# Patient Record
Sex: FEMALE | Employment: OTHER | URBAN - METROPOLITAN AREA
[De-identification: names, ages, dates, MRNs, and addresses within clinical notes are randomized per-mention and may not be internally consistent; named-entity substitution may affect disease eponyms.]

---

## 2018-03-07 PROBLEM — L03.317 CELLULITIS OF BUTTOCK: Status: ACTIVE | Noted: 2018-03-07

## 2018-03-07 PROBLEM — Z23 NEED FOR PROPHYLACTIC VACCINATION AGAINST DIPHTHERIA-TETANUS-PERTUSSIS (DTP): Status: ACTIVE | Noted: 2018-03-07

## 2018-03-19 ENCOUNTER — OFFICE VISIT (OUTPATIENT)
Dept: FAMILY MEDICINE CLINIC | Age: 83
End: 2018-03-19
Payer: MEDICARE

## 2018-03-19 ENCOUNTER — HOSPITAL ENCOUNTER (OUTPATIENT)
Age: 83
Discharge: HOME | End: 2018-03-21
Payer: MEDICARE

## 2018-03-19 VITALS
HEART RATE: 63 BPM | BODY MASS INDEX: 32.02 KG/M2 | DIASTOLIC BLOOD PRESSURE: 80 MMHG | OXYGEN SATURATION: 97 % | WEIGHT: 174 LBS | HEIGHT: 62 IN | SYSTOLIC BLOOD PRESSURE: 120 MMHG | RESPIRATION RATE: 18 BRPM | TEMPERATURE: 98.3 F

## 2018-03-19 DIAGNOSIS — E11.21 TYPE II DIABETES MELLITUS WITH NEPHROPATHY (HCC): ICD-10-CM

## 2018-03-19 DIAGNOSIS — L03.317 CELLULITIS OF BUTTOCK: ICD-10-CM

## 2018-03-19 DIAGNOSIS — N18.30 CKD (CHRONIC KIDNEY DISEASE) STAGE 3, GFR 30-59 ML/MIN (HCC): ICD-10-CM

## 2018-03-19 DIAGNOSIS — R53.83 FATIGUE, UNSPECIFIED TYPE: ICD-10-CM

## 2018-03-19 DIAGNOSIS — E78.2 MIXED HYPERLIPIDEMIA: ICD-10-CM

## 2018-03-19 DIAGNOSIS — R73.01 IFG (IMPAIRED FASTING GLUCOSE): ICD-10-CM

## 2018-03-19 DIAGNOSIS — K21.9 GASTROESOPHAGEAL REFLUX DISEASE WITHOUT ESOPHAGITIS: ICD-10-CM

## 2018-03-19 DIAGNOSIS — I10 ESSENTIAL HYPERTENSION: ICD-10-CM

## 2018-03-19 DIAGNOSIS — L03.317 CELLULITIS OF BUTTOCK: Primary | ICD-10-CM

## 2018-03-19 LAB
ALBUMIN SERPL-MCNC: 3.9 G/DL (ref 3.5–5.2)
ALP BLD-CCNC: 79 U/L (ref 35–104)
ALT SERPL-CCNC: 13 U/L (ref 0–32)
ANION GAP SERPL CALCULATED.3IONS-SCNC: 15 MMOL/L (ref 7–16)
AST SERPL-CCNC: 19 U/L (ref 0–31)
BASOPHILS ABSOLUTE: 0.04 E9/L (ref 0–0.2)
BASOPHILS RELATIVE PERCENT: 0.5 % (ref 0–2)
BILIRUB SERPL-MCNC: 0.7 MG/DL (ref 0–1.2)
BUN BLDV-MCNC: 26 MG/DL (ref 8–23)
CALCIUM SERPL-MCNC: 10.1 MG/DL (ref 8.6–10.2)
CHLORIDE BLD-SCNC: 102 MMOL/L (ref 98–107)
CHOLESTEROL, TOTAL: 199 MG/DL (ref 0–199)
CO2: 25 MMOL/L (ref 22–29)
CREAT SERPL-MCNC: 1.1 MG/DL (ref 0.5–1)
EOSINOPHILS ABSOLUTE: 0.08 E9/L (ref 0.05–0.5)
EOSINOPHILS RELATIVE PERCENT: 1 % (ref 0–6)
GFR AFRICAN AMERICAN: 57
GFR NON-AFRICAN AMERICAN: 47 ML/MIN/1.73
GLUCOSE BLD-MCNC: 191 MG/DL (ref 74–109)
HBA1C MFR BLD: 9.5 % (ref 4.8–5.9)
HCT VFR BLD CALC: 43 % (ref 34–48)
HDLC SERPL-MCNC: 46 MG/DL
HEMOGLOBIN: 14.1 G/DL (ref 11.5–15.5)
IMMATURE GRANULOCYTES #: 0.02 E9/L
IMMATURE GRANULOCYTES %: 0.3 % (ref 0–5)
LDL CHOLESTEROL CALCULATED: 108 MG/DL (ref 0–99)
LYMPHOCYTES ABSOLUTE: 2.02 E9/L (ref 1.5–4)
LYMPHOCYTES RELATIVE PERCENT: 25.5 % (ref 20–42)
MCH RBC QN AUTO: 32 PG (ref 26–35)
MCHC RBC AUTO-ENTMCNC: 32.8 % (ref 32–34.5)
MCV RBC AUTO: 97.7 FL (ref 80–99.9)
MONOCYTES ABSOLUTE: 0.57 E9/L (ref 0.1–0.95)
MONOCYTES RELATIVE PERCENT: 7.2 % (ref 2–12)
NEUTROPHILS ABSOLUTE: 5.19 E9/L (ref 1.8–7.3)
NEUTROPHILS RELATIVE PERCENT: 65.5 % (ref 43–80)
PDW BLD-RTO: 12.9 FL (ref 11.5–15)
PLATELET # BLD: 159 E9/L (ref 130–450)
PMV BLD AUTO: 10.2 FL (ref 7–12)
POTASSIUM SERPL-SCNC: 5.1 MMOL/L (ref 3.5–5)
RBC # BLD: 4.4 E12/L (ref 3.5–5.5)
SODIUM BLD-SCNC: 142 MMOL/L (ref 132–146)
TOTAL PROTEIN: 7.1 G/DL (ref 6.4–8.3)
TRIGL SERPL-MCNC: 224 MG/DL (ref 0–149)
TSH SERPL DL<=0.05 MIU/L-ACNC: 1.13 UIU/ML (ref 0.27–4.2)
VLDLC SERPL CALC-MCNC: 45 MG/DL
WBC # BLD: 7.9 E9/L (ref 4.5–11.5)

## 2018-03-19 PROCEDURE — 99214 OFFICE O/P EST MOD 30 MIN: CPT | Performed by: FAMILY MEDICINE

## 2018-03-19 PROCEDURE — 1090F PRES/ABSN URINE INCON ASSESS: CPT | Performed by: FAMILY MEDICINE

## 2018-03-19 PROCEDURE — G8484 FLU IMMUNIZE NO ADMIN: HCPCS | Performed by: FAMILY MEDICINE

## 2018-03-19 PROCEDURE — 4040F PNEUMOC VAC/ADMIN/RCVD: CPT | Performed by: FAMILY MEDICINE

## 2018-03-19 PROCEDURE — G8417 CALC BMI ABV UP PARAM F/U: HCPCS | Performed by: FAMILY MEDICINE

## 2018-03-19 PROCEDURE — 84443 ASSAY THYROID STIM HORMONE: CPT

## 2018-03-19 PROCEDURE — 83036 HEMOGLOBIN GLYCOSYLATED A1C: CPT

## 2018-03-19 PROCEDURE — 80061 LIPID PANEL: CPT

## 2018-03-19 PROCEDURE — G8400 PT W/DXA NO RESULTS DOC: HCPCS | Performed by: FAMILY MEDICINE

## 2018-03-19 PROCEDURE — 1036F TOBACCO NON-USER: CPT | Performed by: FAMILY MEDICINE

## 2018-03-19 PROCEDURE — 1123F ACP DISCUSS/DSCN MKR DOCD: CPT | Performed by: FAMILY MEDICINE

## 2018-03-19 PROCEDURE — G8427 DOCREV CUR MEDS BY ELIG CLIN: HCPCS | Performed by: FAMILY MEDICINE

## 2018-03-19 PROCEDURE — 80053 COMPREHEN METABOLIC PANEL: CPT

## 2018-03-19 PROCEDURE — 85025 COMPLETE CBC W/AUTO DIFF WBC: CPT

## 2018-03-19 RX ORDER — KETOCONAZOLE 20 MG/G
CREAM TOPICAL
Qty: 60 G | Refills: 1 | Status: SHIPPED | OUTPATIENT
Start: 2018-03-19 | End: 2018-04-04 | Stop reason: CLARIF

## 2018-03-20 DIAGNOSIS — E11.21 TYPE II DIABETES MELLITUS WITH NEPHROPATHY (HCC): Primary | ICD-10-CM

## 2018-03-20 RX ORDER — BLOOD-GLUCOSE METER
1 KIT MISCELLANEOUS 2 TIMES DAILY
Qty: 1 KIT | Refills: 0 | Status: ON HOLD | OUTPATIENT
Start: 2018-03-20 | End: 2019-10-24

## 2018-03-20 NOTE — TELEPHONE ENCOUNTER
Glucometer order sent to [facility] per [facility] request.

## 2018-03-23 ASSESSMENT — ENCOUNTER SYMPTOMS
STRIDOR: 0
PHOTOPHOBIA: 0
SPUTUM PRODUCTION: 0
ORTHOPNEA: 0
SHORTNESS OF BREATH: 0
RESPIRATORY NEGATIVE: 1
DOUBLE VISION: 0
CONSTIPATION: 0
EYES NEGATIVE: 1
HEMOPTYSIS: 0
BLOOD IN STOOL: 0
EYE REDNESS: 0
WHEEZING: 0
COUGH: 0
NAUSEA: 0
BLURRED VISION: 0
EYE DISCHARGE: 0
SORE THROAT: 0
BACK PAIN: 0
DIARRHEA: 0
HEARTBURN: 1
EYE PAIN: 0
SINUS PAIN: 0
ABDOMINAL PAIN: 0
VOMITING: 0

## 2018-03-23 NOTE — PROGRESS NOTES
nervous/anxious. The patient does not have insomnia. Past Medical/Surgical Hx;  Reviewed with patient      Diagnosis Date    Anxiety     Depression     Hypertension     Peripheral neuropathy ([address]      Past Surgical History:   Procedure Laterality Date    CHOLECYSTECTOMY      JOINT REPLACEMENT      SKIN CANCER EXCISION         Past Family Hx:  Reviewed with patient  No family history on file. Social Hx:  Reviewed with patient  Social History   Substance Use Topics    Smoking status: Never Smoker    Smokeless tobacco: Never Used    Alcohol use No       OBJECTIVE  /80   Pulse 63   Temp 98.3 °F (36.8 °C) (Temporal)   Resp 18   Ht 5' 2\" (1.575 m)   Wt 174 lb (78.9 kg)   LMP  (LMP Unknown)   SpO2 97%   Breastfeeding? No   BMI 31.83 kg/m²     Problem List:  Jo Ann Morrow  does not have any pertinent problems on file. PHYS EX:  Physical Exam   Constitutional: She is oriented to person, place, and time. She appears well-developed and well-nourished. No distress. HENT:   Head: Normocephalic and atraumatic. Right Ear: External ear normal.   Left Ear: External ear normal.   Nose: Nose normal.   Mouth/Throat: Oropharynx is clear and moist. No oropharyngeal exudate. Eyes: Conjunctivae and EOM are normal. Pupils are equal, round, and reactive to light. Right eye exhibits no discharge. Left eye exhibits no discharge. No scleral icterus. Neck: Normal range of motion. Neck supple. No JVD present. No tracheal deviation present. No thyromegaly present. Cardiovascular: Normal rate, regular rhythm, normal heart sounds and intact distal pulses. Exam reveals no gallop and no friction rub. No murmur heard. Pulmonary/Chest: Effort normal and breath sounds normal. No stridor. No respiratory distress. She has no wheezes. She has no rales. She exhibits no tenderness. Abdominal: Soft. Bowel sounds are normal. She exhibits no distension and no mass. There is no tenderness.  There is no rebound (impaired fasting glucose)  -     CBC Auto Differential; Future  -     Comprehensive Metabolic Panel; Future  -     Lipid Panel; Future  -     TSH without Reflex; Future  -     HEMOGLOBIN A1C; Future  Instructed on lab. Fatigue, unspecified type  -     CBC Auto Differential; Future  -     Comprehensive Metabolic Panel; Future  Not controlled. Lab. Outpatient Encounter Prescriptions as of 3/19/2018   Medication Sig Dispense Refill    ketoconazole (NIZORAL) 2 % cream Apply topically daily. 60 g 1    montelukast (SINGULAIR) 10 MG tablet TAKE 1 TABLET NIGHTLY 90 tablet 1    mupirocin (BACTROBAN) 2 % cream Apply 3 times daily. 1 Tube 0    carBAMazepine (TEGRETOL-XR) 100 MG extended release tablet Take 1 tablet by mouth 2 times daily 180 tablet 1    furosemide (LASIX) 20 MG tablet Take 1 tablet by mouth daily 90 tablet 1    nitroGLYCERIN (NITROSTAT) 0.4 MG SL tablet Place 1 tablet under the tongue every 5 minutes as needed for Chest pain 25 tablet 3    albuterol sulfate  (90 Base) MCG/ACT inhaler Inhale 2 puffs into the lungs every 6 hours as needed for Wheezing or Shortness of Breath 1 Inhaler 5    ipratropium-albuterol (DUONEB) 0.5-2.5 (3) MG/3ML SOLN nebulizer solution Take 3 mLs by nebulization every 6 hours 120 vial 5    gabapentin (NEURONTIN) 300 MG capsule Take two tablets three times a day. 540 capsule 1    atenolol (TENORMIN) 50 MG tablet Take 1 tablet by mouth daily 90 tablet 1    busPIRone (BUSPAR) 5 MG tablet Take 1 tablet by mouth 3 times daily 270 tablet 1    sertraline (ZOLOFT) 100 MG tablet Take 1 1/2 tablets once a day 135 tablet 1    SYMBICORT 160-4.5 MCG/ACT AERO Inhale 2 puffs into the lungs 2 times daily 1 Inhaler 3    Omega-3-Acid Eth Est, Dietary, 1 G CAPS Take 2 tablets by mouth 2 times daily      Liraglutide (VICTOZA) 18 MG/3ML SOPN SC injection Inject 1.2 mg into the skin daily      aspirin EC 81 MG EC tablet Take 81 mg by mouth daily.       Calcium-Vitamin D

## 2018-03-26 ENCOUNTER — TELEPHONE (OUTPATIENT)
Dept: FAMILY MEDICINE CLINIC | Age: 83
End: 2018-03-26

## 2018-03-26 DIAGNOSIS — L03.317 CELLULITIS OF BUTTOCK: ICD-10-CM

## 2018-03-26 RX ORDER — MUPIROCIN CALCIUM 20 MG/G
CREAM TOPICAL
Qty: 1 TUBE | Refills: 3 | Status: SHIPPED | OUTPATIENT
Start: 2018-03-26 | End: 2018-04-25

## 2018-04-02 RX ORDER — ATENOLOL 50 MG/1
50 TABLET ORAL DAILY
Qty: 90 TABLET | Refills: 1 | Status: SHIPPED | OUTPATIENT
Start: 2018-04-02 | End: 2018-11-26 | Stop reason: SDUPTHER

## 2018-04-04 ENCOUNTER — HOSPITAL ENCOUNTER (OUTPATIENT)
Age: 83
Discharge: HOME | End: 2018-04-06
Payer: MEDICARE

## 2018-04-04 ENCOUNTER — OFFICE VISIT (OUTPATIENT)
Dept: FAMILY MEDICINE CLINIC | Age: 83
End: 2018-04-04
Payer: MEDICARE

## 2018-04-04 ENCOUNTER — TELEPHONE (OUTPATIENT)
Dept: FAMILY MEDICINE CLINIC | Age: 83
End: 2018-04-04

## 2018-04-04 VITALS
WEIGHT: 172.6 LBS | OXYGEN SATURATION: 95 % | RESPIRATION RATE: 20 BRPM | BODY MASS INDEX: 31.76 KG/M2 | SYSTOLIC BLOOD PRESSURE: 118 MMHG | HEIGHT: 62 IN | DIASTOLIC BLOOD PRESSURE: 78 MMHG | HEART RATE: 72 BPM

## 2018-04-04 DIAGNOSIS — R32 URINARY INCONTINENCE, UNSPECIFIED TYPE: ICD-10-CM

## 2018-04-04 DIAGNOSIS — L03.317 CELLULITIS OF BUTTOCK: Primary | ICD-10-CM

## 2018-04-04 DIAGNOSIS — E11.65 TYPE 2 DIABETES MELLITUS WITH HYPERGLYCEMIA, UNSPECIFIED LONG TERM INSULIN USE STATUS: ICD-10-CM

## 2018-04-04 DIAGNOSIS — N18.30 CKD (CHRONIC KIDNEY DISEASE) STAGE 3, GFR 30-59 ML/MIN (HCC): ICD-10-CM

## 2018-04-04 DIAGNOSIS — M19.90 OSTEOARTHRITIS, UNSPECIFIED OSTEOARTHRITIS TYPE, UNSPECIFIED SITE: ICD-10-CM

## 2018-04-04 PROCEDURE — G8427 DOCREV CUR MEDS BY ELIG CLIN: HCPCS | Performed by: FAMILY MEDICINE

## 2018-04-04 PROCEDURE — 99214 OFFICE O/P EST MOD 30 MIN: CPT | Performed by: FAMILY MEDICINE

## 2018-04-04 PROCEDURE — 87070 CULTURE OTHR SPECIMN AEROBIC: CPT

## 2018-04-04 PROCEDURE — G8400 PT W/DXA NO RESULTS DOC: HCPCS | Performed by: FAMILY MEDICINE

## 2018-04-04 PROCEDURE — 1036F TOBACCO NON-USER: CPT | Performed by: FAMILY MEDICINE

## 2018-04-04 PROCEDURE — 1123F ACP DISCUSS/DSCN MKR DOCD: CPT | Performed by: FAMILY MEDICINE

## 2018-04-04 PROCEDURE — 0509F URINE INCON PLAN DOCD: CPT | Performed by: FAMILY MEDICINE

## 2018-04-04 PROCEDURE — 1090F PRES/ABSN URINE INCON ASSESS: CPT | Performed by: FAMILY MEDICINE

## 2018-04-04 PROCEDURE — 4040F PNEUMOC VAC/ADMIN/RCVD: CPT | Performed by: FAMILY MEDICINE

## 2018-04-04 PROCEDURE — G8417 CALC BMI ABV UP PARAM F/U: HCPCS | Performed by: FAMILY MEDICINE

## 2018-04-04 PROCEDURE — 87077 CULTURE AEROBIC IDENTIFY: CPT

## 2018-04-04 PROCEDURE — 87186 SC STD MICRODIL/AGAR DIL: CPT

## 2018-04-04 RX ORDER — DOXYCYCLINE HYCLATE 100 MG
100 TABLET ORAL 2 TIMES DAILY
Qty: 20 TABLET | Refills: 0 | Status: SHIPPED | OUTPATIENT
Start: 2018-04-04 | End: 2018-04-14

## 2018-04-07 LAB
ORGANISM: ABNORMAL
ORGANISM: ABNORMAL
WOUND/ABSCESS: ABNORMAL
WOUND/ABSCESS: ABNORMAL

## 2018-04-08 PROBLEM — M19.90 OSTEOARTHRITIS: Status: ACTIVE | Noted: 2018-04-08

## 2018-04-08 PROBLEM — R32 URINARY INCONTINENCE: Status: ACTIVE | Noted: 2018-04-08

## 2018-04-08 ASSESSMENT — ENCOUNTER SYMPTOMS
EYE DISCHARGE: 0
VOMITING: 0
DOUBLE VISION: 0
NAUSEA: 0
CONSTIPATION: 0
HEARTBURN: 0
BACK PAIN: 1
EYES NEGATIVE: 1
BLURRED VISION: 0
ORTHOPNEA: 0
SORE THROAT: 0
STRIDOR: 0
SPUTUM PRODUCTION: 0
EYE REDNESS: 0
EYE PAIN: 0
COUGH: 0
DIARRHEA: 0
SHORTNESS OF BREATH: 0
ABDOMINAL PAIN: 0
GASTROINTESTINAL NEGATIVE: 1
WHEEZING: 0
PHOTOPHOBIA: 0
SINUS PAIN: 0
RESPIRATORY NEGATIVE: 1
BLOOD IN STOOL: 0
HEMOPTYSIS: 0

## 2018-04-13 ENCOUNTER — HOSPITAL ENCOUNTER (OUTPATIENT)
Dept: WOUND CARE | Age: 83
Discharge: HOME | End: 2018-04-13
Payer: MEDICARE

## 2018-04-13 ENCOUNTER — HOSPITAL ENCOUNTER (OUTPATIENT)
Dept: WOUND CARE | Age: 83
Discharge: HOME | End: 2018-04-13

## 2018-04-13 VITALS
HEIGHT: 62 IN | SYSTOLIC BLOOD PRESSURE: 102 MMHG | WEIGHT: 172 LBS | RESPIRATION RATE: 18 BRPM | HEART RATE: 62 BPM | BODY MASS INDEX: 31.65 KG/M2 | TEMPERATURE: 97.9 F | DIASTOLIC BLOOD PRESSURE: 62 MMHG

## 2018-04-13 DIAGNOSIS — L89.152 DECUBITUS ULCER OF COCCYGEAL REGION, STAGE 2 (HCC): ICD-10-CM

## 2018-04-13 PROCEDURE — 11042 DBRDMT SUBQ TIS 1ST 20SQCM/<: CPT

## 2018-04-13 PROCEDURE — 97597 DBRDMT OPN WND 1ST 20 CM/<: CPT | Performed by: NURSE PRACTITIONER

## 2018-04-13 RX ORDER — KETOCONAZOLE 20 MG/G
CREAM TOPICAL DAILY
Status: ON HOLD | COMMUNITY
End: 2018-08-17 | Stop reason: HOSPADM

## 2018-04-20 ENCOUNTER — HOSPITAL ENCOUNTER (OUTPATIENT)
Dept: WOUND CARE | Age: 83
Discharge: HOME | End: 2018-04-20
Payer: MEDICARE

## 2018-04-27 ENCOUNTER — HOSPITAL ENCOUNTER (OUTPATIENT)
Dept: WOUND CARE | Age: 83
Discharge: HOME | End: 2018-04-27
Payer: MEDICARE

## 2018-04-27 VITALS
HEIGHT: 62 IN | HEART RATE: 67 BPM | SYSTOLIC BLOOD PRESSURE: 136 MMHG | DIASTOLIC BLOOD PRESSURE: 74 MMHG | WEIGHT: 180 LBS | TEMPERATURE: 97.9 F | RESPIRATION RATE: 16 BRPM | BODY MASS INDEX: 33.13 KG/M2

## 2018-04-27 DIAGNOSIS — L89.152 DECUBITUS ULCER OF COCCYGEAL REGION, STAGE 2 (HCC): ICD-10-CM

## 2018-04-27 PROCEDURE — 11042 DBRDMT SUBQ TIS 1ST 20SQCM/<: CPT

## 2018-04-27 PROCEDURE — 97597 DBRDMT OPN WND 1ST 20 CM/<: CPT | Performed by: NURSE PRACTITIONER

## 2018-05-08 RX ORDER — SERTRALINE HYDROCHLORIDE 100 MG/1
TABLET, FILM COATED ORAL
Qty: 135 TABLET | Refills: 1 | Status: SHIPPED | OUTPATIENT
Start: 2018-05-08 | End: 2018-11-26 | Stop reason: SDUPTHER

## 2018-05-11 ENCOUNTER — HOSPITAL ENCOUNTER (OUTPATIENT)
Dept: WOUND CARE | Age: 83
Discharge: HOME | End: 2018-05-11
Payer: MEDICARE

## 2018-05-11 VITALS
HEART RATE: 80 BPM | DIASTOLIC BLOOD PRESSURE: 82 MMHG | TEMPERATURE: 98.2 F | SYSTOLIC BLOOD PRESSURE: 152 MMHG | RESPIRATION RATE: 16 BRPM

## 2018-05-11 PROCEDURE — 97597 DBRDMT OPN WND 1ST 20 CM/<: CPT

## 2018-05-11 PROCEDURE — 97597 DBRDMT OPN WND 1ST 20 CM/<: CPT | Performed by: NURSE PRACTITIONER

## 2018-05-22 ENCOUNTER — TELEPHONE (OUTPATIENT)
Dept: FAMILY MEDICINE CLINIC | Age: 83
End: 2018-05-22

## 2018-05-25 ENCOUNTER — HOSPITAL ENCOUNTER (OUTPATIENT)
Dept: WOUND CARE | Age: 83
Discharge: HOME | End: 2018-05-25

## 2018-06-01 ENCOUNTER — HOSPITAL ENCOUNTER (OUTPATIENT)
Dept: WOUND CARE | Age: 83
Discharge: HOME | End: 2018-06-01
Payer: MEDICARE

## 2018-06-01 VITALS
RESPIRATION RATE: 18 BRPM | HEART RATE: 80 BPM | SYSTOLIC BLOOD PRESSURE: 122 MMHG | HEIGHT: 62 IN | BODY MASS INDEX: 33.13 KG/M2 | WEIGHT: 180 LBS | DIASTOLIC BLOOD PRESSURE: 62 MMHG | TEMPERATURE: 98.1 F

## 2018-06-01 DIAGNOSIS — L89.152 DECUBITUS ULCER OF COCCYGEAL REGION, STAGE 2 (HCC): ICD-10-CM

## 2018-06-01 PROCEDURE — 99213 OFFICE O/P EST LOW 20 MIN: CPT

## 2018-06-01 PROCEDURE — 99212 OFFICE O/P EST SF 10 MIN: CPT | Performed by: NURSE PRACTITIONER

## 2018-06-08 ENCOUNTER — OFFICE VISIT (OUTPATIENT)
Dept: FAMILY MEDICINE CLINIC | Age: 83
End: 2018-06-08
Payer: MEDICARE

## 2018-06-08 VITALS
DIASTOLIC BLOOD PRESSURE: 66 MMHG | SYSTOLIC BLOOD PRESSURE: 128 MMHG | OXYGEN SATURATION: 95 % | BODY MASS INDEX: 30.73 KG/M2 | HEART RATE: 72 BPM | WEIGHT: 167 LBS | HEIGHT: 62 IN

## 2018-06-08 DIAGNOSIS — E11.21 TYPE II DIABETES MELLITUS WITH NEPHROPATHY (HCC): Primary | ICD-10-CM

## 2018-06-08 DIAGNOSIS — G62.9 PERIPHERAL POLYNEUROPATHY: ICD-10-CM

## 2018-06-08 DIAGNOSIS — L30.9 DERMATITIS: ICD-10-CM

## 2018-06-08 DIAGNOSIS — I10 ESSENTIAL HYPERTENSION: ICD-10-CM

## 2018-06-08 DIAGNOSIS — L03.317 CELLULITIS OF BUTTOCK: ICD-10-CM

## 2018-06-08 LAB
GLUCOSE BLD-MCNC: 153 MG/DL
HBA1C MFR BLD: 7.2 %

## 2018-06-08 PROCEDURE — G8417 CALC BMI ABV UP PARAM F/U: HCPCS | Performed by: FAMILY MEDICINE

## 2018-06-08 PROCEDURE — 1090F PRES/ABSN URINE INCON ASSESS: CPT | Performed by: FAMILY MEDICINE

## 2018-06-08 PROCEDURE — 82962 GLUCOSE BLOOD TEST: CPT | Performed by: FAMILY MEDICINE

## 2018-06-08 PROCEDURE — 99214 OFFICE O/P EST MOD 30 MIN: CPT | Performed by: FAMILY MEDICINE

## 2018-06-08 PROCEDURE — 1036F TOBACCO NON-USER: CPT | Performed by: FAMILY MEDICINE

## 2018-06-08 PROCEDURE — G8427 DOCREV CUR MEDS BY ELIG CLIN: HCPCS | Performed by: FAMILY MEDICINE

## 2018-06-08 PROCEDURE — 4040F PNEUMOC VAC/ADMIN/RCVD: CPT | Performed by: FAMILY MEDICINE

## 2018-06-08 PROCEDURE — 83036 HEMOGLOBIN GLYCOSYLATED A1C: CPT | Performed by: FAMILY MEDICINE

## 2018-06-08 PROCEDURE — G8400 PT W/DXA NO RESULTS DOC: HCPCS | Performed by: FAMILY MEDICINE

## 2018-06-08 PROCEDURE — 1123F ACP DISCUSS/DSCN MKR DOCD: CPT | Performed by: FAMILY MEDICINE

## 2018-06-08 RX ORDER — NYSTATIN 100000 [USP'U]/G
POWDER TOPICAL
Qty: 1 BOTTLE | Refills: 1 | Status: ON HOLD | OUTPATIENT
Start: 2018-06-08 | End: 2018-08-17 | Stop reason: HOSPADM

## 2018-06-12 ASSESSMENT — ENCOUNTER SYMPTOMS
VOMITING: 0
HEARTBURN: 0
EYE REDNESS: 0
SHORTNESS OF BREATH: 0
STRIDOR: 0
SINUS PAIN: 0
BLURRED VISION: 0
NAUSEA: 0
ORTHOPNEA: 0
EYES NEGATIVE: 1
RESPIRATORY NEGATIVE: 1
BACK PAIN: 0
COUGH: 0
DOUBLE VISION: 0
GASTROINTESTINAL NEGATIVE: 1
BLOOD IN STOOL: 0
ABDOMINAL PAIN: 0
SORE THROAT: 0
SPUTUM PRODUCTION: 0
DIARRHEA: 0
WHEEZING: 0
EYE PAIN: 0
PHOTOPHOBIA: 0
EYE DISCHARGE: 0
CONSTIPATION: 0
HEMOPTYSIS: 0

## 2018-06-19 ENCOUNTER — TELEPHONE (OUTPATIENT)
Dept: FAMILY MEDICINE CLINIC | Age: 83
End: 2018-06-19

## 2018-06-19 DIAGNOSIS — E11.21 TYPE II DIABETES MELLITUS WITH NEPHROPATHY (HCC): Primary | ICD-10-CM

## 2018-06-27 DIAGNOSIS — E11.21 TYPE II DIABETES MELLITUS WITH NEPHROPATHY (HCC): ICD-10-CM

## 2018-06-27 DIAGNOSIS — N18.30 CKD (CHRONIC KIDNEY DISEASE) STAGE 3, GFR 30-59 ML/MIN (HCC): ICD-10-CM

## 2018-06-27 RX ORDER — GABAPENTIN 300 MG/1
CAPSULE ORAL
Qty: 540 CAPSULE | Refills: 1 | Status: SHIPPED | OUTPATIENT
Start: 2018-06-27 | End: 2018-11-26 | Stop reason: SDUPTHER

## 2018-08-13 ENCOUNTER — TELEPHONE (OUTPATIENT)
Dept: FAMILY MEDICINE CLINIC | Age: 83
End: 2018-08-13

## 2018-08-13 ENCOUNTER — HOSPITAL ENCOUNTER (INPATIENT)
Age: 83
LOS: 4 days | Discharge: NURSING, REHAB OR LONG-TERM CARE | DRG: 193 | End: 2018-08-17
Attending: EMERGENCY MEDICINE | Admitting: INTERNAL MEDICINE
Payer: MEDICARE

## 2018-08-13 ENCOUNTER — HOSPITAL ENCOUNTER (OUTPATIENT)
Age: 83
Discharge: HOME | End: 2018-08-13
Payer: MEDICARE

## 2018-08-13 ENCOUNTER — APPOINTMENT (OUTPATIENT)
Dept: GENERAL RADIOLOGY | Age: 83
DRG: 193 | End: 2018-08-13
Payer: MEDICARE

## 2018-08-13 DIAGNOSIS — J18.9 PNEUMONIA DUE TO ORGANISM: Primary | ICD-10-CM

## 2018-08-13 PROBLEM — R65.10 SIRS (SYSTEMIC INFLAMMATORY RESPONSE SYNDROME) (HCC): Status: ACTIVE | Noted: 2018-08-13

## 2018-08-13 PROBLEM — I10 HTN (HYPERTENSION), BENIGN: Chronic | Status: ACTIVE | Noted: 2018-08-13

## 2018-08-13 PROBLEM — L03.317 CELLULITIS OF BUTTOCK: Status: RESOLVED | Noted: 2018-03-07 | Resolved: 2018-08-13

## 2018-08-13 PROBLEM — J96.01 ACUTE HYPOXEMIC RESPIRATORY FAILURE (HCC): Status: ACTIVE | Noted: 2018-08-13

## 2018-08-13 LAB
ALBUMIN SERPL-MCNC: 4 G/DL (ref 3.5–5.2)
ALP BLD-CCNC: 70 U/L (ref 35–104)
ALT SERPL-CCNC: 58 U/L (ref 0–32)
ANION GAP SERPL CALCULATED.3IONS-SCNC: 15 MMOL/L (ref 7–16)
APTT: 31 SEC (ref 25.7–34.7)
AST SERPL-CCNC: 95 U/L (ref 0–31)
BASOPHILS ABSOLUTE: 0.06 E9/L (ref 0–0.2)
BASOPHILS RELATIVE PERCENT: 0.3 % (ref 0–2)
BILIRUB SERPL-MCNC: 0.7 MG/DL (ref 0–1.2)
BILIRUBIN URINE: NEGATIVE
BLOOD, URINE: NEGATIVE
BUN BLDV-MCNC: 25 MG/DL (ref 8–23)
CALCIUM SERPL-MCNC: 9.8 MG/DL (ref 8.6–10.2)
CHLORIDE BLD-SCNC: 99 MMOL/L (ref 98–107)
CLARITY: CLEAR
CO2: 27 MMOL/L (ref 22–29)
COLOR: YELLOW
CREAT SERPL-MCNC: 1.3 MG/DL (ref 0.5–1)
EOSINOPHILS ABSOLUTE: 0.08 E9/L (ref 0.05–0.5)
EOSINOPHILS RELATIVE PERCENT: 0.4 % (ref 0–6)
GFR AFRICAN AMERICAN: 47
GFR NON-AFRICAN AMERICAN: 39 ML/MIN/1.73
GLUCOSE BLD-MCNC: 320 MG/DL (ref 74–109)
GLUCOSE URINE: 500 MG/DL
HCT VFR BLD CALC: 38.7 % (ref 34–48)
HEMOGLOBIN: 13.3 G/DL (ref 11.5–15.5)
IMMATURE GRANULOCYTES #: 0.16 E9/L
IMMATURE GRANULOCYTES %: 0.8 % (ref 0–5)
INR BLD: 1.1
KETONES, URINE: NEGATIVE MG/DL
LACTIC ACID: 3.2 MMOL/L (ref 0.5–2.2)
LEUKOCYTE ESTERASE, URINE: NEGATIVE
LIPASE: 24 U/L (ref 13–60)
LYMPHOCYTES ABSOLUTE: 1.37 E9/L (ref 1.5–4)
LYMPHOCYTES RELATIVE PERCENT: 6.7 % (ref 20–42)
MCH RBC QN AUTO: 33.2 PG (ref 26–35)
MCHC RBC AUTO-ENTMCNC: 34.4 % (ref 32–34.5)
MCV RBC AUTO: 96.5 FL (ref 80–99.9)
METER GLUCOSE: 223 MG/DL (ref 70–110)
MONOCYTES ABSOLUTE: 0.95 E9/L (ref 0.1–0.95)
MONOCYTES RELATIVE PERCENT: 4.6 % (ref 2–12)
NEUTROPHILS ABSOLUTE: 17.96 E9/L (ref 1.8–7.3)
NEUTROPHILS RELATIVE PERCENT: 87.2 % (ref 43–80)
NITRITE, URINE: NEGATIVE
PDW BLD-RTO: 13 FL (ref 11.5–15)
PH UA: 6.5 (ref 5–9)
PLATELET # BLD: 165 E9/L (ref 130–450)
PMV BLD AUTO: 9.2 FL (ref 7–12)
POTASSIUM SERPL-SCNC: 4.5 MMOL/L (ref 3.5–5)
PRO-BNP: 1445 PG/ML (ref 0–450)
PROTEIN UA: NEGATIVE MG/DL
PROTHROMBIN TIME: 12.5 SEC (ref 9.3–12.4)
RBC # BLD: 4.01 E12/L (ref 3.5–5.5)
SODIUM BLD-SCNC: 141 MMOL/L (ref 132–146)
SPECIFIC GRAVITY UA: 1.01 (ref 1–1.03)
TOTAL PROTEIN: 7.2 G/DL (ref 6.4–8.3)
TROPONIN: 0.1 NG/ML (ref 0–0.03)
UROBILINOGEN, URINE: 0.2 E.U./DL
WBC # BLD: 20.6 E9/L (ref 4.5–11.5)

## 2018-08-13 PROCEDURE — 87798 DETECT AGENT NOS DNA AMP: CPT

## 2018-08-13 PROCEDURE — 87581 M.PNEUMON DNA AMP PROBE: CPT

## 2018-08-13 PROCEDURE — 82962 GLUCOSE BLOOD TEST: CPT

## 2018-08-13 PROCEDURE — 84484 ASSAY OF TROPONIN QUANT: CPT

## 2018-08-13 PROCEDURE — 36415 COLL VENOUS BLD VENIPUNCTURE: CPT

## 2018-08-13 PROCEDURE — A0425 GROUND MILEAGE: HCPCS

## 2018-08-13 PROCEDURE — A0426 ALS 1: HCPCS

## 2018-08-13 PROCEDURE — 6360000002 HC RX W HCPCS: Performed by: INTERNAL MEDICINE

## 2018-08-13 PROCEDURE — 87502 INFLUENZA DNA AMP PROBE: CPT

## 2018-08-13 PROCEDURE — 71045 X-RAY EXAM CHEST 1 VIEW: CPT

## 2018-08-13 PROCEDURE — 2700000000 HC OXYGEN THERAPY PER DAY

## 2018-08-13 PROCEDURE — 83880 ASSAY OF NATRIURETIC PEPTIDE: CPT

## 2018-08-13 PROCEDURE — 80053 COMPREHEN METABOLIC PANEL: CPT

## 2018-08-13 PROCEDURE — 85730 THROMBOPLASTIN TIME PARTIAL: CPT

## 2018-08-13 PROCEDURE — 83605 ASSAY OF LACTIC ACID: CPT

## 2018-08-13 PROCEDURE — 2060000000 HC ICU INTERMEDIATE R&B

## 2018-08-13 PROCEDURE — 6360000002 HC RX W HCPCS: Performed by: NURSE PRACTITIONER

## 2018-08-13 PROCEDURE — 99285 EMERGENCY DEPT VISIT HI MDM: CPT

## 2018-08-13 PROCEDURE — 2580000003 HC RX 258: Performed by: NURSE PRACTITIONER

## 2018-08-13 PROCEDURE — 87040 BLOOD CULTURE FOR BACTERIA: CPT

## 2018-08-13 PROCEDURE — 85025 COMPLETE CBC W/AUTO DIFF WBC: CPT

## 2018-08-13 PROCEDURE — 83690 ASSAY OF LIPASE: CPT

## 2018-08-13 PROCEDURE — 87503 INFLUENZA DNA AMP PROB ADDL: CPT

## 2018-08-13 PROCEDURE — 87088 URINE BACTERIA CULTURE: CPT

## 2018-08-13 PROCEDURE — 81003 URINALYSIS AUTO W/O SCOPE: CPT

## 2018-08-13 PROCEDURE — 6370000000 HC RX 637 (ALT 250 FOR IP): Performed by: INTERNAL MEDICINE

## 2018-08-13 PROCEDURE — 85610 PROTHROMBIN TIME: CPT

## 2018-08-13 PROCEDURE — 87486 CHLMYD PNEUM DNA AMP PROBE: CPT

## 2018-08-13 PROCEDURE — 2580000003 HC RX 258: Performed by: INTERNAL MEDICINE

## 2018-08-13 RX ORDER — IPRATROPIUM BROMIDE AND ALBUTEROL SULFATE 2.5; .5 MG/3ML; MG/3ML
1 SOLUTION RESPIRATORY (INHALATION)
Status: DISCONTINUED | OUTPATIENT
Start: 2018-08-14 | End: 2018-08-17 | Stop reason: HOSPADM

## 2018-08-13 RX ORDER — ATENOLOL 50 MG/1
50 TABLET ORAL DAILY
Status: DISCONTINUED | OUTPATIENT
Start: 2018-08-14 | End: 2018-08-17 | Stop reason: HOSPADM

## 2018-08-13 RX ORDER — SERTRALINE HYDROCHLORIDE 100 MG/1
150 TABLET, FILM COATED ORAL DAILY
Status: DISCONTINUED | OUTPATIENT
Start: 2018-08-14 | End: 2018-08-17 | Stop reason: HOSPADM

## 2018-08-13 RX ORDER — ONDANSETRON 2 MG/ML
4 INJECTION INTRAMUSCULAR; INTRAVENOUS EVERY 6 HOURS PRN
Status: DISCONTINUED | OUTPATIENT
Start: 2018-08-13 | End: 2018-08-17 | Stop reason: HOSPADM

## 2018-08-13 RX ORDER — NICOTINE POLACRILEX 4 MG
15 LOZENGE BUCCAL PRN
Status: DISCONTINUED | OUTPATIENT
Start: 2018-08-13 | End: 2018-08-17 | Stop reason: HOSPADM

## 2018-08-13 RX ORDER — ACETAMINOPHEN 325 MG/1
650 TABLET ORAL EVERY 4 HOURS PRN
Status: DISCONTINUED | OUTPATIENT
Start: 2018-08-13 | End: 2018-08-17 | Stop reason: HOSPADM

## 2018-08-13 RX ORDER — SODIUM CHLORIDE 0.9 % (FLUSH) 0.9 %
10 SYRINGE (ML) INJECTION EVERY 12 HOURS SCHEDULED
Status: DISCONTINUED | OUTPATIENT
Start: 2018-08-13 | End: 2018-08-17 | Stop reason: HOSPADM

## 2018-08-13 RX ORDER — GUAIFENESIN/DEXTROMETHORPHAN 100-10MG/5
5 SYRUP ORAL EVERY 4 HOURS PRN
Status: DISCONTINUED | OUTPATIENT
Start: 2018-08-13 | End: 2018-08-17 | Stop reason: HOSPADM

## 2018-08-13 RX ORDER — DOXYCYCLINE HYCLATE 100 MG/1
100 CAPSULE ORAL EVERY 12 HOURS SCHEDULED
Status: DISCONTINUED | OUTPATIENT
Start: 2018-08-13 | End: 2018-08-17 | Stop reason: HOSPADM

## 2018-08-13 RX ORDER — ASPIRIN 81 MG/1
81 TABLET ORAL DAILY
Status: DISCONTINUED | OUTPATIENT
Start: 2018-08-14 | End: 2018-08-17 | Stop reason: HOSPADM

## 2018-08-13 RX ORDER — VANCOMYCIN HYDROCHLORIDE 500 MG/10ML
INJECTION, POWDER, LYOPHILIZED, FOR SOLUTION INTRAVENOUS
Status: DISCONTINUED
Start: 2018-08-13 | End: 2018-08-13 | Stop reason: WASHOUT

## 2018-08-13 RX ORDER — GABAPENTIN 300 MG/1
600 CAPSULE ORAL 3 TIMES DAILY
Status: DISCONTINUED | OUTPATIENT
Start: 2018-08-13 | End: 2018-08-17 | Stop reason: HOSPADM

## 2018-08-13 RX ORDER — DEXTROSE MONOHYDRATE 25 G/50ML
12.5 INJECTION, SOLUTION INTRAVENOUS PRN
Status: DISCONTINUED | OUTPATIENT
Start: 2018-08-13 | End: 2018-08-17 | Stop reason: HOSPADM

## 2018-08-13 RX ORDER — DEXTROSE MONOHYDRATE 50 MG/ML
100 INJECTION, SOLUTION INTRAVENOUS PRN
Status: DISCONTINUED | OUTPATIENT
Start: 2018-08-13 | End: 2018-08-17 | Stop reason: HOSPADM

## 2018-08-13 RX ORDER — PIPERACILLIN SODIUM, TAZOBACTAM SODIUM 3; .375 G/15ML; G/15ML
INJECTION, POWDER, LYOPHILIZED, FOR SOLUTION INTRAVENOUS
Status: DISCONTINUED
Start: 2018-08-13 | End: 2018-08-14

## 2018-08-13 RX ORDER — BUSPIRONE HYDROCHLORIDE 5 MG/1
5 TABLET ORAL 3 TIMES DAILY
Status: DISCONTINUED | OUTPATIENT
Start: 2018-08-13 | End: 2018-08-16

## 2018-08-13 RX ORDER — SODIUM CHLORIDE 0.9 % (FLUSH) 0.9 %
10 SYRINGE (ML) INJECTION PRN
Status: DISCONTINUED | OUTPATIENT
Start: 2018-08-13 | End: 2018-08-17 | Stop reason: HOSPADM

## 2018-08-13 RX ORDER — MONTELUKAST SODIUM 10 MG/1
10 TABLET ORAL NIGHTLY
Status: DISCONTINUED | OUTPATIENT
Start: 2018-08-13 | End: 2018-08-17 | Stop reason: HOSPADM

## 2018-08-13 RX ORDER — 0.9 % SODIUM CHLORIDE 0.9 %
30 INTRAVENOUS SOLUTION INTRAVENOUS ONCE
Status: DISCONTINUED | OUTPATIENT
Start: 2018-08-13 | End: 2018-08-13

## 2018-08-13 RX ADMIN — GABAPENTIN 600 MG: 300 CAPSULE ORAL at 23:37

## 2018-08-13 RX ADMIN — Medication 10 ML: at 22:16

## 2018-08-13 RX ADMIN — INSULIN LISPRO 2 UNITS: 100 INJECTION, SOLUTION INTRAVENOUS; SUBCUTANEOUS at 22:15

## 2018-08-13 RX ADMIN — MONTELUKAST SODIUM 10 MG: 10 TABLET, FILM COATED ORAL at 23:37

## 2018-08-13 RX ADMIN — PIPERACILLIN SODIUM, TAZOBACTAM SODIUM 3.38 G: 3; .375 INJECTION, POWDER, LYOPHILIZED, FOR SOLUTION INTRAVENOUS at 18:48

## 2018-08-13 RX ADMIN — BUSPIRONE HYDROCHLORIDE 5 MG: 5 TABLET ORAL at 23:37

## 2018-08-13 RX ADMIN — CEFTRIAXONE SODIUM 1 G: 1 INJECTION, POWDER, FOR SOLUTION INTRAMUSCULAR; INTRAVENOUS at 22:16

## 2018-08-13 RX ADMIN — VANCOMYCIN HYDROCHLORIDE 1000 MG: 1 INJECTION, POWDER, LYOPHILIZED, FOR SOLUTION INTRAVENOUS at 19:17

## 2018-08-13 RX ADMIN — DOXYCYCLINE HYCLATE 100 MG: 100 CAPSULE, GELATIN COATED ORAL at 23:37

## 2018-08-13 ASSESSMENT — PAIN SCALES - GENERAL: PAINLEVEL_OUTOF10: 0

## 2018-08-13 NOTE — ED PROVIDER NOTES
ED Attending  CC: Jyoti       Department of Emergency Medicine   ED  Provider Note  Admit Date/RoomTime: 2018  5:03 PM  ED Room:   Chief Complaint:   Cough (for 3 days, mucus white. chills at home) and Fall (this am, slipped out of lift chair   denies injury)       History of Present Illness   Source of history provided by:  patient and relative(s). History/Exam Limitations: none. Ashley Rosales is a 80 y.o. female who has a past medical history of:   Past Medical History:   Diagnosis Date    Anxiety     Depression     Hypertension     Peripheral neuropathy     presents to the ED by private vehicle and accompanied by her granddaughter for cough, fever, and shortness of breath, beginning a few days ago. The complaint has been persistent, moderate in severity. Patient states the symptoms worsened last night and she felt she was having \"reflux\" and took a pepcid with improvement of symptoms. She states this afternoon she developed chest pain and took 4 baby aspirin. She states she is bringing up \"white phlegm\". She reports a fever of 99.6 at home. She states she was having increasing shortness of breath with ambulation today. She denies wearing oxygen at home. She denies smoking. She denies history of heart attack or stroke. She denies history of congestive heart failure. She uses a walker at home. She lives with her . She denies increased swelling or edema to lower extremities. She denies abdominal pain, nausea, or vomiting.        HEART Score For Major Cardiac Events  (Max Score 10 Points)  HISTORY       []   Slightly Suspicious  0       [x]   Moderately Suspicious  +1       []   Highly Suspicious  +2    EK point: No ST depression but LBBB, LVH repolarization changes (ex:digoxin);               2 points: ST depression/elevation not due to LBBB, LVH or digoxin         [x]   Normal  0       []   Nonspecific Repolarization Disturbance  +1       []   Significant ST Depression  +2    AGE Peptide   Result Value Ref Range    Pro-BNP 1,445 (H) 0 - 450 pg/mL   CBC Auto Differential   Result Value Ref Range    WBC 20.6 (H) 4.5 - 11.5 E9/L    RBC 4.01 3.50 - 5.50 E12/L    Hemoglobin 13.3 11.5 - 15.5 g/dL    Hematocrit 38.7 34.0 - 48.0 %    MCV 96.5 80.0 - 99.9 fL    MCH 33.2 26.0 - 35.0 pg    MCHC 34.4 32.0 - 34.5 %    RDW 13.0 11.5 - 15.0 fL    Platelets 945 784 - 644 E9/L    MPV 9.2 7.0 - 12.0 fL    Neutrophils % 87.2 (H) 43.0 - 80.0 %    Immature Granulocytes % 0.8 0.0 - 5.0 %    Lymphocytes % 6.7 (L) 20.0 - 42.0 %    Monocytes % 4.6 2.0 - 12.0 %    Eosinophils % 0.4 0.0 - 6.0 %    Basophils % 0.3 0.0 - 2.0 %    Neutrophils # 17.96 (H) 1.80 - 7.30 E9/L    Immature Granulocytes # 0.16 E9/L    Lymphocytes # 1.37 (L) 1.50 - 4.00 E9/L    Monocytes # 0.95 0.10 - 0.95 E9/L    Eosinophils # 0.08 0.05 - 0.50 E9/L    Basophils # 0.06 0.00 - 0.20 E9/L   Urinalysis   Result Value Ref Range    Color, UA Yellow Straw/Yellow    Clarity, UA Clear Clear    Glucose, Ur 500 (A) Negative mg/dL    Bilirubin Urine Negative Negative    Ketones, Urine Negative Negative mg/dL    Specific Gravity, UA 1.015 1.005 - 1.030    Blood, Urine Negative Negative    pH, UA 6.5 5.0 - 9.0    Protein, UA Negative Negative mg/dL    Urobilinogen, Urine 0.2 <2.0 E.U./dL    Nitrite, Urine Negative Negative    Leukocyte Esterase, Urine Negative Negative     Imaging: All Radiology results interpreted by Radiologist unless otherwise noted. XR CHEST PORTABLE   Final Result   Suboptimal study. No definite pulmonary airspace consolidation. Degenerative changes in the humeral heads. Thoracic dextroscoliosis. EKG #1:  Interpreted by emergency department physician unless otherwise noted. Time:  1720    Rate: 96  Rhythm: Sinus. Interpretation: normal sinus rhythm, 1st degree AV block, there are no previous tracings available for comparison.     ED Course / Medical Decision Making     Medications   piperacillin-tazobactam (ZOSYN) 3.375 (3-0.375) g injection (not administered)   piperacillin-tazobactam (ZOSYN) 3.375 g in dextrose 5 % 100 mL IVPB (mini-bag) (3.375 g Intravenous New Bag 8/13/18 1848)   vancomycin 1000 mg IVPB in 250 mL D5W addavial (not administered)     ED Course as of Aug 13 1912   Mon Aug 13, 2018   1911 Awake and alert. Answering questions appropriately. Lungs coarse bilaterally left greater than right. No use of accessory muscles. Oxygen at 4 liters with saturation at 95%. Discussed plan and answered questions. [JG]      ED Course User Index  [JG] Neela Vaughn, APRN - CNP     Consultations:             IP CONSULT TO HOSPITALIST  IP CONSULT TO SOCIAL WORK    Procedures:   none    MDM: Patient presents with complaint of shortness of breath, cough, and intermittent chest pain and nausea x days. Reported chills at home. Elevated temperature in ED. Laboratory studies with leukocytosis, elevated BNP and elevated troponin. Blood cultures, lactic acid, urine culture pending. Xray suboptimal with possible left upper lobe pneumonia. Initial oxygenation 82% with improvement after oxygen. Normotensive and no IV fluids given. IV antibiotics initiated. Case was discussed with Dr. Patrick Falk per Dr. Jackie Benson. Dr. Patrick Falk accepts the patient to telemetry. Patient hemodynamically stable with improved oxygenation after oxygen applied. Counseling:   I have spoken with the granddaughter and patient and discussed todays results, in addition to providing specific details for the plan of care and counseling regarding the diagnosis and prognosis and are agreeable with the plan. Assessment      1. Pneumonia due to organism      This patient's ED course included: a personal history and physicial examination, re-evaluation prior to disposition, IV medications, cardiac monitoring and continuous pulse oximetry  This patient has remained hemodynamically stable and improved during their ED course. Plan   Admit to telemetry.   Patient condition is

## 2018-08-13 NOTE — Clinical Note
Patient Class: Inpatient [101]   REQUIRED: Diagnosis: Acute hypoxemic respiratory failure [facility]) [[id number]]   Estimated Length of Stay: Estimated stay of more than 2 midnights   Future Attending Provider: Mu Connor [[id number]]   Telemetry Bed Required?: Yes

## 2018-08-14 LAB
ANION GAP SERPL CALCULATED.3IONS-SCNC: 14 MMOL/L (ref 7–16)
BASOPHILS ABSOLUTE: 0.03 E9/L (ref 0–0.2)
BASOPHILS RELATIVE PERCENT: 0.2 % (ref 0–2)
BUN BLDV-MCNC: 25 MG/DL (ref 8–23)
CALCIUM SERPL-MCNC: 8.7 MG/DL (ref 8.6–10.2)
CHLORIDE BLD-SCNC: 99 MMOL/L (ref 98–107)
CO2: 26 MMOL/L (ref 22–29)
CREAT SERPL-MCNC: 1.2 MG/DL (ref 0.5–1)
EOSINOPHILS ABSOLUTE: 0.32 E9/L (ref 0.05–0.5)
EOSINOPHILS RELATIVE PERCENT: 2 % (ref 0–6)
FILM ARRAY ADENOVIRUS: NORMAL
FILM ARRAY BORDETELLA PERTUSSIS: NORMAL
FILM ARRAY CHLAMYDOPHILIA PNEUMONIAE: NORMAL
FILM ARRAY CORONAVIRUS 229E: NORMAL
FILM ARRAY CORONAVIRUS HKU1: NORMAL
FILM ARRAY CORONAVIRUS NL63: NORMAL
FILM ARRAY CORONAVIRUS OC43: NORMAL
FILM ARRAY INFLUENZA A VIRUS 09H1: NORMAL
FILM ARRAY INFLUENZA A VIRUS H1: NORMAL
FILM ARRAY INFLUENZA A VIRUS H3: NORMAL
FILM ARRAY INFLUENZA A VIRUS: NORMAL
FILM ARRAY INFLUENZA B: NORMAL
FILM ARRAY METAPNEUMOVIRUS: NORMAL
FILM ARRAY MYCOPLASMA PNEUMONIAE: NORMAL
FILM ARRAY PARAINFLUENZA VIRUS 1: NORMAL
FILM ARRAY PARAINFLUENZA VIRUS 2: NORMAL
FILM ARRAY PARAINFLUENZA VIRUS 3: NORMAL
FILM ARRAY PARAINFLUENZA VIRUS 4: NORMAL
FILM ARRAY RESPIRATORY SYNCITIAL VIRUS: NORMAL
FILM ARRAY RHINOVIRUS/ENTEROVIRUS: NORMAL
GFR AFRICAN AMERICAN: 52
GFR NON-AFRICAN AMERICAN: 43 ML/MIN/1.73
GLUCOSE BLD-MCNC: 159 MG/DL (ref 74–109)
HCT VFR BLD CALC: 32.2 % (ref 34–48)
HEMOGLOBIN: 11.3 G/DL (ref 11.5–15.5)
IMMATURE GRANULOCYTES #: 0.13 E9/L
IMMATURE GRANULOCYTES %: 0.8 % (ref 0–5)
LACTIC ACID: 1.5 MMOL/L (ref 0.5–2.2)
LYMPHOCYTES ABSOLUTE: 2.21 E9/L (ref 1.5–4)
LYMPHOCYTES RELATIVE PERCENT: 13.7 % (ref 20–42)
MCH RBC QN AUTO: 33 PG (ref 26–35)
MCHC RBC AUTO-ENTMCNC: 35.1 % (ref 32–34.5)
MCV RBC AUTO: 94.2 FL (ref 80–99.9)
METER GLUCOSE: 143 MG/DL (ref 70–110)
METER GLUCOSE: 255 MG/DL (ref 70–110)
METER GLUCOSE: 267 MG/DL (ref 70–110)
MONOCYTES ABSOLUTE: 0.8 E9/L (ref 0.1–0.95)
MONOCYTES RELATIVE PERCENT: 5 % (ref 2–12)
NEUTROPHILS ABSOLUTE: 12.67 E9/L (ref 1.8–7.3)
NEUTROPHILS RELATIVE PERCENT: 78.3 % (ref 43–80)
PDW BLD-RTO: 13 FL (ref 11.5–15)
PLATELET # BLD: 136 E9/L (ref 130–450)
PMV BLD AUTO: 9.5 FL (ref 7–12)
POTASSIUM SERPL-SCNC: 3.6 MMOL/L (ref 3.5–5)
RBC # BLD: 3.42 E12/L (ref 3.5–5.5)
SODIUM BLD-SCNC: 139 MMOL/L (ref 132–146)
WBC # BLD: 16.2 E9/L (ref 4.5–11.5)

## 2018-08-14 PROCEDURE — 97161 PT EVAL LOW COMPLEX 20 MIN: CPT

## 2018-08-14 PROCEDURE — 85025 COMPLETE CBC W/AUTO DIFF WBC: CPT

## 2018-08-14 PROCEDURE — 83605 ASSAY OF LACTIC ACID: CPT

## 2018-08-14 PROCEDURE — G8988 SELF CARE GOAL STATUS: HCPCS

## 2018-08-14 PROCEDURE — 82962 GLUCOSE BLOOD TEST: CPT

## 2018-08-14 PROCEDURE — 97530 THERAPEUTIC ACTIVITIES: CPT

## 2018-08-14 PROCEDURE — 94640 AIRWAY INHALATION TREATMENT: CPT

## 2018-08-14 PROCEDURE — 36415 COLL VENOUS BLD VENIPUNCTURE: CPT

## 2018-08-14 PROCEDURE — G8979 MOBILITY GOAL STATUS: HCPCS

## 2018-08-14 PROCEDURE — 97535 SELF CARE MNGMENT TRAINING: CPT

## 2018-08-14 PROCEDURE — G8978 MOBILITY CURRENT STATUS: HCPCS

## 2018-08-14 PROCEDURE — 6360000002 HC RX W HCPCS: Performed by: INTERNAL MEDICINE

## 2018-08-14 PROCEDURE — 97166 OT EVAL MOD COMPLEX 45 MIN: CPT

## 2018-08-14 PROCEDURE — 2700000000 HC OXYGEN THERAPY PER DAY

## 2018-08-14 PROCEDURE — 2580000003 HC RX 258: Performed by: INTERNAL MEDICINE

## 2018-08-14 PROCEDURE — 6370000000 HC RX 637 (ALT 250 FOR IP): Performed by: INTERNAL MEDICINE

## 2018-08-14 PROCEDURE — 80048 BASIC METABOLIC PNL TOTAL CA: CPT

## 2018-08-14 PROCEDURE — G8987 SELF CARE CURRENT STATUS: HCPCS

## 2018-08-14 PROCEDURE — 2060000000 HC ICU INTERMEDIATE R&B

## 2018-08-14 RX ORDER — PETROLATUM 42 G/100G
OINTMENT TOPICAL 3 TIMES DAILY PRN
Status: DISCONTINUED | OUTPATIENT
Start: 2018-08-14 | End: 2018-08-17 | Stop reason: HOSPADM

## 2018-08-14 RX ORDER — PETROLATUM 42 G/100G
OINTMENT TOPICAL 3 TIMES DAILY
Status: DISCONTINUED | OUTPATIENT
Start: 2018-08-14 | End: 2018-08-17 | Stop reason: HOSPADM

## 2018-08-14 RX ADMIN — GABAPENTIN 600 MG: 300 CAPSULE ORAL at 14:23

## 2018-08-14 RX ADMIN — BUSPIRONE HYDROCHLORIDE 5 MG: 5 TABLET ORAL at 14:23

## 2018-08-14 RX ADMIN — IPRATROPIUM BROMIDE AND ALBUTEROL SULFATE 1 AMPULE: .5; 3 SOLUTION RESPIRATORY (INHALATION) at 08:18

## 2018-08-14 RX ADMIN — ACETAMINOPHEN 650 MG: 325 TABLET, FILM COATED ORAL at 21:46

## 2018-08-14 RX ADMIN — IPRATROPIUM BROMIDE AND ALBUTEROL SULFATE 1 AMPULE: .5; 3 SOLUTION RESPIRATORY (INHALATION) at 21:20

## 2018-08-14 RX ADMIN — MONTELUKAST SODIUM 10 MG: 10 TABLET, FILM COATED ORAL at 20:02

## 2018-08-14 RX ADMIN — Medication 10 ML: at 20:02

## 2018-08-14 RX ADMIN — PETROLATUM: 42 OINTMENT TOPICAL at 20:01

## 2018-08-14 RX ADMIN — GABAPENTIN 600 MG: 300 CAPSULE ORAL at 20:02

## 2018-08-14 RX ADMIN — PETROLATUM: 42 OINTMENT TOPICAL at 14:23

## 2018-08-14 RX ADMIN — IPRATROPIUM BROMIDE AND ALBUTEROL SULFATE 1 AMPULE: .5; 3 SOLUTION RESPIRATORY (INHALATION) at 16:30

## 2018-08-14 RX ADMIN — ACETAMINOPHEN 650 MG: 325 TABLET, FILM COATED ORAL at 09:37

## 2018-08-14 RX ADMIN — INSULIN LISPRO 2 UNITS: 100 INJECTION, SOLUTION INTRAVENOUS; SUBCUTANEOUS at 09:37

## 2018-08-14 RX ADMIN — INSULIN LISPRO 6 UNITS: 100 INJECTION, SOLUTION INTRAVENOUS; SUBCUTANEOUS at 11:56

## 2018-08-14 RX ADMIN — BUSPIRONE HYDROCHLORIDE 5 MG: 5 TABLET ORAL at 20:02

## 2018-08-14 RX ADMIN — SERTRALINE 150 MG: 100 TABLET, FILM COATED ORAL at 09:19

## 2018-08-14 RX ADMIN — Medication 10 ML: at 21:41

## 2018-08-14 RX ADMIN — BUSPIRONE HYDROCHLORIDE 5 MG: 5 TABLET ORAL at 09:19

## 2018-08-14 RX ADMIN — GABAPENTIN 600 MG: 300 CAPSULE ORAL at 09:19

## 2018-08-14 RX ADMIN — ENOXAPARIN SODIUM 40 MG: 40 INJECTION SUBCUTANEOUS at 09:19

## 2018-08-14 RX ADMIN — Medication 10 ML: at 09:20

## 2018-08-14 RX ADMIN — CEFTRIAXONE SODIUM 1 G: 1 INJECTION, POWDER, FOR SOLUTION INTRAMUSCULAR; INTRAVENOUS at 21:41

## 2018-08-14 RX ADMIN — INSULIN LISPRO 2 UNITS: 100 INJECTION, SOLUTION INTRAVENOUS; SUBCUTANEOUS at 15:57

## 2018-08-14 RX ADMIN — ATENOLOL 50 MG: 50 TABLET ORAL at 09:19

## 2018-08-14 RX ADMIN — ASPIRIN 81 MG: 81 TABLET, COATED ORAL at 09:19

## 2018-08-14 RX ADMIN — INSULIN LISPRO 3 UNITS: 100 INJECTION, SOLUTION INTRAVENOUS; SUBCUTANEOUS at 20:01

## 2018-08-14 RX ADMIN — DOXYCYCLINE HYCLATE 100 MG: 100 CAPSULE, GELATIN COATED ORAL at 09:19

## 2018-08-14 RX ADMIN — DOXYCYCLINE HYCLATE 100 MG: 100 CAPSULE, GELATIN COATED ORAL at 20:02

## 2018-08-14 ASSESSMENT — PAIN SCALES - GENERAL
PAINLEVEL_OUTOF10: 3
PAINLEVEL_OUTOF10: 0

## 2018-08-14 NOTE — PROGRESS NOTES
Multi-Step Commands:  Good (-)   Problem solving skills:  Good (-)  Memory:  Good (-)  Additional Comments:  Pt was pleasant, cooperative, motivated to participate in therapy    Visual-Perceptual:   Hearing: WFL  Hearing Aids:  None noted  Vision:  WFL  Glasses:  Yes     UE Assessment:  Hand Dominance:  Right     Strength ROM Additional Info:    RUE   3/5 shoulder flex  3+/5 shoulder ext  4/5 elbow flex  4+/5 elbow ext [facility] proximally  WNL distally Good (-)    Good (-) FMC/dexterity noted during ADL tasks     LUE 3/5 shoulder flex  3+/5 shoulder ext  4/5 elbow flex  4+/5 elbow ext Shoulder flex to ~ 85*  WNL Distally Good (-)    Good (-) FMC/dexterity noted during ADL tasks     Sensation:  Denies numbness and tingling Gallo UEs, chronic neuropathy Gallo LEs  Tone: WNL Bilateral UEs    Edema:   Moderate edema Gallo LEs     Functional Assessment:   Initial Status 8-14-18 Comments   Feeding  SUP/Set up    Grooming  Min a/Set up bed level SUP to wash hands/face, Min A to comb hair d/t limited functional reaching r/t shoulder ROM   Upper Body Dressing Mod A seated EOB  Limited Shoulder ROM affecting task   Lower Body Dressing Max A Max A to don socks, thread feet into undergarments, Mod A for standing balance/Clothing adjustment to don undergarments   Bathing NT    Toileting  NT Gonzalez Catheter   Bed Mobility  Max A Rolling/Repositioning   Functional Transfers Max A  Max A supine<>sit  Mod A sit to stand   Min A stand to sit   Functional Mobility Min A w/ FWW limited to ~ 3' fwd/back d/t fatigue, general weakness, safety      Functional Sitting balance:   Fair (+) static/Fair dynamic - increased posterior lean seated unsupported at EOB  Functional Standing balance:  Fair (+) static/Fair dynamic w/ FWW  Endurance/Activity tolerance: Overall Tolerance - Fair       Tolerated Sitting EOB w/ Ax ~ 10-15 mins       Tolerated Standing w/ Ax ~ 3-4 mins                              Treatment:      -- At end of session, pt was Fair    Recommended Treatment Frequency: 3-5 days/week    Patient / Family Goal: Get stronger    Pt/Family participated in the establishment of the following goals:      Short term goals  Time Frame: 1 week    GOAL (1)  Pt will complete Hygiene/Grooming with SUP/Set up while seated at the sink. GOAL (2)  Pt will complete UB ADLs with Min A w/ use of Adaptive equip/techs PRN  GOAL (3)  Pt will complete LB ADLs/Toileting with Mod A w/ use of DME/AD/Adaptive equip/techs PRN. GOAL (4)  Pt will complete Functional Transfers and Functional Mobility with Min A w/ use of DME/AD PRN. GOAL (5)  Pt will tolerate standing > 5 minutes with Min A for completion of Functional Ax. GOAL (6)  Pt will INDly Demo Good Safety Awareness w/ completion of all Functional Ax. Patient and/or family understands diagnosis, prognosis and plan of care: yes    Yes []  No [x]  Malnutrition indicators have been identified and nursing has been notified to ensure a dietitian consult is ordered. Time in: 1420  Time out: [address] Completed:   Moderate - 40 Minutes  Timed Treatment:  [facility], [address] [place] [address], OTR/L  # [id number]

## 2018-08-14 NOTE — H&P
[address] [facility]  History and Physical      CHIEF COMPLAINT:  Short of breath, cough, chills    History of Present Illness: the patient is a 80 y.o. white woman followed by DR Vito Hopkins who presents secondary to shortness of breath, cough and chills. She was in her usual state of health until yesterday. She developed chills/rigors with low grade fever and shortness of breath. She began coughing non bloody sputum. She had dry heaves. After, she noted a vague mid chest ache. She tried taking 4 aspirin, 81 mg each, but felt no better. She presented to the ER where clinical exam was consistent with developing pneumonia/SIRS. She was given IV antibiotics and aerosols and admitted for further care. This AM, she is awake/alert. She feels better and is less short of breath. No chest pain/angina. No further emesis/nausea. Past Medical History:   Diagnosis Date    Diabetes mellitus 2     Depression     Hypertension     Peripheral neuropathy          Past Surgical History:   Procedure Laterality Date    CHOLECYSTECTOMY      JOINT REPLACEMENT      SKIN CANCER EXCISION         Medications Prior to Admission:    Prescriptions Prior to Admission: gabapentin (NEURONTIN) 300 MG capsule, Take two tablets three times a day. sertraline (ZOLOFT) 100 MG tablet, Take 1 1/2 tablets once a day  Liraglutide (VICTOZA) 18 MG/3ML SOPN SC injection, Inject 1.2 mg into the skin daily  atenolol (TENORMIN) 50 MG tablet, Take 1 tablet by mouth daily  montelukast (SINGULAIR) 10 MG tablet, TAKE 1 TABLET NIGHTLY  busPIRone (BUSPAR) 5 MG tablet, Take 1 tablet by mouth 3 times daily  aspirin EC 81 MG EC tablet, Take 81 mg by mouth daily Took 4 today  Calcium-Vitamin D (CALTRATE 600 PLUS-VIT D PO), Take  by mouth. Insulin Pen Needle 32G X 4 MM MISC, 1 each by Does not apply route daily  hydrocortisone 2.5 % cream, Apply topically 2 times daily.   nystatin (MYCOSTATIN) 345344 UNIT/GM powder, Apply 3 times daily. glucose blood VI test strips (FREESTYLE LITE) strip, Test BID.  ketoconazole (NIZORAL) 2 % cream, Apply topically daily Apply topically daily. glucose monitoring kit (FREESTYLE) monitoring kit, 1 kit by Does not apply route 2 times daily  nitroGLYCERIN (NITROSTAT) 0.4 MG SL tablet, Place 1 tablet under the tongue every 5 minutes as needed for Chest pain  albuterol sulfate  (90 Base) MCG/ACT inhaler, Inhale 2 puffs into the lungs every 6 hours as needed for Wheezing or Shortness of Breath  ipratropium-albuterol (DUONEB) 0.5-2.5 (3) MG/3ML SOLN nebulizer solution, Take 3 mLs by nebulization every 6 hours    Note that the patient's home medications were reviewed and the above list is accurate to the best of my knowledge at the time of the exam.    Allergies:    Patient has no known allergies. Social History:    reports that she has never smoked. She has never used smokeless tobacco. She reports that she does not drink alcohol or use drugs. Family History:   History is noncontributory due to advanced age and co-morbidities. REVIEW OF SYSTEMS:  As above in the HPI, otherwise negative    PHYSICAL EXAM:    Vitals:  /64   Pulse 82   Temp 98 °F (36.7 °C) (Oral)   Resp 24   Ht 5' 2\" (1.575 m)   Wt 167 lb 1.6 oz (75.8 kg)   LMP  (LMP Unknown)   SpO2 97%   BMI 30.56 kg/m²     General:  Awake, alert, oriented X 3. Well developed, well nourished, well groomed. No apparent distress. HEENT:  Normocephalic, atraumatic. No scleral icterus. No conjunctival injection. Normal lips, teeth, and gums. No nasal discharge. Neck:  Supple  Heart:  RRR, no murmurs, gallops, or rubs  Lungs:  Bilateral rhonchi, no wheeze, equal breath sounds bilaterally  Abdomen:   Bowel sounds present, soft, non distended, nontender, no masses, no organomegaly, no peritoneal signs  Extremities:  No clubbing, cyanosis, or edema  Skin:  Warm and dry, no rash, evidence of deep tissue irritation of right/left buttocks resume home medications  6) control sugar  7) PT/OT evaluations  8) the patient is expected to stay 2 or more midnights to evaluate and treat her hypoxia/pneumonia/SIRS        Please note that over 50 minutes was spent in evaluating the patient, review of records and results, discussion with staff/family, etc.    Karina Toth MD  9:07 AM  8/14/2018       UPDATED H&P    the patient is a 80 y.o. white woman followed by DR Eugenio Franklin who presents secondary to fatigue, chills and shortness of breath. Clinical exam was consistent with pneumonia and SIRS. She was admitted and started on IV fluids, IV antibiotics and aerosols. Cultures were negative for specific organism. She had clinical improvement over the course of her stay. She was discharged to [facility] for further functional improvement once transitioned to oral antibiotics.     Electronically signed by Karina Toth MD on 8/17/18 at 9:39 AM

## 2018-08-14 NOTE — PROGRESS NOTES
Physical Therapy    Facility/Department: [address] PICU  Initial Assessment    NAME: Holley Tariq  : 1933  MRN: [id number]    Date of Service: 2018    Evaluating Therapist: Emilie Gloria PT, DPT    Room #: 8615I  DIAGNOSIS: Acute hypoxic respiratory failure  PRECAUTIONS: Falls, O2    Social:  Pt lives with  (90) in a 1 floor plan with 4 step(s) and 2 rail(s) to enter. Prior to admission pt walked with [facility]. Reported having an aide 3x/wk for bathing/sponge bathing, laundry. Daughters assist with meals and transportation. Initial Evaluation  Date: 18 Treatment  Date: NA    Short Term/ Long Term   Goals   Was pt agreeable to Eval/treatment? Yes      Does pt have pain? No c/o pain. Bed Mobility  Rolling: Mod A  Supine to sit: Max A  Sit to supine: Max A  Scooting: Max A  Min A   Transfers Sit to stand: Mod A  Stand to sit: Min A  Stand pivot: NT  SBA   Ambulation    3 feet fwd/bkwd with [facility] with Min A  >50 feet with [facility] with SBA   Stair negotiation: ascended and descended  NT  4 steps with 2 rail with Min A   AM-PAC Score /       Pt is alert and Oriented x 3  BLE ROM is WFL. BLE strength is grossly 3+/5. Balance: Seated: Min A due to moderate retropulsive/L lateral lean; Standing: Min A with [facility]  Sensation: Reported neuropathy N/T in bilateral feet. Edema: None noted  Endurance: Poor  Bed/Chair alarm: No     ASSESSMENT  Pt displays functional ability as noted in the objective portion of this evaluation. Comments/Treatment:  Patient cleared by nurse and agreeable to assessment. Patient required assist with all aspects of bed mobility and reported  assists her at home. Patient assisted to seated and denied dizziness with positional change, however, exhibited a moderate retropulsive/L lateral lean that patient reported is not new. Patient assisted to standing and required verbal cues for hand placement.  Patient with forward flexed posture and not able to correct with cues as she reported this is normal. Patient with small, shuffled steps but no LOB noted during gait. Patient reported having a lift chair and \"slid out of it\" recently. Patient assisted back to supine and placed on a R turn per RN with call light and tray table in reach. Patient education  Pt educated on safety with mobility, transfers, gait. Patient response to education:   Pt verbalized understanding Pt demonstrated skill Pt requires further education in this area   Yes  Yes with verbal cues and assist Yes      Rehab potential is Good for reaching above PT goals. Pts/ family goals   1. To feel better. Patient and or family understand(s) diagnosis, prognosis, and plan of care. Yes     PLAN  PT care will be provided in accordance with the objectives noted above. Whenever appropriate, clear delegation orders will be provided for nursing staff. Exercises and functional mobility practice will be used as well as appropriate assistive devices or modalities to obtain goals. Patient and family education will also be administered as needed. Frequency of treatments will be 2-5x/week x 5-7 days.     Time in: 1420  Time out: [address], PT, DPT   License number:  [id number]

## 2018-08-14 NOTE — PROGRESS NOTES
Patient admitted into room 4503B at 2100, her bilateral lower extremities are noted to be dark pink and warm to touch, with 2+ pitting edema. BLE elevated on pillows, heels off loaded. Patient denies pain or discomfort. Daughter Dolores Vasquezr at bedside.

## 2018-08-14 NOTE — FLOWSHEET NOTE
Inpatient Wound Care(Initial Consult)4503B    Admit Date: 8/13/2018  5:03 PM    Reason for consult:  Coccyx,buttocks    Significant history:  Admitted for SOB,chills,cough  Diabetes mellitus 2      Depression     Hypertension     Peripheral neuropathy         Wound history:  Noted on admission    Findings:       08/14/18 0927   Wound 04/13/18 Coccyx   Date First Assessed/Time First Assessed: 04/13/18 1143   Location: Coccyx  Pre-existing: Yes  Photo Taken: No   Wound Type Wound   Wound Deep tissue/Injury   Dressing/Treatment Pharmaceutical agent (see eMar)   Wound Length (cm) 2.5 cm   Wound Width (cm) 1 cm   Wound Depth (cm)  undetermined   Calculated Wound Size (cm^2) (l*w) 2.5 cm^2   Change in Wound Size % (l*w) -150   Wound Assessment Light purple   Drainage Amount None   Xena-wound Assessment Blanchable erythema;Fragile   Purple%Wound Bed 100   Wound 08/13/18 Buttocks Right   Date First Assessed/Time First Assessed: 08/13/18 2100   Location: Buttocks  Wound Location Orientation: Right  Pre-existing: Yes  Photo Taken: No   Wound Type Wound   Wound Deep tissue/Injury   Dressing/Treatment Pharmaceutical agent (see eMar)   Wound Length (cm) 3.4 cm   Wound Width (cm) 1.4 cm   Wound Depth (cm)  undetermined   Calculated Wound Size (cm^2) (l*w) 4.76 cm^2   Change in Wound Size % (l*w) -90.4   Wound Assessment Light purple   Drainage Amount None   Xena-wound Assessment Blanchable erythema;Fragile   Purple%Wound Bed 100   Wound 08/13/18 Buttocks Left   Date First Assessed/Time First Assessed: 08/13/18 2100   Location: Buttocks  Wound Location Orientation: Left  Pre-existing: Yes  Photo Taken: No   Wound Type Wound   Wound Deep tissue/Injury   Dressing/Treatment Pharmaceutical agent (see eMar)   Wound Length (cm) 2.2 cm   Wound Width (cm) 1.5 cm   Wound Depth (cm)  undetermined   Calculated Wound Size (cm^2) (l*w) 3.3 cm^2   Change in Wound Size % (l*w) 26.67   Wound Assessment Light purple   Drainage Amount None

## 2018-08-15 LAB
ANION GAP SERPL CALCULATED.3IONS-SCNC: 13 MMOL/L (ref 7–16)
BASOPHILS ABSOLUTE: 0.03 E9/L (ref 0–0.2)
BASOPHILS RELATIVE PERCENT: 0.3 % (ref 0–2)
BUN BLDV-MCNC: 25 MG/DL (ref 8–23)
CALCIUM SERPL-MCNC: 8.6 MG/DL (ref 8.6–10.2)
CHLORIDE BLD-SCNC: 103 MMOL/L (ref 98–107)
CO2: 27 MMOL/L (ref 22–29)
CREAT SERPL-MCNC: 1.2 MG/DL (ref 0.5–1)
EOSINOPHILS ABSOLUTE: 0.4 E9/L (ref 0.05–0.5)
EOSINOPHILS RELATIVE PERCENT: 3.8 % (ref 0–6)
GFR AFRICAN AMERICAN: 52
GFR NON-AFRICAN AMERICAN: 43 ML/MIN/1.73
GLUCOSE BLD-MCNC: 136 MG/DL (ref 74–109)
HCT VFR BLD CALC: 31.5 % (ref 34–48)
HEMOGLOBIN: 10.8 G/DL (ref 11.5–15.5)
IMMATURE GRANULOCYTES #: 0.08 E9/L
IMMATURE GRANULOCYTES %: 0.8 % (ref 0–5)
LYMPHOCYTES ABSOLUTE: 1.73 E9/L (ref 1.5–4)
LYMPHOCYTES RELATIVE PERCENT: 16.3 % (ref 20–42)
MCH RBC QN AUTO: 32.8 PG (ref 26–35)
MCHC RBC AUTO-ENTMCNC: 34.3 % (ref 32–34.5)
MCV RBC AUTO: 95.7 FL (ref 80–99.9)
METER GLUCOSE: 133 MG/DL (ref 70–110)
METER GLUCOSE: 158 MG/DL (ref 70–110)
METER GLUCOSE: 208 MG/DL (ref 70–110)
METER GLUCOSE: 234 MG/DL (ref 70–110)
MONOCYTES ABSOLUTE: 0.51 E9/L (ref 0.1–0.95)
MONOCYTES RELATIVE PERCENT: 4.8 % (ref 2–12)
NEUTROPHILS ABSOLUTE: 7.86 E9/L (ref 1.8–7.3)
NEUTROPHILS RELATIVE PERCENT: 74 % (ref 43–80)
PDW BLD-RTO: 13.1 FL (ref 11.5–15)
PLATELET # BLD: 122 E9/L (ref 130–450)
PMV BLD AUTO: 9.4 FL (ref 7–12)
POTASSIUM SERPL-SCNC: 3.5 MMOL/L (ref 3.5–5)
RBC # BLD: 3.29 E12/L (ref 3.5–5.5)
SODIUM BLD-SCNC: 143 MMOL/L (ref 132–146)
WBC # BLD: 10.6 E9/L (ref 4.5–11.5)

## 2018-08-15 PROCEDURE — 97535 SELF CARE MNGMENT TRAINING: CPT

## 2018-08-15 PROCEDURE — 2700000000 HC OXYGEN THERAPY PER DAY

## 2018-08-15 PROCEDURE — 6360000002 HC RX W HCPCS: Performed by: INTERNAL MEDICINE

## 2018-08-15 PROCEDURE — 94640 AIRWAY INHALATION TREATMENT: CPT

## 2018-08-15 PROCEDURE — 82962 GLUCOSE BLOOD TEST: CPT

## 2018-08-15 PROCEDURE — 6370000000 HC RX 637 (ALT 250 FOR IP): Performed by: INTERNAL MEDICINE

## 2018-08-15 PROCEDURE — 80048 BASIC METABOLIC PNL TOTAL CA: CPT

## 2018-08-15 PROCEDURE — 36415 COLL VENOUS BLD VENIPUNCTURE: CPT

## 2018-08-15 PROCEDURE — 85025 COMPLETE CBC W/AUTO DIFF WBC: CPT

## 2018-08-15 PROCEDURE — 1200000000 HC SEMI PRIVATE

## 2018-08-15 PROCEDURE — 2580000003 HC RX 258: Performed by: INTERNAL MEDICINE

## 2018-08-15 PROCEDURE — 97112 NEUROMUSCULAR REEDUCATION: CPT

## 2018-08-15 RX ORDER — PANTOPRAZOLE SODIUM 40 MG/1
40 TABLET, DELAYED RELEASE ORAL
Status: DISCONTINUED | OUTPATIENT
Start: 2018-08-15 | End: 2018-08-17 | Stop reason: HOSPADM

## 2018-08-15 RX ADMIN — BUSPIRONE HYDROCHLORIDE 5 MG: 5 TABLET ORAL at 09:10

## 2018-08-15 RX ADMIN — MONTELUKAST SODIUM 10 MG: 10 TABLET, FILM COATED ORAL at 20:24

## 2018-08-15 RX ADMIN — ATENOLOL 50 MG: 50 TABLET ORAL at 09:10

## 2018-08-15 RX ADMIN — BUSPIRONE HYDROCHLORIDE 5 MG: 5 TABLET ORAL at 14:17

## 2018-08-15 RX ADMIN — Medication 10 ML: at 09:11

## 2018-08-15 RX ADMIN — INSULIN LISPRO 4 UNITS: 100 INJECTION, SOLUTION INTRAVENOUS; SUBCUTANEOUS at 13:07

## 2018-08-15 RX ADMIN — DOXYCYCLINE HYCLATE 100 MG: 100 CAPSULE, GELATIN COATED ORAL at 20:24

## 2018-08-15 RX ADMIN — PETROLATUM: 42 OINTMENT TOPICAL at 14:17

## 2018-08-15 RX ADMIN — Medication 10 ML: at 20:23

## 2018-08-15 RX ADMIN — ENOXAPARIN SODIUM 40 MG: 40 INJECTION SUBCUTANEOUS at 09:10

## 2018-08-15 RX ADMIN — PETROLATUM: 42 OINTMENT TOPICAL at 09:55

## 2018-08-15 RX ADMIN — GABAPENTIN 600 MG: 300 CAPSULE ORAL at 20:23

## 2018-08-15 RX ADMIN — DOXYCYCLINE HYCLATE 100 MG: 100 CAPSULE, GELATIN COATED ORAL at 09:10

## 2018-08-15 RX ADMIN — GABAPENTIN 600 MG: 300 CAPSULE ORAL at 14:17

## 2018-08-15 RX ADMIN — IPRATROPIUM BROMIDE AND ALBUTEROL SULFATE 1 AMPULE: .5; 3 SOLUTION RESPIRATORY (INHALATION) at 16:58

## 2018-08-15 RX ADMIN — PETROLATUM: 42 OINTMENT TOPICAL at 20:25

## 2018-08-15 RX ADMIN — INSULIN LISPRO 1 UNITS: 100 INJECTION, SOLUTION INTRAVENOUS; SUBCUTANEOUS at 20:26

## 2018-08-15 RX ADMIN — GABAPENTIN 600 MG: 300 CAPSULE ORAL at 09:10

## 2018-08-15 RX ADMIN — CEFTRIAXONE SODIUM 1 G: 1 INJECTION, POWDER, FOR SOLUTION INTRAMUSCULAR; INTRAVENOUS at 20:23

## 2018-08-15 RX ADMIN — SERTRALINE 150 MG: 100 TABLET, FILM COATED ORAL at 09:10

## 2018-08-15 RX ADMIN — IPRATROPIUM BROMIDE AND ALBUTEROL SULFATE 1 AMPULE: .5; 3 SOLUTION RESPIRATORY (INHALATION) at 20:42

## 2018-08-15 RX ADMIN — INSULIN LISPRO 4 UNITS: 100 INJECTION, SOLUTION INTRAVENOUS; SUBCUTANEOUS at 17:15

## 2018-08-15 RX ADMIN — IPRATROPIUM BROMIDE AND ALBUTEROL SULFATE 1 AMPULE: .5; 3 SOLUTION RESPIRATORY (INHALATION) at 07:32

## 2018-08-15 RX ADMIN — ASPIRIN 81 MG: 81 TABLET, COATED ORAL at 14:16

## 2018-08-15 RX ADMIN — PANTOPRAZOLE SODIUM 40 MG: 40 TABLET, DELAYED RELEASE ORAL at 09:55

## 2018-08-15 RX ADMIN — BUSPIRONE HYDROCHLORIDE 5 MG: 5 TABLET ORAL at 20:24

## 2018-08-15 ASSESSMENT — PAIN SCALES - GENERAL
PAINLEVEL_OUTOF10: 0
PAINLEVEL_OUTOF10: 0

## 2018-08-15 NOTE — PROGRESS NOTES
Subjective: The patient is awake and alert. Feels better. Had vague chest ache last night for a short time, but feels ok this AM.  No dyspnea. No fevers/chills. Tolerating diet. Objective:    /66   Pulse 100   Temp 97.9 °F (36.6 °C) (Temporal)   Resp 20   Ht 5' 2\" (1.575 m)   Wt 167 lb (75.8 kg)   LMP  (LMP Unknown)   SpO2 96%   BMI 30.54 kg/m²     Current medications that patient is taking have been reviewed. Heart:  RRR, no murmurs, gallops, or rubs.   Lungs:  Few scattered rhonchi otherwise clear, no wheeze/rales  Abd: bowel sounds present, nontender, nondistended, no masses  Extrem:  No clubbing or cyanosis, 1+ bilateral lower leg/ankle edema (chronic)    CBC:   Lab Results   Component Value Date    WBC 10.6 08/15/2018    RBC 3.29 08/15/2018    HGB 10.8 08/15/2018    HCT 31.5 08/15/2018    MCV 95.7 08/15/2018    MCH 32.8 08/15/2018    MCHC 34.3 08/15/2018    RDW 13.1 08/15/2018     08/15/2018    MPV 9.4 08/15/2018     BMP:    Lab Results   Component Value Date     08/15/2018    K 3.5 08/15/2018     08/15/2018    CO2 27 08/15/2018    BUN 25 08/15/2018    CREATININE 1.2 08/15/2018    CALCIUM 8.6 08/15/2018    GFRAA 52 08/15/2018    LABGLOM 43 08/15/2018    GLUCOSE 136 08/15/2018        Viral panel negative  Other cultures pending  Monitor-sinus      Assessment:    Patient Active Problem List   Diagnosis    SIRS    Acute hypoxemic respiratory failure secondary to lobar pneumonia, organism unspecified    Asthma without acute exacerbation    Diabetes mellitus type 2, uncontrolled    CKD (chronic kidney disease) stage 3    Buttock/coccyx wounds, present on admission    HTN (hypertension), benign       Plan:    1) continue IV antibiotics and aerosols  2) await cultures  3) increase activity with PT/gait team  4) ok to remove telemetry and transfer to general floor      Wendall Spatz MD  8:34 AM  8/15/2018

## 2018-08-15 NOTE — PROGRESS NOTES
Nutrition Assessment    Type and Reason for Visit: Initial, Positive Nutrition Screen    Nutrition Recommendations: Continue current diet as ordered. Will begin Ensure HP BID to promote optimal po intake and healing. Malnutrition Assessment:  · Malnutrition Status: At risk for malnutrition  · Context: Acute illness or injury  · Findings of the 6 clinical characteristics of malnutrition (Minimum of 2 out of 6 clinical characteristics is required to make the diagnosis of moderate or severe Protein Calorie Malnutrition based on AND/ASPEN Guidelines):  1. Energy Intake-Less than or equal to 75%,  (x 2 day admit )    2. Weight Loss- (2.9% ),  (x 4 months per EMR data- does not meet significant criteria )  3. Fat Loss-No significant subcutaneous fat loss,    4. Muscle Loss-No significant muscle mass loss,    5. Fluid Accumulation-No significant fluid accumulation,    6.   Strength-Not measured    Nutrition Diagnosis:   · Problem: Increased nutrient needs  · Etiology: related to Increased demand for energy/nutrients due to (needs for healing )     Signs and symptoms:  as evidenced by Presence of wounds    Nutrition Assessment:  · Subjective Assessment: pt asleep at bedside   · Nutrition-Focused Physical Findings: -I/Os, intermitent confusion noted, rounded abd, active bs, +2 BLE edema, SOB noted PTA   · Wound Type: Deep Tissue Injury (coccyx, buttocks )  · Current Nutrition Therapies:  · Oral Diet Orders: Carb Control 4 Carbs/Meal, No Added Salt (3-4gm)   · Oral Diet intake: 51-75% (variable intake noted since admit )  · Oral Nutrition Supplement (ONS) Orders: None  · Anthropometric Measures:  · Ht: 5' 2\" (157.5 cm)   · Current Body Wt: 167 lb (75.8 kg) (8/15 actual bedscale )  · Usual Body Wt: 172 lb (78 kg) (4/2018 actual bedscale per EMR )  · % Weight Change: 2.9% wt loss noted x 4 months per EMR data ,     · Ideal Body Wt: 110 lb (49.9 kg), % Ideal Body 151%   · Adjusted Body Wt: 124 lb (56.2 kg), body

## 2018-08-15 NOTE — PLAN OF CARE
Problem: Falls - Risk of:  Goal: Will remain free from falls  Will remain free from falls   Outcome: Met This Shift      Problem: Pain:  Goal: Control of acute pain  Control of acute pain   Outcome: Met This Shift      Problem: Gas Exchange - Impaired:  Goal: Levels of oxygenation will improve  Levels of oxygenation will improve   Outcome: Not Met This Shift

## 2018-08-15 NOTE — CARE COORDINATION
SOCIAL WORK / DISCHARGE PLANNIN [facility] and can accept pt. Sw followed up with Umm Cardenas dtr, [phone], she wants to tour facility tonight or in am and will confirm this as discharge plan with Bee MATHUR whose contact info was provided to her for further follow up.                     Electronically signed by JOSE ALEJANDRO Pichardo on 8/15/2018 at 3:01 PM

## 2018-08-15 NOTE — CARE COORDINATION
SOCIAL WORK / DISCHARGE PLANNING:  Sw spoke with dtr, Neal Nicolas via phone regarding transition to care. Discussed with dtr her concerns and answered questions. She is interested in [facility] but if no beds available would use [facility] as she has been there before. Dtr is going to discuss with spouse and tour. Efren called referral to [facility], voice message left, request review for acceptance. Efren will follow.                    Electronically signed by JOSE ALEJANDRO Mtz on 8/15/2018 at 2:08 PM

## 2018-08-16 ENCOUNTER — APPOINTMENT (OUTPATIENT)
Dept: GENERAL RADIOLOGY | Age: 83
DRG: 193 | End: 2018-08-16
Payer: MEDICARE

## 2018-08-16 LAB
ANION GAP SERPL CALCULATED.3IONS-SCNC: 14 MMOL/L (ref 7–16)
BASOPHILS ABSOLUTE: 0.04 E9/L (ref 0–0.2)
BASOPHILS RELATIVE PERCENT: 0.3 % (ref 0–2)
BUN BLDV-MCNC: 20 MG/DL (ref 8–23)
CALCIUM SERPL-MCNC: 9.7 MG/DL (ref 8.6–10.2)
CHLORIDE BLD-SCNC: 103 MMOL/L (ref 98–107)
CO2: 23 MMOL/L (ref 22–29)
CREAT SERPL-MCNC: 1.1 MG/DL (ref 0.5–1)
EOSINOPHILS ABSOLUTE: 0.49 E9/L (ref 0.05–0.5)
EOSINOPHILS RELATIVE PERCENT: 4.2 % (ref 0–6)
GFR AFRICAN AMERICAN: 57
GFR NON-AFRICAN AMERICAN: 47 ML/MIN/1.73
GLUCOSE BLD-MCNC: 174 MG/DL (ref 74–109)
HCT VFR BLD CALC: 38.7 % (ref 34–48)
HEMOGLOBIN: 13.5 G/DL (ref 11.5–15.5)
IMMATURE GRANULOCYTES #: 0.09 E9/L
IMMATURE GRANULOCYTES %: 0.8 % (ref 0–5)
LYMPHOCYTES ABSOLUTE: 1.51 E9/L (ref 1.5–4)
LYMPHOCYTES RELATIVE PERCENT: 13 % (ref 20–42)
MCH RBC QN AUTO: 32.7 PG (ref 26–35)
MCHC RBC AUTO-ENTMCNC: 34.9 % (ref 32–34.5)
MCV RBC AUTO: 93.7 FL (ref 80–99.9)
METER GLUCOSE: 140 MG/DL (ref 70–110)
METER GLUCOSE: 275 MG/DL (ref 70–110)
METER GLUCOSE: 317 MG/DL (ref 70–110)
METER GLUCOSE: 348 MG/DL (ref 70–110)
MONOCYTES ABSOLUTE: 0.58 E9/L (ref 0.1–0.95)
MONOCYTES RELATIVE PERCENT: 5 % (ref 2–12)
NEUTROPHILS ABSOLUTE: 8.94 E9/L (ref 1.8–7.3)
NEUTROPHILS RELATIVE PERCENT: 76.7 % (ref 43–80)
PDW BLD-RTO: 12.6 FL (ref 11.5–15)
PLATELET # BLD: 170 E9/L (ref 130–450)
PMV BLD AUTO: 9.4 FL (ref 7–12)
POTASSIUM SERPL-SCNC: 3.9 MMOL/L (ref 3.5–5)
RBC # BLD: 4.13 E12/L (ref 3.5–5.5)
SODIUM BLD-SCNC: 140 MMOL/L (ref 132–146)
URINE CULTURE, ROUTINE: NORMAL
WBC # BLD: 11.7 E9/L (ref 4.5–11.5)

## 2018-08-16 PROCEDURE — 85025 COMPLETE CBC W/AUTO DIFF WBC: CPT

## 2018-08-16 PROCEDURE — 6370000000 HC RX 637 (ALT 250 FOR IP): Performed by: INTERNAL MEDICINE

## 2018-08-16 PROCEDURE — 80048 BASIC METABOLIC PNL TOTAL CA: CPT

## 2018-08-16 PROCEDURE — 82962 GLUCOSE BLOOD TEST: CPT

## 2018-08-16 PROCEDURE — 1200000000 HC SEMI PRIVATE

## 2018-08-16 PROCEDURE — 94640 AIRWAY INHALATION TREATMENT: CPT

## 2018-08-16 PROCEDURE — 71045 X-RAY EXAM CHEST 1 VIEW: CPT

## 2018-08-16 PROCEDURE — 36415 COLL VENOUS BLD VENIPUNCTURE: CPT

## 2018-08-16 PROCEDURE — 2580000003 HC RX 258: Performed by: INTERNAL MEDICINE

## 2018-08-16 PROCEDURE — 6360000002 HC RX W HCPCS: Performed by: INTERNAL MEDICINE

## 2018-08-16 RX ORDER — CEFUROXIME AXETIL 500 MG/1
500 TABLET ORAL EVERY 12 HOURS SCHEDULED
Status: DISCONTINUED | OUTPATIENT
Start: 2018-08-16 | End: 2018-08-17 | Stop reason: HOSPADM

## 2018-08-16 RX ORDER — FLUTICASONE PROPIONATE 50 MCG
1 SPRAY, SUSPENSION (ML) NASAL DAILY
Status: DISCONTINUED | OUTPATIENT
Start: 2018-08-16 | End: 2018-08-17 | Stop reason: HOSPADM

## 2018-08-16 RX ORDER — BUSPIRONE HYDROCHLORIDE 5 MG/1
5 TABLET ORAL DAILY
Status: DISCONTINUED | OUTPATIENT
Start: 2018-08-17 | End: 2018-08-17 | Stop reason: HOSPADM

## 2018-08-16 RX ADMIN — BUSPIRONE HYDROCHLORIDE 5 MG: 5 TABLET ORAL at 07:59

## 2018-08-16 RX ADMIN — MONTELUKAST SODIUM 10 MG: 10 TABLET, FILM COATED ORAL at 20:56

## 2018-08-16 RX ADMIN — ENOXAPARIN SODIUM 40 MG: 40 INJECTION SUBCUTANEOUS at 08:00

## 2018-08-16 RX ADMIN — DOXYCYCLINE HYCLATE 100 MG: 100 CAPSULE, GELATIN COATED ORAL at 20:56

## 2018-08-16 RX ADMIN — INSULIN LISPRO 6 UNITS: 100 INJECTION, SOLUTION INTRAVENOUS; SUBCUTANEOUS at 17:02

## 2018-08-16 RX ADMIN — IPRATROPIUM BROMIDE AND ALBUTEROL SULFATE 1 AMPULE: .5; 3 SOLUTION RESPIRATORY (INHALATION) at 16:11

## 2018-08-16 RX ADMIN — SERTRALINE 150 MG: 100 TABLET, FILM COATED ORAL at 07:59

## 2018-08-16 RX ADMIN — FLUTICASONE PROPIONATE 1 SPRAY: 50 SPRAY, METERED NASAL at 14:49

## 2018-08-16 RX ADMIN — PETROLATUM: 42 OINTMENT TOPICAL at 12:49

## 2018-08-16 RX ADMIN — INSULIN LISPRO 2 UNITS: 100 INJECTION, SOLUTION INTRAVENOUS; SUBCUTANEOUS at 08:00

## 2018-08-16 RX ADMIN — IPRATROPIUM BROMIDE AND ALBUTEROL SULFATE 1 AMPULE: .5; 3 SOLUTION RESPIRATORY (INHALATION) at 20:33

## 2018-08-16 RX ADMIN — Medication 10 ML: at 08:00

## 2018-08-16 RX ADMIN — ASPIRIN 81 MG: 81 TABLET, COATED ORAL at 07:59

## 2018-08-16 RX ADMIN — GABAPENTIN 600 MG: 300 CAPSULE ORAL at 20:56

## 2018-08-16 RX ADMIN — ACETAMINOPHEN 650 MG: 325 TABLET, FILM COATED ORAL at 00:45

## 2018-08-16 RX ADMIN — PETROLATUM: 42 OINTMENT TOPICAL at 08:00

## 2018-08-16 RX ADMIN — GABAPENTIN 600 MG: 300 CAPSULE ORAL at 07:59

## 2018-08-16 RX ADMIN — INSULIN LISPRO 4 UNITS: 100 INJECTION, SOLUTION INTRAVENOUS; SUBCUTANEOUS at 21:10

## 2018-08-16 RX ADMIN — INSULIN LISPRO 8 UNITS: 100 INJECTION, SOLUTION INTRAVENOUS; SUBCUTANEOUS at 12:49

## 2018-08-16 RX ADMIN — CEFUROXIME AXETIL 500 MG: 500 TABLET ORAL at 20:56

## 2018-08-16 RX ADMIN — DOXYCYCLINE HYCLATE 100 MG: 100 CAPSULE, GELATIN COATED ORAL at 07:59

## 2018-08-16 RX ADMIN — GABAPENTIN 600 MG: 300 CAPSULE ORAL at 12:49

## 2018-08-16 RX ADMIN — Medication 10 ML: at 20:57

## 2018-08-16 RX ADMIN — ATENOLOL 50 MG: 50 TABLET ORAL at 07:59

## 2018-08-16 RX ADMIN — PANTOPRAZOLE SODIUM 40 MG: 40 TABLET, DELAYED RELEASE ORAL at 06:11

## 2018-08-16 RX ADMIN — PETROLATUM: 42 OINTMENT TOPICAL at 20:57

## 2018-08-16 RX ADMIN — IPRATROPIUM BROMIDE AND ALBUTEROL SULFATE 1 AMPULE: .5; 3 SOLUTION RESPIRATORY (INHALATION) at 09:20

## 2018-08-16 ASSESSMENT — PAIN SCALES - GENERAL
PAINLEVEL_OUTOF10: 0
PAINLEVEL_OUTOF10: 7
PAINLEVEL_OUTOF10: 0

## 2018-08-16 ASSESSMENT — PAIN DESCRIPTION - DESCRIPTORS: DESCRIPTORS: DISCOMFORT

## 2018-08-16 ASSESSMENT — PAIN DESCRIPTION - PAIN TYPE: TYPE: ACUTE PAIN

## 2018-08-16 ASSESSMENT — PAIN DESCRIPTION - ORIENTATION: ORIENTATION: MID

## 2018-08-16 ASSESSMENT — PAIN DESCRIPTION - LOCATION: LOCATION: BACK

## 2018-08-16 NOTE — PROGRESS NOTES
Subjective: The patient is awake and alert. Sitting up in a chair. Just finished lunch. Cough improved. No dyspnea. No chest pain. No nausea or vomiting. Reports nasal congestion. Objective:    BP (!) 145/68   Pulse 82   Temp 98.1 °F (36.7 °C) (Oral)   Resp 18   Ht 5' 2\" (1.575 m)   Wt 175 lb 8 oz (79.6 kg)   LMP  (LMP Unknown)   SpO2 91%   BMI 32.10 kg/m²     Current medications that patient is taking have been reviewed. Heart:  RRR, no murmurs, gallops, or rubs.   Lungs:  Diminished but clear, no wheeze/rales  Abd: bowel sounds present, nontender, nondistended, no masses  Extrem:  No clubbing or cyanosis, 1+ bilateral lower leg/ankle edema (chronic)    CBC:   Lab Results   Component Value Date    WBC 11.7 08/16/2018    RBC 4.13 08/16/2018    HGB 13.5 08/16/2018    HCT 38.7 08/16/2018    MCV 93.7 08/16/2018    MCH 32.7 08/16/2018    MCHC 34.9 08/16/2018    RDW 12.6 08/16/2018     08/16/2018    MPV 9.4 08/16/2018     BMP:    Lab Results   Component Value Date     08/16/2018    K 3.9 08/16/2018     08/16/2018    CO2 23 08/16/2018    BUN 20 08/16/2018    CREATININE 1.1 08/16/2018    CALCIUM 9.7 08/16/2018    GFRAA 57 08/16/2018    LABGLOM 47 08/16/2018    GLUCOSE 174 08/16/2018       Cultures negative      Assessment:    Patient Active Problem List   Diagnosis    SIRS-resolved    Acute hypoxemic respiratory failure secondary to lobar pneumonia, organism unspecified    Asthma without acute exacerbation    Diabetes mellitus type 2, uncontrolled    CKD (chronic kidney disease) stage 3    Buttock/coccyx wounds, present on admission    HTN (hypertension), benign       Plan:    1) transition to oral antibiotics  2) continue aerosols  3) flonase for nasal congestion  4) re check chest xray  5) plan to discharge to [facility] tomorrow if she remains clinically stable      Charissa Jean MD  1:33 PM  8/16/2018

## 2018-08-17 VITALS
TEMPERATURE: 98.2 F | WEIGHT: 173.7 LBS | SYSTOLIC BLOOD PRESSURE: 130 MMHG | HEART RATE: 82 BPM | BODY MASS INDEX: 31.96 KG/M2 | DIASTOLIC BLOOD PRESSURE: 58 MMHG | HEIGHT: 62 IN | RESPIRATION RATE: 18 BRPM | OXYGEN SATURATION: 95 %

## 2018-08-17 PROBLEM — R65.10 SIRS (SYSTEMIC INFLAMMATORY RESPONSE SYNDROME) (HCC): Status: RESOLVED | Noted: 2018-08-13 | Resolved: 2018-08-17

## 2018-08-17 PROBLEM — J96.01 ACUTE HYPOXEMIC RESPIRATORY FAILURE (HCC): Status: RESOLVED | Noted: 2018-08-13 | Resolved: 2018-08-17

## 2018-08-17 LAB
EKG ATRIAL RATE: 96 BPM
EKG P AXIS: 50 DEGREES
EKG P-R INTERVAL: 216 MS
EKG Q-T INTERVAL: 330 MS
EKG QRS DURATION: 68 MS
EKG QTC CALCULATION (BAZETT): 416 MS
EKG R AXIS: -35 DEGREES
EKG T AXIS: 11 DEGREES
EKG VENTRICULAR RATE: 96 BPM
METER GLUCOSE: 150 MG/DL (ref 70–110)
METER GLUCOSE: 224 MG/DL (ref 70–110)
METER GLUCOSE: 226 MG/DL (ref 70–110)

## 2018-08-17 PROCEDURE — 97530 THERAPEUTIC ACTIVITIES: CPT

## 2018-08-17 PROCEDURE — 6370000000 HC RX 637 (ALT 250 FOR IP): Performed by: INTERNAL MEDICINE

## 2018-08-17 PROCEDURE — 2580000003 HC RX 258: Performed by: INTERNAL MEDICINE

## 2018-08-17 PROCEDURE — 6360000002 HC RX W HCPCS: Performed by: INTERNAL MEDICINE

## 2018-08-17 PROCEDURE — 82962 GLUCOSE BLOOD TEST: CPT

## 2018-08-17 PROCEDURE — 94640 AIRWAY INHALATION TREATMENT: CPT

## 2018-08-17 RX ORDER — ACETAMINOPHEN 325 MG/1
650 TABLET ORAL EVERY 4 HOURS PRN
Qty: 120 TABLET | Refills: 3 | Status: ON HOLD | DISCHARGE
Start: 2018-08-17 | End: 2019-12-02 | Stop reason: HOSPADM

## 2018-08-17 RX ORDER — DOXYCYCLINE HYCLATE 100 MG/1
100 CAPSULE ORAL EVERY 12 HOURS SCHEDULED
Qty: 14 CAPSULE | Refills: 0 | DISCHARGE
Start: 2018-08-17 | End: 2018-08-20 | Stop reason: ALTCHOICE

## 2018-08-17 RX ORDER — IPRATROPIUM BROMIDE AND ALBUTEROL SULFATE 2.5; .5 MG/3ML; MG/3ML
3 SOLUTION RESPIRATORY (INHALATION)
Qty: 360 ML | DISCHARGE
Start: 2018-08-17 | End: 2019-02-13 | Stop reason: SDUPTHER

## 2018-08-17 RX ORDER — FLUTICASONE PROPIONATE 50 MCG
1 SPRAY, SUSPENSION (ML) NASAL DAILY
Qty: 1 BOTTLE | Refills: 3 | Status: ON HOLD | DISCHARGE
Start: 2018-08-17 | End: 2019-10-24 | Stop reason: SDUPTHER

## 2018-08-17 RX ORDER — CEFUROXIME AXETIL 500 MG/1
500 TABLET ORAL EVERY 12 HOURS SCHEDULED
Qty: 14 TABLET | Refills: 0 | DISCHARGE
Start: 2018-08-17 | End: 2018-08-20 | Stop reason: ALTCHOICE

## 2018-08-17 RX ORDER — BUSPIRONE HYDROCHLORIDE 5 MG/1
5 TABLET ORAL DAILY
DISCHARGE
Start: 2018-08-17 | End: 2019-05-29 | Stop reason: SDUPTHER

## 2018-08-17 RX ORDER — PETROLATUM 42 G/100G
OINTMENT TOPICAL
Refills: 0 | DISCHARGE
Start: 2018-08-17 | End: 2019-02-01 | Stop reason: SDUPTHER

## 2018-08-17 RX ORDER — PETROLATUM 42 G/100G
OINTMENT TOPICAL
Refills: 0 | DISCHARGE
Start: 2018-08-17 | End: 2018-10-29 | Stop reason: CLARIF

## 2018-08-17 RX ADMIN — ENOXAPARIN SODIUM 40 MG: 40 INJECTION SUBCUTANEOUS at 09:45

## 2018-08-17 RX ADMIN — IPRATROPIUM BROMIDE AND ALBUTEROL SULFATE 1 AMPULE: .5; 3 SOLUTION RESPIRATORY (INHALATION) at 17:38

## 2018-08-17 RX ADMIN — CEFUROXIME AXETIL 500 MG: 500 TABLET ORAL at 09:46

## 2018-08-17 RX ADMIN — BUSPIRONE HYDROCHLORIDE 5 MG: 5 TABLET ORAL at 09:48

## 2018-08-17 RX ADMIN — Medication 10 ML: at 09:45

## 2018-08-17 RX ADMIN — SERTRALINE 150 MG: 100 TABLET, FILM COATED ORAL at 09:47

## 2018-08-17 RX ADMIN — PANTOPRAZOLE SODIUM 40 MG: 40 TABLET, DELAYED RELEASE ORAL at 06:50

## 2018-08-17 RX ADMIN — GABAPENTIN 600 MG: 300 CAPSULE ORAL at 09:48

## 2018-08-17 RX ADMIN — FLUTICASONE PROPIONATE 1 SPRAY: 50 SPRAY, METERED NASAL at 09:45

## 2018-08-17 RX ADMIN — INSULIN LISPRO 2 UNITS: 100 INJECTION, SOLUTION INTRAVENOUS; SUBCUTANEOUS at 09:46

## 2018-08-17 RX ADMIN — GABAPENTIN 600 MG: 300 CAPSULE ORAL at 14:39

## 2018-08-17 RX ADMIN — IPRATROPIUM BROMIDE AND ALBUTEROL SULFATE 1 AMPULE: .5; 3 SOLUTION RESPIRATORY (INHALATION) at 10:13

## 2018-08-17 RX ADMIN — ATENOLOL 50 MG: 50 TABLET ORAL at 09:48

## 2018-08-17 RX ADMIN — DOXYCYCLINE HYCLATE 100 MG: 100 CAPSULE, GELATIN COATED ORAL at 09:48

## 2018-08-17 RX ADMIN — PETROLATUM: 42 OINTMENT TOPICAL at 14:39

## 2018-08-17 RX ADMIN — PETROLATUM: 42 OINTMENT TOPICAL at 09:52

## 2018-08-17 RX ADMIN — INSULIN LISPRO 4 UNITS: 100 INJECTION, SOLUTION INTRAVENOUS; SUBCUTANEOUS at 13:12

## 2018-08-17 RX ADMIN — ASPIRIN 81 MG: 81 TABLET, COATED ORAL at 09:48

## 2018-08-17 ASSESSMENT — PAIN SCALES - GENERAL
PAINLEVEL_OUTOF10: 0
PAINLEVEL_OUTOF10: 0

## 2018-08-17 NOTE — DISCHARGE SUMMARY
Physician Discharge Summary     Patient ID:  Tye Randall  [id number]  13 y.o.  6/29/1933    Admit date: 8/13/2018    Discharge date and time:  Aug 17, 2018    Admission Diagnoses:   Patient Active Problem List   Diagnosis    SIRS-     Acute hypoxemic respiratory failure secondary to lobar pneumonia, organism unspecified    Asthma without acute exacerbation    Diabetes mellitus type 2, uncontrolled    CKD (chronic kidney disease) stage 3    Buttock/coccyx wounds, present on admission    HTN (hypertension), benign       Discharge Diagnoses: same    Consults: none    Procedures: none    Hospital Course: the patient is a 80 y.o. white woman followed by DR Eugenio Franklin who presents secondary to fatigue, chills and shortness of breath. Clinical exam was consistent with pneumonia and SIRS. She was admitted and started on IV fluids, IV antibiotics and aerosols. Cultures were negative for specific organism. She had clinical improvement over the course of her stay. She was discharged to [facility] for further functional improvement once transitioned to oral antibiotics.     Discharge Exam:  See progress note from today    Disposition: [facility]    Patient Instructions:   Current Discharge Medication List      START taking these medications    Details   acetaminophen (TYLENOL) 325 MG tablet Take 2 tablets by mouth every 4 hours as needed for Fever (Temp greater than 100.5, for pain score 1-3)  Qty: 120 tablet, Refills: 3      insulin lispro (HUMALOG) 100 UNIT/ML injection vial Inject 0-12 Units into the skin 3 times daily (with meals) **Medium Dose Correction Algorithm**  Glucose:  Dose:     No Insulin  140-199  2 Units  200-249  4 Units  250-299  6 Units  300-349  8 Units  350-399  10 Units  400 and above 12 Units  Qty: 1 vial, Refills: 3      cefUROXime (CEFTIN) 500 MG tablet Take 1 tablet by mouth every 12 hours for 7 days  Qty: 14 tablet, Refills: 0      !! mineral oil-hydrophilic petrolatum (HYDROPHOR) ointment Apply

## 2018-08-17 NOTE — CARE COORDINATION
Social Work/Discharge Planning    Pt is discharging to [facility] between 5-6pm via [facility] ([phone]) jimmie. RN aware. SW called pt's daughter to explain that transport will require $60 upfront. She stated that she will call her sister to discuss and stated that they may transport. SW following, awaiting return call.     Electronically signed by JOSE ALEJANDRO Trujillo on 8/17/2018 at 11:44 AM

## 2018-08-17 NOTE — PROGRESS NOTES
OT BEDSIDE TREATMENT NOTE      Date:2018  Patient Name: Beto Ruffin  MRN: [id number]  : 1933  Room: [address] [place] [address]     Evaluating OT:  Kati Jaquan, PRETTY, OTR/L  # [id number]     AM-PAC Raw Score:  15/24   G Code CK     Recommended Adaptive Equipment: TBD as pt progresses        Reason for Admission:  Pt was admitted w/ cough, weakness. Slipped off of Lift chair onto floor     Diagnosis:   1. Pneumonia due to organism       Precautions:  Fall Risk  Coccyx Wound - turning schedule- waffle cushion  Carb Control Diet     Home Living: Pt lives with her spouse in a 1-story house with 4 FEROZ and Gallo HR/s. Bed/bath on the main floor  Bathroom setup: Tub-Shower, standard commode  Equipment owned: Shower Chair, [address], Lift Chair     Available Family Assist:  Spouse provides 24 hour SUP, unable to safely provide adequate assist      Prior Level of Function:  Pt received assist w/ all ADLs, Transfers and Mobility w/ use of FWW. Reports having an Aide 3 days/week to assist w/ Bathing (Sponge bathing and transferring into Tub-Shower 1x/week). Pt reported her spouse provides assist w/ Dressing when Aide is not present  Driving: No  Occupation/Interests: None stated    Pain: No c/o pain this date. Cognition: Alert & Oriented x3. Follows 2 step commands. Fair safety/problem solving. Goals:  GOAL (1)  Pt will complete Hygiene/Grooming with SUP/Set up while seated at the sink. GOAL (2)  Pt will complete UB ADLs with Min A w/ use of Adaptive equip/techs PRN  GOAL (3)  Pt will complete LB ADLs/Toileting with Mod A w/ use of DME/AD/Adaptive equip/techs PRN. GOAL (4)  Pt will complete Functional Transfers and Functional Mobility with Min A w/ use of DME/AD PRN. GOAL (5)  Pt will tolerate standing > 5 minutes with Min A for completion of Functional Ax. GOAL (6)  Pt will INDly Demo Good Safety Awareness w/ completion of all Functional Ax.      Functional Assessment:    Initial Status 18 Current Status  Feeding  SUP/Set up  Setup  Per last tx  Will continue to assess. Grooming  Min a/Set up bed level S/Setup  Combing hair seated in chair    Upper Body Dressing Mod A seated EOB  Min A  To don/doff hospital gown seated at EOB with min cues for sequencing and assist required to adjust clothing around trunk. Lower Body Dressing Max A Max A  Adjusting socks seated in chair. Bathing NT NT      Toileting  NT [place]     Bed Mobility  Max A Supine>Sit: Max A  Mod cues for sequencing required to complete. Functional Transfers Max A  Sit<>Stand: Mod A  Stand Pivot Transfer: Mod A  Min cues for hand placement/safety provided with fair- carryover during sit<>stand transfer. SPT completed from with min cues for safety and assist to maneuver walker. Functional Mobility Min A w/ FWW limited to ~ 3' fwd/back d/t fatigue, general weakness, safety  Mod A  Few steps with w/w with min cues for walker safety. Functional Sitting balance: Min A dynamic, SBA static utilizing B UE's for support. Functional Standing balance: Mod A with w/w  Endurance/Activity tolerance: Fair    Comments: Upon arrival pt in supine with HOB elevated. Pt educated on transfer safety/sequencing, walker safety, increasing activity and posture. Pt verbalized/demonstrated fair understanding. At end of session pt left sitting up in chair on waffle cushion with all lines and tubes intact and call light within reach. · Pt has made fair progress towards set goals.    · Continue with current plan of care    Time in:1111  Time out: 1127  Total Tx Time: [address] [facility] DAN/L [id number]

## 2018-08-17 NOTE — CARE COORDINATION
Social Work/Discharge Planning    Pt's daughter stated that she will provide transportation to [facility]. She stated that she will  patient between 5:30-6pm. RN aware. SW called and canceled transportation.     Electronically signed by Frank Solano on 8/17/2018 at 3:45 PM

## 2018-08-19 LAB
BLOOD CULTURE, ROUTINE: NORMAL
CULTURE, BLOOD 2: NORMAL

## 2018-08-20 ENCOUNTER — CLINICAL DOCUMENTATION (OUTPATIENT)
Dept: FAMILY MEDICINE CLINIC | Age: 83
End: 2018-08-20

## 2018-08-20 ENCOUNTER — HOSPITAL ENCOUNTER (OUTPATIENT)
Age: 83
Discharge: HOME | End: 2018-08-22
Payer: MEDICARE

## 2018-08-20 LAB
ANION GAP SERPL CALCULATED.3IONS-SCNC: 13 MMOL/L (ref 7–16)
BUN BLDV-MCNC: 32 MG/DL (ref 8–23)
CALCIUM SERPL-MCNC: 9.7 MG/DL (ref 8.6–10.2)
CHLORIDE BLD-SCNC: 98 MMOL/L (ref 98–107)
CO2: 27 MMOL/L (ref 22–29)
CREAT SERPL-MCNC: 1.1 MG/DL (ref 0.5–1)
GFR AFRICAN AMERICAN: 57
GFR NON-AFRICAN AMERICAN: 47 ML/MIN/1.73
GLUCOSE BLD-MCNC: 115 MG/DL (ref 74–109)
HBA1C MFR BLD: 7.4 % (ref 4–5.6)
HCT VFR BLD CALC: 34.7 % (ref 34–48)
HEMOGLOBIN: 11.9 G/DL (ref 11.5–15.5)
MCH RBC QN AUTO: 32.9 PG (ref 26–35)
MCHC RBC AUTO-ENTMCNC: 34.3 % (ref 32–34.5)
MCV RBC AUTO: 95.9 FL (ref 80–99.9)
PDW BLD-RTO: 12.8 FL (ref 11.5–15)
PLATELET # BLD: 224 E9/L (ref 130–450)
PMV BLD AUTO: 9.4 FL (ref 7–12)
POTASSIUM SERPL-SCNC: 4.3 MMOL/L (ref 3.5–5)
RBC # BLD: 3.62 E12/L (ref 3.5–5.5)
SODIUM BLD-SCNC: 138 MMOL/L (ref 132–146)
WBC # BLD: 7.9 E9/L (ref 4.5–11.5)

## 2018-08-20 PROCEDURE — 80048 BASIC METABOLIC PNL TOTAL CA: CPT

## 2018-08-20 PROCEDURE — 85027 COMPLETE CBC AUTOMATED: CPT

## 2018-08-20 PROCEDURE — 83036 HEMOGLOBIN GLYCOSYLATED A1C: CPT

## 2018-08-20 PROCEDURE — 36415 COLL VENOUS BLD VENIPUNCTURE: CPT

## 2018-08-20 NOTE — PROGRESS NOTES
deficit or sensory deficit. She exhibits normal muscle tone. Coordination normal.   Skin: Skin is warm. Small area of erythema on buttocks. Dermatitis around neck. Psychiatric: She has a normal mood and affect. Her behavior is normal. Judgment and thought content normal.   Nursing note and vitals reviewed. ASSESSMENT/PLAN  Sundar Oreilly was seen today for diabetes and wound check. Diagnoses and all orders for this visit:    Type II diabetes mellitus with nephropathy ([facility] [address]  -     POCT Glucose  -     POCT glycosylated hemoglobin (Hb A1C)  Controlled. Lab, victoza, ADA diet. Peripheral polyneuropathy (HCC)  Stable. Neurontin, tegretol. Essential hypertension  Controlled. BB, lasix,  Cellulitis of buttock  -     nystatin (MYCOSTATIN) 663352 UNIT/GM powder; Apply 3 times daily. Controlled. Mycostatin, nizoral.  Dermatitis  -     hydrocortisone 2.5 % cream; Apply topically 2 times daily. Not controlled.  Hydrocortisone cream.          Outpatient Encounter Prescriptions as of 8/20/2018   Medication Sig Dispense Refill    acetaminophen (TYLENOL) 325 MG tablet Take 2 tablets by mouth every 4 hours as needed for Fever (Temp greater than 100.5, for pain score 1-3) 120 tablet 3    busPIRone (BUSPAR) 5 MG tablet Take 1 tablet by mouth daily      ipratropium-albuterol (DUONEB) 0.5-2.5 (3) MG/3ML SOLN nebulizer solution Inhale 3 mLs into the lungs every 4 hours (while awake) 360 mL     insulin lispro (HUMALOG) 100 UNIT/ML injection vial Inject 0-12 Units into the skin 3 times daily (with meals) **Medium Dose Correction Algorithm**  Glucose:  Dose:     No Insulin  140-199  2 Units  200-249  4 Units  250-299  6 Units  300-349  8 Units  350-399  10 Units  400 and above 12 Units 1 vial 3    fluticasone (FLONASE) 50 MCG/ACT nasal spray 1 spray by Each Nare route daily 1 Bottle 3    mineral oil-hydrophilic petrolatum (HYDROPHOR) ointment Apply topically bid as needed to coccyx and bilateral buttocks  0    mineral

## 2018-09-07 ENCOUNTER — CLINICAL DOCUMENTATION (OUTPATIENT)
Dept: FAMILY MEDICINE CLINIC | Age: 83
End: 2018-09-07

## 2018-09-07 NOTE — PROGRESS NOTES
Anola Burkitt is a 80 y.o. female. HPI/Chief C/O:  No chief complaint on file. No Known Allergies   I am in to follow up and do a BEERS review   She has no new issues of complaint today  She is eating and doing well        ROS:  Review of Systems   Constitutional: Positive for malaise/fatigue. Negative for chills, diaphoresis, fever and weight loss. HENT: Negative. Negative for congestion, ear discharge, ear pain, hearing loss, nosebleeds, sinus pain, sore throat and tinnitus. Eyes: Negative. Negative for blurred vision, double vision, photophobia, pain, discharge and redness. Respiratory: Negative. Negative for cough, hemoptysis, sputum production, shortness of breath, wheezing and stridor. Cardiovascular: Negative for chest pain, palpitations, orthopnea, claudication, leg swelling and PND. Pt has hypertension. Gastrointestinal: Negative. Negative for abdominal pain, blood in stool, constipation, diarrhea, heartburn, melena, nausea and vomiting. Genitourinary: Negative. Negative for dysuria, flank pain, frequency, hematuria and urgency. Musculoskeletal: Positive for joint pain and myalgias. Negative for back pain, falls and neck pain. Skin: good  Neurological: Positive for tingling. Negative for dizziness, tremors, sensory change, speech change, focal weakness, seizures, loss of consciousness, weakness and headaches. Endo/Heme/Allergies: Negative for environmental allergies and polydipsia. Does not bruise/bleed easily. Pt has type 2 DM. Psychiatric/Behavioral: Negative for depression, hallucinations, memory loss, substance abuse and suicidal ideas. The patient is nervous/anxious. The patient does not have insomnia.           Past Medical/Surgical Hx;  Reviewed with patient      Diagnosis Date    Anxiety     Depression     Hypertension     Peripheral neuropathy      Past Surgical History:   Procedure Laterality Date    CHOLECYSTECTOMY      JOINT REPLACEMENT      SKIN CANCER EXCISION         Past Family Hx:  Reviewed with patient  No family history on file. Social Hx:  Reviewed with patient  Social History   Substance Use Topics    Smoking status: Never Smoker    Smokeless tobacco: Never Used    Alcohol use No       OBJECTIVE  LMP  (LMP Unknown)     Problem List:  Tyron Rosales  does not have any pertinent problems on file. PHYS EX:  Physical Exam   Constitutional: She is oriented to person, place, and time. She appears well-developed and well-nourished. No distress. HENT:   Head: Normocephalic and atraumatic. Right Ear: External ear normal.   Left Ear: External ear normal.   Nose: Nose normal.   Mouth/Throat: Oropharynx is clear and moist. No oropharyngeal exudate. Eyes: Conjunctivae and EOM are normal. Pupils are equal, round, and reactive to light. Right eye exhibits no discharge. Left eye exhibits no discharge. No scleral icterus. Neck: Normal range of motion. Neck supple. No JVD present. No tracheal deviation present. No thyromegaly present. Cardiovascular: Normal rate, regular rhythm, normal heart sounds and intact distal pulses. Exam reveals no gallop and no friction rub. No murmur heard. Pulmonary/Chest: Effort normal and breath sounds normal. No stridor. No respiratory distress. She has no wheezes. She has no rales. She exhibits no tenderness. Abdominal: Soft. Bowel sounds are normal. She exhibits no distension and no mass. There is no tenderness. There is no rebound and no guarding. No hernia. Musculoskeletal: She exhibits tenderness. She exhibits no edema or deformity. She has multiple and diffuse joint pains of osteoarthritis   Lymphadenopathy:     She has no cervical adenopathy. Neurological: She is alert and oriented to person, place, and time. She has normal reflexes. She displays normal reflexes. No cranial nerve deficit or sensory deficit. She exhibits normal muscle tone.  Coordination normal.   Skin: Skin is warm.    Psychiatric: She has a normal mood and affect. Her behavior is normal. Judgment and thought content normal.   Nursing note and vitals reviewed. ASSESSMENT/PLAN  Marcelo Jacobsen was seen today for diabetes and wound check. Diagnoses and all orders for this visit:    Type II diabetes mellitus with nephropathy ([facility] [address]  -     POCT Glucose  -     POCT glycosylated hemoglobin (Hb A1C)  Controlled. Lab, victoza, ADA diet. Peripheral polyneuropathy (HCC)  Stable. Neurontin, tegretol. Essential hypertension  Controlled. BB, lasix,  CARDIAC--ASA, BETA, ace, statin, LASIX, ( ccb )  VASCULAR PANEL  A) ASA, plavix, aggrenox  B) coumadin, pletal, tzd, statin  C) ace, LASIX, folic, ccb  D) cannikinumab, fish oils       She is medically stable and I will make no care plan change today     Outpatient Encounter Prescriptions as of 9/7/2018   Medication Sig Dispense Refill    acetaminophen (TYLENOL) 325 MG tablet Take 2 tablets by mouth every 4 hours as needed for Fever (Temp greater than 100.5, for pain score 1-3) 120 tablet 3    busPIRone (BUSPAR) 5 MG tablet Take 1 tablet by mouth daily      ipratropium-albuterol (DUONEB) 0.5-2.5 (3) MG/3ML SOLN nebulizer solution Inhale 3 mLs into the lungs every 4 hours (while awake) 360 mL     insulin lispro (HUMALOG) 100 UNIT/ML injection vial Inject 0-12 Units into the skin 3 times daily (with meals) **Medium Dose Correction Algorithm**  Glucose:  Dose:     No Insulin  140-199  2 Units  200-249  4 Units  250-299  6 Units  300-349  8 Units  350-399  10 Units  400 and above 12 Units 1 vial 3    fluticasone (FLONASE) 50 MCG/ACT nasal spray 1 spray by Each Nare route daily 1 Bottle 3    mineral oil-hydrophilic petrolatum (HYDROPHOR) ointment Apply topically bid as needed to coccyx and bilateral buttocks  0    mineral oil-hydrophilic petrolatum (HYDROPHOR) ointment Apply topically as needed. 0    gabapentin (NEURONTIN) 300 MG capsule Take two tablets three times a day.  Kendy Grimes

## 2018-10-05 ENCOUNTER — TELEPHONE (OUTPATIENT)
Dept: FAMILY MEDICINE CLINIC | Age: 83
End: 2018-10-05

## 2018-10-05 RX ORDER — AZITHROMYCIN 250 MG/1
TABLET, FILM COATED ORAL
Qty: 1 PACKET | Refills: 0 | Status: SHIPPED | OUTPATIENT
Start: 2018-10-05 | End: 2018-10-09

## 2018-10-08 ENCOUNTER — TELEPHONE (OUTPATIENT)
Dept: FAMILY MEDICINE CLINIC | Age: 83
End: 2018-10-08

## 2018-10-08 ENCOUNTER — CLINICAL DOCUMENTATION (OUTPATIENT)
Dept: FAMILY MEDICINE CLINIC | Age: 83
End: 2018-10-08

## 2018-10-08 DIAGNOSIS — L03.317 CELLULITIS OF BUTTOCK: Primary | ICD-10-CM

## 2018-10-08 NOTE — PROGRESS NOTES
Osvaldo Murphy is a 80 y.o. female. HPI/Chief C/O:  No chief complaint on file. No Known Allergies   I am in to follow up and do a BEERS review   She has no new issues of complaint today  She is eating and doing well        ROS:  Review of Systems   Constitutional: Positive for malaise/fatigue. Negative for chills, diaphoresis, fever and weight loss. HENT: Negative. Negative for congestion, ear discharge, ear pain, hearing loss, nosebleeds, sinus pain, sore throat and tinnitus. Eyes: Negative. Negative for blurred vision, double vision, photophobia, pain, discharge and redness. Respiratory: Negative. Negative for cough, hemoptysis, sputum production, shortness of breath, wheezing and stridor. Cardiovascular: Negative for chest pain, palpitations, orthopnea, claudication, leg swelling and PND. Pt has hypertension. Gastrointestinal: Negative. Negative for abdominal pain, blood in stool, constipation, diarrhea, heartburn, melena, nausea and vomiting. Genitourinary: Negative. Negative for dysuria, flank pain, frequency, hematuria and urgency. Musculoskeletal: Positive for joint pain and myalgias. Negative for back pain, falls and neck pain. Skin: good  Neurological: Positive for tingling. Negative for dizziness, tremors, sensory change, speech change, focal weakness, seizures, loss of consciousness, weakness and headaches. Endo/Heme/Allergies: Negative for environmental allergies and polydipsia. Does not bruise/bleed easily. Pt has type 2 DM. Psychiatric/Behavioral: Negative for depression, hallucinations, memory loss, substance abuse and suicidal ideas. The patient is nervous/anxious. The patient does not have insomnia.           Past Medical/Surgical Hx;  Reviewed with patient      Diagnosis Date    Anxiety     Depression     Hypertension     Peripheral neuropathy      Past Surgical History:   Procedure Laterality Date    CHOLECYSTECTOMY      JOINT needed to coccyx and bilateral buttocks  0    mineral oil-hydrophilic petrolatum (HYDROPHOR) ointment Apply topically as needed. 0    gabapentin (NEURONTIN) 300 MG capsule Take two tablets three times a day. 540 capsule 1    Insulin Pen Needle 32G X 4 MM MISC 1 each by Does not apply route daily 100 each 3    glucose blood VI test strips (FREESTYLE LITE) strip Test BID. 200 each 3    sertraline (ZOLOFT) 100 MG tablet Take 1 1/2 tablets once a day 135 tablet 1    Liraglutide (VICTOZA) 18 MG/3ML SOPN SC injection Inject 1.2 mg into the skin daily 6 pen 1    atenolol (TENORMIN) 50 MG tablet Take 1 tablet by mouth daily 90 tablet 1    glucose monitoring kit (FREESTYLE) monitoring kit 1 kit by Does not apply route 2 times daily 1 kit 0    montelukast (SINGULAIR) 10 MG tablet TAKE 1 TABLET NIGHTLY 90 tablet 1    aspirin EC 81 MG EC tablet Take 81 mg by mouth daily Took 4 today      Calcium-Vitamin D (CALTRATE 600 PLUS-VIT D PO) Take  by mouth. No facility-administered encounter medications on file as of 10/8/2018. No Follow-up on file.         Reviewed recent labs related to Grace's current problems      Discussed importance of regular Health Maintenance follow up  Health Maintenance   Topic    DTaP/Tdap/Td vaccine (1 - Tdap)    Shingles Vaccine (1 of 2 - 2 Dose Series)    Flu vaccine (1)    Potassium monitoring     Creatinine monitoring     DEXA (modify frequency per FRAX score)     Pneumococcal low/med risk

## 2018-10-08 NOTE — TELEPHONE ENCOUNTER
Eboni Ho from [facility] contacted office stating patient's O2 was 93 she did hear some rales in left lower lobe patient is still taking Zpak.  Patient also has sore on right buttock patient's daughter wants to know if an ointment can be called in for the sore

## 2018-10-29 ENCOUNTER — OFFICE VISIT (OUTPATIENT)
Dept: FAMILY MEDICINE CLINIC | Age: 83
End: 2018-10-29
Payer: MEDICARE

## 2018-10-29 VITALS
HEART RATE: 76 BPM | WEIGHT: 170.6 LBS | BODY MASS INDEX: 32.21 KG/M2 | OXYGEN SATURATION: 98 % | RESPIRATION RATE: 18 BRPM | DIASTOLIC BLOOD PRESSURE: 62 MMHG | SYSTOLIC BLOOD PRESSURE: 112 MMHG | HEIGHT: 61 IN

## 2018-10-29 DIAGNOSIS — I10 HTN (HYPERTENSION), BENIGN: Chronic | ICD-10-CM

## 2018-10-29 DIAGNOSIS — J18.9 PNEUMONIA DUE TO INFECTIOUS ORGANISM, UNSPECIFIED LATERALITY, UNSPECIFIED PART OF LUNG: ICD-10-CM

## 2018-10-29 DIAGNOSIS — N18.30 CKD (CHRONIC KIDNEY DISEASE) STAGE 3, GFR 30-59 ML/MIN (HCC): Chronic | ICD-10-CM

## 2018-10-29 DIAGNOSIS — E11.65 UNCONTROLLED TYPE 2 DIABETES MELLITUS WITH HYPERGLYCEMIA (HCC): Primary | Chronic | ICD-10-CM

## 2018-10-29 DIAGNOSIS — R32 URINARY INCONTINENCE, UNSPECIFIED TYPE: ICD-10-CM

## 2018-10-29 DIAGNOSIS — J45.909 MILD ASTHMA WITHOUT COMPLICATION, UNSPECIFIED WHETHER PERSISTENT: ICD-10-CM

## 2018-10-29 DIAGNOSIS — K21.9 GASTROESOPHAGEAL REFLUX DISEASE WITHOUT ESOPHAGITIS: Chronic | ICD-10-CM

## 2018-10-29 DIAGNOSIS — Z23 IMMUNIZATION DUE: ICD-10-CM

## 2018-10-29 LAB — GLUCOSE BLD-MCNC: 140 MG/DL

## 2018-10-29 PROCEDURE — 1123F ACP DISCUSS/DSCN MKR DOCD: CPT | Performed by: FAMILY MEDICINE

## 2018-10-29 PROCEDURE — 82962 GLUCOSE BLOOD TEST: CPT | Performed by: FAMILY MEDICINE

## 2018-10-29 PROCEDURE — 4040F PNEUMOC VAC/ADMIN/RCVD: CPT | Performed by: FAMILY MEDICINE

## 2018-10-29 PROCEDURE — 99214 OFFICE O/P EST MOD 30 MIN: CPT | Performed by: FAMILY MEDICINE

## 2018-10-29 PROCEDURE — 1090F PRES/ABSN URINE INCON ASSESS: CPT | Performed by: FAMILY MEDICINE

## 2018-10-29 PROCEDURE — G8427 DOCREV CUR MEDS BY ELIG CLIN: HCPCS | Performed by: FAMILY MEDICINE

## 2018-10-29 PROCEDURE — 1036F TOBACCO NON-USER: CPT | Performed by: FAMILY MEDICINE

## 2018-10-29 PROCEDURE — 0509F URINE INCON PLAN DOCD: CPT | Performed by: FAMILY MEDICINE

## 2018-10-29 PROCEDURE — G8482 FLU IMMUNIZE ORDER/ADMIN: HCPCS | Performed by: FAMILY MEDICINE

## 2018-10-29 PROCEDURE — 1111F DSCHRG MED/CURRENT MED MERGE: CPT | Performed by: FAMILY MEDICINE

## 2018-10-29 PROCEDURE — G8400 PT W/DXA NO RESULTS DOC: HCPCS | Performed by: FAMILY MEDICINE

## 2018-10-29 PROCEDURE — G0008 ADMIN INFLUENZA VIRUS VAC: HCPCS | Performed by: FAMILY MEDICINE

## 2018-10-29 PROCEDURE — 90662 IIV NO PRSV INCREASED AG IM: CPT | Performed by: FAMILY MEDICINE

## 2018-10-29 PROCEDURE — G8417 CALC BMI ABV UP PARAM F/U: HCPCS | Performed by: FAMILY MEDICINE

## 2018-10-29 PROCEDURE — 1101F PT FALLS ASSESS-DOCD LE1/YR: CPT | Performed by: FAMILY MEDICINE

## 2018-10-29 RX ORDER — MONTELUKAST SODIUM 10 MG/1
TABLET ORAL
Qty: 90 TABLET | Refills: 1 | Status: SHIPPED | OUTPATIENT
Start: 2018-10-29 | End: 2019-05-29 | Stop reason: SDUPTHER

## 2018-11-04 NOTE — PROGRESS NOTES
use according to sliding scale up to 12 units three times daily 5 pen 3    Insulin Pen Needle 32G X 4 MM MISC 1 each by Does not apply route 3 times daily 100 each 3    acetaminophen (TYLENOL) 325 MG tablet Take 2 tablets by mouth every 4 hours as needed for Fever (Temp greater than 100.5, for pain score 1-3) 120 tablet 3    busPIRone (BUSPAR) 5 MG tablet Take 1 tablet by mouth daily      ipratropium-albuterol (DUONEB) 0.5-2.5 (3) MG/3ML SOLN nebulizer solution Inhale 3 mLs into the lungs every 4 hours (while awake) (Patient taking differently: Inhale 3 mLs into the lungs every 6 hours ) 360 mL     fluticasone (FLONASE) 50 MCG/ACT nasal spray 1 spray by Each Nare route daily 1 Bottle 3    mineral oil-hydrophilic petrolatum (HYDROPHOR) ointment Apply topically bid as needed to coccyx and bilateral buttocks  0    gabapentin (NEURONTIN) 300 MG capsule Take two tablets three times a day. 540 capsule 1    glucose blood VI test strips (FREESTYLE LITE) strip Test BID. 200 each 3    sertraline (ZOLOFT) 100 MG tablet Take 1 1/2 tablets once a day 135 tablet 1    Liraglutide (VICTOZA) 18 MG/3ML SOPN SC injection Inject 1.2 mg into the skin daily 6 pen 1    atenolol (TENORMIN) 50 MG tablet Take 1 tablet by mouth daily 90 tablet 1    glucose monitoring kit (FREESTYLE) monitoring kit 1 kit by Does not apply route 2 times daily 1 kit 0    aspirin EC 81 MG EC tablet Take 81 mg by mouth daily Took 4 today      Calcium-Vitamin D (CALTRATE 600 PLUS-VIT D PO) Take  by mouth.             Medications patient taking as of now reconciled against medications ordered at time of hospital discharge: Yes    Chief Complaint   Patient presents with    Diabetes     Pt here today for a f/u after nursing home;     Other     Pt here today to discuss lift chair    Health Maintenance     MA reviewed with pt- flu pended    Discuss Medications     Pt here today to discuss medications    Medication Refill     Pt here today for medication refills- meds pended       History of Present illness - Follow up of Hospital diagnosis(es): acute hypoxemic respiratory failure secondary to lobar pneumonia unspecified organism. Inpatient course: Discharge summary reviewed- see chart. Interval history/Current status: fair    A comprehensive review of systems was negative except for what was noted in the HPI. Vitals:    10/29/18 0948   BP: 112/62   Pulse: 76   Resp: 18   SpO2: 98%   Weight: 170 lb 9.6 oz (77.4 kg)   Height: 5' 1\" (1.549 m)     Body mass index is 32.23 kg/m².    Wt Readings from Last 3 Encounters:   10/29/18 170 lb 9.6 oz (77.4 kg)   08/17/18 173 lb 11.2 oz (78.8 kg)   06/08/18 167 lb (75.8 kg)     BP Readings from Last 3 Encounters:   10/29/18 112/62   08/17/18 (!) 130/58   06/08/18 128/66        Physical Exam:  General Appearance: alert and oriented to person, place and time, well-developed and well-nourished, in no acute distress, alert and oriented to person, place and time, well-developed and well nourished, in no acute distress and alert  Skin: cellulitis buttocks  Head: normocephalic and atraumatic  Eyes: pupils equal, round, and reactive to light, extraocular eye movements intact, conjunctivae normal  ENT: tympanic membrane, external ear and ear canal normal bilaterally, oropharynx clear and moist with normal mucous membranes  Neck: neck supple and non tender without mass, no thyromegaly or thyroid nodules, no cervical lymphadenopathy and neck supple and non tender without mass   Pulmonary/Chest: clear to auscultation bilaterally- no wheezes, rales or rhonchi, normal air movement, no respiratory distress and no chest wall tenderness  Cardiovascular: normal rate, regular rhythm, normal S1 and S2, no murmurs, rubs, clicks or gallops, distal pulses intact, no carotid bruits, normal rate, regular rhythm, normal S1 and S2, no murmurs, no gallops, intact distal pulses and no carotid bruits  Abdomen: soft, non-tender,

## 2018-11-12 ENCOUNTER — CARE COORDINATION (OUTPATIENT)
Dept: CARE COORDINATION | Age: 83
End: 2018-11-12

## 2018-11-20 ENCOUNTER — CARE COORDINATION (OUTPATIENT)
Dept: CARE COORDINATION | Age: 83
End: 2018-11-20

## 2018-11-20 NOTE — CARE COORDINATION
-[facility] phoned patient to offer enrollment into care coordination, patient declines at this time.  -Patient encouraged to contact [facility] if she should change her mind.

## 2018-11-26 ENCOUNTER — HOSPITAL ENCOUNTER (OUTPATIENT)
Dept: GENERAL RADIOLOGY | Age: 83
Discharge: HOME | End: 2018-11-28
Payer: MEDICARE

## 2018-11-26 ENCOUNTER — HOSPITAL ENCOUNTER (OUTPATIENT)
Age: 83
Discharge: HOME | End: 2018-11-28
Payer: MEDICARE

## 2018-11-26 ENCOUNTER — OFFICE VISIT (OUTPATIENT)
Dept: FAMILY MEDICINE CLINIC | Age: 83
End: 2018-11-26
Payer: MEDICARE

## 2018-11-26 VITALS
OXYGEN SATURATION: 97 % | SYSTOLIC BLOOD PRESSURE: 118 MMHG | HEART RATE: 73 BPM | TEMPERATURE: 98.3 F | HEIGHT: 61 IN | WEIGHT: 176 LBS | BODY MASS INDEX: 33.23 KG/M2 | RESPIRATION RATE: 18 BRPM | DIASTOLIC BLOOD PRESSURE: 80 MMHG

## 2018-11-26 DIAGNOSIS — N18.30 CKD (CHRONIC KIDNEY DISEASE) STAGE 3, GFR 30-59 ML/MIN (HCC): ICD-10-CM

## 2018-11-26 DIAGNOSIS — E11.65 UNCONTROLLED TYPE 2 DIABETES MELLITUS WITH HYPERGLYCEMIA (HCC): Chronic | ICD-10-CM

## 2018-11-26 DIAGNOSIS — E78.2 MIXED HYPERLIPIDEMIA: ICD-10-CM

## 2018-11-26 DIAGNOSIS — J18.9 PNEUMONIA DUE TO INFECTIOUS ORGANISM, UNSPECIFIED LATERALITY, UNSPECIFIED PART OF LUNG: ICD-10-CM

## 2018-11-26 DIAGNOSIS — I10 HTN (HYPERTENSION), BENIGN: Chronic | ICD-10-CM

## 2018-11-26 DIAGNOSIS — E11.21 TYPE II DIABETES MELLITUS WITH NEPHROPATHY (HCC): ICD-10-CM

## 2018-11-26 DIAGNOSIS — G62.9 PERIPHERAL POLYNEUROPATHY: ICD-10-CM

## 2018-11-26 DIAGNOSIS — F41.9 ANXIETY: ICD-10-CM

## 2018-11-26 DIAGNOSIS — E11.65 UNCONTROLLED TYPE 2 DIABETES MELLITUS WITH HYPERGLYCEMIA (HCC): Primary | Chronic | ICD-10-CM

## 2018-11-26 DIAGNOSIS — R73.01 IFG (IMPAIRED FASTING GLUCOSE): ICD-10-CM

## 2018-11-26 DIAGNOSIS — R53.83 FATIGUE, UNSPECIFIED TYPE: ICD-10-CM

## 2018-11-26 LAB
ALBUMIN SERPL-MCNC: 3.8 G/DL (ref 3.5–5.2)
ALP BLD-CCNC: 77 U/L (ref 35–104)
ALT SERPL-CCNC: 18 U/L (ref 0–32)
AMPHETAMINE SCREEN, URINE: NOT DETECTED
ANION GAP SERPL CALCULATED.3IONS-SCNC: 14 MMOL/L (ref 7–16)
AST SERPL-CCNC: 27 U/L (ref 0–31)
BARBITURATE SCREEN URINE: NOT DETECTED
BASOPHILS ABSOLUTE: 0.04 E9/L (ref 0–0.2)
BASOPHILS RELATIVE PERCENT: 0.6 % (ref 0–2)
BENZODIAZEPINE SCREEN, URINE: NOT DETECTED
BILIRUB SERPL-MCNC: 0.4 MG/DL (ref 0–1.2)
BUN BLDV-MCNC: 26 MG/DL (ref 8–23)
CALCIUM SERPL-MCNC: 10.3 MG/DL (ref 8.6–10.2)
CANNABINOID SCREEN URINE: NOT DETECTED
CHLORIDE BLD-SCNC: 101 MMOL/L (ref 98–107)
CHOLESTEROL, TOTAL: 262 MG/DL (ref 0–199)
CO2: 29 MMOL/L (ref 22–29)
COCAINE METABOLITE SCREEN URINE: NOT DETECTED
CREAT SERPL-MCNC: 1.3 MG/DL (ref 0.5–1)
EOSINOPHILS ABSOLUTE: 0.15 E9/L (ref 0.05–0.5)
EOSINOPHILS RELATIVE PERCENT: 2.3 % (ref 0–6)
GFR AFRICAN AMERICAN: 47
GFR NON-AFRICAN AMERICAN: 39 ML/MIN/1.73
GLUCOSE BLD-MCNC: 146 MG/DL
GLUCOSE BLD-MCNC: 161 MG/DL (ref 74–99)
HBA1C MFR BLD: 7.4 % (ref 4–5.6)
HCT VFR BLD CALC: 40.9 % (ref 34–48)
HDLC SERPL-MCNC: 51 MG/DL
HEMOGLOBIN: 13.3 G/DL (ref 11.5–15.5)
IMMATURE GRANULOCYTES #: 0.03 E9/L
IMMATURE GRANULOCYTES %: 0.5 % (ref 0–5)
LDL CHOLESTEROL CALCULATED: 145 MG/DL (ref 0–99)
LYMPHOCYTES ABSOLUTE: 1.88 E9/L (ref 1.5–4)
LYMPHOCYTES RELATIVE PERCENT: 28.4 % (ref 20–42)
MCH RBC QN AUTO: 32.8 PG (ref 26–35)
MCHC RBC AUTO-ENTMCNC: 32.5 % (ref 32–34.5)
MCV RBC AUTO: 100.7 FL (ref 80–99.9)
METHADONE SCREEN, URINE: NOT DETECTED
MONOCYTES ABSOLUTE: 0.4 E9/L (ref 0.1–0.95)
MONOCYTES RELATIVE PERCENT: 6.1 % (ref 2–12)
NEUTROPHILS ABSOLUTE: 4.11 E9/L (ref 1.8–7.3)
NEUTROPHILS RELATIVE PERCENT: 62.1 % (ref 43–80)
OPIATE SCREEN URINE: NOT DETECTED
PDW BLD-RTO: 13.3 FL (ref 11.5–15)
PHENCYCLIDINE SCREEN URINE: NOT DETECTED
PLATELET # BLD: 177 E9/L (ref 130–450)
PMV BLD AUTO: 9.5 FL (ref 7–12)
POTASSIUM SERPL-SCNC: 5.6 MMOL/L (ref 3.5–5)
PROPOXYPHENE SCREEN: NOT DETECTED
RBC # BLD: 4.06 E12/L (ref 3.5–5.5)
SODIUM BLD-SCNC: 144 MMOL/L (ref 132–146)
TOTAL PROTEIN: 7.2 G/DL (ref 6.4–8.3)
TRIGL SERPL-MCNC: 330 MG/DL (ref 0–149)
TSH SERPL DL<=0.05 MIU/L-ACNC: 5.4 UIU/ML (ref 0.27–4.2)
VLDLC SERPL CALC-MCNC: 66 MG/DL
WBC # BLD: 6.6 E9/L (ref 4.5–11.5)

## 2018-11-26 PROCEDURE — 80307 DRUG TEST PRSMV CHEM ANLYZR: CPT

## 2018-11-26 PROCEDURE — 1123F ACP DISCUSS/DSCN MKR DOCD: CPT | Performed by: FAMILY MEDICINE

## 2018-11-26 PROCEDURE — 1101F PT FALLS ASSESS-DOCD LE1/YR: CPT | Performed by: FAMILY MEDICINE

## 2018-11-26 PROCEDURE — G8400 PT W/DXA NO RESULTS DOC: HCPCS | Performed by: FAMILY MEDICINE

## 2018-11-26 PROCEDURE — 82962 GLUCOSE BLOOD TEST: CPT | Performed by: FAMILY MEDICINE

## 2018-11-26 PROCEDURE — 1090F PRES/ABSN URINE INCON ASSESS: CPT | Performed by: FAMILY MEDICINE

## 2018-11-26 PROCEDURE — G8427 DOCREV CUR MEDS BY ELIG CLIN: HCPCS | Performed by: FAMILY MEDICINE

## 2018-11-26 PROCEDURE — 83036 HEMOGLOBIN GLYCOSYLATED A1C: CPT

## 2018-11-26 PROCEDURE — 80053 COMPREHEN METABOLIC PANEL: CPT

## 2018-11-26 PROCEDURE — 80061 LIPID PANEL: CPT

## 2018-11-26 PROCEDURE — 84443 ASSAY THYROID STIM HORMONE: CPT

## 2018-11-26 PROCEDURE — 85025 COMPLETE CBC W/AUTO DIFF WBC: CPT

## 2018-11-26 PROCEDURE — 71046 X-RAY EXAM CHEST 2 VIEWS: CPT

## 2018-11-26 PROCEDURE — 99214 OFFICE O/P EST MOD 30 MIN: CPT | Performed by: FAMILY MEDICINE

## 2018-11-26 PROCEDURE — G8417 CALC BMI ABV UP PARAM F/U: HCPCS | Performed by: FAMILY MEDICINE

## 2018-11-26 PROCEDURE — 4040F PNEUMOC VAC/ADMIN/RCVD: CPT | Performed by: FAMILY MEDICINE

## 2018-11-26 PROCEDURE — 1036F TOBACCO NON-USER: CPT | Performed by: FAMILY MEDICINE

## 2018-11-26 PROCEDURE — G8482 FLU IMMUNIZE ORDER/ADMIN: HCPCS | Performed by: FAMILY MEDICINE

## 2018-11-26 RX ORDER — GABAPENTIN 300 MG/1
CAPSULE ORAL
Qty: 180 CAPSULE | Refills: 0 | Status: SHIPPED | OUTPATIENT
Start: 2018-11-26 | End: 2019-06-18 | Stop reason: SDUPTHER

## 2018-11-26 RX ORDER — ATENOLOL 50 MG/1
50 TABLET ORAL DAILY
Qty: 90 TABLET | Refills: 1 | Status: SHIPPED | OUTPATIENT
Start: 2018-11-26 | End: 2019-01-04 | Stop reason: SDUPTHER

## 2018-11-26 RX ORDER — SERTRALINE HYDROCHLORIDE 100 MG/1
TABLET, FILM COATED ORAL
Qty: 135 TABLET | Refills: 1 | Status: SHIPPED | OUTPATIENT
Start: 2018-11-26 | End: 2019-05-29 | Stop reason: SDUPTHER

## 2018-11-26 RX ORDER — GABAPENTIN 300 MG/1
CAPSULE ORAL
Qty: 540 CAPSULE | Refills: 1 | Status: SHIPPED | OUTPATIENT
Start: 2018-11-26 | End: 2018-11-26

## 2018-11-28 NOTE — PROGRESS NOTES
capsule  Take two tablets three times a day. glucose blood VI test strips (FREESTYLE LITE) strip  Test BID.             glucose monitoring kit (FREESTYLE) monitoring kit  1 kit by Does not apply route 2 times daily             insulin lispro (HUMALOG KWIKPEN) 100 UNIT/ML pen  Patient to use according to sliding scale up to 12 units three times daily             Insulin Pen Needle 32G X 4 MM MISC  1 each by Does not apply route 3 times daily             ipratropium-albuterol (DUONEB) 0.5-2.5 (3) MG/3ML SOLN nebulizer solution  Inhale 3 mLs into the lungs every 4 hours (while awake)             Liraglutide (VICTOZA) 18 MG/3ML SOPN SC injection  Inject 1.2 mg into the skin daily             mineral oil-hydrophilic petrolatum (HYDROPHOR) ointment  Apply topically bid as needed to coccyx and bilateral buttocks             montelukast (SINGULAIR) 10 MG tablet  TAKE 1 TABLET NIGHTLY             sertraline (ZOLOFT) 100 MG tablet  Take 1 1/2 tablets once a day                   Medications marked \"taking\" at this time  Outpatient Prescriptions Marked as Taking for the 11/26/18 encounter (Office Visit) with Aisha Lane DO   Medication Sig Dispense Refill    atenolol (TENORMIN) 50 MG tablet Take 1 tablet by mouth daily 90 tablet 1    Liraglutide (VICTOZA) 18 MG/3ML SOPN SC injection Inject 1.2 mg into the skin daily 6 pen 1    sertraline (ZOLOFT) 100 MG tablet Take 1 1/2 tablets once a day 135 tablet 1    gabapentin (NEURONTIN) 300 MG capsule Take two tablets three times a day.  180 capsule 0    montelukast (SINGULAIR) 10 MG tablet TAKE 1 TABLET NIGHTLY 90 tablet 1    insulin lispro (HUMALOG KWIKPEN) 100 UNIT/ML pen Patient to use according to sliding scale up to 12 units three times daily 5 pen 3    Insulin Pen Needle 32G X 4 MM MISC 1 each by Does not apply route 3 times daily 100 each 3    acetaminophen (TYLENOL) 325 MG tablet Take 2 tablets by mouth every 4 hours as needed for Fever (Temp greater than 100.5, for pain score 1-3) 120 tablet 3    busPIRone (BUSPAR) 5 MG tablet Take 1 tablet by mouth daily      ipratropium-albuterol (DUONEB) 0.5-2.5 (3) MG/3ML SOLN nebulizer solution Inhale 3 mLs into the lungs every 4 hours (while awake) (Patient taking differently: Inhale 3 mLs into the lungs every 6 hours ) 360 mL     fluticasone (FLONASE) 50 MCG/ACT nasal spray 1 spray by Each Nare route daily 1 Bottle 3    mineral oil-hydrophilic petrolatum (HYDROPHOR) ointment Apply topically bid as needed to coccyx and bilateral buttocks  0    glucose blood VI test strips (FREESTYLE LITE) strip Test BID. 200 each 3    glucose monitoring kit (FREESTYLE) monitoring kit 1 kit by Does not apply route 2 times daily 1 kit 0    aspirin EC 81 MG EC tablet Take 81 mg by mouth daily Took 4 today      Calcium-Vitamin D (CALTRATE 600 PLUS-VIT D PO) Take  by mouth. Medications patient taking as of now reconciled against medications ordered at time of hospital discharge: Yes    Chief Complaint   Patient presents with    Diabetes     Pt here for 1 month follow up, last visit was TCM, 7 day glucose average was 153, 14 day was 151, and 30 day was 156    Discuss Medications     MA reviewed med list with pt - pharmacy confirmed    Medication Refill     Pharmacy reviewed - meds pended    Health Maintenance     Needs new nebulizer supplies       History of Present illness - Follow up of Hospital diagnosis(es): acute hypoxemic respiratory failure secondary to lobar pneumonia unspecified organism. Inpatient course: Discharge summary reviewed- see chart. Interval history/Current status: fair    A comprehensive review of systems was negative except for what was noted in the HPI. Vitals:    11/26/18 1035   BP: 118/80   Pulse: 73   Resp: 18   Temp: 98.3 °F (36.8 °C)   TempSrc: Temporal   SpO2: 97%   Weight: 176 lb (79.8 kg)   Height: 5' 1\" (1.549 m)     Body mass index is 33.25 kg/m².    Wt Readings from Last 3 (gastroesophageal reflux disease)    Chronic sinusitis    Asthma    Diabetes mellitus type 2, uncontrolled ([facility])    CKD (chronic kidney disease) stage 3, GFR 30-59 ml/min ([facility])    Complete tear of right rotator cuff    Urinary incontinence    Osteoarthritis    Decubitus ulcer of coccygeal region, stage 2    HTN (hypertension), benign    Pneumonia       No Known Allergies    Medications listed as ordered at the time of discharge from [facility] Medication Instructions KALE:    Printed on:11/27/18 1927   Medication Information                      acetaminophen (TYLENOL) 325 MG tablet  Take 2 tablets by mouth every 4 hours as needed for Fever (Temp greater than 100.5, for pain score 1-3)             aspirin EC 81 MG EC tablet  Take 81 mg by mouth daily Took 4 today             atenolol (TENORMIN) 50 MG tablet  Take 1 tablet by mouth daily             busPIRone (BUSPAR) 5 MG tablet  Take 1 tablet by mouth daily             Calcium-Vitamin D (CALTRATE 600 PLUS-VIT D PO)  Take  by mouth. fluticasone (FLONASE) 50 MCG/ACT nasal spray  1 spray by Each Nare route daily             gabapentin (NEURONTIN) 300 MG capsule  Take two tablets three times a day.              glucose blood VI test strips (FREESTYLE LITE) strip  Test BID.             glucose monitoring kit (FREESTYLE) monitoring kit  1 kit by Does not apply route 2 times daily             insulin lispro (HUMALOG KWIKPEN) 100 UNIT/ML pen  Patient to use according to sliding scale up to 12 units three times daily             Insulin Pen Needle 32G X 4 MM MISC  1 each by Does not apply route 3 times daily             ipratropium-albuterol (DUONEB) 0.5-2.5 (3) MG/3ML SOLN nebulizer solution  Inhale 3 mLs into the lungs every 4 hours (while awake)             Liraglutide (VICTOZA) 18 MG/3ML SOPN SC injection  Inject 1.2 mg into the skin daily             mineral oil-hydrophilic petrolatum (HYDROPHOR) ointment  Apply topically bid as needed to coccyx and bilateral buttocks             montelukast (SINGULAIR) 10 MG tablet  TAKE 1 TABLET NIGHTLY             sertraline (ZOLOFT) 100 MG tablet  Take 1 1/2 tablets once a day                   Medications marked \"taking\" at this time  Outpatient Prescriptions Marked as Taking for the 11/26/18 encounter (Office Visit) with Angelica Mario DO   Medication Sig Dispense Refill    atenolol (TENORMIN) 50 MG tablet Take 1 tablet by mouth daily 90 tablet 1    Liraglutide (VICTOZA) 18 MG/3ML SOPN SC injection Inject 1.2 mg into the skin daily 6 pen 1    sertraline (ZOLOFT) 100 MG tablet Take 1 1/2 tablets once a day 135 tablet 1    gabapentin (NEURONTIN) 300 MG capsule Take two tablets three times a day.  180 capsule 0    montelukast (SINGULAIR) 10 MG tablet TAKE 1 TABLET NIGHTLY 90 tablet 1    insulin lispro (HUMALOG KWIKPEN) 100 UNIT/ML pen Patient to use according to sliding scale up to 12 units three times daily 5 pen 3    Insulin Pen Needle 32G X 4 MM MISC 1 each by Does not apply route 3 times daily 100 each 3    acetaminophen (TYLENOL) 325 MG tablet Take 2 tablets by mouth every 4 hours as needed for Fever (Temp greater than 100.5, for pain score 1-3) 120 tablet 3    busPIRone (BUSPAR) 5 MG tablet Take 1 tablet by mouth daily      ipratropium-albuterol (DUONEB) 0.5-2.5 (3) MG/3ML SOLN nebulizer solution Inhale 3 mLs into the lungs every 4 hours (while awake) (Patient taking differently: Inhale 3 mLs into the lungs every 6 hours ) 360 mL     fluticasone (FLONASE) 50 MCG/ACT nasal spray 1 spray by Each Nare route daily 1 Bottle 3    mineral oil-hydrophilic petrolatum (HYDROPHOR) ointment Apply topically bid as needed to coccyx and bilateral buttocks  0    glucose blood VI test strips (FREESTYLE LITE) strip Test BID. 200 each 3    glucose monitoring kit (FREESTYLE) monitoring kit 1 kit by Does not apply route 2 times daily 1 kit 0    aspirin EC 81 MG EC tablet Take 81 mg by mouth daily tender without mass, no thyromegaly or thyroid nodules, no cervical lymphadenopathy and neck supple and non tender without mass   Pulmonary/Chest: clear to auscultation bilaterally- no wheezes, rales or rhonchi, normal air movement, no respiratory distress and no chest wall tenderness  Cardiovascular: normal rate, regular rhythm, normal S1 and S2, no murmurs, rubs, clicks or gallops, distal pulses intact, no carotid bruits, normal rate, regular rhythm, normal S1 and S2, no murmurs, no gallops, intact distal pulses and no carotid bruits  Abdomen: soft, non-tender, non-distended, normal bowel sounds, no masses or organomegaly, soft, non-tender and non-distended  Extremities: venous stasis dermatosis noted  Musculoskeletal: pain and decreased ROM multiple joints  Neurologic: reflexes normal and symmetric, no cranial nerve deficit, gait, coordination and speech normal, reflexes normal and symmetric, no cranial nerve deficit, gait and coordination normal and speech normal    Assessment/Plan:  1. Mild asthma without complication, unspecified whether persistent  Stable. Singulair, duoneb. - montelukast (SINGULAIR) 10 MG tablet; TAKE 1 TABLET NIGHTLY  Dispense: 90 tablet; Refill: 1    2. Uncontrolled type 2 diabetes mellitus with hyperglycemia (HCC)  Controlled. Victoza, humalog, aspirin, ADA diet. - insulin lispro (HUMALOG) 100 UNIT/ML injection vial; Inject 0-12 Units into the skin 3 times daily (with meals) **Medium Dose Correction Algorithm**  Glucose:  Dose:     No Insulin  140-199  2 Units  200-249  4 Units  250-299  6 Units  300-349  8 Units  350-399  10 Units  400 and above 12 Units  Dispense: 1 vial; Refill: 3  - POCT Glucose    3. Gastroesophageal reflux disease without esophagitis  Controlled. 4. CKD (chronic kidney disease) stage 3, GFR 30-59 ml/min (HCC)  Stable. Nephrology, UDS, neurontin. 5. HTN (hypertension), benign  Controlled. BB, low salt diet.     6. Urinary incontinence, unspecified Future    Mixed hyperlipidemia  -     CBC Auto Differential; Future  -     COMPREHENSIVE METABOLIC PANEL; Future  -     Lipid Panel; Future        Outpatient Encounter Prescriptions as of 11/26/2018   Medication Sig Dispense Refill    atenolol (TENORMIN) 50 MG tablet Take 1 tablet by mouth daily 90 tablet 1    Liraglutide (VICTOZA) 18 MG/3ML SOPN SC injection Inject 1.2 mg into the skin daily 6 pen 1    sertraline (ZOLOFT) 100 MG tablet Take 1 1/2 tablets once a day 135 tablet 1    gabapentin (NEURONTIN) 300 MG capsule Take two tablets three times a day. 180 capsule 0    montelukast (SINGULAIR) 10 MG tablet TAKE 1 TABLET NIGHTLY 90 tablet 1    insulin lispro (HUMALOG KWIKPEN) 100 UNIT/ML pen Patient to use according to sliding scale up to 12 units three times daily 5 pen 3    Insulin Pen Needle 32G X 4 MM MISC 1 each by Does not apply route 3 times daily 100 each 3    acetaminophen (TYLENOL) 325 MG tablet Take 2 tablets by mouth every 4 hours as needed for Fever (Temp greater than 100.5, for pain score 1-3) 120 tablet 3    busPIRone (BUSPAR) 5 MG tablet Take 1 tablet by mouth daily      ipratropium-albuterol (DUONEB) 0.5-2.5 (3) MG/3ML SOLN nebulizer solution Inhale 3 mLs into the lungs every 4 hours (while awake) (Patient taking differently: Inhale 3 mLs into the lungs every 6 hours ) 360 mL     fluticasone (FLONASE) 50 MCG/ACT nasal spray 1 spray by Each Nare route daily 1 Bottle 3    mineral oil-hydrophilic petrolatum (HYDROPHOR) ointment Apply topically bid as needed to coccyx and bilateral buttocks  0    glucose blood VI test strips (FREESTYLE LITE) strip Test BID. 200 each 3    glucose monitoring kit (FREESTYLE) monitoring kit 1 kit by Does not apply route 2 times daily 1 kit 0    aspirin EC 81 MG EC tablet Take 81 mg by mouth daily Took 4 today      Calcium-Vitamin D (CALTRATE 600 PLUS-VIT D PO) Take  by mouth.         [DISCONTINUED] gabapentin (NEURONTIN) 300 MG capsule Take two tablets three

## 2018-11-29 ENCOUNTER — CARE COORDINATION (OUTPATIENT)
Dept: CARE COORDINATION | Age: 83
End: 2018-11-29

## 2018-12-06 ENCOUNTER — CARE COORDINATION (OUTPATIENT)
Dept: CARE COORDINATION | Age: 83
End: 2018-12-06

## 2019-01-04 ENCOUNTER — OFFICE VISIT (OUTPATIENT)
Dept: FAMILY MEDICINE CLINIC | Age: 84
End: 2019-01-04
Payer: MEDICARE

## 2019-01-04 VITALS
OXYGEN SATURATION: 96 % | SYSTOLIC BLOOD PRESSURE: 120 MMHG | BODY MASS INDEX: 33.04 KG/M2 | WEIGHT: 175 LBS | DIASTOLIC BLOOD PRESSURE: 78 MMHG | HEIGHT: 61 IN | TEMPERATURE: 98.2 F | RESPIRATION RATE: 18 BRPM | HEART RATE: 75 BPM

## 2019-01-04 DIAGNOSIS — I10 HTN (HYPERTENSION), BENIGN: Chronic | ICD-10-CM

## 2019-01-04 DIAGNOSIS — R06.00 DYSPNEA, UNSPECIFIED TYPE: Primary | ICD-10-CM

## 2019-01-04 DIAGNOSIS — M19.90 OSTEOARTHRITIS, UNSPECIFIED OSTEOARTHRITIS TYPE, UNSPECIFIED SITE: ICD-10-CM

## 2019-01-04 DIAGNOSIS — F32.5 MAJOR DEPRESSIVE DISORDER, SINGLE EPISODE, IN FULL REMISSION (HCC): ICD-10-CM

## 2019-01-04 DIAGNOSIS — J45.909 MILD ASTHMA WITHOUT COMPLICATION, UNSPECIFIED WHETHER PERSISTENT: Chronic | ICD-10-CM

## 2019-01-04 DIAGNOSIS — N18.30 CKD (CHRONIC KIDNEY DISEASE) STAGE 3, GFR 30-59 ML/MIN (HCC): Chronic | ICD-10-CM

## 2019-01-04 DIAGNOSIS — E11.65 UNCONTROLLED TYPE 2 DIABETES MELLITUS WITH HYPERGLYCEMIA (HCC): Chronic | ICD-10-CM

## 2019-01-04 DIAGNOSIS — G62.9 PERIPHERAL POLYNEUROPATHY: ICD-10-CM

## 2019-01-04 PROCEDURE — 1101F PT FALLS ASSESS-DOCD LE1/YR: CPT | Performed by: FAMILY MEDICINE

## 2019-01-04 PROCEDURE — 99214 OFFICE O/P EST MOD 30 MIN: CPT | Performed by: FAMILY MEDICINE

## 2019-01-04 PROCEDURE — G8400 PT W/DXA NO RESULTS DOC: HCPCS | Performed by: FAMILY MEDICINE

## 2019-01-04 PROCEDURE — 4040F PNEUMOC VAC/ADMIN/RCVD: CPT | Performed by: FAMILY MEDICINE

## 2019-01-04 PROCEDURE — G8482 FLU IMMUNIZE ORDER/ADMIN: HCPCS | Performed by: FAMILY MEDICINE

## 2019-01-04 PROCEDURE — 1090F PRES/ABSN URINE INCON ASSESS: CPT | Performed by: FAMILY MEDICINE

## 2019-01-04 PROCEDURE — 1036F TOBACCO NON-USER: CPT | Performed by: FAMILY MEDICINE

## 2019-01-04 PROCEDURE — G8417 CALC BMI ABV UP PARAM F/U: HCPCS | Performed by: FAMILY MEDICINE

## 2019-01-04 PROCEDURE — G8427 DOCREV CUR MEDS BY ELIG CLIN: HCPCS | Performed by: FAMILY MEDICINE

## 2019-01-04 PROCEDURE — 1123F ACP DISCUSS/DSCN MKR DOCD: CPT | Performed by: FAMILY MEDICINE

## 2019-01-04 RX ORDER — ATENOLOL 50 MG/1
50 TABLET ORAL DAILY
Qty: 90 TABLET | Refills: 0 | Status: SHIPPED | OUTPATIENT
Start: 2019-01-04 | End: 2019-06-18 | Stop reason: SDUPTHER

## 2019-01-04 RX ORDER — FLASH GLUCOSE SCANNING READER
1 EACH MISCELLANEOUS
Qty: 6 DEVICE | Refills: 1 | Status: SHIPPED | OUTPATIENT
Start: 2019-01-04 | End: 2019-04-22 | Stop reason: CLARIF

## 2019-01-04 RX ORDER — FLUTICASONE FUROATE AND VILANTEROL 200; 25 UG/1; UG/1
1 POWDER RESPIRATORY (INHALATION) DAILY
Qty: 1 EACH | Refills: 0 | COMMUNITY
Start: 2019-01-04 | End: 2019-04-22 | Stop reason: CLARIF

## 2019-01-04 RX ORDER — FLASH GLUCOSE SENSOR
KIT MISCELLANEOUS
Qty: 1 EACH | Refills: 0 | Status: SHIPPED | OUTPATIENT
Start: 2019-01-04 | End: 2019-04-22 | Stop reason: CLARIF

## 2019-01-07 ENCOUNTER — TELEPHONE (OUTPATIENT)
Dept: FAMILY MEDICINE CLINIC | Age: 84
End: 2019-01-07

## 2019-01-07 PROBLEM — F32.5 MAJOR DEPRESSIVE DISORDER, SINGLE EPISODE, IN FULL REMISSION (HCC): Status: ACTIVE | Noted: 2019-01-07

## 2019-01-07 ASSESSMENT — ENCOUNTER SYMPTOMS
BLOOD IN STOOL: 0
ORTHOPNEA: 0
SPUTUM PRODUCTION: 0
BLURRED VISION: 0
WHEEZING: 0
PHOTOPHOBIA: 0
HEARTBURN: 0
SORE THROAT: 0
SINUS PAIN: 0
VOMITING: 0
BACK PAIN: 0
GASTROINTESTINAL NEGATIVE: 1
EYE PAIN: 0
COUGH: 1
HEMOPTYSIS: 0
EYES NEGATIVE: 1
EYE REDNESS: 0
NAUSEA: 0
DOUBLE VISION: 0
DIARRHEA: 0
ABDOMINAL PAIN: 0
SHORTNESS OF BREATH: 1
STRIDOR: 0
CONSTIPATION: 0
EYE DISCHARGE: 0

## 2019-01-08 ENCOUNTER — HOSPITAL ENCOUNTER (OUTPATIENT)
Age: 84
Discharge: HOME | End: 2019-01-10
Payer: MEDICARE

## 2019-01-08 ENCOUNTER — HOSPITAL ENCOUNTER (OUTPATIENT)
Dept: GENERAL RADIOLOGY | Age: 84
Discharge: HOME | End: 2019-01-10
Payer: MEDICARE

## 2019-01-08 DIAGNOSIS — R06.00 DYSPNEA, UNSPECIFIED TYPE: ICD-10-CM

## 2019-01-08 PROCEDURE — 71046 X-RAY EXAM CHEST 2 VIEWS: CPT

## 2019-02-01 RX ORDER — PETROLATUM 42 G/100G
OINTMENT TOPICAL
Qty: 1 TUBE | Refills: 3 | Status: ON HOLD | OUTPATIENT
Start: 2019-02-01 | End: 2019-10-24 | Stop reason: SDUPTHER

## 2019-02-13 RX ORDER — IPRATROPIUM BROMIDE AND ALBUTEROL SULFATE 2.5; .5 MG/3ML; MG/3ML
3 SOLUTION RESPIRATORY (INHALATION) EVERY 4 HOURS PRN
Qty: 360 ML | Refills: 3 | Status: SHIPPED
Start: 2019-02-13 | End: 2020-01-01

## 2019-02-25 ENCOUNTER — APPOINTMENT (OUTPATIENT)
Dept: GENERAL RADIOLOGY | Age: 84
End: 2019-02-25
Payer: MEDICARE

## 2019-02-25 ENCOUNTER — APPOINTMENT (OUTPATIENT)
Dept: ULTRASOUND IMAGING | Age: 84
End: 2019-02-25
Payer: MEDICARE

## 2019-02-25 ENCOUNTER — HOSPITAL ENCOUNTER (EMERGENCY)
Age: 84
Discharge: HOME | End: 2019-02-25
Attending: EMERGENCY MEDICINE
Payer: MEDICARE

## 2019-02-25 VITALS
HEART RATE: 78 BPM | TEMPERATURE: 98.5 F | BODY MASS INDEX: 33.04 KG/M2 | DIASTOLIC BLOOD PRESSURE: 90 MMHG | RESPIRATION RATE: 14 BRPM | OXYGEN SATURATION: 99 % | WEIGHT: 175 LBS | SYSTOLIC BLOOD PRESSURE: 148 MMHG | HEIGHT: 61 IN

## 2019-02-25 DIAGNOSIS — R60.9 PERIPHERAL EDEMA: Primary | ICD-10-CM

## 2019-02-25 LAB
ANION GAP SERPL CALCULATED.3IONS-SCNC: 10 MMOL/L (ref 7–16)
BASOPHILS ABSOLUTE: 0.03 E9/L (ref 0–0.2)
BASOPHILS RELATIVE PERCENT: 0.4 % (ref 0–2)
BUN BLDV-MCNC: 19 MG/DL (ref 8–23)
CALCIUM SERPL-MCNC: 9.8 MG/DL (ref 8.6–10.2)
CHLORIDE BLD-SCNC: 103 MMOL/L (ref 98–107)
CO2: 30 MMOL/L (ref 22–29)
CREAT SERPL-MCNC: 1 MG/DL (ref 0.5–1)
EOSINOPHILS ABSOLUTE: 0.12 E9/L (ref 0.05–0.5)
EOSINOPHILS RELATIVE PERCENT: 1.8 % (ref 0–6)
GFR AFRICAN AMERICAN: >60
GFR NON-AFRICAN AMERICAN: 53 ML/MIN/1.73
GLUCOSE BLD-MCNC: 158 MG/DL (ref 74–99)
HCT VFR BLD CALC: 37.7 % (ref 34–48)
HEMOGLOBIN: 12.1 G/DL (ref 11.5–15.5)
IMMATURE GRANULOCYTES #: 0.04 E9/L
IMMATURE GRANULOCYTES %: 0.6 % (ref 0–5)
LYMPHOCYTES ABSOLUTE: 1.77 E9/L (ref 1.5–4)
LYMPHOCYTES RELATIVE PERCENT: 26.1 % (ref 20–42)
MCH RBC QN AUTO: 32.4 PG (ref 26–35)
MCHC RBC AUTO-ENTMCNC: 32.1 % (ref 32–34.5)
MCV RBC AUTO: 100.8 FL (ref 80–99.9)
MONOCYTES ABSOLUTE: 0.42 E9/L (ref 0.1–0.95)
MONOCYTES RELATIVE PERCENT: 6.2 % (ref 2–12)
NEUTROPHILS ABSOLUTE: 4.41 E9/L (ref 1.8–7.3)
NEUTROPHILS RELATIVE PERCENT: 64.9 % (ref 43–80)
PDW BLD-RTO: 13.5 FL (ref 11.5–15)
PLATELET # BLD: 154 E9/L (ref 130–450)
PMV BLD AUTO: 8.8 FL (ref 7–12)
POTASSIUM REFLEX MAGNESIUM: 4.7 MMOL/L (ref 3.5–5)
PRO-BNP: 687 PG/ML (ref 0–450)
RBC # BLD: 3.74 E12/L (ref 3.5–5.5)
SEDIMENTATION RATE, ERYTHROCYTE: 41 MM/HR (ref 0–20)
SODIUM BLD-SCNC: 143 MMOL/L (ref 132–146)
TROPONIN: 0.01 NG/ML (ref 0–0.03)
WBC # BLD: 6.8 E9/L (ref 4.5–11.5)

## 2019-02-25 PROCEDURE — 80048 BASIC METABOLIC PNL TOTAL CA: CPT

## 2019-02-25 PROCEDURE — 85025 COMPLETE CBC W/AUTO DIFF WBC: CPT

## 2019-02-25 PROCEDURE — 93971 EXTREMITY STUDY: CPT

## 2019-02-25 PROCEDURE — 85651 RBC SED RATE NONAUTOMATED: CPT

## 2019-02-25 PROCEDURE — 36415 COLL VENOUS BLD VENIPUNCTURE: CPT

## 2019-02-25 PROCEDURE — 84484 ASSAY OF TROPONIN QUANT: CPT

## 2019-02-25 PROCEDURE — 71046 X-RAY EXAM CHEST 2 VIEWS: CPT

## 2019-02-25 PROCEDURE — 99284 EMERGENCY DEPT VISIT MOD MDM: CPT

## 2019-02-25 PROCEDURE — 83880 ASSAY OF NATRIURETIC PEPTIDE: CPT

## 2019-02-25 PROCEDURE — 94760 N-INVAS EAR/PLS OXIMETRY 1: CPT

## 2019-02-25 PROCEDURE — 73552 X-RAY EXAM OF FEMUR 2/>: CPT

## 2019-02-25 PROCEDURE — 73564 X-RAY EXAM KNEE 4 OR MORE: CPT

## 2019-02-25 RX ORDER — SODIUM CHLORIDE 0.9 % (FLUSH) 0.9 %
10 SYRINGE (ML) INJECTION PRN
Status: DISCONTINUED | OUTPATIENT
Start: 2019-02-25 | End: 2019-02-25 | Stop reason: HOSPADM

## 2019-02-25 NOTE — ED NOTES
Assisted Dr. Fernando Norman with bedside ultrasound of b/l lower extremities. (+) 1 pitting edema with red skin noted to b/l lower legs. Skin warm, dry, normal color otherwise.       Mateusz Verdin RN  02/25/19 [id number]

## 2019-02-25 NOTE — ED PROVIDER NOTES
Department of Emergency Medicine   ED  Provider Note  Admit Date/RoomTime: 2/25/2019  2:24 PM  ED Room: 12/12 2/25/19  3:40 PM    History of Present Illness:   Kathleen Hough is a 80 y.o. female presenting to the ED for leg swelling, beginning 2 days ago. The complaint has been persistent, moderate in severity, and worsened by nothing. Daughter is present at bedside and seems well versed in the patient's history. She states that the patient frequently gets worsening independent edema because of her relative immobility that she has had worsening edema and now some erythema and warmth of the bilateral lower extremities on the medial aspect of them for the last several days. She states that her mother has also seemed more short of breath whenever she exerts himself. She denies any history of congestive heart failure. She is not on any anticoagulation. She states that the patient was helped down on occasion last week due to pain in her legs, particularly the right lateral aspect of the femur and the left knee. She did not fall or strike anything. Nothing has made her symptoms any better at home      Review of Systems:   Pertinent positives and negatives are stated within HPI, all other systems reviewed and are negative.          --------------------------------------------- PAST HISTORY ---------------------------------------------  Past Medical History:  has a past medical history of Anxiety; Depression; Diabetes mellitus ([address] GERD (gastroesophageal reflux disease); Hypertension; Peripheral neuropathy; and Spinal stenosis. Past Surgical History:  has a past surgical history that includes Cholecystectomy; Skin cancer excision; and Hip fracture surgery (Bilateral). Social History:  reports that she has never smoked. She has never used smokeless tobacco. She reports that she does not drink alcohol or use drugs. Family History: family history is not on file.      The patients home medications have been reviewed. Allergies: Patient has no known allergies.     -------------------------------------------------- RESULTS -------------------------------------------------  All laboratory and radiology results have been personally reviewed by myself   LABS:  Results for orders placed or performed during the hospital encounter of 02/25/19   CBC auto differential   Result Value Ref Range    WBC 6.8 4.5 - 11.5 E9/L    RBC 3.74 3.50 - 5.50 E12/L    Hemoglobin 12.1 11.5 - 15.5 g/dL    Hematocrit 37.7 34.0 - 48.0 %    .8 (H) 80.0 - 99.9 fL    MCH 32.4 26.0 - 35.0 pg    MCHC 32.1 32.0 - 34.5 %    RDW 13.5 11.5 - 15.0 fL    Platelets 899 055 - 907 E9/L    MPV 8.8 7.0 - 12.0 fL    Neutrophils % 64.9 43.0 - 80.0 %    Immature Granulocytes % 0.6 0.0 - 5.0 %    Lymphocytes % 26.1 20.0 - 42.0 %    Monocytes % 6.2 2.0 - 12.0 %    Eosinophils % 1.8 0.0 - 6.0 %    Basophils % 0.4 0.0 - 2.0 %    Neutrophils # 4.41 1.80 - 7.30 E9/L    Immature Granulocytes # 0.04 E9/L    Lymphocytes # 1.77 1.50 - 4.00 E9/L    Monocytes # 0.42 0.10 - 0.95 E9/L    Eosinophils # 0.12 0.05 - 0.50 E9/L    Basophils # 0.03 0.00 - 0.20 F8/L   Basic Metabolic Panel w/ Reflex to MG   Result Value Ref Range    Sodium 143 132 - 146 mmol/L    Potassium reflex Magnesium 4.7 3.5 - 5.0 mmol/L    Chloride 103 98 - 107 mmol/L    CO2 30 (H) 22 - 29 mmol/L    Anion Gap 10 7 - 16 mmol/L    Glucose 158 (H) 74 - 99 mg/dL    BUN 19 8 - 23 mg/dL    CREATININE 1.0 0.5 - 1.0 mg/dL    GFR Non-African American 53 >=60 mL/min/1.73    GFR African American >60     Calcium 9.8 8.6 - 10.2 mg/dL   Troponin   Result Value Ref Range    Troponin 0.01 0.00 - 0.03 ng/mL   EKG 12 Lead   Result Value Ref Range    Ventricular Rate 74 BPM    Atrial Rate 74 BPM    P-R Interval 216 ms    QRS Duration 72 ms    Q-T Interval 380 ms    QTc Calculation (Bazett) 421 ms    P Axis 55 degrees    R Axis -29 degrees    T Axis 16 degrees       RADIOLOGY:  Interpreted by Radiologist.  XR FEMUR RIGHT (MIN 2 VIEWS)   Final Result   No acute right femoral shaft fracture evident. Status post prior proximal right femoral ORIF. Additional chronic and degenerative findings as noted. Continued follow-up advised should symptoms persist in any event. XR CHEST STANDARD (2 VW)   Final Result   No acute cardiopulmonary pathology evident. XR KNEE LEFT (MIN 4 VIEWS)   Final Result   1. No acute displaced bony fracture definitively identified. If there   is persistent clinical pain or symptomatology, a return to medical   attention within 2-7 days and further imaging is recommended. .   2. 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 US DUP LOWER EXTREMITY LEFT BRUCE   Final Result   1. Negative for evident DVT at left lower extremity major veins, at   level of knee and above. 2. Significantly more technically limited assessment of major left   calf veins, with such evaluated incompletely. 3. Incidentally visible left popliteal/Baker's cyst.                ------------------------- NURSING NOTES AND VITALS REVIEWED ---------------------------   The nursing notes within the ED encounter and vital signs as below have been reviewed.    BP (!) 140/90   Pulse 78   Temp 98.5 °F (36.9 °C) (Oral)   Resp 14   Ht 5' 1\" (1.549 m)   Wt 175 lb (79.4 kg)   LMP  (LMP Unknown)   SpO2 99%   BMI 33.07 kg/m²   Oxygen Saturation Interpretation: Normal      ---------------------------------------------------PHYSICAL EXAM--------------------------------------    Constitutional/General: Alert and oriented x3, well appearing, non toxic in NAD  Head: Normocephalic and atraumatic  Eyes: PERRL, EOMI, conjunctiva normal, sclera non icteric  Mouth: Oropharynx clear, handling secretions, no trismus, no asymmetry of the posterior oropharynx or uvular edema  Neck: Supple, full ROM, non tender to palpation in the midline, no stridor, no crepitus, no meningeal signs  Respiratory: No respiratory distress. Slight crackles appreciated in the left lung base. Cardiovascular:  Regular rate. Regular rhythm. No murmurs, gallops, or rubs. 2+ distal pulses  Chest: No chest wall tenderness  GI:  Abdomen Soft, Non tender, Non distended. +BS. No organomegaly, no palpable masses,  No rebound, guarding, or rigidity. Musculoskeletal: Moves all extremities x 4. Warm and well perfused. Capillary refill <3 seconds. BLE edema L > R  Integument: skin warm and dry. No rashes. There is erythema and warmth to the medial aspect of both lower legs. Lymphatic: no lymphadenopathy noted  Neurologic: GCS 15, no focal deficits, symmetric strength 5/5 in the upper and lower extremities bilaterally  Psychiatric: Normal Affect      ------------------------------ ED COURSE/MEDICAL DECISION MAKING----------------------  Medications   sodium chloride flush 0.9 % injection 10 mL (not administered)     Procedures:    MUSCULOSKELETAL BEDSIDE ULTRASOUND     Risks, benefits and alternatives (for applicable procedures below) described. Performed By: rGace Jackman DO. Indication:  Pain, deformity. Informed consent: by patient or legal gardian. Procedural Quadrants:     BLE: Subcutaneous edema without convincing evidence of cobblestoning or abscess    This procedure was performed by Grace Jackman on 2/25/19 at 5:35 PM      Medical Decision Making:    Patient presents to the ED for leg swelling without history of CHF and questionable cellulitis. Bedside US shows no evidence of cellulitis. Xrays of painful joints negative for fracture. Discussed use of graded compression stockings as well as fluid/salt restriction in her diet with daughter. They will call the PCP tomorrow. Counseling: The emergency provider has spoken with the patient and daughter and discussed todays results, in addition to providing specific details for the plan of care and counseling regarding the diagnosis and prognosis.

## 2019-03-01 LAB
EKG ATRIAL RATE: 74 BPM
EKG P AXIS: 55 DEGREES
EKG P-R INTERVAL: 216 MS
EKG Q-T INTERVAL: 380 MS
EKG QRS DURATION: 72 MS
EKG QTC CALCULATION (BAZETT): 421 MS
EKG R AXIS: -29 DEGREES
EKG T AXIS: 16 DEGREES
EKG VENTRICULAR RATE: 74 BPM

## 2019-03-07 DIAGNOSIS — E11.65 UNCONTROLLED TYPE 2 DIABETES MELLITUS WITH HYPERGLYCEMIA (HCC): Chronic | ICD-10-CM

## 2019-04-22 ENCOUNTER — TELEPHONE (OUTPATIENT)
Dept: FAMILY MEDICINE CLINIC | Age: 84
End: 2019-04-22

## 2019-04-22 ENCOUNTER — OFFICE VISIT (OUTPATIENT)
Dept: FAMILY MEDICINE CLINIC | Age: 84
End: 2019-04-22
Payer: MEDICARE

## 2019-04-22 ENCOUNTER — HOSPITAL ENCOUNTER (OUTPATIENT)
Age: 84
Discharge: HOME | End: 2019-04-24
Payer: MEDICARE

## 2019-04-22 ENCOUNTER — CARE COORDINATION (OUTPATIENT)
Dept: CARE COORDINATION | Age: 84
End: 2019-04-22

## 2019-04-22 VITALS
SYSTOLIC BLOOD PRESSURE: 124 MMHG | WEIGHT: 174 LBS | OXYGEN SATURATION: 96 % | TEMPERATURE: 98.2 F | HEART RATE: 82 BPM | DIASTOLIC BLOOD PRESSURE: 84 MMHG | BODY MASS INDEX: 32.85 KG/M2 | HEIGHT: 61 IN | RESPIRATION RATE: 18 BRPM

## 2019-04-22 DIAGNOSIS — I10 HTN (HYPERTENSION), BENIGN: ICD-10-CM

## 2019-04-22 DIAGNOSIS — E11.21 TYPE II DIABETES MELLITUS WITH NEPHROPATHY (HCC): ICD-10-CM

## 2019-04-22 DIAGNOSIS — J40 BRONCHITIS: ICD-10-CM

## 2019-04-22 DIAGNOSIS — R73.01 IFG (IMPAIRED FASTING GLUCOSE): ICD-10-CM

## 2019-04-22 DIAGNOSIS — M19.90 OSTEOARTHRITIS, UNSPECIFIED OSTEOARTHRITIS TYPE, UNSPECIFIED SITE: ICD-10-CM

## 2019-04-22 DIAGNOSIS — E78.2 MIXED HYPERLIPIDEMIA: ICD-10-CM

## 2019-04-22 DIAGNOSIS — I10 HTN (HYPERTENSION), BENIGN: Primary | ICD-10-CM

## 2019-04-22 DIAGNOSIS — R53.83 FATIGUE, UNSPECIFIED TYPE: ICD-10-CM

## 2019-04-22 DIAGNOSIS — N18.30 CKD (CHRONIC KIDNEY DISEASE) STAGE 3, GFR 30-59 ML/MIN (HCC): ICD-10-CM

## 2019-04-22 LAB
ALBUMIN SERPL-MCNC: 3.9 G/DL (ref 3.5–5.2)
ALP BLD-CCNC: 67 U/L (ref 35–104)
ALT SERPL-CCNC: 12 U/L (ref 0–32)
AMPHETAMINE SCREEN, URINE: NOT DETECTED
ANION GAP SERPL CALCULATED.3IONS-SCNC: 11 MMOL/L (ref 7–16)
AST SERPL-CCNC: 23 U/L (ref 0–31)
BARBITURATE SCREEN URINE: NOT DETECTED
BASOPHILS ABSOLUTE: 0.03 E9/L (ref 0–0.2)
BASOPHILS RELATIVE PERCENT: 0.4 % (ref 0–2)
BENZODIAZEPINE SCREEN, URINE: NOT DETECTED
BILIRUB SERPL-MCNC: 0.2 MG/DL (ref 0–1.2)
BUN BLDV-MCNC: 31 MG/DL (ref 8–23)
CALCIUM SERPL-MCNC: 10.1 MG/DL (ref 8.6–10.2)
CANNABINOID SCREEN URINE: NOT DETECTED
CHLORIDE BLD-SCNC: 107 MMOL/L (ref 98–107)
CHOLESTEROL, TOTAL: 220 MG/DL (ref 0–199)
CO2: 30 MMOL/L (ref 22–29)
COCAINE METABOLITE SCREEN URINE: NOT DETECTED
CREAT SERPL-MCNC: 1.7 MG/DL (ref 0.5–1)
EOSINOPHILS ABSOLUTE: 0.25 E9/L (ref 0.05–0.5)
EOSINOPHILS RELATIVE PERCENT: 3 % (ref 0–6)
GFR AFRICAN AMERICAN: 34
GFR NON-AFRICAN AMERICAN: 29 ML/MIN/1.73
GLUCOSE BLD-MCNC: 161 MG/DL (ref 74–99)
HBA1C MFR BLD: 6.8 % (ref 4–5.6)
HCT VFR BLD CALC: 38.7 % (ref 34–48)
HDLC SERPL-MCNC: 41 MG/DL
HEMOGLOBIN: 12.3 G/DL (ref 11.5–15.5)
IMMATURE GRANULOCYTES #: 0.03 E9/L
IMMATURE GRANULOCYTES %: 0.4 % (ref 0–5)
LDL CHOLESTEROL CALCULATED: 123 MG/DL (ref 0–99)
LYMPHOCYTES ABSOLUTE: 1.35 E9/L (ref 1.5–4)
LYMPHOCYTES RELATIVE PERCENT: 16.5 % (ref 20–42)
MCH RBC QN AUTO: 32.4 PG (ref 26–35)
MCHC RBC AUTO-ENTMCNC: 31.8 % (ref 32–34.5)
MCV RBC AUTO: 101.8 FL (ref 80–99.9)
METHADONE SCREEN, URINE: NOT DETECTED
MONOCYTES ABSOLUTE: 0.45 E9/L (ref 0.1–0.95)
MONOCYTES RELATIVE PERCENT: 5.5 % (ref 2–12)
NEUTROPHILS ABSOLUTE: 6.09 E9/L (ref 1.8–7.3)
NEUTROPHILS RELATIVE PERCENT: 74.2 % (ref 43–80)
OPIATE SCREEN URINE: NOT DETECTED
PDW BLD-RTO: 13.2 FL (ref 11.5–15)
PHENCYCLIDINE SCREEN URINE: NOT DETECTED
PLATELET # BLD: 166 E9/L (ref 130–450)
PMV BLD AUTO: 9.2 FL (ref 7–12)
POTASSIUM SERPL-SCNC: 4.8 MMOL/L (ref 3.5–5)
PROPOXYPHENE SCREEN: NOT DETECTED
RBC # BLD: 3.8 E12/L (ref 3.5–5.5)
SODIUM BLD-SCNC: 148 MMOL/L (ref 132–146)
TOTAL PROTEIN: 7.3 G/DL (ref 6.4–8.3)
TRIGL SERPL-MCNC: 280 MG/DL (ref 0–149)
TSH SERPL DL<=0.05 MIU/L-ACNC: 2.19 UIU/ML (ref 0.27–4.2)
VLDLC SERPL CALC-MCNC: 56 MG/DL
WBC # BLD: 8.2 E9/L (ref 4.5–11.5)

## 2019-04-22 PROCEDURE — 1090F PRES/ABSN URINE INCON ASSESS: CPT | Performed by: FAMILY MEDICINE

## 2019-04-22 PROCEDURE — 4040F PNEUMOC VAC/ADMIN/RCVD: CPT | Performed by: FAMILY MEDICINE

## 2019-04-22 PROCEDURE — G8417 CALC BMI ABV UP PARAM F/U: HCPCS | Performed by: FAMILY MEDICINE

## 2019-04-22 PROCEDURE — 83036 HEMOGLOBIN GLYCOSYLATED A1C: CPT

## 2019-04-22 PROCEDURE — 1123F ACP DISCUSS/DSCN MKR DOCD: CPT | Performed by: FAMILY MEDICINE

## 2019-04-22 PROCEDURE — 84443 ASSAY THYROID STIM HORMONE: CPT

## 2019-04-22 PROCEDURE — 85025 COMPLETE CBC W/AUTO DIFF WBC: CPT

## 2019-04-22 PROCEDURE — 80053 COMPREHEN METABOLIC PANEL: CPT

## 2019-04-22 PROCEDURE — G8427 DOCREV CUR MEDS BY ELIG CLIN: HCPCS | Performed by: FAMILY MEDICINE

## 2019-04-22 PROCEDURE — 1036F TOBACCO NON-USER: CPT | Performed by: FAMILY MEDICINE

## 2019-04-22 PROCEDURE — 80307 DRUG TEST PRSMV CHEM ANLYZR: CPT

## 2019-04-22 PROCEDURE — 99214 OFFICE O/P EST MOD 30 MIN: CPT | Performed by: FAMILY MEDICINE

## 2019-04-22 PROCEDURE — G8400 PT W/DXA NO RESULTS DOC: HCPCS | Performed by: FAMILY MEDICINE

## 2019-04-22 PROCEDURE — 80061 LIPID PANEL: CPT

## 2019-04-22 RX ORDER — TRAMADOL HYDROCHLORIDE 50 MG/1
50 TABLET ORAL EVERY 6 HOURS PRN
Qty: 28 TABLET | Refills: 0 | Status: SHIPPED | OUTPATIENT
Start: 2019-04-22 | End: 2019-04-29

## 2019-04-22 NOTE — CARE COORDINATION
-[facility] phoned patient's daughter Elizabeth Glasgow  to offer enrollment into care coordination for her mother , no answer,  left requesting call back, contact information provided.

## 2019-04-26 ASSESSMENT — ENCOUNTER SYMPTOMS
BLURRED VISION: 0
DOUBLE VISION: 0
ABDOMINAL PAIN: 0
ORTHOPNEA: 0
COUGH: 1
NAUSEA: 0
SORE THROAT: 0
DIARRHEA: 0
HEMOPTYSIS: 0
BLOOD IN STOOL: 0
VOMITING: 0
EYE REDNESS: 0
EYE DISCHARGE: 0
SINUS PAIN: 0
WHEEZING: 0
SPUTUM PRODUCTION: 0
EYES NEGATIVE: 1
STRIDOR: 0
EYE PAIN: 0
CONSTIPATION: 0
BACK PAIN: 0
GASTROINTESTINAL NEGATIVE: 1
SHORTNESS OF BREATH: 1
HEARTBURN: 0
PHOTOPHOBIA: 0

## 2019-04-26 NOTE — PROGRESS NOTES
headaches. Endo/Heme/Allergies: Negative for environmental allergies and polydipsia. Does not bruise/bleed easily. Pt has type 2 DM. Psychiatric/Behavioral: Negative for depression, hallucinations, memory loss, substance abuse and suicidal ideas. The patient is nervous/anxious. The patient does not have insomnia. Past Medical/Surgical Hx;  Reviewed with patient      Diagnosis Date    Anxiety     Depression     Diabetes mellitus ([address]     GERD (gastroesophageal reflux disease)     Hypertension     Peripheral neuropathy     Spinal stenosis      Past Surgical History:   Procedure Laterality Date    CHOLECYSTECTOMY      HIP FRACTURE SURGERY Bilateral     SKIN CANCER EXCISION         Past Family Hx:  Reviewed with patient  History reviewed. No pertinent family history. Social Hx:  Reviewed with patient  Social History     Tobacco Use    Smoking status: Never Smoker    Smokeless tobacco: Never Used   Substance Use Topics    Alcohol use: No       OBJECTIVE  /84   Pulse 82   Temp 98.2 °F (36.8 °C) (Temporal)   Resp 18   Ht 5' 1\" (1.549 m)   Wt 174 lb (78.9 kg)   LMP  (LMP Unknown)   SpO2 96%   BMI 32.88 kg/m²     Problem List:  Henry Garcia does not have any pertinent problems on file. PHYS EX:  Physical Exam   Constitutional: She is oriented to person, place, and time. She appears well-developed and well-nourished. No distress. HENT:   Head: Normocephalic and atraumatic. Right Ear: External ear normal.   Left Ear: External ear normal.   Mouth/Throat: Oropharynx is clear and moist. No oropharyngeal exudate. Pt has nasal congestion. Pharynx- erythema. Eyes: Pupils are equal, round, and reactive to light. Conjunctivae and EOM are normal. Right eye exhibits no discharge. Left eye exhibits no discharge. No scleral icterus. Neck: Normal range of motion. Neck supple. No JVD present. No tracheal deviation present. No thyromegaly present.    Cardiovascular: Normal rate, regular rhythm, normal heart sounds and intact distal pulses. Exam reveals no gallop and no friction rub. No murmur heard. Pulmonary/Chest: Effort normal. No stridor. No respiratory distress. She has no wheezes. She has no rales. She exhibits no tenderness. Pt has distant breath sounds. Abdominal: Soft. Bowel sounds are normal. She exhibits no distension and no mass. There is no tenderness. There is no rebound and no guarding. No hernia. Musculoskeletal: She exhibits tenderness. She exhibits no edema or deformity. Pt uses wheelchair,and is helped by daughter. Pain and decreased ROM multiple joints. Lymphadenopathy:     She has no cervical adenopathy. Neurological: She is alert and oriented to person, place, and time. She has normal reflexes. She displays normal reflexes. A sensory deficit is present. No cranial nerve deficit. She exhibits normal muscle tone. Coordination normal.   Skin: Skin is warm. Rash noted. She is not diaphoretic. There is erythema. No pallor. Small area of erythema on buttocks. Dermatitis around neck. Psychiatric: She has a normal mood and affect. Her behavior is normal. Judgment and thought content normal.   Nursing note and vitals reviewed. ASSESSMENT/PLAN  Eli Arevalo was seen today for diabetes and wound check. Diagnoses and all orders for this visit:    Type II diabetes mellitus with nephropathy ([facility]  -     POCT Glucose  -     POCT glycosylated hemoglobin (Hb A1C)  Controlled. Lab, victoza, humalog, aspirin, ADA diet. Peripheral polyneuropathy (HCC)  Stable. Neurontin. Essential hypertension  Controlled. BB, aspirin. Cellulitis of buttock  -     nystatin (MYCOSTATIN) 597162 UNIT/GM powder; Apply 3 times daily. Controlled. Mycostatin, nizoral.  Dermatitis  -     hydrocortisone 2.5 % cream; Apply topically 2 times daily. Not controlled. Hydrocortisone cream.  Dyspnea. Ranny Rank. CKD stage 3  Stable. No NSAIDS. Depression. Stable.  Buspar, zoloft. Bronchitis  Not controlled. Penicillin, mucinex, duoneb, singulair. Instructed of any worse go ED ASAP. Outpatient Encounter Medications as of 4/22/2019   Medication Sig Dispense Refill    traMADol (ULTRAM) 50 MG tablet Take 1 tablet by mouth every 6 hours as needed for Pain for up to 7 days. Intended supply: 7 days. Take lowest dose possible to manage pain 28 tablet 0    insulin lispro (HUMALOG KWIKPEN) 100 UNIT/ML pen Patient to use according to sliding scale up to 12 units three times daily 5 pen 3    Liraglutide (VICTOZA) 18 MG/3ML SOPN SC injection Inject 1.2 mg into the skin daily 6 pen 1    Omega-3 Fatty Acids (OMEGA-3 EPA FISH OIL PO) Take by mouth Daily      ipratropium-albuterol (DUONEB) 0.5-2.5 (3) MG/3ML SOLN nebulizer solution Inhale 3 mLs into the lungs every 4 hours as needed for Shortness of Breath 360 mL 3    mineral oil-hydrophilic petrolatum (HYDROPHOR) ointment Apply topically bid as needed to coccyx and bilateral buttocks 1 Tube 3    atenolol (TENORMIN) 50 MG tablet Take 1 tablet by mouth daily 90 tablet 0    sertraline (ZOLOFT) 100 MG tablet Take 1 1/2 tablets once a day 135 tablet 1    gabapentin (NEURONTIN) 300 MG capsule Take two tablets three times a day.  180 capsule 0    montelukast (SINGULAIR) 10 MG tablet TAKE 1 TABLET NIGHTLY 90 tablet 1    Insulin Pen Needle 32G X 4 MM MISC 1 each by Does not apply route 3 times daily 100 each 3    acetaminophen (TYLENOL) 325 MG tablet Take 2 tablets by mouth every 4 hours as needed for Fever (Temp greater than 100.5, for pain score 1-3) 120 tablet 3    busPIRone (BUSPAR) 5 MG tablet Take 1 tablet by mouth daily      fluticasone (FLONASE) 50 MCG/ACT nasal spray 1 spray by Each Nare route daily 1 Bottle 3    glucose blood VI test strips (FREESTYLE LITE) strip Test BID. 200 each 3    glucose monitoring kit (FREESTYLE) monitoring kit 1 kit by Does not apply route 2 times daily 1 kit 0    aspirin EC 81 MG EC tablet Take 81 mg by mouth daily Took 4 today      Calcium-Vitamin D (CALTRATE 600 PLUS-VIT D PO) Take  by mouth.  [DISCONTINUED] Continuous Blood Gluc Sensor (FREESTYLE ROJELIO 14 DAY SENSOR) MISC Check glucose BID as PRN symptoms as irregular sugars 1 each 0    [DISCONTINUED] Continuous Blood Gluc  (FREESTYLE ROJELIO 14 DAY READER) NILE Place 1 patch onto the skin every 14 days 6 Device 1    [DISCONTINUED] Fluticasone Furoate-Vilanterol (BREO ELLIPTA) 200-25 MCG/INH AEPB Inhale 1 puff into the lungs daily 1 each 0     No facility-administered encounter medications on file as of 4/22/2019. Return in about 1 month (around 5/20/2019). Reviewed recent labs related to Grace's current problems      Discussed importance of regular Health Maintenance follow up  Health Maintenance   Topic    DTaP/Tdap/Td vaccine (1 - Tdap)    Shingles Vaccine (1 of 2)    Potassium monitoring     Creatinine monitoring     Flu vaccine     Pneumococcal 65+ years Vaccine     DEXA (modify frequency per FRAX score)                                                  Review of Systems   Constitutional: Positive for fatigue and malaise/fatigue. Negative for chills, diaphoresis, fever and weight loss. HENT: Positive for congestion. Negative for ear discharge, ear pain, hearing loss, nosebleeds, sinus pain, sore throat and tinnitus. Eyes: Negative. Negative for blurred vision, double vision, photophobia, pain, discharge and redness. Respiratory: Positive for cough and shortness of breath. Negative for hemoptysis, sputum production, wheezing and stridor. Cardiovascular: Positive for leg swelling. Negative for chest pain, palpitations, orthopnea, claudication and PND. Pt has hypertension. Gastrointestinal: Negative. Negative for abdominal pain, blood in stool, constipation, diarrhea, heartburn, melena, nausea and vomiting. Endocrine: Negative for polydipsia. Genitourinary: Positive for frequency.  Negative for dysuria, flank pain, hematuria and urgency. Musculoskeletal: Positive for joint pain and myalgias. Negative for back pain, falls and neck pain. Skin: Positive for itching and rash. Allergic/Immunologic: Negative for environmental allergies. Neurological: Positive for tingling, weakness and numbness. Negative for dizziness, tremors, sensory change, speech change, focal weakness, seizures, loss of consciousness and headaches. Hematological: Does not bruise/bleed easily. Pt has type 2 DM. Psychiatric/Behavioral: Negative for depression, hallucinations, memory loss, substance abuse and suicidal ideas. The patient is nervous/anxious. The patient does not have insomnia.

## 2019-04-29 ENCOUNTER — CARE COORDINATION (OUTPATIENT)
Dept: CARE COORDINATION | Age: 84
End: 2019-04-29

## 2019-04-29 NOTE — CARE COORDINATION
- [address] phoned patient's daughter Arpita Ant SULAIMAN)  to offer enrollment into care coordination for her mother , no answer, VM left requesting call back, contact information provided.

## 2019-05-07 ENCOUNTER — CARE COORDINATION (OUTPATIENT)
Dept: CARE COORDINATION | Age: 84
End: 2019-05-07

## 2019-05-07 NOTE — CARE COORDINATION
-No response from patient's daughter Bal HILARIO)after VM x 2. -[facility] will temporarily exclude patient from care coordination for unable to contact.

## 2019-05-29 DIAGNOSIS — F41.9 ANXIETY: ICD-10-CM

## 2019-05-29 DIAGNOSIS — J45.909 MILD ASTHMA WITHOUT COMPLICATION, UNSPECIFIED WHETHER PERSISTENT: ICD-10-CM

## 2019-05-29 RX ORDER — BUSPIRONE HYDROCHLORIDE 5 MG/1
5 TABLET ORAL DAILY
Qty: 90 TABLET | Refills: 1 | Status: SHIPPED
Start: 2019-05-29 | End: 2020-03-12 | Stop reason: SDUPTHER

## 2019-05-29 RX ORDER — SERTRALINE HYDROCHLORIDE 100 MG/1
TABLET, FILM COATED ORAL
Qty: 135 TABLET | Refills: 1 | Status: SHIPPED | OUTPATIENT
Start: 2019-05-29 | End: 2019-10-28 | Stop reason: SDUPTHER

## 2019-05-29 RX ORDER — MONTELUKAST SODIUM 10 MG/1
TABLET ORAL
Qty: 90 TABLET | Refills: 1 | Status: SHIPPED | OUTPATIENT
Start: 2019-05-29 | End: 2020-01-09 | Stop reason: ALTCHOICE

## 2019-05-29 NOTE — TELEPHONE ENCOUNTER
Patient called the clinical care line requesting medication refill. Chart reviewed and medication sent to the pharmacy.   Electronically signed by Antonio Hair on 5/29/2019 at 2:37 PM

## 2019-06-07 DIAGNOSIS — E11.65 UNCONTROLLED TYPE 2 DIABETES MELLITUS WITH HYPERGLYCEMIA (HCC): Chronic | ICD-10-CM

## 2019-06-07 RX ORDER — LANCETS 28 GAUGE
EACH MISCELLANEOUS
Qty: 200 EACH | Refills: 3 | Status: ON HOLD | OUTPATIENT
Start: 2019-06-07 | End: 2019-10-24

## 2019-06-07 NOTE — TELEPHONE ENCOUNTER
Patient called the clinical care line requesting medication refill. Chart reviewed and medication sent to the pharmacy.   Electronically signed by Dana Carrasquillo on 6/7/2019 at 3:06 PM

## 2019-06-18 DIAGNOSIS — I10 HTN (HYPERTENSION), BENIGN: Chronic | ICD-10-CM

## 2019-06-18 DIAGNOSIS — N18.30 CKD (CHRONIC KIDNEY DISEASE) STAGE 3, GFR 30-59 ML/MIN (HCC): ICD-10-CM

## 2019-06-18 DIAGNOSIS — G62.9 PERIPHERAL POLYNEUROPATHY: ICD-10-CM

## 2019-06-18 DIAGNOSIS — E11.21 TYPE II DIABETES MELLITUS WITH NEPHROPATHY (HCC): ICD-10-CM

## 2019-06-18 RX ORDER — GABAPENTIN 300 MG/1
CAPSULE ORAL
Qty: 540 CAPSULE | Refills: 0 | Status: SHIPPED | OUTPATIENT
Start: 2019-06-18 | End: 2019-10-30 | Stop reason: SDUPTHER

## 2019-06-18 RX ORDER — ATENOLOL 50 MG/1
50 TABLET ORAL DAILY
Qty: 90 TABLET | Refills: 1 | Status: ON HOLD | OUTPATIENT
Start: 2019-06-18 | End: 2019-10-24 | Stop reason: HOSPADM

## 2019-06-18 NOTE — TELEPHONE ENCOUNTER
Pt's daughter called for refill of Gabapentin to [facility]. Please advise.     Electronically signed by Tyshawn Duran MA on 6/18/19 at 3:36 PM

## 2019-06-27 DIAGNOSIS — L03.317 CELLULITIS OF BUTTOCK: ICD-10-CM

## 2019-06-27 RX ORDER — NYSTATIN 100000 [USP'U]/G
POWDER TOPICAL
Qty: 60 G | Refills: 3 | Status: ON HOLD | OUTPATIENT
Start: 2019-06-27 | End: 2019-10-24 | Stop reason: SDUPTHER

## 2019-06-27 NOTE — TELEPHONE ENCOUNTER
Pt's daughter called office for refill - Rx sent.     Electronically signed by Myron Campbell MA on 6/27/19 at 3:08 PM

## 2019-07-01 DIAGNOSIS — E11.65 UNCONTROLLED TYPE 2 DIABETES MELLITUS WITH HYPERGLYCEMIA (HCC): Chronic | ICD-10-CM

## 2019-07-01 RX ORDER — PEN NEEDLE, DIABETIC 32GX 5/32"
NEEDLE, DISPOSABLE MISCELLANEOUS
Qty: 123 EACH | Refills: 3 | Status: ON HOLD | OUTPATIENT
Start: 2019-07-01 | End: 2019-10-24

## 2019-07-07 ENCOUNTER — HOSPITAL ENCOUNTER (EMERGENCY)
Age: 84
Discharge: ANOTHER HOSPITAL | End: 2019-07-08
Attending: EMERGENCY MEDICINE | Admitting: INTERNAL MEDICINE
Payer: MEDICARE

## 2019-07-07 DIAGNOSIS — L03.319 CELLULITIS OF SACRAL REGION: ICD-10-CM

## 2019-07-07 DIAGNOSIS — E87.20 LACTIC ACIDOSIS: ICD-10-CM

## 2019-07-07 DIAGNOSIS — E11.65 POORLY CONTROLLED TYPE 2 DIABETES MELLITUS (HCC): ICD-10-CM

## 2019-07-07 DIAGNOSIS — L89.152 SACRAL DECUBITUS ULCER, STAGE II (HCC): Primary | ICD-10-CM

## 2019-07-07 DIAGNOSIS — N30.00 ACUTE CYSTITIS WITHOUT HEMATURIA: ICD-10-CM

## 2019-07-07 LAB
BACTERIA: ABNORMAL /HPF
BASOPHILS ABSOLUTE: 0.04 E9/L (ref 0–0.2)
BASOPHILS RELATIVE PERCENT: 0.6 % (ref 0–2)
BILIRUBIN URINE: NEGATIVE
BLOOD, URINE: ABNORMAL
CLARITY: ABNORMAL
COLOR: YELLOW
EOSINOPHILS ABSOLUTE: 0.14 E9/L (ref 0.05–0.5)
EOSINOPHILS RELATIVE PERCENT: 2.1 % (ref 0–6)
EPITHELIAL CELLS, UA: ABNORMAL /HPF
GLUCOSE URINE: NEGATIVE MG/DL
HCT VFR BLD CALC: 38.5 % (ref 34–48)
HEMOGLOBIN: 12.6 G/DL (ref 11.5–15.5)
IMMATURE GRANULOCYTES #: 0.03 E9/L
IMMATURE GRANULOCYTES %: 0.4 % (ref 0–5)
KETONES, URINE: NEGATIVE MG/DL
LEUKOCYTE ESTERASE, URINE: ABNORMAL
LYMPHOCYTES ABSOLUTE: 1.94 E9/L (ref 1.5–4)
LYMPHOCYTES RELATIVE PERCENT: 28.9 % (ref 20–42)
MCH RBC QN AUTO: 32.2 PG (ref 26–35)
MCHC RBC AUTO-ENTMCNC: 32.7 % (ref 32–34.5)
MCV RBC AUTO: 98.5 FL (ref 80–99.9)
MONOCYTES ABSOLUTE: 0.55 E9/L (ref 0.1–0.95)
MONOCYTES RELATIVE PERCENT: 8.2 % (ref 2–12)
NEUTROPHILS ABSOLUTE: 4.01 E9/L (ref 1.8–7.3)
NEUTROPHILS RELATIVE PERCENT: 59.8 % (ref 43–80)
NITRITE, URINE: POSITIVE
PDW BLD-RTO: 14.2 FL (ref 11.5–15)
PH UA: 6.5 (ref 5–9)
PLATELET # BLD: 182 E9/L (ref 130–450)
PMV BLD AUTO: 9.3 FL (ref 7–12)
PROTEIN UA: ABNORMAL MG/DL
RBC # BLD: 3.91 E12/L (ref 3.5–5.5)
RBC UA: ABNORMAL /HPF (ref 0–2)
SPECIFIC GRAVITY UA: 1.02 (ref 1–1.03)
UROBILINOGEN, URINE: 0.2 E.U./DL
WBC # BLD: 6.7 E9/L (ref 4.5–11.5)
WBC UA: ABNORMAL /HPF (ref 0–5)

## 2019-07-07 PROCEDURE — 83605 ASSAY OF LACTIC ACID: CPT

## 2019-07-07 PROCEDURE — 81001 URINALYSIS AUTO W/SCOPE: CPT

## 2019-07-07 PROCEDURE — 80048 BASIC METABOLIC PNL TOTAL CA: CPT

## 2019-07-07 PROCEDURE — 36415 COLL VENOUS BLD VENIPUNCTURE: CPT

## 2019-07-07 PROCEDURE — 99285 EMERGENCY DEPT VISIT HI MDM: CPT

## 2019-07-07 PROCEDURE — 87070 CULTURE OTHR SPECIMN AEROBIC: CPT

## 2019-07-07 PROCEDURE — 6370000000 HC RX 637 (ALT 250 FOR IP): Performed by: EMERGENCY MEDICINE

## 2019-07-07 PROCEDURE — 87040 BLOOD CULTURE FOR BACTERIA: CPT

## 2019-07-07 PROCEDURE — 87088 URINE BACTERIA CULTURE: CPT

## 2019-07-07 PROCEDURE — 85025 COMPLETE CBC W/AUTO DIFF WBC: CPT

## 2019-07-07 PROCEDURE — 6360000002 HC RX W HCPCS: Performed by: EMERGENCY MEDICINE

## 2019-07-07 PROCEDURE — 2580000003 HC RX 258: Performed by: EMERGENCY MEDICINE

## 2019-07-07 PROCEDURE — 87186 SC STD MICRODIL/AGAR DIL: CPT

## 2019-07-07 PROCEDURE — 87077 CULTURE AEROBIC IDENTIFY: CPT

## 2019-07-07 RX ORDER — ACETAMINOPHEN 500 MG
1000 TABLET ORAL ONCE
Status: COMPLETED | OUTPATIENT
Start: 2019-07-07 | End: 2019-07-07

## 2019-07-07 RX ORDER — 0.9 % SODIUM CHLORIDE 0.9 %
500 INTRAVENOUS SOLUTION INTRAVENOUS ONCE
Status: COMPLETED | OUTPATIENT
Start: 2019-07-07 | End: 2019-07-07

## 2019-07-07 RX ADMIN — PIPERACILLIN SODIUM,TAZOBACTAM SODIUM 3.38 G: 3; .375 INJECTION, POWDER, FOR SOLUTION INTRAVENOUS at 23:10

## 2019-07-07 RX ADMIN — ACETAMINOPHEN 1000 MG: 500 TABLET ORAL at 23:10

## 2019-07-07 RX ADMIN — SODIUM CHLORIDE 500 ML: 9 INJECTION, SOLUTION INTRAVENOUS at 23:10

## 2019-07-07 ASSESSMENT — PAIN DESCRIPTION - DESCRIPTORS: DESCRIPTORS: ACHING

## 2019-07-07 ASSESSMENT — PAIN DESCRIPTION - FREQUENCY: FREQUENCY: CONTINUOUS

## 2019-07-07 ASSESSMENT — PAIN SCALES - GENERAL
PAINLEVEL_OUTOF10: 3
PAINLEVEL_OUTOF10: 3

## 2019-07-07 ASSESSMENT — PAIN DESCRIPTION - PAIN TYPE: TYPE: ACUTE PAIN

## 2019-07-07 ASSESSMENT — PAIN DESCRIPTION - PROGRESSION: CLINICAL_PROGRESSION: GRADUALLY WORSENING

## 2019-07-07 ASSESSMENT — PAIN DESCRIPTION - ORIENTATION: ORIENTATION: LEFT

## 2019-07-07 ASSESSMENT — PAIN DESCRIPTION - LOCATION: LOCATION: COCCYX

## 2019-07-08 ENCOUNTER — HOSPITAL ENCOUNTER (INPATIENT)
Age: 84
LOS: 3 days | Discharge: NURSING, REHAB OR LONG-TERM CARE | DRG: 603 | End: 2019-07-11
Attending: INTERNAL MEDICINE | Admitting: INTERNAL MEDICINE
Payer: MEDICARE

## 2019-07-08 ENCOUNTER — APPOINTMENT (OUTPATIENT)
Dept: GENERAL RADIOLOGY | Age: 84
End: 2019-07-08
Payer: MEDICARE

## 2019-07-08 ENCOUNTER — HOSPITAL ENCOUNTER (OUTPATIENT)
Age: 84
Discharge: HOME | End: 2019-07-08
Payer: MEDICARE

## 2019-07-08 ENCOUNTER — APPOINTMENT (OUTPATIENT)
Dept: CT IMAGING | Age: 84
End: 2019-07-08
Payer: MEDICARE

## 2019-07-08 VITALS
WEIGHT: 175 LBS | TEMPERATURE: 98.1 F | DIASTOLIC BLOOD PRESSURE: 88 MMHG | SYSTOLIC BLOOD PRESSURE: 142 MMHG | HEART RATE: 76 BPM | RESPIRATION RATE: 18 BRPM | HEIGHT: 61 IN | OXYGEN SATURATION: 95 % | BODY MASS INDEX: 33.04 KG/M2

## 2019-07-08 PROBLEM — L03.90 CELLULITIS: Status: RESOLVED | Noted: 2019-07-08 | Resolved: 2019-07-08

## 2019-07-08 PROBLEM — M19.90 OSTEOARTHRITIS: Status: RESOLVED | Noted: 2018-04-08 | Resolved: 2019-07-08

## 2019-07-08 PROBLEM — R32 URINARY INCONTINENCE: Status: RESOLVED | Noted: 2018-04-08 | Resolved: 2019-07-08

## 2019-07-08 PROBLEM — L89.159 DECUBITUS ULCER OF COCCYX: Chronic | Status: ACTIVE | Noted: 2019-07-08

## 2019-07-08 PROBLEM — L03.90 CELLULITIS: Status: ACTIVE | Noted: 2019-07-08

## 2019-07-08 PROBLEM — L89.152 DECUBITUS ULCER OF COCCYGEAL REGION, STAGE 2 (HCC): Status: RESOLVED | Noted: 2018-04-13 | Resolved: 2019-07-08

## 2019-07-08 PROBLEM — J18.9 PNEUMONIA: Status: RESOLVED | Noted: 2018-08-13 | Resolved: 2019-07-08

## 2019-07-08 PROBLEM — L89.609 DECUBITUS ULCER, HEEL: Chronic | Status: ACTIVE | Noted: 2019-07-08

## 2019-07-08 PROBLEM — F32.5 MAJOR DEPRESSIVE DISORDER, SINGLE EPISODE, IN FULL REMISSION (HCC): Status: RESOLVED | Noted: 2019-01-07 | Resolved: 2019-07-08

## 2019-07-08 LAB
ANION GAP SERPL CALCULATED.3IONS-SCNC: 11 MMOL/L (ref 7–16)
ANION GAP SERPL CALCULATED.3IONS-SCNC: 13 MMOL/L (ref 7–16)
BUN BLDV-MCNC: 23 MG/DL (ref 8–23)
BUN BLDV-MCNC: 28 MG/DL (ref 8–23)
CALCIUM SERPL-MCNC: 8.9 MG/DL (ref 8.6–10.2)
CALCIUM SERPL-MCNC: 9.6 MG/DL (ref 8.6–10.2)
CHLORIDE BLD-SCNC: 101 MMOL/L (ref 98–107)
CHLORIDE BLD-SCNC: 107 MMOL/L (ref 98–107)
CO2: 25 MMOL/L (ref 22–29)
CO2: 28 MMOL/L (ref 22–29)
CREAT SERPL-MCNC: 1.1 MG/DL (ref 0.5–1)
CREAT SERPL-MCNC: 1.3 MG/DL (ref 0.5–1)
GFR AFRICAN AMERICAN: 47
GFR AFRICAN AMERICAN: 57
GFR NON-AFRICAN AMERICAN: 39 ML/MIN/1.73
GFR NON-AFRICAN AMERICAN: 47 ML/MIN/1.73
GLUCOSE BLD-MCNC: 148 MG/DL (ref 74–99)
GLUCOSE BLD-MCNC: 230 MG/DL (ref 74–99)
HBA1C MFR BLD: 7 % (ref 4–5.6)
LACTIC ACID: 3.1 MMOL/L (ref 0.5–2.2)
METER GLUCOSE: 137 MG/DL (ref 74–99)
METER GLUCOSE: 142 MG/DL (ref 74–99)
METER GLUCOSE: 148 MG/DL (ref 74–99)
METER GLUCOSE: 221 MG/DL (ref 74–99)
POTASSIUM REFLEX MAGNESIUM: 4.1 MMOL/L (ref 3.5–5)
POTASSIUM SERPL-SCNC: 5.2 MMOL/L (ref 3.5–5)
SODIUM BLD-SCNC: 142 MMOL/L (ref 132–146)
SODIUM BLD-SCNC: 143 MMOL/L (ref 132–146)

## 2019-07-08 PROCEDURE — 82962 GLUCOSE BLOOD TEST: CPT

## 2019-07-08 PROCEDURE — 97165 OT EVAL LOW COMPLEX 30 MIN: CPT

## 2019-07-08 PROCEDURE — 97161 PT EVAL LOW COMPLEX 20 MIN: CPT

## 2019-07-08 PROCEDURE — 80048 BASIC METABOLIC PNL TOTAL CA: CPT

## 2019-07-08 PROCEDURE — 6360000002 HC RX W HCPCS: Performed by: INTERNAL MEDICINE

## 2019-07-08 PROCEDURE — 36415 COLL VENOUS BLD VENIPUNCTURE: CPT

## 2019-07-08 PROCEDURE — 83036 HEMOGLOBIN GLYCOSYLATED A1C: CPT

## 2019-07-08 PROCEDURE — 97530 THERAPEUTIC ACTIVITIES: CPT

## 2019-07-08 PROCEDURE — 6370000000 HC RX 637 (ALT 250 FOR IP): Performed by: INTERNAL MEDICINE

## 2019-07-08 PROCEDURE — 2580000003 HC RX 258: Performed by: INTERNAL MEDICINE

## 2019-07-08 PROCEDURE — 2580000003 HC RX 258: Performed by: RADIOLOGY

## 2019-07-08 PROCEDURE — 74177 CT ABD & PELVIS W/CONTRAST: CPT

## 2019-07-08 PROCEDURE — A0428 BLS: HCPCS

## 2019-07-08 PROCEDURE — 2580000003 HC RX 258: Performed by: EMERGENCY MEDICINE

## 2019-07-08 PROCEDURE — 1200000000 HC SEMI PRIVATE

## 2019-07-08 PROCEDURE — 71045 X-RAY EXAM CHEST 1 VIEW: CPT

## 2019-07-08 PROCEDURE — A0425 GROUND MILEAGE: HCPCS

## 2019-07-08 PROCEDURE — 2500000003 HC RX 250 WO HCPCS: Performed by: INTERNAL MEDICINE

## 2019-07-08 PROCEDURE — 6360000004 HC RX CONTRAST MEDICATION: Performed by: RADIOLOGY

## 2019-07-08 RX ORDER — DEXTROSE MONOHYDRATE 50 MG/ML
100 INJECTION, SOLUTION INTRAVENOUS PRN
Status: DISCONTINUED | OUTPATIENT
Start: 2019-07-08 | End: 2019-07-11 | Stop reason: HOSPADM

## 2019-07-08 RX ORDER — SODIUM CHLORIDE 9 MG/ML
INJECTION, SOLUTION INTRAVENOUS CONTINUOUS
Status: DISCONTINUED | OUTPATIENT
Start: 2019-07-08 | End: 2019-07-08

## 2019-07-08 RX ORDER — GABAPENTIN 300 MG/1
300 CAPSULE ORAL NIGHTLY
Status: DISCONTINUED | OUTPATIENT
Start: 2019-07-08 | End: 2019-07-09

## 2019-07-08 RX ORDER — NICOTINE POLACRILEX 4 MG
15 LOZENGE BUCCAL PRN
Status: DISCONTINUED | OUTPATIENT
Start: 2019-07-08 | End: 2019-07-11 | Stop reason: HOSPADM

## 2019-07-08 RX ORDER — 0.9 % SODIUM CHLORIDE 0.9 %
1000 INTRAVENOUS SOLUTION INTRAVENOUS ONCE
Status: COMPLETED | OUTPATIENT
Start: 2019-07-08 | End: 2019-07-08

## 2019-07-08 RX ORDER — MONTELUKAST SODIUM 10 MG/1
10 TABLET ORAL NIGHTLY
Status: DISCONTINUED | OUTPATIENT
Start: 2019-07-08 | End: 2019-07-11 | Stop reason: HOSPADM

## 2019-07-08 RX ORDER — ONDANSETRON 2 MG/ML
4 INJECTION INTRAMUSCULAR; INTRAVENOUS EVERY 6 HOURS PRN
Status: DISCONTINUED | OUTPATIENT
Start: 2019-07-08 | End: 2019-07-11 | Stop reason: HOSPADM

## 2019-07-08 RX ORDER — BUSPIRONE HYDROCHLORIDE 5 MG/1
5 TABLET ORAL DAILY
Status: DISCONTINUED | OUTPATIENT
Start: 2019-07-08 | End: 2019-07-11 | Stop reason: HOSPADM

## 2019-07-08 RX ORDER — DEXTROSE MONOHYDRATE 25 G/50ML
12.5 INJECTION, SOLUTION INTRAVENOUS PRN
Status: DISCONTINUED | OUTPATIENT
Start: 2019-07-08 | End: 2019-07-11 | Stop reason: HOSPADM

## 2019-07-08 RX ORDER — ASPIRIN 81 MG/1
81 TABLET ORAL DAILY
Status: DISCONTINUED | OUTPATIENT
Start: 2019-07-08 | End: 2019-07-11 | Stop reason: HOSPADM

## 2019-07-08 RX ORDER — SODIUM CHLORIDE 0.9 % (FLUSH) 0.9 %
10 SYRINGE (ML) INJECTION ONCE
Status: COMPLETED | OUTPATIENT
Start: 2019-07-08 | End: 2019-07-08

## 2019-07-08 RX ORDER — ACETAMINOPHEN 325 MG/1
650 TABLET ORAL EVERY 4 HOURS PRN
Status: DISCONTINUED | OUTPATIENT
Start: 2019-07-08 | End: 2019-07-11 | Stop reason: HOSPADM

## 2019-07-08 RX ORDER — FLUTICASONE PROPIONATE 50 MCG
1 SPRAY, SUSPENSION (ML) NASAL DAILY
Status: DISCONTINUED | OUTPATIENT
Start: 2019-07-08 | End: 2019-07-11 | Stop reason: HOSPADM

## 2019-07-08 RX ORDER — ATENOLOL 50 MG/1
50 TABLET ORAL DAILY
Status: DISCONTINUED | OUTPATIENT
Start: 2019-07-08 | End: 2019-07-11 | Stop reason: HOSPADM

## 2019-07-08 RX ORDER — IPRATROPIUM BROMIDE AND ALBUTEROL SULFATE 2.5; .5 MG/3ML; MG/3ML
3 SOLUTION RESPIRATORY (INHALATION) EVERY 4 HOURS PRN
Status: DISCONTINUED | OUTPATIENT
Start: 2019-07-08 | End: 2019-07-11 | Stop reason: HOSPADM

## 2019-07-08 RX ADMIN — SERTRALINE HYDROCHLORIDE 50 MG: 50 TABLET ORAL at 08:46

## 2019-07-08 RX ADMIN — PIPERACILLIN SODIUM AND TAZOBACTAM SODIUM 3.38 G: 3; .375 INJECTION, POWDER, LYOPHILIZED, FOR SOLUTION INTRAVENOUS at 23:41

## 2019-07-08 RX ADMIN — MICONAZOLE NITRATE: 20 POWDER TOPICAL at 21:29

## 2019-07-08 RX ADMIN — PIPERACILLIN SODIUM AND TAZOBACTAM SODIUM 3.38 G: 3; .375 INJECTION, POWDER, LYOPHILIZED, FOR SOLUTION INTRAVENOUS at 15:58

## 2019-07-08 RX ADMIN — INSULIN LISPRO 1 UNITS: 100 INJECTION, SOLUTION INTRAVENOUS; SUBCUTANEOUS at 06:53

## 2019-07-08 RX ADMIN — ENOXAPARIN SODIUM 40 MG: 40 INJECTION SUBCUTANEOUS at 08:49

## 2019-07-08 RX ADMIN — IOPAMIDOL 100 ML: 755 INJECTION, SOLUTION INTRAVENOUS at 01:28

## 2019-07-08 RX ADMIN — BUSPIRONE HYDROCHLORIDE 5 MG: 5 TABLET ORAL at 08:46

## 2019-07-08 RX ADMIN — GABAPENTIN 300 MG: 300 CAPSULE ORAL at 21:29

## 2019-07-08 RX ADMIN — ASPIRIN 81 MG: 81 TABLET ORAL at 08:46

## 2019-07-08 RX ADMIN — INSULIN LISPRO 2 UNITS: 100 INJECTION, SOLUTION INTRAVENOUS; SUBCUTANEOUS at 12:01

## 2019-07-08 RX ADMIN — SODIUM CHLORIDE 1000 ML: 9 INJECTION, SOLUTION INTRAVENOUS at 00:19

## 2019-07-08 RX ADMIN — MONTELUKAST SODIUM 10 MG: 10 TABLET, FILM COATED ORAL at 21:29

## 2019-07-08 RX ADMIN — SODIUM CHLORIDE: 9 INJECTION, SOLUTION INTRAVENOUS at 05:54

## 2019-07-08 RX ADMIN — ATENOLOL 50 MG: 50 TABLET ORAL at 08:46

## 2019-07-08 RX ADMIN — INSULIN LISPRO 1 UNITS: 100 INJECTION, SOLUTION INTRAVENOUS; SUBCUTANEOUS at 21:32

## 2019-07-08 RX ADMIN — MICONAZOLE NITRATE: 20 POWDER TOPICAL at 08:45

## 2019-07-08 RX ADMIN — Medication 10 ML: at 01:29

## 2019-07-08 RX ADMIN — PIPERACILLIN SODIUM AND TAZOBACTAM SODIUM 3.38 G: 3; .375 INJECTION, POWDER, LYOPHILIZED, FOR SOLUTION INTRAVENOUS at 06:59

## 2019-07-08 ASSESSMENT — PAIN SCALES - GENERAL
PAINLEVEL_OUTOF10: 0

## 2019-07-08 NOTE — PROGRESS NOTES
Occupational Therapy      Occupational Therapy  Occupational Therapy Initial Assessment    Date:2019  Patient Name: Meryle Jews  MRN: [id number]  : 1933  Room: 524    Evaluating OT: Red Jews OTR/L [id number]    AM-PAC Daily Activity Raw Score:     Recommended Adaptive Equipment: TBD  Diagnosis:  Cellulitis, wound buttocks  Pertinent Medical History: DM, GERD, HTN, spinal stenosis, HX B hip fx, anxiety, neuropathy & depression  Precautions:  Falls  Home Living: Pt lives in a 1-story home [address]. Pt has assist with ADLs & transfers. Prior Level of Function: Assist with ADLs, assist with IADLs. Pt daughters assist & hired caregiver Drew Needs @ night to get ready for bed  Pain Level: Pt did report neuropathy in her feet pain during OT session    Cognition: A&O x3.  Pt is alert and oriented but latent in her processing   Problem solving:  fair   Judgement/safety:  fair     Functional Assessment:   Initial Eval Status  Date: 19 Treatment session:  Short Term Goals  Treatment frequency: 2-5x/wk PRN x1-3 wks   Feeding Min A  s/u   Grooming Min A  Sup   UB Dressing Mod A  Min A   LB Dressing Dependent  ModA   Bathing Max A  Mod A   Toileting NT- zaragoza  Mod A   Bed Mobility  NT  Rolling to right & left Sup   Functional Transfers Max A  Mod A with rwf   Functional Mobility NT     Balance Sitting: CGA    Standing: Mod-Max A     Activity Tolerance Fair-   fair plus balance during self care tasks           ADL comments: Pt educated on pacing, safety and technique during transfers     Strength ROM Additional Info:    BUE  Proximally: 3+/5  Distally: 4-/5   Pt has <1/2 shoulder & greater than 1/2 elbow & wrist Fair  and FMC/dexterity noted during ADL tasks           Comments: Pt has arthrotic changes BUE    Hearing: WFL   Vision: WFL- glasses   Sensation:  No c/o numbness or tingling   Tone: WFL   Edema: BUE edema noted                             Comments/Treatment: Patient educated on adapted techniques

## 2019-07-08 NOTE — ED NOTES
Report to [facility] RN at [facility] for patient transport.       Richard Olsen RN  07/08/19 1123

## 2019-07-08 NOTE — FLOWSHEET NOTE
Inpatient Wound Care    Admit Date: 7/8/2019  2:34 AM    Reason for consult:  Wounds    Significant history:  Admitted with cellulitis. History includes: depression, DM, spinal stenosis. Wound history:  POA    Findings:       07/08/19 1154   Wound 07/07/19 Buttocks Left   Date First Assessed/Time First Assessed: 07/07/19 2318   Present on Hospital Admission: Yes  Primary Wound Type: Pressure Injury  Location: Buttocks  Wound Location Orientation: Left   Wound Pressure Unstageable   Dressing Status Intact   Dressing/Treatment   (mepilex)   Wound Length (cm) 4.5 cm   Wound Width (cm) 3 cm   Wound Depth (cm)   (obscured)   Wound Surface Area (cm^2) 13.5 cm^2   Change in Wound Size % (l*w) 10   Wound Assessment Black; Purple   Drainage Amount Small   Drainage Description Serosanguinous   Odor None   [place]-wound Assessment Denuded   Wound 07/08/19 Coccyx   Date First Assessed/Time First Assessed: 07/08/19 1156   Present on Hospital Admission: Yes  Location: Coccyx   Wound Deep tissue/Injury   Dressing Status Intact   Wound Length (cm) 1 cm   Wound Width (cm) 0.4 cm   Wound Surface Area (cm^2) 0.4 cm^2   Wound Assessment Purple   Drainage Amount None   Xena-wound Assessment Denuded   Wound 07/08/19 Buttocks Right   Date First Assessed/Time First Assessed: 07/08/19 1158   Present on Hospital Admission: Yes  Location: Buttocks  Wound Location Orientation: Right   Wound Pressure Stage  1   Dressing Status Intact   Wound Length (cm) 3 cm   Wound Width (cm) 1 cm   Wound Surface Area (cm^2) 3 cm^2   Wound Assessment Red   Drainage Amount None   Xena-wound Assessment Denuded   Wound 07/08/19 Heel Right   Date First Assessed/Time First Assessed: 07/08/19 1159   Present on Hospital Admission: Yes  Location: Heel  Wound Location Orientation: Right   Wound Pressure Stage  1   Wound Length (cm) 1 cm   Wound Width (cm) 1.5 cm   Wound Surface Area (cm^2) 1.5 cm^2   Wound Assessment Red   Drainage Amount None       Impression: Unstageable pressure injury on L buttocks, Stage 1 pressure injury R buttocks, DTI coccyx, Stage 1 pressure injury R heel. Interventions in place:  Pt sits most of the day at home. She lives alone with family support. Pt has become much less ambulatory over the past month. Daughter is at the bedside. States that her mother usually sleeps in bed but doesn't reposition herself much. Appetite has been good. Long discussion regarding the plan of care. Emotional support given. Plan: Low air loss mattress. Daily dressing changes to buttocks, SOS precautions.        Rudy Caro 7/8/2019 12:01 PM

## 2019-07-08 NOTE — CARE COORDINATION
Social Work discharge planning   SW consult for discharge planning noted. Pt's OT AMPAC today is 11/24. PT AMPAC is pending n chart currently. Sw met with pt, who is a&0x3 and pleasant. We discussed need for SNF short term and pt said she was at [facility] in the past. SW gave her a SNF choice list.  Pt said she will review with her daughter tonight for 2 or 3 choices to give SW tomorrow. Await PT AMPAC as well.   Electronically signed by Radha Tomlin on 7/8/2019 at 3:06 PM

## 2019-07-08 NOTE — ED PROVIDER NOTES
HPI:  7/7/19, Time: 11:37 PM        Brent Merritt is a 80 y.o. female presenting to the ED for decubitus wound worsening severely, beginning 3-4 hours ago. The complaint has been persistent, severe in severity, and worsened by nothing. No reported fever/chills, no n/v/d, no shortness of breath, no coughing, no chest pain, no other complaints reported. No aggravating nor relieving factors reported. Review of Systems:   Pertinent positives and negatives are stated within HPI, all other systems reviewed and are negative.      --------------------------------------------- PAST HISTORY ---------------------------------------------  Past Medical History:  has a past medical history of Anxiety, Depression, Diabetes mellitus ([address] GERD (gastroesophageal reflux disease), Hypertension, Peripheral neuropathy, and Spinal stenosis. Past Surgical History:  has a past surgical history that includes Cholecystectomy; Skin cancer excision; and Hip fracture surgery (Bilateral). Social History:  reports that she has never smoked. She has never used smokeless tobacco. She reports that she does not drink alcohol or use drugs. Family History: family history is not on file. The patients home medications have been reviewed. Allergies: Patient has no known allergies.     -------------------------------------------------- RESULTS -------------------------------------------------  All laboratory and radiology results have been personally reviewed by myself   LABS:  Results for orders placed or performed during the hospital encounter of 07/07/19   CBC Auto Differential   Result Value Ref Range    WBC 6.7 4.5 - 11.5 E9/L    RBC 3.91 3.50 - 5.50 E12/L    Hemoglobin 12.6 11.5 - 15.5 g/dL    Hematocrit 38.5 34.0 - 48.0 %    MCV 98.5 80.0 - 99.9 fL    MCH 32.2 26.0 - 35.0 pg    MCHC 32.7 32.0 - 34.5 %    RDW 14.2 11.5 - 15.0 fL    Platelets 362 323 - 844 E9/L    MPV 9.3 7.0 - 12.0 fL    Neutrophils % 59.8 43.0 - 80.0 % ---------------------------  The nursing notes within the ED encounter and vital signs as below have been reviewed. BP (!) 142/88   Pulse 76   Temp 98.1 °F (36.7 °C) (Oral)   Resp 18   Ht 5' 1\" (1.549 m)   Wt 175 lb (79.4 kg)   LMP  (LMP Unknown)   SpO2 95%   BMI 33.07 kg/m²   Oxygen Saturation Interpretation: Normal      ---------------------------------------------------PHYSICAL EXAM--------------------------------------      Constitutional/General: Alert and oriented x3, well appearing, non toxic in NAD  Head: Normocephalic and atraumatic  Eyes: PERRL, EOMI  Mouth: Oropharynx clear, handling secretions, no trismus  Neck: Supple, full ROM,   Pulmonary: Lungs clear to auscultation bilaterally, no wheezes, rales, or rhonchi. Not in respiratory distress  Cardiovascular:  Regular rate and rhythm, no murmurs, gallops, or rubs. 2+ distal pulses  Abdomen: Soft, non tender, non distended,   Extremities: Moves all extremities x 4.  Warm and well perfused  Skin: warm and dry without rash  Neurologic: GCS 15,  Psych: Normal Affect      ------------------------------ ED COURSE/MEDICAL DECISION MAKING----------------------  Medications   0.9 % sodium chloride bolus (0 mLs Intravenous Stopped 7/7/19 2341)   piperacillin-tazobactam (ZOSYN) 3.375 g in dextrose 5 % 100 mL IVPB (mini-bag) (0 g Intravenous Stopped 7/7/19 2341)   acetaminophen (TYLENOL) tablet 1,000 mg (1,000 mg Oral Given 7/7/19 2310)   0.9 % sodium chloride bolus (0 mLs Intravenous Stopped 7/8/19 0151)   iopamidol (ISOVUE-370) 76 % injection 100 mL (100 mLs Intravenous Given 7/8/19 0128)   sodium chloride flush 0.9 % injection 10 mL (10 mLs Intravenous Given 7/8/19 0129)         ED COURSE:       Medical Decision Making:   Differential Diagnoses:  Sacral Decubitus Ulcer/Cellulitis/Abscess, SIRS, Sepsis, other occult infection, Metabolic/electrolyte disorder    Counseling:  he emergency provider has spoken with the patient and family member patient and

## 2019-07-09 LAB
ANION GAP SERPL CALCULATED.3IONS-SCNC: 10 MMOL/L (ref 7–16)
BASOPHILS ABSOLUTE: 0.04 E9/L (ref 0–0.2)
BASOPHILS RELATIVE PERCENT: 0.6 % (ref 0–2)
BUN BLDV-MCNC: 19 MG/DL (ref 8–23)
CALCIUM SERPL-MCNC: 9 MG/DL (ref 8.6–10.2)
CHLORIDE BLD-SCNC: 107 MMOL/L (ref 98–107)
CO2: 27 MMOL/L (ref 22–29)
CREAT SERPL-MCNC: 1.2 MG/DL (ref 0.5–1)
EOSINOPHILS ABSOLUTE: 0.17 E9/L (ref 0.05–0.5)
EOSINOPHILS RELATIVE PERCENT: 2.4 % (ref 0–6)
GFR AFRICAN AMERICAN: 52
GFR NON-AFRICAN AMERICAN: 43 ML/MIN/1.73
GLUCOSE BLD-MCNC: 101 MG/DL (ref 74–99)
HCT VFR BLD CALC: 33.7 % (ref 34–48)
HEMOGLOBIN: 11.2 G/DL (ref 11.5–15.5)
IMMATURE GRANULOCYTES #: 0.04 E9/L
IMMATURE GRANULOCYTES %: 0.6 % (ref 0–5)
LYMPHOCYTES ABSOLUTE: 1.89 E9/L (ref 1.5–4)
LYMPHOCYTES RELATIVE PERCENT: 26.8 % (ref 20–42)
MAGNESIUM: 2.1 MG/DL (ref 1.6–2.6)
MCH RBC QN AUTO: 32.4 PG (ref 26–35)
MCHC RBC AUTO-ENTMCNC: 33.2 % (ref 32–34.5)
MCV RBC AUTO: 97.4 FL (ref 80–99.9)
METER GLUCOSE: 109 MG/DL (ref 74–99)
METER GLUCOSE: 159 MG/DL (ref 74–99)
METER GLUCOSE: 168 MG/DL (ref 74–99)
METER GLUCOSE: 237 MG/DL (ref 74–99)
MONOCYTES ABSOLUTE: 0.48 E9/L (ref 0.1–0.95)
MONOCYTES RELATIVE PERCENT: 6.8 % (ref 2–12)
NEUTROPHILS ABSOLUTE: 4.43 E9/L (ref 1.8–7.3)
NEUTROPHILS RELATIVE PERCENT: 62.8 % (ref 43–80)
PDW BLD-RTO: 13.9 FL (ref 11.5–15)
PLATELET # BLD: 161 E9/L (ref 130–450)
PMV BLD AUTO: 9 FL (ref 7–12)
POTASSIUM SERPL-SCNC: 4.1 MMOL/L (ref 3.5–5)
RBC # BLD: 3.46 E12/L (ref 3.5–5.5)
SODIUM BLD-SCNC: 144 MMOL/L (ref 132–146)
WBC # BLD: 7.1 E9/L (ref 4.5–11.5)

## 2019-07-09 PROCEDURE — 6370000000 HC RX 637 (ALT 250 FOR IP): Performed by: INTERNAL MEDICINE

## 2019-07-09 PROCEDURE — 85025 COMPLETE CBC W/AUTO DIFF WBC: CPT

## 2019-07-09 PROCEDURE — 82962 GLUCOSE BLOOD TEST: CPT

## 2019-07-09 PROCEDURE — 2580000003 HC RX 258: Performed by: INTERNAL MEDICINE

## 2019-07-09 PROCEDURE — 97530 THERAPEUTIC ACTIVITIES: CPT

## 2019-07-09 PROCEDURE — 36415 COLL VENOUS BLD VENIPUNCTURE: CPT

## 2019-07-09 PROCEDURE — 6360000002 HC RX W HCPCS: Performed by: INTERNAL MEDICINE

## 2019-07-09 PROCEDURE — 80048 BASIC METABOLIC PNL TOTAL CA: CPT

## 2019-07-09 PROCEDURE — 83735 ASSAY OF MAGNESIUM: CPT

## 2019-07-09 PROCEDURE — 1200000000 HC SEMI PRIVATE

## 2019-07-09 PROCEDURE — 97535 SELF CARE MNGMENT TRAINING: CPT

## 2019-07-09 RX ORDER — SODIUM CHLORIDE 0.9 % (FLUSH) 0.9 %
10 SYRINGE (ML) INJECTION 2 TIMES DAILY
Status: DISCONTINUED | OUTPATIENT
Start: 2019-07-09 | End: 2019-07-11 | Stop reason: HOSPADM

## 2019-07-09 RX ORDER — GABAPENTIN 300 MG/1
600 CAPSULE ORAL 3 TIMES DAILY
Status: DISCONTINUED | OUTPATIENT
Start: 2019-07-09 | End: 2019-07-11 | Stop reason: HOSPADM

## 2019-07-09 RX ORDER — SODIUM CHLORIDE 0.9 % (FLUSH) 0.9 %
10 SYRINGE (ML) INJECTION PRN
Status: DISCONTINUED | OUTPATIENT
Start: 2019-07-09 | End: 2019-07-11 | Stop reason: HOSPADM

## 2019-07-09 RX ADMIN — INSULIN LISPRO 1 UNITS: 100 INJECTION, SOLUTION INTRAVENOUS; SUBCUTANEOUS at 11:53

## 2019-07-09 RX ADMIN — ENOXAPARIN SODIUM 40 MG: 40 INJECTION SUBCUTANEOUS at 09:22

## 2019-07-09 RX ADMIN — ATENOLOL 50 MG: 50 TABLET ORAL at 09:22

## 2019-07-09 RX ADMIN — Medication 10 ML: at 09:26

## 2019-07-09 RX ADMIN — MICONAZOLE NITRATE: 20 POWDER TOPICAL at 21:18

## 2019-07-09 RX ADMIN — MICONAZOLE NITRATE: 20 POWDER TOPICAL at 09:22

## 2019-07-09 RX ADMIN — SERTRALINE HYDROCHLORIDE 50 MG: 50 TABLET ORAL at 09:22

## 2019-07-09 RX ADMIN — PIPERACILLIN SODIUM AND TAZOBACTAM SODIUM 3.38 G: 3; .375 INJECTION, POWDER, LYOPHILIZED, FOR SOLUTION INTRAVENOUS at 22:29

## 2019-07-09 RX ADMIN — BUSPIRONE HYDROCHLORIDE 5 MG: 5 TABLET ORAL at 09:22

## 2019-07-09 RX ADMIN — MONTELUKAST SODIUM 10 MG: 10 TABLET, FILM COATED ORAL at 21:11

## 2019-07-09 RX ADMIN — PIPERACILLIN SODIUM AND TAZOBACTAM SODIUM 3.38 G: 3; .375 INJECTION, POWDER, LYOPHILIZED, FOR SOLUTION INTRAVENOUS at 15:11

## 2019-07-09 RX ADMIN — INSULIN LISPRO 1 UNITS: 100 INJECTION, SOLUTION INTRAVENOUS; SUBCUTANEOUS at 16:37

## 2019-07-09 RX ADMIN — INSULIN LISPRO 1 UNITS: 100 INJECTION, SOLUTION INTRAVENOUS; SUBCUTANEOUS at 21:18

## 2019-07-09 RX ADMIN — GABAPENTIN 600 MG: 300 CAPSULE ORAL at 21:22

## 2019-07-09 RX ADMIN — PIPERACILLIN SODIUM AND TAZOBACTAM SODIUM 3.38 G: 3; .375 INJECTION, POWDER, LYOPHILIZED, FOR SOLUTION INTRAVENOUS at 07:10

## 2019-07-09 RX ADMIN — Medication 10 ML: at 21:11

## 2019-07-09 RX ADMIN — ASPIRIN 81 MG: 81 TABLET ORAL at 09:22

## 2019-07-09 RX ADMIN — Medication 10 ML: at 15:12

## 2019-07-09 ASSESSMENT — PAIN SCALES - GENERAL: PAINLEVEL_OUTOF10: 0

## 2019-07-09 NOTE — PROGRESS NOTES
other co morbidities cont as appropriate w dosage adjustments as necessary   PT/OT  DVT PPx  DC planning likely mark in 24 to 48 hours    Electronically signed by Alberto Ortega MD on 7/9/2019 at 9:43 AM

## 2019-07-09 NOTE — PROGRESS NOTES
sitting in chair with grandson present. At end of session pt was transferred to chair with B LE elevated, alarm on and all lines and tubes intact, call light within reach. Education: Safety training during transfers and ADLs. · Pt has made good progress towards set goals.    · Continue with current plan of care      Total Tx Time: [address] DAN/L [id number]

## 2019-07-10 PROBLEM — N39.0 UTI (URINARY TRACT INFECTION): Status: ACTIVE | Noted: 2019-07-10

## 2019-07-10 LAB
METER GLUCOSE: 130 MG/DL (ref 74–99)
METER GLUCOSE: 217 MG/DL (ref 74–99)
METER GLUCOSE: 227 MG/DL (ref 74–99)
METER GLUCOSE: 229 MG/DL (ref 74–99)
ORGANISM: ABNORMAL
URINE CULTURE, ROUTINE: ABNORMAL
WOUND/ABSCESS: NORMAL

## 2019-07-10 PROCEDURE — 1200000000 HC SEMI PRIVATE

## 2019-07-10 PROCEDURE — 6370000000 HC RX 637 (ALT 250 FOR IP): Performed by: INTERNAL MEDICINE

## 2019-07-10 PROCEDURE — 2580000003 HC RX 258: Performed by: INTERNAL MEDICINE

## 2019-07-10 PROCEDURE — 97530 THERAPEUTIC ACTIVITIES: CPT

## 2019-07-10 PROCEDURE — 6360000002 HC RX W HCPCS: Performed by: INTERNAL MEDICINE

## 2019-07-10 PROCEDURE — 82962 GLUCOSE BLOOD TEST: CPT

## 2019-07-10 RX ADMIN — MONTELUKAST SODIUM 10 MG: 10 TABLET, FILM COATED ORAL at 20:30

## 2019-07-10 RX ADMIN — ATENOLOL 50 MG: 50 TABLET ORAL at 09:07

## 2019-07-10 RX ADMIN — BUSPIRONE HYDROCHLORIDE 5 MG: 5 TABLET ORAL at 09:07

## 2019-07-10 RX ADMIN — FLUTICASONE PROPIONATE 1 SPRAY: 50 SPRAY, METERED NASAL at 09:08

## 2019-07-10 RX ADMIN — SERTRALINE HYDROCHLORIDE 50 MG: 50 TABLET ORAL at 09:07

## 2019-07-10 RX ADMIN — PIPERACILLIN SODIUM AND TAZOBACTAM SODIUM 3.38 G: 3; .375 INJECTION, POWDER, LYOPHILIZED, FOR SOLUTION INTRAVENOUS at 14:50

## 2019-07-10 RX ADMIN — GABAPENTIN 600 MG: 300 CAPSULE ORAL at 14:50

## 2019-07-10 RX ADMIN — GABAPENTIN 600 MG: 300 CAPSULE ORAL at 20:30

## 2019-07-10 RX ADMIN — PIPERACILLIN SODIUM AND TAZOBACTAM SODIUM 3.38 G: 3; .375 INJECTION, POWDER, LYOPHILIZED, FOR SOLUTION INTRAVENOUS at 06:55

## 2019-07-10 RX ADMIN — PIPERACILLIN SODIUM AND TAZOBACTAM SODIUM 3.38 G: 3; .375 INJECTION, POWDER, LYOPHILIZED, FOR SOLUTION INTRAVENOUS at 23:21

## 2019-07-10 RX ADMIN — INSULIN LISPRO 2 UNITS: 100 INJECTION, SOLUTION INTRAVENOUS; SUBCUTANEOUS at 12:10

## 2019-07-10 RX ADMIN — MICONAZOLE NITRATE: 20 POWDER TOPICAL at 09:08

## 2019-07-10 RX ADMIN — INSULIN LISPRO 1 UNITS: 100 INJECTION, SOLUTION INTRAVENOUS; SUBCUTANEOUS at 20:30

## 2019-07-10 RX ADMIN — Medication 10 ML: at 23:21

## 2019-07-10 RX ADMIN — ASPIRIN 81 MG: 81 TABLET ORAL at 09:07

## 2019-07-10 RX ADMIN — Medication 10 ML: at 20:30

## 2019-07-10 RX ADMIN — GABAPENTIN 600 MG: 300 CAPSULE ORAL at 09:07

## 2019-07-10 RX ADMIN — ACETAMINOPHEN 650 MG: 325 TABLET ORAL at 20:30

## 2019-07-10 RX ADMIN — MICONAZOLE NITRATE: 20 POWDER TOPICAL at 20:30

## 2019-07-10 RX ADMIN — ENOXAPARIN SODIUM 40 MG: 40 INJECTION SUBCUTANEOUS at 09:08

## 2019-07-10 RX ADMIN — INSULIN LISPRO 2 UNITS: 100 INJECTION, SOLUTION INTRAVENOUS; SUBCUTANEOUS at 17:04

## 2019-07-10 ASSESSMENT — PAIN DESCRIPTION - DESCRIPTORS: DESCRIPTORS: ACHING;SORE

## 2019-07-10 ASSESSMENT — PAIN DESCRIPTION - ONSET: ONSET: ON-GOING

## 2019-07-10 ASSESSMENT — PAIN SCALES - GENERAL
PAINLEVEL_OUTOF10: 3
PAINLEVEL_OUTOF10: 0

## 2019-07-10 ASSESSMENT — PAIN DESCRIPTION - ORIENTATION: ORIENTATION: MID

## 2019-07-10 ASSESSMENT — PAIN DESCRIPTION - FREQUENCY: FREQUENCY: INTERMITTENT

## 2019-07-10 ASSESSMENT — PAIN - FUNCTIONAL ASSESSMENT: PAIN_FUNCTIONAL_ASSESSMENT: PREVENTS OR INTERFERES SOME ACTIVE ACTIVITIES AND ADLS

## 2019-07-10 ASSESSMENT — PAIN DESCRIPTION - PAIN TYPE: TYPE: ACUTE PAIN

## 2019-07-10 ASSESSMENT — PAIN DESCRIPTION - LOCATION: LOCATION: COCCYX;NECK

## 2019-07-10 ASSESSMENT — PAIN DESCRIPTION - PROGRESSION: CLINICAL_PROGRESSION: NOT CHANGED

## 2019-07-10 NOTE — PROGRESS NOTES
Subjective:  Feeling better No CP, SOB, F, V, D, P     Objective:    /78   Pulse 64   Temp 97.8 °F (36.6 °C) (Oral)   Resp 17   Ht 5' 1\" (1.549 m)   Wt 175 lb (79.4 kg)   LMP  (LMP Unknown)   SpO2 93%   BMI 33.07 kg/m²     24HR INTAKE/OUTPUT:      Intake/Output Summary (Last 24 hours) at 7/10/2019 1024  Last data filed at 7/10/2019 0531  Gross per 24 hour   Intake 340 ml   Output 1150 ml   Net -810 ml     nad  Heart:  RRR, no murmurs, gallops, or rubs.   Lungs:  CTA bilaterally, no wheeze, rales or rhonchi  Abd: bowel sounds present, nontender, nondistended, no masses  Extrem:  No clubbing, cyanosis, or edema  + Sacral wound with minimal evidence of infection  Most Recent Labs  Lab Results   Component Value Date    WBC 7.1 07/09/2019    HGB 11.2 (L) 07/09/2019    HCT 33.7 (L) 07/09/2019     07/09/2019     07/09/2019    K 4.1 07/09/2019     07/09/2019    CREATININE 1.2 (H) 07/09/2019    BUN 19 07/09/2019    CO2 27 07/09/2019    GLUCOSE 101 (H) 07/09/2019    ALT 12 04/22/2019    AST 23 04/22/2019    INR 1.1 08/13/2018    TSH 2.190 04/22/2019    LABA1C 7.0 (H) 07/08/2019    LABMICR 21.2 (H) 01/23/2018     Recent Labs     07/09/19  0640   MG 2.1     Lab Results   Component Value Date    CALCIUM 9.0 07/09/2019        No orders to display       Assessment    Principal Problem:    Cellulitis  Active Problems:    GERD (gastroesophageal reflux disease)    Asthma    Diabetes mellitus type 2, uncontrolled ([facility])    CKD (chronic kidney disease) stage 3, GFR 30-59 ml/min ([facility])    HTN (hypertension), benign    Decubitus ulcer of coccyx    Decubitus ulcer, heel  Resolved Problems:    Cellulitis    unstageable left buttocks pressure ulcer, stage 1 right buttocks pressure ulcer, and stage 1 pressure ulcer on right heel were present at the time of the order to admit to the hospital    Plan:    Admit to general medical floor  IV antibiotics-Zosyn, can switch to Levaquin oral at discharge  U CX positive for Pseudomonas  General surgery consult appreciated-no plans for intervention  Wound care consult  Medications for other co morbidities cont as appropriate w dosage adjustments as necessary   PT/OT  DVT PPx  DC planning likely mark in 24 to 48 hours    Electronically signed by Rodri Sorensen MD on 7/10/2019 at 10:24 AM

## 2019-07-10 NOTE — CARE COORDINATION
Social Work / Discharge Planning : SW followed up with  patient, daughter and grandson and discussed discharge plans AT LENGTH. Plan at this time is SNF. SNF list provided and daughter is going to discuss and go tour. SW to follow up tomorrow am for choices. Patient does reside alone and family does check on her throughout the day However, patient has a wound and therapy am/pac supports SNF. Edyta Woody Daughter did say they do feel SNF is the final decision BUT IF they change their mind and take her home, they will need : Hospital bed, special mattress per wound care here, wheelchair and cushion. Again, SW did stress to patient and family that SNF would be very beneficial for patient to succeed  when she does hussein home. Family in agreement for SNF at this time. SW to follow.  Electronically signed by JOSE ALEJANDRO Méndez on 7/10/19 at 11:14 AM

## 2019-07-10 NOTE — PROGRESS NOTES
around and walked into BR. She transferred on/off commode with MOD A. After completion of BM she stood with ww and MIN A for another 3 min while daughter performed thorough hygiene care. Pt walked back to chair for short rest break and then performed ww mobility exercises. Pt c/o fatigue and session was completed. Pt was left sitting up in chair with call light left by patient. Chair/bed alarm: chair alarm was re-activated and pt's daughter and grandson were present. Time in: 13:35  Time out: 14:10    Pt is making good progress toward established Physical Therapy goals. Continue with physical therapy current plan of care. Brenton Montes., P.T.   License Number: [id number]

## 2019-07-11 VITALS
HEIGHT: 61 IN | HEART RATE: 64 BPM | TEMPERATURE: 97.6 F | SYSTOLIC BLOOD PRESSURE: 124 MMHG | OXYGEN SATURATION: 96 % | BODY MASS INDEX: 32.72 KG/M2 | WEIGHT: 173.3 LBS | RESPIRATION RATE: 16 BRPM | DIASTOLIC BLOOD PRESSURE: 60 MMHG

## 2019-07-11 LAB
METER GLUCOSE: 132 MG/DL (ref 74–99)
METER GLUCOSE: 181 MG/DL (ref 74–99)
METER GLUCOSE: 188 MG/DL (ref 74–99)

## 2019-07-11 PROCEDURE — 6370000000 HC RX 637 (ALT 250 FOR IP): Performed by: INTERNAL MEDICINE

## 2019-07-11 PROCEDURE — 97535 SELF CARE MNGMENT TRAINING: CPT

## 2019-07-11 PROCEDURE — 6360000002 HC RX W HCPCS: Performed by: INTERNAL MEDICINE

## 2019-07-11 PROCEDURE — 97530 THERAPEUTIC ACTIVITIES: CPT

## 2019-07-11 PROCEDURE — 82962 GLUCOSE BLOOD TEST: CPT

## 2019-07-11 PROCEDURE — 2580000003 HC RX 258: Performed by: INTERNAL MEDICINE

## 2019-07-11 PROCEDURE — 6370000000 HC RX 637 (ALT 250 FOR IP)

## 2019-07-11 RX ORDER — LEVOFLOXACIN 250 MG/1
250 TABLET ORAL DAILY
Qty: 7 TABLET | Refills: 0 | DISCHARGE
Start: 2019-07-12 | End: 2019-07-11

## 2019-07-11 RX ORDER — LEVOFLOXACIN 250 MG/1
250 TABLET ORAL DAILY
Qty: 7 TABLET | Refills: 0 | DISCHARGE
Start: 2019-07-12 | End: 2019-07-19

## 2019-07-11 RX ORDER — LEVOFLOXACIN 250 MG/1
250 TABLET ORAL DAILY
Status: DISCONTINUED | OUTPATIENT
Start: 2019-07-11 | End: 2019-07-11 | Stop reason: HOSPADM

## 2019-07-11 RX ORDER — DIMETHICONE, OXYBENZONE, AND PADIMATE O 2; 2.5; 6.6 G/100G; G/100G; G/100G
STICK TOPICAL
Status: COMPLETED
Start: 2019-07-11 | End: 2019-07-11

## 2019-07-11 RX ADMIN — ASPIRIN 81 MG: 81 TABLET ORAL at 08:17

## 2019-07-11 RX ADMIN — INSULIN LISPRO 1 UNITS: 100 INJECTION, SOLUTION INTRAVENOUS; SUBCUTANEOUS at 16:58

## 2019-07-11 RX ADMIN — PIPERACILLIN SODIUM AND TAZOBACTAM SODIUM 3.38 G: 3; .375 INJECTION, POWDER, LYOPHILIZED, FOR SOLUTION INTRAVENOUS at 06:58

## 2019-07-11 RX ADMIN — LEVOFLOXACIN 250 MG: 250 TABLET, FILM COATED ORAL at 10:03

## 2019-07-11 RX ADMIN — INSULIN LISPRO 1 UNITS: 100 INJECTION, SOLUTION INTRAVENOUS; SUBCUTANEOUS at 11:44

## 2019-07-11 RX ADMIN — GABAPENTIN 600 MG: 300 CAPSULE ORAL at 08:17

## 2019-07-11 RX ADMIN — MICONAZOLE NITRATE: 20 POWDER TOPICAL at 08:17

## 2019-07-11 RX ADMIN — SERTRALINE HYDROCHLORIDE 50 MG: 50 TABLET ORAL at 08:17

## 2019-07-11 RX ADMIN — ENOXAPARIN SODIUM 40 MG: 40 INJECTION SUBCUTANEOUS at 08:17

## 2019-07-11 RX ADMIN — Medication: at 04:27

## 2019-07-11 RX ADMIN — BUSPIRONE HYDROCHLORIDE 5 MG: 5 TABLET ORAL at 08:17

## 2019-07-11 RX ADMIN — GABAPENTIN 600 MG: 300 CAPSULE ORAL at 14:30

## 2019-07-11 RX ADMIN — ATENOLOL 50 MG: 50 TABLET ORAL at 08:17

## 2019-07-11 RX ADMIN — FLUTICASONE PROPIONATE 1 SPRAY: 50 SPRAY, METERED NASAL at 08:17

## 2019-07-11 ASSESSMENT — PAIN SCALES - GENERAL: PAINLEVEL_OUTOF10: 0

## 2019-07-11 NOTE — PLAN OF CARE
Problem: Increased nutrient needs (NI-5.1)  Goal: Food and/or Nutrient Delivery  Continue Diet. Continue [facility] ONS BID and Ensure High Pro ONS Once Daily. Description  Individualized approach for food/nutrient provision.   Outcome: Met This Shift

## 2019-07-11 NOTE — PLAN OF CARE
Problem: Falls - Risk of:  Goal: Will remain free from falls  Description  Will remain free from falls  Outcome: Met This Shift  Goal: Absence of physical injury  Description  Absence of physical injury  Outcome: Met This Shift     Problem: Risk for Impaired Skin Integrity  Goal: Tissue integrity - skin and mucous membranes  Description  Structural intactness and normal physiological function of skin and  mucous membranes.   Outcome: Met This Shift     Problem: Mobility - Impaired:  Goal: Mobility will improve  Description  Mobility will improve  Outcome: Met This Shift     Problem: Skin Integrity:  Goal: Will show no infection signs and symptoms  Description  Will show no infection signs and symptoms  Outcome: Met This Shift  Goal: Absence of new skin breakdown  Description  Absence of new skin breakdown  Outcome: Met This Shift     Problem: ABCDS Injury Assessment  Goal: Absence of physical injury  Outcome: Met This Shift

## 2019-07-11 NOTE — PROGRESS NOTES
Physical Therapy    Facility/Department: [facility]  Treatment Note  NAME: Brent Merritt  : 1933  MRN: [id number]    Date of Service: 2019    Evaluating Therapist: Jasbir Gallegos PT      Room #: 746   DIAGNOSIS: cellulitis   PRECAUTIONS: Falls     Social:  Pt lives alone  in a 1 floor plan with 4 step(s) and 2 rail(s) to enter. Prior to admission pt walked with [employer] short distances only . Reported having an aide 3x/wk, . Daughters assist as needed.        Initial Evaluation  Date: 19 Treatment     Short Term/ Long Term   Goals   Was pt agreeable to Eval/treatment? Yes   yes     Does pt have pain? No c/o pain.  no c/o pain     Bed Mobility  Rolling: NT  Supine to sit: NT  Sit to supine: NT   Scooting: Min assist in sit   Pt in chair upon arrival   NA, pt was found and left up in chair Min A   Transfers Sit to stand: Max assist   Stand to sit: Mod assist   Stand pivot: max assist chair <> BSC  Sit to stand: MOD A  Stand to sit: MIN A  Stand pivot: MIN A with ww Min assist    Ambulation    8 feet with ww min assist  15 feet x 2 reps +30 feet with ww MIN A 25  feet with [employer] with SBA/CGA   Stair negotiation: ascended and descended  NT NA 4 steps with 2 rail with Min A   AM-PAC Score 12/24 15/24        BLE ROM is WFL  BLE Strength is grossly 3-/5 to 4-/5  Balance: standing with ww MIN A    Pt performed therapeutic exercise of the following: Sit<>stands 3x and stood with ww 1x 1 min. Patient education  Pt was educated on hand placement during transfers. Patient response to education:   Pt verbalized understanding Pt demonstrated skill Pt requires further education in this area   yes yes yes     Additional Comments: Pt was found up in chair and needed to use BR. She stood up from chair with MIN A and then walked into BR with ww and transferred on commode with MIN A. After she was done, she required MOD A to stand up from commode and required assist from [facility] with hygiene care.   Pt then walked back to her chair and sat down for a rest break. After short rest break she walked in her room back and forth and c/o fatigue limiting amb distance. Pt was left sitting up in chair with call light left by patient. Chair/bed alarm: chair alarm was re-activated. Time in: 10:55  Time out: 11:20    Pt is making good progress toward established Physical Therapy goals. Continue with physical therapy current plan of care. Ruth Delvalle., P.T.   License Number: [id number]

## 2019-07-11 NOTE — DISCHARGE INSTR - COC
Continuity of Care Form    Patient Name: Mariza Jorgensen   :  1933  MRN:  [id number]    Admit date:  2019  Discharge date:  19    Code Status Order: Full Code   Advance Directives:   Advance Care Flowsheet Documentation     Date/Time Healthcare Directive Type of Healthcare Directive Copy in [address] Agent's Name Healthcare Agent's Phone Number    [phone]  No, patient does not have an advance directive for healthcare treatment -- -- -- -- --          Admitting Physician:  Shawna Thao MD  PCP: Andrey Vasquez DO    Discharging Nurse: [facility] Unit/Room#: 7390/2393-V  Discharging Unit Phone Number: [phone]    Emergency Contact:   Extended Emergency Contact Information  Primary Emergency Contact: Abena Hay [place] [address] [address]  Phone: [phone]  Mobile Phone: [phone]  Relation: Brother/Sister  Secondary Emergency Contact: Mike Oconnor  [place] Phone: [phone]  Relation: Child    Past Surgical History:  Past Surgical History:   Procedure Laterality Date    CHOLECYSTECTOMY      HIP FRACTURE SURGERY Bilateral     SKIN CANCER EXCISION         Immunization History:   Immunization History   Administered Date(s) Administered    Influenza Virus Vaccine 2015, 2018    Influenza, High Dose (Fluzone 65 yrs and older) 2014, 10/29/2018    Influenza, Jass Soares, 3 Years and older, IM (Fluzone 3 yrs and older or Afluria 5 yrs and older) 2016    Pneumococcal Conjugate 13-valent (Sue Gaston) 2016    Pneumococcal Polysaccharide ([id number]) 10/15/2006, 2015       Active Problems:  Patient Active Problem List   Diagnosis Code    GERD (gastroesophageal reflux disease) K21.9    Asthma J45.909    Diabetes mellitus type 2, uncontrolled ([facility] [address] E11.65    CKD (chronic kidney disease) stage 3, GFR 30-59 ml/min ([facility]) N18.3    HTN (hypertension), benign I10    Cellulitis L03.90    Decubitus ulcer of coccyx L89.159    Decubitus ulcer, heel L89.609    UTI (urinary tract infection) N39.0       Isolation/Infection:   Isolation          No Isolation            Nurse Assessment:  Last Vital Signs: /60   Pulse 64   Temp 97.6 °F (36.4 °C) (Oral)   Resp 16   Ht 5' 1\" (1.549 m)   Wt 173 lb 4.8 oz (78.6 kg)   LMP  (LMP Unknown)   SpO2 96%   BMI 32.74 kg/m²     Last documented pain score (0-10 scale): Pain Level: 0  Last Weight:   Wt Readings from Last 1 Encounters:   07/11/19 173 lb 4.8 oz (78.6 kg)     Mental Status:  oriented and alert    IV Access:  - None    Nursing Mobility/ADLs:  Walking   Assisted  Transfer  Assisted  Bathing  Assisted  Dressing  Assisted  Toileting  Assisted  Feeding  Assisted  Med Admin  Assisted  Med Delivery   whole and prefers mixed with applesauce    Wound Care Documentation and Therapy:  Wound 07/07/19 Buttocks Left (Active)   Wound Pressure Unstageable 7/10/2019  2:18 PM   Dressing Status Clean;Dry; Intact; Changed 7/10/2019  8:15 PM   Dressing Changed Changed/New 7/10/2019  2:18 PM   Dressing/Treatment Alginate; Wound gel 7/10/2019  8:15 PM   Wound Cleansed Rinsed/Irrigated with saline 7/10/2019  2:18 PM   Dressing Change Due 07/11/19 7/10/2019  8:15 PM   Wound Length (cm) 4 cm 7/10/2019  2:18 PM   Wound Width (cm) 3 cm 7/10/2019  2:18 PM   Wound Surface Area (cm^2) 12 cm^2 7/10/2019  2:18 PM   Change in Wound Size % (l*w) 20 7/10/2019  2:18 PM   Wound Assessment DYLON 7/10/2019  8:15 PM   Drainage Amount DYLON 7/10/2019  8:15 PM   Drainage Description Serosanguinous 7/10/2019  2:18 PM   Odor None 7/8/2019 11:54 AM   [place]-wound Assessment [facility] 7/10/2019  8:15 PM   Number of days: 3       Wound 07/08/19 Coccyx (Active)   Wound Deep tissue/Injury 7/10/2019  2:18 PM   Dressing Status Clean;Dry; Intact; Changed 7/10/2019  8:15 PM   Dressing Changed Changed/New 7/10/2019  2:18 PM   Dressing/Treatment Foam;Alginate; Wound gel 7/10/2019  8:15 PM   Wound Cleansed Rinsed/Irrigated Seda Obando MD on 7/11/19 at 2:42 PM

## 2019-07-11 NOTE — DISCHARGE SUMMARY
Omega-3 Fatty Acids (OMEGA-3 EPA FISH OIL PO) Take by mouth Daily      ipratropium-albuterol (DUONEB) 0.5-2.5 (3) MG/3ML SOLN nebulizer solution Inhale 3 mLs into the lungs every 4 hours as needed for Shortness of Breath  Qty: 360 mL, Refills: 3      mineral oil-hydrophilic petrolatum (HYDROPHOR) ointment Apply topically bid as needed to coccyx and bilateral buttocks  Qty: 1 Tube, Refills: 3      acetaminophen (TYLENOL) 325 MG tablet Take 2 tablets by mouth every 4 hours as needed for Fever (Temp greater than 100.5, for pain score 1-3)  Qty: 120 tablet, Refills: 3      fluticasone (FLONASE) 50 MCG/ACT nasal spray 1 spray by Each Nare route daily  Qty: 1 Bottle, Refills: 3      glucose monitoring kit (FREESTYLE) monitoring kit 1 kit by Does not apply route 2 times daily  Qty: 1 kit, Refills: 0    Comments: Please provide patient with a glucometer covered under her insurance plan. Associated Diagnoses: Type II diabetes mellitus with nephropathy (HCC)      aspirin EC 81 MG EC tablet Take 81 mg by mouth daily Took 4 today      Calcium-Vitamin D (CALTRATE 600 PLUS-VIT D PO) Take  by mouth.              Activity: activity as tolerated  Diet: diabetic diet    Follow-up with PCP Dr. Mackenzie Ybarra    Note that over 30 minutes was spent in preparing discharge papers, discussing discharge with patient, staff, consultants, medication review, arranging follow up, etc.    Signed:  Barrie Gonzalez MD  7/11/2019  3:11 PM

## 2019-07-12 ENCOUNTER — HOSPITAL ENCOUNTER (OUTPATIENT)
Age: 84
Discharge: HOME | End: 2019-07-14

## 2019-07-12 LAB
ANION GAP SERPL CALCULATED.3IONS-SCNC: 11 MMOL/L (ref 7–16)
BASOPHILS ABSOLUTE: 0.03 E9/L (ref 0–0.2)
BASOPHILS RELATIVE PERCENT: 0.5 % (ref 0–2)
BUN BLDV-MCNC: 29 MG/DL (ref 8–23)
CALCIUM SERPL-MCNC: 9.3 MG/DL (ref 8.6–10.2)
CHLORIDE BLD-SCNC: 103 MMOL/L (ref 98–107)
CO2: 27 MMOL/L (ref 22–29)
CREAT SERPL-MCNC: 1.1 MG/DL (ref 0.5–1)
EOSINOPHILS ABSOLUTE: 0.12 E9/L (ref 0.05–0.5)
EOSINOPHILS RELATIVE PERCENT: 2 % (ref 0–6)
GFR AFRICAN AMERICAN: 57
GFR NON-AFRICAN AMERICAN: 47 ML/MIN/1.73
GLUCOSE BLD-MCNC: 138 MG/DL (ref 74–99)
HBA1C MFR BLD: 7 % (ref 4–5.6)
HCT VFR BLD CALC: 35.7 % (ref 34–48)
HEMOGLOBIN: 11.7 G/DL (ref 11.5–15.5)
IMMATURE GRANULOCYTES #: 0.02 E9/L
IMMATURE GRANULOCYTES %: 0.3 % (ref 0–5)
LYMPHOCYTES ABSOLUTE: 1.75 E9/L (ref 1.5–4)
LYMPHOCYTES RELATIVE PERCENT: 29.3 % (ref 20–42)
MCH RBC QN AUTO: 32.1 PG (ref 26–35)
MCHC RBC AUTO-ENTMCNC: 32.8 % (ref 32–34.5)
MCV RBC AUTO: 97.8 FL (ref 80–99.9)
MONOCYTES ABSOLUTE: 0.5 E9/L (ref 0.1–0.95)
MONOCYTES RELATIVE PERCENT: 8.4 % (ref 2–12)
NEUTROPHILS ABSOLUTE: 3.55 E9/L (ref 1.8–7.3)
NEUTROPHILS RELATIVE PERCENT: 59.5 % (ref 43–80)
PDW BLD-RTO: 14.3 FL (ref 11.5–15)
PLATELET # BLD: 151 E9/L (ref 130–450)
PMV BLD AUTO: 9.1 FL (ref 7–12)
POTASSIUM SERPL-SCNC: 3.6 MMOL/L (ref 3.5–5)
RBC # BLD: 3.65 E12/L (ref 3.5–5.5)
SODIUM BLD-SCNC: 141 MMOL/L (ref 132–146)
WBC # BLD: 6 E9/L (ref 4.5–11.5)

## 2019-07-12 PROCEDURE — 36415 COLL VENOUS BLD VENIPUNCTURE: CPT

## 2019-07-12 PROCEDURE — 80048 BASIC METABOLIC PNL TOTAL CA: CPT

## 2019-07-12 PROCEDURE — 83036 HEMOGLOBIN GLYCOSYLATED A1C: CPT

## 2019-07-12 PROCEDURE — 85025 COMPLETE CBC W/AUTO DIFF WBC: CPT

## 2019-07-13 LAB
BLOOD CULTURE, ROUTINE: NORMAL
CULTURE, BLOOD 2: NORMAL

## 2019-07-15 ENCOUNTER — CLINICAL DOCUMENTATION (OUTPATIENT)
Dept: FAMILY MEDICINE CLINIC | Age: 84
End: 2019-07-15

## 2019-07-15 ASSESSMENT — ENCOUNTER SYMPTOMS
PHOTOPHOBIA: 0
VOMITING: 0
DOUBLE VISION: 0
STRIDOR: 0
CONSTIPATION: 0
ORTHOPNEA: 0
GASTROINTESTINAL NEGATIVE: 1
EYES NEGATIVE: 1
SPUTUM PRODUCTION: 0
BLURRED VISION: 0
COUGH: 0
NAUSEA: 0
SORE THROAT: 0
SINUS PAIN: 0
EYE REDNESS: 0
DIARRHEA: 0
EYE PAIN: 0
HEARTBURN: 0
ABDOMINAL PAIN: 0
HEMOPTYSIS: 0
SHORTNESS OF BREATH: 1
BLOOD IN STOOL: 0
WHEEZING: 0
BACK PAIN: 1
EYE DISCHARGE: 0

## 2019-07-15 NOTE — PROGRESS NOTES
Diabetes mellitus ([facility] [address]     GERD (gastroesophageal reflux disease)     Hypertension     Peripheral neuropathy     Spinal stenosis      Past Surgical History:   Procedure Laterality Date    CHOLECYSTECTOMY      HIP FRACTURE SURGERY Bilateral     SKIN CANCER EXCISION         Past Family Hx:  Reviewed with patient  No family history on file. Social Hx:  Reviewed with patient  Social History     Tobacco Use    Smoking status: Never Smoker    Smokeless tobacco: Never Used   Substance Use Topics    Alcohol use: No       OBJECTIVE  LMP  (LMP Unknown)     Problem List:  Patel Hodge does not have any pertinent problems on file. PHYS EX:  Physical Exam   Constitutional: She is oriented to person, place, and time. She appears well-developed and well-nourished. No distress. HENT:   Head: Normocephalic and atraumatic. Right Ear: External ear normal.   Left Ear: External ear normal.   Mouth/Throat: Oropharynx is clear and moist. No oropharyngeal exudate. Pt has nasal congestion. Pharynx- erythema. Eyes: Pupils are equal, round, and reactive to light. Conjunctivae and EOM are normal. Right eye exhibits no discharge. Left eye exhibits no discharge. No scleral icterus. Neck: Normal range of motion. Neck supple. No JVD present. No tracheal deviation present. No thyromegaly present. Cardiovascular: Normal rate, regular rhythm, normal heart sounds and intact distal pulses. Exam reveals no gallop and no friction rub. No murmur heard. Pulmonary/Chest: Effort normal. No stridor. No respiratory distress. She has no wheezes. She has no rales. She exhibits no tenderness. Pt has distant breath sounds. Abdominal: Soft. Bowel sounds are normal. She exhibits no distension and no mass. There is no tenderness. There is no rebound and no guarding. No hernia. Musculoskeletal: She exhibits tenderness. She exhibits no edema or deformity. Pt uses wheelchair,and is helped by daughter.  Pain and decreased ROM multiple busPIRone (BUSPAR) 5 MG tablet Take 1 tablet by mouth daily 90 tablet 1    insulin lispro (HUMALOG KWIKPEN) 100 UNIT/ML pen Patient to use according to sliding scale up to 12 units three times daily 5 pen 3    Omega-3 Fatty Acids (OMEGA-3 EPA FISH OIL PO) Take by mouth Daily      ipratropium-albuterol (DUONEB) 0.5-2.5 (3) MG/3ML SOLN nebulizer solution Inhale 3 mLs into the lungs every 4 hours as needed for Shortness of Breath 360 mL 3    mineral oil-hydrophilic petrolatum (HYDROPHOR) ointment Apply topically bid as needed to coccyx and bilateral buttocks 1 Tube 3    acetaminophen (TYLENOL) 325 MG tablet Take 2 tablets by mouth every 4 hours as needed for Fever (Temp greater than 100.5, for pain score 1-3) 120 tablet 3    fluticasone (FLONASE) 50 MCG/ACT nasal spray 1 spray by Each Nare route daily 1 Bottle 3    glucose monitoring kit (FREESTYLE) monitoring kit 1 kit by Does not apply route 2 times daily 1 kit 0    aspirin EC 81 MG EC tablet Take 81 mg by mouth daily Took 4 today      Calcium-Vitamin D (CALTRATE 600 PLUS-VIT D PO) Take  by mouth. No facility-administered encounter medications on file as of 7/15/2019. No follow-ups on file. Reviewed recent labs related to Grace's current problems      Discussed importance of regular Health Maintenance follow up  Health Maintenance   Topic    DTaP/Tdap/Td vaccine (1 - Tdap)    Shingles Vaccine (1 of 2)    Annual Wellness Visit (AWV)     Flu vaccine (1)    Potassium monitoring     Creatinine monitoring     Pneumococcal 65+ years Vaccine                                                  Review of Systems   Constitutional: Positive for fatigue and malaise/fatigue. Negative for chills, diaphoresis, fever and weight loss. HENT: Negative for congestion, ear discharge, ear pain, hearing loss, nosebleeds, sinus pain, sore throat and tinnitus. Eyes: Negative.   Negative for blurred vision, double vision, photophobia, pain, discharge and redness. Respiratory: Positive for shortness of breath. Negative for cough, hemoptysis, sputum production, wheezing and stridor. Cardiovascular: Positive for leg swelling. Negative for chest pain, palpitations, orthopnea, claudication and PND. Pt has hypertension. Gastrointestinal: Negative. Negative for abdominal pain, blood in stool, constipation, diarrhea, heartburn, melena, nausea and vomiting. Endocrine: Negative for polydipsia. Genitourinary: Positive for frequency. Negative for dysuria, flank pain, hematuria and urgency. Musculoskeletal: Positive for back pain, falls, joint pain, myalgias and neck pain. Skin: Negative for itching and rash. Wound on gluteal cleft    Allergic/Immunologic: Negative for environmental allergies. Neurological: Positive for tingling, weakness and numbness. Negative for dizziness, tremors, sensory change, speech change, focal weakness, seizures, loss of consciousness and headaches. Hematological: Does not bruise/bleed easily. Pt has type 2 DM. Psychiatric/Behavioral: Negative for depression, hallucinations, memory loss, substance abuse and suicidal ideas. The patient is nervous/anxious. The patient does not have insomnia.

## 2019-07-29 ENCOUNTER — CARE COORDINATION (OUTPATIENT)
Dept: CARE COORDINATION | Age: 84
End: 2019-07-29

## 2019-07-30 ENCOUNTER — HOSPITAL ENCOUNTER (OUTPATIENT)
Dept: WOUND CARE | Age: 84
Discharge: HOME | End: 2019-07-30
Payer: MEDICARE

## 2019-07-30 VITALS
WEIGHT: 173 LBS | TEMPERATURE: 98.4 F | SYSTOLIC BLOOD PRESSURE: 112 MMHG | HEART RATE: 68 BPM | DIASTOLIC BLOOD PRESSURE: 70 MMHG | HEIGHT: 61 IN | BODY MASS INDEX: 32.66 KG/M2 | RESPIRATION RATE: 18 BRPM

## 2019-07-30 DIAGNOSIS — L89.153 PRESSURE INJURY OF COCCYGEAL REGION, STAGE 3 (HCC): Primary | Chronic | ICD-10-CM

## 2019-07-30 PROCEDURE — 99203 OFFICE O/P NEW LOW 30 MIN: CPT | Performed by: SURGERY

## 2019-07-30 PROCEDURE — 99213 OFFICE O/P EST LOW 20 MIN: CPT

## 2019-07-30 NOTE — PROGRESS NOTES
osteomyelitis   []  4 Localized gangrene   []  5 Extensive gangrene of the foot     Wound: Blister        PAST MEDICAL HISTORY      Diagnosis Date    Anxiety     Depression     Diabetes mellitus (HCC)     GERD (gastroesophageal reflux disease)     Hypertension     Peripheral neuropathy     Spinal stenosis      Past Surgical History:   Procedure Laterality Date    CHOLECYSTECTOMY      HIP FRACTURE SURGERY Bilateral     SKIN CANCER EXCISION       History reviewed. No pertinent family history. Social History     Tobacco Use    Smoking status: Never Smoker    Smokeless tobacco: Never Used   Substance Use Topics    Alcohol use: No    Drug use: No     No Known Allergies  Current Outpatient Medications on File Prior to Encounter   Medication Sig Dispense Refill    BD PEN NEEDLE MUKESH U/F 32G X 4 MM MISC use three times a day with INSULIN 123 each 3    nystatin (MYCOSTATIN) 859791 UNIT/GM powder Apply 3 times daily.  60 g 3    atenolol (TENORMIN) 50 MG tablet Take 1 tablet by mouth daily 90 tablet 1    gabapentin (NEURONTIN) 300 MG capsule Take two tablets three times a day 540 capsule 0    FREESTYLE LANCETS MISC TEST twice a day 200 each 3    blood glucose test strips (FREESTYLE LITE) strip TEST twice a day 200 strip 3    Liraglutide (VICTOZA) 18 MG/3ML SOPN SC injection Inject 1.2 mg into the skin daily 6 pen 1    sertraline (ZOLOFT) 100 MG tablet Take 1 1/2 tablets once a day 135 tablet 1    montelukast (SINGULAIR) 10 MG tablet TAKE 1 TABLET NIGHTLY 90 tablet 1    busPIRone (BUSPAR) 5 MG tablet Take 1 tablet by mouth daily 90 tablet 1    insulin lispro (HUMALOG KWIKPEN) 100 UNIT/ML pen Patient to use according to sliding scale up to 12 units three times daily 5 pen 3    Omega-3 Fatty Acids (OMEGA-3 EPA FISH OIL PO) Take by mouth Daily      ipratropium-albuterol (DUONEB) 0.5-2.5 (3) MG/3ML SOLN nebulizer solution Inhale 3 mLs into the lungs every 4 hours as needed for Shortness of Breath 360 mL 3 Discharge/Physician Orders    Discharge condition: Stable    Assessment of pain at discharge: NONE     Anesthetic used: NONE     Discharge to: Home    Left via:Private automobile    Accompanied by: accompanied by DAUGHTER     ECF/HHA:     Dressing Orders: NONE     Treatment Orders:    [facility] visit ____AREA HEALED ________IF [address] [place] [address] _______________  (Please note your next appointment above and if you are unable to keep, kindly give a 24 hour notice. Thank you.)    Physician signature:__________________________      If you experience any of the following, please call the [address] [place] [address] [address] during business hours:    * Increase in Pain  * Temperature over 101  * Increase in drainage from your wound  * Drainage with a foul odor  * Bleeding  * Increase in swelling  * Need for compression bandage changes due to slippage, breakthrough drainage. If you need medical attention outside of the business hours of the [address] [place] [address] Road please contact your PCP or go to the nearest emergency room.         Electronically signed by Kvng Sanchez MD on 7/30/2019 at 3:34 PM

## 2019-08-05 ENCOUNTER — CLINICAL DOCUMENTATION (OUTPATIENT)
Dept: FAMILY MEDICINE CLINIC | Age: 84
End: 2019-08-05

## 2019-08-05 ASSESSMENT — ENCOUNTER SYMPTOMS
COUGH: 0
SPUTUM PRODUCTION: 0
ORTHOPNEA: 0
SHORTNESS OF BREATH: 1
WHEEZING: 0
EYE DISCHARGE: 0
BACK PAIN: 1
HEMOPTYSIS: 0
EYE REDNESS: 0
DOUBLE VISION: 0
SORE THROAT: 0
STRIDOR: 0
CONSTIPATION: 0
HEARTBURN: 0
NAUSEA: 0
BLURRED VISION: 0
SINUS PAIN: 0
DIARRHEA: 0
VOMITING: 0
EYES NEGATIVE: 1
GASTROINTESTINAL NEGATIVE: 1
BLOOD IN STOOL: 0
EYE PAIN: 0
ABDOMINAL PAIN: 0
PHOTOPHOBIA: 0

## 2019-08-05 NOTE — PROGRESS NOTES
She has no cervical adenopathy. Neurological: She is alert and oriented to person, place, and time. She has normal reflexes. She displays normal reflexes. A sensory deficit is present. No cranial nerve deficit. She exhibits normal muscle tone. Coordination normal.   Skin: Skin is warm. No rash noted. She is not diaphoretic. There is erythema (gluteal cleft, small wound ). No pallor. Nursing note and vitals reviewed. ASSESSMENT/PLAN  Papa Sesay was seen today for diabetes and wound check. Diagnoses and all orders for this visit:    Type II diabetes mellitus with nephropathy ([facility] [address]  -     POCT Glucose  -     POCT glycosylated hemoglobin (Hb A1C)  Controlled. Lab, victoza, humalog, aspirin, ADA diet. Peripheral polyneuropathy (HCC)  Stable. Neurontin. Essential hypertension  Controlled. BB, aspirin. Cellulitis of buttock  -     Post hospital wound care protocol   Dermatitis  -     hydrocortisone 2.5 % cream; Apply topically 2 times daily. Treated as out patient     CKD stage 3      She is medically stable and I will make no change to her care planning today       Outpatient Encounter Medications as of 8/5/2019   Medication Sig Dispense Refill    BD PEN NEEDLE MUKESH U/F 32G X 4 MM MISC use three times a day with INSULIN 123 each 3    nystatin (MYCOSTATIN) 357227 UNIT/GM powder Apply 3 times daily.  60 g 3    atenolol (TENORMIN) 50 MG tablet Take 1 tablet by mouth daily 90 tablet 1    gabapentin (NEURONTIN) 300 MG capsule Take two tablets three times a day 540 capsule 0    FREESTYLE LANCETS MISC TEST twice a day 200 each 3    blood glucose test strips (FREESTYLE LITE) strip TEST twice a day 200 strip 3    Liraglutide (VICTOZA) 18 MG/3ML SOPN SC injection Inject 1.2 mg into the skin daily 6 pen 1    sertraline (ZOLOFT) 100 MG tablet Take 1 1/2 tablets once a day 135 tablet 1    montelukast (SINGULAIR) 10 MG tablet TAKE 1 TABLET NIGHTLY 90 tablet 1    busPIRone (BUSPAR) 5 MG tablet Take 1 tablet by mouth

## 2019-08-07 ENCOUNTER — CARE COORDINATION (OUTPATIENT)
Dept: CARE COORDINATION | Age: 84
End: 2019-08-07

## 2019-08-09 PROBLEM — N39.0 UTI (URINARY TRACT INFECTION): Status: RESOLVED | Noted: 2019-07-10 | Resolved: 2019-08-09

## 2019-08-20 ENCOUNTER — CARE COORDINATION (OUTPATIENT)
Dept: CARE COORDINATION | Age: 84
End: 2019-08-20

## 2019-08-26 ENCOUNTER — TELEPHONE (OUTPATIENT)
Dept: FAMILY MEDICINE CLINIC | Age: 84
End: 2019-08-26

## 2019-08-28 ENCOUNTER — TELEPHONE (OUTPATIENT)
Dept: FAMILY MEDICINE CLINIC | Age: 84
End: 2019-08-28

## 2019-08-28 DIAGNOSIS — E11.65 UNCONTROLLED TYPE 2 DIABETES MELLITUS WITH HYPERGLYCEMIA (HCC): Chronic | ICD-10-CM

## 2019-08-28 RX ORDER — GLUCOSAMINE HCL/CHONDROITIN SU 500-400 MG
CAPSULE ORAL
Qty: 100 STRIP | Refills: 0 | Status: SHIPPED | OUTPATIENT
Start: 2019-08-28 | End: 2019-09-03 | Stop reason: SDUPTHER

## 2019-08-28 RX ORDER — BLOOD-GLUCOSE METER
1 KIT MISCELLANEOUS DAILY
Qty: 1 KIT | Refills: 0 | Status: SHIPPED | OUTPATIENT
Start: 2019-08-28 | End: 2019-09-03 | Stop reason: SDUPTHER

## 2019-08-28 RX ORDER — LANCETS 30 GAUGE
1 EACH MISCELLANEOUS 3 TIMES DAILY
Qty: 100 EACH | Refills: 3 | Status: SHIPPED | OUTPATIENT
Start: 2019-08-28 | End: 2019-09-03 | Stop reason: SDUPTHER

## 2019-08-28 NOTE — TELEPHONE ENCOUNTER
Sliding scale and glucometer order faxed to [facility].   Electronically signed by Abad Ludwig on 8/28/2019 at 3:13 PM

## 2019-08-29 ENCOUNTER — CARE COORDINATION (OUTPATIENT)
Dept: CARE COORDINATION | Age: 84
End: 2019-08-29

## 2019-09-03 DIAGNOSIS — E11.65 UNCONTROLLED TYPE 2 DIABETES MELLITUS WITH HYPERGLYCEMIA (HCC): Chronic | ICD-10-CM

## 2019-09-03 RX ORDER — BLOOD-GLUCOSE METER
1 KIT MISCELLANEOUS DAILY
Qty: 1 KIT | Refills: 0 | Status: ON HOLD | OUTPATIENT
Start: 2019-09-03 | End: 2019-10-24

## 2019-09-03 RX ORDER — GLUCOSAMINE HCL/CHONDROITIN SU 500-400 MG
CAPSULE ORAL
Qty: 100 STRIP | Refills: 0 | Status: ON HOLD | OUTPATIENT
Start: 2019-09-03 | End: 2019-10-24

## 2019-09-03 RX ORDER — LANCETS 30 GAUGE
1 EACH MISCELLANEOUS 3 TIMES DAILY
Qty: 100 EACH | Refills: 3 | Status: ON HOLD | OUTPATIENT
Start: 2019-09-03 | End: 2019-10-24

## 2019-09-04 ENCOUNTER — CARE COORDINATION (OUTPATIENT)
Dept: CARE COORDINATION | Age: 84
End: 2019-09-04

## 2019-09-09 ENCOUNTER — OFFICE VISIT (OUTPATIENT)
Dept: FAMILY MEDICINE CLINIC | Age: 84
End: 2019-09-09
Payer: MEDICARE

## 2019-09-09 VITALS
WEIGHT: 182.2 LBS | DIASTOLIC BLOOD PRESSURE: 64 MMHG | TEMPERATURE: 97.8 F | BODY MASS INDEX: 34.43 KG/M2 | OXYGEN SATURATION: 94 % | SYSTOLIC BLOOD PRESSURE: 124 MMHG | HEART RATE: 73 BPM

## 2019-09-09 DIAGNOSIS — N18.30 CKD (CHRONIC KIDNEY DISEASE) STAGE 3, GFR 30-59 ML/MIN (HCC): ICD-10-CM

## 2019-09-09 DIAGNOSIS — E78.2 MIXED HYPERLIPIDEMIA: ICD-10-CM

## 2019-09-09 DIAGNOSIS — I10 HTN (HYPERTENSION), BENIGN: ICD-10-CM

## 2019-09-09 DIAGNOSIS — N39.0 URINARY TRACT INFECTION WITHOUT HEMATURIA, SITE UNSPECIFIED: ICD-10-CM

## 2019-09-09 DIAGNOSIS — R73.01 IFG (IMPAIRED FASTING GLUCOSE): ICD-10-CM

## 2019-09-09 DIAGNOSIS — R53.83 FATIGUE, UNSPECIFIED TYPE: ICD-10-CM

## 2019-09-09 DIAGNOSIS — Z00.00 ROUTINE GENERAL MEDICAL EXAMINATION AT A HEALTH CARE FACILITY: ICD-10-CM

## 2019-09-09 DIAGNOSIS — L03.317 CELLULITIS OF BUTTOCK: Primary | ICD-10-CM

## 2019-09-09 DIAGNOSIS — L03.039 CELLULITIS OF TOE, UNSPECIFIED LATERALITY: ICD-10-CM

## 2019-09-09 DIAGNOSIS — N18.30 TYPE 2 DM WITH CKD STAGE 3 AND HYPERTENSION (HCC): ICD-10-CM

## 2019-09-09 DIAGNOSIS — I12.9 TYPE 2 DM WITH CKD STAGE 3 AND HYPERTENSION (HCC): ICD-10-CM

## 2019-09-09 DIAGNOSIS — E11.22 TYPE 2 DM WITH CKD STAGE 3 AND HYPERTENSION (HCC): ICD-10-CM

## 2019-09-09 LAB
BILIRUBIN, POC: NEGATIVE
BLOOD URINE, POC: NEGATIVE
CLARITY, POC: CLEAR
COLOR, POC: YELLOW
GLUCOSE URINE, POC: NEGATIVE
KETONES, POC: NEGATIVE
LEUKOCYTE EST, POC: NORMAL
NITRITE, POC: NEGATIVE
PH, POC: 7
PROTEIN, POC: NEGATIVE
SPECIFIC GRAVITY, POC: 1.02
UROBILINOGEN, POC: 0.2

## 2019-09-09 PROCEDURE — 4040F PNEUMOC VAC/ADMIN/RCVD: CPT | Performed by: FAMILY MEDICINE

## 2019-09-09 PROCEDURE — 81003 URINALYSIS AUTO W/O SCOPE: CPT | Performed by: FAMILY MEDICINE

## 2019-09-09 PROCEDURE — 1123F ACP DISCUSS/DSCN MKR DOCD: CPT | Performed by: FAMILY MEDICINE

## 2019-09-09 PROCEDURE — G0438 PPPS, INITIAL VISIT: HCPCS | Performed by: FAMILY MEDICINE

## 2019-09-09 ASSESSMENT — PATIENT HEALTH QUESTIONNAIRE - PHQ9
SUM OF ALL RESPONSES TO PHQ QUESTIONS 1-9: 0
SUM OF ALL RESPONSES TO PHQ QUESTIONS 1-9: 0

## 2019-09-09 ASSESSMENT — LIFESTYLE VARIABLES: HOW OFTEN DO YOU HAVE A DRINK CONTAINING ALCOHOL: 0

## 2019-09-12 ENCOUNTER — HOSPITAL ENCOUNTER (OUTPATIENT)
Dept: GENERAL RADIOLOGY | Age: 84
Discharge: HOME | End: 2019-09-14
Payer: MEDICARE

## 2019-09-12 ENCOUNTER — HOSPITAL ENCOUNTER (OUTPATIENT)
Age: 84
Discharge: HOME | End: 2019-09-12
Payer: MEDICARE

## 2019-09-12 ENCOUNTER — HOSPITAL ENCOUNTER (OUTPATIENT)
Age: 84
Discharge: HOME | End: 2019-09-14
Payer: MEDICARE

## 2019-09-12 DIAGNOSIS — L03.317 CELLULITIS OF BUTTOCK: ICD-10-CM

## 2019-09-12 DIAGNOSIS — R53.83 FATIGUE, UNSPECIFIED TYPE: ICD-10-CM

## 2019-09-12 DIAGNOSIS — L03.039 CELLULITIS OF TOE, UNSPECIFIED LATERALITY: ICD-10-CM

## 2019-09-12 DIAGNOSIS — N39.0 URINARY TRACT INFECTION WITHOUT HEMATURIA, SITE UNSPECIFIED: ICD-10-CM

## 2019-09-12 DIAGNOSIS — R73.01 IFG (IMPAIRED FASTING GLUCOSE): ICD-10-CM

## 2019-09-12 DIAGNOSIS — N18.30 CKD (CHRONIC KIDNEY DISEASE) STAGE 3, GFR 30-59 ML/MIN (HCC): ICD-10-CM

## 2019-09-12 DIAGNOSIS — I10 HTN (HYPERTENSION), BENIGN: ICD-10-CM

## 2019-09-12 DIAGNOSIS — E78.2 MIXED HYPERLIPIDEMIA: ICD-10-CM

## 2019-09-12 LAB
ALBUMIN SERPL-MCNC: 3.6 G/DL (ref 3.5–5.2)
ALP BLD-CCNC: 93 U/L (ref 35–104)
ALT SERPL-CCNC: 18 U/L (ref 0–32)
ANION GAP SERPL CALCULATED.3IONS-SCNC: 12 MMOL/L (ref 7–16)
AST SERPL-CCNC: 21 U/L (ref 0–31)
BASOPHILS ABSOLUTE: 0.02 E9/L (ref 0–0.2)
BASOPHILS RELATIVE PERCENT: 0.3 % (ref 0–2)
BILIRUB SERPL-MCNC: 0.3 MG/DL (ref 0–1.2)
BUN BLDV-MCNC: 23 MG/DL (ref 8–23)
CALCIUM SERPL-MCNC: 9.9 MG/DL (ref 8.6–10.2)
CHLORIDE BLD-SCNC: 101 MMOL/L (ref 98–107)
CHOLESTEROL, TOTAL: 231 MG/DL (ref 0–199)
CO2: 29 MMOL/L (ref 22–29)
CREAT SERPL-MCNC: 1.3 MG/DL (ref 0.5–1)
EOSINOPHILS ABSOLUTE: 0.11 E9/L (ref 0.05–0.5)
EOSINOPHILS RELATIVE PERCENT: 1.4 % (ref 0–6)
GFR AFRICAN AMERICAN: 47
GFR NON-AFRICAN AMERICAN: 39 ML/MIN/1.73
GLUCOSE BLD-MCNC: 311 MG/DL (ref 74–99)
HCT VFR BLD CALC: 38.2 % (ref 34–48)
HDLC SERPL-MCNC: 51 MG/DL
HEMOGLOBIN: 12.2 G/DL (ref 11.5–15.5)
IMMATURE GRANULOCYTES #: 0.05 E9/L
IMMATURE GRANULOCYTES %: 0.7 % (ref 0–5)
LDL CHOLESTEROL CALCULATED: 128 MG/DL (ref 0–99)
LYMPHOCYTES ABSOLUTE: 1.89 E9/L (ref 1.5–4)
LYMPHOCYTES RELATIVE PERCENT: 24.7 % (ref 20–42)
MCH RBC QN AUTO: 32 PG (ref 26–35)
MCHC RBC AUTO-ENTMCNC: 31.9 % (ref 32–34.5)
MCV RBC AUTO: 100.3 FL (ref 80–99.9)
MONOCYTES ABSOLUTE: 0.56 E9/L (ref 0.1–0.95)
MONOCYTES RELATIVE PERCENT: 7.3 % (ref 2–12)
NEUTROPHILS ABSOLUTE: 5.02 E9/L (ref 1.8–7.3)
NEUTROPHILS RELATIVE PERCENT: 65.6 % (ref 43–80)
PDW BLD-RTO: 13.3 FL (ref 11.5–15)
PLATELET # BLD: 165 E9/L (ref 130–450)
PMV BLD AUTO: 8.6 FL (ref 7–12)
POTASSIUM SERPL-SCNC: 4.2 MMOL/L (ref 3.5–5)
RBC # BLD: 3.81 E12/L (ref 3.5–5.5)
SODIUM BLD-SCNC: 142 MMOL/L (ref 132–146)
TOTAL PROTEIN: 7.4 G/DL (ref 6.4–8.3)
TRIGL SERPL-MCNC: 262 MG/DL (ref 0–149)
TSH SERPL DL<=0.05 MIU/L-ACNC: 2.93 UIU/ML (ref 0.27–4.2)
VLDLC SERPL CALC-MCNC: 52 MG/DL
WBC # BLD: 7.7 E9/L (ref 4.5–11.5)

## 2019-09-12 PROCEDURE — 73620 X-RAY EXAM OF FOOT: CPT

## 2019-09-12 PROCEDURE — 84443 ASSAY THYROID STIM HORMONE: CPT

## 2019-09-12 PROCEDURE — 80053 COMPREHEN METABOLIC PANEL: CPT

## 2019-09-12 PROCEDURE — 85025 COMPLETE CBC W/AUTO DIFF WBC: CPT

## 2019-09-12 PROCEDURE — 80061 LIPID PANEL: CPT

## 2019-09-12 PROCEDURE — 36415 COLL VENOUS BLD VENIPUNCTURE: CPT

## 2019-09-13 ENCOUNTER — HOSPITAL ENCOUNTER (OUTPATIENT)
Age: 84
Discharge: HOME | End: 2019-09-15
Payer: MEDICARE

## 2019-09-13 PROCEDURE — 87077 CULTURE AEROBIC IDENTIFY: CPT

## 2019-09-13 PROCEDURE — 87070 CULTURE OTHR SPECIMN AEROBIC: CPT

## 2019-09-13 PROCEDURE — 87186 SC STD MICRODIL/AGAR DIL: CPT

## 2019-09-13 PROCEDURE — 87075 CULTR BACTERIA EXCEPT BLOOD: CPT

## 2019-09-13 PROCEDURE — 87205 SMEAR GRAM STAIN: CPT

## 2019-09-13 NOTE — PROGRESS NOTES
XR FOOT LEFT (2 VIEWS); Future  -     XR FOOT RIGHT (2 VIEWS); Future    Urinary tract infection without hematuria, site unspecified  -     CBC Auto Differential; Future  -     Comprehensive Metabolic Panel;  Future  -     POCT Urinalysis No Micro (Auto)    Type 2 DM with CKD stage 3 and hypertension ([facility] [address]

## 2019-09-14 LAB — GRAM STAIN ORDERABLE: NORMAL

## 2019-09-15 LAB — ANAEROBIC CULTURE: NORMAL

## 2019-09-17 LAB
ORGANISM: ABNORMAL
ORGANISM: ABNORMAL
WOUND/ABSCESS: ABNORMAL
WOUND/ABSCESS: ABNORMAL

## 2019-09-23 ENCOUNTER — HOSPITAL ENCOUNTER (INPATIENT)
Age: 84
LOS: 2 days | Discharge: HOME HEALTH | DRG: 948 | End: 2019-09-26
Attending: INTERNAL MEDICINE | Admitting: INTERNAL MEDICINE
Payer: MEDICARE

## 2019-09-23 ENCOUNTER — APPOINTMENT (OUTPATIENT)
Dept: ULTRASOUND IMAGING | Age: 84
DRG: 948 | End: 2019-09-23
Attending: INTERNAL MEDICINE
Payer: MEDICARE

## 2019-09-23 ENCOUNTER — APPOINTMENT (OUTPATIENT)
Dept: CT IMAGING | Age: 84
End: 2019-09-23
Payer: MEDICARE

## 2019-09-23 ENCOUNTER — HOSPITAL ENCOUNTER (EMERGENCY)
Age: 84
Discharge: ANOTHER HOSPITAL | End: 2019-09-23
Attending: EMERGENCY MEDICINE
Payer: MEDICARE

## 2019-09-23 ENCOUNTER — APPOINTMENT (OUTPATIENT)
Dept: GENERAL RADIOLOGY | Age: 84
End: 2019-09-23
Payer: MEDICARE

## 2019-09-23 ENCOUNTER — HOSPITAL ENCOUNTER (OUTPATIENT)
Age: 84
Discharge: HOME | End: 2019-09-23
Payer: MEDICARE

## 2019-09-23 VITALS
RESPIRATION RATE: 16 BRPM | BODY MASS INDEX: 33.49 KG/M2 | TEMPERATURE: 97.9 F | OXYGEN SATURATION: 98 % | DIASTOLIC BLOOD PRESSURE: 51 MMHG | HEIGHT: 62 IN | SYSTOLIC BLOOD PRESSURE: 140 MMHG | HEART RATE: 71 BPM | WEIGHT: 182 LBS

## 2019-09-23 DIAGNOSIS — N30.01 ACUTE CYSTITIS WITH HEMATURIA: Primary | ICD-10-CM

## 2019-09-23 DIAGNOSIS — R40.4 TRANSIENT ALTERATION OF AWARENESS: ICD-10-CM

## 2019-09-23 DIAGNOSIS — R79.89 ELEVATED D-DIMER: ICD-10-CM

## 2019-09-23 DIAGNOSIS — I50.23 ACUTE ON CHRONIC SYSTOLIC CONGESTIVE HEART FAILURE (HCC): ICD-10-CM

## 2019-09-23 DIAGNOSIS — R09.02 HYPOXIA: ICD-10-CM

## 2019-09-23 PROBLEM — N39.0 UTI (URINARY TRACT INFECTION): Status: ACTIVE | Noted: 2019-09-23

## 2019-09-23 LAB
ALBUMIN SERPL-MCNC: 3.8 G/DL (ref 3.5–5.2)
ALP BLD-CCNC: 85 U/L (ref 35–104)
ALT SERPL-CCNC: 18 U/L (ref 0–32)
ANION GAP SERPL CALCULATED.3IONS-SCNC: 12 MMOL/L (ref 7–16)
AST SERPL-CCNC: 22 U/L (ref 0–31)
BACTERIA: ABNORMAL /HPF
BASOPHILS ABSOLUTE: 0.02 E9/L (ref 0–0.2)
BASOPHILS RELATIVE PERCENT: 0.2 % (ref 0–2)
BILIRUB SERPL-MCNC: 0.3 MG/DL (ref 0–1.2)
BILIRUBIN URINE: NEGATIVE
BLOOD, URINE: ABNORMAL
BUN BLDV-MCNC: 22 MG/DL (ref 8–23)
CALCIUM SERPL-MCNC: 10.4 MG/DL (ref 8.6–10.2)
CHLORIDE BLD-SCNC: 100 MMOL/L (ref 98–107)
CLARITY: CLEAR
CO2: 29 MMOL/L (ref 22–29)
COLOR: YELLOW
CREAT SERPL-MCNC: 1.3 MG/DL (ref 0.5–1)
D DIMER: 581 NG/ML DDU
EKG ATRIAL RATE: 81 BPM
EKG P AXIS: 68 DEGREES
EKG P-R INTERVAL: 208 MS
EKG Q-T INTERVAL: 374 MS
EKG QRS DURATION: 72 MS
EKG QTC CALCULATION (BAZETT): 434 MS
EKG R AXIS: -27 DEGREES
EKG T AXIS: 17 DEGREES
EKG VENTRICULAR RATE: 81 BPM
EOSINOPHILS ABSOLUTE: 0.15 E9/L (ref 0.05–0.5)
EOSINOPHILS RELATIVE PERCENT: 1.5 % (ref 0–6)
FOLATE: >20 NG/ML (ref 4.8–24.2)
GFR AFRICAN AMERICAN: 47
GFR NON-AFRICAN AMERICAN: 39 ML/MIN/1.73
GLUCOSE BLD-MCNC: 215 MG/DL (ref 74–99)
GLUCOSE URINE: NEGATIVE MG/DL
HCT VFR BLD CALC: 37.1 % (ref 34–48)
HEMOGLOBIN: 12.1 G/DL (ref 11.5–15.5)
IMMATURE GRANULOCYTES #: 0.05 E9/L
IMMATURE GRANULOCYTES %: 0.5 % (ref 0–5)
KETONES, URINE: ABNORMAL MG/DL
LACTIC ACID: 2.1 MMOL/L (ref 0.5–2.2)
LEUKOCYTE ESTERASE, URINE: ABNORMAL
LYMPHOCYTES ABSOLUTE: 2.17 E9/L (ref 1.5–4)
LYMPHOCYTES RELATIVE PERCENT: 21.3 % (ref 20–42)
MCH RBC QN AUTO: 32.3 PG (ref 26–35)
MCHC RBC AUTO-ENTMCNC: 32.6 % (ref 32–34.5)
MCV RBC AUTO: 98.9 FL (ref 80–99.9)
METER GLUCOSE: 186 MG/DL (ref 74–99)
METER GLUCOSE: 209 MG/DL (ref 74–99)
METER GLUCOSE: 213 MG/DL (ref 74–99)
METER GLUCOSE: 225 MG/DL (ref 74–99)
MONOCYTES ABSOLUTE: 0.64 E9/L (ref 0.1–0.95)
MONOCYTES RELATIVE PERCENT: 6.3 % (ref 2–12)
NEUTROPHILS ABSOLUTE: 7.17 E9/L (ref 1.8–7.3)
NEUTROPHILS RELATIVE PERCENT: 70.2 % (ref 43–80)
NITRITE, URINE: POSITIVE
PDW BLD-RTO: 13.3 FL (ref 11.5–15)
PH UA: 5.5 (ref 5–9)
PLATELET # BLD: 181 E9/L (ref 130–450)
PMV BLD AUTO: 8.8 FL (ref 7–12)
POTASSIUM SERPL-SCNC: 4.1 MMOL/L (ref 3.5–5)
PRO-BNP: 1392 PG/ML (ref 0–450)
PROTEIN UA: NEGATIVE MG/DL
RBC # BLD: 3.75 E12/L (ref 3.5–5.5)
RBC UA: ABNORMAL /HPF (ref 0–2)
SODIUM BLD-SCNC: 141 MMOL/L (ref 132–146)
SPECIFIC GRAVITY UA: 1.02 (ref 1–1.03)
TOTAL PROTEIN: 7.2 G/DL (ref 6.4–8.3)
TROPONIN: <0.01 NG/ML (ref 0–0.03)
UROBILINOGEN, URINE: 0.2 E.U./DL
VITAMIN B-12: 788 PG/ML (ref 211–946)
WBC # BLD: 10.2 E9/L (ref 4.5–11.5)
WBC UA: ABNORMAL /HPF (ref 0–5)

## 2019-09-23 PROCEDURE — 2580000003 HC RX 258: Performed by: INTERNAL MEDICINE

## 2019-09-23 PROCEDURE — 71045 X-RAY EXAM CHEST 1 VIEW: CPT

## 2019-09-23 PROCEDURE — 83605 ASSAY OF LACTIC ACID: CPT

## 2019-09-23 PROCEDURE — 85025 COMPLETE CBC W/AUTO DIFF WBC: CPT

## 2019-09-23 PROCEDURE — G0008 ADMIN INFLUENZA VIRUS VAC: HCPCS | Performed by: INTERNAL MEDICINE

## 2019-09-23 PROCEDURE — G0378 HOSPITAL OBSERVATION PER HR: HCPCS

## 2019-09-23 PROCEDURE — 83880 ASSAY OF NATRIURETIC PEPTIDE: CPT

## 2019-09-23 PROCEDURE — 82962 GLUCOSE BLOOD TEST: CPT

## 2019-09-23 PROCEDURE — 82746 ASSAY OF FOLIC ACID SERUM: CPT

## 2019-09-23 PROCEDURE — 6370000000 HC RX 637 (ALT 250 FOR IP): Performed by: INTERNAL MEDICINE

## 2019-09-23 PROCEDURE — 81001 URINALYSIS AUTO W/SCOPE: CPT

## 2019-09-23 PROCEDURE — 82607 VITAMIN B-12: CPT

## 2019-09-23 PROCEDURE — 93005 ELECTROCARDIOGRAM TRACING: CPT | Performed by: EMERGENCY MEDICINE

## 2019-09-23 PROCEDURE — 97161 PT EVAL LOW COMPLEX 20 MIN: CPT

## 2019-09-23 PROCEDURE — 70450 CT HEAD/BRAIN W/O DYE: CPT

## 2019-09-23 PROCEDURE — 99220 PR INITIAL OBSERVATION CARE/DAY 70 MINUTES: CPT | Performed by: INTERNAL MEDICINE

## 2019-09-23 PROCEDURE — 84484 ASSAY OF TROPONIN QUANT: CPT

## 2019-09-23 PROCEDURE — G0379 DIRECT REFER HOSPITAL OBSERV: HCPCS

## 2019-09-23 PROCEDURE — A0425 GROUND MILEAGE: HCPCS

## 2019-09-23 PROCEDURE — 80053 COMPREHEN METABOLIC PANEL: CPT

## 2019-09-23 PROCEDURE — 85378 FIBRIN DEGRADE SEMIQUANT: CPT

## 2019-09-23 PROCEDURE — 36415 COLL VENOUS BLD VENIPUNCTURE: CPT

## 2019-09-23 PROCEDURE — 96365 THER/PROPH/DIAG IV INF INIT: CPT

## 2019-09-23 PROCEDURE — 99285 EMERGENCY DEPT VISIT HI MDM: CPT

## 2019-09-23 PROCEDURE — 6360000002 HC RX W HCPCS: Performed by: INTERNAL MEDICINE

## 2019-09-23 PROCEDURE — 87040 BLOOD CULTURE FOR BACTERIA: CPT

## 2019-09-23 PROCEDURE — 2580000003 HC RX 258: Performed by: EMERGENCY MEDICINE

## 2019-09-23 PROCEDURE — 93970 EXTREMITY STUDY: CPT

## 2019-09-23 PROCEDURE — A0426 ALS 1: HCPCS

## 2019-09-23 PROCEDURE — 96375 TX/PRO/DX INJ NEW DRUG ADDON: CPT

## 2019-09-23 PROCEDURE — 90653 IIV ADJUVANT VACCINE IM: CPT | Performed by: INTERNAL MEDICINE

## 2019-09-23 PROCEDURE — 97165 OT EVAL LOW COMPLEX 30 MIN: CPT

## 2019-09-23 PROCEDURE — 6360000002 HC RX W HCPCS: Performed by: EMERGENCY MEDICINE

## 2019-09-23 RX ORDER — ASPIRIN 81 MG/1
81 TABLET, CHEWABLE ORAL DAILY
Status: DISCONTINUED | OUTPATIENT
Start: 2019-09-23 | End: 2019-09-26 | Stop reason: HOSPADM

## 2019-09-23 RX ORDER — SODIUM CHLORIDE 0.9 % (FLUSH) 0.9 %
10 SYRINGE (ML) INJECTION EVERY 12 HOURS SCHEDULED
Status: DISCONTINUED | OUTPATIENT
Start: 2019-09-23 | End: 2019-09-26 | Stop reason: HOSPADM

## 2019-09-23 RX ORDER — GABAPENTIN 300 MG/1
600 CAPSULE ORAL 3 TIMES DAILY
Status: DISCONTINUED | OUTPATIENT
Start: 2019-09-23 | End: 2019-09-26 | Stop reason: HOSPADM

## 2019-09-23 RX ORDER — ONDANSETRON 2 MG/ML
4 INJECTION INTRAMUSCULAR; INTRAVENOUS EVERY 6 HOURS PRN
Status: DISCONTINUED | OUTPATIENT
Start: 2019-09-23 | End: 2019-09-26 | Stop reason: HOSPADM

## 2019-09-23 RX ORDER — ATENOLOL 50 MG/1
50 TABLET ORAL DAILY
Status: DISCONTINUED | OUTPATIENT
Start: 2019-09-23 | End: 2019-09-26 | Stop reason: HOSPADM

## 2019-09-23 RX ORDER — FLUTICASONE PROPIONATE 50 MCG
1 SPRAY, SUSPENSION (ML) NASAL DAILY
Status: DISCONTINUED | OUTPATIENT
Start: 2019-09-23 | End: 2019-09-26 | Stop reason: HOSPADM

## 2019-09-23 RX ORDER — DEXTROSE MONOHYDRATE 50 MG/ML
100 INJECTION, SOLUTION INTRAVENOUS PRN
Status: DISCONTINUED | OUTPATIENT
Start: 2019-09-23 | End: 2019-09-26 | Stop reason: HOSPADM

## 2019-09-23 RX ORDER — ACETAMINOPHEN 325 MG/1
650 TABLET ORAL EVERY 4 HOURS PRN
Status: DISCONTINUED | OUTPATIENT
Start: 2019-09-23 | End: 2019-09-26 | Stop reason: HOSPADM

## 2019-09-23 RX ORDER — MONTELUKAST SODIUM 10 MG/1
10 TABLET ORAL NIGHTLY
Status: DISCONTINUED | OUTPATIENT
Start: 2019-09-23 | End: 2019-09-26 | Stop reason: HOSPADM

## 2019-09-23 RX ORDER — 0.9 % SODIUM CHLORIDE 0.9 %
500 INTRAVENOUS SOLUTION INTRAVENOUS ONCE
Status: DISCONTINUED | OUTPATIENT
Start: 2019-09-23 | End: 2019-09-23 | Stop reason: HOSPADM

## 2019-09-23 RX ORDER — SODIUM CHLORIDE 0.9 % (FLUSH) 0.9 %
10 SYRINGE (ML) INJECTION PRN
Status: DISCONTINUED | OUTPATIENT
Start: 2019-09-23 | End: 2019-09-26 | Stop reason: HOSPADM

## 2019-09-23 RX ORDER — CLINDAMYCIN HYDROCHLORIDE 150 MG/1
300 CAPSULE ORAL 4 TIMES DAILY
Status: COMPLETED | OUTPATIENT
Start: 2019-09-23 | End: 2019-09-23

## 2019-09-23 RX ORDER — CLINDAMYCIN HYDROCHLORIDE 300 MG/1
300 CAPSULE ORAL 4 TIMES DAILY
Status: ON HOLD | COMMUNITY
End: 2019-09-26 | Stop reason: HOSPADM

## 2019-09-23 RX ORDER — BUSPIRONE HYDROCHLORIDE 5 MG/1
5 TABLET ORAL DAILY
Status: DISCONTINUED | OUTPATIENT
Start: 2019-09-23 | End: 2019-09-26 | Stop reason: HOSPADM

## 2019-09-23 RX ORDER — FUROSEMIDE 10 MG/ML
20 INJECTION INTRAMUSCULAR; INTRAVENOUS ONCE
Status: COMPLETED | OUTPATIENT
Start: 2019-09-23 | End: 2019-09-23

## 2019-09-23 RX ORDER — DEXTROSE MONOHYDRATE 25 G/50ML
12.5 INJECTION, SOLUTION INTRAVENOUS PRN
Status: DISCONTINUED | OUTPATIENT
Start: 2019-09-23 | End: 2019-09-26 | Stop reason: HOSPADM

## 2019-09-23 RX ORDER — NICOTINE POLACRILEX 4 MG
15 LOZENGE BUCCAL PRN
Status: DISCONTINUED | OUTPATIENT
Start: 2019-09-23 | End: 2019-09-26 | Stop reason: HOSPADM

## 2019-09-23 RX ADMIN — ASPIRIN 81 MG 81 MG: 81 TABLET ORAL at 11:08

## 2019-09-23 RX ADMIN — Medication 10 ML: at 11:37

## 2019-09-23 RX ADMIN — CEFTRIAXONE SODIUM 1 G: 1 INJECTION, POWDER, FOR SOLUTION INTRAMUSCULAR; INTRAVENOUS at 03:58

## 2019-09-23 RX ADMIN — INSULIN LISPRO 2 UNITS: 100 INJECTION, SOLUTION INTRAVENOUS; SUBCUTANEOUS at 12:34

## 2019-09-23 RX ADMIN — INFLUENZA VACCINE, ADJUVANTED 0.5 ML: 15; 15; 15 INJECTION, SUSPENSION INTRAMUSCULAR at 20:21

## 2019-09-23 RX ADMIN — GABAPENTIN 600 MG: 300 CAPSULE ORAL at 11:08

## 2019-09-23 RX ADMIN — Medication 10 ML: at 20:23

## 2019-09-23 RX ADMIN — GABAPENTIN 600 MG: 300 CAPSULE ORAL at 20:22

## 2019-09-23 RX ADMIN — CLINDAMYCIN HYDROCHLORIDE 300 MG: 150 CAPSULE ORAL at 12:35

## 2019-09-23 RX ADMIN — ENOXAPARIN SODIUM 80 MG: 40 INJECTION SUBCUTANEOUS at 04:12

## 2019-09-23 RX ADMIN — MONTELUKAST SODIUM 10 MG: 10 TABLET, FILM COATED ORAL at 20:22

## 2019-09-23 RX ADMIN — ATENOLOL 50 MG: 50 TABLET ORAL at 11:09

## 2019-09-23 RX ADMIN — INSULIN LISPRO 1 UNITS: 100 INJECTION, SOLUTION INTRAVENOUS; SUBCUTANEOUS at 20:21

## 2019-09-23 RX ADMIN — BUSPIRONE HYDROCHLORIDE 5 MG: 5 TABLET ORAL at 12:35

## 2019-09-23 RX ADMIN — SERTRALINE 150 MG: 100 TABLET, FILM COATED ORAL at 12:35

## 2019-09-23 RX ADMIN — FUROSEMIDE 20 MG: 10 INJECTION, SOLUTION INTRAMUSCULAR; INTRAVENOUS at 03:58

## 2019-09-23 RX ADMIN — INSULIN LISPRO 1 UNITS: 100 INJECTION, SOLUTION INTRAVENOUS; SUBCUTANEOUS at 17:24

## 2019-09-23 RX ADMIN — FLUTICASONE PROPIONATE 1 SPRAY: 50 SPRAY, METERED NASAL at 11:09

## 2019-09-23 RX ADMIN — GABAPENTIN 600 MG: 300 CAPSULE ORAL at 14:49

## 2019-09-23 ASSESSMENT — PAIN SCALES - GENERAL
PAINLEVEL_OUTOF10: 0

## 2019-09-23 NOTE — ED PROVIDER NOTES
deemed necessary. EKG #1:  Interpreted by emergency department physician unless otherwise noted. Time:  02:00    Rate: 80  Rhythm: Sinus. Interpretation: nonspecific ST and T wave findings. 1st degree AVB. No ischemic changes vs old EKG 2/25/2019    Counseling: The emergency provider has spoken with the patient and discussed todays results, in addition to providing specific details for the plan of care and counseling regarding the diagnosis and prognosis. Questions are answered at this time and they are agreeable with the plan. Consults:  @04:20, spoke with , [facility], on-call who agreed with the plan for admission    --------------------------------- IMPRESSION AND DISPOSITION ---------------------------------    IMPRESSION  1. Acute cystitis with hematuria    2. Acute on chronic systolic congestive heart failure ([address]    3. Hypoxia    4. Elevated d-dimer    5. Transient alteration of awareness        DISPOSITION  Disposition: Discharge to home  Patient condition is stable      NOTE: This report was transcribed using voice recognition software.  Every effort was made to ensure accuracy; however, inadvertent computerized transcription errors may be present       Cleo Garcia MD  09/23/19 6605

## 2019-09-23 NOTE — ED NOTES
IV established without difficulty. Labs drawn & sent. Blood cultures obtained as well. Call light within easy reach. Family at bedside. Cardiac monitoring initiated. Lights dimmed & warm blanket provided for comfort measures. No additional needs/concerns voiced at this time.       Carmen Long RN  09/23/19 8864

## 2019-09-24 LAB
ALBUMIN SERPL-MCNC: 3.6 G/DL (ref 3.5–5.2)
ALP BLD-CCNC: 76 U/L (ref 35–104)
ALT SERPL-CCNC: 14 U/L (ref 0–32)
ANION GAP SERPL CALCULATED.3IONS-SCNC: 11 MMOL/L (ref 7–16)
AST SERPL-CCNC: 19 U/L (ref 0–31)
BASOPHILS ABSOLUTE: 0.04 E9/L (ref 0–0.2)
BASOPHILS RELATIVE PERCENT: 0.5 % (ref 0–2)
BILIRUB SERPL-MCNC: 0.4 MG/DL (ref 0–1.2)
BUN BLDV-MCNC: 21 MG/DL (ref 8–23)
CALCIUM SERPL-MCNC: 9.4 MG/DL (ref 8.6–10.2)
CHLORIDE BLD-SCNC: 100 MMOL/L (ref 98–107)
CO2: 29 MMOL/L (ref 22–29)
CREAT SERPL-MCNC: 1.2 MG/DL (ref 0.5–1)
EOSINOPHILS ABSOLUTE: 0.23 E9/L (ref 0.05–0.5)
EOSINOPHILS RELATIVE PERCENT: 2.6 % (ref 0–6)
GFR AFRICAN AMERICAN: 52
GFR NON-AFRICAN AMERICAN: 43 ML/MIN/1.73
GLUCOSE BLD-MCNC: 161 MG/DL (ref 74–99)
HBA1C MFR BLD: 7.7 % (ref 4–5.6)
HCT VFR BLD CALC: 36.3 % (ref 34–48)
HEMOGLOBIN: 12 G/DL (ref 11.5–15.5)
IMMATURE GRANULOCYTES #: 0.03 E9/L
IMMATURE GRANULOCYTES %: 0.3 % (ref 0–5)
LACTIC ACID: 1 MMOL/L (ref 0.5–2.2)
LYMPHOCYTES ABSOLUTE: 1.61 E9/L (ref 1.5–4)
LYMPHOCYTES RELATIVE PERCENT: 18.5 % (ref 20–42)
MAGNESIUM: 2 MG/DL (ref 1.6–2.6)
MCH RBC QN AUTO: 32.5 PG (ref 26–35)
MCHC RBC AUTO-ENTMCNC: 33.1 % (ref 32–34.5)
MCV RBC AUTO: 98.4 FL (ref 80–99.9)
METER GLUCOSE: 156 MG/DL (ref 74–99)
METER GLUCOSE: 192 MG/DL (ref 74–99)
METER GLUCOSE: 194 MG/DL (ref 74–99)
METER GLUCOSE: 331 MG/DL (ref 74–99)
MONOCYTES ABSOLUTE: 0.59 E9/L (ref 0.1–0.95)
MONOCYTES RELATIVE PERCENT: 6.8 % (ref 2–12)
NEUTROPHILS ABSOLUTE: 6.18 E9/L (ref 1.8–7.3)
NEUTROPHILS RELATIVE PERCENT: 71.3 % (ref 43–80)
PDW BLD-RTO: 13.2 FL (ref 11.5–15)
PLATELET # BLD: 175 E9/L (ref 130–450)
PMV BLD AUTO: 9.2 FL (ref 7–12)
POTASSIUM REFLEX MAGNESIUM: 4 MMOL/L (ref 3.5–5)
RBC # BLD: 3.69 E12/L (ref 3.5–5.5)
SODIUM BLD-SCNC: 140 MMOL/L (ref 132–146)
TOTAL PROTEIN: 7.2 G/DL (ref 6.4–8.3)
WBC # BLD: 8.7 E9/L (ref 4.5–11.5)

## 2019-09-24 PROCEDURE — 36415 COLL VENOUS BLD VENIPUNCTURE: CPT

## 2019-09-24 PROCEDURE — 83036 HEMOGLOBIN GLYCOSYLATED A1C: CPT

## 2019-09-24 PROCEDURE — 87088 URINE BACTERIA CULTURE: CPT

## 2019-09-24 PROCEDURE — 97530 THERAPEUTIC ACTIVITIES: CPT

## 2019-09-24 PROCEDURE — 2580000003 HC RX 258: Performed by: INTERNAL MEDICINE

## 2019-09-24 PROCEDURE — 85025 COMPLETE CBC W/AUTO DIFF WBC: CPT

## 2019-09-24 PROCEDURE — 99232 SBSQ HOSP IP/OBS MODERATE 35: CPT | Performed by: INTERNAL MEDICINE

## 2019-09-24 PROCEDURE — 96372 THER/PROPH/DIAG INJ SC/IM: CPT

## 2019-09-24 PROCEDURE — 96365 THER/PROPH/DIAG IV INF INIT: CPT

## 2019-09-24 PROCEDURE — 1200000000 HC SEMI PRIVATE

## 2019-09-24 PROCEDURE — 83605 ASSAY OF LACTIC ACID: CPT

## 2019-09-24 PROCEDURE — 82962 GLUCOSE BLOOD TEST: CPT

## 2019-09-24 PROCEDURE — 83735 ASSAY OF MAGNESIUM: CPT

## 2019-09-24 PROCEDURE — 80053 COMPREHEN METABOLIC PANEL: CPT

## 2019-09-24 PROCEDURE — 97110 THERAPEUTIC EXERCISES: CPT

## 2019-09-24 PROCEDURE — 6370000000 HC RX 637 (ALT 250 FOR IP): Performed by: INTERNAL MEDICINE

## 2019-09-24 PROCEDURE — 6360000002 HC RX W HCPCS: Performed by: INTERNAL MEDICINE

## 2019-09-24 RX ADMIN — ENOXAPARIN SODIUM 40 MG: 40 INJECTION SUBCUTANEOUS at 08:55

## 2019-09-24 RX ADMIN — SERTRALINE 150 MG: 100 TABLET, FILM COATED ORAL at 08:55

## 2019-09-24 RX ADMIN — GABAPENTIN 600 MG: 300 CAPSULE ORAL at 08:55

## 2019-09-24 RX ADMIN — Medication 10 ML: at 03:41

## 2019-09-24 RX ADMIN — INSULIN LISPRO 1 UNITS: 100 INJECTION, SOLUTION INTRAVENOUS; SUBCUTANEOUS at 12:31

## 2019-09-24 RX ADMIN — ASPIRIN 81 MG 81 MG: 81 TABLET ORAL at 08:55

## 2019-09-24 RX ADMIN — Medication 10 ML: at 20:34

## 2019-09-24 RX ADMIN — INSULIN LISPRO 1 UNITS: 100 INJECTION, SOLUTION INTRAVENOUS; SUBCUTANEOUS at 09:10

## 2019-09-24 RX ADMIN — Medication 10 ML: at 08:55

## 2019-09-24 RX ADMIN — CEFTRIAXONE 1 G: 1 INJECTION, POWDER, FOR SOLUTION INTRAMUSCULAR; INTRAVENOUS at 03:41

## 2019-09-24 RX ADMIN — MONTELUKAST SODIUM 10 MG: 10 TABLET, FILM COATED ORAL at 20:31

## 2019-09-24 RX ADMIN — ATENOLOL 50 MG: 50 TABLET ORAL at 08:55

## 2019-09-24 RX ADMIN — GABAPENTIN 600 MG: 300 CAPSULE ORAL at 14:07

## 2019-09-24 RX ADMIN — FLUTICASONE PROPIONATE 1 SPRAY: 50 SPRAY, METERED NASAL at 08:55

## 2019-09-24 RX ADMIN — INSULIN LISPRO 1 UNITS: 100 INJECTION, SOLUTION INTRAVENOUS; SUBCUTANEOUS at 20:35

## 2019-09-24 RX ADMIN — BUSPIRONE HYDROCHLORIDE 5 MG: 5 TABLET ORAL at 08:55

## 2019-09-24 RX ADMIN — GABAPENTIN 600 MG: 300 CAPSULE ORAL at 20:31

## 2019-09-24 RX ADMIN — INSULIN LISPRO 4 UNITS: 100 INJECTION, SOLUTION INTRAVENOUS; SUBCUTANEOUS at 16:31

## 2019-09-24 ASSESSMENT — PAIN SCALES - GENERAL
PAINLEVEL_OUTOF10: 0

## 2019-09-25 LAB
METER GLUCOSE: 140 MG/DL (ref 74–99)
METER GLUCOSE: 164 MG/DL (ref 74–99)
METER GLUCOSE: 202 MG/DL (ref 74–99)
METER GLUCOSE: 223 MG/DL (ref 74–99)

## 2019-09-25 PROCEDURE — 97110 THERAPEUTIC EXERCISES: CPT

## 2019-09-25 PROCEDURE — 6360000002 HC RX W HCPCS: Performed by: INTERNAL MEDICINE

## 2019-09-25 PROCEDURE — 97535 SELF CARE MNGMENT TRAINING: CPT

## 2019-09-25 PROCEDURE — 82962 GLUCOSE BLOOD TEST: CPT

## 2019-09-25 PROCEDURE — 1200000000 HC SEMI PRIVATE

## 2019-09-25 PROCEDURE — 99232 SBSQ HOSP IP/OBS MODERATE 35: CPT | Performed by: INTERNAL MEDICINE

## 2019-09-25 PROCEDURE — 6370000000 HC RX 637 (ALT 250 FOR IP): Performed by: INTERNAL MEDICINE

## 2019-09-25 PROCEDURE — 97530 THERAPEUTIC ACTIVITIES: CPT

## 2019-09-25 PROCEDURE — 2580000003 HC RX 258: Performed by: INTERNAL MEDICINE

## 2019-09-25 RX ADMIN — MONTELUKAST SODIUM 10 MG: 10 TABLET, FILM COATED ORAL at 22:12

## 2019-09-25 RX ADMIN — Medication 10 ML: at 22:20

## 2019-09-25 RX ADMIN — INSULIN LISPRO 1 UNITS: 100 INJECTION, SOLUTION INTRAVENOUS; SUBCUTANEOUS at 09:24

## 2019-09-25 RX ADMIN — ATENOLOL 50 MG: 50 TABLET ORAL at 09:25

## 2019-09-25 RX ADMIN — ACETAMINOPHEN 650 MG: 325 TABLET ORAL at 22:13

## 2019-09-25 RX ADMIN — ENOXAPARIN SODIUM 40 MG: 40 INJECTION SUBCUTANEOUS at 09:25

## 2019-09-25 RX ADMIN — BUSPIRONE HYDROCHLORIDE 5 MG: 5 TABLET ORAL at 09:25

## 2019-09-25 RX ADMIN — GABAPENTIN 600 MG: 300 CAPSULE ORAL at 09:25

## 2019-09-25 RX ADMIN — Medication 10 ML: at 04:02

## 2019-09-25 RX ADMIN — SERTRALINE 150 MG: 100 TABLET, FILM COATED ORAL at 09:25

## 2019-09-25 RX ADMIN — INSULIN LISPRO 2 UNITS: 100 INJECTION, SOLUTION INTRAVENOUS; SUBCUTANEOUS at 12:33

## 2019-09-25 RX ADMIN — ASPIRIN 81 MG 81 MG: 81 TABLET ORAL at 09:25

## 2019-09-25 RX ADMIN — GABAPENTIN 600 MG: 300 CAPSULE ORAL at 14:15

## 2019-09-25 RX ADMIN — FLUTICASONE PROPIONATE 1 SPRAY: 50 SPRAY, METERED NASAL at 09:25

## 2019-09-25 RX ADMIN — INSULIN LISPRO 1 UNITS: 100 INJECTION, SOLUTION INTRAVENOUS; SUBCUTANEOUS at 22:23

## 2019-09-25 RX ADMIN — CEFTRIAXONE 1 G: 1 INJECTION, POWDER, FOR SOLUTION INTRAMUSCULAR; INTRAVENOUS at 04:02

## 2019-09-25 RX ADMIN — INSULIN LISPRO 1 UNITS: 100 INJECTION, SOLUTION INTRAVENOUS; SUBCUTANEOUS at 17:19

## 2019-09-25 RX ADMIN — Medication 10 ML: at 09:29

## 2019-09-25 RX ADMIN — GABAPENTIN 600 MG: 300 CAPSULE ORAL at 22:12

## 2019-09-25 ASSESSMENT — PAIN DESCRIPTION - DESCRIPTORS
DESCRIPTORS: ACHING;DISCOMFORT;SORE
DESCRIPTORS: ACHING;DISCOMFORT;SORE

## 2019-09-25 ASSESSMENT — PAIN DESCRIPTION - LOCATION
LOCATION: BACK
LOCATION: BACK

## 2019-09-25 ASSESSMENT — PAIN - FUNCTIONAL ASSESSMENT
PAIN_FUNCTIONAL_ASSESSMENT: PREVENTS OR INTERFERES SOME ACTIVE ACTIVITIES AND ADLS
PAIN_FUNCTIONAL_ASSESSMENT: PREVENTS OR INTERFERES SOME ACTIVE ACTIVITIES AND ADLS

## 2019-09-25 ASSESSMENT — PAIN SCALES - GENERAL
PAINLEVEL_OUTOF10: 0
PAINLEVEL_OUTOF10: 8
PAINLEVEL_OUTOF10: 10

## 2019-09-25 ASSESSMENT — PAIN DESCRIPTION - PAIN TYPE
TYPE: CHRONIC PAIN
TYPE: CHRONIC PAIN

## 2019-09-26 VITALS
WEIGHT: 182 LBS | RESPIRATION RATE: 16 BRPM | HEART RATE: 68 BPM | DIASTOLIC BLOOD PRESSURE: 53 MMHG | SYSTOLIC BLOOD PRESSURE: 115 MMHG | HEIGHT: 61 IN | OXYGEN SATURATION: 97 % | BODY MASS INDEX: 34.36 KG/M2 | TEMPERATURE: 97.8 F

## 2019-09-26 LAB
METER GLUCOSE: 154 MG/DL (ref 74–99)
METER GLUCOSE: 160 MG/DL (ref 74–99)
METER GLUCOSE: 184 MG/DL (ref 74–99)
URINE CULTURE, ROUTINE: NORMAL

## 2019-09-26 PROCEDURE — 82962 GLUCOSE BLOOD TEST: CPT

## 2019-09-26 PROCEDURE — 6360000002 HC RX W HCPCS: Performed by: INTERNAL MEDICINE

## 2019-09-26 PROCEDURE — 97530 THERAPEUTIC ACTIVITIES: CPT

## 2019-09-26 PROCEDURE — 97535 SELF CARE MNGMENT TRAINING: CPT

## 2019-09-26 PROCEDURE — 6370000000 HC RX 637 (ALT 250 FOR IP): Performed by: INTERNAL MEDICINE

## 2019-09-26 PROCEDURE — 99239 HOSP IP/OBS DSCHRG MGMT >30: CPT | Performed by: INTERNAL MEDICINE

## 2019-09-26 PROCEDURE — 2580000003 HC RX 258: Performed by: INTERNAL MEDICINE

## 2019-09-26 RX ORDER — CEFDINIR 300 MG/1
300 CAPSULE ORAL 2 TIMES DAILY
Qty: 12 CAPSULE | Refills: 0 | Status: SHIPPED | OUTPATIENT
Start: 2019-09-26 | End: 2019-10-02

## 2019-09-26 RX ADMIN — INSULIN LISPRO 1 UNITS: 100 INJECTION, SOLUTION INTRAVENOUS; SUBCUTANEOUS at 09:09

## 2019-09-26 RX ADMIN — SERTRALINE 150 MG: 100 TABLET, FILM COATED ORAL at 09:04

## 2019-09-26 RX ADMIN — GABAPENTIN 600 MG: 300 CAPSULE ORAL at 09:04

## 2019-09-26 RX ADMIN — ASPIRIN 81 MG 81 MG: 81 TABLET ORAL at 09:04

## 2019-09-26 RX ADMIN — ENOXAPARIN SODIUM 40 MG: 40 INJECTION SUBCUTANEOUS at 09:05

## 2019-09-26 RX ADMIN — INSULIN LISPRO 1 UNITS: 100 INJECTION, SOLUTION INTRAVENOUS; SUBCUTANEOUS at 11:38

## 2019-09-26 RX ADMIN — FLUTICASONE PROPIONATE 1 SPRAY: 50 SPRAY, METERED NASAL at 09:10

## 2019-09-26 RX ADMIN — ATENOLOL 50 MG: 50 TABLET ORAL at 09:05

## 2019-09-26 RX ADMIN — CEFTRIAXONE 1 G: 1 INJECTION, POWDER, FOR SOLUTION INTRAMUSCULAR; INTRAVENOUS at 04:15

## 2019-09-26 RX ADMIN — INSULIN LISPRO 1 UNITS: 100 INJECTION, SOLUTION INTRAVENOUS; SUBCUTANEOUS at 17:12

## 2019-09-26 RX ADMIN — GABAPENTIN 600 MG: 300 CAPSULE ORAL at 14:08

## 2019-09-26 RX ADMIN — Medication 10 ML: at 09:11

## 2019-09-26 RX ADMIN — BUSPIRONE HYDROCHLORIDE 5 MG: 5 TABLET ORAL at 09:04

## 2019-09-26 ASSESSMENT — PAIN SCALES - GENERAL
PAINLEVEL_OUTOF10: 0

## 2019-09-27 ENCOUNTER — CARE COORDINATION (OUTPATIENT)
Dept: CASE MANAGEMENT | Age: 84
End: 2019-09-27

## 2019-09-27 ENCOUNTER — OFFICE VISIT (OUTPATIENT)
Dept: FAMILY MEDICINE CLINIC | Age: 84
End: 2019-09-27
Payer: MEDICARE

## 2019-09-27 VITALS
HEART RATE: 63 BPM | TEMPERATURE: 97.2 F | BODY MASS INDEX: 33.91 KG/M2 | HEIGHT: 61 IN | SYSTOLIC BLOOD PRESSURE: 140 MMHG | DIASTOLIC BLOOD PRESSURE: 70 MMHG | OXYGEN SATURATION: 99 % | WEIGHT: 179.6 LBS

## 2019-09-27 DIAGNOSIS — N18.30 CKD (CHRONIC KIDNEY DISEASE) STAGE 3, GFR 30-59 ML/MIN (HCC): Primary | Chronic | ICD-10-CM

## 2019-09-27 DIAGNOSIS — L03.115 CELLULITIS OF RIGHT LOWER EXTREMITY: ICD-10-CM

## 2019-09-27 DIAGNOSIS — E11.65 UNCONTROLLED TYPE 2 DIABETES MELLITUS WITH HYPERGLYCEMIA (HCC): ICD-10-CM

## 2019-09-27 DIAGNOSIS — I10 HTN (HYPERTENSION), BENIGN: Chronic | ICD-10-CM

## 2019-09-27 DIAGNOSIS — N39.0 URINARY TRACT INFECTION WITHOUT HEMATURIA, SITE UNSPECIFIED: ICD-10-CM

## 2019-09-27 PROCEDURE — 1111F DSCHRG MED/CURRENT MED MERGE: CPT | Performed by: FAMILY MEDICINE

## 2019-09-27 PROCEDURE — 99496 TRANSJ CARE MGMT HIGH F2F 7D: CPT | Performed by: FAMILY MEDICINE

## 2019-09-27 NOTE — PATIENT INSTRUCTIONS
good, quick way to make sure that you have a balanced meal. It also helps you spread carbs throughout the day. ? Divide your plate by types of foods. Put non-starchy vegetables on half the plate, meat or other protein food on one-quarter of the plate, and a grain or starchy vegetable in the final quarter of the plate. To this you can add a small piece of fruit and 1 cup of milk or yogurt, depending on how many carbs you are supposed to eat at a meal.  · Try to eat about the same amount of carbs at each meal. Do not \"save up\" your daily allowance of carbs to eat at one meal.  · Proteins have very little or no carbs per serving. Examples of proteins are beef, chicken, turkey, fish, eggs, tofu, cheese, cottage cheese, and peanut butter. A serving size of meat is 3 ounces, which is about the size of a deck of cards. Examples of meat substitute serving sizes (equal to 1 ounce of meat) are 1/4 cup of cottage cheese, 1 egg, 1 tablespoon of peanut butter, and ½ cup of tofu. How can you eat out and still eat healthy? · Learn to estimate the serving sizes of foods that have carbohydrate. If you measure food at home, it will be easier to estimate the amount in a serving of restaurant food. · If the meal you order has too much carbohydrate (such as potatoes, corn, or baked beans), ask to have a low-carbohydrate food instead. Ask for a salad or green vegetables. · If you use insulin, check your blood sugar before and after eating out to help you plan how much to eat in the future. · If you eat more carbohydrate at a meal than you had planned, take a walk or do other exercise. This will help lower your blood sugar. What else should you know? · Limit saturated fat, such as the fat from meat and dairy products. This is a healthy choice because people who have diabetes are at higher risk of heart disease. So choose lean cuts of meat and nonfat or low-fat dairy products.  Use olive or canola oil instead of butter or shortening too.  · Use cookbooks or online recipes to plan several main meals. Plan some quick meals for busy nights. You also can double some recipes that freeze well. Then you can save half for other busy nights when you don't have time to cook. · Make sure you have the ingredients you need for your recipes. If you're running low on basic items, put these items on your shopping list too. · List foods that you use to make breakfasts, lunches, and snacks. List plenty of fruits and vegetables. · Post this list on the refrigerator. Add to it as you think of more things you need. · Take the list to the store to do your weekly shopping. Follow-up care is a key part of your treatment and safety. Be sure to make and go to all appointments, and call your doctor if you are having problems. It's also a good idea to know your test results and keep a list of the medicines you take. Where can you learn more? Go to https://[employer]. org and sign in to your [employer] account. Enter D847 in the Pryv box to learn more about \"Learning About Meal Planning for Diabetes. \"     If you do not have an account, please click on the \"Sign Up Now\" link. Current as of: July 25, 2018  Content Version: 12.1  © 2384-1789 [employer], [employer]orporated. Care instructions adapted under license by [facility] ([facility]). If you have questions about a medical condition or this instruction, always ask your healthcare professional. [place] [address] any warranty or liability for your use of this information.

## 2019-09-27 NOTE — CARE COORDINATION
medications and can do them herself. Med review not completed per her request, she stated she reviewed them with the [facility] nurse this morning as well as Dr. Allan Reaves at her follow up appt. 1111F entered by Dr. Allan Reaves. Elana Davis denies any needs, questions, or concerns at this time.     Follow Up  Future Appointments   Date Time Provider Kacy Parish   10/2/2019 11:30 AM Alva Reaves,  [facility]       Farhana Woods RN

## 2019-09-27 NOTE — PROGRESS NOTES
Post-Discharge Transitional Care Management Services or Hospital Follow Up      Luke Smith   YOB: 1933    Date of Office Visit:  9/27/2019  Date of Hospital Admission: 9/23/19  Date of Hospital Discharge: 9/26/19  Risk of hospital readmission (high >=14%. Medium >=10%) :Readmission Risk Score: 18      Care management risk score Rising risk (score 2-5) and Complex Care (Scores >=6): 11     Non face to face  following discharge, date last encounter closed (first attempt may have been earlier): *No documented post hospital discharge outreach found in the last 14 days    Call initiated 2 business days of discharge: *No response recorded in the last 14 days    Patient Active Problem List   Diagnosis    GERD (gastroesophageal reflux disease)    Asthma    Diabetes mellitus type 2, uncontrolled ([facility] [address]    CKD (chronic kidney disease) stage 3, GFR 30-59 ml/min ([facility] [address]    HTN (hypertension), benign    Cellulitis    Decubitus ulcer of coccyx    Decubitus ulcer, heel    UTI (urinary tract infection)    Dehydration    Essential hypertension       No Known Allergies    Medications listed as ordered at the time of discharge from [facility] Medication Instructions KALE:    Printed on:09/27/19 1212   Medication Information                      acetaminophen (TYLENOL) 325 MG tablet  Take 2 tablets by mouth every 4 hours as needed for Fever (Temp greater than 100.5, for pain score 1-3)             aspirin EC 81 MG EC tablet  Take 81 mg by mouth daily Took 4 today             atenolol (TENORMIN) 50 MG tablet  Take 1 tablet by mouth daily             BD PEN NEEDLE MUKESH U/F 32G X 4 MM MISC  use three times a day with INSULIN             blood glucose monitor strips  Test 3 times a day & as needed for symptoms of irregular blood glucose.              blood glucose test strips (FREESTYLE LITE) strip  TEST twice a day             busPIRone (BUSPAR) 5 MG tablet  Take 1 tablet by mouth daily dry, no rash or erythema  Head: normocephalic and atraumatic  Eyes: pupils equal, round, and reactive to light, extraocular eye movements intact, conjunctivae normal  ENT: tympanic membrane, external ear and ear canal normal bilaterally, oropharynx clear and moist with normal mucous membranes and nose without deformity, nasal mucosa and turbinates normal without polyps  Neck: neck supple and non tender without mass, no thyromegaly or thyroid nodules, no cervical lymphadenopathy   Pulmonary/Chest: clear to auscultation bilaterally- no wheezes, rales or rhonchi, normal air movement, no respiratory distress and no chest wall tenderness  Cardiovascular: normal rate, regular rhythm, normal S1 and S2, no murmurs, no gallops, intact distal pulses, no carotid bruits and no JVD  Abdomen: soft, non-tender, non-distended, normal bowel sounds, no masses or organomegaly  Extremities: no cyanosis and no clubbing  Musculoskeletal: multiple joint pains  Neurologic: in a wheelchair for movement     Assessment/Plan:  1. CKD (chronic kidney disease) stage 3, GFR 30-59 ml/min ([facility])  ---VASCULAR PANEL  A) ASA, plavix, aggrenox  B) coumadin, pletal, tzd, statin  C) ace, hctz, folic, ccb  D) cannikinumab, FISH OILS      2. HTN (hypertension), benign ---controlled   --patient is instructed on low to moderate sodium ( 2 to 2.5 grams ), daily    Also to increase potassium in the diet to about 3.5 grams daily    Literature is provided   ---CARDIAC---ASA, ace, BETA, statin, hctz, ( ccb )      3. Urinary tract infection without hematuria, site unspecified  --Finish Omnicef 300 BID    4. Uncontrolled type 2 diabetes mellitus with hyperglycemia ([facility])  --stable on current care planning  -- continue treatment as we are meeting goals       5. Cellulitis of right lower extremity  Long talk on treatment and prevention  Literature is given     - mupirocin (BACTROBAN) 2 % ointment; Apply 3 times daily.   Dispense: 1 Tube; Refill: 3        Medical

## 2019-09-28 LAB
BLOOD CULTURE, ROUTINE: NORMAL
CULTURE, BLOOD 2: NORMAL

## 2019-10-04 ENCOUNTER — CARE COORDINATION (OUTPATIENT)
Dept: CASE MANAGEMENT | Age: 84
End: 2019-10-04

## 2019-10-08 ENCOUNTER — TELEPHONE (OUTPATIENT)
Dept: FAMILY MEDICINE CLINIC | Age: 84
End: 2019-10-08

## 2019-10-08 DIAGNOSIS — J40 BRONCHITIS: Primary | ICD-10-CM

## 2019-10-08 RX ORDER — DOXYCYCLINE HYCLATE 100 MG
100 TABLET ORAL 2 TIMES DAILY
Qty: 20 TABLET | Refills: 0 | Status: SHIPPED | OUTPATIENT
Start: 2019-10-08 | End: 2019-10-18

## 2019-10-10 ENCOUNTER — CARE COORDINATION (OUTPATIENT)
Dept: CASE MANAGEMENT | Age: 84
End: 2019-10-10

## 2019-10-14 ENCOUNTER — TELEPHONE (OUTPATIENT)
Dept: FAMILY MEDICINE CLINIC | Age: 84
End: 2019-10-14

## 2019-10-15 ENCOUNTER — CARE COORDINATION (OUTPATIENT)
Dept: CASE MANAGEMENT | Age: 84
End: 2019-10-15

## 2019-10-21 ENCOUNTER — APPOINTMENT (OUTPATIENT)
Dept: GENERAL RADIOLOGY | Age: 84
DRG: 175 | End: 2019-10-21
Payer: MEDICARE

## 2019-10-21 ENCOUNTER — HOSPITAL ENCOUNTER (INPATIENT)
Age: 84
LOS: 3 days | Discharge: HOME HEALTH | DRG: 175 | End: 2019-10-24
Attending: EMERGENCY MEDICINE | Admitting: INTERNAL MEDICINE
Payer: MEDICARE

## 2019-10-21 ENCOUNTER — APPOINTMENT (OUTPATIENT)
Dept: CT IMAGING | Age: 84
DRG: 175 | End: 2019-10-21
Payer: MEDICARE

## 2019-10-21 DIAGNOSIS — R07.9 CHEST PAIN, UNSPECIFIED TYPE: Primary | ICD-10-CM

## 2019-10-21 DIAGNOSIS — I21.4 NON-STEMI (NON-ST ELEVATED MYOCARDIAL INFARCTION) (HCC): ICD-10-CM

## 2019-10-21 PROBLEM — I10 HTN (HYPERTENSION), BENIGN: Chronic | Status: RESOLVED | Noted: 2018-08-13 | Resolved: 2019-10-21

## 2019-10-21 PROBLEM — L03.90 CELLULITIS: Status: RESOLVED | Noted: 2019-07-08 | Resolved: 2019-10-21

## 2019-10-21 PROBLEM — N39.0 UTI (URINARY TRACT INFECTION): Status: RESOLVED | Noted: 2019-09-23 | Resolved: 2019-10-21

## 2019-10-21 PROBLEM — I26.93 SINGLE SUBSEGMENTAL PULMONARY EMBOLISM WITHOUT ACUTE COR PULMONALE (HCC): Status: ACTIVE | Noted: 2019-10-21

## 2019-10-21 PROBLEM — I26.99 ACUTE PULMONARY EMBOLISM (HCC): Status: ACTIVE | Noted: 2019-10-21

## 2019-10-21 LAB
ALBUMIN SERPL-MCNC: 3.4 G/DL (ref 3.5–5.2)
ALP BLD-CCNC: 90 U/L (ref 35–104)
ALT SERPL-CCNC: 16 U/L (ref 0–32)
ANION GAP SERPL CALCULATED.3IONS-SCNC: 9 MMOL/L (ref 7–16)
AST SERPL-CCNC: 27 U/L (ref 0–31)
BILIRUB SERPL-MCNC: 0.3 MG/DL (ref 0–1.2)
BUN BLDV-MCNC: 33 MG/DL (ref 8–23)
CALCIUM SERPL-MCNC: 10.2 MG/DL (ref 8.6–10.2)
CHLORIDE BLD-SCNC: 103 MMOL/L (ref 98–107)
CHOLESTEROL, TOTAL: 181 MG/DL (ref 0–199)
CK MB: 46.5 NG/ML (ref 0–4.3)
CO2: 28 MMOL/L (ref 22–29)
CREAT SERPL-MCNC: 1.5 MG/DL (ref 0.5–1)
EKG ATRIAL RATE: 106 BPM
EKG P AXIS: 66 DEGREES
EKG P-R INTERVAL: 228 MS
EKG Q-T INTERVAL: 324 MS
EKG QRS DURATION: 62 MS
EKG QTC CALCULATION (BAZETT): 430 MS
EKG R AXIS: -50 DEGREES
EKG T AXIS: 37 DEGREES
EKG VENTRICULAR RATE: 106 BPM
GFR AFRICAN AMERICAN: 40
GFR NON-AFRICAN AMERICAN: 33 ML/MIN/1.73
GLUCOSE BLD-MCNC: 373 MG/DL (ref 74–99)
HBA1C MFR BLD: 7.4 % (ref 4–5.6)
HCT VFR BLD CALC: 38.4 % (ref 34–48)
HDLC SERPL-MCNC: 38 MG/DL
HEMOGLOBIN: 12.2 G/DL (ref 11.5–15.5)
INR BLD: 1
LDL CHOLESTEROL CALCULATED: 104 MG/DL (ref 0–99)
MCH RBC QN AUTO: 31.4 PG (ref 26–35)
MCHC RBC AUTO-ENTMCNC: 31.8 % (ref 32–34.5)
MCV RBC AUTO: 98.7 FL (ref 80–99.9)
METER GLUCOSE: 126 MG/DL (ref 74–99)
METER GLUCOSE: 159 MG/DL (ref 74–99)
METER GLUCOSE: 305 MG/DL (ref 74–99)
PDW BLD-RTO: 13.3 FL (ref 11.5–15)
PLATELET # BLD: 175 E9/L (ref 130–450)
PMV BLD AUTO: 9.2 FL (ref 7–12)
POTASSIUM SERPL-SCNC: 4.6 MMOL/L (ref 3.5–5)
PROTHROMBIN TIME: 11.4 SEC (ref 9.3–12.4)
RBC # BLD: 3.89 E12/L (ref 3.5–5.5)
SODIUM BLD-SCNC: 140 MMOL/L (ref 132–146)
TOTAL CK: 270 U/L (ref 20–180)
TOTAL PROTEIN: 6.9 G/DL (ref 6.4–8.3)
TRIGL SERPL-MCNC: 197 MG/DL (ref 0–149)
TROPONIN: 0.18 NG/ML (ref 0–0.03)
TROPONIN: 0.69 NG/ML (ref 0–0.03)
TROPONIN: 0.76 NG/ML (ref 0–0.03)
TROPONIN: 0.84 NG/ML (ref 0–0.03)
VLDLC SERPL CALC-MCNC: 39 MG/DL
WBC # BLD: 11 E9/L (ref 4.5–11.5)

## 2019-10-21 PROCEDURE — 93010 ELECTROCARDIOGRAM REPORT: CPT | Performed by: INTERNAL MEDICINE

## 2019-10-21 PROCEDURE — 6360000004 HC RX CONTRAST MEDICATION: Performed by: RADIOLOGY

## 2019-10-21 PROCEDURE — 83036 HEMOGLOBIN GLYCOSYLATED A1C: CPT

## 2019-10-21 PROCEDURE — 6370000000 HC RX 637 (ALT 250 FOR IP): Performed by: INTERNAL MEDICINE

## 2019-10-21 PROCEDURE — 71275 CT ANGIOGRAPHY CHEST: CPT

## 2019-10-21 PROCEDURE — 6370000000 HC RX 637 (ALT 250 FOR IP): Performed by: NURSE PRACTITIONER

## 2019-10-21 PROCEDURE — 80061 LIPID PANEL: CPT

## 2019-10-21 PROCEDURE — 2580000003 HC RX 258: Performed by: RADIOLOGY

## 2019-10-21 PROCEDURE — 85027 COMPLETE CBC AUTOMATED: CPT

## 2019-10-21 PROCEDURE — 2140000000 HC CCU INTERMEDIATE R&B

## 2019-10-21 PROCEDURE — 2580000003 HC RX 258: Performed by: INTERNAL MEDICINE

## 2019-10-21 PROCEDURE — 82962 GLUCOSE BLOOD TEST: CPT

## 2019-10-21 PROCEDURE — 93005 ELECTROCARDIOGRAM TRACING: CPT | Performed by: EMERGENCY MEDICINE

## 2019-10-21 PROCEDURE — 2580000003 HC RX 258: Performed by: EMERGENCY MEDICINE

## 2019-10-21 PROCEDURE — 99223 1ST HOSP IP/OBS HIGH 75: CPT | Performed by: INTERNAL MEDICINE

## 2019-10-21 PROCEDURE — 82550 ASSAY OF CK (CPK): CPT

## 2019-10-21 PROCEDURE — APPSS60 APP SPLIT SHARED TIME 46-60 MINUTES: Performed by: NURSE PRACTITIONER

## 2019-10-21 PROCEDURE — 97535 SELF CARE MNGMENT TRAINING: CPT

## 2019-10-21 PROCEDURE — 97110 THERAPEUTIC EXERCISES: CPT

## 2019-10-21 PROCEDURE — 85610 PROTHROMBIN TIME: CPT

## 2019-10-21 PROCEDURE — 99285 EMERGENCY DEPT VISIT HI MDM: CPT

## 2019-10-21 PROCEDURE — 71045 X-RAY EXAM CHEST 1 VIEW: CPT

## 2019-10-21 PROCEDURE — 82553 CREATINE MB FRACTION: CPT

## 2019-10-21 PROCEDURE — 84484 ASSAY OF TROPONIN QUANT: CPT

## 2019-10-21 PROCEDURE — 97166 OT EVAL MOD COMPLEX 45 MIN: CPT

## 2019-10-21 PROCEDURE — 80053 COMPREHEN METABOLIC PANEL: CPT

## 2019-10-21 PROCEDURE — 36415 COLL VENOUS BLD VENIPUNCTURE: CPT

## 2019-10-21 PROCEDURE — 6360000002 HC RX W HCPCS: Performed by: EMERGENCY MEDICINE

## 2019-10-21 RX ORDER — SERTRALINE HYDROCHLORIDE 100 MG/1
100 TABLET, FILM COATED ORAL DAILY
Status: DISCONTINUED | OUTPATIENT
Start: 2019-10-21 | End: 2019-10-24 | Stop reason: HOSPADM

## 2019-10-21 RX ORDER — ONDANSETRON 2 MG/ML
4 INJECTION INTRAMUSCULAR; INTRAVENOUS EVERY 6 HOURS PRN
Status: DISCONTINUED | OUTPATIENT
Start: 2019-10-21 | End: 2019-10-24 | Stop reason: HOSPADM

## 2019-10-21 RX ORDER — 0.9 % SODIUM CHLORIDE 0.9 %
1000 INTRAVENOUS SOLUTION INTRAVENOUS ONCE
Status: COMPLETED | OUTPATIENT
Start: 2019-10-21 | End: 2019-10-21

## 2019-10-21 RX ORDER — SODIUM CHLORIDE 0.9 % (FLUSH) 0.9 %
10 SYRINGE (ML) INJECTION
Status: COMPLETED | OUTPATIENT
Start: 2019-10-21 | End: 2019-10-21

## 2019-10-21 RX ORDER — IPRATROPIUM BROMIDE AND ALBUTEROL SULFATE 2.5; .5 MG/3ML; MG/3ML
3 SOLUTION RESPIRATORY (INHALATION) EVERY 4 HOURS PRN
Status: DISCONTINUED | OUTPATIENT
Start: 2019-10-21 | End: 2019-10-24 | Stop reason: HOSPADM

## 2019-10-21 RX ORDER — SODIUM CHLORIDE 9 MG/ML
INJECTION, SOLUTION INTRAVENOUS CONTINUOUS
Status: DISCONTINUED | OUTPATIENT
Start: 2019-10-21 | End: 2019-10-22

## 2019-10-21 RX ORDER — LABETALOL HYDROCHLORIDE 5 MG/ML
5 INJECTION, SOLUTION INTRAVENOUS ONCE
Status: DISCONTINUED | OUTPATIENT
Start: 2019-10-21 | End: 2019-10-21

## 2019-10-21 RX ORDER — SODIUM CHLORIDE 0.9 % (FLUSH) 0.9 %
10 SYRINGE (ML) INJECTION PRN
Status: DISCONTINUED | OUTPATIENT
Start: 2019-10-21 | End: 2019-10-24 | Stop reason: HOSPADM

## 2019-10-21 RX ORDER — ASPIRIN 81 MG/1
81 TABLET, CHEWABLE ORAL DAILY
Status: DISCONTINUED | OUTPATIENT
Start: 2019-10-22 | End: 2019-10-24 | Stop reason: HOSPADM

## 2019-10-21 RX ORDER — ACETAMINOPHEN 325 MG/1
650 TABLET ORAL EVERY 4 HOURS PRN
Status: DISCONTINUED | OUTPATIENT
Start: 2019-10-21 | End: 2019-10-24 | Stop reason: HOSPADM

## 2019-10-21 RX ORDER — MONTELUKAST SODIUM 10 MG/1
10 TABLET ORAL NIGHTLY
Status: DISCONTINUED | OUTPATIENT
Start: 2019-10-21 | End: 2019-10-24 | Stop reason: HOSPADM

## 2019-10-21 RX ORDER — DEXTROSE MONOHYDRATE 50 MG/ML
100 INJECTION, SOLUTION INTRAVENOUS PRN
Status: DISCONTINUED | OUTPATIENT
Start: 2019-10-21 | End: 2019-10-24 | Stop reason: HOSPADM

## 2019-10-21 RX ORDER — ATENOLOL 50 MG/1
50 TABLET ORAL DAILY
Status: DISCONTINUED | OUTPATIENT
Start: 2019-10-21 | End: 2019-10-21

## 2019-10-21 RX ORDER — SODIUM CHLORIDE 0.9 % (FLUSH) 0.9 %
10 SYRINGE (ML) INJECTION EVERY 12 HOURS SCHEDULED
Status: DISCONTINUED | OUTPATIENT
Start: 2019-10-21 | End: 2019-10-24 | Stop reason: HOSPADM

## 2019-10-21 RX ORDER — GABAPENTIN 300 MG/1
300 CAPSULE ORAL 2 TIMES DAILY
Status: DISCONTINUED | OUTPATIENT
Start: 2019-10-21 | End: 2019-10-24 | Stop reason: HOSPADM

## 2019-10-21 RX ORDER — FLUTICASONE PROPIONATE 50 MCG
1 SPRAY, SUSPENSION (ML) NASAL DAILY
Status: DISCONTINUED | OUTPATIENT
Start: 2019-10-21 | End: 2019-10-24 | Stop reason: HOSPADM

## 2019-10-21 RX ORDER — DEXTROSE MONOHYDRATE 25 G/50ML
12.5 INJECTION, SOLUTION INTRAVENOUS PRN
Status: DISCONTINUED | OUTPATIENT
Start: 2019-10-21 | End: 2019-10-24 | Stop reason: HOSPADM

## 2019-10-21 RX ORDER — CARVEDILOL 6.25 MG/1
12.5 TABLET ORAL 2 TIMES DAILY
Status: DISCONTINUED | OUTPATIENT
Start: 2019-10-21 | End: 2019-10-24 | Stop reason: HOSPADM

## 2019-10-21 RX ORDER — BUSPIRONE HYDROCHLORIDE 5 MG/1
5 TABLET ORAL DAILY
Status: DISCONTINUED | OUTPATIENT
Start: 2019-10-21 | End: 2019-10-24 | Stop reason: HOSPADM

## 2019-10-21 RX ORDER — NICOTINE POLACRILEX 4 MG
15 LOZENGE BUCCAL PRN
Status: DISCONTINUED | OUTPATIENT
Start: 2019-10-21 | End: 2019-10-24 | Stop reason: HOSPADM

## 2019-10-21 RX ADMIN — SODIUM CHLORIDE 1000 ML: 9 INJECTION, SOLUTION INTRAVENOUS at 07:36

## 2019-10-21 RX ADMIN — APIXABAN 10 MG: 5 TABLET, FILM COATED ORAL at 12:08

## 2019-10-21 RX ADMIN — ACETAMINOPHEN 650 MG: 325 TABLET, FILM COATED ORAL at 22:31

## 2019-10-21 RX ADMIN — ENOXAPARIN SODIUM 80 MG: 80 INJECTION SUBCUTANEOUS at 07:58

## 2019-10-21 RX ADMIN — SODIUM CHLORIDE 100 ML/HR: 9 INJECTION, SOLUTION INTRAVENOUS at 05:02

## 2019-10-21 RX ADMIN — SERTRALINE 100 MG: 100 TABLET, FILM COATED ORAL at 18:46

## 2019-10-21 RX ADMIN — BUSPIRONE HYDROCHLORIDE 5 MG: 5 TABLET ORAL at 18:46

## 2019-10-21 RX ADMIN — Medication 10 ML: at 12:09

## 2019-10-21 RX ADMIN — GABAPENTIN 300 MG: 300 CAPSULE ORAL at 12:08

## 2019-10-21 RX ADMIN — IOPAMIDOL 60 ML: 755 INJECTION, SOLUTION INTRAVENOUS at 06:48

## 2019-10-21 RX ADMIN — CARVEDILOL 12.5 MG: 6.25 TABLET, FILM COATED ORAL at 22:32

## 2019-10-21 RX ADMIN — Medication 10 ML: at 06:48

## 2019-10-21 RX ADMIN — SODIUM CHLORIDE: 9 INJECTION, SOLUTION INTRAVENOUS at 12:08

## 2019-10-21 RX ADMIN — GABAPENTIN 300 MG: 300 CAPSULE ORAL at 22:30

## 2019-10-21 RX ADMIN — MONTELUKAST SODIUM 10 MG: 10 TABLET ORAL at 22:33

## 2019-10-21 RX ADMIN — APIXABAN 10 MG: 5 TABLET, FILM COATED ORAL at 22:38

## 2019-10-21 RX ADMIN — INSULIN LISPRO 8 UNITS: 100 INJECTION, SOLUTION INTRAVENOUS; SUBCUTANEOUS at 16:37

## 2019-10-21 ASSESSMENT — PAIN SCALES - GENERAL
PAINLEVEL_OUTOF10: 0
PAINLEVEL_OUTOF10: 0
PAINLEVEL_OUTOF10: 4
PAINLEVEL_OUTOF10: 0

## 2019-10-22 ENCOUNTER — APPOINTMENT (OUTPATIENT)
Dept: ULTRASOUND IMAGING | Age: 84
DRG: 175 | End: 2019-10-22
Payer: MEDICARE

## 2019-10-22 LAB
LV EF: 48 %
LVEF MODALITY: NORMAL
METER GLUCOSE: 146 MG/DL (ref 74–99)
METER GLUCOSE: 147 MG/DL (ref 74–99)
METER GLUCOSE: 167 MG/DL (ref 74–99)
METER GLUCOSE: 275 MG/DL (ref 74–99)

## 2019-10-22 PROCEDURE — 6370000000 HC RX 637 (ALT 250 FOR IP): Performed by: INTERNAL MEDICINE

## 2019-10-22 PROCEDURE — 6370000000 HC RX 637 (ALT 250 FOR IP): Performed by: NURSE PRACTITIONER

## 2019-10-22 PROCEDURE — 93306 TTE W/DOPPLER COMPLETE: CPT

## 2019-10-22 PROCEDURE — 93970 EXTREMITY STUDY: CPT

## 2019-10-22 PROCEDURE — 99232 SBSQ HOSP IP/OBS MODERATE 35: CPT | Performed by: INTERNAL MEDICINE

## 2019-10-22 PROCEDURE — 6360000004 HC RX CONTRAST MEDICATION: Performed by: NURSE PRACTITIONER

## 2019-10-22 PROCEDURE — 82962 GLUCOSE BLOOD TEST: CPT

## 2019-10-22 PROCEDURE — 2140000000 HC CCU INTERMEDIATE R&B

## 2019-10-22 PROCEDURE — 2580000003 HC RX 258: Performed by: INTERNAL MEDICINE

## 2019-10-22 RX ADMIN — GABAPENTIN 300 MG: 300 CAPSULE ORAL at 10:55

## 2019-10-22 RX ADMIN — FLUTICASONE PROPIONATE 1 SPRAY: 50 SPRAY, METERED NASAL at 16:37

## 2019-10-22 RX ADMIN — PERFLUTREN 1.65 MG: 6.52 INJECTION, SUSPENSION INTRAVENOUS at 16:31

## 2019-10-22 RX ADMIN — APIXABAN 10 MG: 5 TABLET, FILM COATED ORAL at 10:56

## 2019-10-22 RX ADMIN — ASPIRIN 81 MG CHEWABLE TABLET 81 MG: 81 TABLET CHEWABLE at 10:56

## 2019-10-22 RX ADMIN — INSULIN LISPRO 3 UNITS: 100 INJECTION, SOLUTION INTRAVENOUS; SUBCUTANEOUS at 21:41

## 2019-10-22 RX ADMIN — GABAPENTIN 300 MG: 300 CAPSULE ORAL at 21:14

## 2019-10-22 RX ADMIN — INSULIN LISPRO 2 UNITS: 100 INJECTION, SOLUTION INTRAVENOUS; SUBCUTANEOUS at 06:14

## 2019-10-22 RX ADMIN — CARVEDILOL 12.5 MG: 6.25 TABLET, FILM COATED ORAL at 21:14

## 2019-10-22 RX ADMIN — MONTELUKAST SODIUM 10 MG: 10 TABLET ORAL at 21:14

## 2019-10-22 RX ADMIN — Medication 10 ML: at 10:56

## 2019-10-22 RX ADMIN — Medication 10 ML: at 21:14

## 2019-10-22 RX ADMIN — APIXABAN 10 MG: 5 TABLET, FILM COATED ORAL at 21:14

## 2019-10-22 RX ADMIN — ACETAMINOPHEN 650 MG: 325 TABLET, FILM COATED ORAL at 05:06

## 2019-10-22 RX ADMIN — INSULIN LISPRO 2 UNITS: 100 INJECTION, SOLUTION INTRAVENOUS; SUBCUTANEOUS at 18:20

## 2019-10-22 ASSESSMENT — PAIN SCALES - GENERAL
PAINLEVEL_OUTOF10: 0
PAINLEVEL_OUTOF10: 0
PAINLEVEL_OUTOF10: 8
PAINLEVEL_OUTOF10: 0

## 2019-10-22 ASSESSMENT — PAIN DESCRIPTION - PAIN TYPE: TYPE: CHRONIC PAIN

## 2019-10-22 ASSESSMENT — PAIN DESCRIPTION - FREQUENCY: FREQUENCY: INTERMITTENT

## 2019-10-22 ASSESSMENT — PAIN DESCRIPTION - DESCRIPTORS: DESCRIPTORS: THROBBING

## 2019-10-22 ASSESSMENT — PAIN DESCRIPTION - LOCATION: LOCATION: FOOT

## 2019-10-22 ASSESSMENT — PAIN DESCRIPTION - ONSET: ONSET: ON-GOING

## 2019-10-22 ASSESSMENT — PAIN DESCRIPTION - ORIENTATION: ORIENTATION: RIGHT;LEFT

## 2019-10-22 ASSESSMENT — PAIN DESCRIPTION - PROGRESSION: CLINICAL_PROGRESSION: NOT CHANGED

## 2019-10-23 ENCOUNTER — APPOINTMENT (OUTPATIENT)
Dept: NON INVASIVE DIAGNOSTICS | Age: 84
DRG: 175 | End: 2019-10-23
Payer: MEDICARE

## 2019-10-23 ENCOUNTER — APPOINTMENT (OUTPATIENT)
Dept: NUCLEAR MEDICINE | Age: 84
DRG: 175 | End: 2019-10-23
Payer: MEDICARE

## 2019-10-23 LAB
ANION GAP SERPL CALCULATED.3IONS-SCNC: 13 MMOL/L (ref 7–16)
BUN BLDV-MCNC: 18 MG/DL (ref 8–23)
CALCIUM SERPL-MCNC: 8.9 MG/DL (ref 8.6–10.2)
CHLORIDE BLD-SCNC: 103 MMOL/L (ref 98–107)
CO2: 22 MMOL/L (ref 22–29)
CREAT SERPL-MCNC: 1 MG/DL (ref 0.5–1)
GFR AFRICAN AMERICAN: >60
GFR NON-AFRICAN AMERICAN: 53 ML/MIN/1.73
GLUCOSE BLD-MCNC: 183 MG/DL (ref 74–99)
HCT VFR BLD CALC: 34.4 % (ref 34–48)
HEMOGLOBIN: 11.5 G/DL (ref 11.5–15.5)
LV EF: 40 %
LVEF MODALITY: NORMAL
MCH RBC QN AUTO: 32 PG (ref 26–35)
MCHC RBC AUTO-ENTMCNC: 33.4 % (ref 32–34.5)
MCV RBC AUTO: 95.8 FL (ref 80–99.9)
METER GLUCOSE: 181 MG/DL (ref 74–99)
METER GLUCOSE: 226 MG/DL (ref 74–99)
METER GLUCOSE: 280 MG/DL (ref 74–99)
PDW BLD-RTO: 13.2 FL (ref 11.5–15)
PLATELET # BLD: 154 E9/L (ref 130–450)
PMV BLD AUTO: 9.4 FL (ref 7–12)
POTASSIUM SERPL-SCNC: 3.9 MMOL/L (ref 3.5–5)
RBC # BLD: 3.59 E12/L (ref 3.5–5.5)
SODIUM BLD-SCNC: 138 MMOL/L (ref 132–146)
WBC # BLD: 6.6 E9/L (ref 4.5–11.5)

## 2019-10-23 PROCEDURE — 36415 COLL VENOUS BLD VENIPUNCTURE: CPT

## 2019-10-23 PROCEDURE — 78452 HT MUSCLE IMAGE SPECT MULT: CPT

## 2019-10-23 PROCEDURE — 6370000000 HC RX 637 (ALT 250 FOR IP): Performed by: INTERNAL MEDICINE

## 2019-10-23 PROCEDURE — 6360000002 HC RX W HCPCS: Performed by: INTERNAL MEDICINE

## 2019-10-23 PROCEDURE — 6370000000 HC RX 637 (ALT 250 FOR IP): Performed by: NURSE PRACTITIONER

## 2019-10-23 PROCEDURE — A9500 TC99M SESTAMIBI: HCPCS | Performed by: RADIOLOGY

## 2019-10-23 PROCEDURE — 85027 COMPLETE CBC AUTOMATED: CPT

## 2019-10-23 PROCEDURE — 2580000003 HC RX 258: Performed by: INTERNAL MEDICINE

## 2019-10-23 PROCEDURE — 80048 BASIC METABOLIC PNL TOTAL CA: CPT

## 2019-10-23 PROCEDURE — 82962 GLUCOSE BLOOD TEST: CPT

## 2019-10-23 PROCEDURE — 3430000000 HC RX DIAGNOSTIC RADIOPHARMACEUTICAL: Performed by: RADIOLOGY

## 2019-10-23 PROCEDURE — 2140000000 HC CCU INTERMEDIATE R&B

## 2019-10-23 PROCEDURE — 93017 CV STRESS TEST TRACING ONLY: CPT

## 2019-10-23 PROCEDURE — 99232 SBSQ HOSP IP/OBS MODERATE 35: CPT | Performed by: INTERNAL MEDICINE

## 2019-10-23 RX ORDER — LISINOPRIL 5 MG/1
2.5 TABLET ORAL DAILY
Status: DISCONTINUED | OUTPATIENT
Start: 2019-10-23 | End: 2019-10-24 | Stop reason: HOSPADM

## 2019-10-23 RX ORDER — SODIUM CHLORIDE 0.9 % (FLUSH) 0.9 %
10 SYRINGE (ML) INJECTION PRN
Status: DISCONTINUED | OUTPATIENT
Start: 2019-10-23 | End: 2019-10-24 | Stop reason: HOSPADM

## 2019-10-23 RX ADMIN — APIXABAN 10 MG: 5 TABLET, FILM COATED ORAL at 20:45

## 2019-10-23 RX ADMIN — CARVEDILOL 12.5 MG: 6.25 TABLET, FILM COATED ORAL at 20:46

## 2019-10-23 RX ADMIN — MONTELUKAST SODIUM 10 MG: 10 TABLET ORAL at 20:45

## 2019-10-23 RX ADMIN — FLUTICASONE PROPIONATE 1 SPRAY: 50 SPRAY, METERED NASAL at 08:26

## 2019-10-23 RX ADMIN — INSULIN LISPRO 3 UNITS: 100 INJECTION, SOLUTION INTRAVENOUS; SUBCUTANEOUS at 20:44

## 2019-10-23 RX ADMIN — Medication 33 MILLICURIE: at 15:11

## 2019-10-23 RX ADMIN — INSULIN LISPRO 4 UNITS: 100 INJECTION, SOLUTION INTRAVENOUS; SUBCUTANEOUS at 17:50

## 2019-10-23 RX ADMIN — ASPIRIN 81 MG CHEWABLE TABLET 81 MG: 81 TABLET CHEWABLE at 08:27

## 2019-10-23 RX ADMIN — Medication 10 ML: at 20:48

## 2019-10-23 RX ADMIN — BUSPIRONE HYDROCHLORIDE 5 MG: 5 TABLET ORAL at 08:27

## 2019-10-23 RX ADMIN — GABAPENTIN 300 MG: 300 CAPSULE ORAL at 20:45

## 2019-10-23 RX ADMIN — SERTRALINE 100 MG: 100 TABLET, FILM COATED ORAL at 08:27

## 2019-10-23 RX ADMIN — CARVEDILOL 12.5 MG: 6.25 TABLET, FILM COATED ORAL at 08:27

## 2019-10-23 RX ADMIN — REGADENOSON 0.4 MG: 0.08 INJECTION, SOLUTION INTRAVENOUS at 13:03

## 2019-10-23 RX ADMIN — GABAPENTIN 300 MG: 300 CAPSULE ORAL at 08:27

## 2019-10-23 RX ADMIN — APIXABAN 10 MG: 5 TABLET, FILM COATED ORAL at 08:26

## 2019-10-23 RX ADMIN — Medication 10 ML: at 08:27

## 2019-10-23 RX ADMIN — Medication 12 MILLICURIE: at 09:40

## 2019-10-23 RX ADMIN — INSULIN LISPRO 2 UNITS: 100 INJECTION, SOLUTION INTRAVENOUS; SUBCUTANEOUS at 06:39

## 2019-10-23 ASSESSMENT — PAIN SCALES - GENERAL
PAINLEVEL_OUTOF10: 0
PAINLEVEL_OUTOF10: 0

## 2019-10-24 VITALS
RESPIRATION RATE: 12 BRPM | BODY MASS INDEX: 33.09 KG/M2 | TEMPERATURE: 97.5 F | SYSTOLIC BLOOD PRESSURE: 96 MMHG | WEIGHT: 175.3 LBS | DIASTOLIC BLOOD PRESSURE: 52 MMHG | HEART RATE: 77 BPM | HEIGHT: 61 IN | OXYGEN SATURATION: 98 %

## 2019-10-24 LAB
ANION GAP SERPL CALCULATED.3IONS-SCNC: 17 MMOL/L (ref 7–16)
BUN BLDV-MCNC: 22 MG/DL (ref 8–23)
CALCIUM SERPL-MCNC: 9.1 MG/DL (ref 8.6–10.2)
CHLORIDE BLD-SCNC: 97 MMOL/L (ref 98–107)
CO2: 22 MMOL/L (ref 22–29)
CREAT SERPL-MCNC: 1.2 MG/DL (ref 0.5–1)
GFR AFRICAN AMERICAN: 52
GFR NON-AFRICAN AMERICAN: 43 ML/MIN/1.73
GLUCOSE BLD-MCNC: 230 MG/DL (ref 74–99)
METER GLUCOSE: 129 MG/DL (ref 74–99)
METER GLUCOSE: 241 MG/DL (ref 74–99)
METER GLUCOSE: 268 MG/DL (ref 74–99)
POTASSIUM SERPL-SCNC: 4 MMOL/L (ref 3.5–5)
SODIUM BLD-SCNC: 136 MMOL/L (ref 132–146)

## 2019-10-24 PROCEDURE — 2580000003 HC RX 258: Performed by: INTERNAL MEDICINE

## 2019-10-24 PROCEDURE — 6370000000 HC RX 637 (ALT 250 FOR IP): Performed by: INTERNAL MEDICINE

## 2019-10-24 PROCEDURE — 99232 SBSQ HOSP IP/OBS MODERATE 35: CPT | Performed by: INTERNAL MEDICINE

## 2019-10-24 PROCEDURE — 97161 PT EVAL LOW COMPLEX 20 MIN: CPT

## 2019-10-24 PROCEDURE — 36415 COLL VENOUS BLD VENIPUNCTURE: CPT

## 2019-10-24 PROCEDURE — 80048 BASIC METABOLIC PNL TOTAL CA: CPT

## 2019-10-24 PROCEDURE — 97530 THERAPEUTIC ACTIVITIES: CPT

## 2019-10-24 PROCEDURE — 6370000000 HC RX 637 (ALT 250 FOR IP): Performed by: NURSE PRACTITIONER

## 2019-10-24 PROCEDURE — 82962 GLUCOSE BLOOD TEST: CPT

## 2019-10-24 RX ORDER — PETROLATUM 42 G/100G
OINTMENT TOPICAL DAILY
COMMUNITY

## 2019-10-24 RX ORDER — NITROGLYCERIN 0.4 MG/1
0.4 TABLET SUBLINGUAL EVERY 5 MIN PRN
Qty: 25 TABLET | Refills: 3 | Status: SHIPPED | OUTPATIENT
Start: 2019-10-24 | End: 2020-09-28 | Stop reason: SDUPTHER

## 2019-10-24 RX ORDER — FLUTICASONE PROPIONATE 50 MCG
1 SPRAY, SUSPENSION (ML) NASAL DAILY PRN
Status: ON HOLD | COMMUNITY
End: 2021-01-01 | Stop reason: HOSPADM

## 2019-10-24 RX ORDER — CARVEDILOL 12.5 MG/1
12.5 TABLET ORAL 2 TIMES DAILY
Qty: 60 TABLET | Refills: 3 | Status: SHIPPED | OUTPATIENT
Start: 2019-10-24 | End: 2019-11-13 | Stop reason: SDUPTHER

## 2019-10-24 RX ORDER — NYSTATIN 100000 [USP'U]/G
1 POWDER TOPICAL 3 TIMES DAILY PRN
COMMUNITY
End: 2020-07-06

## 2019-10-24 RX ORDER — LISINOPRIL 2.5 MG/1
2.5 TABLET ORAL DAILY
Qty: 30 TABLET | Refills: 3 | Status: SHIPPED | OUTPATIENT
Start: 2019-10-25 | End: 2019-11-13 | Stop reason: SDUPTHER

## 2019-10-24 RX ADMIN — GABAPENTIN 300 MG: 300 CAPSULE ORAL at 09:24

## 2019-10-24 RX ADMIN — INSULIN LISPRO 6 UNITS: 100 INJECTION, SOLUTION INTRAVENOUS; SUBCUTANEOUS at 18:19

## 2019-10-24 RX ADMIN — CARVEDILOL 12.5 MG: 6.25 TABLET, FILM COATED ORAL at 09:24

## 2019-10-24 RX ADMIN — LISINOPRIL 2.5 MG: 5 TABLET ORAL at 09:22

## 2019-10-24 RX ADMIN — BUSPIRONE HYDROCHLORIDE 5 MG: 5 TABLET ORAL at 09:24

## 2019-10-24 RX ADMIN — SERTRALINE 100 MG: 100 TABLET, FILM COATED ORAL at 09:24

## 2019-10-24 RX ADMIN — INSULIN LISPRO 4 UNITS: 100 INJECTION, SOLUTION INTRAVENOUS; SUBCUTANEOUS at 11:12

## 2019-10-24 RX ADMIN — Medication 10 ML: at 09:25

## 2019-10-24 RX ADMIN — FLUTICASONE PROPIONATE 1 SPRAY: 50 SPRAY, METERED NASAL at 09:24

## 2019-10-24 RX ADMIN — ASPIRIN 81 MG CHEWABLE TABLET 81 MG: 81 TABLET CHEWABLE at 09:24

## 2019-10-24 RX ADMIN — APIXABAN 10 MG: 5 TABLET, FILM COATED ORAL at 09:24

## 2019-10-24 ASSESSMENT — PAIN SCALES - GENERAL
PAINLEVEL_OUTOF10: 0
PAINLEVEL_OUTOF10: 0

## 2019-10-25 ENCOUNTER — TELEPHONE (OUTPATIENT)
Dept: ADMINISTRATIVE | Age: 84
End: 2019-10-25

## 2019-10-25 ENCOUNTER — CARE COORDINATION (OUTPATIENT)
Dept: CASE MANAGEMENT | Age: 84
End: 2019-10-25

## 2019-10-25 DIAGNOSIS — I26.93 SINGLE SUBSEGMENTAL PULMONARY EMBOLISM WITHOUT ACUTE COR PULMONALE (HCC): Primary | ICD-10-CM

## 2019-10-25 PROCEDURE — 1111F DSCHRG MED/CURRENT MED MERGE: CPT | Performed by: FAMILY MEDICINE

## 2019-10-28 ENCOUNTER — TELEPHONE (OUTPATIENT)
Dept: FAMILY MEDICINE CLINIC | Age: 84
End: 2019-10-28

## 2019-10-28 DIAGNOSIS — F41.9 ANXIETY: ICD-10-CM

## 2019-10-28 DIAGNOSIS — E11.65 UNCONTROLLED TYPE 2 DIABETES MELLITUS WITH HYPERGLYCEMIA (HCC): Chronic | ICD-10-CM

## 2019-10-28 DIAGNOSIS — J45.909 MILD ASTHMA WITHOUT COMPLICATION, UNSPECIFIED WHETHER PERSISTENT: Primary | Chronic | ICD-10-CM

## 2019-10-28 RX ORDER — SERTRALINE HYDROCHLORIDE 100 MG/1
TABLET, FILM COATED ORAL
Qty: 135 TABLET | Refills: 1 | Status: SHIPPED | OUTPATIENT
Start: 2019-10-28 | End: 2020-01-09 | Stop reason: ALTCHOICE

## 2019-10-29 ENCOUNTER — CARE COORDINATION (OUTPATIENT)
Dept: CASE MANAGEMENT | Age: 84
End: 2019-10-29

## 2019-10-30 ENCOUNTER — TELEPHONE (OUTPATIENT)
Dept: FAMILY MEDICINE CLINIC | Age: 84
End: 2019-10-30

## 2019-10-30 ENCOUNTER — OFFICE VISIT (OUTPATIENT)
Dept: FAMILY MEDICINE CLINIC | Age: 84
End: 2019-10-30
Payer: MEDICARE

## 2019-10-30 ENCOUNTER — CARE COORDINATION (OUTPATIENT)
Dept: CASE MANAGEMENT | Age: 84
End: 2019-10-30

## 2019-10-30 VITALS
TEMPERATURE: 98.7 F | RESPIRATION RATE: 18 BRPM | BODY MASS INDEX: 33.23 KG/M2 | DIASTOLIC BLOOD PRESSURE: 82 MMHG | WEIGHT: 176 LBS | SYSTOLIC BLOOD PRESSURE: 124 MMHG | HEIGHT: 61 IN | HEART RATE: 83 BPM | OXYGEN SATURATION: 96 %

## 2019-10-30 DIAGNOSIS — E11.21 TYPE II DIABETES MELLITUS WITH NEPHROPATHY (HCC): ICD-10-CM

## 2019-10-30 DIAGNOSIS — I26.99 OTHER ACUTE PULMONARY EMBOLISM, UNSPECIFIED WHETHER ACUTE COR PULMONALE PRESENT (HCC): Primary | ICD-10-CM

## 2019-10-30 DIAGNOSIS — N18.30 CKD (CHRONIC KIDNEY DISEASE) STAGE 3, GFR 30-59 ML/MIN (HCC): ICD-10-CM

## 2019-10-30 DIAGNOSIS — G62.9 PERIPHERAL POLYNEUROPATHY: ICD-10-CM

## 2019-10-30 PROCEDURE — 1111F DSCHRG MED/CURRENT MED MERGE: CPT | Performed by: FAMILY MEDICINE

## 2019-10-30 PROCEDURE — 99496 TRANSJ CARE MGMT HIGH F2F 7D: CPT | Performed by: FAMILY MEDICINE

## 2019-10-30 RX ORDER — GABAPENTIN 300 MG/1
600 CAPSULE ORAL 3 TIMES DAILY
Qty: 540 CAPSULE | Refills: 0 | Status: SHIPPED
Start: 2019-10-30 | End: 2020-04-27

## 2019-11-06 ENCOUNTER — CARE COORDINATION (OUTPATIENT)
Dept: CASE MANAGEMENT | Age: 84
End: 2019-11-06

## 2019-11-13 RX ORDER — LISINOPRIL 2.5 MG/1
2.5 TABLET ORAL DAILY
Qty: 30 TABLET | Refills: 3 | Status: SHIPPED
Start: 2019-11-13 | End: 2020-05-13 | Stop reason: SDUPTHER

## 2019-11-13 RX ORDER — CARVEDILOL 12.5 MG/1
12.5 TABLET ORAL 2 TIMES DAILY
Qty: 60 TABLET | Refills: 3 | Status: ON HOLD | OUTPATIENT
Start: 2019-11-13 | End: 2019-11-23 | Stop reason: HOSPADM

## 2019-11-22 ENCOUNTER — APPOINTMENT (OUTPATIENT)
Dept: GENERAL RADIOLOGY | Age: 84
End: 2019-11-22
Payer: MEDICARE

## 2019-11-22 ENCOUNTER — HOSPITAL ENCOUNTER (OUTPATIENT)
Age: 84
Setting detail: OBSERVATION
Discharge: HOME | End: 2019-11-24
Attending: EMERGENCY MEDICINE | Admitting: INTERNAL MEDICINE
Payer: MEDICARE

## 2019-11-22 DIAGNOSIS — R07.9 CHEST PAIN, UNSPECIFIED TYPE: Primary | ICD-10-CM

## 2019-11-22 LAB
ALBUMIN SERPL-MCNC: 3.5 G/DL (ref 3.5–5.2)
ALP BLD-CCNC: 84 U/L (ref 35–104)
ALT SERPL-CCNC: 18 U/L (ref 0–32)
ANION GAP SERPL CALCULATED.3IONS-SCNC: 14 MMOL/L (ref 7–16)
AST SERPL-CCNC: 23 U/L (ref 0–31)
BILIRUB SERPL-MCNC: 0.3 MG/DL (ref 0–1.2)
BUN BLDV-MCNC: 28 MG/DL (ref 8–23)
CALCIUM SERPL-MCNC: 9.7 MG/DL (ref 8.6–10.2)
CHLORIDE BLD-SCNC: 98 MMOL/L (ref 98–107)
CHP ED QC CHECK: YES
CO2: 26 MMOL/L (ref 22–29)
CREAT SERPL-MCNC: 1.2 MG/DL (ref 0.5–1)
EKG ATRIAL RATE: 99 BPM
EKG P AXIS: 68 DEGREES
EKG P-R INTERVAL: 222 MS
EKG Q-T INTERVAL: 334 MS
EKG QRS DURATION: 72 MS
EKG QTC CALCULATION (BAZETT): 428 MS
EKG R AXIS: -46 DEGREES
EKG T AXIS: 78 DEGREES
EKG VENTRICULAR RATE: 99 BPM
GFR AFRICAN AMERICAN: 51
GFR NON-AFRICAN AMERICAN: 43 ML/MIN/1.73
GLUCOSE BLD-MCNC: 191 MG/DL
GLUCOSE BLD-MCNC: 284 MG/DL (ref 74–99)
HBA1C MFR BLD: 7.6 % (ref 4–5.6)
HCT VFR BLD CALC: 35.7 % (ref 34–48)
HEMOGLOBIN: 11.3 G/DL (ref 11.5–15.5)
INR BLD: 1.2
MCH RBC QN AUTO: 31.4 PG (ref 26–35)
MCHC RBC AUTO-ENTMCNC: 31.7 % (ref 32–34.5)
MCV RBC AUTO: 99.2 FL (ref 80–99.9)
METER GLUCOSE: 162 MG/DL (ref 74–99)
METER GLUCOSE: 165 MG/DL (ref 74–99)
METER GLUCOSE: 179 MG/DL (ref 74–99)
METER GLUCOSE: 181 MG/DL (ref 74–99)
METER GLUCOSE: 191 MG/DL (ref 74–99)
PDW BLD-RTO: 13.5 FL (ref 11.5–15)
PLATELET # BLD: 175 E9/L (ref 130–450)
PMV BLD AUTO: 9.3 FL (ref 7–12)
POTASSIUM SERPL-SCNC: 4.3 MMOL/L (ref 3.5–5)
PRO-BNP: 903 PG/ML (ref 0–450)
PROTHROMBIN TIME: 13.5 SEC (ref 9.3–12.4)
RBC # BLD: 3.6 E12/L (ref 3.5–5.5)
SODIUM BLD-SCNC: 138 MMOL/L (ref 132–146)
TOTAL PROTEIN: 6.6 G/DL (ref 6.4–8.3)
TROPONIN: 0.03 NG/ML (ref 0–0.03)
TROPONIN: 0.17 NG/ML (ref 0–0.03)
TROPONIN: 0.22 NG/ML (ref 0–0.03)
TROPONIN: 0.22 NG/ML (ref 0–0.03)
WBC # BLD: 7.3 E9/L (ref 4.5–11.5)

## 2019-11-22 PROCEDURE — 82962 GLUCOSE BLOOD TEST: CPT

## 2019-11-22 PROCEDURE — 6370000000 HC RX 637 (ALT 250 FOR IP): Performed by: EMERGENCY MEDICINE

## 2019-11-22 PROCEDURE — 83036 HEMOGLOBIN GLYCOSYLATED A1C: CPT

## 2019-11-22 PROCEDURE — 6370000000 HC RX 637 (ALT 250 FOR IP): Performed by: INTERNAL MEDICINE

## 2019-11-22 PROCEDURE — 85610 PROTHROMBIN TIME: CPT

## 2019-11-22 PROCEDURE — 85027 COMPLETE CBC AUTOMATED: CPT

## 2019-11-22 PROCEDURE — 80053 COMPREHEN METABOLIC PANEL: CPT

## 2019-11-22 PROCEDURE — APPSS45 APP SPLIT SHARED TIME 31-45 MINUTES: Performed by: NURSE PRACTITIONER

## 2019-11-22 PROCEDURE — G0378 HOSPITAL OBSERVATION PER HR: HCPCS

## 2019-11-22 PROCEDURE — 99215 OFFICE O/P EST HI 40 MIN: CPT | Performed by: INTERNAL MEDICINE

## 2019-11-22 PROCEDURE — 36415 COLL VENOUS BLD VENIPUNCTURE: CPT

## 2019-11-22 PROCEDURE — 71045 X-RAY EXAM CHEST 1 VIEW: CPT

## 2019-11-22 PROCEDURE — 83880 ASSAY OF NATRIURETIC PEPTIDE: CPT

## 2019-11-22 PROCEDURE — 2700000000 HC OXYGEN THERAPY PER DAY

## 2019-11-22 PROCEDURE — 93010 ELECTROCARDIOGRAM REPORT: CPT | Performed by: INTERNAL MEDICINE

## 2019-11-22 PROCEDURE — 6370000000 HC RX 637 (ALT 250 FOR IP): Performed by: NURSE PRACTITIONER

## 2019-11-22 PROCEDURE — 99285 EMERGENCY DEPT VISIT HI MDM: CPT

## 2019-11-22 PROCEDURE — 2580000003 HC RX 258: Performed by: NURSE PRACTITIONER

## 2019-11-22 PROCEDURE — 84484 ASSAY OF TROPONIN QUANT: CPT

## 2019-11-22 PROCEDURE — 96372 THER/PROPH/DIAG INJ SC/IM: CPT

## 2019-11-22 PROCEDURE — 93005 ELECTROCARDIOGRAM TRACING: CPT | Performed by: EMERGENCY MEDICINE

## 2019-11-22 RX ORDER — LISINOPRIL 5 MG/1
2.5 TABLET ORAL DAILY
Status: DISCONTINUED | OUTPATIENT
Start: 2019-11-22 | End: 2019-11-24 | Stop reason: HOSPADM

## 2019-11-22 RX ORDER — DEXTROSE MONOHYDRATE 50 MG/ML
100 INJECTION, SOLUTION INTRAVENOUS PRN
Status: DISCONTINUED | OUTPATIENT
Start: 2019-11-22 | End: 2019-11-24 | Stop reason: HOSPADM

## 2019-11-22 RX ORDER — ACETAMINOPHEN 325 MG/1
650 TABLET ORAL EVERY 4 HOURS PRN
Status: DISCONTINUED | OUTPATIENT
Start: 2019-11-22 | End: 2019-11-24 | Stop reason: HOSPADM

## 2019-11-22 RX ORDER — NITROGLYCERIN 0.4 MG/1
0.4 TABLET SUBLINGUAL EVERY 5 MIN PRN
Status: DISCONTINUED | OUTPATIENT
Start: 2019-11-22 | End: 2019-11-24 | Stop reason: HOSPADM

## 2019-11-22 RX ORDER — DEXTROSE MONOHYDRATE 25 G/50ML
12.5 INJECTION, SOLUTION INTRAVENOUS PRN
Status: DISCONTINUED | OUTPATIENT
Start: 2019-11-22 | End: 2019-11-24 | Stop reason: HOSPADM

## 2019-11-22 RX ORDER — BUSPIRONE HYDROCHLORIDE 5 MG/1
5 TABLET ORAL DAILY
Status: DISCONTINUED | OUTPATIENT
Start: 2019-11-22 | End: 2019-11-24 | Stop reason: HOSPADM

## 2019-11-22 RX ORDER — FUROSEMIDE 40 MG/1
40 TABLET ORAL DAILY
Status: DISCONTINUED | OUTPATIENT
Start: 2019-11-22 | End: 2019-11-24 | Stop reason: HOSPADM

## 2019-11-22 RX ORDER — ATORVASTATIN CALCIUM 40 MG/1
40 TABLET, FILM COATED ORAL NIGHTLY
Status: DISCONTINUED | OUTPATIENT
Start: 2019-11-22 | End: 2019-11-24 | Stop reason: HOSPADM

## 2019-11-22 RX ORDER — CARVEDILOL 25 MG/1
25 TABLET ORAL 2 TIMES DAILY WITH MEALS
Status: DISCONTINUED | OUTPATIENT
Start: 2019-11-22 | End: 2019-11-24 | Stop reason: HOSPADM

## 2019-11-22 RX ORDER — SODIUM CHLORIDE 0.9 % (FLUSH) 0.9 %
10 SYRINGE (ML) INJECTION PRN
Status: DISCONTINUED | OUTPATIENT
Start: 2019-11-22 | End: 2019-11-24 | Stop reason: HOSPADM

## 2019-11-22 RX ORDER — GABAPENTIN 300 MG/1
300 CAPSULE ORAL 3 TIMES DAILY
Status: DISCONTINUED | OUTPATIENT
Start: 2019-11-22 | End: 2019-11-24 | Stop reason: HOSPADM

## 2019-11-22 RX ORDER — IPRATROPIUM BROMIDE AND ALBUTEROL SULFATE 2.5; .5 MG/3ML; MG/3ML
3 SOLUTION RESPIRATORY (INHALATION) EVERY 4 HOURS PRN
Status: DISCONTINUED | OUTPATIENT
Start: 2019-11-22 | End: 2019-11-24 | Stop reason: HOSPADM

## 2019-11-22 RX ORDER — CARVEDILOL 6.25 MG/1
12.5 TABLET ORAL 2 TIMES DAILY WITH MEALS
Status: DISCONTINUED | OUTPATIENT
Start: 2019-11-22 | End: 2019-11-22

## 2019-11-22 RX ORDER — ASPIRIN 81 MG/1
324 TABLET, CHEWABLE ORAL ONCE
Status: COMPLETED | OUTPATIENT
Start: 2019-11-22 | End: 2019-11-22

## 2019-11-22 RX ORDER — ATORVASTATIN CALCIUM 40 MG/1
40 TABLET, FILM COATED ORAL NIGHTLY
Status: DISCONTINUED | OUTPATIENT
Start: 2019-11-22 | End: 2019-11-22 | Stop reason: SDUPTHER

## 2019-11-22 RX ORDER — ONDANSETRON 2 MG/ML
4 INJECTION INTRAMUSCULAR; INTRAVENOUS EVERY 6 HOURS PRN
Status: DISCONTINUED | OUTPATIENT
Start: 2019-11-22 | End: 2019-11-24 | Stop reason: HOSPADM

## 2019-11-22 RX ORDER — ASPIRIN 81 MG/1
81 TABLET, CHEWABLE ORAL DAILY
Status: DISCONTINUED | OUTPATIENT
Start: 2019-11-23 | End: 2019-11-24 | Stop reason: HOSPADM

## 2019-11-22 RX ORDER — NICOTINE POLACRILEX 4 MG
15 LOZENGE BUCCAL PRN
Status: DISCONTINUED | OUTPATIENT
Start: 2019-11-22 | End: 2019-11-24 | Stop reason: HOSPADM

## 2019-11-22 RX ORDER — RANOLAZINE 500 MG/1
500 TABLET, EXTENDED RELEASE ORAL 2 TIMES DAILY
Status: DISCONTINUED | OUTPATIENT
Start: 2019-11-22 | End: 2019-11-24 | Stop reason: HOSPADM

## 2019-11-22 RX ORDER — SODIUM CHLORIDE 0.9 % (FLUSH) 0.9 %
10 SYRINGE (ML) INJECTION EVERY 12 HOURS SCHEDULED
Status: DISCONTINUED | OUTPATIENT
Start: 2019-11-22 | End: 2019-11-24 | Stop reason: HOSPADM

## 2019-11-22 RX ADMIN — CARVEDILOL 25 MG: 25 TABLET, FILM COATED ORAL at 17:21

## 2019-11-22 RX ADMIN — NITROGLYCERIN 0.5 INCH: 20 OINTMENT TOPICAL at 02:47

## 2019-11-22 RX ADMIN — Medication 10 ML: at 20:38

## 2019-11-22 RX ADMIN — ASPIRIN 81 MG 324 MG: 81 TABLET ORAL at 02:46

## 2019-11-22 RX ADMIN — INSULIN LISPRO 2 UNITS: 100 INJECTION, SOLUTION INTRAVENOUS; SUBCUTANEOUS at 17:23

## 2019-11-22 RX ADMIN — APIXABAN 5 MG: 5 TABLET, FILM COATED ORAL at 08:44

## 2019-11-22 RX ADMIN — INSULIN LISPRO 1 UNITS: 100 INJECTION, SOLUTION INTRAVENOUS; SUBCUTANEOUS at 20:40

## 2019-11-22 RX ADMIN — GABAPENTIN 300 MG: 300 CAPSULE ORAL at 08:44

## 2019-11-22 RX ADMIN — NITROGLYCERIN 0.5 INCH: 20 OINTMENT TOPICAL at 08:45

## 2019-11-22 RX ADMIN — GABAPENTIN 300 MG: 300 CAPSULE ORAL at 20:38

## 2019-11-22 RX ADMIN — NITROGLYCERIN 0.5 INCH: 20 OINTMENT TOPICAL at 17:21

## 2019-11-22 RX ADMIN — RANOLAZINE 500 MG: 500 TABLET, FILM COATED, EXTENDED RELEASE ORAL at 20:38

## 2019-11-22 RX ADMIN — INSULIN LISPRO 2 UNITS: 100 INJECTION, SOLUTION INTRAVENOUS; SUBCUTANEOUS at 08:58

## 2019-11-22 RX ADMIN — FUROSEMIDE 40 MG: 40 TABLET ORAL at 17:21

## 2019-11-22 RX ADMIN — GABAPENTIN 300 MG: 300 CAPSULE ORAL at 13:58

## 2019-11-22 RX ADMIN — NITROGLYCERIN 0.5 INCH: 20 OINTMENT TOPICAL at 13:58

## 2019-11-22 RX ADMIN — CARVEDILOL 12.5 MG: 6.25 TABLET, FILM COATED ORAL at 08:44

## 2019-11-22 RX ADMIN — ATORVASTATIN CALCIUM 40 MG: 40 TABLET, FILM COATED ORAL at 20:38

## 2019-11-22 RX ADMIN — ACETAMINOPHEN 650 MG: 325 TABLET, FILM COATED ORAL at 18:58

## 2019-11-22 ASSESSMENT — PAIN SCALES - GENERAL
PAINLEVEL_OUTOF10: 5
PAINLEVEL_OUTOF10: 0
PAINLEVEL_OUTOF10: 0

## 2019-11-23 PROBLEM — Z86.711 HISTORY OF PULMONARY EMBOLUS (PE): Chronic | Status: ACTIVE | Noted: 2019-11-23

## 2019-11-23 PROBLEM — I26.93 SINGLE SUBSEGMENTAL PULMONARY EMBOLISM WITHOUT ACUTE COR PULMONALE (HCC): Status: RESOLVED | Noted: 2019-10-21 | Resolved: 2019-11-23

## 2019-11-23 PROBLEM — L89.609 DECUBITUS ULCER, HEEL: Chronic | Status: RESOLVED | Noted: 2019-07-08 | Resolved: 2019-11-23

## 2019-11-23 PROBLEM — R07.9 CHEST PAIN: Status: RESOLVED | Noted: 2019-10-21 | Resolved: 2019-11-23

## 2019-11-23 PROBLEM — I26.99 ACUTE PULMONARY EMBOLISM (HCC): Status: RESOLVED | Noted: 2019-10-21 | Resolved: 2019-11-23

## 2019-11-23 PROBLEM — L89.159 DECUBITUS ULCER OF COCCYX: Chronic | Status: RESOLVED | Noted: 2019-07-08 | Resolved: 2019-11-23

## 2019-11-23 PROBLEM — R07.9 CHEST PAIN: Status: ACTIVE | Noted: 2019-11-23

## 2019-11-23 LAB
ANION GAP SERPL CALCULATED.3IONS-SCNC: 14 MMOL/L (ref 7–16)
BUN BLDV-MCNC: 27 MG/DL (ref 8–23)
CALCIUM SERPL-MCNC: 9.3 MG/DL (ref 8.6–10.2)
CHLORIDE BLD-SCNC: 102 MMOL/L (ref 98–107)
CHOLESTEROL, FASTING: 219 MG/DL (ref 0–199)
CO2: 28 MMOL/L (ref 22–29)
CREAT SERPL-MCNC: 1.3 MG/DL (ref 0.5–1)
GFR AFRICAN AMERICAN: 47
GFR NON-AFRICAN AMERICAN: 39 ML/MIN/1.73
GLUCOSE BLD-MCNC: 126 MG/DL (ref 74–99)
HCT VFR BLD CALC: 34.2 % (ref 34–48)
HDLC SERPL-MCNC: 46 MG/DL
HEMOGLOBIN: 10.7 G/DL (ref 11.5–15.5)
LDL CHOLESTEROL CALCULATED: 123 MG/DL (ref 0–99)
MCH RBC QN AUTO: 31.1 PG (ref 26–35)
MCHC RBC AUTO-ENTMCNC: 31.3 % (ref 32–34.5)
MCV RBC AUTO: 99.4 FL (ref 80–99.9)
METER GLUCOSE: 124 MG/DL (ref 74–99)
METER GLUCOSE: 204 MG/DL (ref 74–99)
METER GLUCOSE: 222 MG/DL (ref 74–99)
METER GLUCOSE: 228 MG/DL (ref 74–99)
PDW BLD-RTO: 13.7 FL (ref 11.5–15)
PLATELET # BLD: 159 E9/L (ref 130–450)
PMV BLD AUTO: 9.3 FL (ref 7–12)
POTASSIUM REFLEX MAGNESIUM: 4.5 MMOL/L (ref 3.5–5)
RBC # BLD: 3.44 E12/L (ref 3.5–5.5)
SODIUM BLD-SCNC: 144 MMOL/L (ref 132–146)
TRIGLYCERIDE, FASTING: 252 MG/DL (ref 0–149)
VLDLC SERPL CALC-MCNC: 50 MG/DL
WBC # BLD: 6.8 E9/L (ref 4.5–11.5)

## 2019-11-23 PROCEDURE — 6370000000 HC RX 637 (ALT 250 FOR IP): Performed by: NURSE PRACTITIONER

## 2019-11-23 PROCEDURE — 6370000000 HC RX 637 (ALT 250 FOR IP): Performed by: INTERNAL MEDICINE

## 2019-11-23 PROCEDURE — 85027 COMPLETE CBC AUTOMATED: CPT

## 2019-11-23 PROCEDURE — 6360000002 HC RX W HCPCS: Performed by: INTERNAL MEDICINE

## 2019-11-23 PROCEDURE — G0378 HOSPITAL OBSERVATION PER HR: HCPCS

## 2019-11-23 PROCEDURE — 2580000003 HC RX 258: Performed by: NURSE PRACTITIONER

## 2019-11-23 PROCEDURE — 80061 LIPID PANEL: CPT

## 2019-11-23 PROCEDURE — 36415 COLL VENOUS BLD VENIPUNCTURE: CPT

## 2019-11-23 PROCEDURE — 80048 BASIC METABOLIC PNL TOTAL CA: CPT

## 2019-11-23 PROCEDURE — 2700000000 HC OXYGEN THERAPY PER DAY

## 2019-11-23 PROCEDURE — 99214 OFFICE O/P EST MOD 30 MIN: CPT | Performed by: INTERNAL MEDICINE

## 2019-11-23 PROCEDURE — 82962 GLUCOSE BLOOD TEST: CPT

## 2019-11-23 RX ORDER — ASPIRIN 81 MG/1
81 TABLET, CHEWABLE ORAL DAILY
Qty: 30 TABLET | Refills: 0 | Status: SHIPPED | OUTPATIENT
Start: 2019-11-23 | End: 2020-01-09 | Stop reason: ALTCHOICE

## 2019-11-23 RX ORDER — ATORVASTATIN CALCIUM 40 MG/1
40 TABLET, FILM COATED ORAL NIGHTLY
Qty: 30 TABLET | Refills: 0 | Status: SHIPPED | OUTPATIENT
Start: 2019-11-23 | End: 2020-02-14 | Stop reason: SDUPTHER

## 2019-11-23 RX ORDER — RANOLAZINE 500 MG/1
500 TABLET, EXTENDED RELEASE ORAL 2 TIMES DAILY
Qty: 60 TABLET | Refills: 0 | Status: SHIPPED | OUTPATIENT
Start: 2019-11-23 | End: 2020-01-09 | Stop reason: ALTCHOICE

## 2019-11-23 RX ORDER — FUROSEMIDE 40 MG/1
40 TABLET ORAL DAILY
Qty: 30 TABLET | Refills: 0 | Status: ON HOLD | OUTPATIENT
Start: 2019-11-23 | End: 2019-12-02 | Stop reason: HOSPADM

## 2019-11-23 RX ORDER — ISOSORBIDE MONONITRATE 30 MG/1
30 TABLET, EXTENDED RELEASE ORAL DAILY
Status: DISCONTINUED | OUTPATIENT
Start: 2019-11-23 | End: 2019-11-24 | Stop reason: HOSPADM

## 2019-11-23 RX ORDER — CARVEDILOL 25 MG/1
25 TABLET ORAL 2 TIMES DAILY WITH MEALS
Qty: 60 TABLET | Refills: 0 | Status: SHIPPED | OUTPATIENT
Start: 2019-11-23 | End: 2020-01-09 | Stop reason: ALTCHOICE

## 2019-11-23 RX ADMIN — GABAPENTIN 300 MG: 300 CAPSULE ORAL at 21:27

## 2019-11-23 RX ADMIN — INSULIN LISPRO 4 UNITS: 100 INJECTION, SOLUTION INTRAVENOUS; SUBCUTANEOUS at 17:09

## 2019-11-23 RX ADMIN — GABAPENTIN 300 MG: 300 CAPSULE ORAL at 13:34

## 2019-11-23 RX ADMIN — ISOSORBIDE MONONITRATE 30 MG: 30 TABLET ORAL at 17:06

## 2019-11-23 RX ADMIN — ENOXAPARIN SODIUM 80 MG: 80 INJECTION SUBCUTANEOUS at 21:27

## 2019-11-23 RX ADMIN — INSULIN LISPRO 2 UNITS: 100 INJECTION, SOLUTION INTRAVENOUS; SUBCUTANEOUS at 21:28

## 2019-11-23 RX ADMIN — RANOLAZINE 500 MG: 500 TABLET, FILM COATED, EXTENDED RELEASE ORAL at 21:27

## 2019-11-23 RX ADMIN — RANOLAZINE 500 MG: 500 TABLET, FILM COATED, EXTENDED RELEASE ORAL at 08:31

## 2019-11-23 RX ADMIN — GABAPENTIN 300 MG: 300 CAPSULE ORAL at 08:32

## 2019-11-23 RX ADMIN — Medication 10 ML: at 21:28

## 2019-11-23 RX ADMIN — Medication 10 ML: at 08:33

## 2019-11-23 RX ADMIN — SERTRALINE 150 MG: 50 TABLET, FILM COATED ORAL at 08:31

## 2019-11-23 RX ADMIN — LISINOPRIL 2.5 MG: 5 TABLET ORAL at 08:32

## 2019-11-23 RX ADMIN — NITROGLYCERIN 0.5 INCH: 20 OINTMENT TOPICAL at 03:04

## 2019-11-23 RX ADMIN — ENOXAPARIN SODIUM 80 MG: 80 INJECTION SUBCUTANEOUS at 08:32

## 2019-11-23 RX ADMIN — FUROSEMIDE 40 MG: 40 TABLET ORAL at 08:32

## 2019-11-23 RX ADMIN — ATORVASTATIN CALCIUM 40 MG: 40 TABLET, FILM COATED ORAL at 21:27

## 2019-11-23 RX ADMIN — ACETAMINOPHEN 650 MG: 325 TABLET, FILM COATED ORAL at 09:53

## 2019-11-23 RX ADMIN — INSULIN LISPRO 4 UNITS: 100 INJECTION, SOLUTION INTRAVENOUS; SUBCUTANEOUS at 13:39

## 2019-11-23 RX ADMIN — CARVEDILOL 25 MG: 25 TABLET, FILM COATED ORAL at 17:06

## 2019-11-23 RX ADMIN — BUSPIRONE HYDROCHLORIDE 5 MG: 5 TABLET ORAL at 08:31

## 2019-11-23 RX ADMIN — ASPIRIN 81 MG 81 MG: 81 TABLET ORAL at 08:32

## 2019-11-23 RX ADMIN — NITROGLYCERIN 0.5 INCH: 20 OINTMENT TOPICAL at 13:34

## 2019-11-23 RX ADMIN — NITROGLYCERIN 0.5 INCH: 20 OINTMENT TOPICAL at 08:32

## 2019-11-23 RX ADMIN — CARVEDILOL 25 MG: 25 TABLET, FILM COATED ORAL at 08:30

## 2019-11-23 ASSESSMENT — PAIN SCALES - GENERAL
PAINLEVEL_OUTOF10: 0
PAINLEVEL_OUTOF10: 0
PAINLEVEL_OUTOF10: 6

## 2019-11-24 VITALS
WEIGHT: 184.7 LBS | RESPIRATION RATE: 16 BRPM | SYSTOLIC BLOOD PRESSURE: 122 MMHG | DIASTOLIC BLOOD PRESSURE: 54 MMHG | TEMPERATURE: 98.7 F | OXYGEN SATURATION: 98 % | HEART RATE: 98 BPM | HEIGHT: 61 IN | BODY MASS INDEX: 34.87 KG/M2

## 2019-11-24 LAB
ANION GAP SERPL CALCULATED.3IONS-SCNC: 15 MMOL/L (ref 7–16)
BUN BLDV-MCNC: 32 MG/DL (ref 8–23)
CALCIUM SERPL-MCNC: 9.3 MG/DL (ref 8.6–10.2)
CHLORIDE BLD-SCNC: 100 MMOL/L (ref 98–107)
CO2: 25 MMOL/L (ref 22–29)
CREAT SERPL-MCNC: 1.4 MG/DL (ref 0.5–1)
EKG ATRIAL RATE: 98 BPM
EKG P AXIS: 54 DEGREES
EKG P-R INTERVAL: 206 MS
EKG Q-T INTERVAL: 342 MS
EKG QRS DURATION: 78 MS
EKG QTC CALCULATION (BAZETT): 436 MS
EKG R AXIS: -35 DEGREES
EKG T AXIS: 86 DEGREES
EKG VENTRICULAR RATE: 98 BPM
GFR AFRICAN AMERICAN: 43
GFR NON-AFRICAN AMERICAN: 36 ML/MIN/1.73
GLUCOSE BLD-MCNC: 152 MG/DL (ref 74–99)
HCT VFR BLD CALC: 34.1 % (ref 34–48)
HEMOGLOBIN: 10.9 G/DL (ref 11.5–15.5)
MCH RBC QN AUTO: 31.1 PG (ref 26–35)
MCHC RBC AUTO-ENTMCNC: 32 % (ref 32–34.5)
MCV RBC AUTO: 97.4 FL (ref 80–99.9)
METER GLUCOSE: 140 MG/DL (ref 74–99)
METER GLUCOSE: 144 MG/DL (ref 74–99)
METER GLUCOSE: 229 MG/DL (ref 74–99)
PDW BLD-RTO: 13.6 FL (ref 11.5–15)
PLATELET # BLD: 164 E9/L (ref 130–450)
PMV BLD AUTO: 9.4 FL (ref 7–12)
POTASSIUM REFLEX MAGNESIUM: 4.3 MMOL/L (ref 3.5–5)
RBC # BLD: 3.5 E12/L (ref 3.5–5.5)
SODIUM BLD-SCNC: 140 MMOL/L (ref 132–146)
WBC # BLD: 10.5 E9/L (ref 4.5–11.5)

## 2019-11-24 PROCEDURE — 93005 ELECTROCARDIOGRAM TRACING: CPT | Performed by: NURSE PRACTITIONER

## 2019-11-24 PROCEDURE — 36415 COLL VENOUS BLD VENIPUNCTURE: CPT

## 2019-11-24 PROCEDURE — G0378 HOSPITAL OBSERVATION PER HR: HCPCS

## 2019-11-24 PROCEDURE — 97165 OT EVAL LOW COMPLEX 30 MIN: CPT

## 2019-11-24 PROCEDURE — 80048 BASIC METABOLIC PNL TOTAL CA: CPT

## 2019-11-24 PROCEDURE — 82962 GLUCOSE BLOOD TEST: CPT

## 2019-11-24 PROCEDURE — 2580000003 HC RX 258: Performed by: NURSE PRACTITIONER

## 2019-11-24 PROCEDURE — 6370000000 HC RX 637 (ALT 250 FOR IP): Performed by: INTERNAL MEDICINE

## 2019-11-24 PROCEDURE — 97530 THERAPEUTIC ACTIVITIES: CPT

## 2019-11-24 PROCEDURE — 6370000000 HC RX 637 (ALT 250 FOR IP): Performed by: NURSE PRACTITIONER

## 2019-11-24 PROCEDURE — 85027 COMPLETE CBC AUTOMATED: CPT

## 2019-11-24 PROCEDURE — 99212 OFFICE O/P EST SF 10 MIN: CPT | Performed by: INTERNAL MEDICINE

## 2019-11-24 PROCEDURE — 93010 ELECTROCARDIOGRAM REPORT: CPT | Performed by: INTERNAL MEDICINE

## 2019-11-24 RX ORDER — ISOSORBIDE MONONITRATE 30 MG/1
30 TABLET, EXTENDED RELEASE ORAL DAILY
Qty: 30 TABLET | Refills: 0 | Status: SHIPPED | OUTPATIENT
Start: 2019-11-24 | End: 2019-11-25 | Stop reason: SDUPTHER

## 2019-11-24 RX ADMIN — LISINOPRIL 2.5 MG: 5 TABLET ORAL at 11:00

## 2019-11-24 RX ADMIN — GABAPENTIN 300 MG: 300 CAPSULE ORAL at 10:59

## 2019-11-24 RX ADMIN — BUSPIRONE HYDROCHLORIDE 5 MG: 5 TABLET ORAL at 10:56

## 2019-11-24 RX ADMIN — RANOLAZINE 500 MG: 500 TABLET, FILM COATED, EXTENDED RELEASE ORAL at 10:58

## 2019-11-24 RX ADMIN — FUROSEMIDE 40 MG: 40 TABLET ORAL at 10:59

## 2019-11-24 RX ADMIN — INSULIN LISPRO 4 UNITS: 100 INJECTION, SOLUTION INTRAVENOUS; SUBCUTANEOUS at 11:02

## 2019-11-24 RX ADMIN — Medication 10 ML: at 10:55

## 2019-11-24 RX ADMIN — ASPIRIN 81 MG 81 MG: 81 TABLET ORAL at 10:59

## 2019-11-24 RX ADMIN — ISOSORBIDE MONONITRATE 30 MG: 30 TABLET ORAL at 10:57

## 2019-11-24 RX ADMIN — SERTRALINE 150 MG: 50 TABLET, FILM COATED ORAL at 10:57

## 2019-11-24 RX ADMIN — APIXABAN 5 MG: 5 TABLET, FILM COATED ORAL at 11:30

## 2019-11-24 RX ADMIN — CARVEDILOL 25 MG: 25 TABLET, FILM COATED ORAL at 17:43

## 2019-11-24 RX ADMIN — CARVEDILOL 25 MG: 25 TABLET, FILM COATED ORAL at 11:00

## 2019-11-25 ENCOUNTER — OFFICE VISIT (OUTPATIENT)
Dept: CARDIOLOGY CLINIC | Age: 84
End: 2019-11-25
Payer: MEDICARE

## 2019-11-25 ENCOUNTER — CARE COORDINATION (OUTPATIENT)
Dept: CASE MANAGEMENT | Age: 84
End: 2019-11-25

## 2019-11-25 VITALS
RESPIRATION RATE: 18 BRPM | WEIGHT: 185 LBS | HEIGHT: 61 IN | BODY MASS INDEX: 34.93 KG/M2 | SYSTOLIC BLOOD PRESSURE: 100 MMHG | DIASTOLIC BLOOD PRESSURE: 58 MMHG | HEART RATE: 78 BPM

## 2019-11-25 DIAGNOSIS — I25.5 ISCHEMIC CARDIOMYOPATHY: ICD-10-CM

## 2019-11-25 DIAGNOSIS — R07.89 OTHER CHEST PAIN: Primary | ICD-10-CM

## 2019-11-25 PROCEDURE — G8482 FLU IMMUNIZE ORDER/ADMIN: HCPCS | Performed by: INTERNAL MEDICINE

## 2019-11-25 PROCEDURE — 4040F PNEUMOC VAC/ADMIN/RCVD: CPT | Performed by: INTERNAL MEDICINE

## 2019-11-25 PROCEDURE — G8598 ASA/ANTIPLAT THER USED: HCPCS | Performed by: INTERNAL MEDICINE

## 2019-11-25 PROCEDURE — 99214 OFFICE O/P EST MOD 30 MIN: CPT | Performed by: INTERNAL MEDICINE

## 2019-11-25 PROCEDURE — 1090F PRES/ABSN URINE INCON ASSESS: CPT | Performed by: INTERNAL MEDICINE

## 2019-11-25 PROCEDURE — G8417 CALC BMI ABV UP PARAM F/U: HCPCS | Performed by: INTERNAL MEDICINE

## 2019-11-25 PROCEDURE — 1123F ACP DISCUSS/DSCN MKR DOCD: CPT | Performed by: INTERNAL MEDICINE

## 2019-11-25 PROCEDURE — G8427 DOCREV CUR MEDS BY ELIG CLIN: HCPCS | Performed by: INTERNAL MEDICINE

## 2019-11-25 PROCEDURE — 1036F TOBACCO NON-USER: CPT | Performed by: INTERNAL MEDICINE

## 2019-11-25 PROCEDURE — 93000 ELECTROCARDIOGRAM COMPLETE: CPT | Performed by: INTERNAL MEDICINE

## 2019-11-25 RX ORDER — ISOSORBIDE MONONITRATE 30 MG/1
30 TABLET, EXTENDED RELEASE ORAL DAILY
Qty: 90 TABLET | Refills: 3 | Status: SHIPPED
Start: 2019-11-25 | End: 2020-01-01

## 2019-11-26 ENCOUNTER — CARE COORDINATION (OUTPATIENT)
Dept: CASE MANAGEMENT | Age: 84
End: 2019-11-26

## 2019-11-28 ENCOUNTER — HOSPITAL ENCOUNTER (INPATIENT)
Age: 84
LOS: 3 days | Discharge: HOME | DRG: 152 | End: 2019-12-02
Attending: EMERGENCY MEDICINE | Admitting: INTERNAL MEDICINE
Payer: MEDICARE

## 2019-11-28 ENCOUNTER — APPOINTMENT (OUTPATIENT)
Dept: GENERAL RADIOLOGY | Age: 84
DRG: 152 | End: 2019-11-28
Payer: MEDICARE

## 2019-11-28 DIAGNOSIS — N18.9 CHRONIC KIDNEY DISEASE, UNSPECIFIED CKD STAGE: ICD-10-CM

## 2019-11-28 DIAGNOSIS — R07.9 CHEST PAIN, UNSPECIFIED TYPE: ICD-10-CM

## 2019-11-28 DIAGNOSIS — E86.1 INTRAVASCULAR VOLUME DEPLETION: ICD-10-CM

## 2019-11-28 DIAGNOSIS — J96.01 ACUTE RESPIRATORY FAILURE WITH HYPOXIA (HCC): Primary | ICD-10-CM

## 2019-11-28 DIAGNOSIS — I50.9 ACUTE HEART FAILURE, UNSPECIFIED HEART FAILURE TYPE (HCC): ICD-10-CM

## 2019-11-28 LAB
ALBUMIN SERPL-MCNC: 3.7 G/DL (ref 3.5–5.2)
ALP BLD-CCNC: 88 U/L (ref 35–104)
ALT SERPL-CCNC: 19 U/L (ref 0–32)
ANION GAP SERPL CALCULATED.3IONS-SCNC: 15 MMOL/L (ref 7–16)
AST SERPL-CCNC: 17 U/L (ref 0–31)
BASOPHILS ABSOLUTE: 0.03 E9/L (ref 0–0.2)
BASOPHILS RELATIVE PERCENT: 0.3 % (ref 0–2)
BILIRUB SERPL-MCNC: 0.3 MG/DL (ref 0–1.2)
BUN BLDV-MCNC: 36 MG/DL (ref 8–23)
CALCIUM SERPL-MCNC: 9.8 MG/DL (ref 8.6–10.2)
CHLORIDE BLD-SCNC: 98 MMOL/L (ref 98–107)
CO2: 25 MMOL/L (ref 22–29)
CREAT SERPL-MCNC: 1.6 MG/DL (ref 0.5–1)
EOSINOPHILS ABSOLUTE: 0.11 E9/L (ref 0.05–0.5)
EOSINOPHILS RELATIVE PERCENT: 0.9 % (ref 0–6)
GFR AFRICAN AMERICAN: 37
GFR NON-AFRICAN AMERICAN: 31 ML/MIN/1.73
GLUCOSE BLD-MCNC: 322 MG/DL (ref 74–99)
HCT VFR BLD CALC: 33.8 % (ref 34–48)
HEMOGLOBIN: 10.8 G/DL (ref 11.5–15.5)
IMMATURE GRANULOCYTES #: 0.04 E9/L
IMMATURE GRANULOCYTES %: 0.3 % (ref 0–5)
INFLUENZA A BY PCR: NOT DETECTED
INFLUENZA B BY PCR: NOT DETECTED
LACTIC ACID, SEPSIS: 1.2 MMOL/L (ref 0.5–1.9)
LYMPHOCYTES ABSOLUTE: 1.75 E9/L (ref 1.5–4)
LYMPHOCYTES RELATIVE PERCENT: 14.7 % (ref 20–42)
MCH RBC QN AUTO: 31.5 PG (ref 26–35)
MCHC RBC AUTO-ENTMCNC: 32 % (ref 32–34.5)
MCV RBC AUTO: 98.5 FL (ref 80–99.9)
MONOCYTES ABSOLUTE: 0.62 E9/L (ref 0.1–0.95)
MONOCYTES RELATIVE PERCENT: 5.2 % (ref 2–12)
NEUTROPHILS ABSOLUTE: 9.37 E9/L (ref 1.8–7.3)
NEUTROPHILS RELATIVE PERCENT: 78.6 % (ref 43–80)
PDW BLD-RTO: 13.6 FL (ref 11.5–15)
PLATELET # BLD: 210 E9/L (ref 130–450)
PMV BLD AUTO: 9.6 FL (ref 7–12)
POTASSIUM SERPL-SCNC: 4.7 MMOL/L (ref 3.5–5)
PRO-BNP: 2717 PG/ML (ref 0–450)
RBC # BLD: 3.43 E12/L (ref 3.5–5.5)
SODIUM BLD-SCNC: 138 MMOL/L (ref 132–146)
TOTAL PROTEIN: 7.2 G/DL (ref 6.4–8.3)
TROPONIN: 0.11 NG/ML (ref 0–0.03)
WBC # BLD: 11.9 E9/L (ref 4.5–11.5)

## 2019-11-28 PROCEDURE — 2580000003 HC RX 258: Performed by: EMERGENCY MEDICINE

## 2019-11-28 PROCEDURE — 93005 ELECTROCARDIOGRAM TRACING: CPT | Performed by: NURSE PRACTITIONER

## 2019-11-28 PROCEDURE — 83880 ASSAY OF NATRIURETIC PEPTIDE: CPT

## 2019-11-28 PROCEDURE — 99285 EMERGENCY DEPT VISIT HI MDM: CPT

## 2019-11-28 PROCEDURE — 80053 COMPREHEN METABOLIC PANEL: CPT

## 2019-11-28 PROCEDURE — 87502 INFLUENZA DNA AMP PROBE: CPT

## 2019-11-28 PROCEDURE — 85025 COMPLETE CBC W/AUTO DIFF WBC: CPT

## 2019-11-28 PROCEDURE — 83605 ASSAY OF LACTIC ACID: CPT

## 2019-11-28 PROCEDURE — 94761 N-INVAS EAR/PLS OXIMETRY MLT: CPT

## 2019-11-28 PROCEDURE — 71045 X-RAY EXAM CHEST 1 VIEW: CPT

## 2019-11-28 PROCEDURE — 87040 BLOOD CULTURE FOR BACTERIA: CPT

## 2019-11-28 PROCEDURE — 36415 COLL VENOUS BLD VENIPUNCTURE: CPT

## 2019-11-28 PROCEDURE — 84484 ASSAY OF TROPONIN QUANT: CPT

## 2019-11-28 RX ORDER — 0.9 % SODIUM CHLORIDE 0.9 %
500 INTRAVENOUS SOLUTION INTRAVENOUS ONCE
Status: COMPLETED | OUTPATIENT
Start: 2019-11-28 | End: 2019-11-29

## 2019-11-28 RX ADMIN — SODIUM CHLORIDE 500 ML: 9 INJECTION, SOLUTION INTRAVENOUS at 23:34

## 2019-11-28 ASSESSMENT — ENCOUNTER SYMPTOMS
WHEEZING: 0
BLOOD IN STOOL: 0
EYE PAIN: 0
ABDOMINAL DISTENTION: 0
VOMITING: 0
ABDOMINAL PAIN: 0
COUGH: 1
EYE DISCHARGE: 0
RHINORRHEA: 0
DIARRHEA: 0
NAUSEA: 0
SORE THROAT: 0
BACK PAIN: 0
EYE REDNESS: 0
SHORTNESS OF BREATH: 1
SINUS PRESSURE: 0

## 2019-11-29 PROBLEM — N17.9 ACUTE RENAL FAILURE SUPERIMPOSED ON STAGE 3 CHRONIC KIDNEY DISEASE (HCC): Status: ACTIVE | Noted: 2019-11-29

## 2019-11-29 PROBLEM — J96.01 ACUTE RESPIRATORY FAILURE WITH HYPOXIA (HCC): Status: ACTIVE | Noted: 2019-11-29

## 2019-11-29 PROBLEM — I50.43 ACUTE ON CHRONIC SYSTOLIC AND DIASTOLIC HEART FAILURE, NYHA CLASS 3 (HCC): Status: ACTIVE | Noted: 2019-11-29

## 2019-11-29 PROBLEM — N18.30 ACUTE RENAL FAILURE SUPERIMPOSED ON STAGE 3 CHRONIC KIDNEY DISEASE (HCC): Status: ACTIVE | Noted: 2019-11-29

## 2019-11-29 LAB
ADENOVIRUS BY PCR: NOT DETECTED
ALBUMIN SERPL-MCNC: 3.1 G/DL (ref 3.5–5.2)
ANION GAP SERPL CALCULATED.3IONS-SCNC: 13 MMOL/L (ref 7–16)
BACTERIA: ABNORMAL /HPF
BILIRUBIN URINE: NEGATIVE
BLOOD, URINE: NORMAL
BORDETELLA PARAPERTUSSIS BY PCR: NOT DETECTED
BORDETELLA PERTUSSIS BY PCR: NOT DETECTED
BUN BLDV-MCNC: 35 MG/DL (ref 8–23)
CALCIUM SERPL-MCNC: 9.7 MG/DL (ref 8.6–10.2)
CHLAMYDOPHILIA PNEUMONIAE BY PCR: NOT DETECTED
CHLORIDE BLD-SCNC: 102 MMOL/L (ref 98–107)
CK MB: 1.5 NG/ML (ref 0–4.3)
CLARITY: CLEAR
CO2: 25 MMOL/L (ref 22–29)
COLOR: YELLOW
CORONAVIRUS 229E BY PCR: NOT DETECTED
CORONAVIRUS HKU1 BY PCR: NOT DETECTED
CORONAVIRUS NL63 BY PCR: NOT DETECTED
CORONAVIRUS OC43 BY PCR: NOT DETECTED
CREAT SERPL-MCNC: 1.5 MG/DL (ref 0.5–1)
EPITHELIAL CELLS, UA: ABNORMAL /HPF
GFR AFRICAN AMERICAN: 40
GFR NON-AFRICAN AMERICAN: 33 ML/MIN/1.73
GLUCOSE BLD-MCNC: 122 MG/DL (ref 74–99)
GLUCOSE URINE: NEGATIVE MG/DL
HBA1C MFR BLD: 7.5 % (ref 4–5.6)
HCT VFR BLD CALC: 29 % (ref 34–48)
HEMOGLOBIN: 9.4 G/DL (ref 11.5–15.5)
HUMAN METAPNEUMOVIRUS BY PCR: NOT DETECTED
HUMAN RHINOVIRUS/ENTEROVIRUS BY PCR: NOT DETECTED
INFLUENZA A BY PCR: NOT DETECTED
INFLUENZA B BY PCR: NOT DETECTED
KETONES, URINE: NEGATIVE MG/DL
LACTIC ACID, SEPSIS: 0.8 MMOL/L (ref 0.5–1.9)
LEUKOCYTE ESTERASE, URINE: NEGATIVE
MCH RBC QN AUTO: 32.1 PG (ref 26–35)
MCHC RBC AUTO-ENTMCNC: 32.4 % (ref 32–34.5)
MCV RBC AUTO: 99 FL (ref 80–99.9)
METER GLUCOSE: 150 MG/DL (ref 74–99)
METER GLUCOSE: 151 MG/DL (ref 74–99)
METER GLUCOSE: 158 MG/DL (ref 74–99)
METER GLUCOSE: 186 MG/DL (ref 74–99)
MYCOPLASMA PNEUMONIAE BY PCR: NOT DETECTED
NITRITE, URINE: NEGATIVE
PARAINFLUENZA VIRUS 1 BY PCR: NOT DETECTED
PARAINFLUENZA VIRUS 2 BY PCR: NOT DETECTED
PARAINFLUENZA VIRUS 3 BY PCR: NOT DETECTED
PARAINFLUENZA VIRUS 4 BY PCR: NOT DETECTED
PDW BLD-RTO: 13.6 FL (ref 11.5–15)
PH UA: 5.5 (ref 5–9)
PLATELET # BLD: 172 E9/L (ref 130–450)
PMV BLD AUTO: 9.4 FL (ref 7–12)
POTASSIUM REFLEX MAGNESIUM: 4.1 MMOL/L (ref 3.5–5)
PROCALCITONIN: 0.1 NG/ML (ref 0–0.08)
PROTEIN UA: NEGATIVE MG/DL
RBC # BLD: 2.93 E12/L (ref 3.5–5.5)
RBC UA: ABNORMAL /HPF (ref 0–2)
RESPIRATORY SYNCYTIAL VIRUS BY PCR: NOT DETECTED
SODIUM BLD-SCNC: 140 MMOL/L (ref 132–146)
SPECIFIC GRAVITY UA: 1.01 (ref 1–1.03)
T4 FREE: 1.02 NG/DL (ref 0.93–1.7)
TOTAL CK: 28 U/L (ref 20–180)
TROPONIN: 0.07 NG/ML (ref 0–0.03)
TROPONIN: 0.09 NG/ML (ref 0–0.03)
TROPONIN: 0.1 NG/ML (ref 0–0.03)
TSH SERPL DL<=0.05 MIU/L-ACNC: 1.94 UIU/ML (ref 0.27–4.2)
UROBILINOGEN, URINE: 0.2 E.U./DL
WBC # BLD: 9.9 E9/L (ref 4.5–11.5)
WBC UA: ABNORMAL /HPF (ref 0–5)

## 2019-11-29 PROCEDURE — 2060000000 HC ICU INTERMEDIATE R&B

## 2019-11-29 PROCEDURE — 87088 URINE BACTERIA CULTURE: CPT

## 2019-11-29 PROCEDURE — 80048 BASIC METABOLIC PNL TOTAL CA: CPT

## 2019-11-29 PROCEDURE — 2700000000 HC OXYGEN THERAPY PER DAY

## 2019-11-29 PROCEDURE — 6370000000 HC RX 637 (ALT 250 FOR IP): Performed by: EMERGENCY MEDICINE

## 2019-11-29 PROCEDURE — 2580000003 HC RX 258: Performed by: NURSE PRACTITIONER

## 2019-11-29 PROCEDURE — 84439 ASSAY OF FREE THYROXINE: CPT

## 2019-11-29 PROCEDURE — 81001 URINALYSIS AUTO W/SCOPE: CPT

## 2019-11-29 PROCEDURE — 85027 COMPLETE CBC AUTOMATED: CPT

## 2019-11-29 PROCEDURE — 6370000000 HC RX 637 (ALT 250 FOR IP): Performed by: INTERNAL MEDICINE

## 2019-11-29 PROCEDURE — 82040 ASSAY OF SERUM ALBUMIN: CPT

## 2019-11-29 PROCEDURE — 6360000002 HC RX W HCPCS: Performed by: NURSE PRACTITIONER

## 2019-11-29 PROCEDURE — 99222 1ST HOSP IP/OBS MODERATE 55: CPT | Performed by: INTERNAL MEDICINE

## 2019-11-29 PROCEDURE — 36415 COLL VENOUS BLD VENIPUNCTURE: CPT

## 2019-11-29 PROCEDURE — 97166 OT EVAL MOD COMPLEX 45 MIN: CPT

## 2019-11-29 PROCEDURE — 0100U HC RESPIRPTHGN MULT REV TRANS & AMP PRB TECH 21 TRGT: CPT

## 2019-11-29 PROCEDURE — 84484 ASSAY OF TROPONIN QUANT: CPT

## 2019-11-29 PROCEDURE — 87040 BLOOD CULTURE FOR BACTERIA: CPT

## 2019-11-29 PROCEDURE — 82553 CREATINE MB FRACTION: CPT

## 2019-11-29 PROCEDURE — 83036 HEMOGLOBIN GLYCOSYLATED A1C: CPT

## 2019-11-29 PROCEDURE — 83605 ASSAY OF LACTIC ACID: CPT

## 2019-11-29 PROCEDURE — 84145 PROCALCITONIN (PCT): CPT

## 2019-11-29 PROCEDURE — 84443 ASSAY THYROID STIM HORMONE: CPT

## 2019-11-29 PROCEDURE — 82962 GLUCOSE BLOOD TEST: CPT

## 2019-11-29 PROCEDURE — 97535 SELF CARE MNGMENT TRAINING: CPT

## 2019-11-29 PROCEDURE — 2580000003 HC RX 258: Performed by: INTERNAL MEDICINE

## 2019-11-29 PROCEDURE — 6360000002 HC RX W HCPCS: Performed by: INTERNAL MEDICINE

## 2019-11-29 PROCEDURE — 82550 ASSAY OF CK (CPK): CPT

## 2019-11-29 PROCEDURE — 6370000000 HC RX 637 (ALT 250 FOR IP): Performed by: NURSE PRACTITIONER

## 2019-11-29 RX ORDER — HYDROCHLOROTHIAZIDE 12.5 MG/1
12.5 TABLET ORAL DAILY
Status: DISCONTINUED | OUTPATIENT
Start: 2019-11-30 | End: 2019-11-30

## 2019-11-29 RX ORDER — SODIUM CHLORIDE 0.9 % (FLUSH) 0.9 %
10 SYRINGE (ML) INJECTION PRN
Status: DISCONTINUED | OUTPATIENT
Start: 2019-11-29 | End: 2019-12-02 | Stop reason: HOSPADM

## 2019-11-29 RX ORDER — RANOLAZINE 500 MG/1
500 TABLET, EXTENDED RELEASE ORAL 2 TIMES DAILY
Status: DISCONTINUED | OUTPATIENT
Start: 2019-11-29 | End: 2019-11-30

## 2019-11-29 RX ORDER — PANTOPRAZOLE SODIUM 20 MG/1
20 TABLET, DELAYED RELEASE ORAL
Status: DISCONTINUED | OUTPATIENT
Start: 2019-11-29 | End: 2019-12-02 | Stop reason: HOSPADM

## 2019-11-29 RX ORDER — ATORVASTATIN CALCIUM 40 MG/1
40 TABLET, FILM COATED ORAL NIGHTLY
Status: DISCONTINUED | OUTPATIENT
Start: 2019-11-29 | End: 2019-12-02 | Stop reason: HOSPADM

## 2019-11-29 RX ORDER — ISOSORBIDE MONONITRATE 30 MG/1
30 TABLET, EXTENDED RELEASE ORAL DAILY
Status: DISCONTINUED | OUTPATIENT
Start: 2019-11-29 | End: 2019-12-02 | Stop reason: HOSPADM

## 2019-11-29 RX ORDER — ASPIRIN 81 MG/1
324 TABLET, CHEWABLE ORAL ONCE
Status: COMPLETED | OUTPATIENT
Start: 2019-11-29 | End: 2019-11-29

## 2019-11-29 RX ORDER — GABAPENTIN 300 MG/1
600 CAPSULE ORAL 3 TIMES DAILY
Status: DISCONTINUED | OUTPATIENT
Start: 2019-11-29 | End: 2019-12-02 | Stop reason: HOSPADM

## 2019-11-29 RX ORDER — DEXTROSE MONOHYDRATE 25 G/50ML
12.5 INJECTION, SOLUTION INTRAVENOUS PRN
Status: DISCONTINUED | OUTPATIENT
Start: 2019-11-29 | End: 2019-12-02 | Stop reason: HOSPADM

## 2019-11-29 RX ORDER — DEXTROSE MONOHYDRATE 50 MG/ML
100 INJECTION, SOLUTION INTRAVENOUS PRN
Status: DISCONTINUED | OUTPATIENT
Start: 2019-11-29 | End: 2019-12-02 | Stop reason: HOSPADM

## 2019-11-29 RX ORDER — CARVEDILOL 25 MG/1
25 TABLET ORAL 2 TIMES DAILY WITH MEALS
Status: DISCONTINUED | OUTPATIENT
Start: 2019-11-29 | End: 2019-11-30

## 2019-11-29 RX ORDER — FUROSEMIDE 10 MG/ML
40 INJECTION INTRAMUSCULAR; INTRAVENOUS ONCE
Status: COMPLETED | OUTPATIENT
Start: 2019-11-29 | End: 2019-11-29

## 2019-11-29 RX ORDER — SODIUM CHLORIDE 0.9 % (FLUSH) 0.9 %
10 SYRINGE (ML) INJECTION EVERY 12 HOURS SCHEDULED
Status: DISCONTINUED | OUTPATIENT
Start: 2019-11-29 | End: 2019-12-02 | Stop reason: HOSPADM

## 2019-11-29 RX ORDER — BUSPIRONE HYDROCHLORIDE 5 MG/1
5 TABLET ORAL DAILY
Status: DISCONTINUED | OUTPATIENT
Start: 2019-11-29 | End: 2019-12-02 | Stop reason: HOSPADM

## 2019-11-29 RX ORDER — ASPIRIN 81 MG/1
81 TABLET, CHEWABLE ORAL DAILY
Status: DISCONTINUED | OUTPATIENT
Start: 2019-11-29 | End: 2019-12-02 | Stop reason: HOSPADM

## 2019-11-29 RX ORDER — NITROGLYCERIN 0.4 MG/1
0.4 TABLET SUBLINGUAL EVERY 5 MIN PRN
Status: DISCONTINUED | OUTPATIENT
Start: 2019-11-29 | End: 2019-12-02 | Stop reason: HOSPADM

## 2019-11-29 RX ORDER — NICOTINE POLACRILEX 4 MG
15 LOZENGE BUCCAL PRN
Status: DISCONTINUED | OUTPATIENT
Start: 2019-11-29 | End: 2019-12-02 | Stop reason: HOSPADM

## 2019-11-29 RX ORDER — IPRATROPIUM BROMIDE AND ALBUTEROL SULFATE 2.5; .5 MG/3ML; MG/3ML
3 SOLUTION RESPIRATORY (INHALATION) EVERY 4 HOURS PRN
Status: DISCONTINUED | OUTPATIENT
Start: 2019-11-29 | End: 2019-12-02 | Stop reason: HOSPADM

## 2019-11-29 RX ADMIN — CARVEDILOL 25 MG: 25 TABLET, FILM COATED ORAL at 08:41

## 2019-11-29 RX ADMIN — FUROSEMIDE 40 MG: 10 INJECTION, SOLUTION INTRAMUSCULAR; INTRAVENOUS at 13:55

## 2019-11-29 RX ADMIN — SODIUM CHLORIDE, PRESERVATIVE FREE 10 ML: 5 INJECTION INTRAVENOUS at 20:56

## 2019-11-29 RX ADMIN — INSULIN LISPRO 2 UNITS: 100 INJECTION, SOLUTION INTRAVENOUS; SUBCUTANEOUS at 17:07

## 2019-11-29 RX ADMIN — RANOLAZINE 500 MG: 500 TABLET, FILM COATED, EXTENDED RELEASE ORAL at 20:56

## 2019-11-29 RX ADMIN — ASPIRIN 81 MG 81 MG: 81 TABLET ORAL at 08:40

## 2019-11-29 RX ADMIN — ATORVASTATIN CALCIUM 40 MG: 40 TABLET, FILM COATED ORAL at 20:56

## 2019-11-29 RX ADMIN — PANTOPRAZOLE SODIUM 20 MG: 20 TABLET, DELAYED RELEASE ORAL at 13:55

## 2019-11-29 RX ADMIN — ASPIRIN 81 MG 324 MG: 81 TABLET ORAL at 01:16

## 2019-11-29 RX ADMIN — INSULIN LISPRO 2 UNITS: 100 INJECTION, SOLUTION INTRAVENOUS; SUBCUTANEOUS at 08:40

## 2019-11-29 RX ADMIN — GABAPENTIN 600 MG: 300 CAPSULE ORAL at 20:56

## 2019-11-29 RX ADMIN — BUSPIRONE HYDROCHLORIDE 5 MG: 5 TABLET ORAL at 08:41

## 2019-11-29 RX ADMIN — SODIUM CHLORIDE, PRESERVATIVE FREE 10 ML: 5 INJECTION INTRAVENOUS at 13:55

## 2019-11-29 RX ADMIN — GABAPENTIN 600 MG: 300 CAPSULE ORAL at 13:55

## 2019-11-29 RX ADMIN — SODIUM CHLORIDE, PRESERVATIVE FREE 10 ML: 5 INJECTION INTRAVENOUS at 10:44

## 2019-11-29 RX ADMIN — SERTRALINE 25 MG: 50 TABLET, FILM COATED ORAL at 08:40

## 2019-11-29 RX ADMIN — GABAPENTIN 600 MG: 300 CAPSULE ORAL at 08:54

## 2019-11-29 RX ADMIN — APIXABAN 5 MG: 5 TABLET, FILM COATED ORAL at 20:56

## 2019-11-29 RX ADMIN — SODIUM CHLORIDE, PRESERVATIVE FREE 10 ML: 5 INJECTION INTRAVENOUS at 08:42

## 2019-11-29 RX ADMIN — INSULIN LISPRO 1 UNITS: 100 INJECTION, SOLUTION INTRAVENOUS; SUBCUTANEOUS at 20:57

## 2019-11-29 RX ADMIN — INSULIN LISPRO 2 UNITS: 100 INJECTION, SOLUTION INTRAVENOUS; SUBCUTANEOUS at 11:58

## 2019-11-29 RX ADMIN — CEFTRIAXONE SODIUM 1 G: 1 INJECTION, POWDER, FOR SOLUTION INTRAMUSCULAR; INTRAVENOUS at 10:44

## 2019-11-29 RX ADMIN — RANOLAZINE 500 MG: 500 TABLET, FILM COATED, EXTENDED RELEASE ORAL at 08:40

## 2019-11-29 RX ADMIN — ISOSORBIDE MONONITRATE 30 MG: 30 TABLET ORAL at 08:40

## 2019-11-29 RX ADMIN — CARVEDILOL 25 MG: 25 TABLET, FILM COATED ORAL at 17:06

## 2019-11-29 RX ADMIN — APIXABAN 5 MG: 5 TABLET, FILM COATED ORAL at 08:40

## 2019-11-29 ASSESSMENT — PAIN SCALES - GENERAL
PAINLEVEL_OUTOF10: 0

## 2019-11-30 LAB
ALBUMIN SERPL-MCNC: 3.4 G/DL (ref 3.5–5.2)
AMORPHOUS: ABNORMAL
ANION GAP SERPL CALCULATED.3IONS-SCNC: 15 MMOL/L (ref 7–16)
B.E.: 1.2 MMOL/L (ref -3–3)
BACTERIA: ABNORMAL /HPF
BILIRUBIN URINE: NEGATIVE
BLOOD, URINE: ABNORMAL
BUN BLDV-MCNC: 31 MG/DL (ref 8–23)
CALCIUM SERPL-MCNC: 9.6 MG/DL (ref 8.6–10.2)
CHLORIDE BLD-SCNC: 97 MMOL/L (ref 98–107)
CLARITY: CLEAR
CO2: 27 MMOL/L (ref 22–29)
COHB: 0.2 % (ref 0–1.5)
COLOR: YELLOW
CREAT SERPL-MCNC: 1.4 MG/DL (ref 0.5–1)
CRITICAL: ABNORMAL
DATE ANALYZED: ABNORMAL
DATE OF COLLECTION: ABNORMAL
EKG ATRIAL RATE: 96 BPM
EKG P AXIS: 52 DEGREES
EKG P-R INTERVAL: 220 MS
EKG Q-T INTERVAL: 330 MS
EKG QRS DURATION: 72 MS
EKG QTC CALCULATION (BAZETT): 416 MS
EKG R AXIS: -22 DEGREES
EKG T AXIS: 77 DEGREES
EKG VENTRICULAR RATE: 96 BPM
GFR AFRICAN AMERICAN: 43
GFR NON-AFRICAN AMERICAN: 36 ML/MIN/1.73
GLUCOSE BLD-MCNC: 125 MG/DL (ref 74–99)
GLUCOSE URINE: NEGATIVE MG/DL
HCO3: 26.6 MMOL/L (ref 22–26)
HCT VFR BLD CALC: 32.9 % (ref 34–48)
HEMOGLOBIN: 10.3 G/DL (ref 11.5–15.5)
HHB: 4.7 % (ref 0–5)
KETONES, URINE: NEGATIVE MG/DL
LAB: ABNORMAL
LEUKOCYTE ESTERASE, URINE: ABNORMAL
Lab: ABNORMAL
MCH RBC QN AUTO: 30.8 PG (ref 26–35)
MCHC RBC AUTO-ENTMCNC: 31.3 % (ref 32–34.5)
MCV RBC AUTO: 98.5 FL (ref 80–99.9)
METER GLUCOSE: 136 MG/DL (ref 74–99)
METER GLUCOSE: 148 MG/DL (ref 74–99)
METER GLUCOSE: 184 MG/DL (ref 74–99)
METER GLUCOSE: 207 MG/DL (ref 74–99)
METHB: 0.5 % (ref 0–1.5)
MODE: ABNORMAL
NITRITE, URINE: NEGATIVE
O2 CONTENT: 12.9 ML/DL
O2 SATURATION: 95.3 % (ref 92–98.5)
O2HB: 94.6 % (ref 94–97)
OPERATOR ID: 914
PATIENT TEMP: 37 C
PCO2: 45.6 MMHG (ref 35–45)
PDW BLD-RTO: 13.7 FL (ref 11.5–15)
PH BLOOD GAS: 7.38 (ref 7.35–7.45)
PH UA: 6 (ref 5–9)
PLATELET # BLD: 199 E9/L (ref 130–450)
PMV BLD AUTO: 9.1 FL (ref 7–12)
PO2: 86.9 MMHG (ref 60–100)
POTASSIUM REFLEX MAGNESIUM: 4.4 MMOL/L (ref 3.5–5)
POTASSIUM SERPL-SCNC: 4.07 MMOL/L (ref 3.3–5.1)
POTASSIUM SERPL-SCNC: 4.4 MMOL/L (ref 3.5–5)
PROTEIN UA: NEGATIVE MG/DL
RBC # BLD: 3.34 E12/L (ref 3.5–5.5)
RBC UA: >20 /HPF (ref 0–2)
SODIUM BLD-SCNC: 139 MMOL/L (ref 132–146)
SOURCE, BLOOD GAS: ABNORMAL
SPECIFIC GRAVITY UA: <=1.005 (ref 1–1.03)
THB: 9.6 G/DL (ref 11.5–16.5)
TIME ANALYZED: 1536
UROBILINOGEN, URINE: 0.2 E.U./DL
WBC # BLD: 10.2 E9/L (ref 4.5–11.5)
WBC UA: ABNORMAL /HPF (ref 0–5)

## 2019-11-30 PROCEDURE — 6360000002 HC RX W HCPCS: Performed by: INTERNAL MEDICINE

## 2019-11-30 PROCEDURE — 93010 ELECTROCARDIOGRAM REPORT: CPT | Performed by: INTERNAL MEDICINE

## 2019-11-30 PROCEDURE — 81001 URINALYSIS AUTO W/SCOPE: CPT

## 2019-11-30 PROCEDURE — 82805 BLOOD GASES W/O2 SATURATION: CPT

## 2019-11-30 PROCEDURE — 82962 GLUCOSE BLOOD TEST: CPT

## 2019-11-30 PROCEDURE — 6370000000 HC RX 637 (ALT 250 FOR IP): Performed by: INTERNAL MEDICINE

## 2019-11-30 PROCEDURE — 6370000000 HC RX 637 (ALT 250 FOR IP): Performed by: NURSE PRACTITIONER

## 2019-11-30 PROCEDURE — 85027 COMPLETE CBC AUTOMATED: CPT

## 2019-11-30 PROCEDURE — 2060000000 HC ICU INTERMEDIATE R&B

## 2019-11-30 PROCEDURE — 2580000003 HC RX 258: Performed by: INTERNAL MEDICINE

## 2019-11-30 PROCEDURE — 80048 BASIC METABOLIC PNL TOTAL CA: CPT

## 2019-11-30 PROCEDURE — 82040 ASSAY OF SERUM ALBUMIN: CPT

## 2019-11-30 PROCEDURE — 2580000003 HC RX 258: Performed by: NURSE PRACTITIONER

## 2019-11-30 PROCEDURE — 84132 ASSAY OF SERUM POTASSIUM: CPT

## 2019-11-30 PROCEDURE — 36415 COLL VENOUS BLD VENIPUNCTURE: CPT

## 2019-11-30 PROCEDURE — 2700000000 HC OXYGEN THERAPY PER DAY

## 2019-11-30 RX ORDER — SODIUM CHLORIDE 9 MG/ML
INJECTION, SOLUTION INTRAVENOUS CONTINUOUS
Status: DISCONTINUED | OUTPATIENT
Start: 2019-11-30 | End: 2019-12-01

## 2019-11-30 RX ORDER — CARVEDILOL 6.25 MG/1
6.25 TABLET ORAL 2 TIMES DAILY WITH MEALS
Status: DISCONTINUED | OUTPATIENT
Start: 2019-11-30 | End: 2019-12-01

## 2019-11-30 RX ORDER — 0.9 % SODIUM CHLORIDE 0.9 %
1000 INTRAVENOUS SOLUTION INTRAVENOUS ONCE
Status: COMPLETED | OUTPATIENT
Start: 2019-11-30 | End: 2019-11-30

## 2019-11-30 RX ADMIN — RANOLAZINE 500 MG: 500 TABLET, FILM COATED, EXTENDED RELEASE ORAL at 09:39

## 2019-11-30 RX ADMIN — ASPIRIN 81 MG 81 MG: 81 TABLET ORAL at 09:40

## 2019-11-30 RX ADMIN — SODIUM CHLORIDE, PRESERVATIVE FREE 10 ML: 5 INJECTION INTRAVENOUS at 21:13

## 2019-11-30 RX ADMIN — BUSPIRONE HYDROCHLORIDE 5 MG: 5 TABLET ORAL at 09:39

## 2019-11-30 RX ADMIN — SODIUM CHLORIDE 1000 ML: 9 INJECTION, SOLUTION INTRAVENOUS at 14:31

## 2019-11-30 RX ADMIN — GABAPENTIN 600 MG: 300 CAPSULE ORAL at 09:39

## 2019-11-30 RX ADMIN — SODIUM CHLORIDE, PRESERVATIVE FREE 10 ML: 5 INJECTION INTRAVENOUS at 11:30

## 2019-11-30 RX ADMIN — CARVEDILOL 25 MG: 25 TABLET, FILM COATED ORAL at 09:39

## 2019-11-30 RX ADMIN — SODIUM CHLORIDE, PRESERVATIVE FREE 10 ML: 5 INJECTION INTRAVENOUS at 09:40

## 2019-11-30 RX ADMIN — CEFTRIAXONE SODIUM 1 G: 1 INJECTION, POWDER, FOR SOLUTION INTRAMUSCULAR; INTRAVENOUS at 11:30

## 2019-11-30 RX ADMIN — PANTOPRAZOLE SODIUM 20 MG: 20 TABLET, DELAYED RELEASE ORAL at 09:40

## 2019-11-30 RX ADMIN — HYDROCHLOROTHIAZIDE 12.5 MG: 12.5 TABLET ORAL at 10:26

## 2019-11-30 RX ADMIN — SERTRALINE 25 MG: 50 TABLET, FILM COATED ORAL at 09:39

## 2019-11-30 RX ADMIN — INSULIN LISPRO 2 UNITS: 100 INJECTION, SOLUTION INTRAVENOUS; SUBCUTANEOUS at 16:56

## 2019-11-30 RX ADMIN — SODIUM CHLORIDE: 9 INJECTION, SOLUTION INTRAVENOUS at 12:27

## 2019-11-30 RX ADMIN — ATORVASTATIN CALCIUM 40 MG: 40 TABLET, FILM COATED ORAL at 21:13

## 2019-11-30 RX ADMIN — INSULIN LISPRO 4 UNITS: 100 INJECTION, SOLUTION INTRAVENOUS; SUBCUTANEOUS at 12:11

## 2019-11-30 RX ADMIN — APIXABAN 5 MG: 5 TABLET, FILM COATED ORAL at 21:13

## 2019-11-30 RX ADMIN — ISOSORBIDE MONONITRATE 30 MG: 30 TABLET ORAL at 09:39

## 2019-11-30 RX ADMIN — GABAPENTIN 600 MG: 300 CAPSULE ORAL at 21:13

## 2019-11-30 RX ADMIN — APIXABAN 5 MG: 5 TABLET, FILM COATED ORAL at 09:40

## 2019-11-30 ASSESSMENT — PAIN SCALES - GENERAL
PAINLEVEL_OUTOF10: 0

## 2019-12-01 ENCOUNTER — APPOINTMENT (OUTPATIENT)
Dept: GENERAL RADIOLOGY | Age: 84
DRG: 152 | End: 2019-12-01
Payer: MEDICARE

## 2019-12-01 ENCOUNTER — APPOINTMENT (OUTPATIENT)
Dept: CT IMAGING | Age: 84
DRG: 152 | End: 2019-12-01
Payer: MEDICARE

## 2019-12-01 LAB
ALBUMIN SERPL-MCNC: 3 G/DL (ref 3.5–5.2)
ANION GAP SERPL CALCULATED.3IONS-SCNC: 12 MMOL/L (ref 7–16)
BUN BLDV-MCNC: 27 MG/DL (ref 8–23)
CALCIUM SERPL-MCNC: 9 MG/DL (ref 8.6–10.2)
CHLORIDE BLD-SCNC: 102 MMOL/L (ref 98–107)
CO2: 28 MMOL/L (ref 22–29)
CREAT SERPL-MCNC: 1.5 MG/DL (ref 0.5–1)
GFR AFRICAN AMERICAN: 40
GFR NON-AFRICAN AMERICAN: 33 ML/MIN/1.73
GLUCOSE BLD-MCNC: 138 MG/DL (ref 74–99)
HCT VFR BLD CALC: 29.5 % (ref 34–48)
HEMOGLOBIN: 9.3 G/DL (ref 11.5–15.5)
MCH RBC QN AUTO: 31.2 PG (ref 26–35)
MCHC RBC AUTO-ENTMCNC: 31.5 % (ref 32–34.5)
MCV RBC AUTO: 99 FL (ref 80–99.9)
METER GLUCOSE: 132 MG/DL (ref 74–99)
METER GLUCOSE: 175 MG/DL (ref 74–99)
METER GLUCOSE: 180 MG/DL (ref 74–99)
METER GLUCOSE: 256 MG/DL (ref 74–99)
PDW BLD-RTO: 13.4 FL (ref 11.5–15)
PLATELET # BLD: 206 E9/L (ref 130–450)
PMV BLD AUTO: 9.4 FL (ref 7–12)
POTASSIUM REFLEX MAGNESIUM: 4.6 MMOL/L (ref 3.5–5)
RBC # BLD: 2.98 E12/L (ref 3.5–5.5)
SODIUM BLD-SCNC: 142 MMOL/L (ref 132–146)
URINE CULTURE, ROUTINE: NORMAL
WBC # BLD: 8.8 E9/L (ref 4.5–11.5)

## 2019-12-01 PROCEDURE — 82040 ASSAY OF SERUM ALBUMIN: CPT

## 2019-12-01 PROCEDURE — 82962 GLUCOSE BLOOD TEST: CPT

## 2019-12-01 PROCEDURE — 80048 BASIC METABOLIC PNL TOTAL CA: CPT

## 2019-12-01 PROCEDURE — 2580000003 HC RX 258: Performed by: INTERNAL MEDICINE

## 2019-12-01 PROCEDURE — 70450 CT HEAD/BRAIN W/O DYE: CPT

## 2019-12-01 PROCEDURE — 2700000000 HC OXYGEN THERAPY PER DAY

## 2019-12-01 PROCEDURE — 6370000000 HC RX 637 (ALT 250 FOR IP): Performed by: INTERNAL MEDICINE

## 2019-12-01 PROCEDURE — 2580000003 HC RX 258: Performed by: NURSE PRACTITIONER

## 2019-12-01 PROCEDURE — 85027 COMPLETE CBC AUTOMATED: CPT

## 2019-12-01 PROCEDURE — 6360000002 HC RX W HCPCS: Performed by: INTERNAL MEDICINE

## 2019-12-01 PROCEDURE — 71045 X-RAY EXAM CHEST 1 VIEW: CPT

## 2019-12-01 PROCEDURE — 74230 X-RAY XM SWLNG FUNCJ C+: CPT

## 2019-12-01 PROCEDURE — 36415 COLL VENOUS BLD VENIPUNCTURE: CPT

## 2019-12-01 PROCEDURE — 2500000003 HC RX 250 WO HCPCS: Performed by: STUDENT IN AN ORGANIZED HEALTH CARE EDUCATION/TRAINING PROGRAM

## 2019-12-01 PROCEDURE — 6370000000 HC RX 637 (ALT 250 FOR IP): Performed by: NURSE PRACTITIONER

## 2019-12-01 PROCEDURE — 2060000000 HC ICU INTERMEDIATE R&B

## 2019-12-01 RX ORDER — CARVEDILOL 6.25 MG/1
12.5 TABLET ORAL 2 TIMES DAILY WITH MEALS
Status: DISCONTINUED | OUTPATIENT
Start: 2019-12-01 | End: 2019-12-02 | Stop reason: HOSPADM

## 2019-12-01 RX ADMIN — BARIUM SULFATE 15 ML: 400 SUSPENSION ORAL at 13:56

## 2019-12-01 RX ADMIN — ATORVASTATIN CALCIUM 40 MG: 40 TABLET, FILM COATED ORAL at 22:19

## 2019-12-01 RX ADMIN — GABAPENTIN 600 MG: 300 CAPSULE ORAL at 14:18

## 2019-12-01 RX ADMIN — SODIUM CHLORIDE: 9 INJECTION, SOLUTION INTRAVENOUS at 01:45

## 2019-12-01 RX ADMIN — GABAPENTIN 600 MG: 300 CAPSULE ORAL at 22:20

## 2019-12-01 RX ADMIN — INSULIN LISPRO 3 UNITS: 100 INJECTION, SOLUTION INTRAVENOUS; SUBCUTANEOUS at 22:20

## 2019-12-01 RX ADMIN — SODIUM CHLORIDE, PRESERVATIVE FREE 10 ML: 5 INJECTION INTRAVENOUS at 22:20

## 2019-12-01 RX ADMIN — APIXABAN 5 MG: 5 TABLET, FILM COATED ORAL at 07:51

## 2019-12-01 RX ADMIN — INSULIN LISPRO 2 UNITS: 100 INJECTION, SOLUTION INTRAVENOUS; SUBCUTANEOUS at 16:47

## 2019-12-01 RX ADMIN — GABAPENTIN 600 MG: 300 CAPSULE ORAL at 07:54

## 2019-12-01 RX ADMIN — BARIUM SULFATE 15 ML: 0.81 POWDER, FOR SUSPENSION ORAL at 13:56

## 2019-12-01 RX ADMIN — SERTRALINE 25 MG: 50 TABLET, FILM COATED ORAL at 07:53

## 2019-12-01 RX ADMIN — BUSPIRONE HYDROCHLORIDE 5 MG: 5 TABLET ORAL at 07:53

## 2019-12-01 RX ADMIN — PANTOPRAZOLE SODIUM 20 MG: 20 TABLET, DELAYED RELEASE ORAL at 06:05

## 2019-12-01 RX ADMIN — CEFTRIAXONE SODIUM 1 G: 1 INJECTION, POWDER, FOR SOLUTION INTRAMUSCULAR; INTRAVENOUS at 10:34

## 2019-12-01 RX ADMIN — CARVEDILOL 12.5 MG: 6.25 TABLET, FILM COATED ORAL at 16:47

## 2019-12-01 RX ADMIN — ISOSORBIDE MONONITRATE 30 MG: 30 TABLET ORAL at 07:53

## 2019-12-01 RX ADMIN — CARVEDILOL 6.25 MG: 6.25 TABLET, FILM COATED ORAL at 07:51

## 2019-12-01 RX ADMIN — ASPIRIN 81 MG 81 MG: 81 TABLET ORAL at 07:52

## 2019-12-01 RX ADMIN — APIXABAN 5 MG: 5 TABLET, FILM COATED ORAL at 22:19

## 2019-12-01 ASSESSMENT — PAIN SCALES - GENERAL
PAINLEVEL_OUTOF10: 0

## 2019-12-02 VITALS
TEMPERATURE: 97.9 F | BODY MASS INDEX: 37.07 KG/M2 | HEART RATE: 83 BPM | SYSTOLIC BLOOD PRESSURE: 130 MMHG | HEIGHT: 60 IN | RESPIRATION RATE: 20 BRPM | DIASTOLIC BLOOD PRESSURE: 60 MMHG | OXYGEN SATURATION: 97 % | WEIGHT: 188.8 LBS

## 2019-12-02 PROBLEM — N18.30 ACUTE RENAL FAILURE SUPERIMPOSED ON STAGE 3 CHRONIC KIDNEY DISEASE (HCC): Status: RESOLVED | Noted: 2019-11-29 | Resolved: 2019-12-02

## 2019-12-02 PROBLEM — N17.9 ACUTE RENAL FAILURE SUPERIMPOSED ON STAGE 3 CHRONIC KIDNEY DISEASE (HCC): Status: RESOLVED | Noted: 2019-11-29 | Resolved: 2019-12-02

## 2019-12-02 PROBLEM — R07.9 CHEST PAIN: Status: RESOLVED | Noted: 2019-11-23 | Resolved: 2019-12-02

## 2019-12-02 PROBLEM — I50.43 ACUTE ON CHRONIC SYSTOLIC AND DIASTOLIC HEART FAILURE, NYHA CLASS 3 (HCC): Status: RESOLVED | Noted: 2019-11-29 | Resolved: 2019-12-02

## 2019-12-02 LAB
ALBUMIN SERPL-MCNC: 2.8 G/DL (ref 3.5–5.2)
ANION GAP SERPL CALCULATED.3IONS-SCNC: 11 MMOL/L (ref 7–16)
BUN BLDV-MCNC: 18 MG/DL (ref 8–23)
CALCIUM SERPL-MCNC: 8.7 MG/DL (ref 8.6–10.2)
CHLORIDE BLD-SCNC: 105 MMOL/L (ref 98–107)
CO2: 25 MMOL/L (ref 22–29)
CREAT SERPL-MCNC: 1.3 MG/DL (ref 0.5–1)
GFR AFRICAN AMERICAN: 47
GFR NON-AFRICAN AMERICAN: 39 ML/MIN/1.73
GLUCOSE BLD-MCNC: 147 MG/DL (ref 74–99)
HCT VFR BLD CALC: 28 % (ref 34–48)
HEMOGLOBIN: 8.9 G/DL (ref 11.5–15.5)
MCH RBC QN AUTO: 31.3 PG (ref 26–35)
MCHC RBC AUTO-ENTMCNC: 31.8 % (ref 32–34.5)
MCV RBC AUTO: 98.6 FL (ref 80–99.9)
METER GLUCOSE: 132 MG/DL (ref 74–99)
METER GLUCOSE: 239 MG/DL (ref 74–99)
METER GLUCOSE: 318 MG/DL (ref 74–99)
PDW BLD-RTO: 13.3 FL (ref 11.5–15)
PLATELET # BLD: 213 E9/L (ref 130–450)
PMV BLD AUTO: 9 FL (ref 7–12)
POTASSIUM REFLEX MAGNESIUM: 4.2 MMOL/L (ref 3.5–5)
RBC # BLD: 2.84 E12/L (ref 3.5–5.5)
SODIUM BLD-SCNC: 141 MMOL/L (ref 132–146)
WBC # BLD: 7.3 E9/L (ref 4.5–11.5)

## 2019-12-02 PROCEDURE — 97162 PT EVAL MOD COMPLEX 30 MIN: CPT

## 2019-12-02 PROCEDURE — 36415 COLL VENOUS BLD VENIPUNCTURE: CPT

## 2019-12-02 PROCEDURE — 85027 COMPLETE CBC AUTOMATED: CPT

## 2019-12-02 PROCEDURE — 82040 ASSAY OF SERUM ALBUMIN: CPT

## 2019-12-02 PROCEDURE — 80048 BASIC METABOLIC PNL TOTAL CA: CPT

## 2019-12-02 PROCEDURE — 82962 GLUCOSE BLOOD TEST: CPT

## 2019-12-02 PROCEDURE — 6360000002 HC RX W HCPCS: Performed by: INTERNAL MEDICINE

## 2019-12-02 PROCEDURE — 6370000000 HC RX 637 (ALT 250 FOR IP): Performed by: INTERNAL MEDICINE

## 2019-12-02 PROCEDURE — 2700000000 HC OXYGEN THERAPY PER DAY

## 2019-12-02 PROCEDURE — 2580000003 HC RX 258: Performed by: INTERNAL MEDICINE

## 2019-12-02 PROCEDURE — 6370000000 HC RX 637 (ALT 250 FOR IP): Performed by: NURSE PRACTITIONER

## 2019-12-02 PROCEDURE — 2580000003 HC RX 258: Performed by: NURSE PRACTITIONER

## 2019-12-02 PROCEDURE — 97530 THERAPEUTIC ACTIVITIES: CPT

## 2019-12-02 RX ADMIN — SODIUM CHLORIDE, PRESERVATIVE FREE 10 ML: 5 INJECTION INTRAVENOUS at 09:10

## 2019-12-02 RX ADMIN — ISOSORBIDE MONONITRATE 30 MG: 30 TABLET ORAL at 09:10

## 2019-12-02 RX ADMIN — CARVEDILOL 12.5 MG: 6.25 TABLET, FILM COATED ORAL at 16:47

## 2019-12-02 RX ADMIN — BUSPIRONE HYDROCHLORIDE 5 MG: 5 TABLET ORAL at 09:10

## 2019-12-02 RX ADMIN — PANTOPRAZOLE SODIUM 20 MG: 20 TABLET, DELAYED RELEASE ORAL at 06:15

## 2019-12-02 RX ADMIN — GABAPENTIN 600 MG: 300 CAPSULE ORAL at 13:41

## 2019-12-02 RX ADMIN — SERTRALINE 25 MG: 50 TABLET, FILM COATED ORAL at 09:09

## 2019-12-02 RX ADMIN — INSULIN LISPRO 4 UNITS: 100 INJECTION, SOLUTION INTRAVENOUS; SUBCUTANEOUS at 11:57

## 2019-12-02 RX ADMIN — APIXABAN 5 MG: 5 TABLET, FILM COATED ORAL at 09:09

## 2019-12-02 RX ADMIN — CEFTRIAXONE SODIUM 1 G: 1 INJECTION, POWDER, FOR SOLUTION INTRAMUSCULAR; INTRAVENOUS at 10:50

## 2019-12-02 RX ADMIN — ASPIRIN 81 MG 81 MG: 81 TABLET ORAL at 09:09

## 2019-12-02 RX ADMIN — GABAPENTIN 600 MG: 300 CAPSULE ORAL at 09:09

## 2019-12-02 RX ADMIN — INSULIN LISPRO 8 UNITS: 100 INJECTION, SOLUTION INTRAVENOUS; SUBCUTANEOUS at 16:47

## 2019-12-02 RX ADMIN — CARVEDILOL 12.5 MG: 6.25 TABLET, FILM COATED ORAL at 09:10

## 2019-12-02 ASSESSMENT — PAIN SCALES - GENERAL
PAINLEVEL_OUTOF10: 0
PAINLEVEL_OUTOF10: 0

## 2019-12-03 ENCOUNTER — HOSPITAL ENCOUNTER (OUTPATIENT)
Age: 84
Discharge: HOME | End: 2019-12-05

## 2019-12-03 ENCOUNTER — TELEPHONE (OUTPATIENT)
Dept: CARDIOLOGY CLINIC | Age: 84
End: 2019-12-03

## 2019-12-03 LAB
ANION GAP SERPL CALCULATED.3IONS-SCNC: 15 MMOL/L (ref 7–16)
BLOOD CULTURE, ROUTINE: NORMAL
BUN BLDV-MCNC: 21 MG/DL (ref 8–23)
CALCIUM SERPL-MCNC: 8.9 MG/DL (ref 8.6–10.2)
CHLORIDE BLD-SCNC: 101 MMOL/L (ref 98–107)
CO2: 24 MMOL/L (ref 22–29)
CREAT SERPL-MCNC: 1.4 MG/DL (ref 0.5–1)
GFR AFRICAN AMERICAN: 43
GFR NON-AFRICAN AMERICAN: 36 ML/MIN/1.73
GLUCOSE BLD-MCNC: 141 MG/DL (ref 74–99)
HBA1C MFR BLD: 7.7 % (ref 4–5.6)
HCT VFR BLD CALC: 29.2 % (ref 34–48)
HEMOGLOBIN: 9.2 G/DL (ref 11.5–15.5)
MCH RBC QN AUTO: 31.3 PG (ref 26–35)
MCHC RBC AUTO-ENTMCNC: 31.5 % (ref 32–34.5)
MCV RBC AUTO: 99.3 FL (ref 80–99.9)
PDW BLD-RTO: 13.3 FL (ref 11.5–15)
PLATELET # BLD: 220 E9/L (ref 130–450)
PMV BLD AUTO: 9.2 FL (ref 7–12)
POTASSIUM SERPL-SCNC: 4 MMOL/L (ref 3.5–5)
RBC # BLD: 2.94 E12/L (ref 3.5–5.5)
SODIUM BLD-SCNC: 140 MMOL/L (ref 132–146)
WBC # BLD: 7.1 E9/L (ref 4.5–11.5)

## 2019-12-03 PROCEDURE — 83036 HEMOGLOBIN GLYCOSYLATED A1C: CPT

## 2019-12-03 PROCEDURE — 80048 BASIC METABOLIC PNL TOTAL CA: CPT

## 2019-12-03 PROCEDURE — 36415 COLL VENOUS BLD VENIPUNCTURE: CPT

## 2019-12-03 PROCEDURE — 85027 COMPLETE CBC AUTOMATED: CPT

## 2019-12-04 LAB
BLOOD CULTURE, ROUTINE: NORMAL
CULTURE, BLOOD 2: NORMAL
CULTURE, BLOOD 2: NORMAL

## 2019-12-12 ENCOUNTER — HOSPITAL ENCOUNTER (OUTPATIENT)
Age: 84
Discharge: HOME | End: 2019-12-14

## 2019-12-12 LAB
ANION GAP SERPL CALCULATED.3IONS-SCNC: 13 MMOL/L (ref 7–16)
BUN BLDV-MCNC: 57 MG/DL (ref 8–23)
CALCIUM SERPL-MCNC: 9.4 MG/DL (ref 8.6–10.2)
CHLORIDE BLD-SCNC: 99 MMOL/L (ref 98–107)
CO2: 26 MMOL/L (ref 22–29)
CREAT SERPL-MCNC: 1.2 MG/DL (ref 0.5–1)
GFR AFRICAN AMERICAN: 51
GFR NON-AFRICAN AMERICAN: 43 ML/MIN/1.73
GLUCOSE BLD-MCNC: 273 MG/DL (ref 74–99)
HCT VFR BLD CALC: 31.9 % (ref 34–48)
HEMOGLOBIN: 10 G/DL (ref 11.5–15.5)
MCH RBC QN AUTO: 31 PG (ref 26–35)
MCHC RBC AUTO-ENTMCNC: 31.3 % (ref 32–34.5)
MCV RBC AUTO: 98.8 FL (ref 80–99.9)
PDW BLD-RTO: 13.5 FL (ref 11.5–15)
PLATELET # BLD: 314 E9/L (ref 130–450)
PMV BLD AUTO: 9.1 FL (ref 7–12)
POTASSIUM SERPL-SCNC: 4.5 MMOL/L (ref 3.5–5)
RBC # BLD: 3.23 E12/L (ref 3.5–5.5)
SODIUM BLD-SCNC: 138 MMOL/L (ref 132–146)
WBC # BLD: 12.2 E9/L (ref 4.5–11.5)

## 2019-12-12 PROCEDURE — 85027 COMPLETE CBC AUTOMATED: CPT

## 2019-12-12 PROCEDURE — 36415 COLL VENOUS BLD VENIPUNCTURE: CPT

## 2019-12-12 PROCEDURE — 80048 BASIC METABOLIC PNL TOTAL CA: CPT

## 2019-12-15 ENCOUNTER — HOSPITAL ENCOUNTER (OUTPATIENT)
Age: 84
Discharge: HOME | End: 2019-12-17

## 2019-12-15 ENCOUNTER — HOSPITAL ENCOUNTER (OUTPATIENT)
Age: 84
Discharge: HOME | End: 2019-12-15
Payer: COMMERCIAL

## 2019-12-15 LAB
ANION GAP SERPL CALCULATED.3IONS-SCNC: 14 MMOL/L (ref 7–16)
BUN BLDV-MCNC: 52 MG/DL (ref 8–23)
CALCIUM SERPL-MCNC: 9.4 MG/DL (ref 8.6–10.2)
CHLORIDE BLD-SCNC: 106 MMOL/L (ref 98–107)
CO2: 22 MMOL/L (ref 22–29)
CREAT SERPL-MCNC: 1.1 MG/DL (ref 0.5–1)
GFR AFRICAN AMERICAN: 57
GFR NON-AFRICAN AMERICAN: 47 ML/MIN/1.73
GLUCOSE BLD-MCNC: 243 MG/DL (ref 74–99)
HCT VFR BLD CALC: 29.4 % (ref 34–48)
HEMOGLOBIN: 9.1 G/DL (ref 11.5–15.5)
MCH RBC QN AUTO: 30.8 PG (ref 26–35)
MCHC RBC AUTO-ENTMCNC: 31 % (ref 32–34.5)
MCV RBC AUTO: 99.7 FL (ref 80–99.9)
PDW BLD-RTO: 13.8 FL (ref 11.5–15)
PLATELET # BLD: 314 E9/L (ref 130–450)
PMV BLD AUTO: 9.3 FL (ref 7–12)
POTASSIUM SERPL-SCNC: 4 MMOL/L (ref 3.5–5)
RBC # BLD: 2.95 E12/L (ref 3.5–5.5)
SODIUM BLD-SCNC: 142 MMOL/L (ref 132–146)
WBC # BLD: 8 E9/L (ref 4.5–11.5)

## 2019-12-15 PROCEDURE — 36415 COLL VENOUS BLD VENIPUNCTURE: CPT

## 2019-12-15 PROCEDURE — 85027 COMPLETE CBC AUTOMATED: CPT

## 2019-12-15 PROCEDURE — 80048 BASIC METABOLIC PNL TOTAL CA: CPT

## 2019-12-16 ENCOUNTER — HOSPITAL ENCOUNTER (OUTPATIENT)
Dept: GENERAL RADIOLOGY | Age: 84
Discharge: HOME | End: 2019-12-18
Payer: COMMERCIAL

## 2019-12-16 DIAGNOSIS — R13.10 DYSPHAGIA, UNSPECIFIED TYPE: ICD-10-CM

## 2019-12-16 PROCEDURE — 74230 X-RAY XM SWLNG FUNCJ C+: CPT

## 2019-12-16 PROCEDURE — 2500000003 HC RX 250 WO HCPCS: Performed by: PHYSICIAN ASSISTANT

## 2019-12-16 PROCEDURE — 92611 MOTION FLUOROSCOPY/SWALLOW: CPT

## 2019-12-16 RX ADMIN — BARIUM SULFATE 15 ML: 0.81 POWDER, FOR SUSPENSION ORAL at 13:23

## 2019-12-16 RX ADMIN — BARIUM SULFATE 15 ML: 400 SUSPENSION ORAL at 13:23

## 2019-12-16 RX ADMIN — BARIUM SULFATE 15 ML: 400 PASTE ORAL at 13:23

## 2020-01-01 ENCOUNTER — HOSPITAL ENCOUNTER (OUTPATIENT)
Dept: WOUND CARE | Age: 85
Discharge: HOME | End: 2020-12-02
Payer: MEDICARE

## 2020-01-01 ENCOUNTER — HOSPITAL ENCOUNTER (OUTPATIENT)
Dept: WOUND CARE | Age: 85
Discharge: HOME | End: 2020-11-04
Payer: MEDICARE

## 2020-01-01 ENCOUNTER — TELEPHONE (OUTPATIENT)
Dept: FAMILY MEDICINE CLINIC | Age: 85
End: 2020-01-01

## 2020-01-01 ENCOUNTER — TELEPHONE (OUTPATIENT)
Dept: CARDIOLOGY CLINIC | Age: 85
End: 2020-01-01

## 2020-01-01 ENCOUNTER — CARE COORDINATION (OUTPATIENT)
Dept: CASE MANAGEMENT | Age: 85
End: 2020-01-01

## 2020-01-01 ENCOUNTER — HOSPITAL ENCOUNTER (INPATIENT)
Age: 85
LOS: 4 days | Discharge: HOME | DRG: 177 | End: 2020-11-20
Attending: EMERGENCY MEDICINE | Admitting: INTERNAL MEDICINE
Payer: MEDICARE

## 2020-01-01 ENCOUNTER — APPOINTMENT (OUTPATIENT)
Dept: GENERAL RADIOLOGY | Age: 85
End: 2020-01-01
Payer: MEDICARE

## 2020-01-01 ENCOUNTER — HOSPITAL ENCOUNTER (INPATIENT)
Age: 85
LOS: 6 days | Discharge: HOME | DRG: 177 | End: 2020-12-05
Attending: INTERNAL MEDICINE | Admitting: INTERNAL MEDICINE
Payer: MEDICARE

## 2020-01-01 ENCOUNTER — APPOINTMENT (OUTPATIENT)
Dept: GENERAL RADIOLOGY | Age: 85
DRG: 177 | End: 2020-01-01
Payer: MEDICARE

## 2020-01-01 ENCOUNTER — APPOINTMENT (OUTPATIENT)
Dept: GENERAL RADIOLOGY | Age: 85
DRG: 177 | End: 2020-01-01
Attending: INTERNAL MEDICINE
Payer: MEDICARE

## 2020-01-01 ENCOUNTER — VIRTUAL VISIT (OUTPATIENT)
Dept: FAMILY MEDICINE CLINIC | Age: 85
End: 2020-01-01
Payer: MEDICARE

## 2020-01-01 ENCOUNTER — OFFICE VISIT (OUTPATIENT)
Dept: FAMILY MEDICINE CLINIC | Age: 85
End: 2020-01-01
Payer: MEDICARE

## 2020-01-01 ENCOUNTER — HOSPITAL ENCOUNTER (EMERGENCY)
Age: 85
Discharge: ANOTHER HOSPITAL | End: 2020-11-29
Attending: EMERGENCY MEDICINE
Payer: MEDICARE

## 2020-01-01 VITALS
BODY MASS INDEX: 33.57 KG/M2 | RESPIRATION RATE: 16 BRPM | HEART RATE: 85 BPM | DIASTOLIC BLOOD PRESSURE: 48 MMHG | TEMPERATURE: 98 F | SYSTOLIC BLOOD PRESSURE: 92 MMHG | OXYGEN SATURATION: 95 % | HEIGHT: 60 IN | WEIGHT: 171 LBS

## 2020-01-01 VITALS
HEART RATE: 80 BPM | DIASTOLIC BLOOD PRESSURE: 70 MMHG | TEMPERATURE: 97.8 F | SYSTOLIC BLOOD PRESSURE: 122 MMHG | RESPIRATION RATE: 16 BRPM

## 2020-01-01 VITALS
BODY MASS INDEX: 33.38 KG/M2 | TEMPERATURE: 97 F | SYSTOLIC BLOOD PRESSURE: 179 MMHG | RESPIRATION RATE: 16 BRPM | WEIGHT: 170 LBS | OXYGEN SATURATION: 92 % | HEART RATE: 80 BPM | DIASTOLIC BLOOD PRESSURE: 82 MMHG | HEIGHT: 60 IN

## 2020-01-01 VITALS
OXYGEN SATURATION: 98 % | WEIGHT: 178 LBS | DIASTOLIC BLOOD PRESSURE: 57 MMHG | HEART RATE: 79 BPM | SYSTOLIC BLOOD PRESSURE: 113 MMHG | HEIGHT: 60 IN | BODY MASS INDEX: 34.95 KG/M2 | RESPIRATION RATE: 22 BRPM | TEMPERATURE: 98 F

## 2020-01-01 VITALS
RESPIRATION RATE: 18 BRPM | OXYGEN SATURATION: 95 % | HEIGHT: 60 IN | DIASTOLIC BLOOD PRESSURE: 72 MMHG | WEIGHT: 178.5 LBS | HEART RATE: 70 BPM | TEMPERATURE: 98 F | BODY MASS INDEX: 35.05 KG/M2 | SYSTOLIC BLOOD PRESSURE: 130 MMHG

## 2020-01-01 DIAGNOSIS — N17.9 ACUTE KIDNEY INJURY SUPERIMPOSED ON CKD (HCC): ICD-10-CM

## 2020-01-01 DIAGNOSIS — I10 ESSENTIAL HYPERTENSION: ICD-10-CM

## 2020-01-01 DIAGNOSIS — R06.9 UNSPECIFIED ABNORMALITIES OF BREATHING: ICD-10-CM

## 2020-01-01 DIAGNOSIS — N18.9 ACUTE KIDNEY INJURY SUPERIMPOSED ON CKD (HCC): ICD-10-CM

## 2020-01-01 LAB
ADENOVIRUS BY PCR: NOT DETECTED
ALBUMIN SERPL-MCNC: 3 G/DL (ref 3.5–5.2)
ALBUMIN SERPL-MCNC: 3.2 G/DL (ref 3.5–5.2)
ALP BLD-CCNC: 55 U/L (ref 35–104)
ALP BLD-CCNC: 57 U/L (ref 35–104)
ALP BLD-CCNC: 62 U/L (ref 35–104)
ALP BLD-CCNC: 71 U/L (ref 35–104)
ALT SERPL-CCNC: 18 U/L (ref 0–32)
ALT SERPL-CCNC: 19 U/L (ref 0–32)
ALT SERPL-CCNC: 22 U/L (ref 0–32)
ALT SERPL-CCNC: 29 U/L (ref 0–32)
AMORPHOUS: ABNORMAL
ANION GAP SERPL CALCULATED.3IONS-SCNC: 10 MMOL/L (ref 7–16)
ANION GAP SERPL CALCULATED.3IONS-SCNC: 11 MMOL/L (ref 7–16)
ANION GAP SERPL CALCULATED.3IONS-SCNC: 12 MMOL/L (ref 7–16)
ANION GAP SERPL CALCULATED.3IONS-SCNC: 13 MMOL/L (ref 7–16)
ANION GAP SERPL CALCULATED.3IONS-SCNC: 13 MMOL/L (ref 7–16)
ANION GAP SERPL CALCULATED.3IONS-SCNC: 7 MMOL/L (ref 7–16)
ANION GAP SERPL CALCULATED.3IONS-SCNC: 8 MMOL/L (ref 7–16)
ANION GAP SERPL CALCULATED.3IONS-SCNC: 8 MMOL/L (ref 7–16)
ANION GAP SERPL CALCULATED.3IONS-SCNC: 9 MMOL/L (ref 7–16)
ARTERIAL PH AT TEMPERATURE: 7.37 (ref 7.35–7.45)
AST SERPL-CCNC: 27 U/L (ref 0–31)
AST SERPL-CCNC: 29 U/L (ref 0–31)
AST SERPL-CCNC: 34 U/L (ref 0–31)
AST SERPL-CCNC: 39 U/L (ref 0–31)
BACTERIA: ABNORMAL /HPF
BASE EXCESS: -0.6 MMOL/L (ref -3–3)
BASOPHILS ABSOLUTE: 0 E9/L (ref 0–0.2)
BASOPHILS ABSOLUTE: 0.01 E9/L (ref 0–0.2)
BASOPHILS ABSOLUTE: 0.02 E9/L (ref 0–0.2)
BASOPHILS RELATIVE PERCENT: 0 % (ref 0–2)
BASOPHILS RELATIVE PERCENT: 0.1 % (ref 0–2)
BASOPHILS RELATIVE PERCENT: 0.1 % (ref 0–2)
BASOPHILS RELATIVE PERCENT: 0.2 % (ref 0–2)
BASOPHILS RELATIVE PERCENT: 0.3 % (ref 0–2)
BILIRUB SERPL-MCNC: 0.3 MG/DL (ref 0–1.2)
BILIRUBIN URINE: NEGATIVE
BLOOD CULTURE, ROUTINE: NORMAL
BLOOD CULTURE, ROUTINE: NORMAL
BLOOD, URINE: NEGATIVE
BORDETELLA PARAPERTUSSIS BY PCR: NOT DETECTED
BORDETELLA PERTUSSIS BY PCR: NOT DETECTED
BUN BLDV-MCNC: 13 MG/DL (ref 8–23)
BUN BLDV-MCNC: 15 MG/DL (ref 8–23)
BUN BLDV-MCNC: 15 MG/DL (ref 8–23)
BUN BLDV-MCNC: 16 MG/DL (ref 8–23)
BUN BLDV-MCNC: 17 MG/DL (ref 8–23)
BUN BLDV-MCNC: 17 MG/DL (ref 8–23)
BUN BLDV-MCNC: 19 MG/DL (ref 8–23)
BUN BLDV-MCNC: 21 MG/DL (ref 8–23)
BUN BLDV-MCNC: 21 MG/DL (ref 8–23)
BUN BLDV-MCNC: 26 MG/DL (ref 8–23)
BUN BLDV-MCNC: 28 MG/DL (ref 8–23)
BUN BLDV-MCNC: 29 MG/DL (ref 8–23)
BUN BLDV-MCNC: 30 MG/DL (ref 8–23)
BUN BLDV-MCNC: 34 MG/DL (ref 8–23)
BUN BLDV-MCNC: 36 MG/DL (ref 8–23)
BUN BLDV-MCNC: 38 MG/DL (ref 8–23)
C-REACTIVE PROTEIN: 0.7 MG/DL (ref 0–0.4)
C-REACTIVE PROTEIN: 1 MG/DL (ref 0–0.4)
CALCIUM SERPL-MCNC: 8.5 MG/DL (ref 8.6–10.2)
CALCIUM SERPL-MCNC: 8.7 MG/DL (ref 8.6–10.2)
CALCIUM SERPL-MCNC: 8.8 MG/DL (ref 8.6–10.2)
CALCIUM SERPL-MCNC: 8.8 MG/DL (ref 8.6–10.2)
CALCIUM SERPL-MCNC: 8.9 MG/DL (ref 8.6–10.2)
CALCIUM SERPL-MCNC: 8.9 MG/DL (ref 8.6–10.2)
CALCIUM SERPL-MCNC: 9 MG/DL (ref 8.6–10.2)
CALCIUM SERPL-MCNC: 9.1 MG/DL (ref 8.6–10.2)
CALCIUM SERPL-MCNC: 9.2 MG/DL (ref 8.6–10.2)
CALCIUM SERPL-MCNC: 9.2 MG/DL (ref 8.6–10.2)
CALCIUM SERPL-MCNC: 9.3 MG/DL (ref 8.6–10.2)
CALCIUM SERPL-MCNC: 9.5 MG/DL (ref 8.6–10.2)
CHLAMYDOPHILIA PNEUMONIAE BY PCR: NOT DETECTED
CHLORIDE BLD-SCNC: 100 MMOL/L (ref 98–107)
CHLORIDE BLD-SCNC: 100 MMOL/L (ref 98–107)
CHLORIDE BLD-SCNC: 101 MMOL/L (ref 98–107)
CHLORIDE BLD-SCNC: 102 MMOL/L (ref 98–107)
CHLORIDE BLD-SCNC: 103 MMOL/L (ref 98–107)
CHLORIDE BLD-SCNC: 103 MMOL/L (ref 98–107)
CHLORIDE BLD-SCNC: 104 MMOL/L (ref 98–107)
CHLORIDE BLD-SCNC: 106 MMOL/L (ref 98–107)
CHLORIDE BLD-SCNC: 107 MMOL/L (ref 98–107)
CHOLESTEROL, TOTAL: 83 MG/DL (ref 0–199)
CHP ED QC CHECK: NORMAL
CHP ED QC CHECK: NORMAL
CK MB: 4.5 NG/ML (ref 0–4.3)
CK MB: 4.9 NG/ML (ref 0–4.3)
CLARITY: CLEAR
CO2: 23 MMOL/L (ref 22–29)
CO2: 24 MMOL/L (ref 22–29)
CO2: 25 MMOL/L (ref 22–29)
CO2: 25 MMOL/L (ref 22–29)
CO2: 26 MMOL/L (ref 22–29)
CO2: 27 MMOL/L (ref 22–29)
CO2: 28 MMOL/L (ref 22–29)
CO2: 28 MMOL/L (ref 22–29)
CO2: 29 MMOL/L (ref 22–29)
COLOR: YELLOW
CORONAVIRUS 229E BY PCR: NOT DETECTED
CORONAVIRUS HKU1 BY PCR: NOT DETECTED
CORONAVIRUS NL63 BY PCR: NOT DETECTED
CORONAVIRUS OC43 BY PCR: NOT DETECTED
CREAT SERPL-MCNC: 0.9 MG/DL (ref 0.5–1)
CREAT SERPL-MCNC: 0.9 MG/DL (ref 0.5–1)
CREAT SERPL-MCNC: 1 MG/DL (ref 0.5–1)
CREAT SERPL-MCNC: 1.1 MG/DL (ref 0.5–1)
CREAT SERPL-MCNC: 1.2 MG/DL (ref 0.5–1)
CREAT SERPL-MCNC: 1.4 MG/DL (ref 0.5–1)
CREAT SERPL-MCNC: 1.5 MG/DL (ref 0.5–1)
CREAT SERPL-MCNC: 1.7 MG/DL (ref 0.5–1)
CULTURE, BLOOD 2: NORMAL
D DIMER: <200 NG/ML DDU
DRAWN BY: ABNORMAL
EKG ATRIAL RATE: 87 BPM
EKG ATRIAL RATE: 88 BPM
EKG ATRIAL RATE: 89 BPM
EKG ATRIAL RATE: 98 BPM
EKG P AXIS: 44 DEGREES
EKG P AXIS: 49 DEGREES
EKG P AXIS: 69 DEGREES
EKG P AXIS: 78 DEGREES
EKG P-R INTERVAL: 196 MS
EKG P-R INTERVAL: 202 MS
EKG P-R INTERVAL: 216 MS
EKG P-R INTERVAL: 230 MS
EKG Q-T INTERVAL: 334 MS
EKG Q-T INTERVAL: 340 MS
EKG Q-T INTERVAL: 350 MS
EKG Q-T INTERVAL: 356 MS
EKG QRS DURATION: 74 MS
EKG QRS DURATION: 74 MS
EKG QRS DURATION: 82 MS
EKG QRS DURATION: 84 MS
EKG QTC CALCULATION (BAZETT): 404 MS
EKG QTC CALCULATION (BAZETT): 425 MS
EKG QTC CALCULATION (BAZETT): 428 MS
EKG QTC CALCULATION (BAZETT): 434 MS
EKG R AXIS: -25 DEGREES
EKG R AXIS: -28 DEGREES
EKG R AXIS: -33 DEGREES
EKG R AXIS: -34 DEGREES
EKG T AXIS: -148 DEGREES
EKG T AXIS: 72 DEGREES
EKG T AXIS: 84 DEGREES
EKG T AXIS: 98 DEGREES
EKG VENTRICULAR RATE: 87 BPM
EKG VENTRICULAR RATE: 88 BPM
EKG VENTRICULAR RATE: 89 BPM
EKG VENTRICULAR RATE: 98 BPM
EOSINOPHILS ABSOLUTE: 0.01 E9/L (ref 0.05–0.5)
EOSINOPHILS ABSOLUTE: 0.02 E9/L (ref 0.05–0.5)
EOSINOPHILS ABSOLUTE: 0.02 E9/L (ref 0.05–0.5)
EOSINOPHILS ABSOLUTE: 0.03 E9/L (ref 0.05–0.5)
EOSINOPHILS ABSOLUTE: 0.03 E9/L (ref 0.05–0.5)
EOSINOPHILS ABSOLUTE: 0.05 E9/L (ref 0.05–0.5)
EOSINOPHILS ABSOLUTE: 0.07 E9/L (ref 0.05–0.5)
EOSINOPHILS ABSOLUTE: 0.07 E9/L (ref 0.05–0.5)
EOSINOPHILS ABSOLUTE: 0.11 E9/L (ref 0.05–0.5)
EOSINOPHILS ABSOLUTE: 0.12 E9/L (ref 0.05–0.5)
EOSINOPHILS RELATIVE PERCENT: 0.2 % (ref 0–6)
EOSINOPHILS RELATIVE PERCENT: 0.3 % (ref 0–6)
EOSINOPHILS RELATIVE PERCENT: 0.4 % (ref 0–6)
EOSINOPHILS RELATIVE PERCENT: 0.5 % (ref 0–6)
EOSINOPHILS RELATIVE PERCENT: 0.6 % (ref 0–6)
EOSINOPHILS RELATIVE PERCENT: 1 % (ref 0–6)
EOSINOPHILS RELATIVE PERCENT: 1.1 % (ref 0–6)
EOSINOPHILS RELATIVE PERCENT: 1.3 % (ref 0–6)
EOSINOPHILS RELATIVE PERCENT: 1.7 % (ref 0–6)
EOSINOPHILS RELATIVE PERCENT: 1.7 % (ref 0–6)
FERRITIN: 69 NG/ML
FERRITIN: 92 NG/ML
FIBRINOGEN: 427 MG/DL (ref 225–540)
FIBRINOGEN: 450 MG/DL (ref 225–540)
FIBRINOGEN: 454 MG/DL (ref 225–540)
GFR AFRICAN AMERICAN: 34
GFR AFRICAN AMERICAN: 40
GFR AFRICAN AMERICAN: 43
GFR AFRICAN AMERICAN: 51
GFR AFRICAN AMERICAN: 57
GFR AFRICAN AMERICAN: >60
GFR NON-AFRICAN AMERICAN: 28 ML/MIN/1.73
GFR NON-AFRICAN AMERICAN: 33 ML/MIN/1.73
GFR NON-AFRICAN AMERICAN: 36 ML/MIN/1.73
GFR NON-AFRICAN AMERICAN: 42 ML/MIN/1.73
GFR NON-AFRICAN AMERICAN: 47 ML/MIN/1.73
GFR NON-AFRICAN AMERICAN: 52 ML/MIN/1.73
GFR NON-AFRICAN AMERICAN: 59 ML/MIN/1.73
GFR NON-AFRICAN AMERICAN: 59 ML/MIN/1.73
GLUCOSE BLD-MCNC: 120 MG/DL (ref 74–99)
GLUCOSE BLD-MCNC: 138 MG/DL (ref 74–99)
GLUCOSE BLD-MCNC: 140 MG/DL (ref 74–99)
GLUCOSE BLD-MCNC: 142 MG/DL (ref 74–99)
GLUCOSE BLD-MCNC: 147 MG/DL (ref 74–99)
GLUCOSE BLD-MCNC: 152 MG/DL (ref 74–99)
GLUCOSE BLD-MCNC: 157 MG/DL (ref 74–99)
GLUCOSE BLD-MCNC: 162 MG/DL (ref 74–99)
GLUCOSE BLD-MCNC: 163 MG/DL
GLUCOSE BLD-MCNC: 166 MG/DL (ref 74–99)
GLUCOSE BLD-MCNC: 174 MG/DL (ref 74–99)
GLUCOSE BLD-MCNC: 176 MG/DL (ref 74–99)
GLUCOSE BLD-MCNC: 177 MG/DL (ref 74–99)
GLUCOSE BLD-MCNC: 182 MG/DL (ref 74–99)
GLUCOSE BLD-MCNC: 199 MG/DL (ref 74–99)
GLUCOSE BLD-MCNC: 211 MG/DL (ref 74–99)
GLUCOSE BLD-MCNC: 230 MG/DL (ref 74–99)
GLUCOSE BLD-MCNC: 88 MG/DL
GLUCOSE URINE: NEGATIVE MG/DL
HBA1C MFR BLD: 6.8 % (ref 4–5.6)
HCO3: 24.8 MMOL/L (ref 22–26)
HCT VFR BLD CALC: 27.4 % (ref 34–48)
HCT VFR BLD CALC: 30.1 % (ref 34–48)
HCT VFR BLD CALC: 30.2 % (ref 34–48)
HCT VFR BLD CALC: 30.9 % (ref 34–48)
HCT VFR BLD CALC: 30.9 % (ref 34–48)
HCT VFR BLD CALC: 31.5 % (ref 34–48)
HCT VFR BLD CALC: 31.5 % (ref 34–48)
HCT VFR BLD CALC: 32.2 % (ref 34–48)
HCT VFR BLD CALC: 33.1 % (ref 34–48)
HCT VFR BLD CALC: 33.6 % (ref 34–48)
HCT VFR BLD CALC: 34.6 % (ref 34–48)
HCT VFR BLD CALC: 36.2 % (ref 34–48)
HDLC SERPL-MCNC: 38 MG/DL
HEMOGLOBIN: 10 G/DL (ref 11.5–15.5)
HEMOGLOBIN: 10.1 G/DL (ref 11.5–15.5)
HEMOGLOBIN: 10.3 G/DL (ref 11.5–15.5)
HEMOGLOBIN: 10.3 G/DL (ref 11.5–15.5)
HEMOGLOBIN: 10.5 G/DL (ref 11.5–15.5)
HEMOGLOBIN: 10.6 G/DL (ref 11.5–15.5)
HEMOGLOBIN: 10.6 G/DL (ref 11.5–15.5)
HEMOGLOBIN: 11 G/DL (ref 11.5–15.5)
HEMOGLOBIN: 11.2 G/DL (ref 11.5–15.5)
HEMOGLOBIN: 11.4 G/DL (ref 11.5–15.5)
HEMOGLOBIN: 11.6 G/DL (ref 11.5–15.5)
HEMOGLOBIN: 9.2 G/DL (ref 11.5–15.5)
HUMAN METAPNEUMOVIRUS BY PCR: NOT DETECTED
HUMAN RHINOVIRUS/ENTEROVIRUS BY PCR: NOT DETECTED
IMMATURE GRANULOCYTES #: 0.01 E9/L
IMMATURE GRANULOCYTES #: 0.02 E9/L
IMMATURE GRANULOCYTES #: 0.03 E9/L
IMMATURE GRANULOCYTES #: 0.04 E9/L
IMMATURE GRANULOCYTES %: 0.2 % (ref 0–5)
IMMATURE GRANULOCYTES %: 0.3 % (ref 0–5)
IMMATURE GRANULOCYTES %: 0.4 % (ref 0–5)
IMMATURE GRANULOCYTES %: 0.5 % (ref 0–5)
IMMATURE GRANULOCYTES %: 0.5 % (ref 0–5)
IMMATURE GRANULOCYTES %: 0.6 % (ref 0–5)
IMMATURE GRANULOCYTES %: 0.6 % (ref 0–5)
INFLUENZA A BY PCR: NOT DETECTED
INFLUENZA B BY PCR: NOT DETECTED
INSPIRED O2: 21 %
KETONES, URINE: NEGATIVE MG/DL
L. PNEUMOPHILA SEROGP 1 UR AG: NORMAL
L. PNEUMOPHILA SEROGP 1 UR AG: NORMAL
LACTATE DEHYDROGENASE: 410 U/L (ref 135–214)
LACTIC ACID, SEPSIS: 0.9 MMOL/L (ref 0.5–1.9)
LACTIC ACID, SEPSIS: 1.2 MMOL/L (ref 0.5–1.9)
LACTIC ACID: 2 MMOL/L (ref 0.5–2.2)
LDL CHOLESTEROL CALCULATED: 28 MG/DL (ref 0–99)
LEUKOCYTE ESTERASE, URINE: ABNORMAL
LYMPHOCYTES ABSOLUTE: 0.94 E9/L (ref 1.5–4)
LYMPHOCYTES ABSOLUTE: 1.03 E9/L (ref 1.5–4)
LYMPHOCYTES ABSOLUTE: 1.06 E9/L (ref 1.5–4)
LYMPHOCYTES ABSOLUTE: 1.08 E9/L (ref 1.5–4)
LYMPHOCYTES ABSOLUTE: 1.18 E9/L (ref 1.5–4)
LYMPHOCYTES ABSOLUTE: 1.19 E9/L (ref 1.5–4)
LYMPHOCYTES ABSOLUTE: 1.35 E9/L (ref 1.5–4)
LYMPHOCYTES ABSOLUTE: 1.44 E9/L (ref 1.5–4)
LYMPHOCYTES ABSOLUTE: 1.58 E9/L (ref 1.5–4)
LYMPHOCYTES ABSOLUTE: 1.75 E9/L (ref 1.5–4)
LYMPHOCYTES RELATIVE PERCENT: 16 % (ref 20–42)
LYMPHOCYTES RELATIVE PERCENT: 16.7 % (ref 20–42)
LYMPHOCYTES RELATIVE PERCENT: 18 % (ref 20–42)
LYMPHOCYTES RELATIVE PERCENT: 18.6 % (ref 20–42)
LYMPHOCYTES RELATIVE PERCENT: 19.5 % (ref 20–42)
LYMPHOCYTES RELATIVE PERCENT: 22.7 % (ref 20–42)
LYMPHOCYTES RELATIVE PERCENT: 23.5 % (ref 20–42)
LYMPHOCYTES RELATIVE PERCENT: 24.6 % (ref 20–42)
LYMPHOCYTES RELATIVE PERCENT: 30.8 % (ref 20–42)
LYMPHOCYTES RELATIVE PERCENT: 31 % (ref 20–42)
Lab: NORMAL
MCH RBC QN AUTO: 32.9 PG (ref 26–35)
MCH RBC QN AUTO: 33.2 PG (ref 26–35)
MCH RBC QN AUTO: 33.3 PG (ref 26–35)
MCH RBC QN AUTO: 33.3 PG (ref 26–35)
MCH RBC QN AUTO: 33.4 PG (ref 26–35)
MCH RBC QN AUTO: 33.6 PG (ref 26–35)
MCH RBC QN AUTO: 33.7 PG (ref 26–35)
MCH RBC QN AUTO: 33.7 PG (ref 26–35)
MCH RBC QN AUTO: 33.8 PG (ref 26–35)
MCH RBC QN AUTO: 33.9 PG (ref 26–35)
MCH RBC QN AUTO: 33.9 PG (ref 26–35)
MCH RBC QN AUTO: 34.7 PG (ref 26–35)
MCHC RBC AUTO-ENTMCNC: 32 % (ref 32–34.5)
MCHC RBC AUTO-ENTMCNC: 32.4 % (ref 32–34.5)
MCHC RBC AUTO-ENTMCNC: 32.7 % (ref 32–34.5)
MCHC RBC AUTO-ENTMCNC: 32.7 % (ref 32–34.5)
MCHC RBC AUTO-ENTMCNC: 32.9 % (ref 32–34.5)
MCHC RBC AUTO-ENTMCNC: 33.2 % (ref 32–34.5)
MCHC RBC AUTO-ENTMCNC: 33.3 % (ref 32–34.5)
MCHC RBC AUTO-ENTMCNC: 33.4 % (ref 32–34.5)
MCHC RBC AUTO-ENTMCNC: 33.6 % (ref 32–34.5)
MCHC RBC AUTO-ENTMCNC: 33.7 % (ref 32–34.5)
MCHC RBC AUTO-ENTMCNC: 34 % (ref 32–34.5)
MCHC RBC AUTO-ENTMCNC: 34.4 % (ref 32–34.5)
MCV RBC AUTO: 100 FL (ref 80–99.9)
MCV RBC AUTO: 100.4 FL (ref 80–99.9)
MCV RBC AUTO: 100.6 FL (ref 80–99.9)
MCV RBC AUTO: 101.8 FL (ref 80–99.9)
MCV RBC AUTO: 102 FL (ref 80–99.9)
MCV RBC AUTO: 102 FL (ref 80–99.9)
MCV RBC AUTO: 103.3 FL (ref 80–99.9)
MCV RBC AUTO: 103.3 FL (ref 80–99.9)
MCV RBC AUTO: 105.2 FL (ref 80–99.9)
MCV RBC AUTO: 97.6 FL (ref 80–99.9)
MCV RBC AUTO: 99.7 FL (ref 80–99.9)
MCV RBC AUTO: 99.7 FL (ref 80–99.9)
METER GLUCOSE: 120 MG/DL (ref 74–99)
METER GLUCOSE: 140 MG/DL (ref 74–99)
METER GLUCOSE: 149 MG/DL (ref 74–99)
METER GLUCOSE: 151 MG/DL (ref 74–99)
METER GLUCOSE: 154 MG/DL (ref 74–99)
METER GLUCOSE: 156 MG/DL (ref 74–99)
METER GLUCOSE: 156 MG/DL (ref 74–99)
METER GLUCOSE: 160 MG/DL (ref 74–99)
METER GLUCOSE: 162 MG/DL (ref 74–99)
METER GLUCOSE: 164 MG/DL (ref 74–99)
METER GLUCOSE: 167 MG/DL (ref 74–99)
METER GLUCOSE: 174 MG/DL (ref 74–99)
METER GLUCOSE: 179 MG/DL (ref 74–99)
METER GLUCOSE: 181 MG/DL (ref 74–99)
METER GLUCOSE: 181 MG/DL (ref 74–99)
METER GLUCOSE: 185 MG/DL (ref 74–99)
METER GLUCOSE: 186 MG/DL (ref 74–99)
METER GLUCOSE: 187 MG/DL (ref 74–99)
METER GLUCOSE: 187 MG/DL (ref 74–99)
METER GLUCOSE: 199 MG/DL (ref 74–99)
METER GLUCOSE: 199 MG/DL (ref 74–99)
METER GLUCOSE: 203 MG/DL (ref 74–99)
METER GLUCOSE: 214 MG/DL (ref 74–99)
METER GLUCOSE: 214 MG/DL (ref 74–99)
METER GLUCOSE: 218 MG/DL (ref 74–99)
METER GLUCOSE: 222 MG/DL (ref 74–99)
METER GLUCOSE: 230 MG/DL (ref 74–99)
METER GLUCOSE: 244 MG/DL (ref 74–99)
METER GLUCOSE: 244 MG/DL (ref 74–99)
METER GLUCOSE: 251 MG/DL (ref 74–99)
METER GLUCOSE: 252 MG/DL (ref 74–99)
METER GLUCOSE: 258 MG/DL (ref 74–99)
METER GLUCOSE: 269 MG/DL (ref 74–99)
METER GLUCOSE: 269 MG/DL (ref 74–99)
METER GLUCOSE: 294 MG/DL (ref 74–99)
METER GLUCOSE: 299 MG/DL (ref 74–99)
METER GLUCOSE: 327 MG/DL (ref 74–99)
METER GLUCOSE: 88 MG/DL (ref 74–99)
METER GLUCOSE: 99 MG/DL (ref 74–99)
MODE: ABNORMAL
MONOCYTES ABSOLUTE: 0.15 E9/L (ref 0.1–0.95)
MONOCYTES ABSOLUTE: 0.27 E9/L (ref 0.1–0.95)
MONOCYTES ABSOLUTE: 0.31 E9/L (ref 0.1–0.95)
MONOCYTES ABSOLUTE: 0.33 E9/L (ref 0.1–0.95)
MONOCYTES ABSOLUTE: 0.33 E9/L (ref 0.1–0.95)
MONOCYTES ABSOLUTE: 0.36 E9/L (ref 0.1–0.95)
MONOCYTES ABSOLUTE: 0.38 E9/L (ref 0.1–0.95)
MONOCYTES ABSOLUTE: 0.4 E9/L (ref 0.1–0.95)
MONOCYTES ABSOLUTE: 0.41 E9/L (ref 0.1–0.95)
MONOCYTES ABSOLUTE: 0.49 E9/L (ref 0.1–0.95)
MONOCYTES RELATIVE PERCENT: 10.9 % (ref 2–12)
MONOCYTES RELATIVE PERCENT: 3.1 % (ref 2–12)
MONOCYTES RELATIVE PERCENT: 4.6 % (ref 2–12)
MONOCYTES RELATIVE PERCENT: 5.1 % (ref 2–12)
MONOCYTES RELATIVE PERCENT: 5.4 % (ref 2–12)
MONOCYTES RELATIVE PERCENT: 5.6 % (ref 2–12)
MONOCYTES RELATIVE PERCENT: 6.3 % (ref 2–12)
MONOCYTES RELATIVE PERCENT: 6.5 % (ref 2–12)
MYCOPLASMA PNEUMONIAE BY PCR: NOT DETECTED
NEUTROPHILS ABSOLUTE: 2.89 E9/L (ref 1.8–7.3)
NEUTROPHILS ABSOLUTE: 3.13 E9/L (ref 1.8–7.3)
NEUTROPHILS ABSOLUTE: 3.43 E9/L (ref 1.8–7.3)
NEUTROPHILS ABSOLUTE: 3.57 E9/L (ref 1.8–7.3)
NEUTROPHILS ABSOLUTE: 3.81 E9/L (ref 1.8–7.3)
NEUTROPHILS ABSOLUTE: 4.33 E9/L (ref 1.8–7.3)
NEUTROPHILS ABSOLUTE: 4.5 E9/L (ref 1.8–7.3)
NEUTROPHILS ABSOLUTE: 4.62 E9/L (ref 1.8–7.3)
NEUTROPHILS ABSOLUTE: 5.33 E9/L (ref 1.8–7.3)
NEUTROPHILS ABSOLUTE: 5.43 E9/L (ref 1.8–7.3)
NEUTROPHILS RELATIVE PERCENT: 61.5 % (ref 43–80)
NEUTROPHILS RELATIVE PERCENT: 62.7 % (ref 43–80)
NEUTROPHILS RELATIVE PERCENT: 64.1 % (ref 43–80)
NEUTROPHILS RELATIVE PERCENT: 68.5 % (ref 43–80)
NEUTROPHILS RELATIVE PERCENT: 71.1 % (ref 43–80)
NEUTROPHILS RELATIVE PERCENT: 72.1 % (ref 43–80)
NEUTROPHILS RELATIVE PERCENT: 73.4 % (ref 43–80)
NEUTROPHILS RELATIVE PERCENT: 74.9 % (ref 43–80)
NEUTROPHILS RELATIVE PERCENT: 76.7 % (ref 43–80)
NEUTROPHILS RELATIVE PERCENT: 78.7 % (ref 43–80)
NITRITE, URINE: NEGATIVE
O2 SATURATION: 91.2 % (ref 92–98.5)
ORGANISM: ABNORMAL
PARAINFLUENZA VIRUS 1 BY PCR: NOT DETECTED
PARAINFLUENZA VIRUS 2 BY PCR: NOT DETECTED
PARAINFLUENZA VIRUS 3 BY PCR: NOT DETECTED
PARAINFLUENZA VIRUS 4 BY PCR: NOT DETECTED
PCO2: 43.1 MMHG (ref 35–45)
PDW BLD-RTO: 12.3 FL (ref 11.5–15)
PDW BLD-RTO: 12.6 FL (ref 11.5–15)
PDW BLD-RTO: 12.6 FL (ref 11.5–15)
PDW BLD-RTO: 12.7 FL (ref 11.5–15)
PDW BLD-RTO: 12.7 FL (ref 11.5–15)
PDW BLD-RTO: 12.8 FL (ref 11.5–15)
PDW BLD-RTO: 12.8 FL (ref 11.5–15)
PDW BLD-RTO: 12.9 FL (ref 11.5–15)
PDW BLD-RTO: 13 FL (ref 11.5–15)
PDW BLD-RTO: 13.1 FL (ref 11.5–15)
PH UA: 7.5 (ref 5–9)
PLATELET # BLD: 103 E9/L (ref 130–450)
PLATELET # BLD: 110 E9/L (ref 130–450)
PLATELET # BLD: 115 E9/L (ref 130–450)
PLATELET # BLD: 122 E9/L (ref 130–450)
PLATELET # BLD: 125 E9/L (ref 130–450)
PLATELET # BLD: 135 E9/L (ref 130–450)
PLATELET # BLD: 137 E9/L (ref 130–450)
PLATELET # BLD: 137 E9/L (ref 130–450)
PLATELET # BLD: 142 E9/L (ref 130–450)
PLATELET # BLD: 145 E9/L (ref 130–450)
PLATELET # BLD: 148 E9/L (ref 130–450)
PLATELET # BLD: 194 E9/L (ref 130–450)
PMV BLD AUTO: 9.1 FL (ref 7–12)
PMV BLD AUTO: 9.1 FL (ref 7–12)
PMV BLD AUTO: 9.2 FL (ref 7–12)
PMV BLD AUTO: 9.3 FL (ref 7–12)
PMV BLD AUTO: 9.4 FL (ref 7–12)
PMV BLD AUTO: 9.5 FL (ref 7–12)
PMV BLD AUTO: 9.5 FL (ref 7–12)
PMV BLD AUTO: 9.6 FL (ref 7–12)
PMV BLD AUTO: 9.6 FL (ref 7–12)
PMV BLD AUTO: 9.9 FL (ref 7–12)
PO2: 63.8 MMHG (ref 0–100)
POTASSIUM REFLEX MAGNESIUM: 3.7 MMOL/L (ref 3.5–5)
POTASSIUM REFLEX MAGNESIUM: 3.8 MMOL/L (ref 3.5–5)
POTASSIUM REFLEX MAGNESIUM: 3.8 MMOL/L (ref 3.5–5)
POTASSIUM REFLEX MAGNESIUM: 3.9 MMOL/L (ref 3.5–5)
POTASSIUM REFLEX MAGNESIUM: 4 MMOL/L (ref 3.5–5)
POTASSIUM REFLEX MAGNESIUM: 4.1 MMOL/L (ref 3.5–5)
POTASSIUM REFLEX MAGNESIUM: 5.2 MMOL/L (ref 3.5–5)
POTASSIUM REFLEX MAGNESIUM: 5.9 MMOL/L (ref 3.5–5)
POTASSIUM SERPL-SCNC: 4 MMOL/L (ref 3.5–5)
POTASSIUM SERPL-SCNC: 4.3 MMOL/L (ref 3.5–5)
POTASSIUM SERPL-SCNC: 4.4 MMOL/L (ref 3.5–5)
POTASSIUM SERPL-SCNC: 4.6 MMOL/L (ref 3.5–5)
POTASSIUM SERPL-SCNC: 4.7 MMOL/L (ref 3.5–5)
POTASSIUM SERPL-SCNC: 4.9 MMOL/L (ref 3.5–5)
POTASSIUM SERPL-SCNC: 5.5 MMOL/L (ref 3.5–5)
PRO-BNP: 1059 PG/ML (ref 0–450)
PRO-BNP: 1527 PG/ML (ref 0–450)
PRO-BNP: 1795 PG/ML (ref 0–450)
PROCALCITONIN: 0.06 NG/ML (ref 0–0.08)
PROCALCITONIN: 0.08 NG/ML (ref 0–0.08)
PROTEIN UA: NEGATIVE MG/DL
RBC # BLD: 2.73 E12/L (ref 3.5–5.5)
RBC # BLD: 2.95 E12/L (ref 3.5–5.5)
RBC # BLD: 3.03 E12/L (ref 3.5–5.5)
RBC # BLD: 3.03 E12/L (ref 3.5–5.5)
RBC # BLD: 3.05 E12/L (ref 3.5–5.5)
RBC # BLD: 3.1 E12/L (ref 3.5–5.5)
RBC # BLD: 3.13 E12/L (ref 3.5–5.5)
RBC # BLD: 3.22 E12/L (ref 3.5–5.5)
RBC # BLD: 3.3 E12/L (ref 3.5–5.5)
RBC # BLD: 3.35 E12/L (ref 3.5–5.5)
RBC # BLD: 3.39 E12/L (ref 3.5–5.5)
RBC # BLD: 3.44 E12/L (ref 3.5–5.5)
RBC UA: ABNORMAL /HPF (ref 0–2)
REPORT: NORMAL
RESPIRATORY SYNCYTIAL VIRUS BY PCR: NOT DETECTED
SAMPLE SITE: ABNORMAL
SARS-COV-2, PCR: DETECTED
SODIUM BLD-SCNC: 136 MMOL/L (ref 132–146)
SODIUM BLD-SCNC: 137 MMOL/L (ref 132–146)
SODIUM BLD-SCNC: 138 MMOL/L (ref 132–146)
SODIUM BLD-SCNC: 140 MMOL/L (ref 132–146)
SODIUM BLD-SCNC: 140 MMOL/L (ref 132–146)
SODIUM BLD-SCNC: 141 MMOL/L (ref 132–146)
SODIUM BLD-SCNC: 142 MMOL/L (ref 132–146)
SODIUM BLD-SCNC: 142 MMOL/L (ref 132–146)
SODIUM BLD-SCNC: 143 MMOL/L (ref 132–146)
SPECIFIC GRAVITY UA: 1.01 (ref 1–1.03)
SPECIMEN SOURCE: ABNORMAL
STREP PNEUMONIAE ANTIGEN, URINE: NORMAL
STREP PNEUMONIAE ANTIGEN, URINE: NORMAL
THIS TEST SENT TO: NORMAL
TOTAL CK: 100 U/L (ref 20–180)
TOTAL CK: 100 U/L (ref 20–180)
TOTAL CK: 41 U/L (ref 20–180)
TOTAL PROTEIN: 5.9 G/DL (ref 6.4–8.3)
TOTAL PROTEIN: 6.1 G/DL (ref 6.4–8.3)
TOTAL PROTEIN: 6.3 G/DL (ref 6.4–8.3)
TOTAL PROTEIN: 6.5 G/DL (ref 6.4–8.3)
TRIGL SERPL-MCNC: 87 MG/DL (ref 0–149)
TROPONIN: 0.01 NG/ML (ref 0–0.03)
TROPONIN: 0.01 NG/ML (ref 0–0.03)
TROPONIN: 0.03 NG/ML (ref 0–0.03)
TROPONIN: 0.03 NG/ML (ref 0–0.03)
TROPONIN: 0.09 NG/ML (ref 0–0.03)
TROPONIN: 0.15 NG/ML (ref 0–0.03)
URINE CULTURE, ROUTINE: ABNORMAL
UROBILINOGEN, URINE: 0.2 E.U./DL
VLDLC SERPL CALC-MCNC: 17 MG/DL
WBC # BLD: 3.2 E9/L (ref 4.5–11.5)
WBC # BLD: 4.5 E9/L (ref 4.5–11.5)
WBC # BLD: 4.8 E9/L (ref 4.5–11.5)
WBC # BLD: 5.1 E9/L (ref 4.5–11.5)
WBC # BLD: 5.3 E9/L (ref 4.5–11.5)
WBC # BLD: 5.7 E9/L (ref 4.5–11.5)
WBC # BLD: 5.9 E9/L (ref 4.5–11.5)
WBC # BLD: 6 E9/L (ref 4.5–11.5)
WBC # BLD: 6.3 E9/L (ref 4.5–11.5)
WBC # BLD: 6.9 E9/L (ref 4.5–11.5)
WBC # BLD: 7.1 E9/L (ref 4.5–11.5)
WBC # BLD: 7.3 E9/L (ref 4.5–11.5)
WBC UA: ABNORMAL /HPF (ref 0–5)

## 2020-01-01 PROCEDURE — 6360000002 HC RX W HCPCS: Performed by: INTERNAL MEDICINE

## 2020-01-01 PROCEDURE — 80048 BASIC METABOLIC PNL TOTAL CA: CPT

## 2020-01-01 PROCEDURE — 1090F PRES/ABSN URINE INCON ASSESS: CPT | Performed by: FAMILY MEDICINE

## 2020-01-01 PROCEDURE — 2140000000 HC CCU INTERMEDIATE R&B

## 2020-01-01 PROCEDURE — 6370000000 HC RX 637 (ALT 250 FOR IP): Performed by: INTERNAL MEDICINE

## 2020-01-01 PROCEDURE — 99215 OFFICE O/P EST HI 40 MIN: CPT | Performed by: FAMILY MEDICINE

## 2020-01-01 PROCEDURE — 87040 BLOOD CULTURE FOR BACTERIA: CPT

## 2020-01-01 PROCEDURE — 1123F ACP DISCUSS/DSCN MKR DOCD: CPT | Performed by: FAMILY MEDICINE

## 2020-01-01 PROCEDURE — 6370000000 HC RX 637 (ALT 250 FOR IP): Performed by: NURSE PRACTITIONER

## 2020-01-01 PROCEDURE — 85025 COMPLETE CBC W/AUTO DIFF WBC: CPT

## 2020-01-01 PROCEDURE — 96365 THER/PROPH/DIAG IV INF INIT: CPT

## 2020-01-01 PROCEDURE — 1111F DSCHRG MED/CURRENT MED MERGE: CPT | Performed by: FAMILY MEDICINE

## 2020-01-01 PROCEDURE — 6370000000 HC RX 637 (ALT 250 FOR IP): Performed by: STUDENT IN AN ORGANIZED HEALTH CARE EDUCATION/TRAINING PROGRAM

## 2020-01-01 PROCEDURE — 97535 SELF CARE MNGMENT TRAINING: CPT

## 2020-01-01 PROCEDURE — 71045 X-RAY EXAM CHEST 1 VIEW: CPT

## 2020-01-01 PROCEDURE — 6360000002 HC RX W HCPCS: Performed by: EMERGENCY MEDICINE

## 2020-01-01 PROCEDURE — 93010 ELECTROCARDIOGRAM REPORT: CPT | Performed by: INTERNAL MEDICINE

## 2020-01-01 PROCEDURE — 93005 ELECTROCARDIOGRAM TRACING: CPT | Performed by: FAMILY MEDICINE

## 2020-01-01 PROCEDURE — 87088 URINE BACTERIA CULTURE: CPT

## 2020-01-01 PROCEDURE — 82962 GLUCOSE BLOOD TEST: CPT

## 2020-01-01 PROCEDURE — 87186 SC STD MICRODIL/AGAR DIL: CPT

## 2020-01-01 PROCEDURE — 97161 PT EVAL LOW COMPLEX 20 MIN: CPT

## 2020-01-01 PROCEDURE — 81001 URINALYSIS AUTO W/SCOPE: CPT

## 2020-01-01 PROCEDURE — 97530 THERAPEUTIC ACTIVITIES: CPT

## 2020-01-01 PROCEDURE — 83615 LACTATE (LD) (LDH) ENZYME: CPT

## 2020-01-01 PROCEDURE — 36415 COLL VENOUS BLD VENIPUNCTURE: CPT

## 2020-01-01 PROCEDURE — 87449 NOS EACH ORGANISM AG IA: CPT

## 2020-01-01 PROCEDURE — 85384 FIBRINOGEN ACTIVITY: CPT

## 2020-01-01 PROCEDURE — 83605 ASSAY OF LACTIC ACID: CPT

## 2020-01-01 PROCEDURE — 2700000000 HC OXYGEN THERAPY PER DAY

## 2020-01-01 PROCEDURE — 84145 PROCALCITONIN (PCT): CPT

## 2020-01-01 PROCEDURE — 0202U NFCT DS 22 TRGT SARS-COV-2: CPT

## 2020-01-01 PROCEDURE — 99223 1ST HOSP IP/OBS HIGH 75: CPT | Performed by: INTERNAL MEDICINE

## 2020-01-01 PROCEDURE — 82803 BLOOD GASES ANY COMBINATION: CPT

## 2020-01-01 PROCEDURE — 99221 1ST HOSP IP/OBS SF/LOW 40: CPT | Performed by: SURGERY

## 2020-01-01 PROCEDURE — 2580000003 HC RX 258: Performed by: INTERNAL MEDICINE

## 2020-01-01 PROCEDURE — 99285 EMERGENCY DEPT VISIT HI MDM: CPT

## 2020-01-01 PROCEDURE — 2580000003 HC RX 258: Performed by: EMERGENCY MEDICINE

## 2020-01-01 PROCEDURE — 99239 HOSP IP/OBS DSCHRG MGMT >30: CPT | Performed by: INTERNAL MEDICINE

## 2020-01-01 PROCEDURE — XW033E5 INTRODUCTION OF REMDESIVIR ANTI-INFECTIVE INTO PERIPHERAL VEIN, PERCUTANEOUS APPROACH, NEW TECHNOLOGY GROUP 5: ICD-10-PCS | Performed by: INTERNAL MEDICINE

## 2020-01-01 PROCEDURE — 84484 ASSAY OF TROPONIN QUANT: CPT

## 2020-01-01 PROCEDURE — 6360000002 HC RX W HCPCS: Performed by: NURSE PRACTITIONER

## 2020-01-01 PROCEDURE — 2500000003 HC RX 250 WO HCPCS: Performed by: EMERGENCY MEDICINE

## 2020-01-01 PROCEDURE — 92610 EVALUATE SWALLOWING FUNCTION: CPT | Performed by: SPEECH-LANGUAGE PATHOLOGIST

## 2020-01-01 PROCEDURE — 99232 SBSQ HOSP IP/OBS MODERATE 35: CPT | Performed by: INTERNAL MEDICINE

## 2020-01-01 PROCEDURE — G0008 ADMIN INFLUENZA VIRUS VAC: HCPCS | Performed by: INTERNAL MEDICINE

## 2020-01-01 PROCEDURE — 2500000003 HC RX 250 WO HCPCS: Performed by: INTERNAL MEDICINE

## 2020-01-01 PROCEDURE — 80053 COMPREHEN METABOLIC PANEL: CPT

## 2020-01-01 PROCEDURE — 97110 THERAPEUTIC EXERCISES: CPT

## 2020-01-01 PROCEDURE — 99232 SBSQ HOSP IP/OBS MODERATE 35: CPT | Performed by: FAMILY MEDICINE

## 2020-01-01 PROCEDURE — 97166 OT EVAL MOD COMPLEX 45 MIN: CPT

## 2020-01-01 PROCEDURE — 99212 OFFICE O/P EST SF 10 MIN: CPT | Performed by: SURGERY

## 2020-01-01 PROCEDURE — 82962 GLUCOSE BLOOD TEST: CPT | Performed by: FAMILY MEDICINE

## 2020-01-01 PROCEDURE — 84132 ASSAY OF SERUM POTASSIUM: CPT

## 2020-01-01 PROCEDURE — 85027 COMPLETE CBC AUTOMATED: CPT

## 2020-01-01 PROCEDURE — 2060000000 HC ICU INTERMEDIATE R&B

## 2020-01-01 PROCEDURE — 83880 ASSAY OF NATRIURETIC PEPTIDE: CPT

## 2020-01-01 PROCEDURE — 82550 ASSAY OF CK (CPK): CPT

## 2020-01-01 PROCEDURE — 97162 PT EVAL MOD COMPLEX 30 MIN: CPT

## 2020-01-01 PROCEDURE — 1036F TOBACCO NON-USER: CPT | Performed by: FAMILY MEDICINE

## 2020-01-01 PROCEDURE — 2580000003 HC RX 258: Performed by: NURSE PRACTITIONER

## 2020-01-01 PROCEDURE — 83036 HEMOGLOBIN GLYCOSYLATED A1C: CPT

## 2020-01-01 PROCEDURE — G8482 FLU IMMUNIZE ORDER/ADMIN: HCPCS | Performed by: FAMILY MEDICINE

## 2020-01-01 PROCEDURE — 99213 OFFICE O/P EST LOW 20 MIN: CPT

## 2020-01-01 PROCEDURE — 97165 OT EVAL LOW COMPLEX 30 MIN: CPT

## 2020-01-01 PROCEDURE — 96375 TX/PRO/DX INJ NEW DRUG ADDON: CPT

## 2020-01-01 PROCEDURE — G8417 CALC BMI ABV UP PARAM F/U: HCPCS | Performed by: FAMILY MEDICINE

## 2020-01-01 PROCEDURE — 6370000000 HC RX 637 (ALT 250 FOR IP): Performed by: FAMILY MEDICINE

## 2020-01-01 PROCEDURE — 86140 C-REACTIVE PROTEIN: CPT

## 2020-01-01 PROCEDURE — 93005 ELECTROCARDIOGRAM TRACING: CPT | Performed by: EMERGENCY MEDICINE

## 2020-01-01 PROCEDURE — 93005 ELECTROCARDIOGRAM TRACING: CPT | Performed by: STUDENT IN AN ORGANIZED HEALTH CARE EDUCATION/TRAINING PROGRAM

## 2020-01-01 PROCEDURE — G8427 DOCREV CUR MEDS BY ELIG CLIN: HCPCS | Performed by: FAMILY MEDICINE

## 2020-01-01 PROCEDURE — APPSS60 APP SPLIT SHARED TIME 46-60 MINUTES: Performed by: PHYSICIAN ASSISTANT

## 2020-01-01 PROCEDURE — 99233 SBSQ HOSP IP/OBS HIGH 50: CPT | Performed by: INTERNAL MEDICINE

## 2020-01-01 PROCEDURE — APPSS30 APP SPLIT SHARED TIME 16-30 MINUTES: Performed by: NURSE PRACTITIONER

## 2020-01-01 PROCEDURE — 99233 SBSQ HOSP IP/OBS HIGH 50: CPT | Performed by: FAMILY MEDICINE

## 2020-01-01 PROCEDURE — 85378 FIBRIN DEGRADE SEMIQUANT: CPT

## 2020-01-01 PROCEDURE — 87450 HC DIRECT STREP B ANTIGEN: CPT

## 2020-01-01 PROCEDURE — 83520 IMMUNOASSAY QUANT NOS NONAB: CPT

## 2020-01-01 PROCEDURE — 99214 OFFICE O/P EST MOD 30 MIN: CPT | Performed by: FAMILY MEDICINE

## 2020-01-01 PROCEDURE — 96366 THER/PROPH/DIAG IV INF ADDON: CPT

## 2020-01-01 PROCEDURE — 92526 ORAL FUNCTION THERAPY: CPT | Performed by: SPEECH-LANGUAGE PATHOLOGIST

## 2020-01-01 PROCEDURE — 90686 IIV4 VACC NO PRSV 0.5 ML IM: CPT | Performed by: INTERNAL MEDICINE

## 2020-01-01 PROCEDURE — 80061 LIPID PANEL: CPT

## 2020-01-01 PROCEDURE — 82728 ASSAY OF FERRITIN: CPT

## 2020-01-01 PROCEDURE — 96361 HYDRATE IV INFUSION ADD-ON: CPT

## 2020-01-01 PROCEDURE — 2500000003 HC RX 250 WO HCPCS: Performed by: NURSE PRACTITIONER

## 2020-01-01 PROCEDURE — 82553 CREATINE MB FRACTION: CPT

## 2020-01-01 PROCEDURE — 4040F PNEUMOC VAC/ADMIN/RCVD: CPT | Performed by: FAMILY MEDICINE

## 2020-01-01 PROCEDURE — 99222 1ST HOSP IP/OBS MODERATE 55: CPT | Performed by: INTERNAL MEDICINE

## 2020-01-01 PROCEDURE — 96367 TX/PROPH/DG ADDL SEQ IV INF: CPT

## 2020-01-01 RX ORDER — IPRATROPIUM BROMIDE AND ALBUTEROL SULFATE 2.5; .5 MG/3ML; MG/3ML
1 SOLUTION RESPIRATORY (INHALATION) EVERY 4 HOURS PRN
Qty: 360 ML | Refills: 2 | Status: SHIPPED | OUTPATIENT
Start: 2020-01-01

## 2020-01-01 RX ORDER — GABAPENTIN 300 MG/1
600 CAPSULE ORAL 3 TIMES DAILY
Status: DISCONTINUED | OUTPATIENT
Start: 2020-01-01 | End: 2020-01-01 | Stop reason: HOSPADM

## 2020-01-01 RX ORDER — SODIUM CHLORIDE 0.9 % (FLUSH) 0.9 %
10 SYRINGE (ML) INJECTION PRN
Status: DISCONTINUED | OUTPATIENT
Start: 2020-01-01 | End: 2020-01-01 | Stop reason: HOSPADM

## 2020-01-01 RX ORDER — ACETAMINOPHEN 325 MG/1
650 TABLET ORAL EVERY 6 HOURS PRN
Status: DISCONTINUED | OUTPATIENT
Start: 2020-01-01 | End: 2020-01-01 | Stop reason: HOSPADM

## 2020-01-01 RX ORDER — CEFDINIR 300 MG/1
300 CAPSULE ORAL DAILY
Qty: 1 CAPSULE | Refills: 0 | Status: SHIPPED | OUTPATIENT
Start: 2020-01-01 | End: 2020-01-01

## 2020-01-01 RX ORDER — LIDOCAINE HYDROCHLORIDE 40 MG/ML
SOLUTION TOPICAL ONCE
Status: DISCONTINUED | OUTPATIENT
Start: 2020-01-01 | End: 2020-01-01 | Stop reason: HOSPADM

## 2020-01-01 RX ORDER — 0.9 % SODIUM CHLORIDE 0.9 %
500 INTRAVENOUS SOLUTION INTRAVENOUS ONCE
Status: COMPLETED | OUTPATIENT
Start: 2020-01-01 | End: 2020-01-01

## 2020-01-01 RX ORDER — CHOLECALCIFEROL (VITAMIN D3) 125 MCG
10 CAPSULE ORAL NIGHTLY
Status: DISCONTINUED | OUTPATIENT
Start: 2020-01-01 | End: 2020-01-01 | Stop reason: CLARIF

## 2020-01-01 RX ORDER — FLUTICASONE PROPIONATE 50 MCG
1 SPRAY, SUSPENSION (ML) NASAL DAILY PRN
Status: DISCONTINUED | OUTPATIENT
Start: 2020-01-01 | End: 2020-01-01 | Stop reason: HOSPADM

## 2020-01-01 RX ORDER — BENZONATATE 100 MG/1
100 CAPSULE ORAL EVERY 8 HOURS PRN
Status: DISCONTINUED | OUTPATIENT
Start: 2020-01-01 | End: 2020-01-01 | Stop reason: HOSPADM

## 2020-01-01 RX ORDER — ALBUTEROL SULFATE 90 UG/1
2 AEROSOL, METERED RESPIRATORY (INHALATION) EVERY 6 HOURS
Status: DISCONTINUED | OUTPATIENT
Start: 2020-01-01 | End: 2020-01-01 | Stop reason: HOSPADM

## 2020-01-01 RX ORDER — 0.9 % SODIUM CHLORIDE 0.9 %
30 INTRAVENOUS SOLUTION INTRAVENOUS PRN
Status: DISCONTINUED | OUTPATIENT
Start: 2020-01-01 | End: 2020-01-01 | Stop reason: HOSPADM

## 2020-01-01 RX ORDER — CHLORAL HYDRATE 500 MG
1000 CAPSULE ORAL 2 TIMES DAILY
Status: DISCONTINUED | OUTPATIENT
Start: 2020-01-01 | End: 2020-01-01 | Stop reason: CLARIF

## 2020-01-01 RX ORDER — NYSTATIN 100000 [USP'U]/G
1 POWDER TOPICAL 2 TIMES DAILY
Status: ON HOLD | COMMUNITY
End: 2021-01-01 | Stop reason: HOSPADM

## 2020-01-01 RX ORDER — ATORVASTATIN CALCIUM 40 MG/1
40 TABLET, FILM COATED ORAL NIGHTLY
Status: DISCONTINUED | OUTPATIENT
Start: 2020-01-01 | End: 2020-01-01 | Stop reason: HOSPADM

## 2020-01-01 RX ORDER — ATORVASTATIN CALCIUM 40 MG/1
40 TABLET, FILM COATED ORAL NIGHTLY
COMMUNITY
End: 2021-01-01 | Stop reason: SDUPTHER

## 2020-01-01 RX ORDER — PANTOPRAZOLE SODIUM 40 MG/1
40 TABLET, DELAYED RELEASE ORAL
Status: DISCONTINUED | OUTPATIENT
Start: 2020-01-01 | End: 2020-01-01 | Stop reason: HOSPADM

## 2020-01-01 RX ORDER — FUROSEMIDE 20 MG/1
20 TABLET ORAL DAILY
COMMUNITY
End: 2021-01-01 | Stop reason: DRUGHIGH

## 2020-01-01 RX ORDER — NICOTINE POLACRILEX 4 MG
15 LOZENGE BUCCAL PRN
Status: DISCONTINUED | OUTPATIENT
Start: 2020-01-01 | End: 2020-01-01 | Stop reason: HOSPADM

## 2020-01-01 RX ORDER — M-VIT,TX,IRON,MINS/CALC/FOLIC 27MG-0.4MG
1 TABLET ORAL DAILY
Status: DISCONTINUED | OUTPATIENT
Start: 2020-01-01 | End: 2020-01-01 | Stop reason: HOSPADM

## 2020-01-01 RX ORDER — CARVEDILOL 6.25 MG/1
12.5 TABLET ORAL 2 TIMES DAILY WITH MEALS
Status: DISCONTINUED | OUTPATIENT
Start: 2020-01-01 | End: 2020-01-01 | Stop reason: HOSPADM

## 2020-01-01 RX ORDER — ACETAMINOPHEN 650 MG/1
650 SUPPOSITORY RECTAL EVERY 6 HOURS PRN
Status: DISCONTINUED | OUTPATIENT
Start: 2020-01-01 | End: 2020-01-01 | Stop reason: HOSPADM

## 2020-01-01 RX ORDER — MECOBALAMIN 5000 MCG
10 TABLET,DISINTEGRATING ORAL NIGHTLY
Status: DISCONTINUED | OUTPATIENT
Start: 2020-01-01 | End: 2020-01-01 | Stop reason: HOSPADM

## 2020-01-01 RX ORDER — ISOSORBIDE MONONITRATE 30 MG/1
TABLET, EXTENDED RELEASE ORAL
Qty: 90 TABLET | Refills: 3 | Status: SHIPPED
Start: 2020-01-01 | End: 2020-01-01

## 2020-01-01 RX ORDER — ONDANSETRON 2 MG/ML
4 INJECTION INTRAMUSCULAR; INTRAVENOUS EVERY 6 HOURS PRN
Status: DISCONTINUED | OUTPATIENT
Start: 2020-01-01 | End: 2020-01-01 | Stop reason: HOSPADM

## 2020-01-01 RX ORDER — FUROSEMIDE 20 MG/1
20 TABLET ORAL DAILY
Qty: 90 TABLET | Refills: 1 | Status: SHIPPED
Start: 2020-01-01 | End: 2020-01-01 | Stop reason: HOSPADM

## 2020-01-01 RX ORDER — DEXTROSE MONOHYDRATE 50 MG/ML
100 INJECTION, SOLUTION INTRAVENOUS PRN
Status: DISCONTINUED | OUTPATIENT
Start: 2020-01-01 | End: 2020-01-01 | Stop reason: HOSPADM

## 2020-01-01 RX ORDER — NITROGLYCERIN 0.4 MG/1
0.4 TABLET SUBLINGUAL EVERY 5 MIN PRN
Status: DISCONTINUED | OUTPATIENT
Start: 2020-01-01 | End: 2020-01-01 | Stop reason: HOSPADM

## 2020-01-01 RX ORDER — DEXAMETHASONE SODIUM PHOSPHATE 10 MG/ML
6 INJECTION INTRAMUSCULAR; INTRAVENOUS DAILY
Status: DISCONTINUED | OUTPATIENT
Start: 2020-01-01 | End: 2020-01-01 | Stop reason: HOSPADM

## 2020-01-01 RX ORDER — CARVEDILOL 12.5 MG/1
12.5 TABLET ORAL 2 TIMES DAILY WITH MEALS
COMMUNITY
End: 2021-01-01 | Stop reason: SDUPTHER

## 2020-01-01 RX ORDER — GABAPENTIN 300 MG/1
600 CAPSULE ORAL EVERY 8 HOURS SCHEDULED
Status: DISCONTINUED | OUTPATIENT
Start: 2020-01-01 | End: 2020-01-01 | Stop reason: HOSPADM

## 2020-01-01 RX ORDER — ASPIRIN 81 MG/1
81 TABLET, CHEWABLE ORAL DAILY
Status: DISCONTINUED | OUTPATIENT
Start: 2020-01-01 | End: 2020-01-01 | Stop reason: HOSPADM

## 2020-01-01 RX ORDER — GABAPENTIN 300 MG/1
CAPSULE ORAL
Qty: 540 CAPSULE | Refills: 0 | OUTPATIENT
Start: 2020-01-01

## 2020-01-01 RX ORDER — IPRATROPIUM BROMIDE AND ALBUTEROL SULFATE 2.5; .5 MG/3ML; MG/3ML
1 SOLUTION RESPIRATORY (INHALATION) EVERY 4 HOURS PRN
COMMUNITY
End: 2020-01-01 | Stop reason: SDUPTHER

## 2020-01-01 RX ORDER — SERTRALINE HYDROCHLORIDE 100 MG/1
TABLET, FILM COATED ORAL
Qty: 135 TABLET | Refills: 1 | Status: SHIPPED
Start: 2020-01-01 | End: 2021-01-01

## 2020-01-01 RX ORDER — CHLORAL HYDRATE 500 MG
1000 CAPSULE ORAL 2 TIMES DAILY
Status: ON HOLD | COMMUNITY
End: 2021-01-01 | Stop reason: HOSPADM

## 2020-01-01 RX ORDER — ASCORBIC ACID 500 MG
500 TABLET ORAL DAILY
Status: DISCONTINUED | OUTPATIENT
Start: 2020-01-01 | End: 2020-01-01 | Stop reason: HOSPADM

## 2020-01-01 RX ORDER — SODIUM CHLORIDE 9 MG/ML
INJECTION, SOLUTION INTRAVENOUS CONTINUOUS
Status: ACTIVE | OUTPATIENT
Start: 2020-01-01 | End: 2020-01-01

## 2020-01-01 RX ORDER — DEXTROSE MONOHYDRATE 25 G/50ML
12.5 INJECTION, SOLUTION INTRAVENOUS PRN
Status: DISCONTINUED | OUTPATIENT
Start: 2020-01-01 | End: 2020-01-01 | Stop reason: HOSPADM

## 2020-01-01 RX ORDER — TRAMADOL HYDROCHLORIDE 50 MG/1
50 TABLET ORAL EVERY 6 HOURS PRN
Qty: 28 TABLET | Refills: 0 | Status: SHIPPED | OUTPATIENT
Start: 2020-01-01 | End: 2020-01-01

## 2020-01-01 RX ORDER — ISOSORBIDE MONONITRATE 30 MG/1
30 TABLET, EXTENDED RELEASE ORAL DAILY
Status: DISCONTINUED | OUTPATIENT
Start: 2020-01-01 | End: 2020-01-01 | Stop reason: HOSPADM

## 2020-01-01 RX ORDER — NUTRITIONAL SUPPLEMENT
1 POWDER (GRAM) ORAL 2 TIMES DAILY
Qty: 60 CAN | Refills: 3 | Status: SHIPPED
Start: 2020-01-01 | End: 2021-01-01

## 2020-01-01 RX ORDER — ISOSORBIDE MONONITRATE 30 MG/1
30 TABLET, EXTENDED RELEASE ORAL DAILY
COMMUNITY
End: 2021-01-01 | Stop reason: SDUPTHER

## 2020-01-01 RX ORDER — FAMOTIDINE 20 MG/1
10 TABLET, FILM COATED ORAL DAILY
Status: DISCONTINUED | OUTPATIENT
Start: 2020-01-01 | End: 2020-01-01 | Stop reason: HOSPADM

## 2020-01-01 RX ORDER — FENTANYL CITRATE 50 UG/ML
50 INJECTION, SOLUTION INTRAMUSCULAR; INTRAVENOUS ONCE
Status: COMPLETED | OUTPATIENT
Start: 2020-01-01 | End: 2020-01-01

## 2020-01-01 RX ORDER — POLYETHYLENE GLYCOL 3350 17 G/17G
17 POWDER, FOR SOLUTION ORAL DAILY PRN
Status: DISCONTINUED | OUTPATIENT
Start: 2020-01-01 | End: 2020-01-01 | Stop reason: HOSPADM

## 2020-01-01 RX ORDER — VITAMIN B COMPLEX
1000 TABLET ORAL DAILY
Status: DISCONTINUED | OUTPATIENT
Start: 2020-01-01 | End: 2020-01-01 | Stop reason: HOSPADM

## 2020-01-01 RX ORDER — 0.9 % SODIUM CHLORIDE 0.9 %
1000 INTRAVENOUS SOLUTION INTRAVENOUS ONCE
Status: COMPLETED | OUTPATIENT
Start: 2020-01-01 | End: 2020-01-01

## 2020-01-01 RX ORDER — MONTELUKAST SODIUM 10 MG/1
10 TABLET ORAL NIGHTLY
Status: DISCONTINUED | OUTPATIENT
Start: 2020-01-01 | End: 2020-01-01 | Stop reason: HOSPADM

## 2020-01-01 RX ORDER — PETROLATUM 42 G/100G
OINTMENT TOPICAL DAILY
Status: DISCONTINUED | OUTPATIENT
Start: 2020-01-01 | End: 2020-01-01 | Stop reason: HOSPADM

## 2020-01-01 RX ORDER — BUSPIRONE HYDROCHLORIDE 5 MG/1
5 TABLET ORAL DAILY
Status: DISCONTINUED | OUTPATIENT
Start: 2020-01-01 | End: 2020-01-01 | Stop reason: HOSPADM

## 2020-01-01 RX ORDER — CALCIUM CARBONATE/VITAMIN D3 600 MG-20
1 TABLET ORAL 2 TIMES DAILY
Status: ON HOLD | COMMUNITY
End: 2021-01-01 | Stop reason: HOSPADM

## 2020-01-01 RX ORDER — SODIUM CHLORIDE 0.9 % (FLUSH) 0.9 %
10 SYRINGE (ML) INJECTION EVERY 12 HOURS SCHEDULED
Status: DISCONTINUED | OUTPATIENT
Start: 2020-01-01 | End: 2020-01-01 | Stop reason: HOSPADM

## 2020-01-01 RX ORDER — PEN NEEDLE, DIABETIC 32GX 5/32"
NEEDLE, DISPOSABLE MISCELLANEOUS
Qty: 100 EACH | Refills: 5 | Status: SHIPPED
Start: 2020-01-01 | End: 2020-01-01

## 2020-01-01 RX ORDER — CARVEDILOL 12.5 MG/1
TABLET ORAL
Qty: 180 TABLET | Refills: 0 | Status: SHIPPED
Start: 2020-01-01 | End: 2020-01-01

## 2020-01-01 RX ORDER — PROMETHAZINE HYDROCHLORIDE 25 MG/1
12.5 TABLET ORAL EVERY 6 HOURS PRN
Status: DISCONTINUED | OUTPATIENT
Start: 2020-01-01 | End: 2020-01-01 | Stop reason: HOSPADM

## 2020-01-01 RX ORDER — OYSTER SHELL CALCIUM WITH VITAMIN D 500; 200 MG/1; [IU]/1
1 TABLET, FILM COATED ORAL 2 TIMES DAILY
Status: DISCONTINUED | OUTPATIENT
Start: 2020-01-01 | End: 2020-01-01 | Stop reason: HOSPADM

## 2020-01-01 RX ORDER — ASPIRIN 81 MG/1
81 TABLET, CHEWABLE ORAL DAILY
Status: DISCONTINUED | OUTPATIENT
Start: 2020-01-01 | End: 2020-01-01

## 2020-01-01 RX ORDER — INSULIN LISPRO 100 [IU]/ML
0-12 INJECTION, SOLUTION INTRAVENOUS; SUBCUTANEOUS
COMMUNITY
End: 2021-01-01 | Stop reason: SDUPTHER

## 2020-01-01 RX ORDER — GABAPENTIN 300 MG/1
600 CAPSULE ORAL 3 TIMES DAILY
Qty: 180 CAPSULE | Refills: 0 | Status: SHIPPED
Start: 2020-01-01 | End: 2021-01-01 | Stop reason: SDUPTHER

## 2020-01-01 RX ORDER — DEXAMETHASONE SODIUM PHOSPHATE 4 MG/ML
4 INJECTION, SOLUTION INTRA-ARTICULAR; INTRALESIONAL; INTRAMUSCULAR; INTRAVENOUS; SOFT TISSUE EVERY 6 HOURS
Status: DISCONTINUED | OUTPATIENT
Start: 2020-01-01 | End: 2020-01-01 | Stop reason: HOSPADM

## 2020-01-01 RX ORDER — GABAPENTIN 300 MG/1
CAPSULE ORAL
Qty: 540 CAPSULE | Refills: 0 | Status: SHIPPED
Start: 2020-01-01 | End: 2020-01-01 | Stop reason: SDUPTHER

## 2020-01-01 RX ORDER — DEXAMETHASONE SODIUM PHOSPHATE 10 MG/ML
6 INJECTION INTRAMUSCULAR; INTRAVENOUS ONCE
Status: DISCONTINUED | OUTPATIENT
Start: 2020-01-01 | End: 2020-01-01

## 2020-01-01 RX ORDER — BENZONATATE 100 MG/1
200 CAPSULE ORAL 3 TIMES DAILY PRN
Status: DISCONTINUED | OUTPATIENT
Start: 2020-01-01 | End: 2020-01-01 | Stop reason: HOSPADM

## 2020-01-01 RX ORDER — ACETAMINOPHEN 325 MG/1
650 TABLET ORAL ONCE
Status: COMPLETED | OUTPATIENT
Start: 2020-01-01 | End: 2020-01-01

## 2020-01-01 RX ORDER — ZINC SULFATE 50(220)MG
50 CAPSULE ORAL DAILY
Status: DISCONTINUED | OUTPATIENT
Start: 2020-01-01 | End: 2020-01-01 | Stop reason: HOSPADM

## 2020-01-01 RX ORDER — LANSOPRAZOLE 30 MG/1
30 CAPSULE, DELAYED RELEASE ORAL DAILY
COMMUNITY
End: 2021-01-01 | Stop reason: SDUPTHER

## 2020-01-01 RX ORDER — CEFEPIME HYDROCHLORIDE 2 G/1
INJECTION, POWDER, FOR SOLUTION INTRAVENOUS
Status: DISPENSED
Start: 2020-01-01 | End: 2020-01-01

## 2020-01-01 RX ORDER — LISINOPRIL 2.5 MG/1
2.5 TABLET ORAL DAILY
Status: ON HOLD | COMMUNITY
End: 2021-01-01 | Stop reason: HOSPADM

## 2020-01-01 RX ORDER — AZITHROMYCIN 500 MG/1
500 TABLET, FILM COATED ORAL DAILY
Qty: 1 PACKET | Refills: 0 | Status: SHIPPED | OUTPATIENT
Start: 2020-01-01 | End: 2020-01-01

## 2020-01-01 RX ORDER — DEXAMETHASONE SODIUM PHOSPHATE 10 MG/ML
6 INJECTION INTRAMUSCULAR; INTRAVENOUS ONCE
Status: DISCONTINUED | OUTPATIENT
Start: 2020-01-01 | End: 2020-01-01 | Stop reason: HOSPADM

## 2020-01-01 RX ORDER — DEXAMETHASONE 6 MG/1
6 TABLET ORAL
Qty: 5 TABLET | Refills: 0 | Status: SHIPPED | OUTPATIENT
Start: 2020-01-01 | End: 2020-01-01

## 2020-01-01 RX ADMIN — Medication 1000 UNITS: at 09:52

## 2020-01-01 RX ADMIN — ALBUTEROL SULFATE 2 PUFF: 90 AEROSOL, METERED RESPIRATORY (INHALATION) at 11:48

## 2020-01-01 RX ADMIN — CARVEDILOL 12.5 MG: 6.25 TABLET, FILM COATED ORAL at 09:51

## 2020-01-01 RX ADMIN — APIXABAN 5 MG: 5 TABLET, FILM COATED ORAL at 09:43

## 2020-01-01 RX ADMIN — Medication 10 ML: at 10:48

## 2020-01-01 RX ADMIN — BUSPIRONE HYDROCHLORIDE 5 MG: 5 TABLET ORAL at 09:28

## 2020-01-01 RX ADMIN — ISOSORBIDE MONONITRATE 30 MG: 30 TABLET ORAL at 10:31

## 2020-01-01 RX ADMIN — INSULIN LISPRO 2 UNITS: 100 INJECTION, SOLUTION INTRAVENOUS; SUBCUTANEOUS at 17:30

## 2020-01-01 RX ADMIN — GABAPENTIN 600 MG: 300 CAPSULE ORAL at 05:30

## 2020-01-01 RX ADMIN — INSULIN LISPRO 1 UNITS: 100 INJECTION, SOLUTION INTRAVENOUS; SUBCUTANEOUS at 06:54

## 2020-01-01 RX ADMIN — ATORVASTATIN CALCIUM 40 MG: 40 TABLET, FILM COATED ORAL at 22:17

## 2020-01-01 RX ADMIN — CARVEDILOL 12.5 MG: 6.25 TABLET, FILM COATED ORAL at 17:02

## 2020-01-01 RX ADMIN — Medication 1 TABLET: at 09:52

## 2020-01-01 RX ADMIN — ALBUTEROL SULFATE 2 PUFF: 90 AEROSOL, METERED RESPIRATORY (INHALATION) at 13:08

## 2020-01-01 RX ADMIN — ACETAMINOPHEN 650 MG: 325 TABLET ORAL at 11:43

## 2020-01-01 RX ADMIN — ATORVASTATIN CALCIUM 40 MG: 40 TABLET, FILM COATED ORAL at 20:27

## 2020-01-01 RX ADMIN — INSULIN LISPRO 1 UNITS: 100 INJECTION, SOLUTION INTRAVENOUS; SUBCUTANEOUS at 15:47

## 2020-01-01 RX ADMIN — APIXABAN 5 MG: 5 TABLET, FILM COATED ORAL at 20:45

## 2020-01-01 RX ADMIN — SERTRALINE 150 MG: 50 TABLET, FILM COATED ORAL at 09:00

## 2020-01-01 RX ADMIN — Medication 10 ML: at 20:22

## 2020-01-01 RX ADMIN — AZITHROMYCIN DIHYDRATE 500 MG: 500 INJECTION, POWDER, LYOPHILIZED, FOR SOLUTION INTRAVENOUS at 23:09

## 2020-01-01 RX ADMIN — Medication 10 ML: at 09:11

## 2020-01-01 RX ADMIN — GABAPENTIN 600 MG: 300 CAPSULE ORAL at 22:42

## 2020-01-01 RX ADMIN — GABAPENTIN 600 MG: 300 CAPSULE ORAL at 20:42

## 2020-01-01 RX ADMIN — Medication 1000 UNITS: at 09:00

## 2020-01-01 RX ADMIN — PANTOPRAZOLE SODIUM 40 MG: 40 TABLET, DELAYED RELEASE ORAL at 06:17

## 2020-01-01 RX ADMIN — APIXABAN 5 MG: 5 TABLET, FILM COATED ORAL at 08:52

## 2020-01-01 RX ADMIN — OYSTER SHELL CALCIUM WITH VITAMIN D: 500; 200 TABLET, FILM COATED ORAL at 10:47

## 2020-01-01 RX ADMIN — GABAPENTIN 600 MG: 300 CAPSULE ORAL at 06:05

## 2020-01-01 RX ADMIN — CARVEDILOL 12.5 MG: 6.25 TABLET, FILM COATED ORAL at 09:11

## 2020-01-01 RX ADMIN — ATORVASTATIN CALCIUM 40 MG: 40 TABLET, FILM COATED ORAL at 22:03

## 2020-01-01 RX ADMIN — PANTOPRAZOLE SODIUM 40 MG: 40 TABLET, DELAYED RELEASE ORAL at 05:30

## 2020-01-01 RX ADMIN — CARVEDILOL 12.5 MG: 6.25 TABLET, FILM COATED ORAL at 22:13

## 2020-01-01 RX ADMIN — REMDESIVIR 100 MG: 100 INJECTION, POWDER, LYOPHILIZED, FOR SOLUTION INTRAVENOUS at 18:38

## 2020-01-01 RX ADMIN — MULTIPLE VITAMINS W/ MINERALS TAB 1 TABLET: TAB at 10:46

## 2020-01-01 RX ADMIN — GABAPENTIN 600 MG: 300 CAPSULE ORAL at 10:47

## 2020-01-01 RX ADMIN — Medication 10 ML: at 09:46

## 2020-01-01 RX ADMIN — MONTELUKAST 10 MG: 10 TABLET, FILM COATED ORAL at 22:13

## 2020-01-01 RX ADMIN — APIXABAN 5 MG: 5 TABLET, FILM COATED ORAL at 09:51

## 2020-01-01 RX ADMIN — GABAPENTIN 600 MG: 300 CAPSULE ORAL at 20:27

## 2020-01-01 RX ADMIN — CARVEDILOL 12.5 MG: 6.25 TABLET, FILM COATED ORAL at 10:46

## 2020-01-01 RX ADMIN — INSULIN LISPRO 2 UNITS: 100 INJECTION, SOLUTION INTRAVENOUS; SUBCUTANEOUS at 21:55

## 2020-01-01 RX ADMIN — Medication 1 TABLET: at 10:31

## 2020-01-01 RX ADMIN — ASPIRIN 81 MG CHEWABLE TABLET 81 MG: 81 TABLET CHEWABLE at 10:32

## 2020-01-01 RX ADMIN — INSULIN LISPRO 1 UNITS: 100 INJECTION, SOLUTION INTRAVENOUS; SUBCUTANEOUS at 17:58

## 2020-01-01 RX ADMIN — ACETAMINOPHEN 650 MG: 325 TABLET ORAL at 09:13

## 2020-01-01 RX ADMIN — ISOSORBIDE MONONITRATE 30 MG: 30 TABLET, EXTENDED RELEASE ORAL at 09:27

## 2020-01-01 RX ADMIN — IPRATROPIUM BROMIDE AND ALBUTEROL 1 PUFF: 20; 100 SPRAY, METERED RESPIRATORY (INHALATION) at 09:41

## 2020-01-01 RX ADMIN — OXYCODONE HYDROCHLORIDE AND ACETAMINOPHEN 500 MG: 500 TABLET ORAL at 09:00

## 2020-01-01 RX ADMIN — MULTIPLE VITAMINS W/ MINERALS TAB 1 TABLET: TAB at 09:51

## 2020-01-01 RX ADMIN — CEFEPIME 2 G: 2 INJECTION, POWDER, FOR SOLUTION INTRAVENOUS at 01:44

## 2020-01-01 RX ADMIN — SERTRALINE 150 MG: 100 TABLET, FILM COATED ORAL at 09:42

## 2020-01-01 RX ADMIN — ALBUTEROL SULFATE 2 PUFF: 90 AEROSOL, METERED RESPIRATORY (INHALATION) at 17:57

## 2020-01-01 RX ADMIN — Medication 10 ML: at 08:54

## 2020-01-01 RX ADMIN — GABAPENTIN 600 MG: 300 CAPSULE ORAL at 14:00

## 2020-01-01 RX ADMIN — CEFTRIAXONE 1 G: 1 INJECTION, POWDER, FOR SOLUTION INTRAMUSCULAR; INTRAVENOUS at 20:23

## 2020-01-01 RX ADMIN — MICONAZOLE NITRATE: 20.6 POWDER TOPICAL at 09:50

## 2020-01-01 RX ADMIN — AZITHROMYCIN DIHYDRATE 500 MG: 500 INJECTION, POWDER, LYOPHILIZED, FOR SOLUTION INTRAVENOUS at 22:15

## 2020-01-01 RX ADMIN — GABAPENTIN 600 MG: 300 CAPSULE ORAL at 08:54

## 2020-01-01 RX ADMIN — APIXABAN 5 MG: 5 TABLET, FILM COATED ORAL at 10:47

## 2020-01-01 RX ADMIN — COLLAGENASE SANTYL: 250 OINTMENT TOPICAL at 18:01

## 2020-01-01 RX ADMIN — ASPIRIN 81 MG CHEWABLE TABLET 81 MG: 81 TABLET CHEWABLE at 10:46

## 2020-01-01 RX ADMIN — COLLAGENASE SANTYL: 250 OINTMENT TOPICAL at 09:41

## 2020-01-01 RX ADMIN — INSULIN LISPRO 3 UNITS: 100 INJECTION, SOLUTION INTRAVENOUS; SUBCUTANEOUS at 11:57

## 2020-01-01 RX ADMIN — MULTIPLE VITAMINS W/ MINERALS TAB 1 TABLET: TAB at 09:10

## 2020-01-01 RX ADMIN — Medication 1 TABLET: at 09:00

## 2020-01-01 RX ADMIN — Medication 50 MG: at 10:32

## 2020-01-01 RX ADMIN — Medication 50 MG: at 09:50

## 2020-01-01 RX ADMIN — ANTI-FUNGAL POWDER MICONAZOLE NITRATE TALC FREE: 1.42 POWDER TOPICAL at 20:45

## 2020-01-01 RX ADMIN — INSULIN LISPRO 1 UNITS: 100 INJECTION, SOLUTION INTRAVENOUS; SUBCUTANEOUS at 22:50

## 2020-01-01 RX ADMIN — BENZONATATE 100 MG: 100 CAPSULE ORAL at 22:41

## 2020-01-01 RX ADMIN — ASPIRIN 81 MG CHEWABLE TABLET 81 MG: 81 TABLET CHEWABLE at 09:11

## 2020-01-01 RX ADMIN — GABAPENTIN 600 MG: 300 CAPSULE ORAL at 06:17

## 2020-01-01 RX ADMIN — SKIN PROTECTANT: 44 OINTMENT TOPICAL at 11:19

## 2020-01-01 RX ADMIN — ISOSORBIDE MONONITRATE 30 MG: 30 TABLET ORAL at 09:11

## 2020-01-01 RX ADMIN — BUSPIRONE HYDROCHLORIDE 5 MG: 5 TABLET ORAL at 10:47

## 2020-01-01 RX ADMIN — INSULIN LISPRO 1 UNITS: 100 INJECTION, SOLUTION INTRAVENOUS; SUBCUTANEOUS at 12:28

## 2020-01-01 RX ADMIN — CEFTRIAXONE SODIUM 1 G: 1 INJECTION, POWDER, FOR SOLUTION INTRAMUSCULAR; INTRAVENOUS at 15:22

## 2020-01-01 RX ADMIN — Medication 10 MG: at 22:41

## 2020-01-01 RX ADMIN — FAMOTIDINE 10 MG: 20 TABLET ORAL at 10:33

## 2020-01-01 RX ADMIN — PETROLATUM: 420 OINTMENT TOPICAL at 20:30

## 2020-01-01 RX ADMIN — OXYCODONE HYDROCHLORIDE AND ACETAMINOPHEN 500 MG: 500 TABLET ORAL at 10:31

## 2020-01-01 RX ADMIN — ALBUTEROL SULFATE 2 PUFF: 90 AEROSOL, METERED RESPIRATORY (INHALATION) at 05:30

## 2020-01-01 RX ADMIN — ISOSORBIDE MONONITRATE 30 MG: 30 TABLET, EXTENDED RELEASE ORAL at 08:54

## 2020-01-01 RX ADMIN — INSULIN LISPRO 1 UNITS: 100 INJECTION, SOLUTION INTRAVENOUS; SUBCUTANEOUS at 06:52

## 2020-01-01 RX ADMIN — CARVEDILOL 12.5 MG: 6.25 TABLET, FILM COATED ORAL at 16:45

## 2020-01-01 RX ADMIN — ACETAMINOPHEN 650 MG: 325 TABLET ORAL at 14:00

## 2020-01-01 RX ADMIN — CARVEDILOL 12.5 MG: 6.25 TABLET, FILM COATED ORAL at 18:42

## 2020-01-01 RX ADMIN — REMDESIVIR 200 MG: 100 INJECTION, POWDER, LYOPHILIZED, FOR SOLUTION INTRAVENOUS at 19:10

## 2020-01-01 RX ADMIN — MULTIPLE VITAMINS W/ MINERALS TAB 1 TABLET: TAB at 09:42

## 2020-01-01 RX ADMIN — ISOSORBIDE MONONITRATE 30 MG: 30 TABLET, EXTENDED RELEASE ORAL at 08:43

## 2020-01-01 RX ADMIN — MULTIPLE VITAMINS W/ MINERALS TAB 1 TABLET: TAB at 10:07

## 2020-01-01 RX ADMIN — INSULIN LISPRO 3 UNITS: 100 INJECTION, SOLUTION INTRAVENOUS; SUBCUTANEOUS at 18:43

## 2020-01-01 RX ADMIN — ASPIRIN 81 MG CHEWABLE TABLET 81 MG: 81 TABLET CHEWABLE at 09:00

## 2020-01-01 RX ADMIN — INFLUENZA A VIRUS A/VICTORIA/2454/2019 IVR-207 (H1N1) ANTIGEN (PROPIOLACTONE INACTIVATED), INFLUENZA A VIRUS A/HONG KONG/2671/2019 IVR-208 (H3N2) ANTIGEN (PROPIOLACTONE INACTIVATED), INFLUENZA B VIRUS B/VICTORIA/705/2018 BVR-11 ANTIGEN (PROPIOLACTONE INACTIVATED), INFLUENZA B VIRUS B/PHUKET/3073/2013 BVR-1B ANTIGEN (PROPIOLACTONE INACTIVATED) 0.5 ML: 15; 15; 15; 15 INJECTION, SUSPENSION INTRAMUSCULAR at 18:02

## 2020-01-01 RX ADMIN — Medication 10 ML: at 09:00

## 2020-01-01 RX ADMIN — GABAPENTIN 600 MG: 300 CAPSULE ORAL at 22:03

## 2020-01-01 RX ADMIN — GABAPENTIN 600 MG: 300 CAPSULE ORAL at 14:01

## 2020-01-01 RX ADMIN — CEFTRIAXONE 1 G: 1 INJECTION, POWDER, FOR SOLUTION INTRAMUSCULAR; INTRAVENOUS at 22:04

## 2020-01-01 RX ADMIN — ALBUTEROL SULFATE 2 PUFF: 90 AEROSOL, METERED RESPIRATORY (INHALATION) at 01:00

## 2020-01-01 RX ADMIN — DEXAMETHASONE SODIUM PHOSPHATE 6 MG: 10 INJECTION INTRAMUSCULAR; INTRAVENOUS at 09:00

## 2020-01-01 RX ADMIN — INSULIN LISPRO 3 UNITS: 100 INJECTION, SOLUTION INTRAVENOUS; SUBCUTANEOUS at 12:00

## 2020-01-01 RX ADMIN — ASPIRIN 81 MG CHEWABLE TABLET 81 MG: 81 TABLET CHEWABLE at 09:42

## 2020-01-01 RX ADMIN — BUSPIRONE HYDROCHLORIDE 5 MG: 5 TABLET ORAL at 08:44

## 2020-01-01 RX ADMIN — AZITHROMYCIN DIHYDRATE 500 MG: 500 INJECTION, POWDER, LYOPHILIZED, FOR SOLUTION INTRAVENOUS at 20:22

## 2020-01-01 RX ADMIN — Medication 10 ML: at 11:01

## 2020-01-01 RX ADMIN — MONTELUKAST SODIUM 10 MG: 10 TABLET, FILM COATED ORAL at 22:03

## 2020-01-01 RX ADMIN — PETROLATUM: 420 OINTMENT TOPICAL at 09:29

## 2020-01-01 RX ADMIN — APIXABAN 5 MG: 5 TABLET, FILM COATED ORAL at 09:00

## 2020-01-01 RX ADMIN — GABAPENTIN 600 MG: 300 CAPSULE ORAL at 14:30

## 2020-01-01 RX ADMIN — APIXABAN 5 MG: 5 TABLET, FILM COATED ORAL at 08:43

## 2020-01-01 RX ADMIN — MICONAZOLE NITRATE: 20.6 POWDER TOPICAL at 20:43

## 2020-01-01 RX ADMIN — GABAPENTIN 600 MG: 300 CAPSULE ORAL at 19:49

## 2020-01-01 RX ADMIN — OYSTER SHELL CALCIUM WITH VITAMIN D: 500; 200 TABLET, FILM COATED ORAL at 22:33

## 2020-01-01 RX ADMIN — Medication 10 ML: at 23:51

## 2020-01-01 RX ADMIN — FENTANYL CITRATE 50 MCG: 50 INJECTION, SOLUTION INTRAMUSCULAR; INTRAVENOUS at 18:03

## 2020-01-01 RX ADMIN — CARVEDILOL 12.5 MG: 6.25 TABLET, FILM COATED ORAL at 09:00

## 2020-01-01 RX ADMIN — Medication 10 ML: at 09:50

## 2020-01-01 RX ADMIN — SERTRALINE 150 MG: 100 TABLET, FILM COATED ORAL at 09:28

## 2020-01-01 RX ADMIN — ALBUTEROL SULFATE 2 PUFF: 90 AEROSOL, METERED RESPIRATORY (INHALATION) at 12:45

## 2020-01-01 RX ADMIN — BENZONATATE 200 MG: 100 CAPSULE ORAL at 00:41

## 2020-01-01 RX ADMIN — MICONAZOLE NITRATE: 20.6 POWDER TOPICAL at 20:45

## 2020-01-01 RX ADMIN — MULTIPLE VITAMINS W/ MINERALS TAB 1 TABLET: TAB at 09:28

## 2020-01-01 RX ADMIN — ALBUTEROL SULFATE 2 PUFF: 90 AEROSOL, METERED RESPIRATORY (INHALATION) at 00:43

## 2020-01-01 RX ADMIN — FAMOTIDINE 10 MG: 20 TABLET ORAL at 09:10

## 2020-01-01 RX ADMIN — INSULIN LISPRO 2 UNITS: 100 INJECTION, SOLUTION INTRAVENOUS; SUBCUTANEOUS at 12:30

## 2020-01-01 RX ADMIN — MONTELUKAST 10 MG: 10 TABLET, FILM COATED ORAL at 22:42

## 2020-01-01 RX ADMIN — BUSPIRONE HYDROCHLORIDE 5 MG: 5 TABLET ORAL at 08:53

## 2020-01-01 RX ADMIN — INSULIN LISPRO 1 UNITS: 100 INJECTION, SOLUTION INTRAVENOUS; SUBCUTANEOUS at 22:47

## 2020-01-01 RX ADMIN — CARVEDILOL 12.5 MG: 6.25 TABLET, FILM COATED ORAL at 08:54

## 2020-01-01 RX ADMIN — FAMOTIDINE 10 MG: 20 TABLET ORAL at 09:00

## 2020-01-01 RX ADMIN — Medication 1000 UNITS: at 10:31

## 2020-01-01 RX ADMIN — INSULIN LISPRO 1 UNITS: 100 INJECTION, SOLUTION INTRAVENOUS; SUBCUTANEOUS at 20:51

## 2020-01-01 RX ADMIN — ASPIRIN 81 MG CHEWABLE TABLET 81 MG: 81 TABLET CHEWABLE at 08:52

## 2020-01-01 RX ADMIN — FAMOTIDINE 10 MG: 20 TABLET ORAL at 09:52

## 2020-01-01 RX ADMIN — INSULIN LISPRO 2 UNITS: 100 INJECTION, SOLUTION INTRAVENOUS; SUBCUTANEOUS at 22:59

## 2020-01-01 RX ADMIN — INSULIN LISPRO 1 UNITS: 100 INJECTION, SOLUTION INTRAVENOUS; SUBCUTANEOUS at 20:28

## 2020-01-01 RX ADMIN — Medication 10 ML: at 20:27

## 2020-01-01 RX ADMIN — Medication 10 ML: at 22:43

## 2020-01-01 RX ADMIN — BENZONATATE 200 MG: 100 CAPSULE ORAL at 15:45

## 2020-01-01 RX ADMIN — INSULIN LISPRO 1 UNITS: 100 INJECTION, SOLUTION INTRAVENOUS; SUBCUTANEOUS at 11:36

## 2020-01-01 RX ADMIN — INSULIN LISPRO 1 UNITS: 100 INJECTION, SOLUTION INTRAVENOUS; SUBCUTANEOUS at 20:07

## 2020-01-01 RX ADMIN — ATORVASTATIN CALCIUM 40 MG: 40 TABLET, FILM COATED ORAL at 20:45

## 2020-01-01 RX ADMIN — ATORVASTATIN CALCIUM 40 MG: 40 TABLET, FILM COATED ORAL at 20:28

## 2020-01-01 RX ADMIN — CARVEDILOL 12.5 MG: 6.25 TABLET, FILM COATED ORAL at 10:47

## 2020-01-01 RX ADMIN — APIXABAN 5 MG: 5 TABLET, FILM COATED ORAL at 09:28

## 2020-01-01 RX ADMIN — APIXABAN 5 MG: 5 TABLET, FILM COATED ORAL at 20:27

## 2020-01-01 RX ADMIN — SERTRALINE 150 MG: 100 TABLET, FILM COATED ORAL at 10:46

## 2020-01-01 RX ADMIN — INSULIN LISPRO 1 UNITS: 100 INJECTION, SOLUTION INTRAVENOUS; SUBCUTANEOUS at 07:00

## 2020-01-01 RX ADMIN — PANTOPRAZOLE SODIUM 40 MG: 40 TABLET, DELAYED RELEASE ORAL at 06:40

## 2020-01-01 RX ADMIN — BUSPIRONE HYDROCHLORIDE 5 MG: 5 TABLET ORAL at 10:31

## 2020-01-01 RX ADMIN — MICONAZOLE NITRATE: 20.6 POWDER TOPICAL at 09:12

## 2020-01-01 RX ADMIN — ISOSORBIDE MONONITRATE 30 MG: 30 TABLET ORAL at 09:51

## 2020-01-01 RX ADMIN — APIXABAN 5 MG: 5 TABLET, FILM COATED ORAL at 22:17

## 2020-01-01 RX ADMIN — Medication 50 MG: at 22:13

## 2020-01-01 RX ADMIN — INSULIN LISPRO 3 UNITS: 100 INJECTION, SOLUTION INTRAVENOUS; SUBCUTANEOUS at 12:15

## 2020-01-01 RX ADMIN — SERTRALINE 150 MG: 100 TABLET, FILM COATED ORAL at 08:43

## 2020-01-01 RX ADMIN — DEXAMETHASONE SODIUM PHOSPHATE 6 MG: 10 INJECTION INTRAMUSCULAR; INTRAVENOUS at 09:52

## 2020-01-01 RX ADMIN — DEXAMETHASONE SODIUM PHOSPHATE 4 MG: 4 INJECTION, SOLUTION INTRAMUSCULAR; INTRAVENOUS at 06:54

## 2020-01-01 RX ADMIN — DEXAMETHASONE SODIUM PHOSPHATE 6 MG: 10 INJECTION INTRAMUSCULAR; INTRAVENOUS at 09:12

## 2020-01-01 RX ADMIN — INSULIN LISPRO 4 UNITS: 100 INJECTION, SOLUTION INTRAVENOUS; SUBCUTANEOUS at 17:59

## 2020-01-01 RX ADMIN — AZITHROMYCIN DIHYDRATE 500 MG: 500 INJECTION, POWDER, LYOPHILIZED, FOR SOLUTION INTRAVENOUS at 23:52

## 2020-01-01 RX ADMIN — ALBUTEROL SULFATE 2 PUFF: 90 AEROSOL, METERED RESPIRATORY (INHALATION) at 18:17

## 2020-01-01 RX ADMIN — Medication 10 ML: at 22:13

## 2020-01-01 RX ADMIN — SODIUM CHLORIDE, PRESERVATIVE FREE 10 ML: 5 INJECTION INTRAVENOUS at 17:20

## 2020-01-01 RX ADMIN — CEFTRIAXONE 1 G: 1 INJECTION, POWDER, FOR SOLUTION INTRAMUSCULAR; INTRAVENOUS at 22:13

## 2020-01-01 RX ADMIN — PETROLATUM: 420 OINTMENT TOPICAL at 20:27

## 2020-01-01 RX ADMIN — INSULIN LISPRO 2 UNITS: 100 INJECTION, SOLUTION INTRAVENOUS; SUBCUTANEOUS at 17:58

## 2020-01-01 RX ADMIN — SERTRALINE 150 MG: 50 TABLET, FILM COATED ORAL at 09:10

## 2020-01-01 RX ADMIN — ANTI-FUNGAL POWDER MICONAZOLE NITRATE TALC FREE: 1.42 POWDER TOPICAL at 09:00

## 2020-01-01 RX ADMIN — MULTIPLE VITAMINS W/ MINERALS TAB 1 TABLET: TAB at 08:55

## 2020-01-01 RX ADMIN — SKIN PROTECTANT: 44 OINTMENT TOPICAL at 15:03

## 2020-01-01 RX ADMIN — ALBUTEROL SULFATE 2 PUFF: 90 AEROSOL, METERED RESPIRATORY (INHALATION) at 18:23

## 2020-01-01 RX ADMIN — BUSPIRONE HYDROCHLORIDE 5 MG: 5 TABLET ORAL at 10:46

## 2020-01-01 RX ADMIN — PETROLATUM: 420 OINTMENT TOPICAL at 19:51

## 2020-01-01 RX ADMIN — GABAPENTIN 600 MG: 300 CAPSULE ORAL at 22:33

## 2020-01-01 RX ADMIN — INSULIN LISPRO 1 UNITS: 100 INJECTION, SOLUTION INTRAVENOUS; SUBCUTANEOUS at 22:35

## 2020-01-01 RX ADMIN — Medication 10 MG: at 22:30

## 2020-01-01 RX ADMIN — GABAPENTIN 600 MG: 300 CAPSULE ORAL at 14:43

## 2020-01-01 RX ADMIN — CEFTRIAXONE 1 G: 1 INJECTION, POWDER, FOR SOLUTION INTRAMUSCULAR; INTRAVENOUS at 23:35

## 2020-01-01 RX ADMIN — Medication 10 ML: at 20:42

## 2020-01-01 RX ADMIN — Medication 1000 UNITS: at 22:13

## 2020-01-01 RX ADMIN — ATORVASTATIN CALCIUM 40 MG: 40 TABLET, FILM COATED ORAL at 22:33

## 2020-01-01 RX ADMIN — ATORVASTATIN CALCIUM 40 MG: 40 TABLET, FILM COATED ORAL at 20:42

## 2020-01-01 RX ADMIN — PETROLATUM: 420 OINTMENT TOPICAL at 09:41

## 2020-01-01 RX ADMIN — ALBUTEROL SULFATE 2 PUFF: 90 AEROSOL, METERED RESPIRATORY (INHALATION) at 06:06

## 2020-01-01 RX ADMIN — ATORVASTATIN CALCIUM 40 MG: 40 TABLET, FILM COATED ORAL at 19:50

## 2020-01-01 RX ADMIN — MONTELUKAST 10 MG: 10 TABLET, FILM COATED ORAL at 20:42

## 2020-01-01 RX ADMIN — Medication 10 ML: at 23:37

## 2020-01-01 RX ADMIN — OYSTER SHELL CALCIUM WITH VITAMIN D: 500; 200 TABLET, FILM COATED ORAL at 19:50

## 2020-01-01 RX ADMIN — GABAPENTIN 600 MG: 300 CAPSULE ORAL at 13:09

## 2020-01-01 RX ADMIN — GABAPENTIN 600 MG: 300 CAPSULE ORAL at 14:02

## 2020-01-01 RX ADMIN — ASPIRIN 81 MG CHEWABLE TABLET 81 MG: 81 TABLET CHEWABLE at 09:51

## 2020-01-01 RX ADMIN — Medication 10 MG: at 20:45

## 2020-01-01 RX ADMIN — APIXABAN 5 MG: 5 TABLET, FILM COATED ORAL at 19:49

## 2020-01-01 RX ADMIN — ASPIRIN 81 MG CHEWABLE TABLET 81 MG: 81 TABLET CHEWABLE at 08:43

## 2020-01-01 RX ADMIN — BUSPIRONE HYDROCHLORIDE 5 MG: 5 TABLET ORAL at 09:11

## 2020-01-01 RX ADMIN — SKIN PROTECTANT: 44 OINTMENT TOPICAL at 09:00

## 2020-01-01 RX ADMIN — MULTIPLE VITAMINS W/ MINERALS TAB 1 TABLET: TAB at 09:00

## 2020-01-01 RX ADMIN — GABAPENTIN 600 MG: 300 CAPSULE ORAL at 22:13

## 2020-01-01 RX ADMIN — PETROLATUM: 420 OINTMENT TOPICAL at 08:55

## 2020-01-01 RX ADMIN — OYSTER SHELL CALCIUM WITH VITAMIN D: 500; 200 TABLET, FILM COATED ORAL at 08:53

## 2020-01-01 RX ADMIN — DOXYCYCLINE 100 MG: 100 INJECTION, POWDER, LYOPHILIZED, FOR SOLUTION INTRAVENOUS at 02:30

## 2020-01-01 RX ADMIN — Medication 1000 UNITS: at 09:11

## 2020-01-01 RX ADMIN — BENZONATATE 100 MG: 100 CAPSULE ORAL at 22:14

## 2020-01-01 RX ADMIN — BENZONATATE 200 MG: 100 CAPSULE ORAL at 19:49

## 2020-01-01 RX ADMIN — OYSTER SHELL CALCIUM WITH VITAMIN D: 500; 200 TABLET, FILM COATED ORAL at 22:06

## 2020-01-01 RX ADMIN — GABAPENTIN 600 MG: 300 CAPSULE ORAL at 20:45

## 2020-01-01 RX ADMIN — ALBUTEROL SULFATE 2 PUFF: 90 AEROSOL, METERED RESPIRATORY (INHALATION) at 22:12

## 2020-01-01 RX ADMIN — Medication 1 TABLET: at 20:45

## 2020-01-01 RX ADMIN — APIXABAN 5 MG: 5 TABLET, FILM COATED ORAL at 20:42

## 2020-01-01 RX ADMIN — APIXABAN 5 MG: 5 TABLET, FILM COATED ORAL at 22:42

## 2020-01-01 RX ADMIN — CARVEDILOL 12.5 MG: 6.25 TABLET, FILM COATED ORAL at 09:28

## 2020-01-01 RX ADMIN — MONTELUKAST SODIUM 10 MG: 10 TABLET, FILM COATED ORAL at 20:27

## 2020-01-01 RX ADMIN — ANTI-FUNGAL POWDER MICONAZOLE NITRATE TALC FREE: 1.42 POWDER TOPICAL at 21:00

## 2020-01-01 RX ADMIN — BENZONATATE 100 MG: 100 CAPSULE ORAL at 09:16

## 2020-01-01 RX ADMIN — BUSPIRONE HYDROCHLORIDE 5 MG: 5 TABLET ORAL at 09:00

## 2020-01-01 RX ADMIN — Medication 10 ML: at 09:29

## 2020-01-01 RX ADMIN — PETROLATUM: 420 OINTMENT TOPICAL at 22:20

## 2020-01-01 RX ADMIN — INSULIN LISPRO 2 UNITS: 100 INJECTION, SOLUTION INTRAVENOUS; SUBCUTANEOUS at 18:00

## 2020-01-01 RX ADMIN — ISOSORBIDE MONONITRATE 30 MG: 30 TABLET, EXTENDED RELEASE ORAL at 10:47

## 2020-01-01 RX ADMIN — MONTELUKAST 10 MG: 10 TABLET, FILM COATED ORAL at 20:45

## 2020-01-01 RX ADMIN — Medication 10 ML: at 22:05

## 2020-01-01 RX ADMIN — ALBUTEROL SULFATE 2 PUFF: 90 AEROSOL, METERED RESPIRATORY (INHALATION) at 18:38

## 2020-01-01 RX ADMIN — ATORVASTATIN CALCIUM 40 MG: 40 TABLET, FILM COATED ORAL at 22:41

## 2020-01-01 RX ADMIN — ASPIRIN 81 MG CHEWABLE TABLET 81 MG: 81 TABLET CHEWABLE at 18:01

## 2020-01-01 RX ADMIN — CEFTRIAXONE 1 G: 1 INJECTION, POWDER, FOR SOLUTION INTRAMUSCULAR; INTRAVENOUS at 13:00

## 2020-01-01 RX ADMIN — CARVEDILOL 12.5 MG: 6.25 TABLET, FILM COATED ORAL at 17:41

## 2020-01-01 RX ADMIN — Medication 10 ML: at 22:12

## 2020-01-01 RX ADMIN — PANTOPRAZOLE SODIUM 40 MG: 40 TABLET, DELAYED RELEASE ORAL at 06:30

## 2020-01-01 RX ADMIN — ALBUTEROL SULFATE 2 PUFF: 90 AEROSOL, METERED RESPIRATORY (INHALATION) at 06:16

## 2020-01-01 RX ADMIN — PANTOPRAZOLE SODIUM 40 MG: 40 TABLET, DELAYED RELEASE ORAL at 05:49

## 2020-01-01 RX ADMIN — INSULIN LISPRO 1 UNITS: 100 INJECTION, SOLUTION INTRAVENOUS; SUBCUTANEOUS at 06:02

## 2020-01-01 RX ADMIN — INSULIN LISPRO 1 UNITS: 100 INJECTION, SOLUTION INTRAVENOUS; SUBCUTANEOUS at 22:24

## 2020-01-01 RX ADMIN — ASPIRIN 81 MG CHEWABLE TABLET 81 MG: 81 TABLET CHEWABLE at 09:27

## 2020-01-01 RX ADMIN — MONTELUKAST SODIUM 10 MG: 10 TABLET, FILM COATED ORAL at 20:28

## 2020-01-01 RX ADMIN — APIXABAN 5 MG: 5 TABLET, FILM COATED ORAL at 22:03

## 2020-01-01 RX ADMIN — CEFTRIAXONE 1 G: 1 INJECTION, POWDER, FOR SOLUTION INTRAMUSCULAR; INTRAVENOUS at 23:52

## 2020-01-01 RX ADMIN — INSULIN LISPRO 2 UNITS: 100 INJECTION, SOLUTION INTRAVENOUS; SUBCUTANEOUS at 12:34

## 2020-01-01 RX ADMIN — AZITHROMYCIN DIHYDRATE 500 MG: 500 INJECTION, POWDER, LYOPHILIZED, FOR SOLUTION INTRAVENOUS at 21:58

## 2020-01-01 RX ADMIN — Medication 1 TABLET: at 09:11

## 2020-01-01 RX ADMIN — PANTOPRAZOLE SODIUM 40 MG: 40 TABLET, DELAYED RELEASE ORAL at 05:48

## 2020-01-01 RX ADMIN — OYSTER SHELL CALCIUM WITH VITAMIN D: 500; 200 TABLET, FILM COATED ORAL at 22:20

## 2020-01-01 RX ADMIN — MULTIPLE VITAMINS W/ MINERALS TAB 1 TABLET: TAB at 10:33

## 2020-01-01 RX ADMIN — POLYETHYLENE GLYCOL 3350 17 G: 17 POWDER, FOR SOLUTION ORAL at 22:17

## 2020-01-01 RX ADMIN — INSULIN LISPRO 1 UNITS: 100 INJECTION, SOLUTION INTRAVENOUS; SUBCUTANEOUS at 06:41

## 2020-01-01 RX ADMIN — ALBUTEROL SULFATE 2 PUFF: 90 AEROSOL, METERED RESPIRATORY (INHALATION) at 00:15

## 2020-01-01 RX ADMIN — PETROLATUM: 420 OINTMENT TOPICAL at 10:49

## 2020-01-01 RX ADMIN — BENZONATATE 100 MG: 100 CAPSULE ORAL at 09:52

## 2020-01-01 RX ADMIN — APIXABAN 5 MG: 5 TABLET, FILM COATED ORAL at 10:31

## 2020-01-01 RX ADMIN — OYSTER SHELL CALCIUM WITH VITAMIN D: 500; 200 TABLET, FILM COATED ORAL at 10:49

## 2020-01-01 RX ADMIN — BENZONATATE 100 MG: 100 CAPSULE ORAL at 20:42

## 2020-01-01 RX ADMIN — REMDESIVIR 100 MG: 100 INJECTION, POWDER, LYOPHILIZED, FOR SOLUTION INTRAVENOUS at 17:02

## 2020-01-01 RX ADMIN — CARVEDILOL 12.5 MG: 6.25 TABLET, FILM COATED ORAL at 17:03

## 2020-01-01 RX ADMIN — Medication 50 MG: at 09:09

## 2020-01-01 RX ADMIN — SERTRALINE 150 MG: 50 TABLET, FILM COATED ORAL at 10:31

## 2020-01-01 RX ADMIN — MICONAZOLE NITRATE: 20.6 POWDER TOPICAL at 09:00

## 2020-01-01 RX ADMIN — ALBUTEROL SULFATE 2 PUFF: 90 AEROSOL, METERED RESPIRATORY (INHALATION) at 13:00

## 2020-01-01 RX ADMIN — REMDESIVIR 100 MG: 100 INJECTION, POWDER, LYOPHILIZED, FOR SOLUTION INTRAVENOUS at 18:50

## 2020-01-01 RX ADMIN — INSULIN LISPRO 2 UNITS: 100 INJECTION, SOLUTION INTRAVENOUS; SUBCUTANEOUS at 12:00

## 2020-01-01 RX ADMIN — ISOSORBIDE MONONITRATE 30 MG: 30 TABLET, EXTENDED RELEASE ORAL at 09:42

## 2020-01-01 RX ADMIN — CARVEDILOL 12.5 MG: 6.25 TABLET, FILM COATED ORAL at 18:38

## 2020-01-01 RX ADMIN — INSULIN LISPRO 2 UNITS: 100 INJECTION, SOLUTION INTRAVENOUS; SUBCUTANEOUS at 18:58

## 2020-01-01 RX ADMIN — OYSTER SHELL CALCIUM WITH VITAMIN D: 500; 200 TABLET, FILM COATED ORAL at 09:44

## 2020-01-01 RX ADMIN — GABAPENTIN 600 MG: 300 CAPSULE ORAL at 09:45

## 2020-01-01 RX ADMIN — OXYCODONE HYDROCHLORIDE AND ACETAMINOPHEN 500 MG: 500 TABLET ORAL at 22:13

## 2020-01-01 RX ADMIN — PETROLATUM: 420 OINTMENT TOPICAL at 11:49

## 2020-01-01 RX ADMIN — SODIUM CHLORIDE 1000 ML: 9 INJECTION, SOLUTION INTRAVENOUS at 01:43

## 2020-01-01 RX ADMIN — DOXYCYCLINE 100 MG: 100 INJECTION, POWDER, LYOPHILIZED, FOR SOLUTION INTRAVENOUS at 15:40

## 2020-01-01 RX ADMIN — OYSTER SHELL CALCIUM WITH VITAMIN D: 500; 200 TABLET, FILM COATED ORAL at 20:27

## 2020-01-01 RX ADMIN — ANTI-FUNGAL POWDER MICONAZOLE NITRATE TALC FREE: 1.42 POWDER TOPICAL at 14:31

## 2020-01-01 RX ADMIN — MULTIPLE VITAMINS W/ MINERALS TAB 1 TABLET: TAB at 10:47

## 2020-01-01 RX ADMIN — Medication 1 TABLET: at 20:42

## 2020-01-01 RX ADMIN — APIXABAN 5 MG: 5 TABLET, FILM COATED ORAL at 09:11

## 2020-01-01 RX ADMIN — CARVEDILOL 12.5 MG: 6.25 TABLET, FILM COATED ORAL at 18:02

## 2020-01-01 RX ADMIN — BUSPIRONE HYDROCHLORIDE 5 MG: 5 TABLET ORAL at 09:52

## 2020-01-01 RX ADMIN — CARVEDILOL 12.5 MG: 6.25 TABLET, FILM COATED ORAL at 09:43

## 2020-01-01 RX ADMIN — MONTELUKAST SODIUM 10 MG: 10 TABLET, FILM COATED ORAL at 22:32

## 2020-01-01 RX ADMIN — OXYCODONE HYDROCHLORIDE AND ACETAMINOPHEN 500 MG: 500 TABLET ORAL at 09:51

## 2020-01-01 RX ADMIN — ATORVASTATIN CALCIUM 40 MG: 40 TABLET, FILM COATED ORAL at 22:13

## 2020-01-01 RX ADMIN — AZITHROMYCIN DIHYDRATE 500 MG: 500 INJECTION, POWDER, LYOPHILIZED, FOR SOLUTION INTRAVENOUS at 23:36

## 2020-01-01 RX ADMIN — GABAPENTIN 600 MG: 300 CAPSULE ORAL at 14:18

## 2020-01-01 RX ADMIN — INSULIN LISPRO 1 UNITS: 100 INJECTION, SOLUTION INTRAVENOUS; SUBCUTANEOUS at 05:30

## 2020-01-01 RX ADMIN — DEXAMETHASONE SODIUM PHOSPHATE 6 MG: 10 INJECTION INTRAMUSCULAR; INTRAVENOUS at 17:05

## 2020-01-01 RX ADMIN — SODIUM CHLORIDE: 9 INJECTION, SOLUTION INTRAVENOUS at 23:51

## 2020-01-01 RX ADMIN — CARVEDILOL 12.5 MG: 6.25 TABLET, FILM COATED ORAL at 16:47

## 2020-01-01 RX ADMIN — CEFTRIAXONE 1 G: 1 INJECTION, POWDER, FOR SOLUTION INTRAMUSCULAR; INTRAVENOUS at 23:07

## 2020-01-01 RX ADMIN — SODIUM CHLORIDE 500 ML: 9 INJECTION, SOLUTION INTRAVENOUS at 12:23

## 2020-01-01 RX ADMIN — GABAPENTIN 600 MG: 300 CAPSULE ORAL at 14:49

## 2020-01-01 RX ADMIN — GABAPENTIN 600 MG: 300 CAPSULE ORAL at 20:28

## 2020-01-01 RX ADMIN — APIXABAN 5 MG: 5 TABLET, FILM COATED ORAL at 22:32

## 2020-01-01 RX ADMIN — ISOSORBIDE MONONITRATE 30 MG: 30 TABLET ORAL at 09:00

## 2020-01-01 RX ADMIN — PANTOPRAZOLE SODIUM 40 MG: 40 TABLET, DELAYED RELEASE ORAL at 06:05

## 2020-01-01 RX ADMIN — OYSTER SHELL CALCIUM WITH VITAMIN D: 500; 200 TABLET, FILM COATED ORAL at 20:29

## 2020-01-01 RX ADMIN — INSULIN LISPRO 1 UNITS: 100 INJECTION, SOLUTION INTRAVENOUS; SUBCUTANEOUS at 16:45

## 2020-01-01 RX ADMIN — CARVEDILOL 12.5 MG: 6.25 TABLET, FILM COATED ORAL at 16:49

## 2020-01-01 RX ADMIN — Medication 1 TABLET: at 22:13

## 2020-01-01 RX ADMIN — CARVEDILOL 12.5 MG: 6.25 TABLET, FILM COATED ORAL at 08:44

## 2020-01-01 RX ADMIN — MICONAZOLE NITRATE: 20.6 POWDER TOPICAL at 21:00

## 2020-01-01 RX ADMIN — DEXAMETHASONE SODIUM PHOSPHATE 4 MG: 4 INJECTION, SOLUTION INTRAMUSCULAR; INTRAVENOUS at 15:13

## 2020-01-01 RX ADMIN — OYSTER SHELL CALCIUM WITH VITAMIN D: 500; 200 TABLET, FILM COATED ORAL at 09:29

## 2020-01-01 RX ADMIN — MICONAZOLE NITRATE: 20.6 POWDER TOPICAL at 22:14

## 2020-01-01 RX ADMIN — GABAPENTIN 600 MG: 300 CAPSULE ORAL at 08:44

## 2020-01-01 RX ADMIN — Medication 10 ML: at 20:45

## 2020-01-01 RX ADMIN — DEXAMETHASONE SODIUM PHOSPHATE 4 MG: 4 INJECTION, SOLUTION INTRAMUSCULAR; INTRAVENOUS at 01:44

## 2020-01-01 RX ADMIN — SERTRALINE 150 MG: 50 TABLET, FILM COATED ORAL at 09:51

## 2020-01-01 RX ADMIN — BENZONATATE 200 MG: 100 CAPSULE ORAL at 06:28

## 2020-01-01 RX ADMIN — APIXABAN 5 MG: 5 TABLET, FILM COATED ORAL at 22:14

## 2020-01-01 RX ADMIN — GABAPENTIN 600 MG: 300 CAPSULE ORAL at 22:17

## 2020-01-01 RX ADMIN — PETROLATUM: 420 OINTMENT TOPICAL at 21:58

## 2020-01-01 RX ADMIN — SERTRALINE 150 MG: 100 TABLET, FILM COATED ORAL at 08:54

## 2020-01-01 RX ADMIN — BUSPIRONE HYDROCHLORIDE 5 MG: 5 TABLET ORAL at 09:44

## 2020-01-01 RX ADMIN — OYSTER SHELL CALCIUM WITH VITAMIN D: 500; 200 TABLET, FILM COATED ORAL at 08:45

## 2020-01-01 RX ADMIN — Medication 50 MG: at 09:00

## 2020-01-01 RX ADMIN — Medication 1 TABLET: at 22:41

## 2020-01-01 RX ADMIN — MONTELUKAST SODIUM 10 MG: 10 TABLET, FILM COATED ORAL at 22:17

## 2020-01-01 RX ADMIN — MONTELUKAST SODIUM 10 MG: 10 TABLET, FILM COATED ORAL at 19:50

## 2020-01-01 RX ADMIN — OXYCODONE HYDROCHLORIDE AND ACETAMINOPHEN 500 MG: 500 TABLET ORAL at 09:11

## 2020-01-01 RX ADMIN — PANTOPRAZOLE SODIUM 40 MG: 40 TABLET, DELAYED RELEASE ORAL at 06:28

## 2020-01-01 ASSESSMENT — PAIN DESCRIPTION - PAIN TYPE
TYPE: CHRONIC PAIN
TYPE: ACUTE PAIN
TYPE: CHRONIC PAIN;ACUTE PAIN
TYPE: CHRONIC PAIN
TYPE: ACUTE PAIN
TYPE: ACUTE PAIN

## 2020-01-01 ASSESSMENT — ENCOUNTER SYMPTOMS
NAUSEA: 0
EYE DISCHARGE: 0
EYE REDNESS: 0
CHEST TIGHTNESS: 0
SORE THROAT: 0
CHOKING: 0
ABDOMINAL DISTENTION: 0
SINUS PRESSURE: 0
COLOR CHANGE: 0
ANAL BLEEDING: 0
EYE ITCHING: 0
ABDOMINAL DISTENTION: 0
APNEA: 0
STRIDOR: 0
WHEEZING: 0
BLOOD IN STOOL: 0
NAUSEA: 0
NAUSEA: 0
EYES NEGATIVE: 1
COUGH: 0
GASTROINTESTINAL NEGATIVE: 1
PHOTOPHOBIA: 0
SHORTNESS OF BREATH: 1
RECTAL PAIN: 0
SINUS PRESSURE: 0
ABDOMINAL PAIN: 0
FACIAL SWELLING: 0
EYE REDNESS: 0
WHEEZING: 0
ORTHOPNEA: 0
ALLERGIC/IMMUNOLOGIC NEGATIVE: 1
FACIAL SWELLING: 0
SINUS PRESSURE: 0
TROUBLE SWALLOWING: 0
BLOOD IN STOOL: 0
VOICE CHANGE: 0
VOMITING: 0
SHORTNESS OF BREATH: 1
VOICE CHANGE: 0
VISUAL CHANGE: 1
VOMITING: 0
SHORTNESS OF BREATH: 0
ALLERGIC/IMMUNOLOGIC NEGATIVE: 1
VISUAL CHANGE: 1
EYE ITCHING: 0
EYE PAIN: 0
CONSTIPATION: 0
BLURRED VISION: 0
PHOTOPHOBIA: 0
RHINORRHEA: 0
EYE PAIN: 0
CHEST TIGHTNESS: 0
SORE THROAT: 0
EYE DISCHARGE: 0
COUGH: 0
COLOR CHANGE: 0
BLURRED VISION: 0
VOMITING: 0
RECTAL PAIN: 0
ABDOMINAL DISTENTION: 0
BACK PAIN: 0
GASTROINTESTINAL NEGATIVE: 1
APNEA: 0
BACK PAIN: 1
COUGH: 1
ORTHOPNEA: 0
BACK PAIN: 1
EYE REDNESS: 0
DIARRHEA: 0
SINUS PAIN: 0
DIARRHEA: 0
RHINORRHEA: 0
DIARRHEA: 0
EYE PAIN: 0
SINUS PAIN: 0
WHEEZING: 0
STRIDOR: 0
TROUBLE SWALLOWING: 0
SORE THROAT: 0
ABDOMINAL PAIN: 0
EYES NEGATIVE: 1
ABDOMINAL PAIN: 0
EYE DISCHARGE: 0
CHOKING: 0
CONSTIPATION: 0
ANAL BLEEDING: 0

## 2020-01-01 ASSESSMENT — PAIN SCALES - GENERAL
PAINLEVEL_OUTOF10: 0
PAINLEVEL_OUTOF10: 4
PAINLEVEL_OUTOF10: 0
PAINLEVEL_OUTOF10: 2
PAINLEVEL_OUTOF10: 0
PAINLEVEL_OUTOF10: 4
PAINLEVEL_OUTOF10: 10
PAINLEVEL_OUTOF10: 0
PAINLEVEL_OUTOF10: 6
PAINLEVEL_OUTOF10: 4
PAINLEVEL_OUTOF10: 9
PAINLEVEL_OUTOF10: 5
PAINLEVEL_OUTOF10: 6
PAINLEVEL_OUTOF10: 2
PAINLEVEL_OUTOF10: 0

## 2020-01-01 ASSESSMENT — PAIN DESCRIPTION - LOCATION
LOCATION: COCCYX;LEG
LOCATION: COCCYX
LOCATION: EAR;HEAD
LOCATION: COCCYX
LOCATION: EAR;HEAD
LOCATION: BACK

## 2020-01-01 ASSESSMENT — PAIN DESCRIPTION - ORIENTATION
ORIENTATION: LOWER;MID
ORIENTATION: LOWER;MID
ORIENTATION: LOWER
ORIENTATION: LEFT

## 2020-01-01 ASSESSMENT — PAIN DESCRIPTION - DESCRIPTORS
DESCRIPTORS: JABBING
DESCRIPTORS: ACHING;CONSTANT;THROBBING
DESCRIPTORS: OTHER (COMMENT)

## 2020-01-01 ASSESSMENT — PAIN DESCRIPTION - FREQUENCY: FREQUENCY: CONTINUOUS

## 2020-01-01 ASSESSMENT — PAIN DESCRIPTION - PROGRESSION
CLINICAL_PROGRESSION: GRADUALLY IMPROVING

## 2020-01-01 ASSESSMENT — PAIN - FUNCTIONAL ASSESSMENT: PAIN_FUNCTIONAL_ASSESSMENT: ACTIVITIES ARE NOT PREVENTED

## 2020-01-02 ENCOUNTER — TELEPHONE (OUTPATIENT)
Dept: CARDIOLOGY | Age: 85
End: 2020-01-02

## 2020-01-06 ENCOUNTER — HOSPITAL ENCOUNTER (OUTPATIENT)
Dept: CARDIOLOGY | Age: 85
Discharge: HOME | DRG: 291 | End: 2020-01-06
Payer: MEDICARE

## 2020-01-06 PROBLEM — J96.90 RESPIRATORY FAILURE (HCC): Status: ACTIVE | Noted: 2020-01-06

## 2020-01-06 LAB
LV EF: 63 %
LVEF MODALITY: NORMAL

## 2020-01-06 PROCEDURE — 93306 TTE W/DOPPLER COMPLETE: CPT | Performed by: PSYCHIATRY & NEUROLOGY

## 2020-01-07 ENCOUNTER — TELEPHONE (OUTPATIENT)
Dept: CARDIOLOGY CLINIC | Age: 85
End: 2020-01-07

## 2020-01-07 NOTE — TELEPHONE ENCOUNTER
----- Message from Prabhu Arzate MD sent at 1/7/2020  2:23 PM EST -----  Her echo showed improved LV function and EF increased from 45-50 to 60 - 65%

## 2020-01-07 NOTE — TELEPHONE ENCOUNTER
Patient's daughter Russ Arredondo was notified of echo results, she also scheduled the pt.  For her 3month f/u

## 2020-01-09 ENCOUNTER — APPOINTMENT (OUTPATIENT)
Dept: GENERAL RADIOLOGY | Age: 85
DRG: 291 | End: 2020-01-09
Payer: MEDICARE

## 2020-01-09 ENCOUNTER — HOSPITAL ENCOUNTER (INPATIENT)
Age: 85
LOS: 6 days | Discharge: HOME HEALTH | DRG: 291 | End: 2020-01-15
Attending: EMERGENCY MEDICINE | Admitting: INTERNAL MEDICINE
Payer: MEDICARE

## 2020-01-09 ENCOUNTER — APPOINTMENT (OUTPATIENT)
Dept: CT IMAGING | Age: 85
DRG: 291 | End: 2020-01-09
Payer: MEDICARE

## 2020-01-09 PROBLEM — J18.9 HCAP (HEALTHCARE-ASSOCIATED PNEUMONIA): Status: ACTIVE | Noted: 2020-01-09

## 2020-01-09 LAB
ALBUMIN SERPL-MCNC: 2.9 G/DL (ref 3.5–5.2)
ALP BLD-CCNC: 77 U/L (ref 35–104)
ALT SERPL-CCNC: 17 U/L (ref 0–32)
ANION GAP SERPL CALCULATED.3IONS-SCNC: 9 MMOL/L (ref 7–16)
APTT: 33 SEC (ref 24.5–35.1)
APTT: 35.6 SEC (ref 24.5–35.1)
AST SERPL-CCNC: 38 U/L (ref 0–31)
BASOPHILS ABSOLUTE: 0.02 E9/L (ref 0–0.2)
BASOPHILS RELATIVE PERCENT: 0.2 % (ref 0–2)
BILIRUB SERPL-MCNC: 0.2 MG/DL (ref 0–1.2)
BUN BLDV-MCNC: 19 MG/DL (ref 8–23)
CALCIUM SERPL-MCNC: 8.8 MG/DL (ref 8.6–10.2)
CHLORIDE BLD-SCNC: 106 MMOL/L (ref 98–107)
CK MB: 17.7 NG/ML (ref 0–4.3)
CO2: 27 MMOL/L (ref 22–29)
CREAT SERPL-MCNC: 1 MG/DL (ref 0.5–1)
EKG ATRIAL RATE: 102 BPM
EKG P AXIS: 77 DEGREES
EKG P-R INTERVAL: 194 MS
EKG Q-T INTERVAL: 332 MS
EKG QRS DURATION: 80 MS
EKG QTC CALCULATION (BAZETT): 432 MS
EKG R AXIS: -29 DEGREES
EKG T AXIS: 95 DEGREES
EKG VENTRICULAR RATE: 102 BPM
EOSINOPHILS ABSOLUTE: 0.06 E9/L (ref 0.05–0.5)
EOSINOPHILS RELATIVE PERCENT: 0.5 % (ref 0–6)
GFR AFRICAN AMERICAN: >60
GFR NON-AFRICAN AMERICAN: 53 ML/MIN/1.73
GLUCOSE BLD-MCNC: 319 MG/DL (ref 74–99)
HBA1C MFR BLD: 8.4 % (ref 4–5.6)
HCT VFR BLD CALC: 30.9 % (ref 34–48)
HCT VFR BLD CALC: 33.7 % (ref 34–48)
HCT VFR BLD CALC: 35.7 % (ref 34–48)
HEMOGLOBIN: 10.4 G/DL (ref 11.5–15.5)
HEMOGLOBIN: 10.8 G/DL (ref 11.5–15.5)
HEMOGLOBIN: 9.6 G/DL (ref 11.5–15.5)
IMMATURE GRANULOCYTES #: 0.07 E9/L
IMMATURE GRANULOCYTES %: 0.6 % (ref 0–5)
INFLUENZA A BY PCR: NOT DETECTED
INFLUENZA B BY PCR: NOT DETECTED
INR BLD: 1.2
LACTIC ACID, SEPSIS: 1.6 MMOL/L (ref 0.5–1.9)
LACTIC ACID, SEPSIS: 1.8 MMOL/L (ref 0.5–1.9)
LIPASE: 37 U/L (ref 13–60)
LYMPHOCYTES ABSOLUTE: 1.66 E9/L (ref 1.5–4)
LYMPHOCYTES RELATIVE PERCENT: 14 % (ref 20–42)
MAGNESIUM: 1.9 MG/DL (ref 1.6–2.6)
MCH RBC QN AUTO: 29.3 PG (ref 26–35)
MCH RBC QN AUTO: 29.5 PG (ref 26–35)
MCH RBC QN AUTO: 29.8 PG (ref 26–35)
MCHC RBC AUTO-ENTMCNC: 30.3 % (ref 32–34.5)
MCHC RBC AUTO-ENTMCNC: 30.9 % (ref 32–34.5)
MCHC RBC AUTO-ENTMCNC: 31.1 % (ref 32–34.5)
MCV RBC AUTO: 95.5 FL (ref 80–99.9)
MCV RBC AUTO: 96 FL (ref 80–99.9)
MCV RBC AUTO: 97 FL (ref 80–99.9)
METER GLUCOSE: 234 MG/DL (ref 74–99)
METER GLUCOSE: 292 MG/DL (ref 74–99)
METER GLUCOSE: 368 MG/DL (ref 74–99)
MONOCYTES ABSOLUTE: 0.71 E9/L (ref 0.1–0.95)
MONOCYTES RELATIVE PERCENT: 6 % (ref 2–12)
NEUTROPHILS ABSOLUTE: 9.37 E9/L (ref 1.8–7.3)
NEUTROPHILS RELATIVE PERCENT: 78.7 % (ref 43–80)
PDW BLD-RTO: 12.9 FL (ref 11.5–15)
PDW BLD-RTO: 13 FL (ref 11.5–15)
PDW BLD-RTO: 13.2 FL (ref 11.5–15)
PLATELET # BLD: 156 E9/L (ref 130–450)
PLATELET # BLD: 175 E9/L (ref 130–450)
PLATELET # BLD: 205 E9/L (ref 130–450)
PMV BLD AUTO: 10.3 FL (ref 7–12)
PMV BLD AUTO: 9 FL (ref 7–12)
PMV BLD AUTO: 9.8 FL (ref 7–12)
POTASSIUM SERPL-SCNC: 4.6 MMOL/L (ref 3.5–5)
PRO-BNP: 2562 PG/ML (ref 0–450)
PRO-BNP: 4593 PG/ML (ref 0–450)
PROCALCITONIN: 0.07 NG/ML (ref 0–0.08)
PROTHROMBIN TIME: 13.9 SEC (ref 9.3–12.4)
RBC # BLD: 3.22 E12/L (ref 3.5–5.5)
RBC # BLD: 3.53 E12/L (ref 3.5–5.5)
RBC # BLD: 3.68 E12/L (ref 3.5–5.5)
REASON FOR REJECTION: NORMAL
REJECTED TEST: NORMAL
SODIUM BLD-SCNC: 142 MMOL/L (ref 132–146)
TOTAL CK: 187 U/L (ref 20–180)
TOTAL PROTEIN: 6.2 G/DL (ref 6.4–8.3)
TROPONIN: 0.55 NG/ML (ref 0–0.03)
TROPONIN: 0.71 NG/ML (ref 0–0.03)
TROPONIN: 0.79 NG/ML (ref 0–0.03)
TROPONIN: 0.81 NG/ML (ref 0–0.03)
WBC # BLD: 10.2 E9/L (ref 4.5–11.5)
WBC # BLD: 11.9 E9/L (ref 4.5–11.5)
WBC # BLD: 9.2 E9/L (ref 4.5–11.5)

## 2020-01-09 PROCEDURE — APPSS60 APP SPLIT SHARED TIME 46-60 MINUTES: Performed by: NURSE PRACTITIONER

## 2020-01-09 PROCEDURE — 80053 COMPREHEN METABOLIC PANEL: CPT

## 2020-01-09 PROCEDURE — 99223 1ST HOSP IP/OBS HIGH 75: CPT | Performed by: INTERNAL MEDICINE

## 2020-01-09 PROCEDURE — 2060000000 HC ICU INTERMEDIATE R&B

## 2020-01-09 PROCEDURE — 2580000003 HC RX 258: Performed by: INTERNAL MEDICINE

## 2020-01-09 PROCEDURE — 6360000002 HC RX W HCPCS: Performed by: EMERGENCY MEDICINE

## 2020-01-09 PROCEDURE — 83605 ASSAY OF LACTIC ACID: CPT

## 2020-01-09 PROCEDURE — 94761 N-INVAS EAR/PLS OXIMETRY MLT: CPT

## 2020-01-09 PROCEDURE — 71250 CT THORAX DX C-: CPT

## 2020-01-09 PROCEDURE — 82962 GLUCOSE BLOOD TEST: CPT

## 2020-01-09 PROCEDURE — 85610 PROTHROMBIN TIME: CPT

## 2020-01-09 PROCEDURE — 6360000002 HC RX W HCPCS: Performed by: INTERNAL MEDICINE

## 2020-01-09 PROCEDURE — 83036 HEMOGLOBIN GLYCOSYLATED A1C: CPT

## 2020-01-09 PROCEDURE — 85730 THROMBOPLASTIN TIME PARTIAL: CPT

## 2020-01-09 PROCEDURE — 86738 MYCOPLASMA ANTIBODY: CPT

## 2020-01-09 PROCEDURE — 6370000000 HC RX 637 (ALT 250 FOR IP): Performed by: INTERNAL MEDICINE

## 2020-01-09 PROCEDURE — 84484 ASSAY OF TROPONIN QUANT: CPT

## 2020-01-09 PROCEDURE — 93005 ELECTROCARDIOGRAM TRACING: CPT | Performed by: EMERGENCY MEDICINE

## 2020-01-09 PROCEDURE — 71045 X-RAY EXAM CHEST 1 VIEW: CPT

## 2020-01-09 PROCEDURE — 99285 EMERGENCY DEPT VISIT HI MDM: CPT

## 2020-01-09 PROCEDURE — 83690 ASSAY OF LIPASE: CPT

## 2020-01-09 PROCEDURE — 82550 ASSAY OF CK (CPK): CPT

## 2020-01-09 PROCEDURE — 93005 ELECTROCARDIOGRAM TRACING: CPT | Performed by: NURSE PRACTITIONER

## 2020-01-09 PROCEDURE — 6360000002 HC RX W HCPCS: Performed by: NURSE PRACTITIONER

## 2020-01-09 PROCEDURE — 83880 ASSAY OF NATRIURETIC PEPTIDE: CPT

## 2020-01-09 PROCEDURE — 84145 PROCALCITONIN (PCT): CPT

## 2020-01-09 PROCEDURE — 0100U HC RESPIRPTHGN MULT REV TRANS & AMP PRB TECH 21 TRGT: CPT

## 2020-01-09 PROCEDURE — 2580000003 HC RX 258: Performed by: EMERGENCY MEDICINE

## 2020-01-09 PROCEDURE — 93010 ELECTROCARDIOGRAM REPORT: CPT | Performed by: INTERNAL MEDICINE

## 2020-01-09 PROCEDURE — 85027 COMPLETE CBC AUTOMATED: CPT

## 2020-01-09 PROCEDURE — 94640 AIRWAY INHALATION TREATMENT: CPT

## 2020-01-09 PROCEDURE — 83735 ASSAY OF MAGNESIUM: CPT

## 2020-01-09 PROCEDURE — 87040 BLOOD CULTURE FOR BACTERIA: CPT

## 2020-01-09 PROCEDURE — 2700000000 HC OXYGEN THERAPY PER DAY

## 2020-01-09 PROCEDURE — 85025 COMPLETE CBC W/AUTO DIFF WBC: CPT

## 2020-01-09 PROCEDURE — 96365 THER/PROPH/DIAG IV INF INIT: CPT

## 2020-01-09 PROCEDURE — 82553 CREATINE MB FRACTION: CPT

## 2020-01-09 PROCEDURE — 87450 HC DIRECT STREP B ANTIGEN: CPT

## 2020-01-09 PROCEDURE — 96367 TX/PROPH/DG ADDL SEQ IV INF: CPT

## 2020-01-09 PROCEDURE — 36415 COLL VENOUS BLD VENIPUNCTURE: CPT

## 2020-01-09 PROCEDURE — 87502 INFLUENZA DNA AMP PROBE: CPT

## 2020-01-09 PROCEDURE — 94664 DEMO&/EVAL PT USE INHALER: CPT

## 2020-01-09 RX ORDER — ATORVASTATIN CALCIUM 40 MG/1
40 TABLET, FILM COATED ORAL NIGHTLY
Status: DISCONTINUED | OUTPATIENT
Start: 2020-01-09 | End: 2020-01-15 | Stop reason: HOSPADM

## 2020-01-09 RX ORDER — HEPARIN SODIUM 10000 [USP'U]/100ML
1000 INJECTION, SOLUTION INTRAVENOUS CONTINUOUS
Status: DISCONTINUED | OUTPATIENT
Start: 2020-01-09 | End: 2020-01-10 | Stop reason: SDUPTHER

## 2020-01-09 RX ORDER — NITROGLYCERIN 0.4 MG/1
0.4 TABLET SUBLINGUAL EVERY 5 MIN PRN
Status: DISCONTINUED | OUTPATIENT
Start: 2020-01-09 | End: 2020-01-15 | Stop reason: HOSPADM

## 2020-01-09 RX ORDER — SODIUM CHLORIDE 0.9 % (FLUSH) 0.9 %
10 SYRINGE (ML) INJECTION EVERY 12 HOURS SCHEDULED
Status: DISCONTINUED | OUTPATIENT
Start: 2020-01-09 | End: 2020-01-15 | Stop reason: HOSPADM

## 2020-01-09 RX ORDER — HEPARIN SODIUM 1000 [USP'U]/ML
4000 INJECTION, SOLUTION INTRAVENOUS; SUBCUTANEOUS PRN
Status: DISCONTINUED | OUTPATIENT
Start: 2020-01-09 | End: 2020-01-11

## 2020-01-09 RX ORDER — MONTELUKAST SODIUM 10 MG/1
10 TABLET ORAL NIGHTLY
COMMUNITY
End: 2020-03-12 | Stop reason: SDUPTHER

## 2020-01-09 RX ORDER — M-VIT,TX,IRON,MINS/CALC/FOLIC 27MG-0.4MG
1 TABLET ORAL DAILY
Status: DISCONTINUED | OUTPATIENT
Start: 2020-01-09 | End: 2020-01-15 | Stop reason: HOSPADM

## 2020-01-09 RX ORDER — SERTRALINE HYDROCHLORIDE 100 MG/1
150 TABLET, FILM COATED ORAL DAILY
COMMUNITY
End: 2020-06-11

## 2020-01-09 RX ORDER — NICOTINE POLACRILEX 4 MG
15 LOZENGE BUCCAL PRN
Status: DISCONTINUED | OUTPATIENT
Start: 2020-01-09 | End: 2020-01-15 | Stop reason: HOSPADM

## 2020-01-09 RX ORDER — CARVEDILOL 6.25 MG/1
12.5 TABLET ORAL 2 TIMES DAILY WITH MEALS
Status: DISCONTINUED | OUTPATIENT
Start: 2020-01-09 | End: 2020-01-15 | Stop reason: HOSPADM

## 2020-01-09 RX ORDER — PANTOPRAZOLE SODIUM 40 MG/1
40 TABLET, DELAYED RELEASE ORAL
Status: DISCONTINUED | OUTPATIENT
Start: 2020-01-10 | End: 2020-01-15 | Stop reason: HOSPADM

## 2020-01-09 RX ORDER — ISOSORBIDE MONONITRATE 30 MG/1
30 TABLET, EXTENDED RELEASE ORAL DAILY
Status: DISCONTINUED | OUTPATIENT
Start: 2020-01-09 | End: 2020-01-15 | Stop reason: HOSPADM

## 2020-01-09 RX ORDER — BUSPIRONE HYDROCHLORIDE 5 MG/1
5 TABLET ORAL DAILY
Status: DISCONTINUED | OUTPATIENT
Start: 2020-01-09 | End: 2020-01-15 | Stop reason: HOSPADM

## 2020-01-09 RX ORDER — SODIUM CHLORIDE 0.9 % (FLUSH) 0.9 %
10 SYRINGE (ML) INJECTION PRN
Status: DISCONTINUED | OUTPATIENT
Start: 2020-01-09 | End: 2020-01-15 | Stop reason: HOSPADM

## 2020-01-09 RX ORDER — DEXTROSE MONOHYDRATE 25 G/50ML
12.5 INJECTION, SOLUTION INTRAVENOUS PRN
Status: DISCONTINUED | OUTPATIENT
Start: 2020-01-09 | End: 2020-01-15 | Stop reason: HOSPADM

## 2020-01-09 RX ORDER — HEPARIN SODIUM AND DEXTROSE 10000; 5 [USP'U]/100ML; G/100ML
1000 INJECTION INTRAVENOUS CONTINUOUS
Status: DISCONTINUED | OUTPATIENT
Start: 2020-01-09 | End: 2020-01-09 | Stop reason: DRUGHIGH

## 2020-01-09 RX ORDER — LISINOPRIL 5 MG/1
2.5 TABLET ORAL DAILY
Status: DISCONTINUED | OUTPATIENT
Start: 2020-01-09 | End: 2020-01-09

## 2020-01-09 RX ORDER — ACETAMINOPHEN 325 MG/1
650 TABLET ORAL EVERY 6 HOURS PRN
Status: DISCONTINUED | OUTPATIENT
Start: 2020-01-09 | End: 2020-01-15 | Stop reason: HOSPADM

## 2020-01-09 RX ORDER — FLUTICASONE PROPIONATE 50 MCG
1 SPRAY, SUSPENSION (ML) NASAL DAILY PRN
Status: DISCONTINUED | OUTPATIENT
Start: 2020-01-09 | End: 2020-01-15 | Stop reason: HOSPADM

## 2020-01-09 RX ORDER — FUROSEMIDE 10 MG/ML
20 INJECTION INTRAMUSCULAR; INTRAVENOUS 2 TIMES DAILY
Status: DISCONTINUED | OUTPATIENT
Start: 2020-01-09 | End: 2020-01-14

## 2020-01-09 RX ORDER — M-VIT,TX,IRON,MINS/CALC/FOLIC 27MG-0.4MG
1 TABLET ORAL DAILY
COMMUNITY
End: 2021-01-01

## 2020-01-09 RX ORDER — GABAPENTIN 300 MG/1
600 CAPSULE ORAL 3 TIMES DAILY
Status: DISCONTINUED | OUTPATIENT
Start: 2020-01-09 | End: 2020-01-15 | Stop reason: HOSPADM

## 2020-01-09 RX ORDER — ONDANSETRON 2 MG/ML
4 INJECTION INTRAMUSCULAR; INTRAVENOUS EVERY 6 HOURS PRN
Status: DISCONTINUED | OUTPATIENT
Start: 2020-01-09 | End: 2020-01-15 | Stop reason: HOSPADM

## 2020-01-09 RX ORDER — IPRATROPIUM BROMIDE AND ALBUTEROL SULFATE 2.5; .5 MG/3ML; MG/3ML
3 SOLUTION RESPIRATORY (INHALATION)
Status: DISCONTINUED | OUTPATIENT
Start: 2020-01-09 | End: 2020-01-11

## 2020-01-09 RX ORDER — MONTELUKAST SODIUM 10 MG/1
10 TABLET ORAL NIGHTLY
Status: DISCONTINUED | OUTPATIENT
Start: 2020-01-09 | End: 2020-01-15 | Stop reason: HOSPADM

## 2020-01-09 RX ORDER — ACETAMINOPHEN 325 MG/1
650 TABLET ORAL NIGHTLY
COMMUNITY

## 2020-01-09 RX ORDER — LISINOPRIL 5 MG/1
2.5 TABLET ORAL DAILY
Status: DISCONTINUED | OUTPATIENT
Start: 2020-01-11 | End: 2020-01-15 | Stop reason: HOSPADM

## 2020-01-09 RX ORDER — HEPARIN SODIUM 1000 [USP'U]/ML
2000 INJECTION, SOLUTION INTRAVENOUS; SUBCUTANEOUS PRN
Status: DISCONTINUED | OUTPATIENT
Start: 2020-01-09 | End: 2020-01-11

## 2020-01-09 RX ORDER — DEXTROSE MONOHYDRATE 50 MG/ML
100 INJECTION, SOLUTION INTRAVENOUS PRN
Status: DISCONTINUED | OUTPATIENT
Start: 2020-01-09 | End: 2020-01-15 | Stop reason: HOSPADM

## 2020-01-09 RX ORDER — CARVEDILOL 25 MG/1
12.5 TABLET ORAL 2 TIMES DAILY WITH MEALS
COMMUNITY
End: 2020-05-13 | Stop reason: SDUPTHER

## 2020-01-09 RX ORDER — OMEPRAZOLE 40 MG/1
40 CAPSULE, DELAYED RELEASE ORAL DAILY
COMMUNITY
End: 2020-01-22 | Stop reason: ALTCHOICE

## 2020-01-09 RX ADMIN — CEFEPIME HYDROCHLORIDE 1 G: 1 INJECTION, POWDER, FOR SOLUTION INTRAMUSCULAR; INTRAVENOUS at 18:42

## 2020-01-09 RX ADMIN — MULTIPLE VITAMINS W/ MINERALS TAB 1 TABLET: TAB at 17:02

## 2020-01-09 RX ADMIN — ATORVASTATIN CALCIUM 40 MG: 40 TABLET, FILM COATED ORAL at 20:35

## 2020-01-09 RX ADMIN — INSULIN LISPRO 2 UNITS: 100 INJECTION, SOLUTION INTRAVENOUS; SUBCUTANEOUS at 13:57

## 2020-01-09 RX ADMIN — SERTRALINE 150 MG: 100 TABLET, FILM COATED ORAL at 17:02

## 2020-01-09 RX ADMIN — CARVEDILOL 12.5 MG: 6.25 TABLET, FILM COATED ORAL at 17:02

## 2020-01-09 RX ADMIN — GABAPENTIN 600 MG: 300 CAPSULE ORAL at 17:01

## 2020-01-09 RX ADMIN — PIPERACILLIN AND TAZOBACTAM 3.38 G: 3; .375 INJECTION, POWDER, LYOPHILIZED, FOR SOLUTION INTRAVENOUS at 09:35

## 2020-01-09 RX ADMIN — INSULIN LISPRO 3 UNITS: 100 INJECTION, SOLUTION INTRAVENOUS; SUBCUTANEOUS at 20:34

## 2020-01-09 RX ADMIN — APIXABAN 5 MG: 5 TABLET, FILM COATED ORAL at 17:02

## 2020-01-09 RX ADMIN — FUROSEMIDE 20 MG: 10 INJECTION, SOLUTION INTRAMUSCULAR; INTRAVENOUS at 18:42

## 2020-01-09 RX ADMIN — INSULIN LISPRO 3 UNITS: 100 INJECTION, SOLUTION INTRAVENOUS; SUBCUTANEOUS at 18:42

## 2020-01-09 RX ADMIN — LISINOPRIL 2.5 MG: 5 TABLET ORAL at 17:01

## 2020-01-09 RX ADMIN — BUSPIRONE HYDROCHLORIDE 5 MG: 5 TABLET ORAL at 17:01

## 2020-01-09 RX ADMIN — MONTELUKAST SODIUM 10 MG: 10 TABLET ORAL at 20:35

## 2020-01-09 RX ADMIN — GABAPENTIN 600 MG: 300 CAPSULE ORAL at 20:35

## 2020-01-09 RX ADMIN — VANCOMYCIN HYDROCHLORIDE 1000 MG: 1 INJECTION, POWDER, LYOPHILIZED, FOR SOLUTION INTRAVENOUS at 08:31

## 2020-01-09 RX ADMIN — HEPARIN SODIUM 1000 UNITS/HR: 10000 INJECTION, SOLUTION INTRAVENOUS at 20:05

## 2020-01-09 RX ADMIN — ISOSORBIDE MONONITRATE 30 MG: 30 TABLET ORAL at 17:02

## 2020-01-09 RX ADMIN — IPRATROPIUM BROMIDE AND ALBUTEROL SULFATE 3 ML: 2.5; .5 SOLUTION RESPIRATORY (INHALATION) at 19:37

## 2020-01-09 ASSESSMENT — PAIN SCALES - GENERAL
PAINLEVEL_OUTOF10: 0
PAINLEVEL_OUTOF10: 0

## 2020-01-09 NOTE — ED NOTES
Pt alert to person place and situation. Lethargic. Skin pale, cool and dry. respirations even and unlabored at rest. abd soft and non distended. Denying any abd pains or pains. Denying sob at time. Family states recently placed into rehab facility and developed pneumonia stating 27th of December she had a clear chest xray states remains coughing non stop and states that she is scared that she may have developed pneumonia again. sr of 98 on the monitor. Call bell in reach. No distress noted at time. Resting with eyes closed.       Deja Medrano, LEXIE  01/09/20 0121

## 2020-01-09 NOTE — PROGRESS NOTES
Occupational Therapy Hold    OT order received. Per speaking with RN, hold OT eval this date due to elevated troponin levels. Will re-attempt at a later time.     Didi Aviles, [address] [facility], OTR/L [id number]

## 2020-01-09 NOTE — ED PROVIDER NOTES
HPI:  1/9/20, Time: 6:33 AM         Nafisa Roberto is a 80 y.o. female presenting to the ED for cough and shortness of breath, beginning last night. The complaint has been persistent, moderate in severity, and worsened by nothing. Pt recently diagnosed with aspiration pneumonia and was put on a thickened liquid diet. Per pt, after drinking hot chocolate last night her cough worsened. Per daughter, pt has been sick for six weeks and has been in and out of the hospital with pneumonia and coughing. Daughter reports that pt has had multiple chest x-rays since the beginning of December which have come out negative. Pt recently discharged from [facility] on Saturday. Pt does follow with a pulmonologist. Hx PE. Pt had recent ECHO that has improved since her last one. Patient denies fever/chills, congestion, chest pain, edema, headache, visual disturbances, focal paresthesias, focal weakness, abdominal pain, nausea, vomiting, diarrhea, constipation, dysuria, hematuria, trauma, neck or back pain or other complaints. ROS:   Pertinent positives and negatives are stated within HPI, all other systems reviewed and are negative.      --------------------------------------------- PAST HISTORY ---------------------------------------------  Past Medical History:  has a past medical history of Anxiety, Depression, Diabetes mellitus ([address] GERD (gastroesophageal reflux disease), Hypertension, Peripheral neuropathy, and Spinal stenosis. Past Surgical History:  has a past surgical history that includes Cholecystectomy; Skin cancer excision; and Hip fracture surgery (Bilateral). Social History:  reports that she has never smoked. She has never used smokeless tobacco. She reports that she does not drink alcohol or use drugs. Family History: family history is not on file. The patients home medications have been reviewed.     Allergies: Patient has no known allergies. ---------------------------------------------------PHYSICAL EXAM--------------------------------------    Constitutional:  Well developed, well nourished, no acute distress, non-toxic appearance. Warm to touch. Eyes:  PERRL, conjunctiva normal, EOMI  HENT:  Atraumatic, external ears normal, nose normal, oropharynx moist. Neck- normal range of motion, no tenderness, supple   Respiratory:  No respiratory distress, normal breath sounds, no rales, no wheezing. Wet, junky cough, diffuse audible rhonchi throughout  Cardiovascular:  Normal rate, normal rhythm, no murmurs, no gallops, no rubs. Radial and DP pulses 2+ bilaterally. GI:  Soft, nondistended, normal bowel sounds, nontender, no organomegaly, no mass, no rebound, no guarding   :  No costovertebral angle tenderness   Musculoskeletal:  No tenderness, no deformities. Back- no tenderness. 2+ bilateral lower extremity pitting edema  Integument:  Well hydrated, no rash. Well perfused. Lymphatic:  No lymphadenopathy noted   Neurologic:  Alert & oriented x 3, CN 2-12 normal, normal motor function, normal sensory function, no focal deficits noted. Psychiatric:  Speech and behavior appropriate       -------------------------------------------------- RESULTS -------------------------------------------------  I have personally reviewed all laboratory and imaging results for this patient. Results are listed below.      LABS:  Results for orders placed or performed during the hospital encounter of 01/09/20   Rapid influenza A/B antigens   Result Value Ref Range    Influenza A by PCR Not Detected Not Detected    Influenza B by PCR Not Detected Not Detected   Lactate, Sepsis   Result Value Ref Range    Lactic Acid, Sepsis 1.8 0.5 - 1.9 mmol/L   APTT   Result Value Ref Range    aPTT 33.0 24.5 - 35.1 sec   Protime-INR   Result Value Ref Range    Protime 13.9 (H) 9.3 - 12.4 sec    INR 1.2    Lipase   Result Value Ref Range    Lipase 37 13 - 60 U/L   CBC Atrial Rate 102 BPM    P-R Interval 194 ms    QRS Duration 80 ms    Q-T Interval 332 ms    QTc Calculation (Bazett) 432 ms    P Axis 77 degrees    R Axis -29 degrees    T Axis 95 degrees       RADIOLOGY:  Interpreted by Radiologist.  CT Chest WO Contrast   Final Result   Patchy bibasilar infiltrates and small pleural effusions concerning   for pneumonia. XR CHEST PORTABLE   Final Result   Patchy bibasilar infiltrates and small pleural effusion concerning for   pneumonia. EKG Interpretation  Interpreted by emergency department physician,    Time: 6:13  Rhythm: sinus tachycardia  Rate: 102  Axis: normal  Conduction: normal  ST Segments: normal  T Waves: normal  Clinical Impression: no acute changes  Comparison to prior EKG: no previous EKG      ------------------------- NURSING NOTES AND VITALS REVIEWED ---------------------------   The nursing notes within the ED encounter and vital signs as below have been reviewed by myself. BP (!) 127/58   Pulse 86   Temp 98.6 °F (37 °C) (Temporal)   Resp 22   Ht 5' 1\" (1.549 m)   Wt 188 lb (85.3 kg)   LMP  (LMP Unknown)   SpO2 98%   BMI 35.52 kg/m²   Oxygen Saturation Interpretation: Normal    The patients available past medical records and past encounters were reviewed.         ------------------------------ ED COURSE/MEDICAL DECISION MAKING----------------------  Medications   ipratropium-albuterol (DUONEB) nebulizer solution 3 mL (has no administration in time range)   busPIRone (BUSPAR) tablet 5 mg (has no administration in time range)   fluticasone (FLONASE) 50 MCG/ACT nasal spray 1 spray (has no administration in time range)   nitroGLYCERIN (NITROSTAT) SL tablet 0.4 mg (has no administration in time range)   Liraglutide (VICTOZA) SC injection 1.2 mg (has no administration in time range)   gabapentin (NEURONTIN) capsule 600 mg (has no administration in time range)   lisinopril (PRINIVIL;ZESTRIL) tablet 2.5 mg (has no administration in time range)   apixaban (ELIQUIS) tablet 5 mg (has no administration in time range)   atorvastatin (LIPITOR) tablet 40 mg (has no administration in time range)   isosorbide mononitrate (IMDUR) extended release tablet 30 mg (has no administration in time range)   carvedilol (COREG) tablet 12.5 mg (has no administration in time range)   montelukast (SINGULAIR) tablet 10 mg (has no administration in time range)   sertraline (ZOLOFT) tablet 150 mg (has no administration in time range)   therapeutic multivitamin-minerals 1 tablet (has no administration in time range)   acetaminophen (TYLENOL) tablet 650 mg (has no administration in time range)   pantoprazole (PROTONIX) tablet 40 mg (has no administration in time range)   sodium chloride flush 0.9 % injection 10 mL (has no administration in time range)   sodium chloride flush 0.9 % injection 10 mL (has no administration in time range)   magnesium hydroxide (MILK OF MAGNESIA) 400 MG/5ML suspension 30 mL (has no administration in time range)   ondansetron (ZOFRAN) injection 4 mg (has no administration in time range)   glucose (GLUTOSE) 40 % oral gel 15 g (has no administration in time range)   dextrose 50 % IV solution (has no administration in time range)   glucagon (rDNA) injection 1 mg (has no administration in time range)   dextrose 5 % solution (has no administration in time range)   insulin lispro (HUMALOG) injection vial 0-6 Units (2 Units Subcutaneous Given 1/9/20 1227)   insulin lispro (HUMALOG) injection vial 0-3 Units (has no administration in time range)   vancomycin 1000 mg IVPB in 250 mL D5W addavial (has no administration in time range)   vancomycin 1000 mg IVPB in 250 mL D5W addavial (0 mg Intravenous Stopped 1/9/20 2892)   piperacillin-tazobactam (ZOSYN) 3.375 g in dextrose 5 % 100 mL IVPB (mini-bag) (0 g Intravenous Stopped 1/9/20 1046)           Procedures:  none      Medical Decision Making:    Pt is an 80year old female presenting for coughing and shortness of breath that has been ongoing since last night. Per daughter, pt was recently diagnosed with aspiration pneumonia and put on a thickened liquid diet. Pt reports that coughing worsened after drinking hot chocolate last night. Daughter reports that pt has had multiple visits to the ED with admissions for the same complaint. Hx of PE. Labs and imaging were obtained and reviewed. An EKG was obtained and reviewed. SIRS CRITERIA:    Temp >38 C (100.4 F) or <36 C (96.8 F)   NO. Heart Rate >90:   YES  +1.     Resp. Rate >20 or PaCO2 < 32 mmHg:   YES  +1. WBC <4K or >12K  or >10% Bands:   NO. Total:   2     ** Two or more above criteria met? Yes**     IV bolus not given due to pleural effusions and CHF    Elevated trop 0.55, no chest pain, already on Eliquis. Will consult cardiology for further management. This patient's ED course included: re-evaluation prior to disposition, cardiac monitoring, continuous pulse oximetry and a personal history and physicial eaxmination    This patient has remained hemodynamically stable during their ED course. Re-Evaluations:             Time: 8:30 AM  Re-evaluation. Patients symptoms are improving  Repeat physical examination, pt is not tachycardic        Consultations:             Time: 8:16 AM.   Spoke with Dr. Lady Granados (Medicine). Discussed case. They will admit this patient. Pulmonology and cardiology consults placed    Critical Care: none        Counseling: The emergency provider has spoken with the patient and family member daughter and discussed todays results, in addition to providing specific details for the plan of care and counseling regarding the diagnosis and prognosis. Questions are answered at this time and they are agreeable with the plan.       --------------------------------- IMPRESSION AND DISPOSITION ---------------------------------    IMPRESSION  1. HCAP (healthcare-associated pneumonia)    2.  Elevated

## 2020-01-09 NOTE — PROGRESS NOTES
Physical Therapy    PT orders received and appreciated. Upon chart review, pt was noted to have elevated troponin levels. Discussed with RN who requests pt to be held this date. PT will follow and attempt again as appropriate at later time/date.  Thank you    Hanny Lake, PT, DPT  [id number]

## 2020-01-09 NOTE — PROGRESS NOTES
Torsten Taylor was ordered Liraglutide (victoza) 1.2mg which is a nonformulary medication. The patient has indicated that the home supply of this medication will be brought in to the hospital for inpatient use. If the medication has not been administered by 1400 on the following day from the time the order was placed, a pharmacist will follow-up with the nurse of the patient to assess the capability of the patient to bring in the medication. If it is determined that the patient cannot supply the medication and it is not available to be dispensed from the pharmacy, a call will be placed to the ordering provider to discuss alternative options.

## 2020-01-09 NOTE — CONSULTS
I independently interviewed and examined the patient. I have reviewed the above documentation completed by the MADAI. Please see my additional contributions to the HPI, physical exam, and assessment / medical decision making. Reason for consult: NSTEMI and also CHF. She was previously known to me (Dr. Esau Estrada) at [facility]) cardiology. Mrs. Pj Burrows is a 58-year-old female history of hypertension, type 2 diabetes on insulin, history of statin intolerance, hyperlipidemia currently on statin, chronic kidney disease stage III, anxiety, depression, spinal stenosis and history of multiple admissions for CHF and aspiration pneumonia. She was recently diagnosed with a pulmonary emboli in October 2019 and has been on anticoagulation therapy with Eliquis. Ms. Otilia Whitehead admitted to [facility] ED on 1/9/2020 with complaints of cough (nonproductive) with worsening shortness of breath that started ~12/25/2019. Patient was recently discharged from nursing facility on 1/4/2020. Prior to being discharged she was noted to have aspiration pneumonia, treated with doxycycline and Rocephin and had a failed swallow evaluation requiring thickened liquids. Her daughter who is very involved in her care noted that several times during her admission to the nursing facility she aspirated and has been coughing ever since. She was seen by pulmonary approximately 1 week ago was noted to have clear lungs was stopped on Mucinex. Per patient's daughter she was drinking hot chocolate last night coughed and the began to feel short of breath. Overnight she had continued to cough and suddenly had midsternal \"pressure\" 8/10, nonradiating lasting for approximately 45 minutes. Her daughter gave her 3 sublingual nitroglycerin that completely resolved her pain. Due to coughing, wheezing, lower extremity swelling and recent chest pain patient was brought to the ED.      Blood pressure 175/85, heart rate 107, afebrile, 94% on room air.   proBNP 4593,

## 2020-01-09 NOTE — CONSULTS
Inpatient Cardiology Consultation      Reason for Consult: NSTEMI    Consulting Physician: Dr. Agustin You    Requesting Physician: Dr. Dago Brantley    Date of Consultation: 1/9/2020    HISTORY OF PRESENT ILLNESS:   Ms. Tania Alarcon is a 68-year-old obese  female who is known to Dr. Agustin You; was recently seen in hospital consultation on 11/29/2019 with Dr. Bladimir Ambrosio for left-sided chest \"pressure\" at rest with a rapid pulse. Troponin0.11>>0.10. Upon arrival 92/53  ST, temperature 99.0, 90% on RA. Na 138, K+ 4.7, Bun/Cr 36/1.6, , p-BNP 2717. Cardiology recommendations-add PPI. Hold Aldactone in the setting of renal insufficiency. PMH: HTN, T2DM insulin requiring, PE 10/21/2019 OAC with Eliquis, CKD stage III, HTN,  non ischemic stress 10/23/2019, NSTEMI 10/23/2019, cardiomyopathy EF 45-50% on TTE 10/22/2019, mild VHD, anxiety, spinal stenosis, lifelong non smoker, and ambulates with walker. Ms. Tania Alarcon admitted to [facility] ED on 1/9/2020 with complaints of cough (nonproductive) with worsening shortness of breath that started ~12/25/2019. Patient was recently discharged from nursing facility on 1/4/2020. Prior to being discharged she was noted to have aspiration pneumonia, treated with doxycycline and Rocephin and had a failed swallow evaluation requiring thickened liquids. Her daughter who is very involved in her care noted that several times during her admission to the nursing facility she aspirated and has been coughing ever since. She was seen by pulmonary approximately 1 week ago was noted to have clear lungs was stopped on Mucinex. Per patient's daughter she was drinking hot chocolate last night coughed and the began to feel short of breath. Overnight she had continued to cough and suddenly had midsternal \"pressure\" 8/10, nonradiating lasting for approximately 45 minutes. Her daughter gave her 3 sublingual nitroglycerin that completely resolved her pain.   Due to coughing, wheezing, lower extremity swelling and recent chest pain patient was brought to the ED. Blood pressure 175/85, heart rate 107, afebrile, 94% on room air. proBNP 4593, troponin 0.55, BUN 19, creatinine 1.0, albumin 2.9. CT chest: Patchy bibasilar infiltrates and small pleural effusions concerning for pneumonia. Chest x-ray: Patchy bibasilar infiltrates and small pleural effusion concerning for pneumonia. Pulmonary consulted for pneumonia. On initial consultation, patient sitting up in bed and is ill-appearing. No acute distress. Denies chest pain at this time but continues to feel short of breath and has a dry cough. Denies fever, chills, nausea, vomiting or palpitations. Please note: past medical records were reviewed per electronic medical record (EMR) - see detailed reports under Past Medical/ Surgical History. Past Medical History:    1. Lifelong non smoker  2. HTN  3. T2DM insulin requiring with neuropathy, and nephropathy. HgbA1c 7.5 on 11/28/2019  4. Hx HLD on fish oil. Statin therapy stopped in 2015 secondary to age. T Chol 219 Triglycerides 252 HDL 46   on 10/21/2019  5. CKD  6. Anxiety/Depression  7. GERD  8. Spinal stenosis/back pain with remote history of injections  9. Ambulates with walker. Needs assistance with ALL ADL's  10. L hip fracture s/p ORIF,  Cholecystectomy, bilateral hip arthroplasty's, skin carcinoma excision. 11. SE-B  9/23/2019-9/26/2019 admitted for weakness, cough and hypoxia.  She was treated for a UTI.  Ultrasound of the lower extremities showed no evidence for DVT.   reated with Omnicef.  Aspirin 81 daily, atenolol 50 mg daily on discharge.  Cardiology was not consulted during that admission.   12. 1st degree AVB  13. [facility] 10/21/2019-10/24/2019 Troponin 0.18-->0.76-->0.84  14. 10/21/2019 CTA Chest: Subsegmental pulmonary embolism in the left lower lobe. There is no central pulmonary embolism.  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 with collateral vessels in the daily    Yes Historical Provider, MD   ipratropium-albuterol (DUONEB) 0.5-2.5 (3) MG/3ML SOLN nebulizer solution Inhale 3 mLs into the lungs every 4 hours as needed for Shortness of Breath 2/13/19  Yes Frank Gonzalez Catterlin, DO   Calcium-Vitamin D (CALTRATE 600 PLUS-VIT D PO) Take 1 tablet by mouth 2 times daily    Yes Historical Provider, MD       Current Medications:    Current Facility-Administered Medications: ipratropium-albuterol (DUONEB) nebulizer solution 3 mL, 3 mL, Inhalation, Q4H WA  busPIRone (BUSPAR) tablet 5 mg, 5 mg, Oral, Daily  fluticasone (FLONASE) 50 MCG/ACT nasal spray 1 spray, 1 spray, Each Nostril, Daily PRN  nitroGLYCERIN (NITROSTAT) SL tablet 0.4 mg, 0.4 mg, Sublingual, Q5 Min PRN  Liraglutide (VICTOZA) SC injection 1.2 mg, 1.2 mg, Subcutaneous, Daily  gabapentin (NEURONTIN) capsule 600 mg, 600 mg, Oral, TID  lisinopril (PRINIVIL;ZESTRIL) tablet 2.5 mg, 2.5 mg, Oral, Daily  apixaban (ELIQUIS) tablet 5 mg, 5 mg, Oral, BID  atorvastatin (LIPITOR) tablet 40 mg, 40 mg, Oral, Nightly  isosorbide mononitrate (IMDUR) extended release tablet 30 mg, 30 mg, Oral, Daily  carvedilol (COREG) tablet 12.5 mg, 12.5 mg, Oral, BID WC  montelukast (SINGULAIR) tablet 10 mg, 10 mg, Oral, Nightly  sertraline (ZOLOFT) tablet 150 mg, 150 mg, Oral, Daily  therapeutic multivitamin-minerals 1 tablet, 1 tablet, Oral, Daily  acetaminophen (TYLENOL) tablet 650 mg, 650 mg, Oral, Q6H PRN  [START ON 1/10/2020] pantoprazole (PROTONIX) tablet 40 mg, 40 mg, Oral, QAM AC  sodium chloride flush 0.9 % injection 10 mL, 10 mL, Intravenous, 2 times per day  sodium chloride flush 0.9 % injection 10 mL, 10 mL, Intravenous, PRN  magnesium hydroxide (MILK OF MAGNESIA) 400 MG/5ML suspension 30 mL, 30 mL, Oral, Daily PRN  ondansetron (ZOFRAN) injection 4 mg, 4 mg, Intravenous, Q6H PRN  glucose (GLUTOSE) 40 % oral gel 15 g, 15 g, Oral, PRN  dextrose 50 % IV solution, 12.5 g, Intravenous, PRN  glucagon (rDNA) injection 1 mg, 1 mg, Intramuscular, PRN  dextrose 5 % solution, 100 mL/hr, Intravenous, PRN  insulin lispro (HUMALOG) injection vial 0-6 Units, 0-6 Units, Subcutaneous, TID WC  insulin lispro (HUMALOG) injection vial 0-3 Units, 0-3 Units, Subcutaneous, Nightly  [START ON 1/10/2020] vancomycin 1000 mg IVPB in 250 mL D5W addavial, 1,000 mg, Intravenous, Q24H    Allergies:  Patient has no known allergies. Social History:    Patient is . Aleshia Rosa   1 year ago. Suzanne Aguilar lives alone. She has around-the-clock care from her family and also [facility] home health care. She is a walker for ambulation and one assist. Needs help with all ADL's  Lifelong non-smoker  Denies alcohol, illicit drug use, and caffeine  Code Stats: Full Code    Family History: Noncontributory due to advanced age    REVIEW OF SYSTEMS:     · Constitutional: + fatigue. Denies fevers, chills or night sweats. +weakness. · Eyes: Denies visual changes or drainage  · ENT: Denies headaches or hearing loss. No mouth sores or sore throat. No epistaxis   · Cardiovascular: See HPI. + lower extremity swelling/ redness  · Respiratory: + DURAN, + dry cough, + orthopnea. Denies PND. No hemoptysis   · Gastrointestinal: Denies hematemesis or anorexia. No hematochezia or melena    · Genitourinary: Denies urgency, dysuria or hematuria. · Musculoskeletal: + gait disturbance (uses walker), + generalized  Weakness. +Chronic Bilateral knee pain  · Integumentary: Denies rash, hives or pruritis   · Neurological: Denies dizziness, headaches or seizures. No numbness or tingling  · Psychiatric: Denies anxiety or depression. · Endocrine: Denies temperature intolerance. No recent weight change. .  · Hematologic/Lymphatic: Denies abnormal bruising or bleeding.  No swollen lymph nodes    PHYSICAL EXAM:   BP (!) 127/58   Pulse 86   Temp 98.6 °F (37 °C) (Temporal)   Resp 22   Ht 5' 1\" (1.549 m)   Wt 188 lb (85.3 kg)   LMP  (LMP Unknown)   SpO2 98%   BMI 35.52 kg/m²   CONST: Elderly, ill-appearing obese female who appears of stated age. Awake, alert and cooperative. No apparent distress. HEENT:   Head- Normocephalic, atraumatic   Eyes- Conjunctivae pink, anicteric  Throat- Oral mucosa pink and moist  Neck-  No stridor, trachea midline, mild jugular venous distention. No carotid bruit. CHEST: Chest symmetrical and non-tender to palpation. No accessory muscle use or intercostal retractions  RESPIRATORY: Lung sounds -scattered rhonchi with bibasilar Rales  CARDIOVASCULAR:     Heart Inspection- shows no noted pulsations  Heart Palpation- no heaves or thrills; PMI is non-displaced   Heart Ausculation- Regular rate and rhythm, no murmur. No s3, s4 or rub   PV: +1-+2 pitting lower extremity edema with redness. No varicosities. Pedal pulses palpable, no clubbing or cyanosis   ABDOMEN: Soft, obese, non-tender to light palpation. Bowel sounds present. No palpable masses no organomegaly; no abdominal bruit  MS: Good muscle strength and tone. No atrophy or abnormal movements. : Deferred  SKIN: Warm and dry no statis dermatitis or ulcers   NEURO / PSYCH: Oriented to person, place and time. Speech clear and appropriate. Follows all commands. Pleasant affect     DATA:    Tele strips: SR  EKG: as per Dr. Hernandez Barrier  Diagnostic:    No intake or output data in the 24 hours ending 01/09/20 1416    Labs:   CBC:   Recent Labs     01/09/20  0638   WBC 11.9*   HGB 10.4*   HCT 33.7*        BMP:   Recent Labs     01/09/20  0940      K 4.6   CO2 27   BUN 19   CREATININE 1.0   LABGLOM 53   CALCIUM 8.8     Mag: No results for input(s): MG in the last 72 hours. Phos: No results for input(s): PHOS in the last 72 hours. TSH: No results for input(s): TSH in the last 72 hours.   HgA1c:   Lab Results   Component Value Date    LABA1C 7.7 (H) 12/03/2019     No results found for: EAG  proBNP:   Recent Labs     01/09/20  0643 01/09/20  0940   PROBNP 2,562* 4,593*     PT/INR:   Recent Labs     01/09/20  4550 PROTIME 13.9*   INR 1.2     APTT:  Recent Labs     01/09/20  0638   APTT 33.0     CARDIAC ENZYMES:  Recent Labs     01/09/20  0940   TROPONINI 0.55*     FASTING LIPID PANEL:  Lab Results   Component Value Date    CHOL 181 10/21/2019    HDL 46 11/23/2019    LDLCALC 123 11/23/2019    TRIG 197 10/21/2019     LIVER PROFILE:  Recent Labs     01/09/20  0940   AST 38*   ALT 17   LABALBU 2.9*     CT chest without contrast: 1/9/2020  Patchy bibasilar infiltrates and small pleural effusions concerning for pneumonia    Assessment/Plan as per Dr. Christina Carrillo  Electronically signed by KRISTIE Amaya CNP on 1/9/20 at 2:30 PM

## 2020-01-09 NOTE — CONSULTS
[address] NOTE    Patient: Yissel Gonzalez  MRN: [id number]  : 1933    Encounter Date: 2020  Encounter Time: 12:53 PM     Date of Admission: .2020  5:51 AM    Consulting Physician:  Primary Care Physician:      John Pepe [place], [place]     [phone]    PROBLEM LIST:  Patient Active Problem List   Diagnosis    GERD (gastroesophageal reflux disease)    Diabetes mellitus type 2, uncontrolled ([address]    CKD (chronic kidney disease) stage 3, GFR 30-59 ml/min ([address]    Essential hypertension    History of pulmonary embolus (PE)    Acute respiratory failure with hypoxia ([address]    Respiratory failure ([address]    HCAP (healthcare-associated pneumonia)     Reason for Consultation: HCAP     HPI:   Ms. Althea Pat is a 81 y/o female with past medical history noted for below that presented back to [facility] ER for cough as well as shortness of breath that was moderate in severity and had been ongoing for 1 day prior to admission. Patient recently diagnosed with aspiration pneumonia and was put on a thickened liquid diet. Per patient, after drinking hot chocolate last night her cough worsened. Per daughter, patient has been sick for six weeks and has been in and out of the hospital with pneumonia and coughing. Multiple CXR since early 2019 were negative for acute changes per daughter. Patient denies fever/chills, congestion, chest pain, edema, headache, visual disturbances, focal paresthesias, focal weakness, abdominal pain, nausea, vomiting, diarrhea, constipation, dysuria, hematuria, trauma, neck or back pain or other complaints.       PAST MEDICAL HISTORY:   Past Medical History:   Diagnosis Date    Anxiety     Depression     Diabetes mellitus (HCC)     GERD (gastroesophageal reflux disease)     Hypertension     Peripheral neuropathy     Spinal stenosis        PAST SURGICAL HISTORY:   Past Surgical History:   Procedure Laterality Date    CHOLECYSTECTOMY      HIP FRACTURE SURGERY Bilateral     SKIN CANCER EXCISION         FAMILY HISTORY:   History reviewed. No pertinent family history. SOCIAL HISTORY:   Social History     Socioeconomic History    Marital status:      Spouse name: Not on file    Number of children: Not on file    Years of education: Not on file    Highest education level: Not on file   Occupational History    Not on file   Social Needs    Financial resource strain: Not on file    Food insecurity:     Worry: Not on file     Inability: Not on file    Transportation needs:     Medical: Not on file     Non-medical: Not on file   Tobacco Use    Smoking status: Never Smoker    Smokeless tobacco: Never Used   Substance and Sexual Activity    Alcohol use: No    Drug use: No    Sexual activity: Not Currently   Lifestyle    Physical activity:     Days per week: Not on file     Minutes per session: Not on file    Stress: Not on file   Relationships    Social connections:     Talks on phone: Not on file     Gets together: Not on file     Attends Oriental orthodox service: Not on file     Active member of club or organization: Not on file     Attends meetings of clubs or organizations: Not on file     Relationship status: Not on file    Intimate partner violence:     Fear of current or ex partner: Not on file     Emotionally abused: Not on file     Physically abused: Not on file     Forced sexual activity: Not on file   Other Topics Concern    Not on file   Social History Narrative    Not on file     Social History     Tobacco Use   Smoking Status Never Smoker   Smokeless Tobacco Never Used     Social History     Substance and Sexual Activity   Alcohol Use No     Social History     Substance and Sexual Activity   Drug Use No     HOME MEDICATIONS:  Prior to Admission medications    Medication Sig Start Date End Date Taking?  Authorizing Provider   carvedilol (COREG) 25 MG tablet Take 12.5 mg by mouth 2    CO2 27   BUN 19   CREATININE 1.0       MG:   Lab Results   Component Value Date    MG 2.0 09/24/2019     Ca/Phos:   Lab Results   Component Value Date    CALCIUM 8.8 01/09/2020     Amylase: No results found for: AMYLASE  Lipase:   Lab Results   Component Value Date    LIPASE 37 01/09/2020     LIVER PROFILE:   Recent Labs     01/09/20  0643 01/09/20  0940   AST  --  38*   ALT  --  17   LIPASE 37  --    BILITOT  --  0.2   ALKPHOS  --  77       PT/INR:   Recent Labs     01/09/20  0638   PROTIME 13.9*   INR 1.2     APTT:   Recent Labs     01/09/20  0638   APTT 33.0       Cardiac Enzymes:  Lab Results   Component Value Date    CKTOTAL 28 11/29/2019    CKMB 1.5 11/29/2019    TROPONINI 0.55 (H) 01/09/2020       Hgb A1C:   Lab Results   Component Value Date    LABA1C 7.7 (H) 12/03/2019     No results found for: EAG  ARLEEN: No results found for: ARLEEN  ESR:   Lab Results   Component Value Date    SEDRATE 41 (H) 02/25/2019     CRP:   Lab Results   Component Value Date    CRP 0.2 12/08/2014     D Dimer:   Lab Results   Component Value Date    DDIMER 581 09/23/2019     Folate and B12:   Lab Results   Component Value Date    SFYJHYXL41 157 09/23/2019   ,   Lab Results   Component Value Date    FOLATE >20.0 09/23/2019       Lactic Acid:   Lab Results   Component Value Date    LACTA 1.0 09/24/2019     Ammonia:   Cortisol:  Thyroid Studies:  Lab Results   Component Value Date    TSH 1.940 11/29/2019     CT Chest WO Contrast   Final Result   Patchy bibasilar infiltrates and small pleural effusions concerning   for pneumonia. XR CHEST PORTABLE   Final Result   Patchy bibasilar infiltrates and small pleural effusion concerning for   pneumonia.             US DUP LOWER EXTREMITIES BILATERAL VENOUS    (Results Pending)     ASSESSMENT:  1.) HCAP - bibasilar  2.) Acute Hypoxic Respiratory Failure   3.) H/O Pulmonary Embolism - subsegmental PE in LLL  4.) Elevated Troponin     PLAN:  1.) O2, IS, respiratory panel, procalcitonin   2.) vancomycin and cefepime   3.) pan culture, urine strep pneumo, urine legionella, serum mycoplasma IgM, blood culture x 2, UA, urine culture   4.) DVT Prophylaxis     Thank you Jorge Ferguson, DO very much for allowing me to see this patient in consultation and follow up. Care reviewed with nursing staff, medical and surgical specialty care, primary care and the patient's family as available. Restraints are ordered when the patient can do harm to him/herself by pulling out devices.     Kirill Ortiz M.D.

## 2020-01-10 ENCOUNTER — APPOINTMENT (OUTPATIENT)
Dept: ULTRASOUND IMAGING | Age: 85
DRG: 291 | End: 2020-01-10
Payer: MEDICARE

## 2020-01-10 PROBLEM — J96.90 RESPIRATORY FAILURE (HCC): Status: RESOLVED | Noted: 2020-01-06 | Resolved: 2020-01-10

## 2020-01-10 LAB
ADENOVIRUS BY PCR: NOT DETECTED
ANION GAP SERPL CALCULATED.3IONS-SCNC: 13 MMOL/L (ref 7–16)
APTT: 103.7 SEC (ref 24.5–35.1)
APTT: 71.1 SEC (ref 24.5–35.1)
APTT: 72.5 SEC (ref 24.5–35.1)
BASOPHILS ABSOLUTE: 0.03 E9/L (ref 0–0.2)
BASOPHILS RELATIVE PERCENT: 0.3 % (ref 0–2)
BORDETELLA PARAPERTUSSIS BY PCR: NOT DETECTED
BORDETELLA PERTUSSIS BY PCR: NOT DETECTED
BUN BLDV-MCNC: 18 MG/DL (ref 8–23)
CALCIUM SERPL-MCNC: 9.2 MG/DL (ref 8.6–10.2)
CHLAMYDOPHILIA PNEUMONIAE BY PCR: NOT DETECTED
CHLORIDE BLD-SCNC: 101 MMOL/L (ref 98–107)
CO2: 25 MMOL/L (ref 22–29)
CORONAVIRUS 229E BY PCR: NOT DETECTED
CORONAVIRUS HKU1 BY PCR: NOT DETECTED
CORONAVIRUS NL63 BY PCR: NOT DETECTED
CORONAVIRUS OC43 BY PCR: NOT DETECTED
CREAT SERPL-MCNC: 1.1 MG/DL (ref 0.5–1)
EKG ATRIAL RATE: 91 BPM
EKG P AXIS: 45 DEGREES
EKG P-R INTERVAL: 188 MS
EKG Q-T INTERVAL: 362 MS
EKG QRS DURATION: 78 MS
EKG QTC CALCULATION (BAZETT): 445 MS
EKG R AXIS: -22 DEGREES
EKG T AXIS: 117 DEGREES
EKG VENTRICULAR RATE: 91 BPM
EOSINOPHILS ABSOLUTE: 0.14 E9/L (ref 0.05–0.5)
EOSINOPHILS RELATIVE PERCENT: 1.6 % (ref 0–6)
GFR AFRICAN AMERICAN: 57
GFR NON-AFRICAN AMERICAN: 47 ML/MIN/1.73
GLUCOSE BLD-MCNC: 196 MG/DL (ref 74–99)
HCT VFR BLD CALC: 31.2 % (ref 34–48)
HEMOGLOBIN: 9.6 G/DL (ref 11.5–15.5)
HUMAN METAPNEUMOVIRUS BY PCR: NOT DETECTED
HUMAN RHINOVIRUS/ENTEROVIRUS BY PCR: NOT DETECTED
IMMATURE GRANULOCYTES #: 0.03 E9/L
IMMATURE GRANULOCYTES %: 0.3 % (ref 0–5)
INFLUENZA A BY PCR: NOT DETECTED
INFLUENZA B BY PCR: NOT DETECTED
L. PNEUMOPHILA SEROGP 1 UR AG: NORMAL
LYMPHOCYTES ABSOLUTE: 1.84 E9/L (ref 1.5–4)
LYMPHOCYTES RELATIVE PERCENT: 21.1 % (ref 20–42)
MCH RBC QN AUTO: 29.4 PG (ref 26–35)
MCHC RBC AUTO-ENTMCNC: 30.8 % (ref 32–34.5)
MCV RBC AUTO: 95.7 FL (ref 80–99.9)
METER GLUCOSE: 233 MG/DL (ref 74–99)
METER GLUCOSE: 245 MG/DL (ref 74–99)
METER GLUCOSE: 282 MG/DL (ref 74–99)
METER GLUCOSE: 289 MG/DL (ref 74–99)
MONOCYTES ABSOLUTE: 0.47 E9/L (ref 0.1–0.95)
MONOCYTES RELATIVE PERCENT: 5.4 % (ref 2–12)
MYCOPLASMA PNEUMONIAE BY PCR: NOT DETECTED
NEUTROPHILS ABSOLUTE: 6.2 E9/L (ref 1.8–7.3)
NEUTROPHILS RELATIVE PERCENT: 71.3 % (ref 43–80)
PARAINFLUENZA VIRUS 1 BY PCR: NOT DETECTED
PARAINFLUENZA VIRUS 2 BY PCR: NOT DETECTED
PARAINFLUENZA VIRUS 3 BY PCR: NOT DETECTED
PARAINFLUENZA VIRUS 4 BY PCR: NOT DETECTED
PDW BLD-RTO: 13.1 FL (ref 11.5–15)
PLATELET # BLD: 181 E9/L (ref 130–450)
PMV BLD AUTO: 9.6 FL (ref 7–12)
POTASSIUM REFLEX MAGNESIUM: 4.1 MMOL/L (ref 3.5–5)
RBC # BLD: 3.26 E12/L (ref 3.5–5.5)
RESPIRATORY SYNCYTIAL VIRUS BY PCR: NOT DETECTED
SODIUM BLD-SCNC: 139 MMOL/L (ref 132–146)
STREP PNEUMONIAE ANTIGEN, URINE: NORMAL
WBC # BLD: 8.7 E9/L (ref 4.5–11.5)

## 2020-01-10 PROCEDURE — 82962 GLUCOSE BLOOD TEST: CPT

## 2020-01-10 PROCEDURE — 85025 COMPLETE CBC W/AUTO DIFF WBC: CPT

## 2020-01-10 PROCEDURE — 6360000002 HC RX W HCPCS: Performed by: NURSE PRACTITIONER

## 2020-01-10 PROCEDURE — 85730 THROMBOPLASTIN TIME PARTIAL: CPT

## 2020-01-10 PROCEDURE — 6360000002 HC RX W HCPCS: Performed by: INTERNAL MEDICINE

## 2020-01-10 PROCEDURE — 97162 PT EVAL MOD COMPLEX 30 MIN: CPT | Performed by: PHYSICAL THERAPIST

## 2020-01-10 PROCEDURE — 2060000000 HC ICU INTERMEDIATE R&B

## 2020-01-10 PROCEDURE — 80048 BASIC METABOLIC PNL TOTAL CA: CPT

## 2020-01-10 PROCEDURE — 97530 THERAPEUTIC ACTIVITIES: CPT | Performed by: PHYSICAL THERAPIST

## 2020-01-10 PROCEDURE — 2580000003 HC RX 258: Performed by: INTERNAL MEDICINE

## 2020-01-10 PROCEDURE — 94640 AIRWAY INHALATION TREATMENT: CPT

## 2020-01-10 PROCEDURE — 93308 TTE F-UP OR LMTD: CPT

## 2020-01-10 PROCEDURE — 99232 SBSQ HOSP IP/OBS MODERATE 35: CPT | Performed by: INTERNAL MEDICINE

## 2020-01-10 PROCEDURE — 36415 COLL VENOUS BLD VENIPUNCTURE: CPT

## 2020-01-10 PROCEDURE — 93970 EXTREMITY STUDY: CPT

## 2020-01-10 PROCEDURE — 6370000000 HC RX 637 (ALT 250 FOR IP): Performed by: INTERNAL MEDICINE

## 2020-01-10 PROCEDURE — 93010 ELECTROCARDIOGRAM REPORT: CPT | Performed by: INTERNAL MEDICINE

## 2020-01-10 PROCEDURE — 97530 THERAPEUTIC ACTIVITIES: CPT

## 2020-01-10 PROCEDURE — 97535 SELF CARE MNGMENT TRAINING: CPT

## 2020-01-10 PROCEDURE — 97166 OT EVAL MOD COMPLEX 45 MIN: CPT

## 2020-01-10 PROCEDURE — 2700000000 HC OXYGEN THERAPY PER DAY

## 2020-01-10 PROCEDURE — 94760 N-INVAS EAR/PLS OXIMETRY 1: CPT

## 2020-01-10 RX ORDER — ASPIRIN 81 MG/1
81 TABLET, CHEWABLE ORAL DAILY
Status: DISCONTINUED | OUTPATIENT
Start: 2020-01-10 | End: 2020-01-15 | Stop reason: HOSPADM

## 2020-01-10 RX ORDER — HEPARIN SODIUM 10000 [USP'U]/100ML
12 INJECTION, SOLUTION INTRAVENOUS CONTINUOUS
Status: DISCONTINUED | OUTPATIENT
Start: 2020-01-10 | End: 2020-01-11

## 2020-01-10 RX ADMIN — GABAPENTIN 600 MG: 300 CAPSULE ORAL at 10:48

## 2020-01-10 RX ADMIN — IPRATROPIUM BROMIDE AND ALBUTEROL SULFATE 3 ML: 2.5; .5 SOLUTION RESPIRATORY (INHALATION) at 14:03

## 2020-01-10 RX ADMIN — CARVEDILOL 12.5 MG: 6.25 TABLET, FILM COATED ORAL at 10:49

## 2020-01-10 RX ADMIN — PANTOPRAZOLE SODIUM 40 MG: 40 TABLET, DELAYED RELEASE ORAL at 06:02

## 2020-01-10 RX ADMIN — SODIUM CHLORIDE, PRESERVATIVE FREE 10 ML: 5 INJECTION INTRAVENOUS at 20:28

## 2020-01-10 RX ADMIN — ACETAMINOPHEN 650 MG: 325 TABLET, FILM COATED ORAL at 20:37

## 2020-01-10 RX ADMIN — IPRATROPIUM BROMIDE AND ALBUTEROL SULFATE 3 ML: 2.5; .5 SOLUTION RESPIRATORY (INHALATION) at 17:46

## 2020-01-10 RX ADMIN — SODIUM CHLORIDE, PRESERVATIVE FREE 10 ML: 5 INJECTION INTRAVENOUS at 10:56

## 2020-01-10 RX ADMIN — ACETAMINOPHEN 650 MG: 325 TABLET, FILM COATED ORAL at 10:49

## 2020-01-10 RX ADMIN — SODIUM CHLORIDE, PRESERVATIVE FREE 10 ML: 5 INJECTION INTRAVENOUS at 04:40

## 2020-01-10 RX ADMIN — INSULIN LISPRO 2 UNITS: 100 INJECTION, SOLUTION INTRAVENOUS; SUBCUTANEOUS at 17:35

## 2020-01-10 RX ADMIN — INSULIN LISPRO 2 UNITS: 100 INJECTION, SOLUTION INTRAVENOUS; SUBCUTANEOUS at 10:46

## 2020-01-10 RX ADMIN — GABAPENTIN 600 MG: 300 CAPSULE ORAL at 20:28

## 2020-01-10 RX ADMIN — INSULIN LISPRO 3 UNITS: 100 INJECTION, SOLUTION INTRAVENOUS; SUBCUTANEOUS at 13:28

## 2020-01-10 RX ADMIN — FUROSEMIDE 20 MG: 10 INJECTION, SOLUTION INTRAMUSCULAR; INTRAVENOUS at 17:35

## 2020-01-10 RX ADMIN — ASPIRIN 81 MG 81 MG: 81 TABLET ORAL at 10:48

## 2020-01-10 RX ADMIN — IPRATROPIUM BROMIDE AND ALBUTEROL SULFATE 3 ML: 2.5; .5 SOLUTION RESPIRATORY (INHALATION) at 09:27

## 2020-01-10 RX ADMIN — FUROSEMIDE 20 MG: 10 INJECTION, SOLUTION INTRAMUSCULAR; INTRAVENOUS at 10:48

## 2020-01-10 RX ADMIN — VANCOMYCIN HYDROCHLORIDE 1000 MG: 1 INJECTION, POWDER, LYOPHILIZED, FOR SOLUTION INTRAVENOUS at 10:47

## 2020-01-10 RX ADMIN — IPRATROPIUM BROMIDE AND ALBUTEROL SULFATE 3 ML: 2.5; .5 SOLUTION RESPIRATORY (INHALATION) at 22:07

## 2020-01-10 RX ADMIN — MULTIPLE VITAMINS W/ MINERALS TAB 1 TABLET: TAB at 10:49

## 2020-01-10 RX ADMIN — CARVEDILOL 12.5 MG: 6.25 TABLET, FILM COATED ORAL at 17:35

## 2020-01-10 RX ADMIN — ATORVASTATIN CALCIUM 40 MG: 40 TABLET, FILM COATED ORAL at 20:28

## 2020-01-10 RX ADMIN — ISOSORBIDE MONONITRATE 30 MG: 30 TABLET ORAL at 10:48

## 2020-01-10 RX ADMIN — INSULIN LISPRO 2 UNITS: 100 INJECTION, SOLUTION INTRAVENOUS; SUBCUTANEOUS at 20:27

## 2020-01-10 RX ADMIN — MONTELUKAST SODIUM 10 MG: 10 TABLET ORAL at 20:28

## 2020-01-10 RX ADMIN — SERTRALINE 150 MG: 100 TABLET, FILM COATED ORAL at 10:48

## 2020-01-10 RX ADMIN — CEFEPIME HYDROCHLORIDE 1 G: 1 INJECTION, POWDER, FOR SOLUTION INTRAMUSCULAR; INTRAVENOUS at 04:39

## 2020-01-10 RX ADMIN — GABAPENTIN 600 MG: 300 CAPSULE ORAL at 13:29

## 2020-01-10 RX ADMIN — BUSPIRONE HYDROCHLORIDE 5 MG: 5 TABLET ORAL at 10:48

## 2020-01-10 ASSESSMENT — PAIN DESCRIPTION - LOCATION: LOCATION: NECK

## 2020-01-10 ASSESSMENT — PAIN SCALES - GENERAL
PAINLEVEL_OUTOF10: 0
PAINLEVEL_OUTOF10: 2
PAINLEVEL_OUTOF10: 4
PAINLEVEL_OUTOF10: 8
PAINLEVEL_OUTOF10: 0

## 2020-01-10 ASSESSMENT — PAIN DESCRIPTION - FREQUENCY: FREQUENCY: INTERMITTENT

## 2020-01-10 ASSESSMENT — PAIN DESCRIPTION - DESCRIPTORS: DESCRIPTORS: ACHING;DISCOMFORT;SHARP

## 2020-01-10 ASSESSMENT — PAIN - FUNCTIONAL ASSESSMENT: PAIN_FUNCTIONAL_ASSESSMENT: ACTIVITIES ARE NOT PREVENTED

## 2020-01-10 ASSESSMENT — PAIN DESCRIPTION - ONSET: ONSET: SUDDEN

## 2020-01-10 ASSESSMENT — PAIN DESCRIPTION - PAIN TYPE: TYPE: ACUTE PAIN

## 2020-01-10 NOTE — PROGRESS NOTES
INPATIENT CARDIOLOGY FOLLOW-UP    Name: Alysia Cochran    Age: 80 y.o. Date of Admission: 1/9/2020  5:51 AM    Date of Service: 1/10/2020    Chief Complaint: Follow-up for NSTEMI and also CHF. Interim History:  No new overnight cardiac complaints. She is feeling much better today. Her throat is not hurting anymore. She still has some chest discomfort on coughing. Otherwise, she is feeling better. Currently with no complaints of CP, SOB, palpitations, dizziness, or lightheadedness. SR on telemetry.     Review of Systems:   Cardiac: As per HPI  General: No fever, chills  Pulmonary: As per HPI  HEENT: No visual disturbances, difficult swallowing  GI: No nausea, vomiting  Endocrine: No thyroid disease or DM  Musculoskeletal: CACERES x 4, no focal motor deficits  Skin: Intact, no rashes  Neuro/Psych: No headache or seizures    Problem List:  Patient Active Problem List   Diagnosis    GERD (gastroesophageal reflux disease)    Diabetes mellitus type 2, uncontrolled [facility]    CKD (chronic kidney disease) stage 3, GFR 30-59 ml/min [facility]    Essential hypertension    History of pulmonary embolus (PE)    Acute respiratory failure with hypoxia ([facility]    Respiratory failure [facility]    HCAP (healthcare-associated pneumonia)       Allergies:  No Known Allergies    Current Medications:  Current Facility-Administered Medications   Medication Dose Route Frequency Provider Last Rate Last Dose    ipratropium-albuterol (DUONEB) nebulizer solution 3 mL  3 mL Inhalation Q4H WA Elly Walker, DO   3 mL at 01/09/20 1937    busPIRone (BUSPAR) tablet 5 mg  5 mg Oral Daily Elly Walker DO   5 mg at 01/09/20 1701    fluticasone (FLONASE) 50 MCG/ACT nasal spray 1 spray  1 spray Each Nostril Daily PRN Elly Walker DO        nitroGLYCERIN (NITROSTAT) SL tablet 0.4 mg  0.4 mg Sublingual Q5 Min PRN Elly Walker DO        Liraglutide (VICTOZA) SC injection 1.2 mg  1.2 mg Subcutaneous Daily Elly Walker DO        2034    vancomycin 1000 mg IVPB in 250 mL D5W addavial  1,000 mg Intravenous Q24H Grant Daily, DO        furosemide (LASIX) injection 20 mg  20 mg Intravenous BID KRISTIE Gaytan - CNP   20 mg at 01/09/20 1842    perflutren lipid microspheres (DEFINITY) injection 1.65 mg  1.5 mL Intravenous ONCE PRN Penny Ambrosio APRN - CNP        cefepime (MAXIPIME) 1 g IVPB extended (mini-bag)  1 g Intravenous Q12H Gordon Frias MD 12.5 mL/hr at 01/10/20 0439 1 g at 01/10/20 0439    heparin (porcine) injection 4,000 Units  4,000 Units Intravenous PRN Ricky Sanabria MD        heparin (porcine) injection 2,000 Units  2,000 Units Intravenous PRN Ricky Sanabria MD        heparin 25,000 units in dextrose 5% 250 mL infusion  1,000 Units/hr Intravenous Continuous Ricky Sanabria MD 10 mL/hr at 01/09/20 2005 1,000 Units/hr at 01/09/20 2005    [START ON 1/11/2020] lisinopril (PRINIVIL;ZESTRIL) tablet 2.5 mg  2.5 mg Oral Daily Ricky Sanabria MD          dextrose      heparin (porcine) 1,000 Units/hr (01/09/20 2005)       Physical Exam:  BP (!) 114/54   Pulse 86   Temp 98.5 °F (36.9 °C) (Temporal)   Resp 18   Ht 5' 1\" (1.549 m)   Wt 180 lb 9 oz (81.9 kg)   LMP  (LMP Unknown)   SpO2 97%   BMI 34.12 kg/m²   Wt Readings from Last 3 Encounters:   01/10/20 180 lb 9 oz (81.9 kg)   12/02/19 188 lb 12.8 oz (85.6 kg)   11/25/19 185 lb (83.9 kg)     Appearance: Awake, alert, no acute respiratory distress  Skin: Intact, no rash  Head: Normocephalic, atraumatic  Eyes: EOMI, no conjunctival erythema  ENMT: No pharyngeal erythema, MMM, no rhinorrhea  Neck: Supple, no elevated JVP, no carotid bruits  Lungs: Clear to auscultation bilaterally. No wheezes, rales, or rhonchi.   Cardiac: Regular rate and rhythm, +S1S2, no murmurs apparent  Abdomen: Soft, nontender, +bowel sounds  Extremities: Moves all extremities x 4, no lower extremity edema  Neurologic: No focal motor deficits apparent, normal mood and affect  Peripheral Pulses: Intact posterior tibial pulses bilaterally    Intake/Output:    Intake/Output Summary (Last 24 hours) at 1/10/2020 0722  Last data filed at 1/10/2020 0604  Gross per 24 hour   Intake 438.67 ml   Output 500 ml   Net -61.33 ml     No intake/output data recorded.     Laboratory Tests:  Recent Labs     01/09/20  0940      K 4.6      CO2 27   BUN 19   CREATININE 1.0   GLUCOSE 319*   CALCIUM 8.8     Lab Results   Component Value Date    MG 1.9 01/09/2020     Recent Labs     01/09/20  0940   ALKPHOS 77   ALT 17   AST 38*   PROT 6.2*   BILITOT 0.2   LABALBU 2.9*     Recent Labs     01/09/20  0638 01/09/20  1512 01/09/20  1846   WBC 11.9* 10.2 9.2   RBC 3.53 3.68 3.22*   HGB 10.4* 10.8* 9.6*   HCT 33.7* 35.7 30.9*   MCV 95.5 97.0 96.0   MCH 29.5 29.3 29.8   MCHC 30.9* 30.3* 31.1*   RDW 13.2 12.9 13.0    175 156   MPV 10.3 9.8 9.0     Lab Results   Component Value Date    CKTOTAL 187 (H) 01/09/2020    CKMB 17.7 (H) 01/09/2020    TROPONINI 0.71 (H) 01/09/2020    TROPONINI 0.81 (H) 01/09/2020    TROPONINI 0.79 (H) 01/09/2020     Lab Results   Component Value Date    INR 1.2 01/09/2020    INR 1.2 11/22/2019    INR 1.0 10/21/2019    PROTIME 13.9 (H) 01/09/2020    PROTIME 13.5 (H) 11/22/2019    PROTIME 11.4 10/21/2019     Lab Results   Component Value Date    TSH 1.940 11/29/2019     Lab Results   Component Value Date    LABA1C 8.4 (H) 01/09/2020     No results found for: EAG  Lab Results   Component Value Date    CHOL 181 10/21/2019    CHOL 231 (H) 09/12/2019    CHOL 220 (H) 04/22/2019     Lab Results   Component Value Date    TRIG 197 (H) 10/21/2019    TRIG 262 (H) 09/12/2019    TRIG 280 (H) 04/22/2019     Lab Results   Component Value Date    HDL 46 11/23/2019    HDL 38 10/21/2019    HDL 51 09/12/2019     Lab Results   Component Value Date    LDLCALC 123 (H) 11/23/2019    LDLCALC 104 (H) 10/21/2019    LDLCALC 128 (H) 09/12/2019     Lab Results   Component Value Date    LABVLDL 50 11/23/2019    LABVLDL 39 10/21/2019    LABVLDL 52 09/12/2019     No results found for: CHOLHDLRATIO    Cardiac Tests:  Data review:  Telemetry findings reviewed: SR at rate 90s, no new tachy/bradyarrhythmias overnight     EKG: Sinus tachycardia, low voltage precordial complexes, inferior MI age undetermined, abnormal EKG.    Vitals: Blood pressure 127/58 with heart rate of 86>> 114/54 with a heart rate of 86.     Labs were reviewed: BMP normal, proBNP 4593, troponin 0.55>>0.79>>0.81>>0.7, WBC 11.9 hemoglobin 10.4/33.7 labs for this morning are pending. Procalcitonin 0.07     TTE-1/7/2020: LVEF 60 to 65%, indeterminate diastolic function, normal RV size and function, moderate AI, unable to estimate PA systolic pressure. Previous EF was 45 to 50%.    Lexiscan Cardiolite stress test-10/23/2019: No reversible perfusion defect, fixed defect involving the apex, anteroseptal and inferolateral walls, global hypokinesis, LVEF 40%.     Assessment:  · Acute hypoxic respiratory failures secondary to mild CHF and also pneumonia. · Mild decompensated heart failure  · Heart failure with preserved ejection fraction  · Heart failure with recovered ejection fraction  · Elevated troponin secondary to type II ischemia Vs, type 1 mostly type 2   · Type 2 diabetes on insulin  · Hypertension stable  · Hyperlipidemia on statin  · History of pulmonary embolism on on Eliquis for anticoagulation  · History of recurrent aspiration pneumonia. · CKD stage III        Plan:  · Continue Lasix 20 mg IV every 12 hours, obtain BMP  · Aspiration precautions  · Cardiac diet and restrict daily salt intake to less than 2 g and fluids to 64 ounces a day. Strict input output charting  · Daily weight  · No further rise in troponin levels were noted, tropes are trending down. Continue heparin and will add aspirin 81 mg p.o. daily. · We will follow limited echo with Definity contrast to assess wall motion.   · Evaluation of pneumonia as per pulmonary service  · Continue rest of her home medications  · Bilateral venous Dopplers to evaluate DVT pending. · I would not recommend any invasive procedures or further cardiac tests at this time. We will manage her medically. · Continue IV heparin for another 24 hours. And further recommendations will be based on echocardiogram.  · Discussed with her daughter yesterday and the family does not want to pursue any aggressive measures at this time. · Will discuss with her daughter. Tha Lee MD., [facility].   [facility]) Cardiology

## 2020-01-10 NOTE — CARE COORDINATION
KEVAN spoke with patient and her daughter Nickie Joy who is at bedside. Patient lives at home in her own home. It is a 1 story home. She states they have a lift in the garage up to the house. She states patient walks in the house with a walker, but anywhere else she only uses a wheelchair. She states they also have a raised toilet seat, shower chair and a hospital bed. She has a life alert button at home as well. Per patients daughter, patient was just at [facility] and discharged home Saturday. She was opened by [facility] that Sunday. She states they had nursing, PT, OT and SLP supposed to be coming. PCP is Dr Danuta Patricia, pharmacy is [facility]. Per patient and daughter, they would like patient to return home on discharge with resumption of HHC through [facility]. They state they really don't want to return to rehab again so soon, they want her to have a chance at home. The Plan for Transition of Care is related to the following treatment goals: Improve function after discharge. The Patient and/or patient representative Nickie Joy  was provided with a choice of provider and agrees   with the discharge plan. [x] Yes [] No    Freedom of choice list was provided with basic dialogue that supports the patient's individualized plan of care/goals, treatment preferences and shares the quality data associated with the providers.  [x] Yes [] No

## 2020-01-10 NOTE — H&P
[id number] [facility]  Attending History and Physical      CHIEF COMPLAINT:  Shortness of breath      HISTORY OF PRESENT ILLNESS:      The patient is a 80 y.o. female patient of dr Jerry lara who presents with complains of cough and shortness of breath. Patient is well known to the hospitalist service. She was discharged from the hospital on 12/2/2019. At that time the patient was admitted with shortness of breath. She was seen by cardiology and pulmonology. She was suspected to have a viral URI. She was seen by Pt/Ot and REESE was set up. patient presents back to the ED with cough, shortness of breath. She has been having issues with aspiration. She has been placed on a modified diet. She was discharged from her rehab facility this past Saturday. Patient chokes when she eats. The family admits this. She has had failed swallow studies in the past.  She has been struggling at home. She is getting weaker and weaker. She denies chest pain, abdominal pain, nausea, vomiting, fevers, chills and diaphoresis. She is resting comfortably in bed.        Past Medical History:    Past Medical History:   Diagnosis Date    Anxiety     Depression     Diabetes mellitus ([facility]     GERD (gastroesophageal reflux disease)     Hypertension     Peripheral neuropathy     Spinal stenosis        Past Surgical History:    Past Surgical History:   Procedure Laterality Date    CHOLECYSTECTOMY      HIP FRACTURE SURGERY Bilateral     SKIN CANCER EXCISION         Medications Prior to Admission:    Medications Prior to Admission: carvedilol (COREG) 25 MG tablet, Take 12.5 mg by mouth 2 times daily (with meals)  Insulin Lispro (HUMALOG KWIKPEN SC), Inject 0-12 Units into the skin 3 times daily *Per Sliding Scale*  montelukast (SINGULAIR) 10 MG tablet, Take 10 mg by mouth nightly  sertraline (ZOLOFT) 100 MG tablet, Take 150 mg by mouth daily  Multiple Vitamins-Minerals (THERAPEUTIC MULTIVITAMIN-MINERALS) tablet, Take 1 tablet by mouth daily  acetaminophen (TYLENOL) 325 MG tablet, Take 650 mg by mouth every 6 hours as needed for Pain  omeprazole (PRILOSEC) 40 MG delayed release capsule, Take 40 mg by mouth daily  isosorbide mononitrate (IMDUR) 30 MG extended release tablet, Take 1 tablet by mouth daily  atorvastatin (LIPITOR) 40 MG tablet, Take 1 tablet by mouth nightly  lisinopril (PRINIVIL;ZESTRIL) 2.5 MG tablet, Take 1 tablet by mouth daily  apixaban (ELIQUIS) 5 MG TABS tablet, Take 1 tablet by mouth 2 times daily  gabapentin (NEURONTIN) 300 MG capsule, Take 2 capsules by mouth 3 times daily for 90 days. Liraglutide (VICTOZA) 18 MG/3ML SOPN SC injection, Inject 1.2 mg into the skin daily  fluticasone (FLONASE) 50 MCG/ACT nasal spray, 1 spray by Each Nostril route daily as needed for Rhinitis  nystatin (MYCOSTATIN) 608532 UNIT/GM powder, Apply 1 each topically 3 times daily as needed   mineral oil-hydrophilic petrolatum (HYDROPHOR) ointment, Apply 1 g topically daily as needed for Dry Skin (to feet)   nitroGLYCERIN (NITROSTAT) 0.4 MG SL tablet, Place 1 tablet under the tongue every 5 minutes as needed for Chest pain up to max of 3 total doses. If no relief after 1 dose, call 911.  busPIRone (BUSPAR) 5 MG tablet, Take 1 tablet by mouth daily  Omega-3 Fatty Acids (OMEGA-3 EPA FISH OIL PO), Take 1 capsule by mouth 2 times daily   ipratropium-albuterol (DUONEB) 0.5-2.5 (3) MG/3ML SOLN nebulizer solution, Inhale 3 mLs into the lungs every 4 hours as needed for Shortness of Breath  Calcium-Vitamin D (CALTRATE 600 PLUS-VIT D PO), Take 1 tablet by mouth 2 times daily     Allergies:    Patient has no known allergies. Social History:    reports that she has never smoked. She has never used smokeless tobacco. She reports that she does not drink alcohol or use drugs. Family History:   family history is not on file.     REVIEW OF SYSTEMS:  As above in the HPI, otherwise negative    PHYSICAL EXAM:    Vitals:  BP (!) 110/49 baseline. Blood pressure ok, continue current medications  Stable. Secondary to pneumonia. Treating for acute bacterial pneumonia of HCAP type. Antibiotics. Aggressive pulmonary hygiene. Pulmonology assist appreciated.   Pt/Ot evaluations for discharge planning        Jen Morel MD  12:16 PM  1/10/2020

## 2020-01-10 NOTE — PROGRESS NOTES
Sitting: Min A    Standing: Mod A x2 w/ ww     Activity Tolerance Fair-  Good   Visual/  Perceptual Glasses: yes                Hand dominance: R   Strength ROM Additional Info:    RUE  Distal: 3+/5  Proximal: 2+/5  WFL   good  and wfl FMC/dexterity noted during ADL tasks       LUE Distal: 3+/5  Proximal: 2+/5  WFL   good  and wfl FMC/dexterity noted during ADL tasks       Hearing: [place]  Sensation:  No c/o numbness or tingling   Tone: WFL                             Comments/Treatment: Upon arrival, patient lying in bed. Pt agreeable to OT session this date. RN approved therapy. Therapist facilitated bed mobility (rolling L/R, supine><sit EOB , scooting w/ cues for body mechanics), unsupported sitting balance training, functional transfers (education/cues for safety/hand placement, sequencing), standing tolerance tasks (addressing posture, balance and activity tolerance. 2 trials w/ extensive static rest break between. Posterior lean noted w/ cues/assist to correct) and lateral sidesteps to [facility] with ww (education/cuing on posture (flexed trunk forward), ww management and safety. Cues also provided for sequencing and attention to task). Therapist facilitated self-care retraining: UB/LB self-care tasks (gown, socks), toileting task (pt verbalized need for bedpan w/ initial stand. Pt then assisted to seated position EOB on bedpan) and grooming task while educating pt on modified techniques, posture, safety and energy conservation techniques. Skilled monitoring of HR, O2 sats and pts response to treatment. At end of session, patient lying in bed (repositioned for comfort w/ pillow props w/ breakfast tray. Bed alarm on) with call light and phone within reach, all lines and tubes intact. Pt would benefit from continued skilled OT to increase functional independence and quality of life.     mod  Profile and History- med (extensive chart review)  Assessment of Occupational Performance and Identification of Deficits- med  Clinical Decision Making- med    Evaluation time includes thorough review of current medical information, gathering information on past medical history/social history and prior level of function, completion of standardized testing/informal observation of tasks, assessment of data, and development of POC/Goals. Assessment of current deficits  Functional mobility [x]  ADLs [x] Strength [x]  Cognition [x]  Functional transfers  [x] IADLs [] Safety Awareness [x]  Endurance [x]  Fine Motor Coordination [] Balance [x] Vision/perception [] Sensation []   Gross Motor Coordination [] ROM [] Delirium []                  Motor Control []    Plan of Care:   ADL retraining [x]   Equipment needs [x]   Neuromuscular re-education [x] Energy Conservation Techniques [x]  Functional Transfer training [x] Patient and/or Family Education [x]  Functional Mobility training [x]  Environmental Modifications [x]  Cognitive re-training [x]   Compensatory techniques for ADLs [x]  Splinting Needs []   Positioning to improve overall function [x]   Therapeutic Activity [x]  Therapeutic Exercise  [x]  Visual/Perceptual: []    Delirium prevention/treatment  []   Other:  []    Rehab Potential: Good for established goals    Patient / Family Goal: Not stated     Patient and/or family were instructed on diagnosis, prognosis/goals and plan of care. Demonstrated fair understanding. [] Malnutrition indicators have been identified and nursing has been notified to ensure a dietitian consult is ordered.        Mod Evaluation completed +  Timed Treatment: 31 minutes  Tx Time in: 07:58  TxTime out: 08:10    Tx Time in: 08:29  TxTime out: 08:48     [facility], OTR/L #[id number]

## 2020-01-10 NOTE — PROGRESS NOTES
Phone call made to Dr. Teresa Meehan regarding patient's BP of 83/46. Per Dr. Teresa Meehan, Dr. Bebe Reyes notified via perfect serve. Dr. Bebe Reyes made phone call to unit. See new orders.    [address]

## 2020-01-10 NOTE — PROGRESS NOTES
Pressure:  End-Inspiratory Plateau Pressure:    ABG:  No results for input(s): PH, PO2, PCO2, HCO3, BE, O2SAT, METHB, O2HB, COHB, O2CON, HHB, THB in the last 72 hours. ________________________________________________________________________    IV ACCESS:    NUTRITION: DIET CARDIAC; Carb Control: 4 carb choices (60 gms)/meal; Low Sodium (2 GM); Mildly Thick (Nectar); Daily Fluid Restriction: 2000 ml    INTAKE/OUTPUTS:  I/O last 3 completed shifts: In: 438.7 [P.O.:290;  I.V.:98.7; IV Piggyback:50]  Out: 500 [Urine:500]    Intake/Output Summary (Last 24 hours) at 1/10/2020 1429  Last data filed at 1/10/2020 1124  Gross per 24 hour   Intake 558.67 ml   Output 1100 ml   Net -541.33 ml     General Appearance: well-developed and well-nourished, in no acute distress   Eyes: pupils equal, round, and reactive to light, extraocular eye movements intact, conjunctivae normal and sclera anicteric   Neck: neck supple and non tender without mass, no thyromegaly, no thyroid nodules and no cervical adenopathy   Pulmonary/Chest: decreased breath sounds, no accessory muscles of inspiration, no focal wheezes  Cardiovascular: normal rate, regular rhythm, normal S1 and S2, no murmurs, rubs, clicks or gallops, distal pulses intact, no carotid bruits, no murmurs, no gallops, no carotid bruits and no JVD   Abdomen: soft, non-tender, non-distended, normal bowel sounds, no masses or organomegaly   Extremities: no cyanosis, no clubbing, no edema    LABS/IMAGING:    CBC:  Lab Results   Component Value Date    WBC 8.7 01/10/2020    HGB 9.6 (L) 01/10/2020    HCT 31.2 (L) 01/10/2020    MCV 95.7 01/10/2020     01/10/2020    LYMPHOPCT 21.1 01/10/2020    RBC 3.26 (L) 01/10/2020    MCH 29.4 01/10/2020    MCHC 30.8 (L) 01/10/2020    RDW 13.1 01/10/2020    NEUTOPHILPCT 71.3 01/10/2020    MONOPCT 5.4 01/10/2020    BASOPCT 0.3 01/10/2020    NEUTROABS 6.20 01/10/2020    LYMPHSABS 1.84 01/10/2020    MONOSABS 0.47 01/10/2020    EOSABS 0.14

## 2020-01-10 NOTE — PROGRESS NOTES
Physical Therapy  Initial Assessment     Name: Maggie Catherine  : 1933  MRN: [id number]    Date of Service: 1/10/2020    Evaluating PT: Pari Hdz, PT, DPT [id number]    Room #:  4885/0093-K    Diagnosis: HCAP  Precautions: fall risk, nectar liquids, bed alarm  Procedures: none  PMHx: peripheral neuropathy, HTN, anxiety, depression, diabetes, spinal stenosis  Recommended DME: to be determined    Pt lives with alone, but family provides 24 hour care in shifts per pt report in a single story house with ramped entrance. Bed is on the first floor and bath is on the first floor. Pt ambulated with FWW and assist from family for household distances, uses transport chair for community distances prior to admission. HPI: Pt presented to the ED on  with SOB and cough beginning day of admission. Pt was recently placed on nectar thick liquids due to aspiration PNA. Initial Evaluation  Date: 1/10/20 Treatment Date: Short Term/ Long Term   Goals   AM-PAC 6 Clicks      Was pt agreeable to Eval/treatment? yes     Does pt have pain? 1/10 headache     Bed Mobility  Rolling: Mod A  Supine to sit: Max A   Sit to supine: Max A  Scooting: Max A   Rolling: Min A  Supine to sit: Min A  Sit to supine: Min A  Scooting: Min A   Transfers Sit to stand: Mod A x2  Stand to sit: Mod A x2  Stand pivot: NT  Sit to stand: Min A  Stand to sit: Min A  Stand pivot: Min A with AAD   Ambulation   4 side steps towards R with FWW and Mod A x2  50 feet with AAD with Min A   Stair negotiation: NA, pt has ramp to enter/exit her home. NA   ROM B UE: WNL  B LE: WNL     Strength B UE: WNL  B LE: 3+/5     Balance Sitting EOB: CGA  Dynamic standing:  Mod A x2 with FWW  Sitting EOB: SBA  Dynamic standing: Min A with AAD     Pt is A & O x: 3 (person/place/time)  Sensation: reports intermittent c/o numbness in B feet due to neuropathy  Coordination: grossly intact  Edema: mild edema noted of B ankles, non pitting    ASSESSMENT    Patient education  Pt

## 2020-01-10 NOTE — PROGRESS NOTES
Pharmacy Consultation Note  (Antibiotic Dosing and Monitoring)    Initial consult date:   Consulting physician: Dr. Ari Tamayo  Drug(s): Vancomycin   Indication: HCAP    Ht Readings from Last 1 Encounters:   20 5' 1\" (1.549 m)     Wt Readings from Last 1 Encounters:   01/10/20 180 lb 9 oz (81.9 kg)       Age/Gender IBW DW  Allergy Information   80 y.o. female 47.8 kg 62.8 kg  Patient has no known allergies. Date  WBC BUN/CR Drug/Dose Time   Given Level(s)   (Time) Comments    11.9 19/ Vancomycin 1 g IV X1 0831     1/10 8.7 18/.1 Vancomycin 1 g IV Q24H 1047                         Estimated Creatinine Clearance: 36 mL/min (A) (based on SCr of 1.1 mg/dL (H)). Intake/Output Summary (Last 24 hours) at 1/10/2020 1310  Last data filed at 1/10/2020 1124  Gross per 24 hour   Intake 558.67 ml   Output 1100 ml   Net -541.33 ml       Temp max: Temp (24hrs), Av.8 °F (37.1 °C), Min:98.5 °F (36.9 °C), Max:99 °F (37.2 °C)      Cultures:  available culture and sensitivity results were reviewed in [facility]    Urine  Sent    Influenza Negative    Blood #1 No growth to date    Blood #2 No growth to date    Resp panel Negative    S pneumo Negative    Legionella Negative    Assessment:  · Consulted by Dr. Ari Tamayo to dose/monitor vancomycin  · Goal trough level:  15-20 mcg/mL  · 81 yo female admitted with cough and shortness of breath. Patient has been in an out of the hospital with pneumonia for the past six weeks. Patient was recently diagnosed with aspiration pneumonia and her symptoms worsened after drinking hot chocolate last night. Chest x-ray shows patchy infiltrates and small pleural effusion. She was given Zosyn and vancomycin empirically. · Scr 1.1, baseline ~1.1. Plan:  · Will continue vancomycin 1000 mg IV every 24 hours  · Will check vancomycin trough level when steady state is attained if vanco continues.   · Pharmacist will follow and monitor/adjust dosing as

## 2020-01-11 LAB
APTT: 40.6 SEC (ref 24.5–35.1)
METER GLUCOSE: 152 MG/DL (ref 74–99)
METER GLUCOSE: 171 MG/DL (ref 74–99)
METER GLUCOSE: 225 MG/DL (ref 74–99)
METER GLUCOSE: 302 MG/DL (ref 74–99)
PLATELET # BLD: 153 E9/L (ref 130–450)

## 2020-01-11 PROCEDURE — 6370000000 HC RX 637 (ALT 250 FOR IP): Performed by: INTERNAL MEDICINE

## 2020-01-11 PROCEDURE — 82962 GLUCOSE BLOOD TEST: CPT

## 2020-01-11 PROCEDURE — 85730 THROMBOPLASTIN TIME PARTIAL: CPT

## 2020-01-11 PROCEDURE — 36415 COLL VENOUS BLD VENIPUNCTURE: CPT

## 2020-01-11 PROCEDURE — 2060000000 HC ICU INTERMEDIATE R&B

## 2020-01-11 PROCEDURE — 94640 AIRWAY INHALATION TREATMENT: CPT

## 2020-01-11 PROCEDURE — 6360000002 HC RX W HCPCS: Performed by: INTERNAL MEDICINE

## 2020-01-11 PROCEDURE — 2580000003 HC RX 258: Performed by: INTERNAL MEDICINE

## 2020-01-11 PROCEDURE — 2700000000 HC OXYGEN THERAPY PER DAY

## 2020-01-11 PROCEDURE — 6360000002 HC RX W HCPCS: Performed by: NURSE PRACTITIONER

## 2020-01-11 PROCEDURE — 85049 AUTOMATED PLATELET COUNT: CPT

## 2020-01-11 RX ORDER — IPRATROPIUM BROMIDE AND ALBUTEROL SULFATE 2.5; .5 MG/3ML; MG/3ML
3 SOLUTION RESPIRATORY (INHALATION) 4 TIMES DAILY
Status: DISCONTINUED | OUTPATIENT
Start: 2020-01-11 | End: 2020-01-15 | Stop reason: HOSPADM

## 2020-01-11 RX ADMIN — INSULIN LISPRO 1 UNITS: 100 INJECTION, SOLUTION INTRAVENOUS; SUBCUTANEOUS at 17:55

## 2020-01-11 RX ADMIN — FUROSEMIDE 20 MG: 10 INJECTION, SOLUTION INTRAMUSCULAR; INTRAVENOUS at 08:28

## 2020-01-11 RX ADMIN — FUROSEMIDE 20 MG: 10 INJECTION, SOLUTION INTRAMUSCULAR; INTRAVENOUS at 17:54

## 2020-01-11 RX ADMIN — IPRATROPIUM BROMIDE AND ALBUTEROL SULFATE 3 ML: 2.5; .5 SOLUTION RESPIRATORY (INHALATION) at 07:56

## 2020-01-11 RX ADMIN — CARVEDILOL 12.5 MG: 6.25 TABLET, FILM COATED ORAL at 17:50

## 2020-01-11 RX ADMIN — APIXABAN 5 MG: 5 TABLET, FILM COATED ORAL at 08:28

## 2020-01-11 RX ADMIN — GABAPENTIN 600 MG: 300 CAPSULE ORAL at 15:34

## 2020-01-11 RX ADMIN — MULTIPLE VITAMINS W/ MINERALS TAB 1 TABLET: TAB at 08:20

## 2020-01-11 RX ADMIN — HEPARIN SODIUM 12 UNITS/KG/HR: 10000 INJECTION, SOLUTION INTRAVENOUS at 06:08

## 2020-01-11 RX ADMIN — INSULIN LISPRO 1 UNITS: 100 INJECTION, SOLUTION INTRAVENOUS; SUBCUTANEOUS at 08:42

## 2020-01-11 RX ADMIN — CARVEDILOL 12.5 MG: 6.25 TABLET, FILM COATED ORAL at 08:20

## 2020-01-11 RX ADMIN — SERTRALINE 150 MG: 100 TABLET, FILM COATED ORAL at 08:20

## 2020-01-11 RX ADMIN — GABAPENTIN 600 MG: 300 CAPSULE ORAL at 08:28

## 2020-01-11 RX ADMIN — PANTOPRAZOLE SODIUM 40 MG: 40 TABLET, DELAYED RELEASE ORAL at 06:07

## 2020-01-11 RX ADMIN — IPRATROPIUM BROMIDE AND ALBUTEROL SULFATE 3 ML: 2.5; .5 SOLUTION RESPIRATORY (INHALATION) at 11:12

## 2020-01-11 RX ADMIN — INSULIN LISPRO 4 UNITS: 100 INJECTION, SOLUTION INTRAVENOUS; SUBCUTANEOUS at 11:54

## 2020-01-11 RX ADMIN — ATORVASTATIN CALCIUM 40 MG: 40 TABLET, FILM COATED ORAL at 21:38

## 2020-01-11 RX ADMIN — SODIUM CHLORIDE, PRESERVATIVE FREE 10 ML: 5 INJECTION INTRAVENOUS at 08:38

## 2020-01-11 RX ADMIN — ISOSORBIDE MONONITRATE 30 MG: 30 TABLET ORAL at 08:20

## 2020-01-11 RX ADMIN — FLUTICASONE PROPIONATE 1 SPRAY: 50 SPRAY, METERED NASAL at 21:45

## 2020-01-11 RX ADMIN — INSULIN LISPRO 1 UNITS: 100 INJECTION, SOLUTION INTRAVENOUS; SUBCUTANEOUS at 21:40

## 2020-01-11 RX ADMIN — MONTELUKAST SODIUM 10 MG: 10 TABLET ORAL at 21:38

## 2020-01-11 RX ADMIN — APIXABAN 5 MG: 5 TABLET, FILM COATED ORAL at 21:38

## 2020-01-11 RX ADMIN — ASPIRIN 81 MG 81 MG: 81 TABLET ORAL at 08:28

## 2020-01-11 RX ADMIN — HEPARIN SODIUM 2000 UNITS: 1000 INJECTION, SOLUTION INTRAVENOUS; SUBCUTANEOUS at 06:07

## 2020-01-11 RX ADMIN — SODIUM CHLORIDE, PRESERVATIVE FREE 10 ML: 5 INJECTION INTRAVENOUS at 21:46

## 2020-01-11 RX ADMIN — GABAPENTIN 600 MG: 300 CAPSULE ORAL at 21:38

## 2020-01-11 RX ADMIN — IPRATROPIUM BROMIDE AND ALBUTEROL SULFATE 3 ML: 2.5; .5 SOLUTION RESPIRATORY (INHALATION) at 21:51

## 2020-01-11 RX ADMIN — IPRATROPIUM BROMIDE AND ALBUTEROL SULFATE 3 ML: 2.5; .5 SOLUTION RESPIRATORY (INHALATION) at 17:28

## 2020-01-11 RX ADMIN — BUSPIRONE HYDROCHLORIDE 5 MG: 5 TABLET ORAL at 08:20

## 2020-01-11 ASSESSMENT — PAIN SCALES - GENERAL: PAINLEVEL_OUTOF10: 0

## 2020-01-11 NOTE — PROGRESS NOTES
[facility] Progress Note    81 yo female admitted on 1/9/20 with worsening coug of 1 day's duration without associated fever or chills. She does have recent history or repeated pneumonia, documented aspiration, and a  LLL segmental embolism documented on 10/21/19. CC/Overnight events:   Feels better. No shortness of breath at rest and states cough is significantly improved.  On oxygen at 2 L NC    Current Facility-Administered Medications   Medication Dose Route Frequency Provider Last Rate Last Dose    apixaban (ELIQUIS) tablet 5 mg  5 mg Oral BID Maura Hernandez MD        aspirin chewable tablet 81 mg  81 mg Oral Daily Clari Mcdaniel MD   81 mg at 01/10/20 1048    ipratropium-albuterol (DUONEB) nebulizer solution 3 mL  3 mL Inhalation Q4H WA Bin Kelrosendo, DO   3 mL at 01/11/20 0756    busPIRone (BUSPAR) tablet 5 mg  5 mg Oral Daily Bin Kelch, DO   5 mg at 01/10/20 1048    fluticasone (FLONASE) 50 MCG/ACT nasal spray 1 spray  1 spray Each Nostril Daily PRN Bin Kelch, DO        nitroGLYCERIN (NITROSTAT) SL tablet 0.4 mg  0.4 mg Sublingual Q5 Min PRN Bin Kelch, DO        Liraglutide (VICTOZA) SC injection 1.2 mg  1.2 mg Subcutaneous Daily Bin Kelch, DO   Stopped at 01/10/20 1333    gabapentin (NEURONTIN) capsule 600 mg  600 mg Oral TID Bin Kelch, DO   600 mg at 01/10/20 2028    atorvastatin (LIPITOR) tablet 40 mg  40 mg Oral Nightly Bin Kelch, DO   40 mg at 01/10/20 2028    isosorbide mononitrate (IMDUR) extended release tablet 30 mg  30 mg Oral Daily Bin Kelch, DO   30 mg at 01/10/20 1048    carvedilol (COREG) tablet 12.5 mg  12.5 mg Oral BID WC Bin Kelch, DO   12.5 mg at 01/10/20 1735    montelukast (SINGULAIR) tablet 10 mg  10 mg Oral Nightly Bin Kelch, DO   10 mg at 01/10/20 2028    sertraline (ZOLOFT) tablet 150 mg  150 mg Oral Daily Bin Dela Cruz DO   150 mg at 01/10/20 1048    therapeutic multivitamin-minerals 1 tablet

## 2020-01-11 NOTE — PROGRESS NOTES
Subjective: The patient is awake and alert. No problems overnight. Denies chest pain, angina, and dyspnea. Denies abdominal pain. Tolerating diet. No nausea or vomiting. Objective:    BP (!) 112/47   Pulse 79   Temp 97.9 °F (36.6 °C) (Oral)   Resp 18   Ht 5' 1\" (1.549 m)   Wt 183 lb (83 kg)   LMP  (LMP Unknown)   SpO2 98%   BMI 34.58 kg/m²     Current medications that patient is taking have been reviewed. Heart:  RRR, no murmurs, gallops, or rubs.   Lungs:  CTA bilaterally, no wheeze, rales or rhonchi  Abd: bowel sounds present, soft, nontender, nondistended, no masses  Extrem:  No cyanosis or edema    CBC with Differential:    Lab Results   Component Value Date    WBC 8.7 01/10/2020    RBC 3.26 01/10/2020    HGB 9.6 01/10/2020    HCT 31.2 01/10/2020     01/11/2020    MCV 95.7 01/10/2020    MCH 29.4 01/10/2020    MCHC 30.8 01/10/2020    RDW 13.1 01/10/2020    SEGSPCT 58 06/18/2013    LYMPHOPCT 21.1 01/10/2020    MONOPCT 5.4 01/10/2020    BASOPCT 0.3 01/10/2020    MONOSABS 0.47 01/10/2020    LYMPHSABS 1.84 01/10/2020    EOSABS 0.14 01/10/2020    BASOSABS 0.03 01/10/2020     CMP:    Lab Results   Component Value Date     01/10/2020    K 4.1 01/10/2020     01/10/2020    CO2 25 01/10/2020    BUN 18 01/10/2020    CREATININE 1.1 01/10/2020    GFRAA 57 01/10/2020    LABGLOM 47 01/10/2020    GLUCOSE 196 01/10/2020    PROT 6.2 01/09/2020    LABALBU 2.9 01/09/2020    CALCIUM 9.2 01/10/2020    BILITOT 0.2 01/09/2020    ALKPHOS 77 01/09/2020    AST 38 01/09/2020    ALT 17 01/09/2020     BMP:    Lab Results   Component Value Date     01/10/2020    K 4.1 01/10/2020     01/10/2020    CO2 25 01/10/2020    BUN 18 01/10/2020    LABALBU 2.9 01/09/2020    CREATININE 1.1 01/10/2020    CALCIUM 9.2 01/10/2020    GFRAA 57 01/10/2020    LABGLOM 47 01/10/2020    GLUCOSE 196 01/10/2020     Magnesium:    Lab Results   Component Value Date    MG 1.9 01/09/2020     Phosphorus:  No results found

## 2020-01-12 LAB
METER GLUCOSE: 135 MG/DL (ref 74–99)
METER GLUCOSE: 211 MG/DL (ref 74–99)
METER GLUCOSE: 273 MG/DL (ref 74–99)
METER GLUCOSE: 291 MG/DL (ref 74–99)

## 2020-01-12 PROCEDURE — 6360000002 HC RX W HCPCS: Performed by: NURSE PRACTITIONER

## 2020-01-12 PROCEDURE — 2700000000 HC OXYGEN THERAPY PER DAY

## 2020-01-12 PROCEDURE — 6370000000 HC RX 637 (ALT 250 FOR IP): Performed by: INTERNAL MEDICINE

## 2020-01-12 PROCEDURE — 2060000000 HC ICU INTERMEDIATE R&B

## 2020-01-12 PROCEDURE — 94640 AIRWAY INHALATION TREATMENT: CPT

## 2020-01-12 PROCEDURE — 2580000003 HC RX 258: Performed by: INTERNAL MEDICINE

## 2020-01-12 PROCEDURE — 82962 GLUCOSE BLOOD TEST: CPT

## 2020-01-12 RX ADMIN — BUSPIRONE HYDROCHLORIDE 5 MG: 5 TABLET ORAL at 10:07

## 2020-01-12 RX ADMIN — APIXABAN 5 MG: 5 TABLET, FILM COATED ORAL at 10:07

## 2020-01-12 RX ADMIN — IPRATROPIUM BROMIDE AND ALBUTEROL SULFATE 3 ML: 2.5; .5 SOLUTION RESPIRATORY (INHALATION) at 18:07

## 2020-01-12 RX ADMIN — GABAPENTIN 600 MG: 300 CAPSULE ORAL at 10:06

## 2020-01-12 RX ADMIN — APIXABAN 5 MG: 5 TABLET, FILM COATED ORAL at 22:27

## 2020-01-12 RX ADMIN — ASPIRIN 81 MG 81 MG: 81 TABLET ORAL at 10:07

## 2020-01-12 RX ADMIN — CARVEDILOL 12.5 MG: 6.25 TABLET, FILM COATED ORAL at 10:07

## 2020-01-12 RX ADMIN — ATORVASTATIN CALCIUM 40 MG: 40 TABLET, FILM COATED ORAL at 22:27

## 2020-01-12 RX ADMIN — SODIUM CHLORIDE, PRESERVATIVE FREE 10 ML: 5 INJECTION INTRAVENOUS at 10:09

## 2020-01-12 RX ADMIN — IPRATROPIUM BROMIDE AND ALBUTEROL SULFATE 3 ML: 2.5; .5 SOLUTION RESPIRATORY (INHALATION) at 21:59

## 2020-01-12 RX ADMIN — MULTIPLE VITAMINS W/ MINERALS TAB 1 TABLET: TAB at 10:07

## 2020-01-12 RX ADMIN — LISINOPRIL 2.5 MG: 5 TABLET ORAL at 10:07

## 2020-01-12 RX ADMIN — FUROSEMIDE 20 MG: 10 INJECTION, SOLUTION INTRAMUSCULAR; INTRAVENOUS at 10:09

## 2020-01-12 RX ADMIN — IPRATROPIUM BROMIDE AND ALBUTEROL SULFATE 3 ML: 2.5; .5 SOLUTION RESPIRATORY (INHALATION) at 14:15

## 2020-01-12 RX ADMIN — INSULIN LISPRO 3 UNITS: 100 INJECTION, SOLUTION INTRAVENOUS; SUBCUTANEOUS at 18:55

## 2020-01-12 RX ADMIN — GABAPENTIN 600 MG: 300 CAPSULE ORAL at 22:27

## 2020-01-12 RX ADMIN — PANTOPRAZOLE SODIUM 40 MG: 40 TABLET, DELAYED RELEASE ORAL at 06:48

## 2020-01-12 RX ADMIN — INSULIN LISPRO 1 UNITS: 100 INJECTION, SOLUTION INTRAVENOUS; SUBCUTANEOUS at 22:28

## 2020-01-12 RX ADMIN — MONTELUKAST SODIUM 10 MG: 10 TABLET ORAL at 22:27

## 2020-01-12 RX ADMIN — SERTRALINE 150 MG: 100 TABLET, FILM COATED ORAL at 10:07

## 2020-01-12 RX ADMIN — CARVEDILOL 12.5 MG: 6.25 TABLET, FILM COATED ORAL at 19:02

## 2020-01-12 RX ADMIN — IPRATROPIUM BROMIDE AND ALBUTEROL SULFATE 3 ML: 2.5; .5 SOLUTION RESPIRATORY (INHALATION) at 10:34

## 2020-01-12 RX ADMIN — INSULIN LISPRO 3 UNITS: 100 INJECTION, SOLUTION INTRAVENOUS; SUBCUTANEOUS at 13:04

## 2020-01-12 RX ADMIN — GABAPENTIN 600 MG: 300 CAPSULE ORAL at 14:48

## 2020-01-12 RX ADMIN — FUROSEMIDE 20 MG: 10 INJECTION, SOLUTION INTRAMUSCULAR; INTRAVENOUS at 19:03

## 2020-01-12 RX ADMIN — SODIUM CHLORIDE, PRESERVATIVE FREE 10 ML: 5 INJECTION INTRAVENOUS at 22:28

## 2020-01-12 RX ADMIN — ISOSORBIDE MONONITRATE 30 MG: 30 TABLET ORAL at 10:07

## 2020-01-12 ASSESSMENT — PAIN SCALES - GENERAL
PAINLEVEL_OUTOF10: 0

## 2020-01-12 NOTE — PROGRESS NOTES
Subjective: The patient is awake and alert. No problems overnight. Denies chest pain, angina, and dyspnea. Denies abdominal pain. Tolerating diet. No nausea or vomiting. Objective:    BP (!) 121/59   Pulse 81   Temp 98.1 °F (36.7 °C)   Resp 16   Ht 5' 1\" (1.549 m)   Wt 184 lb 9 oz (83.7 kg)   LMP  (LMP Unknown)   SpO2 94%   BMI 34.87 kg/m²     Current medications that patient is taking have been reviewed. Heart:  RRR, no murmurs, gallops, or rubs.   Lungs:  CTA bilaterally, no wheeze, rales or rhonchi  Abd: bowel sounds present, soft, nontender, nondistended, no masses  Extrem:  No cyanosis or edema    CBC with Differential:    Lab Results   Component Value Date    WBC 8.7 01/10/2020    RBC 3.26 01/10/2020    HGB 9.6 01/10/2020    HCT 31.2 01/10/2020     01/11/2020    MCV 95.7 01/10/2020    MCH 29.4 01/10/2020    MCHC 30.8 01/10/2020    RDW 13.1 01/10/2020    SEGSPCT 58 06/18/2013    LYMPHOPCT 21.1 01/10/2020    MONOPCT 5.4 01/10/2020    BASOPCT 0.3 01/10/2020    MONOSABS 0.47 01/10/2020    LYMPHSABS 1.84 01/10/2020    EOSABS 0.14 01/10/2020    BASOSABS 0.03 01/10/2020     CMP:    Lab Results   Component Value Date     01/10/2020    K 4.1 01/10/2020     01/10/2020    CO2 25 01/10/2020    BUN 18 01/10/2020    CREATININE 1.1 01/10/2020    GFRAA 57 01/10/2020    LABGLOM 47 01/10/2020    GLUCOSE 196 01/10/2020    PROT 6.2 01/09/2020    LABALBU 2.9 01/09/2020    CALCIUM 9.2 01/10/2020    BILITOT 0.2 01/09/2020    ALKPHOS 77 01/09/2020    AST 38 01/09/2020    ALT 17 01/09/2020     BMP:    Lab Results   Component Value Date     01/10/2020    K 4.1 01/10/2020     01/10/2020    CO2 25 01/10/2020    BUN 18 01/10/2020    LABALBU 2.9 01/09/2020    CREATININE 1.1 01/10/2020    CALCIUM 9.2 01/10/2020    GFRAA 57 01/10/2020    LABGLOM 47 01/10/2020    GLUCOSE 196 01/10/2020     Magnesium:    Lab Results   Component Value Date    MG 1.9 01/09/2020     Phosphorus:  No results found for: PHOS  PT/INR:    Lab Results   Component Value Date    PROTIME 13.9 01/09/2020    INR 1.2 01/09/2020     PTT:    Lab Results   Component Value Date    APTT 40.6 01/11/2020   [APTT}     Assessment:    Patient Active Problem List   Diagnosis    GERD (gastroesophageal reflux disease)    Diabetes mellitus type 2, uncontrolled (HCC)    CKD (chronic kidney disease) stage 3, GFR 30-59 ml/min (HCC)    Essential hypertension    History of pulmonary embolus (PE)    Acute respiratory failure with hypoxia ([facility] [address]    HCAP (healthcare-associated pneumonia)       Plan:    PPI. Blood glucose ok, continue to adjust basal/bolus insulin therapy  Renal function at baseline. Blood pressure ok, continue current medications  Stable. Secondary to pneumonia. Treating for acute bacterial pneumonia. Pulmonology on board. Aggressive pulmonary hygiene. Patient aspirates. Pt/Ot evaluations for discharge planning. Ok to discharge once ok with pulmonology.         Ivania Alonzo    12:04 PM  1/12/2020

## 2020-01-13 LAB
ANION GAP SERPL CALCULATED.3IONS-SCNC: 10 MMOL/L (ref 7–16)
BUN BLDV-MCNC: 36 MG/DL (ref 8–23)
CALCIUM SERPL-MCNC: 9.5 MG/DL (ref 8.6–10.2)
CHLORIDE BLD-SCNC: 99 MMOL/L (ref 98–107)
CO2: 30 MMOL/L (ref 22–29)
CREAT SERPL-MCNC: 2.5 MG/DL (ref 0.5–1)
GFR AFRICAN AMERICAN: 22
GFR NON-AFRICAN AMERICAN: 18 ML/MIN/1.73
GLUCOSE BLD-MCNC: 270 MG/DL (ref 74–99)
METER GLUCOSE: 178 MG/DL (ref 74–99)
METER GLUCOSE: 248 MG/DL (ref 74–99)
METER GLUCOSE: 256 MG/DL (ref 74–99)
METER GLUCOSE: 264 MG/DL (ref 74–99)
POTASSIUM SERPL-SCNC: 4.5 MMOL/L (ref 3.5–5)
SODIUM BLD-SCNC: 139 MMOL/L (ref 132–146)

## 2020-01-13 PROCEDURE — 2580000003 HC RX 258: Performed by: NURSE PRACTITIONER

## 2020-01-13 PROCEDURE — 6360000002 HC RX W HCPCS: Performed by: NURSE PRACTITIONER

## 2020-01-13 PROCEDURE — 82962 GLUCOSE BLOOD TEST: CPT

## 2020-01-13 PROCEDURE — 6370000000 HC RX 637 (ALT 250 FOR IP): Performed by: INTERNAL MEDICINE

## 2020-01-13 PROCEDURE — 36415 COLL VENOUS BLD VENIPUNCTURE: CPT

## 2020-01-13 PROCEDURE — 2060000000 HC ICU INTERMEDIATE R&B

## 2020-01-13 PROCEDURE — 97530 THERAPEUTIC ACTIVITIES: CPT

## 2020-01-13 PROCEDURE — 94640 AIRWAY INHALATION TREATMENT: CPT

## 2020-01-13 PROCEDURE — 80048 BASIC METABOLIC PNL TOTAL CA: CPT

## 2020-01-13 PROCEDURE — 2580000003 HC RX 258: Performed by: INTERNAL MEDICINE

## 2020-01-13 PROCEDURE — 2700000000 HC OXYGEN THERAPY PER DAY

## 2020-01-13 PROCEDURE — 97535 SELF CARE MNGMENT TRAINING: CPT

## 2020-01-13 RX ORDER — ASPIRIN 81 MG/1
81 TABLET, CHEWABLE ORAL DAILY
Qty: 30 TABLET | Refills: 0 | Status: SHIPPED | OUTPATIENT
Start: 2020-01-14 | End: 2021-01-01 | Stop reason: ALTCHOICE

## 2020-01-13 RX ORDER — FUROSEMIDE 20 MG/1
20 TABLET ORAL 2 TIMES DAILY
Qty: 60 TABLET | Refills: 0 | Status: SHIPPED | OUTPATIENT
Start: 2020-01-13 | End: 2020-02-14 | Stop reason: SDUPTHER

## 2020-01-13 RX ORDER — 0.9 % SODIUM CHLORIDE 0.9 %
500 INTRAVENOUS SOLUTION INTRAVENOUS ONCE
Status: COMPLETED | OUTPATIENT
Start: 2020-01-13 | End: 2020-01-14

## 2020-01-13 RX ADMIN — INSULIN LISPRO 3 UNITS: 100 INJECTION, SOLUTION INTRAVENOUS; SUBCUTANEOUS at 12:16

## 2020-01-13 RX ADMIN — IPRATROPIUM BROMIDE AND ALBUTEROL SULFATE 3 ML: 2.5; .5 SOLUTION RESPIRATORY (INHALATION) at 21:59

## 2020-01-13 RX ADMIN — FUROSEMIDE 20 MG: 10 INJECTION, SOLUTION INTRAMUSCULAR; INTRAVENOUS at 08:25

## 2020-01-13 RX ADMIN — ASPIRIN 81 MG 81 MG: 81 TABLET ORAL at 08:23

## 2020-01-13 RX ADMIN — SODIUM CHLORIDE 500 ML: 9 INJECTION, SOLUTION INTRAVENOUS at 21:14

## 2020-01-13 RX ADMIN — BUSPIRONE HYDROCHLORIDE 5 MG: 5 TABLET ORAL at 08:24

## 2020-01-13 RX ADMIN — SODIUM CHLORIDE, PRESERVATIVE FREE 10 ML: 5 INJECTION INTRAVENOUS at 21:15

## 2020-01-13 RX ADMIN — IPRATROPIUM BROMIDE AND ALBUTEROL SULFATE 3 ML: 2.5; .5 SOLUTION RESPIRATORY (INHALATION) at 11:35

## 2020-01-13 RX ADMIN — GABAPENTIN 600 MG: 300 CAPSULE ORAL at 08:23

## 2020-01-13 RX ADMIN — FUROSEMIDE 20 MG: 10 INJECTION, SOLUTION INTRAMUSCULAR; INTRAVENOUS at 17:52

## 2020-01-13 RX ADMIN — INSULIN LISPRO 1 UNITS: 100 INJECTION, SOLUTION INTRAVENOUS; SUBCUTANEOUS at 08:26

## 2020-01-13 RX ADMIN — MULTIPLE VITAMINS W/ MINERALS TAB 1 TABLET: TAB at 08:24

## 2020-01-13 RX ADMIN — IPRATROPIUM BROMIDE AND ALBUTEROL SULFATE 3 ML: 2.5; .5 SOLUTION RESPIRATORY (INHALATION) at 08:38

## 2020-01-13 RX ADMIN — IPRATROPIUM BROMIDE AND ALBUTEROL SULFATE 3 ML: 2.5; .5 SOLUTION RESPIRATORY (INHALATION) at 16:47

## 2020-01-13 RX ADMIN — SODIUM CHLORIDE, PRESERVATIVE FREE 10 ML: 5 INJECTION INTRAVENOUS at 08:25

## 2020-01-13 RX ADMIN — LISINOPRIL 2.5 MG: 5 TABLET ORAL at 08:23

## 2020-01-13 RX ADMIN — ACETAMINOPHEN 650 MG: 325 TABLET, FILM COATED ORAL at 14:52

## 2020-01-13 RX ADMIN — APIXABAN 5 MG: 5 TABLET, FILM COATED ORAL at 08:23

## 2020-01-13 RX ADMIN — SERTRALINE 150 MG: 100 TABLET, FILM COATED ORAL at 08:23

## 2020-01-13 RX ADMIN — CARVEDILOL 12.5 MG: 6.25 TABLET, FILM COATED ORAL at 08:24

## 2020-01-13 RX ADMIN — ISOSORBIDE MONONITRATE 30 MG: 30 TABLET ORAL at 08:24

## 2020-01-13 RX ADMIN — INSULIN LISPRO 2 UNITS: 100 INJECTION, SOLUTION INTRAVENOUS; SUBCUTANEOUS at 17:53

## 2020-01-13 RX ADMIN — APIXABAN 5 MG: 5 TABLET, FILM COATED ORAL at 23:13

## 2020-01-13 RX ADMIN — CARVEDILOL 12.5 MG: 6.25 TABLET, FILM COATED ORAL at 17:52

## 2020-01-13 RX ADMIN — GABAPENTIN 600 MG: 300 CAPSULE ORAL at 14:30

## 2020-01-13 RX ADMIN — PANTOPRAZOLE SODIUM 40 MG: 40 TABLET, DELAYED RELEASE ORAL at 05:35

## 2020-01-13 ASSESSMENT — PAIN DESCRIPTION - DESCRIPTORS: DESCRIPTORS: ACHING;CONSTANT;DISCOMFORT

## 2020-01-13 ASSESSMENT — PAIN SCALES - GENERAL
PAINLEVEL_OUTOF10: 2
PAINLEVEL_OUTOF10: 0
PAINLEVEL_OUTOF10: 5

## 2020-01-13 ASSESSMENT — PAIN DESCRIPTION - PAIN TYPE: TYPE: ACUTE PAIN

## 2020-01-13 ASSESSMENT — PAIN DESCRIPTION - LOCATION: LOCATION: NECK

## 2020-01-13 ASSESSMENT — PAIN DESCRIPTION - FREQUENCY: FREQUENCY: INTERMITTENT

## 2020-01-13 NOTE — PROGRESS NOTES
[facility] Progress Note    81 yo female admitted on 1/9/20 with worsening coug of 1 day's duration without associated fever or chills. She does have recent history or repeated pneumonia, documented aspiration, and a  LLL segmental embolism documented on 10/21/19. CC/Overnight events:   Feels better. No shortness of breath at rest and states cough is significantly improved. On oxygen room air!     Current Facility-Administered Medications   Medication Dose Route Frequency Provider Last Rate Last Dose    apixaban (ELIQUIS) tablet 5 mg  5 mg Oral BID Dona Cruz MD   5 mg at 01/13/20 0823    ipratropium-albuterol (DUONEB) nebulizer solution 3 mL  3 mL Inhalation 4x daily Cait Garcia MD   3 mL at 01/13/20 1647    aspirin chewable tablet 81 mg  81 mg Oral Daily Thomas Donaldson MD   81 mg at 01/13/20 1486    busPIRone (BUSPAR) tablet 5 mg  5 mg Oral Daily Buena Actis, DO   5 mg at 01/13/20 0824    fluticasone (FLONASE) 50 MCG/ACT nasal spray 1 spray  1 spray Each Nostril Daily PRN Perlita Actis, DO   1 spray at 01/11/20 2145    nitroGLYCERIN (NITROSTAT) SL tablet 0.4 mg  0.4 mg Sublingual Q5 Min PRN Perlita Actis, DO        Liraglutide (VICTOZA) SC injection 1.2 mg  1.2 mg Subcutaneous Daily Perlita Actis, DO   Stopped at 01/10/20 1333    gabapentin (NEURONTIN) capsule 600 mg  600 mg Oral TID Buena Actis, DO   600 mg at 01/13/20 1430    atorvastatin (LIPITOR) tablet 40 mg  40 mg Oral Nightly Perlita Actis, DO   40 mg at 01/12/20 2227    isosorbide mononitrate (IMDUR) extended release tablet 30 mg  30 mg Oral Daily Buena Actis, DO   30 mg at 01/13/20 0824    carvedilol (COREG) tablet 12.5 mg  12.5 mg Oral BID WC Perlita Actis, DO   12.5 mg at 01/13/20 0639    montelukast (SINGULAIR) tablet 10 mg  10 mg Oral Nightly Buena Actis, DO   10 mg at 01/12/20 2227    sertraline (ZOLOFT) tablet 150 mg  150 mg Oral Daily Perlita Lam DO   150 mg at 01/13/20 3015  therapeutic multivitamin-minerals 1 tablet  1 tablet Oral Daily Vallie Loop, DO   1 tablet at 01/13/20 9751    acetaminophen (TYLENOL) tablet 650 mg  650 mg Oral Q6H PRN Vallie Loop, DO   650 mg at 01/13/20 1452    pantoprazole (PROTONIX) tablet 40 mg  40 mg Oral QAM AC Vallie Loop, DO   40 mg at 01/13/20 0535    sodium chloride flush 0.9 % injection 10 mL  10 mL Intravenous 2 times per day Vallie Loop, DO   10 mL at 01/13/20 0825    sodium chloride flush 0.9 % injection 10 mL  10 mL Intravenous PRN Vallie Loop, DO   10 mL at 01/10/20 0440    magnesium hydroxide (MILK OF MAGNESIA) 400 MG/5ML suspension 30 mL  30 mL Oral Daily PRN Vallie Loop, DO        ondansetron [facility]) injection 4 mg  4 mg Intravenous Q6H PRN Vallie Loop, DO        glucose (GLUTOSE) 40 % oral gel 15 g  15 g Oral PRN Vallie Loop, DO        dextrose 50 % IV solution  12.5 g Intravenous PRN Vallie Loop, DO        glucagon (rDNA) injection 1 mg  1 mg Intramuscular PRN Vallie Loop, DO        dextrose 5 % solution  100 mL/hr Intravenous PRN Vallie Loop, DO        insulin lispro (HUMALOG) injection vial 0-6 Units  0-6 Units Subcutaneous TID WC Vallie Loop, DO   3 Units at 01/13/20 1216    insulin lispro (HUMALOG) injection vial 0-3 Units  0-3 Units Subcutaneous Nightly Vallie Loop, DO   1 Units at 01/12/20 2228    furosemide (LASIX) injection 20 mg  20 mg Intravenous BID Lito Goo, APRN - CNP   20 mg at 01/13/20 0825    perflutren lipid microspheres (DEFINITY) injection 1.65 mg  1.5 mL Intravenous ONCE PRN Lito Goo, APRN - CNP        lisinopril (PRINIVIL;ZESTRIL) tablet 2.5 mg  2.5 mg Oral Daily Benjamín Esters, MD   2.5 mg at 01/13/20 0729       Objective:   BP (!) 101/50   Pulse 81   Temp 97.3 °F (36.3 °C) (Temporal)   Resp 18   Ht 5' 1\" (1.549 m)   Wt 179 lb 3 oz (81.3 kg)   LMP  (LMP Unknown)   SpO2 96%   BMI 33.86 kg/m²     Intake/Output Summary (Last 24 hours) at small pleural effusions concerning   for pneumonia. XR CHEST PORTABLE   Final Result   Patchy bibasilar infiltrates and small pleural effusion concerning for   pneumonia. Impression:  1. HfpEF  2. S/p Acute hypoxemic respiratory failure due to CHF / atelectasis, Improved  3. Recent segmental PE  4. Elevated troponins, known CAD - on medical rx    Principal Problem:    Acute respiratory failure with hypoxia (HCC)  Active Problems:    GERD (gastroesophageal reflux disease)    Diabetes mellitus type 2, uncontrolled ([facility])    CKD (chronic kidney disease) stage 3, GFR 30-59 ml/min ([facility])    Essential hypertension    History of pulmonary embolus (PE)    HCAP (healthcare-associated pneumonia)  Resolved Problems:    * No resolved hospital problems. *      Plans:   1. Room air  2. Change nebs to qid  3.  Out of bed  Ok to d/c from PULM f/up 2-4 weeks

## 2020-01-13 NOTE — CARE COORDINATION
nH predict tool uploaded. Recommendation is SNF. Patient and family wish patient to return home with [facility].

## 2020-01-13 NOTE — DISCHARGE SUMMARY
Physician Discharge Summary     Patient ID:  Maggie Catherine  [id number]  80 y.o.  6/29/1933    Admit date: 1/9/2020    Discharge date and time:  1/13/2020    Admission Diagnoses:   Patient Active Problem List   Diagnosis    GERD (gastroesophageal reflux disease)    Diabetes mellitus type 2, uncontrolled ([facility]    CKD (chronic kidney disease) stage 3, GFR 30-59 ml/min (HCC)    Essential hypertension    History of pulmonary embolus (PE)    Acute respiratory failure with hypoxia ([facility]    HCAP (healthcare-associated pneumonia)       Discharge Diagnoses: as above    Consults: cardiology and pulmonary/intensive care    Procedures: see chart    Hospital Course: patient was admitted with shortness of breath. She was found to suffer acute respiratory failure secondary to aspiration and CHF. She was seen by cardiology and pulmonology. She underwent aggressive pulmonary hygiene and diuresis. She tolerated this well and clinically improved. She was seen by Pt/Ot and rehab was recommended. The patient and family declined rehab placement. [place] [address].       Discharge Exam:  See progress note from today    Condition:  stable    Disposition: home    Patient Instructions:   Current Discharge Medication List      START taking these medications    Details   aspirin 81 MG chewable tablet Take 1 tablet by mouth daily  Qty: 30 tablet, Refills: 0      furosemide (LASIX) 20 MG tablet Take 1 tablet by mouth 2 times daily  Qty: 60 tablet, Refills: 0         CONTINUE these medications which have NOT CHANGED    Details   carvedilol (COREG) 25 MG tablet Take 12.5 mg by mouth 2 times daily (with meals)      Insulin Lispro (HUMALOG KWIKPEN SC) Inject 0-12 Units into the skin 3 times daily *Per Sliding Scale*      montelukast (SINGULAIR) 10 MG tablet Take 10 mg by mouth nightly      sertraline (ZOLOFT) 100 MG tablet Take 150 mg by mouth daily      Multiple Vitamins-Minerals (THERAPEUTIC MULTIVITAMIN-MINERALS) tablet

## 2020-01-13 NOTE — PROGRESS NOTES
OT BEDSIDE TREATMENT NOTE      Date:2020  Patient Name: Carla Cobian  MRN: [id number]  : 1933  Room: [address] [place], [place] [address]     Per OT Eval:    Evaluating [facility], OTR/L #[id number]     AM-PAC Daily Activity Raw Score:   Recommended Adaptive Equipment: TBD      Diagnosis: HCAP (healthcare-associated pneumonia) [J18.9]  HCAP (healthcare-associated pneumonia) [J18.9]        Pertinent Medical History: anxiety, depression, DM, GERD, HTN, peripheral neuropathy, spinal stenosis     Precautions:  Falls, bed alarm, TAPS, O2, Purewick (incontinent with standing), nectar thick diet     Home Living: Pt lives alone (w/ 24 hr care by family) in 1 floor home. Ramped entry   Bathroom setup: walk in shower with tub transfer bench- recently has been sponge bathing   Equipment owned: ww, transport chair, BSC     Prior Level of Function: assistance with ADLs , assistance with IADLs; ambulated short distances inside home w/ ww and assistance.  Transport chair in community  Driving: no  Pt recently discharged from NH to home     Pain Level: L hip pain 1/10     Cognition: A&O: 3/4; Follows 1-2 step directions, pleasant & cooperative               Memory:  fair               Sequencing:  fair               Problem solving:  fair -              Judgement/safety:  fair -                Functional Assessment:    Initial Eval Status  Date: 1/10/20 Treatment Status  Date:  20 Short Term Goals  Treatment frequency: PRN    Feeding Stand by Assist  Set up  Recommending pt eat all meals in chair   Modified [place]    Grooming Minimal Assist  SBA-Mod A  Simple task SBA washing off her face, increased time, Mod A grooming her hair  Modified [place]    UB Dressing Moderate Assist  Max A  Doff & cheo gown upright in bed, cues to assist with task, instructed on modified techniques   Stand by Assist    LB Dressing Dependent   Max/Dep  Bed level donning socks  Max A cheo brief over feet & hips Moderate Assist    Bathing Maximal Assist  Max A  Simulated  Moderate Assist    Toileting Dependent   Dep  Incontinent of urine when standing  Moderate Assist    Bed Mobility  Rolling: Moderate Assist   Supine to sit: Maximal Assist   Sit to supine: Maximal Assist  Max A  Supine to sit cues for technique from PTA present   Rolling: Stand by Assist   Supine to sit: Minimal Assist   Sit to supine: Minimal Assist    Functional Transfers Mod A x2  Mod A x 2  R lateral lean   Cues for hand placement, technique & posture  Min A   Functional Mobility Mod A x2 w/ ww  Limited to lateral sidesteps to [facility]  Mod A x 2  Taking a few steps using walker from EOB to chair  Min A   Balance Sitting: Min A     Standing: Mod A x2 w/ ww  Sitting: Min A  Standing: Mod A x 2  R lateral lean, cued for posture, using walker      Activity Tolerance Fair- Fair  Light tasks, mild SOB   Good   Visual/  Perceptual Glasses: yes            Education:  Pt was educated through out treatment regarding proper technique & safety with bed mobility, functional transfers & mobility, walker safety & ADL compensatory strategies to ease tasks to improve safety & prevent falls and allow pt to return home safely. Comments: Upon arrival pt was in bed & agreeable for therapy. At end of session pt was seated in chair, nsg informed, all lines and tubes intact & call light within reach. · Pt has made slow progress towards set goals. · Continue with current plan of care    Time in: 10:00am  Time out: 10:24am  Total Tx Time: 24  minutes    Sam Ferrara.  [address] [place] [address], [address] [place] [address]

## 2020-01-13 NOTE — DISCHARGE INSTR - COC
GFR 30-59 ml/min ([facility]) N18.3    Essential hypertension I10    History of pulmonary embolus (PE) Z86.711    Acute respiratory failure with hypoxia ([facility]) J96.01    HCAP (healthcare-associated pneumonia) J18.9       Isolation/Infection:   Isolation          No Isolation        Patient Infection Status     None to display          Nurse Assessment:  Last Vital Signs: BP (!) 106/46   Pulse 96   Temp 97.6 °F (36.4 °C) (Temporal)   Resp 18   Ht 5' 1\" (1.549 m)   Wt 179 lb 3 oz (81.3 kg)   LMP  (LMP Unknown)   SpO2 98%   BMI 33.86 kg/m²     Last documented pain score (0-10 scale): Pain Level: 0  Last Weight:   Wt Readings from Last 1 Encounters:   01/13/20 179 lb 3 oz (81.3 kg)     Mental Status:  {IP PT MENTAL STATUS:20030}    IV Access:  { OSWALDO IV ACCESS:576044092}    Nursing Mobility/ADLs:  Walking   {[facility] DME UIIA:158338079}  Transfer  {[facility] DME HJLY:876063591}  Bathing  {[facility] DME WCJR:750527504}  Dressing  {[facility] DME JNPJ:051993392}  Toileting  {[facility] DME PAAE:237329513}  Feeding  {[facility] DME EMLP:468790467}  Med Admin  {[facility] DME GURM:091806775}  Med Delivery   { [facility] MED Delivery:722932503}    Wound Care Documentation and Therapy:  Wound 11/29/19 Left tip of great toe (Active)   Number of days: 45       Wound 11/29/19  Right tip of great toe (Active)   Number of days: 45       Wound 11/29/19 Heel Right (Active)   Number of days: 45        Elimination:  Continence:   · Bowel: {YES / FD:53708}  · Bladder: {YES / EAMON:74675}  Urinary Catheter: {Urinary Catheter:760606333}   Colostomy/Ileostomy/Ileal Conduit: {YES / SJ:44520}       Date of Last BM: ***    Intake/Output Summary (Last 24 hours) at 1/13/2020 1249  Last data filed at 1/13/2020 0343  Gross per 24 hour   Intake 0 ml   Output 900 ml   Net -900 ml     I/O last 3 completed shifts:   In: 360 [P.O.:360]  Out: 1900 [Urine:1900]    Safety Concerns:     [address] Safety Concerns:076705647}    Impairments/Disabilities:      [address] Impairments/Disabilities:214804569}    Nutrition Therapy:  Current Nutrition Therapy:   [address] OSWALDO Diet List:762230946}    Routes of Feeding: {CHP DME Other Feedings:305856979}  Liquids: {Slp liquid thickness:65067}  Daily Fluid Restriction: {CHP DME Yes amt example:055914262}  Last Modified Barium Swallow with Video (Video Swallowing Test): {Done Not Done BPZV:706470953}    Treatments at the Time of Hospital Discharge:   Respiratory Treatments: ***  Oxygen Therapy:  {Therapy; copd oxygen:94177}  Ventilator:    {[facility] Vent NKTE:521488944}    Rehab Therapies: {THERAPEUTIC INTERVENTION:5394834639}  Weight Bearing Status/Restrictions: {[facility] Weight Bearin}  Other Medical Equipment (for information only, NOT a DME order):  {EQUIPMENT:826674297}  Other Treatments: ***    Patient's personal belongings (please select all that are sent with patient):  {[facility] DME Belongings:479538186}    RN SIGNATURE:  {Esignature:362956723}    CASE MANAGEMENT/SOCIAL WORK SECTION    Inpatient Status Date: ***    Readmission Risk Assessment Score:  Readmission Risk              Risk of Unplanned Readmission:        25           Discharging to Facility/ Agency   · Name:   · Address:  · Phone:  · Fax:    Dialysis Facility (if applicable)   · Name:  · Address:  · Dialysis Schedule:  · Phone:  · Fax:    / signature: {Esignature:097021472}    PHYSICIAN SECTION    Prognosis: {Prognosis:4407854771}    Condition at Discharge: [address] Patient Condition:136331528}    Rehab Potential (if transferring to Rehab): {Prognosis:7704987135}    Recommended Labs or Other Treatments After Discharge: ***    Physician Certification: I certify the above information and transfer of Jose Barrera  is necessary for the continuing treatment of the diagnosis listed and that she requires {Admit to Appropriate Level of Care:38590} for {GREATER/LESS:700295820} 30 days.      Update Admission H&P: [facility]68[facility]}    PHYSICIAN SIGNATURE: Electronically signed by Drew Baeza MD on 1/13/2020 at 12:49 PM    Follow up with dr Biju lara in 1 week. Follow up with dr Mary Mooney in 2-3 weeks. Follow up with dr Preet Brooks in 2-3 weeks.

## 2020-01-13 NOTE — PROGRESS NOTES
Physical Therapy  Facility/Department: [address] NEURO IMCU  Daily Treatment Note  NAME: Patricia Dela Cruz  : 1933  MRN: [id number]    Date of Service: 2020   Evaluating PT: Shukri Ruby, PT, DPT [id number]     Room #:  6731/8679-J     Diagnosis: HCAP  Precautions: fall risk, nectar liquids, bed alarm  Procedures: none  PMHx: peripheral neuropathy, HTN, anxiety, depression, diabetes, spinal stenosis  Recommended DME: to be determined     Pt lives with alone, but family provides 24 hour care in shifts per pt report in a single story house with ramped entrance. Bed is on the first floor and bath is on the first floor. Pt ambulated with FWW and assist from family for household distances, uses transport chair for community distances prior to admission.     HPI: Pt presented to the ED on  with SOB and cough beginning day of admission. Pt was recently placed on nectar thick liquids due to aspiration PNA.                Initial Evaluation  Date: 1/10/20 Treatment Date:   Short Term/ Long Term   Goals   AM-PAC 6 Clicks  82/22      Was pt agreeable to Eval/treatment? yes yes      Does pt have pain? 1/10 headache  no c/o pain      Bed Mobility  Rolling: Mod A  Supine to sit: Max A   Sit to supine: Max A  Scooting: Max A   Rolling: NT  Supine to sit; Max A  Sit to supine: NT  Scooting: Max A to EOB  Rolling: Min A  Supine to sit: Min A  Sit to supine: Min A  Scooting: Min A   Transfers Sit to stand: Mod A x2  Stand to sit: Mod A x2  Stand pivot: NT  sit to stand; Mod A x 2  Stand to sit: Mod A Sit to stand: Min A  Stand to sit: Min A  Stand pivot: Min A with AAD   Ambulation   4 side steps towards R with FWW and Mod A x2  3 feet to bedside chair with Mod A x 2 using ww  50 feet with AAD with Min A   Stair negotiation: NA, pt has ramp to enter/exit her home.  NT NA   ROM B UE: WNL  B LE: WNL       Strength B UE: WNL  B LE: 3+/5       Balance Sitting EOB: CGA  Dynamic standing:  Mod A x2 with FWW  sitting EOB: Min A Sitting EOB: SBA  Dynamic standing: Min A with AAD        Pt performed therapeutic exercise of the following: seated B LE AP's and LAQ 2 x 10 reps. Patient education  Pt was educated on safety when seated EOB     Patient response to education:   Pt verbalized understanding Pt demonstrated skill Pt requires further education in this area   x x x     Additional Comments: Pt required max A to sit eob. Decreased effort. Once EOB Pt required tactile cues for feet on the floor with posterior R lean. Initial stand pt incontinent of urine as donning brief. Pt sat back down for rest break prior to pivoting to beside chair. Pt was left seated in bedside chair with call light left by patient. Time in: 1000  Time out: 1024    Pt is making good progress toward established Physical Therapy goals. Continue with physical therapy current plan of care.     Violetta Prado PTA  License Number: [id number]

## 2020-01-13 NOTE — PROGRESS NOTES
Subjective: The patient is awake and alert. Sitting up in a chair. No problems overnight. Denies chest pain, angina, and dyspnea. Denies abdominal pain. Tolerating diet. No nausea or vomiting. Wants to go home. Objective:    /74   Pulse 64   Temp 98.5 °F (36.9 °C)   Resp 20   Ht 5' 1\" (1.549 m)   Wt 179 lb 3 oz (81.3 kg)   LMP  (LMP Unknown)   SpO2 92%   BMI 33.86 kg/m²     Current medications that patient is taking have been reviewed. Heart:  RRR, no murmurs, gallops, or rubs.   Lungs:  CTA bilaterally, no wheeze, rales or rhonchi  Abd: bowel sounds present, soft, nontender, nondistended, no masses  Extrem:  No cyanosis or edema    CBC with Differential:    Lab Results   Component Value Date    WBC 8.7 01/10/2020    RBC 3.26 01/10/2020    HGB 9.6 01/10/2020    HCT 31.2 01/10/2020     01/11/2020    MCV 95.7 01/10/2020    MCH 29.4 01/10/2020    MCHC 30.8 01/10/2020    RDW 13.1 01/10/2020    SEGSPCT 58 06/18/2013    LYMPHOPCT 21.1 01/10/2020    MONOPCT 5.4 01/10/2020    BASOPCT 0.3 01/10/2020    MONOSABS 0.47 01/10/2020    LYMPHSABS 1.84 01/10/2020    EOSABS 0.14 01/10/2020    BASOSABS 0.03 01/10/2020     CMP:    Lab Results   Component Value Date     01/10/2020    K 4.1 01/10/2020     01/10/2020    CO2 25 01/10/2020    BUN 18 01/10/2020    CREATININE 1.1 01/10/2020    GFRAA 57 01/10/2020    LABGLOM 47 01/10/2020    GLUCOSE 196 01/10/2020    PROT 6.2 01/09/2020    LABALBU 2.9 01/09/2020    CALCIUM 9.2 01/10/2020    BILITOT 0.2 01/09/2020    ALKPHOS 77 01/09/2020    AST 38 01/09/2020    ALT 17 01/09/2020     BMP:    Lab Results   Component Value Date     01/10/2020    K 4.1 01/10/2020     01/10/2020    CO2 25 01/10/2020    BUN 18 01/10/2020    LABALBU 2.9 01/09/2020    CREATININE 1.1 01/10/2020    CALCIUM 9.2 01/10/2020    GFRAA 57 01/10/2020    LABGLOM 47 01/10/2020    GLUCOSE 196 01/10/2020     Magnesium:    Lab Results   Component Value Date    MG 1.9 01/09/2020     Phosphorus:  No results found for: PHOS  PT/INR:    Lab Results   Component Value Date    PROTIME 13.9 01/09/2020    INR 1.2 01/09/2020     PTT:    Lab Results   Component Value Date    APTT 40.6 01/11/2020   [APTT}     Assessment:    Patient Active Problem List   Diagnosis    GERD (gastroesophageal reflux disease)    Diabetes mellitus type 2, uncontrolled (HCC)    CKD (chronic kidney disease) stage 3, GFR 30-59 ml/min ([facility])    Essential hypertension    History of pulmonary embolus (PE)    Acute respiratory failure with hypoxia ([address]    HCAP (healthcare-associated pneumonia)       Plan:    PPI. Blood glucose ok, continue to adjust basal/bolus insulin therapy  Renal function at baseline. Blood pressure ok, continue current medications  Stable. Secondary to pneumonia. Treating for acute bacterial pneumonia. Pulmonology on board. Aggressive pulmonary hygiene. Patient aspirates. Pt/Ot evaluations for discharge planning. Ok to discharge once ok with pulmonology.         Shannan Tarango    6:50 AM  1/13/2020

## 2020-01-14 LAB
ANION GAP SERPL CALCULATED.3IONS-SCNC: 14 MMOL/L (ref 7–16)
BLOOD CULTURE, ROUTINE: NORMAL
BUN BLDV-MCNC: 34 MG/DL (ref 8–23)
CALCIUM SERPL-MCNC: 9.2 MG/DL (ref 8.6–10.2)
CHLORIDE BLD-SCNC: 100 MMOL/L (ref 98–107)
CO2: 28 MMOL/L (ref 22–29)
CREAT SERPL-MCNC: 2 MG/DL (ref 0.5–1)
CULTURE, BLOOD 2: NORMAL
GFR AFRICAN AMERICAN: 29
GFR NON-AFRICAN AMERICAN: 24 ML/MIN/1.73
GLUCOSE BLD-MCNC: 175 MG/DL (ref 74–99)
METER GLUCOSE: 164 MG/DL (ref 74–99)
METER GLUCOSE: 184 MG/DL (ref 74–99)
METER GLUCOSE: 242 MG/DL (ref 74–99)
METER GLUCOSE: 292 MG/DL (ref 74–99)
POTASSIUM SERPL-SCNC: 4 MMOL/L (ref 3.5–5)
SODIUM BLD-SCNC: 142 MMOL/L (ref 132–146)

## 2020-01-14 PROCEDURE — 6370000000 HC RX 637 (ALT 250 FOR IP): Performed by: INTERNAL MEDICINE

## 2020-01-14 PROCEDURE — 36415 COLL VENOUS BLD VENIPUNCTURE: CPT

## 2020-01-14 PROCEDURE — 51798 US URINE CAPACITY MEASURE: CPT

## 2020-01-14 PROCEDURE — 2580000003 HC RX 258: Performed by: INTERNAL MEDICINE

## 2020-01-14 PROCEDURE — 80048 BASIC METABOLIC PNL TOTAL CA: CPT

## 2020-01-14 PROCEDURE — 82962 GLUCOSE BLOOD TEST: CPT

## 2020-01-14 PROCEDURE — 2060000000 HC ICU INTERMEDIATE R&B

## 2020-01-14 PROCEDURE — 94640 AIRWAY INHALATION TREATMENT: CPT

## 2020-01-14 RX ORDER — SODIUM CHLORIDE 9 MG/ML
INJECTION, SOLUTION INTRAVENOUS CONTINUOUS
Status: DISCONTINUED | OUTPATIENT
Start: 2020-01-14 | End: 2020-01-15 | Stop reason: HOSPADM

## 2020-01-14 RX ADMIN — INSULIN LISPRO 2 UNITS: 100 INJECTION, SOLUTION INTRAVENOUS; SUBCUTANEOUS at 16:16

## 2020-01-14 RX ADMIN — SODIUM CHLORIDE: 9 INJECTION, SOLUTION INTRAVENOUS at 21:59

## 2020-01-14 RX ADMIN — PANTOPRAZOLE SODIUM 40 MG: 40 TABLET, DELAYED RELEASE ORAL at 05:43

## 2020-01-14 RX ADMIN — APIXABAN 5 MG: 5 TABLET, FILM COATED ORAL at 21:53

## 2020-01-14 RX ADMIN — GABAPENTIN 600 MG: 300 CAPSULE ORAL at 21:53

## 2020-01-14 RX ADMIN — ISOSORBIDE MONONITRATE 30 MG: 30 TABLET ORAL at 10:30

## 2020-01-14 RX ADMIN — GABAPENTIN 600 MG: 300 CAPSULE ORAL at 13:36

## 2020-01-14 RX ADMIN — ACETAMINOPHEN 650 MG: 325 TABLET, FILM COATED ORAL at 13:56

## 2020-01-14 RX ADMIN — SODIUM CHLORIDE: 9 INJECTION, SOLUTION INTRAVENOUS at 13:09

## 2020-01-14 RX ADMIN — ATORVASTATIN CALCIUM 40 MG: 40 TABLET, FILM COATED ORAL at 21:53

## 2020-01-14 RX ADMIN — MONTELUKAST SODIUM 10 MG: 10 TABLET ORAL at 21:53

## 2020-01-14 RX ADMIN — SERTRALINE 150 MG: 100 TABLET, FILM COATED ORAL at 10:30

## 2020-01-14 RX ADMIN — IPRATROPIUM BROMIDE AND ALBUTEROL SULFATE 3 ML: 2.5; .5 SOLUTION RESPIRATORY (INHALATION) at 20:26

## 2020-01-14 RX ADMIN — INSULIN LISPRO 1 UNITS: 100 INJECTION, SOLUTION INTRAVENOUS; SUBCUTANEOUS at 21:55

## 2020-01-14 RX ADMIN — APIXABAN 5 MG: 5 TABLET, FILM COATED ORAL at 10:32

## 2020-01-14 RX ADMIN — LISINOPRIL 2.5 MG: 5 TABLET ORAL at 10:30

## 2020-01-14 RX ADMIN — IPRATROPIUM BROMIDE AND ALBUTEROL SULFATE 3 ML: 2.5; .5 SOLUTION RESPIRATORY (INHALATION) at 11:50

## 2020-01-14 RX ADMIN — BUSPIRONE HYDROCHLORIDE 5 MG: 5 TABLET ORAL at 10:35

## 2020-01-14 RX ADMIN — INSULIN LISPRO 1 UNITS: 100 INJECTION, SOLUTION INTRAVENOUS; SUBCUTANEOUS at 10:42

## 2020-01-14 RX ADMIN — CARVEDILOL 12.5 MG: 6.25 TABLET, FILM COATED ORAL at 10:29

## 2020-01-14 RX ADMIN — CARVEDILOL 12.5 MG: 6.25 TABLET, FILM COATED ORAL at 16:16

## 2020-01-14 RX ADMIN — MULTIPLE VITAMINS W/ MINERALS TAB 1 TABLET: TAB at 10:29

## 2020-01-14 RX ADMIN — INSULIN LISPRO 3 UNITS: 100 INJECTION, SOLUTION INTRAVENOUS; SUBCUTANEOUS at 11:44

## 2020-01-14 RX ADMIN — GABAPENTIN 600 MG: 300 CAPSULE ORAL at 10:35

## 2020-01-14 RX ADMIN — ASPIRIN 81 MG 81 MG: 81 TABLET ORAL at 10:33

## 2020-01-14 RX ADMIN — SODIUM CHLORIDE, PRESERVATIVE FREE 10 ML: 5 INJECTION INTRAVENOUS at 10:37

## 2020-01-14 ASSESSMENT — PAIN SCALES - GENERAL
PAINLEVEL_OUTOF10: 4
PAINLEVEL_OUTOF10: 0
PAINLEVEL_OUTOF10: 6

## 2020-01-14 NOTE — PROGRESS NOTES
Patient has order to be discharged, waiting for daughter to arrive for discharge paperwork to be signed. This nurse entered room to bring paperwork to daughter. When attempting to speak with patient, patient was found to be lethargic with delayed responses. Patient was only alert to self and not following commands. Vital signs, blood glucose level, and SpO2 obtained. Charge nurse aware, physician notified.

## 2020-01-14 NOTE — FLOWSHEET NOTE
01/14/20 1255   Encounter Summary   Services provided to: Patient   Referral/Consult From: [facility]   (PK 1/14)   Complexity of Encounter Moderate   Spiritual Assessment Completed Yes   Advance Care Planning Yes   Routine   Type Follow up   Spiritual/Hoahaoism   Type Spiritual support   Assessment Calm; Approachable   Intervention Nurtured hope   Outcome Expressed gratitude   Patient's Daughter is aware of Full-code status.

## 2020-01-14 NOTE — PLAN OF CARE
Problem: Falls - Risk of:  Goal: Will remain free from falls  Description  Will remain free from falls  Outcome: Met This Shift     Problem: Breathing Pattern - Ineffective:  Goal: Ability to achieve and maintain a regular respiratory rate will improve  Description  Ability to achieve and maintain a regular respiratory rate will improve  Outcome: Met This Shift     Problem: Risk for Impaired Skin Integrity  Goal: Tissue integrity - skin and mucous membranes  Description  Structural intactness and normal physiological function of skin and  mucous membranes.   Outcome: Not Met This Shift

## 2020-01-14 NOTE — PROGRESS NOTES
Subjective: The patient is awake and alert. Sitting up in a chair. No problems overnight. Denies chest pain, angina, and dyspnea. Denies abdominal pain. Tolerating diet. No nausea or vomiting. Wants to go home. Objective:    BP (!) 106/52   Pulse 73   Temp 97.9 °F (36.6 °C) (Temporal)   Resp 18   Ht 5' 1\" (1.549 m)   Wt 179 lb (81.2 kg)   LMP  (LMP Unknown)   SpO2 95%   BMI 33.82 kg/m²     Current medications that patient is taking have been reviewed. Heart:  RRR, no murmurs, gallops, or rubs.   Lungs:  CTA bilaterally, no wheeze, rales or rhonchi  Abd: bowel sounds present, soft, nontender, nondistended, no masses  Extrem:  No cyanosis or edema    CBC with Differential:    Lab Results   Component Value Date    WBC 8.7 01/10/2020    RBC 3.26 01/10/2020    HGB 9.6 01/10/2020    HCT 31.2 01/10/2020     01/11/2020    MCV 95.7 01/10/2020    MCH 29.4 01/10/2020    MCHC 30.8 01/10/2020    RDW 13.1 01/10/2020    SEGSPCT 58 06/18/2013    LYMPHOPCT 21.1 01/10/2020    MONOPCT 5.4 01/10/2020    BASOPCT 0.3 01/10/2020    MONOSABS 0.47 01/10/2020    LYMPHSABS 1.84 01/10/2020    EOSABS 0.14 01/10/2020    BASOSABS 0.03 01/10/2020     CMP:    Lab Results   Component Value Date     01/14/2020    K 4.0 01/14/2020    K 4.1 01/10/2020     01/14/2020    CO2 28 01/14/2020    BUN 34 01/14/2020    CREATININE 2.0 01/14/2020    GFRAA 29 01/14/2020    LABGLOM 24 01/14/2020    GLUCOSE 175 01/14/2020    PROT 6.2 01/09/2020    LABALBU 2.9 01/09/2020    CALCIUM 9.2 01/14/2020    BILITOT 0.2 01/09/2020    ALKPHOS 77 01/09/2020    AST 38 01/09/2020    ALT 17 01/09/2020     BMP:    Lab Results   Component Value Date     01/14/2020    K 4.0 01/14/2020    K 4.1 01/10/2020     01/14/2020    CO2 28 01/14/2020    BUN 34 01/14/2020    LABALBU 2.9 01/09/2020    CREATININE 2.0 01/14/2020    CALCIUM 9.2 01/14/2020    GFRAA 29 01/14/2020    LABGLOM 24 01/14/2020    GLUCOSE 175 01/14/2020     Magnesium:

## 2020-01-14 NOTE — PROGRESS NOTES
Patient more alert. Vital signs and SpO2 stable. Patient able to take Eliquis without difficulty. Daughter stated she was concerned because patient had not been voiding. Purewick repositioned. Will continue to monitor.

## 2020-01-15 VITALS
DIASTOLIC BLOOD PRESSURE: 54 MMHG | WEIGHT: 180.5 LBS | OXYGEN SATURATION: 96 % | HEIGHT: 61 IN | BODY MASS INDEX: 34.08 KG/M2 | TEMPERATURE: 98.2 F | RESPIRATION RATE: 18 BRPM | SYSTOLIC BLOOD PRESSURE: 102 MMHG | HEART RATE: 75 BPM

## 2020-01-15 LAB
ANION GAP SERPL CALCULATED.3IONS-SCNC: 13 MMOL/L (ref 7–16)
BUN BLDV-MCNC: 27 MG/DL (ref 8–23)
CALCIUM SERPL-MCNC: 8.8 MG/DL (ref 8.6–10.2)
CHLORIDE BLD-SCNC: 100 MMOL/L (ref 98–107)
CO2: 24 MMOL/L (ref 22–29)
CREAT SERPL-MCNC: 1.3 MG/DL (ref 0.5–1)
GFR AFRICAN AMERICAN: 47
GFR NON-AFRICAN AMERICAN: 39 ML/MIN/1.73
GLUCOSE BLD-MCNC: 104 MG/DL (ref 74–99)
METER GLUCOSE: 204 MG/DL (ref 74–99)
METER GLUCOSE: 92 MG/DL (ref 74–99)
POTASSIUM SERPL-SCNC: 3.8 MMOL/L (ref 3.5–5)
SODIUM BLD-SCNC: 137 MMOL/L (ref 132–146)

## 2020-01-15 PROCEDURE — 6370000000 HC RX 637 (ALT 250 FOR IP): Performed by: INTERNAL MEDICINE

## 2020-01-15 PROCEDURE — 36415 COLL VENOUS BLD VENIPUNCTURE: CPT

## 2020-01-15 PROCEDURE — 2580000003 HC RX 258: Performed by: INTERNAL MEDICINE

## 2020-01-15 PROCEDURE — 94640 AIRWAY INHALATION TREATMENT: CPT

## 2020-01-15 PROCEDURE — 82962 GLUCOSE BLOOD TEST: CPT

## 2020-01-15 PROCEDURE — 80048 BASIC METABOLIC PNL TOTAL CA: CPT

## 2020-01-15 RX ADMIN — IPRATROPIUM BROMIDE AND ALBUTEROL SULFATE 3 ML: 2.5; .5 SOLUTION RESPIRATORY (INHALATION) at 10:30

## 2020-01-15 RX ADMIN — ISOSORBIDE MONONITRATE 30 MG: 30 TABLET ORAL at 08:32

## 2020-01-15 RX ADMIN — IPRATROPIUM BROMIDE AND ALBUTEROL SULFATE 3 ML: 2.5; .5 SOLUTION RESPIRATORY (INHALATION) at 14:42

## 2020-01-15 RX ADMIN — BUSPIRONE HYDROCHLORIDE 5 MG: 5 TABLET ORAL at 08:32

## 2020-01-15 RX ADMIN — SERTRALINE 150 MG: 100 TABLET, FILM COATED ORAL at 08:32

## 2020-01-15 RX ADMIN — INSULIN LISPRO 2 UNITS: 100 INJECTION, SOLUTION INTRAVENOUS; SUBCUTANEOUS at 11:44

## 2020-01-15 RX ADMIN — APIXABAN 5 MG: 5 TABLET, FILM COATED ORAL at 08:33

## 2020-01-15 RX ADMIN — SODIUM CHLORIDE: 9 INJECTION, SOLUTION INTRAVENOUS at 08:31

## 2020-01-15 RX ADMIN — LISINOPRIL 2.5 MG: 5 TABLET ORAL at 08:32

## 2020-01-15 RX ADMIN — CARVEDILOL 12.5 MG: 6.25 TABLET, FILM COATED ORAL at 08:32

## 2020-01-15 RX ADMIN — PANTOPRAZOLE SODIUM 40 MG: 40 TABLET, DELAYED RELEASE ORAL at 06:07

## 2020-01-15 RX ADMIN — ASPIRIN 81 MG 81 MG: 81 TABLET ORAL at 08:33

## 2020-01-15 RX ADMIN — MULTIPLE VITAMINS W/ MINERALS TAB 1 TABLET: TAB at 08:32

## 2020-01-15 RX ADMIN — GABAPENTIN 600 MG: 300 CAPSULE ORAL at 08:33

## 2020-01-15 ASSESSMENT — PAIN SCALES - GENERAL: PAINLEVEL_OUTOF10: 0

## 2020-01-15 NOTE — PROGRESS NOTES
Lab Results   Component Value Date    MG 1.9 01/09/2020     Phosphorus:  No results found for: PHOS  PT/INR:    Lab Results   Component Value Date    PROTIME 13.9 01/09/2020    INR 1.2 01/09/2020     PTT:    Lab Results   Component Value Date    APTT 40.6 01/11/2020   [APTT}     Assessment:    Patient Active Problem List   Diagnosis    GERD (gastroesophageal reflux disease)    Diabetes mellitus type 2, uncontrolled (HCC)    CKD (chronic kidney disease) stage 3, GFR 30-59 ml/min ([facility])    Essential hypertension    History of pulmonary embolus (PE)    Acute respiratory failure with hypoxia ([facility] [address]    HCAP (healthcare-associated pneumonia)       Plan:    PPI. Blood glucose ok, continue to adjust basal/bolus insulin therapy  Renal function at baseline. Blood pressure ok, continue current medications  Stable. Secondary to pneumonia. Treating for acute bacterial pneumonia. Pulmonology on board. Aggressive pulmonary hygiene. Patient aspirates. Pt/Ot evaluations for discharge planning. Waiting on social aspects of medicine for discharge.         Héctor Isbell    10:11 AM  1/15/2020

## 2020-01-15 NOTE — PLAN OF CARE
Problem: Falls - Risk of:  Goal: Will remain free from falls  Outcome: Met This Shift  Goal: Absence of physical injury  Outcome: Met This Shift     Problem: Risk for Impaired Skin Integrity  Goal: Tissue integrity - skin and mucous membranes  Outcome: Met This Shift     Problem: Breathing Pattern - Ineffective:  Goal: Ability to achieve and maintain a regular respiratory rate will improve  Outcome: Met This Shift

## 2020-01-15 NOTE — PROGRESS NOTES
Discharge information reviewed with Patient and Daughter, Jaye Sorto. Daughter signed the discharge AVS. IV Removed with no complications. Patient taken off the Monitor. Patient leaving Facility via Wheelchair with Daughter, Jaye Sorto at this time.

## 2020-01-15 NOTE — PROGRESS NOTES
Called Patient's daughter, Woody Amaro to inform her of Patient's discharge. No answer. Wasn't able to leave a Voice message (\"Mailbox not set up\"). Left a message with Tru Triana, Patient's son regarding discharge.

## 2020-01-16 ENCOUNTER — CARE COORDINATION (OUTPATIENT)
Dept: CASE MANAGEMENT | Age: 85
End: 2020-01-16

## 2020-01-16 ENCOUNTER — TELEPHONE (OUTPATIENT)
Dept: FAMILY MEDICINE CLINIC | Age: 85
End: 2020-01-16

## 2020-01-16 LAB — MYCOPLASMA PNEUMONIAE IGM: NORMAL

## 2020-01-16 NOTE — CARE COORDINATION
[facility] Transitions Initial Follow Up Call    Call within 2 business days of discharge: Yes    Patient: Jing Yao Patient : 1933   MRN: [id number]  Reason for Admission: Acute respiratory failure with hypoxia  Discharge Date: 1/15/20 RARS: Readmission Risk Score: 31      Last Discharge [facility]       Complaint Diagnosis Description Type Department Provider    20 Shortness of Breath; Cough HCAP (healthcare-associated pneumonia) . .. ED to Hosp-Admission (Discharged) (ADMITTED) JAYLON [facility] Girish Wilde MD; Jenn Mendoza. .. Spoke with: Tommy Houser, patient's daughter on [address]: [facility]    Non-face-to-face services provided:  Scheduled appointment with PCP-Per patient's daughter Tommy Houser, she will contact Dr. Michelle Sharif office to schedule follow up appt. Patient's daughter to discuss Lasix with Dr. Yeni Christopher. Scheduled appointment with Specialist-Verified with Tommy Houser appt with Dr. szymanski on 3/2/20 at 1300. Per Tommy Houser, she will contact Dr. Miley Herring office to schedule appt prior to March. Tommy Houser to contact Dr. Genesis Snider office to schedule follow appt with pulmonology. per ALAN.  Obtained and reviewed discharge summary and/or continuity of care documents  Communication with home health agencies or other community services the patient is currently using-Per Nitza at [facility], patient is scheduled for [address] on 20. This CTN requested hemalatha on 20 if possible with therapy to be scheduled for 20. Patient's daughter aware of this request.  Per Nitza, patient's hemalatha by nursing will performed on 20 and physicial therapy next week. Nitza notified the need for patient to be seen by therapy sooner than next week due to \"full assist\" per Tommy Houser.      Care Transitions 24 Hour Call    Schedule Follow Up Appointment with PCP:  Declined  Do you have any ongoing symptoms?:  Yes  Patient-reported symptoms:  Cough  Do you have a copy of your discharge instructions?:  Yes  Do you

## 2020-01-16 NOTE — TELEPHONE ENCOUNTER
Patient's daughter Tyrel Chawla called stating patient was discharged home on Lasix 20 mg. Elisha Juliano states patient has no swelling and she is not sure why she was sent home with lasix when patient was dehydrated while in hospital. Elisha Henao wants to know if she can use her own judgement whether or not to give patient lasix.  Patient scheduled for 1/22/20

## 2020-01-17 ENCOUNTER — TELEPHONE (OUTPATIENT)
Dept: CARDIOLOGY CLINIC | Age: 85
End: 2020-01-17

## 2020-01-18 NOTE — CARE COORDINATION
This CTN did not receive response from Ruth Alvarenga, Speech Therapist at [facility].

## 2020-01-20 ENCOUNTER — CARE COORDINATION (OUTPATIENT)
Dept: CASE MANAGEMENT | Age: 85
End: 2020-01-20

## 2020-01-20 NOTE — CARE COORDINATION
[facility] Transitions Follow Up Call    2020    Patient: Maggie Catherine  Patient : 1933   MRN: [id number]  Reason for Admission: Acute respiratory failure with hypoxia  Discharge Date: 1/15/20 RARS: Readmission Risk Score: 31         Spoke with: Jaye Sorto, patient's daughter not on HIPAA    First attempt to reach the patient for sub Care Transition call post hospital discharge. Mike's voicemail not set up. Called patent's home. Daughter Jaye Sorto states it is best to speak to [place].      Follow Up  Future Appointments   Date Time Provider [place]   2020 11[facility]   3/2/2020  1:00 PM Jose D Norman MD [facility]       Yusuf Gallegos RN

## 2020-01-21 ENCOUNTER — CARE COORDINATION (OUTPATIENT)
Dept: CASE MANAGEMENT | Age: 85
End: 2020-01-21

## 2020-01-21 NOTE — CARE COORDINATION
[facility] Transitions Follow Up Call    2020    Patient: Israel Long  Patient : 1933   MRN: [id number]  Reason for Admission: Acute respiratory failure with hypoxia  Discharge Date: 1/15/20 RARS: Readmission Risk Score: 31         Spoke with: Sylvia Curtis, patient's daughter    Care Transitions Subsequent and Final Call    Subsequent and Final Calls  Do you have any ongoing symptoms?:  Yes  Onset of Patient-reported symptoms:  Other  Patient-reported symptoms:  Shortness of Breath  Have your medications changed?:  No  Do you have any questions related to your medications?:  No  Do you currently have any active services?:  Yes  Are you currently active with any services?:  Home Health  Do you have any needs or concerns that I can assist you with?:  No  Identified Barriers:  None  Care Transitions Interventions  No Identified Needs                          Other Interventions:          Spoke with patient's daughter for follow up care transition call. Sylvia Curtis denies any medication changes or questions. Per Salty Irizarry non pitting left lower extremity edema on . Lasix adminstered on Monday. Salty Irizarry has not seen patient today. Salty Irizarry states no coughing noted and strength is increasing. Patient is ambulating with a wheeled walker and hands on x 1 assist.  Per Salty Irizarry patient does have a gait belt in the home. Per Salty Irizarry, patient is a \"little bit winded upon exertion,\" but SpO2 have been good. Per Salty Irizarry, unable to weigh patient. Will give Lasix based on assessment. Home care snv scheduled for today 20. Per Salty Irizarry, appt with Dr. Jeff Curry on 20, Dr. Salty Irizarry on 20 and Dr. Darrian Brown on 2/3/20. Patient's daughter to contact Starla Goldberg, Speech Therapy at [facility] regarding reordering single packs of thickener. Patient's daughter denies any further needs, questions, or concerns at this time. Patient's daughter is agreeable to future follow up calls.     Follow Up  Future Appointments

## 2020-01-22 ENCOUNTER — HOSPITAL ENCOUNTER (OUTPATIENT)
Age: 85
Discharge: HOME | End: 2020-01-24
Payer: MEDICARE

## 2020-01-22 ENCOUNTER — OFFICE VISIT (OUTPATIENT)
Dept: FAMILY MEDICINE CLINIC | Age: 85
End: 2020-01-22
Payer: MEDICARE

## 2020-01-22 ENCOUNTER — OFFICE VISIT (OUTPATIENT)
Dept: CARDIOLOGY CLINIC | Age: 85
End: 2020-01-22
Payer: MEDICARE

## 2020-01-22 VITALS
SYSTOLIC BLOOD PRESSURE: 128 MMHG | OXYGEN SATURATION: 98 % | BODY MASS INDEX: 33.76 KG/M2 | HEART RATE: 86 BPM | HEIGHT: 61 IN | TEMPERATURE: 97.9 F | DIASTOLIC BLOOD PRESSURE: 62 MMHG | WEIGHT: 178.8 LBS

## 2020-01-22 VITALS
BODY MASS INDEX: 38.53 KG/M2 | HEIGHT: 57 IN | RESPIRATION RATE: 16 BRPM | WEIGHT: 178.6 LBS | HEART RATE: 80 BPM | SYSTOLIC BLOOD PRESSURE: 130 MMHG | DIASTOLIC BLOOD PRESSURE: 60 MMHG

## 2020-01-22 LAB
ANION GAP SERPL CALCULATED.3IONS-SCNC: 15 MMOL/L (ref 7–16)
BUN BLDV-MCNC: 17 MG/DL (ref 8–23)
CALCIUM SERPL-MCNC: 9.3 MG/DL (ref 8.6–10.2)
CHLORIDE BLD-SCNC: 103 MMOL/L (ref 98–107)
CO2: 21 MMOL/L (ref 22–29)
CREAT SERPL-MCNC: 1.1 MG/DL (ref 0.5–1)
GFR AFRICAN AMERICAN: 57
GFR NON-AFRICAN AMERICAN: 47 ML/MIN/1.73
GLUCOSE BLD-MCNC: 234 MG/DL (ref 74–99)
POTASSIUM SERPL-SCNC: 4.7 MMOL/L (ref 3.5–5)
SODIUM BLD-SCNC: 139 MMOL/L (ref 132–146)

## 2020-01-22 PROCEDURE — 99496 TRANSJ CARE MGMT HIGH F2F 7D: CPT | Performed by: FAMILY MEDICINE

## 2020-01-22 PROCEDURE — G8427 DOCREV CUR MEDS BY ELIG CLIN: HCPCS | Performed by: INTERNAL MEDICINE

## 2020-01-22 PROCEDURE — 1090F PRES/ABSN URINE INCON ASSESS: CPT | Performed by: INTERNAL MEDICINE

## 2020-01-22 PROCEDURE — 1111F DSCHRG MED/CURRENT MED MERGE: CPT | Performed by: INTERNAL MEDICINE

## 2020-01-22 PROCEDURE — G8482 FLU IMMUNIZE ORDER/ADMIN: HCPCS | Performed by: INTERNAL MEDICINE

## 2020-01-22 PROCEDURE — 1036F TOBACCO NON-USER: CPT | Performed by: INTERNAL MEDICINE

## 2020-01-22 PROCEDURE — 1123F ACP DISCUSS/DSCN MKR DOCD: CPT | Performed by: INTERNAL MEDICINE

## 2020-01-22 PROCEDURE — 99214 OFFICE O/P EST MOD 30 MIN: CPT | Performed by: INTERNAL MEDICINE

## 2020-01-22 PROCEDURE — 80048 BASIC METABOLIC PNL TOTAL CA: CPT

## 2020-01-22 PROCEDURE — G8417 CALC BMI ABV UP PARAM F/U: HCPCS | Performed by: INTERNAL MEDICINE

## 2020-01-22 PROCEDURE — 1111F DSCHRG MED/CURRENT MED MERGE: CPT | Performed by: FAMILY MEDICINE

## 2020-01-22 PROCEDURE — 93000 ELECTROCARDIOGRAM COMPLETE: CPT | Performed by: INTERNAL MEDICINE

## 2020-01-22 PROCEDURE — 4040F PNEUMOC VAC/ADMIN/RCVD: CPT | Performed by: INTERNAL MEDICINE

## 2020-01-22 RX ORDER — LANSOPRAZOLE 30 MG/1
30 CAPSULE, DELAYED RELEASE ORAL DAILY
Qty: 90 CAPSULE | Refills: 1 | Status: SHIPPED | OUTPATIENT
Start: 2020-01-22 | End: 2020-07-06

## 2020-01-22 ASSESSMENT — PATIENT HEALTH QUESTIONNAIRE - PHQ9
SUM OF ALL RESPONSES TO PHQ QUESTIONS 1-9: 0
SUM OF ALL RESPONSES TO PHQ QUESTIONS 1-9: 0
SUM OF ALL RESPONSES TO PHQ9 QUESTIONS 1 & 2: 0
1. LITTLE INTEREST OR PLEASURE IN DOING THINGS: 0
2. FEELING DOWN, DEPRESSED OR HOPELESS: 0

## 2020-01-22 NOTE — PROGRESS NOTES
Post-Discharge Transitional Care Management Services or Hospital Follow Up      Belinda Mitchell   YOB: 1933    Date of Office Visit:  1/22/2020  Date of Hospital Admission: 1/9/20  Date of Hospital Discharge: 1/15/20  Risk of hospital readmission (high >=14%.  Medium >=10%) :Readmission Risk Score: 31      Care management risk score Rising risk (score 2-5) and Complex Care (Scores >=6): 16     Non face to face  following discharge, date last encounter closed (first attempt may have been earlier): 1/18/2020  2:35 PM    Call initiated 2 business days of discharge: Yes    Patient Active Problem List   Diagnosis    GERD (gastroesophageal reflux disease)    Diabetes mellitus type 2, uncontrolled ([facility]    CKD (chronic kidney disease) stage 3, GFR 30-59 ml/min ([facility]    Essential hypertension    History of pulmonary embolus (PE)    Acute respiratory failure with hypoxia ([facility]    HCAP (healthcare-associated pneumonia)       No Known Allergies    Medications listed as ordered at the time of discharge from [facility] Medication Instructions [facility]:    Printed on:01/22/20 1130   Medication Information                      acetaminophen (TYLENOL) 325 MG tablet  Take 650 mg by mouth every 6 hours as needed for Pain             apixaban (ELIQUIS) 5 MG TABS tablet  Take 1 tablet by mouth 2 times daily             aspirin 81 MG chewable tablet  Take 1 tablet by mouth daily             atorvastatin (LIPITOR) 40 MG tablet  Take 1 tablet by mouth nightly             busPIRone (BUSPAR) 5 MG tablet  Take 1 tablet by mouth daily             Calcium-Vitamin D (CALTRATE 600 PLUS-VIT D PO)  Take 1 tablet by mouth 2 times daily              carvedilol (COREG) 25 MG tablet  Take 12.5 mg by mouth 2 times daily (with meals)             fluticasone (FLONASE) 50 MCG/ACT nasal spray  1 spray by Each Nostril route daily as needed for Rhinitis             furosemide (LASIX) 20 MG tablet  Take 1 tablet by mouth 2 mLs into the lungs every 4 hours as needed for Shortness of Breath 360 mL 3    Calcium-Vitamin D (CALTRATE 600 PLUS-VIT D PO) Take 1 tablet by mouth 2 times daily           Medications patient taking as of now reconciled against medications ordered at time of hospital discharge: Yes    Chief Complaint   Patient presents with    Follow-Up from Hospital     concerning of bilateral pneumonia; fluid overload       History of Present illness - Follow up of Hospital diagnosis(es): CHF, IDDM, CKD stage 3    Inpatient course: Discharge summary reviewed- see chart. Interval history/Current status: fair    A comprehensive review of systems was negative except for what was noted in the HPI. Vitals:    01/22/20 1112   BP: 128/62   Pulse: 86   Temp: 97.9 °F (36.6 °C)   SpO2: 98%   Weight: 178 lb 12.8 oz (81.1 kg)   Height: 5' 1\" (1.549 m)     Body mass index is 33.78 kg/m².    Wt Readings from Last 3 Encounters:   01/22/20 178 lb 12.8 oz (81.1 kg)   01/15/20 180 lb 8 oz (81.9 kg)   12/02/19 188 lb 12.8 oz (85.6 kg)     BP Readings from Last 3 Encounters:   01/22/20 128/62   01/15/20 (!) 102/54   12/02/19 130/60        Physical Exam:  General Appearance: in no acute distress and alert  Skin: warm and dry, no rash or erythema  Head: normocephalic and atraumatic  Eyes: pupils equal, round, and reactive to light, extraocular eye movements intact, conjunctivae normal  ENT: tympanic membrane, external ear and ear canal normal bilaterally, oropharynx clear and moist with normal mucous membranes, hearing grossly normal bilaterally and nose without deformity, nasal mucosa and turbinates normal without polyps  Neck: neck supple and non tender without mass, no thyromegaly or thyroid nodules, no cervical lymphadenopathy   Pulmonary/Chest: clear to auscultation bilaterally- no wheezes, rales or rhonchi, normal air movement, no respiratory distress and no chest wall tenderness  Cardiovascular: normal rate, regular rhythm, normal S1 and S2, no murmurs, no gallops, intact distal pulses and no carotid bruits  Abdomen: soft, non-tender, non-distended, normal bowel sounds, no masses or organomegaly  Extremities: PTE 2 + bilaterally   Musculoskeletal: in a wheel chair due to osteoarthritis   Neurologic: no acute issues, she is in a wheelchair     Assessment/Plan:  1. Chronic combined systolic and diastolic congestive heart failure ([address]  --patient is stable on good preload and afterload reduction    Patient is doing well on Lasix for a diuretic   --this patient has good systolic support      2. CKD (chronic kidney disease) stage 3, GFR 30-59 ml/min ([facility])  --patient is instructed on low to moderate sodium ( 2 to 2.5 grams ), daily    Also to increase potassium in the diet to about 3.5 grams daily    Literature is provided   ---CARDIAC---ASA, ACE, BETA, STATIN, LASIX, ( ccb )    3.  IDDM (insulin dependent diabetes mellitus) ([facility])  ---VASCULAR PANEL  A) ASA, plavix, aggrenox  B) ELIQUIS, pletal, tzd, STATIN  C) ACE, LASIX, folic, ccb  D) cannikinumab, FISH OILS          Medical Decision Making: high complexity

## 2020-01-22 NOTE — PROGRESS NOTES
moderate LVH, stage 1 DD, mild AR, mild AS. 16. Lexsican MPS 10/23/2019: LVEF 40%, no reversible perfusion defect.  Fixed perfusion defect was noted extending from the apex into the anteroseptal and inferolateral walls.  Global hypokinesis with severe hypokinesis of the septum was noted  17. [address] with Eliquis  18. Hospital admission 11/22 through 11/24/2019 11/22/2019 p-, troponin 0.03-->0.17-->0.22-->0. 22. Ranexa, statin, and Imdur added. Coreg increased. 11/23/2019 Per Dr Mina Gifford a long discussion with the patient and her daughter. Marisol Mayo to                  continue with Dr. Nathalia Samano plan to continue with aggressive medication titration. [id number]/28 through 12/02/2019  20. Hospital admission 01/09/2020 for cough and dyspnea. proBNP= 4593. BUN=9. Creatinine= 1.0 troponin 0 0.55 Rx for acute hypoxic respiratory failure secondary to mild CHF and pneumonia  21. RONALD 01/10/2020 TTE 01/10/2020: Technically difficult examination  Normal LV size . EF estimated 60-65%. Normal LV  wall thickness. No regional wall motion abnormalities . Indeterminate diastolic function. Normal RV. TAPSE 21 mm. No hemodynamically significant aortic stenosis  Moderate aortic regurgitation . Unable  estimate RVSP  No  hemodynamically significant pericardial effusion. Compared  echo of 10/22/2019, . LVEF has improved from 45-50% to 60-65%. 22. Labs 01/15/2020: Blood sugar 104. BUN 27.   Creatinine 1.1.  K3.8     Review of Systems:  Constitutional: negative for fever and chills  Respiratory: negative for cough and hemoptysis  Cardiovascular:   Gastrointestinal: negative for abdominal pain, diarrhea, nausea and vomiting  Genitourinary:negative for dysuria and hematuria  Derm: negative for rash and skin lesion(s)  Neurological: negative for seizures and tremors  Endocrine: negative for diabetic symptoms including polydipsia and polyuria  Musculoskeletal: negative for CTD  Psychiatric: negative for recognition software. Every effort has been made to ensure accuracy, however; inadvertent computerized transcription errors may be present. Cher Bob.  Jan Sanders MD

## 2020-01-22 NOTE — PATIENT INSTRUCTIONS
Patient Education        Learning About Diabetes Food Guidelines  Your Care Instructions    Meal planning is important to manage diabetes. It helps keep your blood sugar at a target level (which you set with your doctor). You don't have to eat special foods. You can eat what your family eats, including sweets once in a while. But you do have to pay attention to how often you eat and how much you eat of certain foods. You may want to work with a dietitian or a certified diabetes educator (CDE) to help you plan meals and snacks. A dietitian or CDE can also help you lose weight if that is one of your goals. What should you know about eating carbs? Managing the amount of carbohydrate (carbs) you eat is an important part of healthy meals when you have diabetes. Carbohydrate is found in many foods. · Learn which foods have carbs. And learn the amounts of carbs in different foods. ? Bread, cereal, pasta, and rice have about 15 grams of carbs in a serving. A serving is 1 slice of bread (1 ounce), ½ cup of cooked cereal, or 1/3 cup of cooked pasta or rice. ? Fruits have 15 grams of carbs in a serving. A serving is 1 small fresh fruit, such as an apple or orange; ½ of a banana; ½ cup of cooked or canned fruit; ½ cup of fruit juice; 1 cup of melon or raspberries; or 2 tablespoons of dried fruit. ? Milk and no-sugar-added yogurt have 15 grams of carbs in a serving. A serving is 1 cup of milk or 2/3 cup of no-sugar-added yogurt. ? Starchy vegetables have 15 grams of carbs in a serving. A serving is ½ cup of mashed potatoes or sweet potato; 1 cup winter squash; ½ of a small baked potato; ½ cup of cooked beans; or ½ cup cooked corn or green peas. · Learn how much carbs to eat each day and at each meal. A dietitian or CDE can teach you how to keep track of the amount of carbs you eat. This is called carbohydrate counting. · If you are not sure how to count carbohydrate grams, use the Plate Method to plan meals.  It is a reach a healthy weight if that is one of your goals. What plan is right for you? Your dietitian or diabetes educator may suggest that you start with the plate format or carbohydrate counting. The plate format  The plate format is a simple way to help you manage how you eat. You plan meals by learning how much space each food should take on a plate. Using the plate format helps you spread carbohydrate throughout the day. It can make it easier to keep your blood sugar level within your target range. It also helps you see if you're eating healthy portion sizes. To use the plate format, you put non-starchy vegetables on half your plate. Add meat or meat substitutes on one-quarter of the plate. Put a grain or starchy vegetable (such as brown rice or a potato) on the final quarter of the plate. You can add a small piece of fruit and some low-fat or fat-free milk or yogurt, depending on your carbohydrate goal for each meal.  Here are some tips for using the plate format:  · Make sure that you are not using an oversized plate. A 9-inch plate is best. Many restaurants use larger plates. · Get used to using the plate format at home. Then you can use it when you eat out. · Write down your questions about using the plate format. Talk to your doctor, a dietitian, or a diabetes educator about your concerns. Carbohydrate counting  With carbohydrate counting, you plan meals based on the amount of carbohydrate in each food. Carbohydrate raises blood sugar higher and more quickly than any other nutrient. It is found in desserts, breads and cereals, and fruit. It's also found in starchy vegetables such as potatoes and corn, grains such as rice and pasta, and milk and yogurt. Spreading carbohydrate throughout the day helps keep your blood sugar levels within your target range.   Your daily amount depends on several things, including your weight, how active you are, which diabetes medicines you take, and what your goals are for lower your risk of serious problems. The most common source of sodium is salt. People get most of the salt in their diet from canned, prepared, and packaged foods. Fast food and restaurant meals also are very high in sodium. Your doctor will probably limit your sodium to less than 2,000 milligrams (mg) a day. This limit counts all the sodium in prepared and packaged foods and any salt you add to your food. Follow-up care is a key part of your treatment and safety. Be sure to make and go to all appointments, and call your doctor if you are having problems. It's also a good idea to know your test results and keep a list of the medicines you take. How can you care for yourself at home? Read food labels  · Read labels on cans and food packages. The labels tell you how much sodium is in each serving. Make sure that you look at the serving size. If you eat more than the serving size, you have eaten more sodium. · Food labels also tell you the Percent Daily Value for sodium. Choose products with low Percent Daily Values for sodium. · Be aware that sodium can come in forms other than salt, including monosodium glutamate (MSG), sodium citrate, and sodium bicarbonate (baking soda). MSG is often added to Asian food. When you eat out, you can sometimes ask for food without MSG or added salt. Buy low-sodium foods  · Buy foods that are labeled \"unsalted\" (no salt added), \"sodium-free\" (less than 5 mg of sodium per serving), or \"low-sodium\" (less than 140 mg of sodium per serving). Foods labeled \"reduced-sodium\" and \"light sodium\" may still have too much sodium. Be sure to read the label to see how much sodium you are getting. · Buy fresh vegetables, or frozen vegetables without added sauces. Buy low-sodium versions of canned vegetables, soups, and other canned goods. Prepare low-sodium meals  · Cut back on the amount of salt you use in cooking. This will help you adjust to the taste. Do not add salt after cooking.  One

## 2020-01-24 ENCOUNTER — CARE COORDINATION (OUTPATIENT)
Dept: CASE MANAGEMENT | Age: 85
End: 2020-01-24

## 2020-01-27 ENCOUNTER — CARE COORDINATION (OUTPATIENT)
Dept: CASE MANAGEMENT | Age: 85
End: 2020-01-27

## 2020-01-28 ENCOUNTER — CARE COORDINATION (OUTPATIENT)
Dept: CASE MANAGEMENT | Age: 85
End: 2020-01-28

## 2020-01-30 ENCOUNTER — CARE COORDINATION (OUTPATIENT)
Dept: CASE MANAGEMENT | Age: 85
End: 2020-01-30

## 2020-01-30 NOTE — CARE COORDINATION
P[facility] Transitions Follow Up Call    2020    Patient: Israel Long  Patient : 1933   MRN: [id number]  Reason for Admission: Acute respiratory failure hypoxia  Discharge Date: 1/15/20 RARS: Readmission Risk Score: 32         Spoke with: patient's daughter Melissa BIRD)    Care Transitions Subsequent and Final Call    Subsequent and Final Calls  Have your medications changed?:  No  Do you have any questions related to your medications?:  No  Do you currently have any active services?:  Yes  Are you currently active with any services?:  Home Health  Do you have any needs or concerns that I can assist you with?:  No  Identified Barriers:  None  Care Transitions Interventions  No Identified Needs                          Other Interventions:          -Spoke with patient's daughter Melissa BIRD) for final care transition call. [facility] is visiting  with her mother now. Sylvia Curtis states her mother is \"stable,\" ankle swelling is decreased, weight 177.6 lbs today and oximeter reading  97% in RA. Patient is taking Lasix 20 mg twice daily per Sylvia Curtis. Patient's  pulmonary appointment is next Monday(2/3/20) per Sylvia Curtis.  -Patient's daughter denies any needs, questions, or concerns at this time and is agreeable to CTN signing off.     Follow Up  Future Appointments   Date Time Provider Kacy Parish   3/2/2020  1:00 PM Bal Calix MD [facility], RN

## 2020-01-31 ENCOUNTER — TELEPHONE (OUTPATIENT)
Dept: ADMINISTRATIVE | Age: 85
End: 2020-01-31

## 2020-02-10 ENCOUNTER — TELEPHONE (OUTPATIENT)
Dept: FAMILY MEDICINE CLINIC | Age: 85
End: 2020-02-10

## 2020-02-10 ENCOUNTER — OFFICE VISIT (OUTPATIENT)
Dept: FAMILY MEDICINE CLINIC | Age: 85
End: 2020-02-10
Payer: MEDICARE

## 2020-02-10 VITALS
DIASTOLIC BLOOD PRESSURE: 66 MMHG | OXYGEN SATURATION: 99 % | HEART RATE: 85 BPM | RESPIRATION RATE: 18 BRPM | SYSTOLIC BLOOD PRESSURE: 122 MMHG | WEIGHT: 181 LBS | TEMPERATURE: 97.4 F | BODY MASS INDEX: 39.05 KG/M2 | HEIGHT: 57 IN

## 2020-02-10 PROBLEM — E66.01 MORBIDLY OBESE (HCC): Status: ACTIVE | Noted: 2020-02-10

## 2020-02-10 PROCEDURE — 3052F HG A1C>EQUAL 8.0%<EQUAL 9.0%: CPT | Performed by: FAMILY MEDICINE

## 2020-02-10 PROCEDURE — 1090F PRES/ABSN URINE INCON ASSESS: CPT | Performed by: FAMILY MEDICINE

## 2020-02-10 PROCEDURE — G8427 DOCREV CUR MEDS BY ELIG CLIN: HCPCS | Performed by: FAMILY MEDICINE

## 2020-02-10 PROCEDURE — 1036F TOBACCO NON-USER: CPT | Performed by: FAMILY MEDICINE

## 2020-02-10 PROCEDURE — 99215 OFFICE O/P EST HI 40 MIN: CPT | Performed by: FAMILY MEDICINE

## 2020-02-10 PROCEDURE — 4040F PNEUMOC VAC/ADMIN/RCVD: CPT | Performed by: FAMILY MEDICINE

## 2020-02-10 PROCEDURE — G8417 CALC BMI ABV UP PARAM F/U: HCPCS | Performed by: FAMILY MEDICINE

## 2020-02-10 PROCEDURE — 1111F DSCHRG MED/CURRENT MED MERGE: CPT | Performed by: FAMILY MEDICINE

## 2020-02-10 PROCEDURE — G8482 FLU IMMUNIZE ORDER/ADMIN: HCPCS | Performed by: FAMILY MEDICINE

## 2020-02-10 PROCEDURE — 1123F ACP DISCUSS/DSCN MKR DOCD: CPT | Performed by: FAMILY MEDICINE

## 2020-02-10 RX ORDER — DOXYCYCLINE HYCLATE 100 MG
100 TABLET ORAL 2 TIMES DAILY
Qty: 20 TABLET | Refills: 0 | Status: SHIPPED | OUTPATIENT
Start: 2020-02-10 | End: 2020-02-20

## 2020-02-10 ASSESSMENT — ENCOUNTER SYMPTOMS
ANAL BLEEDING: 0
TROUBLE SWALLOWING: 0
EYE PAIN: 0
ABDOMINAL DISTENTION: 0
VOICE CHANGE: 0
GASTROINTESTINAL NEGATIVE: 1
ORTHOPNEA: 0
NAUSEA: 0
RECTAL PAIN: 0
SINUS PAIN: 0
PHOTOPHOBIA: 0
EYE REDNESS: 0
VISUAL CHANGE: 1
SINUS PRESSURE: 0
VOMITING: 0
EYE ITCHING: 0
BLOOD IN STOOL: 0
ABDOMINAL PAIN: 0
RHINORRHEA: 0
SHORTNESS OF BREATH: 1
DIARRHEA: 0
CHEST TIGHTNESS: 0
BACK PAIN: 1
EYE DISCHARGE: 0
COLOR CHANGE: 0
CONSTIPATION: 0
ALLERGIC/IMMUNOLOGIC NEGATIVE: 1
CHOKING: 0
SORE THROAT: 0
COUGH: 0
EYES NEGATIVE: 1
APNEA: 0
WHEEZING: 0
STRIDOR: 0
BLURRED VISION: 0
FACIAL SWELLING: 0

## 2020-02-10 NOTE — PROGRESS NOTES
Rosendo Norman is a 80 y.o. female. HPI/Chief C/O:wound care   Chief Complaint   Patient presents with    Wound Check     Pt here today to have wound on buttocks checked     No Known Allergies  The patient is here for a medication list and treatment planning review  We will go over our care planning goals as well as take care of all refills  We will set up labs as well   She is having skin breakdown to the buttock    Diabetes   She presents for her follow-up diabetic visit. She has type 2 diabetes mellitus. Hypoglycemia symptoms include confusion and nervousness/anxiousness. Pertinent negatives for hypoglycemia include no dizziness, headaches, pallor, seizures, speech difficulty, sweats or tremors. Associated symptoms include fatigue, foot paresthesias, visual change and weakness. Pertinent negatives for diabetes include no blurred vision, no chest pain, no foot ulcerations, no polydipsia, no polyphagia, no polyuria and no weight loss. There are no hypoglycemic complications. Diabetic complications include heart disease, nephropathy and peripheral neuropathy. Pertinent negatives for diabetic complications include no autonomic neuropathy, CVA, PVD or retinopathy. Risk factors for coronary artery disease include obesity, post-menopausal, sedentary lifestyle, stress, hypertension, diabetes mellitus and family history. Current diabetic treatment includes diet and insulin injections (Victoza). She is compliant with treatment some of the time. She is following a generally unhealthy diet. An ACE inhibitor/angiotensin II receptor blocker is being taken. Hypertension   This is a chronic problem. The current episode started more than 1 year ago. Associated symptoms include malaise/fatigue, neck pain, peripheral edema and shortness of breath. Pertinent negatives include no anxiety, blurred vision, chest pain, headaches, orthopnea, palpitations, PND or sweats.  Risk factors for coronary artery disease include file.    PHYS EX:  Physical Exam  Vitals signs and nursing note reviewed. Constitutional:       General: She is not in acute distress. Appearance: Normal appearance. She is well-developed. She is obese. She is not ill-appearing, toxic-appearing or diaphoretic. HENT:      Head: Normocephalic and atraumatic. Right Ear: Tympanic membrane, ear canal and external ear normal. There is no impacted cerumen. Left Ear: Tympanic membrane, ear canal and external ear normal. There is no impacted cerumen. Nose: Nose normal. No congestion or rhinorrhea. Mouth/Throat:      Mouth: Mucous membranes are moist.      Pharynx: No oropharyngeal exudate or posterior oropharyngeal erythema. Eyes:      General: No scleral icterus. Right eye: No discharge. Left eye: No discharge. Extraocular Movements: Extraocular movements intact. Conjunctiva/sclera: Conjunctivae normal.      Pupils: Pupils are equal, round, and reactive to light. Neck:      Musculoskeletal: Normal range of motion and neck supple. No neck rigidity or muscular tenderness. Thyroid: No thyromegaly. Vascular: No carotid bruit or JVD. Trachea: No tracheal deviation. Cardiovascular:      Rate and Rhythm: Normal rate and regular rhythm. Pulses: Normal pulses. Heart sounds: Normal heart sounds. No murmur. No friction rub. No gallop. Pulmonary:      Effort: Pulmonary effort is normal. No respiratory distress. Breath sounds: Normal breath sounds. No stridor. No wheezing, rhonchi or rales. Chest:      Chest wall: No tenderness. Abdominal:      General: Bowel sounds are normal. There is no distension. Palpations: Abdomen is soft. There is no mass. Tenderness: There is no abdominal tenderness. There is no right CVA tenderness, left CVA tenderness, guarding or rebound. Hernia: No hernia is present. Genitourinary:     Vagina: Normal.   Musculoskeletal: Normal range of motion. General: Tenderness present. No swelling, deformity or signs of injury. Right lower leg: Edema present. Left lower leg: Edema present. Comments: Pt uses wheelchair,and is helped by daughter. Pain and decreased ROM multiple joints. Lymphadenopathy:      Cervical: No cervical adenopathy. Skin:     General: Skin is warm. Coloration: Skin is not jaundiced or pale. Findings: Erythema (gluteal cleft, small wound to left and right cheeks) present. No bruising, lesion or rash. Neurological:      General: No focal deficit present. Mental Status: She is alert. Cranial Nerves: No cranial nerve deficit. Sensory: Sensory deficit present. Motor: Weakness present. No abnormal muscle tone. Coordination: Coordination abnormal.      Gait: Gait abnormal.      Deep Tendon Reflexes: Reflexes abnormal.      Comments: In a wheelchair           1. [facility] ([facility]  --talk on diet, exercise, vitamins, weight loss for sugar and wound care  - Basic Metabolic Panel; Future  - CBC Auto Differential; Future    2. Pressure injury of skin of buttock, unspecified injury stage, unspecified laterality    - Basic Metabolic Panel; Future  - CBC Auto Differential; Future  - doxycycline hyclate (VIBRA-TABS) 100 MG tablet; Take 1 tablet by mouth 2 times daily for 10 days  Dispense: 20 tablet; Refill: 0    3. CKD (chronic kidney disease) stage 3, GFR 30-59 ml/min (HCC)    - Basic Metabolic Panel; Future  - CBC Auto Differential; Future    4. Essential hypertension    - Basic Metabolic Panel; Future  - CBC Auto Differential; Future    5. IDDM (insulin dependent diabetes mellitus) ([facility]  --stable on current care planning  -- continue treatment as we are meeting goals           ASSESSMENT/PLAN  Fouzia Curran was seen today for wound check.     Diagnoses and all orders for this visit:    Pressure injury of skin of buttock, unspecified injury stage, unspecified laterality  -     Basic Metabolic acetaminophen (TYLENOL) 325 MG tablet Take 650 mg by mouth every 6 hours as needed for Pain      isosorbide mononitrate (IMDUR) 30 MG extended release tablet Take 1 tablet by mouth daily 90 tablet 3    atorvastatin (LIPITOR) 40 MG tablet Take 1 tablet by mouth nightly 30 tablet 0    lisinopril (PRINIVIL;ZESTRIL) 2.5 MG tablet Take 1 tablet by mouth daily 30 tablet 3    apixaban (ELIQUIS) 5 MG TABS tablet Take 1 tablet by mouth 2 times daily 60 tablet 3    gabapentin (NEURONTIN) 300 MG capsule Take 2 capsules by mouth 3 times daily for 90 days. 540 capsule 0    Liraglutide (VICTOZA) 18 MG/3ML SOPN SC injection Inject 1.2 mg into the skin daily 6 pen 1    fluticasone (FLONASE) 50 MCG/ACT nasal spray 1 spray by Each Nostril route daily as needed for Rhinitis      nystatin (MYCOSTATIN) 030751 UNIT/GM powder Apply 1 each topically 3 times daily as needed       mineral oil-hydrophilic petrolatum (HYDROPHOR) ointment Apply 1 g topically daily as needed for Dry Skin (to feet)       nitroGLYCERIN (NITROSTAT) 0.4 MG SL tablet Place 1 tablet under the tongue every 5 minutes as needed for Chest pain up to max of 3 total doses. If no relief after 1 dose, call 911. 25 tablet 3    busPIRone (BUSPAR) 5 MG tablet Take 1 tablet by mouth daily 90 tablet 1    Omega-3 Fatty Acids (OMEGA-3 EPA FISH OIL PO) Take 1 capsule by mouth 2 times daily       ipratropium-albuterol (DUONEB) 0.5-2.5 (3) MG/3ML SOLN nebulizer solution Inhale 3 mLs into the lungs every 4 hours as needed for Shortness of Breath 360 mL 3    Calcium-Vitamin D (CALTRATE 600 PLUS-VIT D PO) Take 1 tablet by mouth 2 times daily        No facility-administered encounter medications on file as of 2/10/2020. No follow-ups on file.         Reviewed recent labs related to Grace's current problems      Discussed importance of regular Health Maintenance follow up  Health Maintenance   Topic    Hepatitis B vaccine (1 of 3 - Risk 3-dose series)    Shingles

## 2020-02-10 NOTE — TELEPHONE ENCOUNTER
Patient's daughter is asking for orders for Aquacel ag dressing, mepilex dressing , occlusive dressing or whatever would be appropriate for pressure wound. Patient has appointment with wound care 2/12.  Patient has [facility] coming to [facility] [phone]

## 2020-02-10 NOTE — PATIENT INSTRUCTIONS
when cooking. · Don't skip meals. Your blood sugar may drop too low if you skip meals and take insulin or certain medicines for diabetes. · Check with your doctor before you drink alcohol. Alcohol can cause your blood sugar to drop too low. Alcohol can also cause a bad reaction if you take certain diabetes medicines. Follow-up care is a key part of your treatment and safety. Be sure to make and go to all appointments, and call your doctor if you are having problems. It's also a good idea to know your test results and keep a list of the medicines you take. Where can you learn more? Go to [email or web address] and sign in to your [employer] account. Enter O796 in the [employer] box to learn more about \"Learning About Diabetes Food Guidelines. \"     If you do not have an account, please click on the \"Sign Up Now\" link. Current as of: April 16, 2019  Content Version: 12.3  © 4782-1640 [employer]. Care instructions adapted under license by [facility] ([facility]). If you have questions about a medical condition or this instruction, always ask your healthcare professional. [address] any warranty or liability for your use of this information. Patient Education        Learning About Meal Planning for Diabetes  Why plan your meals? Meal planning can be a key part of managing diabetes. Planning meals and snacks with the right balance of carbohydrate, protein, and fat can help you keep your blood sugar at the target level you set with your doctor. You don't have to eat special foods. You can eat what your family eats, including sweets once in a while. But you do have to pay attention to how often you eat and how much you eat of certain foods. You may want to work with a dietitian or a certified diabetes educator. He or she can give you tips and meal ideas and can answer your questions about meal planning.  This health professional can also help you reach a healthy weight if that is one of your goals. What plan is right for you? Your dietitian or diabetes educator may suggest that you start with the plate format or carbohydrate counting. The plate format  The plate format is a simple way to help you manage how you eat. You plan meals by learning how much space each food should take on a plate. Using the plate format helps you spread carbohydrate throughout the day. It can make it easier to keep your blood sugar level within your target range. It also helps you see if you're eating healthy portion sizes. To use the plate format, you put non-starchy vegetables on half your plate. Add meat or meat substitutes on one-quarter of the plate. Put a grain or starchy vegetable (such as brown rice or a potato) on the final quarter of the plate. You can add a small piece of fruit and some low-fat or fat-free milk or yogurt, depending on your carbohydrate goal for each meal.  Here are some tips for using the plate format:  · Make sure that you are not using an oversized plate. A 9-inch plate is best. Many restaurants use larger plates. · Get used to using the plate format at home. Then you can use it when you eat out. · Write down your questions about using the plate format. Talk to your doctor, a dietitian, or a diabetes educator about your concerns. Carbohydrate counting  With carbohydrate counting, you plan meals based on the amount of carbohydrate in each food. Carbohydrate raises blood sugar higher and more quickly than any other nutrient. It is found in desserts, breads and cereals, and fruit. It's also found in starchy vegetables such as potatoes and corn, grains such as rice and pasta, and milk and yogurt. Spreading carbohydrate throughout the day helps keep your blood sugar levels within your target range.   Your daily amount depends on several things, including your weight, how active you are, which diabetes medicines you take, and what your goals are for your blood sugar levels. A registered dietitian or diabetes educator can help you plan how much carbohydrate to include in each meal and snack. A guideline for your daily amount of carbohydrate is:  · 45 to 60 grams at each meal. That's about the same as 3 to 4 carbohydrate servings. · 15 to 20 grams at each snack. That's about the same as 1 carbohydrate serving. The Nutrition Facts label on packaged foods tells you how much carbohydrate is in a serving of the food. First, look at the serving size on the food label. Is that the amount you eat in a serving? All of the nutrition information on a food label is based on that serving size. So if you eat more or less than that, you'll need to adjust the other numbers. Total carbohydrate is the next thing you need to look for on the label. If you count carbohydrate servings, one serving of carbohydrate is 15 grams. For foods that don't come with labels, such as fresh fruits and vegetables, you'll need a guide that lists carbohydrate in these foods. Ask your doctor, dietitian, or diabetes educator about books or other nutrition guides you can use. If you take insulin, you need to know how many grams of carbohydrate are in a meal. This lets you know how much rapid-acting insulin to take before you eat. If you use an insulin pump, you get a constant rate of insulin during the day. So the pump must be programmed at meals to give you extra insulin to cover the rise in blood sugar after meals. When you know how much carbohydrate you will eat, you can take the right amount of insulin. Or, if you always use the same amount of insulin, you need to make sure that you eat the same amount of carbohydrate at meals. If you need more help to understand carbohydrate counting and food labels, ask your doctor, dietitian, or diabetes educator. How do you get started with meal planning? Here are some tips to get started:  · Plan your meals a week at a time.  Don't forget to include may also want you to come back for a wound check. The wound check lets the doctor know how your wound is healing and if you need more treatment. Follow-up care is a key part of your treatment and safety. Be sure to make and go to all appointments, and call your doctor if you are having problems. It's also a good idea to know your test results and keep a list of the medicines you take. How can you care for yourself at home? · If your doctor told you how to care for your wound, follow your doctor's instructions. If you did not get instructions, follow this general advice:  ? You may cover the wound with a thin layer of petroleum jelly, such as Vaseline, and a nonstick bandage. ? Apply more petroleum jelly and replace the bandage as needed. · Keep the wound dry for the first 24 to 48 hours. After this, you can shower if your doctor okays it. Pat the wound dry. · Be safe with medicines. Read and follow all instructions on the label. ? If the doctor gave you a prescription medicine for pain, take it as prescribed. ? If you are not taking a prescription pain medicine, ask your doctor if you can take an over-the-counter medicine. · If your doctor prescribed antibiotics, take them as directed. Do not stop taking them just because you feel better. You need to take the full course of antibiotics. · If you have stitches, do not remove them on your own. Your doctor will tell you when to come back to have them removed. · If you have Steri-Strips, leave them on until they fall off. · If possible, prop up the injured area on a pillow anytime you sit or lie down during the next 3 days. Try to keep it above the level of your heart. This will help reduce swelling. When should you call for help?   Call your doctor now or seek immediate medical care if:    · You have new pain, or the pain gets worse.     · The skin near the wound is cold or pale or changes color.     · You have tingling, weakness, or numbness near the

## 2020-02-11 ENCOUNTER — TELEPHONE (OUTPATIENT)
Dept: FAMILY MEDICINE CLINIC | Age: 85
End: 2020-02-11

## 2020-02-11 ENCOUNTER — HOSPITAL ENCOUNTER (OUTPATIENT)
Age: 85
Discharge: HOME | End: 2020-02-13
Payer: MEDICARE

## 2020-02-11 LAB
ANION GAP SERPL CALCULATED.3IONS-SCNC: 16 MMOL/L (ref 7–16)
BUN BLDV-MCNC: 29 MG/DL (ref 8–23)
CALCIUM SERPL-MCNC: 9.4 MG/DL (ref 8.6–10.2)
CHLORIDE BLD-SCNC: 103 MMOL/L (ref 98–107)
CO2: 23 MMOL/L (ref 22–29)
CREAT SERPL-MCNC: 1.2 MG/DL (ref 0.5–1)
GFR AFRICAN AMERICAN: 51
GFR NON-AFRICAN AMERICAN: 43 ML/MIN/1.73
GLUCOSE BLD-MCNC: 165 MG/DL (ref 74–99)
POTASSIUM SERPL-SCNC: 4.1 MMOL/L (ref 3.5–5)
SODIUM BLD-SCNC: 142 MMOL/L (ref 132–146)

## 2020-02-11 PROCEDURE — 80048 BASIC METABOLIC PNL TOTAL CA: CPT

## 2020-02-11 PROCEDURE — 36415 COLL VENOUS BLD VENIPUNCTURE: CPT

## 2020-02-12 ENCOUNTER — HOSPITAL ENCOUNTER (OUTPATIENT)
Dept: WOUND CARE | Age: 85
Discharge: HOME | End: 2020-02-12
Payer: MEDICARE

## 2020-02-12 VITALS
TEMPERATURE: 98.3 F | HEIGHT: 57 IN | RESPIRATION RATE: 16 BRPM | BODY MASS INDEX: 39.05 KG/M2 | HEART RATE: 81 BPM | WEIGHT: 181 LBS | SYSTOLIC BLOOD PRESSURE: 118 MMHG | DIASTOLIC BLOOD PRESSURE: 62 MMHG

## 2020-02-12 PROBLEM — L89.152 PRESSURE INJURY OF SACRAL REGION, STAGE 2 (HCC): Status: ACTIVE | Noted: 2020-02-12

## 2020-02-12 PROCEDURE — 97597 DBRDMT OPN WND 1ST 20 CM/<: CPT | Performed by: SURGERY

## 2020-02-12 PROCEDURE — 99203 OFFICE O/P NEW LOW 30 MIN: CPT | Performed by: SURGERY

## 2020-02-12 PROCEDURE — 97597 DBRDMT OPN WND 1ST 20 CM/<: CPT

## 2020-02-12 PROCEDURE — 99213 OFFICE O/P EST LOW 20 MIN: CPT

## 2020-02-12 RX ORDER — LIDOCAINE HYDROCHLORIDE 40 MG/ML
SOLUTION TOPICAL ONCE
Status: DISCONTINUED | OUTPATIENT
Start: 2020-02-12 | End: 2020-02-13 | Stop reason: HOSPADM

## 2020-02-12 NOTE — H&P
Wound Healing Center /Hyperbarics   History and Physical/Consultation  Vascular    Referring Physician : Olinda Saint, DO  [address] RECORD NUMBER:  [id number]  AGE: 80 y.o. GENDER: female  : 1933  EPISODE DATE:  2020  Subjective:     Chief Complaint   Patient presents with    Wound Check     left buttock         HISTORY of PRESENT ILLNESS HPI     Stevan Santana is a 80 y.o. female who presents today for wound/ulcer evaluation. History of Wound Context:  The patient has had a wound of sacrum which was first noted approximately within last week. History of previous similar wound. This has been treated with local care. On their initial visit to the wound healing center, 20, the patient has noted that the wound has not been improving. The patient has had similar previous wounds in the past.      Pt is currently on abx.       Wound/Ulcer Pain Timing/Severity: intermittent  Quality of pain: burning  Severity:  3 / 10   Modifying Factors: Pain worsens with sitting  Associated Signs/Symptoms: none    Ulcer Identification:  Ulcer Type: pressure and skin tear  Contributing Factors: chronic pressure, decreased mobility and shear force    Diabetic/Pressure/Non Pressure Ulcers only:  Ulcer: Pressure ulcer, Stage 2    If patient has diabetic lower extremity wounds  Saravia Classification of diabetic lower extremity wounds:    Grade Description   []  0 No open wound   []  1 Superficial ulcer involving the full skin thickness   []  2 Deep ulcer involves ligament, tendon, joint capsule, or fascia  No bone involvement or abscess presence   []  3 Deep Ulcer with abcess formation and/or osteomyelitis   []  4 Localized gangrene   []  5 Extensive gangrene of the foot     Wound: N/A        PAST MEDICAL HISTORY      Diagnosis Date    Anxiety     Depression     Diabetes mellitus (HCC)     GERD (gastroesophageal reflux disease)     Hypertension     Peripheral neuropathy     Spinal stenosis oil-hydrophilic petrolatum (HYDROPHOR) ointment Apply 1 g topically daily as needed for Dry Skin (to feet)       busPIRone (BUSPAR) 5 MG tablet Take 1 tablet by mouth daily 90 tablet 1    Omega-3 Fatty Acids (OMEGA-3 EPA FISH OIL PO) Take 1 capsule by mouth 2 times daily       ipratropium-albuterol (DUONEB) 0.5-2.5 (3) MG/3ML SOLN nebulizer solution Inhale 3 mLs into the lungs every 4 hours as needed for Shortness of Breath 360 mL 3    Calcium-Vitamin D (CALTRATE 600 PLUS-VIT D PO) Take 1 tablet by mouth 2 times daily       gabapentin (NEURONTIN) 300 MG capsule Take 2 capsules by mouth 3 times daily for 90 days. 540 capsule 0    fluticasone (FLONASE) 50 MCG/ACT nasal spray 1 spray by Each Nostril route daily as needed for Rhinitis      nitroGLYCERIN (NITROSTAT) 0.4 MG SL tablet Place 1 tablet under the tongue every 5 minutes as needed for Chest pain up to max of 3 total doses. If no relief after 1 dose, call 911. 25 tablet 3     No current facility-administered medications on file prior to encounter.         REVIEW OF SYSTEMS   ROS : All others Negative if blank [], Positive if [x]  General Urinary   [] Fevers [] Hematuria   [] Chills [] Dysuria   [] Weight Loss Vascular   Skin [] Claudication   [] Tissue Loss [] Rest Pain   Eyes Neurologic   [] Wears Glasses/Contacts [] Stroke/TIA   [] Vision Changes [] Focal weakness   Respiratory [] Slurred Speech    [] Shortness of breath ENT   Cardiovascular [] Difficulty swallowing   [] Chest Pain Gastrointestinal   [] Shortness of breath with exertion [] Abdominal Pain    [] Melena       [] Hematochezia               Objective:    /62   Pulse 81   Temp 98.3 °F (36.8 °C) (Temporal)   Resp 16   Ht 4' 9\" (1.448 m)   Wt 181 lb (82.1 kg)   LMP  (LMP Unknown)   BMI 39.17 kg/m²   Wt Readings from Last 3 Encounters:   02/12/20 181 lb (82.1 kg)   02/10/20 181 lb (82.1 kg)   01/22/20 178 lb 9.6 oz (81 kg)       PHYSICAL EXAM  CONSTITUTIONAL:   Awake, alert, cooperative   PSYCHIATRIC :  Oriented to time, place and person      normal insight to disease process  ENT:  External ears and nose without lesions    Hearing deficits is  noted  NECK: Supple, symmetrical, trachea midline      LUNGS:  No increased work of breathing                  Clear to auscultation bilaterally   CARDIOVASCULAR:  regular rate and rhythm   ABDOMEN:  soft, non-distended, non-tender      EXTREMITIES:   R UE Edema is not noted  L UE Edema is not noted  R LE Edema is  noted  L LE Edema is  noted  R femoral  L femoral    R dorsalis pedis  L dorsalis pedis    R posterior tibial  L posterior tibial      Assessment:     Problem List Items Addressed This Visit     * (Principal) Pressure injury of sacral region, stage 2 (HCC)          Pre Debridement Measurements:  Are located in the [place]  Documentation Flow Sheet  Post Debridement Measurements:  Wound/Ulcer Descriptions are Pre Debridement except measurements:     Wound 11/29/19 Left tip of great toe (Active)   Number of days: 75       Wound 11/29/19  Right tip of great toe (Active)   Number of days: 75       Wound 11/29/19 Heel Right (Active)   Number of days: 75       Wound 02/12/20 Buttocks Left #1 acquired 2-6-20 (Active)   Wound Image   2/12/2020  2:54 PM   Wound Pressure Stage  3 2/12/2020  2:54 PM   Wound Length (cm) 2.8 cm 2/12/2020  2:54 PM   Wound Width (cm) 3 cm 2/12/2020  2:54 PM   Wound Depth (cm) 0.1 cm 2/12/2020  2:54 PM   Wound Surface Area (cm^2) 8.4 cm^2 2/12/2020  2:54 PM   Wound Volume (cm^3) 0.84 cm^3 2/12/2020  2:54 PM   Post-Procedure Length (cm) 2.9 cm 2/12/2020  3:11 PM   Post-Procedure Width (cm) 3.1 cm 2/12/2020  3:11 PM   Post-Procedure Depth (cm) 0.1 cm 2/12/2020  3:11 PM   Post-Procedure Surface Area (cm^2) 8.99 cm^2 2/12/2020  3:11 PM   Post-Procedure Volume (cm^3) 0.9 cm^3 2/12/2020  3:11 PM   Wound Assessment Pink;Yellow 2/12/2020  2:54 PM   Drainage Amount Small 2/12/2020  2:54 PM   Drainage Description Serosanguinous; Serous 2/12/2020  2:54 PM   Odor None 2/12/2020  2:54 PM   Xena-wound Assessment Pink 2/12/2020  2:54 PM   Number of days: 0       Wound 02/12/20 Coccyx #2 acquired 2-12-20 (Active)   Wound Image   2/12/2020  2:54 PM   Wound Pressure Stage  3 2/12/2020  2:54 PM   Wound Length (cm) 0.5 cm 2/12/2020  2:54 PM   Wound Width (cm) 0.5 cm 2/12/2020  2:54 PM   Wound Depth (cm) 0.1 cm 2/12/2020  2:54 PM   Wound Surface Area (cm^2) 0.25 cm^2 2/12/2020  2:54 PM   Wound Volume (cm^3) 0.02 cm^3 2/12/2020  2:54 PM   Post-Procedure Length (cm) 0.5 cm 2/12/2020  3:11 PM   Post-Procedure Width (cm) 0.5 cm 2/12/2020  3:11 PM   Post-Procedure Depth (cm) 0.1 cm 2/12/2020  3:11 PM   Post-Procedure Surface Area (cm^2) 0.25 cm^2 2/12/2020  3:11 PM   Post-Procedure Volume (cm^3) 0.02 cm^3 2/12/2020  3:11 PM   Wound Assessment Red; White 2/12/2020  2:54 PM   Drainage Amount Small 2/12/2020  2:54 PM   Drainage Description Serous 2/12/2020  2:54 PM   Odor None 2/12/2020  2:54 PM   Xena-wound Assessment Pink; Maceration 2/12/2020  2:54 PM   Number of days: 0          Procedure Note  Indications:  Based on my examination of this patient's wound(s)/ulcer(s) today, debridement is required to promote healing and evaluate the wound base. Performed by: Russell Hopkins MD    Consent obtained:  Yes    Time out taken:  Yes    Pain Control: Anesthetic  Anesthetic: 4% Lidocaine Liquid Topical     Debridement:Non-excisional Debridement    Using curette the wound(s)/ulcer(s) was/were sharply debrided down through and including the removal of epidermis.         Devitalized Tissue Debrided:  slough to stimulate bleeding to promote healing, post debridement good bleeding base and wound edges noted    Wound/Ulcer #: 1    Percent of Wound/Ulcer Debrided: 100%    Total Surface Area Debrided:  8 sq cm     Estimated Blood Loss:  None  Hemostasis Achieved:  not needed    Procedural Pain:  2  / 10   Post Procedural Pain:  0 / 10     Response to treatment:  Well tolerated by patient. A culture was not done. Plan:     Treatment Note please see attached Discharge Instructions    Written patient dismissal instructions given to patient and signed by patient or POA. Discharge Instructions       Visit Discharge/Physician Orders    Discharge condition: Stable    Assessment of pain at discharge:NONE    Anesthetic used: LIDOCAINE 2%    Discharge to: Home    Left via:Private automobile    Accompanied by: FAMILY. ECF/HHA:     Dressing Orders:WOUND LOCATION LEFT BUTTOCK AND COCCYX CLEANSE WOUND WITH NORMAL SALINE APPLY ZINC OXIDE CREAM DAILY. Treatment Orders:KEEP DRY    TRY TO AVOID PRESSURE AND SHEARING TO WOUND SITE. [facility] followup visit ___I MONTH__________________________  (Please note your next appointment above and if you are unable to keep, kindly give a 24 hour notice. Thank you.)    Physician signature:__________________________      If you experience any of the following, please call the [facility] during business hours:    * Increase in Pain  * Temperature over 101  * Increase in drainage from your wound  * Drainage with a foul odor  * Bleeding  * Increase in swelling  * Need for compression bandage changes due to slippage, breakthrough drainage. If you need medical attention outside of the business hours of the [address] [place] [address] [address] please contact your PCP or go to the nearest emergency room.         Electronically signed by Bell Madera MD on 2/12/2020 at 3:19 PM

## 2020-02-14 RX ORDER — ATORVASTATIN CALCIUM 40 MG/1
40 TABLET, FILM COATED ORAL NIGHTLY
Qty: 90 TABLET | Refills: 1 | Status: SHIPPED
Start: 2020-02-14 | End: 2020-08-04

## 2020-02-14 RX ORDER — FUROSEMIDE 20 MG/1
20 TABLET ORAL 2 TIMES DAILY
Qty: 180 TABLET | Refills: 1 | Status: SHIPPED
Start: 2020-02-14 | End: 2020-08-31 | Stop reason: SDUPTHER

## 2020-03-02 ENCOUNTER — OFFICE VISIT (OUTPATIENT)
Dept: CARDIOLOGY CLINIC | Age: 85
End: 2020-03-02
Payer: MEDICARE

## 2020-03-02 VITALS
RESPIRATION RATE: 18 BRPM | DIASTOLIC BLOOD PRESSURE: 58 MMHG | HEIGHT: 61 IN | WEIGHT: 166.5 LBS | SYSTOLIC BLOOD PRESSURE: 110 MMHG | BODY MASS INDEX: 31.43 KG/M2 | HEART RATE: 86 BPM

## 2020-03-02 PROCEDURE — 99214 OFFICE O/P EST MOD 30 MIN: CPT | Performed by: INTERNAL MEDICINE

## 2020-03-02 PROCEDURE — 4040F PNEUMOC VAC/ADMIN/RCVD: CPT | Performed by: INTERNAL MEDICINE

## 2020-03-02 PROCEDURE — 93000 ELECTROCARDIOGRAM COMPLETE: CPT | Performed by: INTERNAL MEDICINE

## 2020-03-02 PROCEDURE — 1090F PRES/ABSN URINE INCON ASSESS: CPT | Performed by: INTERNAL MEDICINE

## 2020-03-02 PROCEDURE — G8427 DOCREV CUR MEDS BY ELIG CLIN: HCPCS | Performed by: INTERNAL MEDICINE

## 2020-03-02 PROCEDURE — 1123F ACP DISCUSS/DSCN MKR DOCD: CPT | Performed by: INTERNAL MEDICINE

## 2020-03-02 PROCEDURE — G8482 FLU IMMUNIZE ORDER/ADMIN: HCPCS | Performed by: INTERNAL MEDICINE

## 2020-03-02 PROCEDURE — G8417 CALC BMI ABV UP PARAM F/U: HCPCS | Performed by: INTERNAL MEDICINE

## 2020-03-02 PROCEDURE — 1036F TOBACCO NON-USER: CPT | Performed by: INTERNAL MEDICINE

## 2020-03-02 NOTE — PROGRESS NOTES
with medications, as well as salt and fluid intake. He does not take any over-the-counter arthritis medications. No new cardiac complaints since last cardiology evaluation. She denies recent chest pain, palpitations, lightheadedness, dizziness, syncope, PND, or orthopnea. SR on EKG. Review of Systems:   Cardiac: As per HPI  General: No fever, chills  Pulmonary: As per HPI  HEENT: No visual disturbances, difficult swallowing  GI: No nausea, vomiting  Endocrine: No thyroid disease or DM  Musculoskeletal: CACERES x 4, no focal motor deficits  Skin: Intact, no rashes  Neuro/Psych: No headache or seizures    Past Medical History:  Past Medical History:   Diagnosis Date    Anxiety     Depression     Diabetes mellitus ([facility] [address]     GERD (gastroesophageal reflux disease)     Hypertension     Peripheral neuropathy     Spinal stenosis        Past Surgical History:  Past Surgical History:   Procedure Laterality Date    CHOLECYSTECTOMY      HIP FRACTURE SURGERY Bilateral     SKIN CANCER EXCISION         Family History:  No family history on file. Social History:  Social History     Socioeconomic History    Marital status:       Spouse name: Not on file    Number of children: Not on file    Years of education: Not on file    Highest education level: Not on file   Occupational History    Not on file   Social Needs    Financial resource strain: Not on file    Food insecurity:     Worry: Not on file     Inability: Not on file    Transportation needs:     Medical: Not on file     Non-medical: Not on file   Tobacco Use    Smoking status: Never Smoker    Smokeless tobacco: Never Used   Substance and Sexual Activity    Alcohol use: No    Drug use: No    Sexual activity: Not Currently   Lifestyle    Physical activity:     Days per week: Not on file     Minutes per session: Not on file    Stress: Not on file   Relationships    Social connections:     Talks on phone: Not on file     Gets together: Not on file     Attends Worship service: Not on file     Active member of club or organization: Not on file     Attends meetings of clubs or organizations: Not on file     Relationship status: Not on file    Intimate partner violence:     Fear of current or ex partner: Not on file     Emotionally abused: Not on file     Physically abused: Not on file     Forced sexual activity: Not on file   Other Topics Concern    Not on file   Social History Narrative    Not on file       Allergies:  No Known Allergies    Current Medications:  Current Outpatient Medications   Medication Sig Dispense Refill    atorvastatin (LIPITOR) 40 MG tablet Take 1 tablet by mouth nightly 90 tablet 1    furosemide (LASIX) 20 MG tablet Take 1 tablet by mouth 2 times daily 180 tablet 1    lansoprazole (PREVACID) 30 MG delayed release capsule Take 1 capsule by mouth daily 90 capsule 1    aspirin 81 MG chewable tablet Take 1 tablet by mouth daily 30 tablet 0    carvedilol (COREG) 25 MG tablet Take 12.5 mg by mouth 2 times daily (with meals)      Insulin Lispro (HUMALOG KWIKPEN SC) Inject 0-12 Units into the skin 3 times daily *Per Sliding Scale*      montelukast (SINGULAIR) 10 MG tablet Take 10 mg by mouth nightly      sertraline (ZOLOFT) 100 MG tablet Take 150 mg by mouth daily      Multiple Vitamins-Minerals (THERAPEUTIC MULTIVITAMIN-MINERALS) tablet Take 1 tablet by mouth daily      acetaminophen (TYLENOL) 325 MG tablet Take 650 mg by mouth every 6 hours as needed for Pain      isosorbide mononitrate (IMDUR) 30 MG extended release tablet Take 1 tablet by mouth daily 90 tablet 3    lisinopril (PRINIVIL;ZESTRIL) 2.5 MG tablet Take 1 tablet by mouth daily 30 tablet 3    apixaban (ELIQUIS) 5 MG TABS tablet Take 1 tablet by mouth 2 times daily 60 tablet 3    gabapentin (NEURONTIN) 300 MG capsule Take 2 capsules by mouth 3 times daily for 90 days.  540 capsule 0    Liraglutide (VICTOZA) 18 MG/3ML SOPN SC injection Inject 1.2 mg into the skin daily 6 pen 1    fluticasone (FLONASE) 50 MCG/ACT nasal spray 1 spray by Each Nostril route daily as needed for Rhinitis      nystatin (MYCOSTATIN) 218804 UNIT/GM powder Apply 1 each topically 3 times daily as needed       mineral oil-hydrophilic petrolatum (HYDROPHOR) ointment Apply 1 g topically daily as needed for Dry Skin (to feet)       nitroGLYCERIN (NITROSTAT) 0.4 MG SL tablet Place 1 tablet under the tongue every 5 minutes as needed for Chest pain up to max of 3 total doses. If no relief after 1 dose, call 911. 25 tablet 3    busPIRone (BUSPAR) 5 MG tablet Take 1 tablet by mouth daily 90 tablet 1    Omega-3 Fatty Acids (OMEGA-3 EPA FISH OIL PO) Take 1 capsule by mouth 2 times daily       ipratropium-albuterol (DUONEB) 0.5-2.5 (3) MG/3ML SOLN nebulizer solution Inhale 3 mLs into the lungs every 4 hours as needed for Shortness of Breath 360 mL 3    Calcium-Vitamin D (CALTRATE 600 PLUS-VIT D PO) Take 1 tablet by mouth 2 times daily        No current facility-administered medications for this visit. Physical Exam:  LMP  (LMP Unknown)   Wt Readings from Last 3 Encounters:   02/12/20 181 lb (82.1 kg)   02/10/20 181 lb (82.1 kg)   01/22/20 178 lb 9.6 oz (81 kg)     Appearance: Awake, alert and oriented x 3, no acute respiratory distress  Skin: Intact, no rash  Head: Normocephalic, atraumatic  Eyes: EOMI, no conjunctival erythema  ENMT: No pharyngeal erythema, MMM, no rhinorrhea  Neck: Supple, no elevated JVP, no carotid bruits  Lungs: Clear to auscultation bilaterally. No wheezes, rales, or rhonchi.   Cardiac: Regular rate and rhythm, +S1S2, no murmurs apparent  Abdomen: Soft, nontender, +bowel sounds  Extremities: Moves all extremities x 4, no lower extremity edema  Neurologic: No focal motor deficits apparent, normal mood and affect, alert and oriented x 3  Peripheral Pulses: Intact posterior tibial pulses bilaterally    Laboratory Tests:  Lab Results   Component Value Date    CREATININE 1.2

## 2020-03-04 ENCOUNTER — HOSPITAL ENCOUNTER (OUTPATIENT)
Dept: WOUND CARE | Age: 85
Discharge: HOME | End: 2020-03-04
Payer: MEDICARE

## 2020-03-04 VITALS
TEMPERATURE: 98.3 F | BODY MASS INDEX: 31.34 KG/M2 | RESPIRATION RATE: 18 BRPM | HEART RATE: 80 BPM | WEIGHT: 166 LBS | SYSTOLIC BLOOD PRESSURE: 102 MMHG | HEIGHT: 61 IN | DIASTOLIC BLOOD PRESSURE: 58 MMHG

## 2020-03-04 PROCEDURE — 97597 DBRDMT OPN WND 1ST 20 CM/<: CPT | Performed by: SURGERY

## 2020-03-04 PROCEDURE — 97597 DBRDMT OPN WND 1ST 20 CM/<: CPT

## 2020-03-04 RX ORDER — LIDOCAINE HYDROCHLORIDE 40 MG/ML
SOLUTION TOPICAL ONCE
Status: DISCONTINUED | OUTPATIENT
Start: 2020-03-04 | End: 2020-03-05 | Stop reason: HOSPADM

## 2020-03-04 ASSESSMENT — PAIN DESCRIPTION - ONSET: ONSET: ON-GOING

## 2020-03-04 ASSESSMENT — PAIN DESCRIPTION - LOCATION: LOCATION: COCCYX;BUTTOCKS

## 2020-03-04 ASSESSMENT — PAIN SCALES - GENERAL: PAINLEVEL_OUTOF10: 2

## 2020-03-04 ASSESSMENT — PAIN DESCRIPTION - PROGRESSION: CLINICAL_PROGRESSION: NOT CHANGED

## 2020-03-04 ASSESSMENT — PAIN DESCRIPTION - DESCRIPTORS: DESCRIPTORS: ACHING;BURNING;DISCOMFORT

## 2020-03-04 ASSESSMENT — PAIN DESCRIPTION - PAIN TYPE: TYPE: ACUTE PAIN

## 2020-03-04 ASSESSMENT — PAIN DESCRIPTION - ORIENTATION: ORIENTATION: LEFT

## 2020-03-04 ASSESSMENT — PAIN DESCRIPTION - FREQUENCY: FREQUENCY: INTERMITTENT

## 2020-03-04 NOTE — PROGRESS NOTES
Wound Healing Center Followup Visit Note    Referring Physician : Mary Mooney DO  [address] RECORD NUMBER:  [id number]  AGE: 80 y.o. GENDER: female  : 1933  EPISODE DATE:  3/4/2020    Subjective:     Chief Complaint   Patient presents with    Wound Check     left buttocks, coccyx      HISTORY of PRESENT ILLNESS HPI   Torsten Taylor is a 80 y.o. female who presents today for wound/ulcer evaluation. History of Wound Context: Patient returns for follow-up with her daughter. Her daughter states that the wound was nearly healed and then reopened to a similar size. She denies fevers or chills. She denies drainage. PAST MEDICAL HISTORY      Diagnosis Date    Anxiety     Depression     Diabetes mellitus (HCC)     GERD (gastroesophageal reflux disease)     Hypertension     Peripheral neuropathy     Spinal stenosis      Past Surgical History:   Procedure Laterality Date    CHOLECYSTECTOMY      HIP FRACTURE SURGERY Bilateral     SKIN CANCER EXCISION       History reviewed. No pertinent family history.   Social History     Tobacco Use    Smoking status: Never Smoker    Smokeless tobacco: Never Used   Substance Use Topics    Alcohol use: No    Drug use: No     No Known Allergies  Current Outpatient Medications on File Prior to Encounter   Medication Sig Dispense Refill    atorvastatin (LIPITOR) 40 MG tablet Take 1 tablet by mouth nightly 90 tablet 1    furosemide (LASIX) 20 MG tablet Take 1 tablet by mouth 2 times daily 180 tablet 1    lansoprazole (PREVACID) 30 MG delayed release capsule Take 1 capsule by mouth daily 90 capsule 1    aspirin 81 MG chewable tablet Take 1 tablet by mouth daily 30 tablet 0    carvedilol (COREG) 25 MG tablet Take 12.5 mg by mouth 2 times daily (with meals)      Insulin Lispro (HUMALOG KWIKPEN SC) Inject 0-12 Units into the skin 3 times daily *Per Sliding Scale*      montelukast (SINGULAIR) 10 MG tablet Take 10 mg by mouth nightly

## 2020-03-12 ENCOUNTER — CARE COORDINATION (OUTPATIENT)
Dept: CARE COORDINATION | Age: 85
End: 2020-03-12

## 2020-03-12 RX ORDER — BUSPIRONE HYDROCHLORIDE 5 MG/1
5 TABLET ORAL DAILY
Qty: 90 TABLET | Refills: 1 | Status: SHIPPED
Start: 2020-03-12 | End: 2020-08-31 | Stop reason: SDUPTHER

## 2020-03-12 RX ORDER — MONTELUKAST SODIUM 10 MG/1
10 TABLET ORAL NIGHTLY
Qty: 90 TABLET | Refills: 1 | Status: SHIPPED
Start: 2020-03-12 | End: 2020-08-31 | Stop reason: SDUPTHER

## 2020-03-12 NOTE — CARE COORDINATION
[facility] contacted patient's daughter, Tae Spivey (on HIPAA, and primary caregiver) to offer Care Coordination. Daughter declined at this time. [facility] encouraged daughter to contact [facility] if she feels she needs assistance managing her mother's health care needs. PLAN:  Patient will be temporarily excluded from Care Coordination d/t declined enrollment.

## 2020-03-31 ENCOUNTER — VIRTUAL VISIT (OUTPATIENT)
Dept: FAMILY MEDICINE CLINIC | Age: 85
End: 2020-03-31
Payer: MEDICARE

## 2020-03-31 PROCEDURE — G2012 BRIEF CHECK IN BY MD/QHP: HCPCS | Performed by: FAMILY MEDICINE

## 2020-03-31 RX ORDER — ERYTHROMYCIN 5 MG/G
OINTMENT OPHTHALMIC
Qty: 3.5 G | Refills: 1 | Status: SHIPPED | OUTPATIENT
Start: 2020-03-31 | End: 2020-04-10

## 2020-03-31 NOTE — PROGRESS NOTES
Daya Darden is a 80 y.o. female evaluated via telephone on 3/31/2020. Consent:  She and/or health care decision maker is aware that that she may receive a bill for this telephone service, depending on her insurance coverage, and has provided verbal consent to proceed: Yes      Documentation:  I communicated with the patient and/or health care decision maker about she C/O her eye being red and having some drainage. There is no pain or any visual issues. Details of this discussion including any medical advice provided: I will send in an eye ointment and be sure that we do not give anything that may increase the ocular pressure. We do not want to precipitate a glaucoma crisis. Keeping in mind that she may have rubbed her eye and broken a blood vessel with her being on eliquis    ---VASCULAR PANEL  A) ASA, plavix, aggrenox  B) ELIQUIS, pletal, tzd, STATIN  C) ACE, LASIX, folic, ccb  D) cannikinumab, FISH OILS    ---CARDIAC---ASA,  ACE, BETA,  STATIN, LASIX, ( ccb )    --patient is stable on good preload and afterload reduction    Patient is doing well on Lasix for a diuretic   --this patient has good systolic support        I affirm this is a Patient Initiated Episode with an Established Patient who has not had a related appointment within my department in the past 7 days or scheduled within the next 24 hours.     Total Time: minutes: 21-30 minutes    Note: not billable if this call serves to triage the patient into an appointment for the relevant concern      Lady Davis

## 2020-04-06 ENCOUNTER — TELEPHONE (OUTPATIENT)
Dept: FAMILY MEDICINE CLINIC | Age: 85
End: 2020-04-06

## 2020-04-06 RX ORDER — PEN NEEDLE, DIABETIC 32GX 5/32"
1 NEEDLE, DISPOSABLE MISCELLANEOUS DAILY
COMMUNITY
Start: 2020-02-29 | End: 2020-04-06 | Stop reason: SDUPTHER

## 2020-04-06 RX ORDER — PEN NEEDLE, DIABETIC 32GX 5/32"
1 NEEDLE, DISPOSABLE MISCELLANEOUS 3 TIMES DAILY
Qty: 100 EACH | Refills: 5 | Status: SHIPPED
Start: 2020-04-06 | End: 2020-01-01

## 2020-04-06 RX ORDER — TOBRAMYCIN AND DEXAMETHASONE 3; 1 MG/ML; MG/ML
1 SUSPENSION/ DROPS OPHTHALMIC
Qty: 1 BOTTLE | Refills: 0 | Status: SHIPPED | OUTPATIENT
Start: 2020-04-06 | End: 2020-04-16

## 2020-04-06 NOTE — TELEPHONE ENCOUNTER
Patients daughter called stating her left eye is not completely better, states the \"inner aspect\" of the eye is still red. States it looks \"hemoragic,\" is requesting an eye drop.

## 2020-04-27 RX ORDER — GABAPENTIN 300 MG/1
CAPSULE ORAL
Qty: 540 CAPSULE | Refills: 0 | Status: SHIPPED
Start: 2020-04-27 | End: 2020-08-05 | Stop reason: SDUPTHER

## 2020-04-30 ENCOUNTER — TELEPHONE (OUTPATIENT)
Dept: FAMILY MEDICINE CLINIC | Age: 85
End: 2020-04-30

## 2020-04-30 RX ORDER — CEPHALEXIN 500 MG/1
500 CAPSULE ORAL 2 TIMES DAILY
Qty: 20 CAPSULE | Refills: 0 | Status: SHIPPED | OUTPATIENT
Start: 2020-04-30 | End: 2020-05-10

## 2020-04-30 NOTE — TELEPHONE ENCOUNTER
Voicemail message left for patient's daughter Rashad Herron.   Electronically signed by Iris Holland on 4/30/2020 at 3:27 PM

## 2020-05-01 RX ORDER — INSULIN LISPRO 100 [IU]/ML
0-12 INJECTION, SOLUTION INTRAVENOUS; SUBCUTANEOUS 3 TIMES DAILY
Qty: 32.4 ML | Refills: 1 | Status: SHIPPED
Start: 2020-05-01 | End: 2020-01-01

## 2020-05-13 RX ORDER — CARVEDILOL 25 MG/1
12.5 TABLET ORAL 2 TIMES DAILY WITH MEALS
Qty: 90 TABLET | Refills: 1 | Status: SHIPPED
Start: 2020-05-13 | End: 2020-10-14 | Stop reason: ALTCHOICE

## 2020-05-13 RX ORDER — LISINOPRIL 2.5 MG/1
2.5 TABLET ORAL DAILY
Qty: 90 TABLET | Refills: 1 | Status: SHIPPED
Start: 2020-05-13 | End: 2020-09-16 | Stop reason: SDUPTHER

## 2020-06-11 RX ORDER — SERTRALINE HYDROCHLORIDE 100 MG/1
150 TABLET, FILM COATED ORAL DAILY
Qty: 135 TABLET | Refills: 1 | Status: SHIPPED
Start: 2020-06-11 | End: 2020-01-01

## 2020-07-06 RX ORDER — NYSTATIN 100000 [USP'U]/G
POWDER TOPICAL
Qty: 60 G | Refills: 1 | Status: SHIPPED
Start: 2020-07-06 | End: 2020-01-01

## 2020-07-06 RX ORDER — LANSOPRAZOLE 30 MG/1
CAPSULE, DELAYED RELEASE ORAL
Qty: 90 CAPSULE | Refills: 1 | Status: SHIPPED
Start: 2020-07-06 | End: 2020-01-01

## 2020-07-28 NOTE — CARE COORDINATION
Notified Chris Cardenas with [facility] that patient will likely discharge home today. They will follow up after discharge. Detail Level: Zone Detail Level: Simple Sarecycline Counseling: Patient advised regarding possible photosensitivity and discoloration of the teeth, skin, lips, tongue and gums. Patient instructed to avoid sunlight, if possible. When exposed to sunlight, patients should wear protective clothing, sunglasses, and sunscreen. The patient was instructed to call the office immediately if the following severe adverse effects occur:  hearing changes, easy bruising/bleeding, severe headache, or vision changes. The patient verbalized understanding of the proper use and possible adverse effects of sarecycline. All of the patient's questions and concerns were addressed. Topical Sulfur Applications Pregnancy And Lactation Text: This medication is Pregnancy Category C and has an unknown safety profile during pregnancy. It is unknown if this topical medication is excreted in breast milk. High Dose Vitamin A Counseling: Side effects reviewed, pt to contact office should one occur. Tazorac Pregnancy And Lactation Text: This medication is not safe during pregnancy. It is unknown if this medication is excreted in breast milk. Erythromycin Counseling:  I discussed with the patient the risks of erythromycin including but not limited to GI upset, allergic reaction, drug rash, diarrhea, increase in liver enzymes, and yeast infections. Benzoyl Peroxide Pregnancy And Lactation Text: This medication is Pregnancy Category C. It is unknown if benzoyl peroxide is excreted in breast milk. Spironolactone Pregnancy And Lactation Text: This medication can cause feminization of the male fetus and should be avoided during pregnancy. The active metabolite is also found in breast milk. Birth Control Pills Counseling: Birth Control Pill Counseling: I discussed with the patient the potential side effects of OCPs including but not limited to increased risk of stroke, heart attack, thrombophlebitis, deep venous thrombosis, hepatic adenomas, breast changes, GI upset, headaches, and depression. The patient verbalized understanding of the proper use and possible adverse effects of OCPs. All of the patient's questions and concerns were addressed. Sarecycline Pregnancy And Lactation Text: This medication is Pregnancy Category D and not consider safe during pregnancy. It is also excreted in breast milk. Azithromycin Counseling:  I discussed with the patient the risks of azithromycin including but not limited to GI upset, allergic reaction, drug rash, diarrhea, and yeast infections. High Dose Vitamin A Pregnancy And Lactation Text: High dose vitamin A therapy is contraindicated during pregnancy and breast feeding. Topical Clindamycin Counseling: Patient counseled that this medication may cause skin irritation or allergic reactions. In the event of skin irritation, the patient was advised to reduce the amount of the drug applied or use it less frequently. The patient verbalized understanding of the proper use and possible adverse effects of clindamycin. All of the patient's questions and concerns were addressed. Erythromycin Pregnancy And Lactation Text: This medication is Pregnancy Category B and is considered safe during pregnancy. It is also excreted in breast milk. Topical Retinoid counseling:  Patient advised to apply a pea-sized amount only at bedtime and wait 30 minutes after washing their face before applying. If too drying, patient may add a non-comedogenic moisturizer. The patient verbalized understanding of the proper use and possible adverse effects of retinoids. All of the patient's questions and concerns were addressed. Include Pregnancy/Lactation Warning?: No Dapsone Pregnancy And Lactation Text: This medication is Pregnancy Category C and is not considered safe during pregnancy or breast feeding. Tetracycline Counseling: Patient counseled regarding possible photosensitivity and increased risk for sunburn. Patient instructed to avoid sunlight, if possible. When exposed to sunlight, patients should wear protective clothing, sunglasses, and sunscreen. The patient was instructed to call the office immediately if the following severe adverse effects occur:  hearing changes, easy bruising/bleeding, severe headache, or vision changes. The patient verbalized understanding of the proper use and possible adverse effects of tetracycline. All of the patient's questions and concerns were addressed. Patient understands to avoid pregnancy while on therapy due to potential birth defects. Bactrim Pregnancy And Lactation Text: This medication is Pregnancy Category D and is known to cause fetal risk. It is also excreted in breast milk. Minocycline Counseling: Patient advised regarding possible photosensitivity and discoloration of the teeth, skin, lips, tongue and gums. Patient instructed to avoid sunlight, if possible. When exposed to sunlight, patients should wear protective clothing, sunglasses, and sunscreen. The patient was instructed to call the office immediately if the following severe adverse effects occur:  hearing changes, easy bruising/bleeding, severe headache, or vision changes. The patient verbalized understanding of the proper use and possible adverse effects of minocycline. All of the patient's questions and concerns were addressed. Azithromycin Pregnancy And Lactation Text: This medication is considered safe during pregnancy and is also secreted in breast milk. Topical Clindamycin Pregnancy And Lactation Text: This medication is Pregnancy Category B and is considered safe during pregnancy. It is unknown if it is excreted in breast milk. Isotretinoin Counseling: Patient should get monthly blood tests, not donate blood, not drive at night if vision affected, not share medication, and not undergo elective surgery for 6 months after tx completed. Side effects reviewed, pt to contact office should one occur. Topical Retinoid Pregnancy And Lactation Text: This medication is Pregnancy Category C. It is unknown if this medication is excreted in breast milk. Doxycycline Counseling:  Patient counseled regarding possible photosensitivity and increased risk for sunburn. Patient instructed to avoid sunlight, if possible. When exposed to sunlight, patients should wear protective clothing, sunglasses, and sunscreen. The patient was instructed to call the office immediately if the following severe adverse effects occur:  hearing changes, easy bruising/bleeding, severe headache, or vision changes. The patient verbalized understanding of the proper use and possible adverse effects of doxycycline. All of the patient's questions and concerns were addressed. Dapsone Counseling: I discussed with the patient the risks of dapsone including but not limited to hemolytic anemia, agranulocytosis, rashes, methemoglobinemia, kidney failure, peripheral neuropathy, headaches, GI upset, and liver toxicity. Patients who start dapsone require monitoring including baseline LFTs and weekly CBCs for the first month, then every month thereafter. The patient verbalized understanding of the proper use and possible adverse effects of dapsone. All of the patient's questions and concerns were addressed. Bactrim Counseling:  I discussed with the patient the risks of sulfa antibiotics including but not limited to GI upset, allergic reaction, drug rash, diarrhea, dizziness, photosensitivity, and yeast infections. Rarely, more serious reactions can occur including but not limited to aplastic anemia, agranulocytosis, methemoglobinemia, blood dyscrasias, liver or kidney failure, lung infiltrates or desquamative/blistering drug rashes. Topical Sulfur Applications Counseling: Topical Sulfur Counseling: Patient counseled that this medication may cause skin irritation or allergic reactions. In the event of skin irritation, the patient was advised to reduce the amount of the drug applied or use it less frequently. The patient verbalized understanding of the proper use and possible adverse effects of topical sulfur application. All of the patient's questions and concerns were addressed. Isotretinoin Pregnancy And Lactation Text: This medication is Pregnancy Category X and is considered extremely dangerous during pregnancy. It is unknown if it is excreted in breast milk. Tazorac Counseling:  Patient advised that medication is irritating and drying. Patient may need to apply sparingly and wash off after an hour before eventually leaving it on overnight. The patient verbalized understanding of the proper use and possible adverse effects of tazorac. All of the patient's questions and concerns were addressed. Doxycycline Pregnancy And Lactation Text: This medication is Pregnancy Category D and not consider safe during pregnancy. It is also excreted in breast milk but is considered safe for shorter treatment courses. Benzoyl Peroxide Counseling: Patient counseled that medicine may cause skin irritation and bleach clothing. In the event of skin irritation, the patient was advised to reduce the amount of the drug applied or use it less frequently. The patient verbalized understanding of the proper use and possible adverse effects of benzoyl peroxide. All of the patient's questions and concerns were addressed. Birth Control Pills Pregnancy And Lactation Text: This medication should be avoided if pregnant and for the first 30 days post-partum. Spironolactone Counseling: Patient advised regarding risks of diarrhea, abdominal pain, hyperkalemia, birth defects (for female patients), liver toxicity and renal toxicity. The patient may need blood work to monitor liver and kidney function and potassium levels while on therapy. The patient verbalized understanding of the proper use and possible adverse effects of spironolactone. All of the patient's questions and concerns were addressed. Detail Level: Detailed

## 2020-08-04 RX ORDER — ATORVASTATIN CALCIUM 40 MG/1
TABLET, FILM COATED ORAL
Qty: 90 TABLET | Refills: 1 | Status: SHIPPED
Start: 2020-08-04 | End: 2020-01-01

## 2020-08-04 NOTE — TELEPHONE ENCOUNTER
Pt's daughter called requesting refill for Gabapentin. Do you want to see her in office or can she do a VV? Last visit was a VV on 03/31. Please advise.     Electronically signed by Harsh Zuñiga MA on 8/4/20 at 2:15 PM EDT

## 2020-08-05 RX ORDER — GABAPENTIN 300 MG/1
CAPSULE ORAL
Qty: 540 CAPSULE | Refills: 0 | Status: SHIPPED
Start: 2020-08-05 | End: 2020-01-01 | Stop reason: SDUPTHER

## 2020-08-31 ENCOUNTER — TELEPHONE (OUTPATIENT)
Dept: FAMILY MEDICINE CLINIC | Age: 85
End: 2020-08-31

## 2020-08-31 RX ORDER — BUSPIRONE HYDROCHLORIDE 5 MG/1
5 TABLET ORAL DAILY
Qty: 90 TABLET | Refills: 1 | Status: SHIPPED
Start: 2020-08-31 | End: 2020-09-03 | Stop reason: SDUPTHER

## 2020-08-31 RX ORDER — MONTELUKAST SODIUM 10 MG/1
10 TABLET ORAL NIGHTLY
Qty: 90 TABLET | Refills: 1 | Status: SHIPPED
Start: 2020-08-31 | End: 2020-09-03 | Stop reason: SDUPTHER

## 2020-08-31 RX ORDER — NITROFURANTOIN 25; 75 MG/1; MG/1
100 CAPSULE ORAL 2 TIMES DAILY
Qty: 20 CAPSULE | Refills: 0 | Status: SHIPPED | OUTPATIENT
Start: 2020-08-31 | End: 2020-09-10

## 2020-08-31 RX ORDER — LANCETS 30 GAUGE
1 EACH MISCELLANEOUS 3 TIMES DAILY
Qty: 100 EACH | Refills: 3 | Status: SHIPPED
Start: 2020-08-31 | End: 2020-01-01

## 2020-08-31 RX ORDER — GLUCOSAMINE HCL/CHONDROITIN SU 500-400 MG
CAPSULE ORAL
Qty: 100 STRIP | Refills: 3 | Status: SHIPPED
Start: 2020-08-31 | End: 2020-01-01

## 2020-08-31 RX ORDER — FUROSEMIDE 20 MG/1
20 TABLET ORAL 2 TIMES DAILY
Qty: 180 TABLET | Refills: 1 | Status: ON HOLD
Start: 2020-08-31 | End: 2020-01-01 | Stop reason: SDUPTHER

## 2020-08-31 NOTE — TELEPHONE ENCOUNTER
Low grade temp; very strong odor; patient states her stream feels very weak. Sal Claros says she is getting a UTI and is asking for an antibiotic to be phoned in. Please advise.

## 2020-09-03 RX ORDER — BUSPIRONE HYDROCHLORIDE 5 MG/1
5 TABLET ORAL DAILY
Qty: 90 TABLET | Refills: 1 | Status: SHIPPED
Start: 2020-09-03 | End: 2021-01-01 | Stop reason: SDUPTHER

## 2020-09-03 RX ORDER — MONTELUKAST SODIUM 10 MG/1
10 TABLET ORAL NIGHTLY
Qty: 90 TABLET | Refills: 1 | Status: SHIPPED
Start: 2020-09-03 | End: 2021-01-01 | Stop reason: SDUPTHER

## 2020-09-03 RX ORDER — LIRAGLUTIDE 6 MG/ML
1.2 INJECTION SUBCUTANEOUS DAILY
Qty: 18 ML | Refills: 1 | Status: ON HOLD
Start: 2020-09-03 | End: 2020-01-01 | Stop reason: HOSPADM

## 2020-09-16 ENCOUNTER — OFFICE VISIT (OUTPATIENT)
Dept: FAMILY MEDICINE CLINIC | Age: 85
End: 2020-09-16
Payer: MEDICARE

## 2020-09-16 ENCOUNTER — HOSPITAL ENCOUNTER (OUTPATIENT)
Age: 85
Discharge: HOME | End: 2020-09-18
Payer: MEDICARE

## 2020-09-16 VITALS
HEIGHT: 61 IN | HEART RATE: 74 BPM | OXYGEN SATURATION: 97 % | DIASTOLIC BLOOD PRESSURE: 78 MMHG | RESPIRATION RATE: 18 BRPM | WEIGHT: 178.6 LBS | TEMPERATURE: 98.3 F | BODY MASS INDEX: 33.72 KG/M2 | SYSTOLIC BLOOD PRESSURE: 120 MMHG

## 2020-09-16 LAB — HBA1C MFR BLD: 6.6 %

## 2020-09-16 PROCEDURE — 84550 ASSAY OF BLOOD/URIC ACID: CPT

## 2020-09-16 PROCEDURE — 80053 COMPREHEN METABOLIC PANEL: CPT

## 2020-09-16 PROCEDURE — 80061 LIPID PANEL: CPT

## 2020-09-16 PROCEDURE — 1123F ACP DISCUSS/DSCN MKR DOCD: CPT | Performed by: FAMILY MEDICINE

## 2020-09-16 PROCEDURE — G0439 PPPS, SUBSEQ VISIT: HCPCS | Performed by: FAMILY MEDICINE

## 2020-09-16 PROCEDURE — 83036 HEMOGLOBIN GLYCOSYLATED A1C: CPT | Performed by: FAMILY MEDICINE

## 2020-09-16 PROCEDURE — 4040F PNEUMOC VAC/ADMIN/RCVD: CPT | Performed by: FAMILY MEDICINE

## 2020-09-16 PROCEDURE — 84443 ASSAY THYROID STIM HORMONE: CPT

## 2020-09-16 PROCEDURE — 85025 COMPLETE CBC W/AUTO DIFF WBC: CPT

## 2020-09-16 RX ORDER — LISINOPRIL 2.5 MG/1
2.5 TABLET ORAL DAILY
Qty: 90 TABLET | Refills: 1 | Status: SHIPPED
Start: 2020-09-16 | End: 2020-01-01 | Stop reason: HOSPADM

## 2020-09-16 ASSESSMENT — PATIENT HEALTH QUESTIONNAIRE - PHQ9
SUM OF ALL RESPONSES TO PHQ QUESTIONS 1-9: 0
SUM OF ALL RESPONSES TO PHQ9 QUESTIONS 1 & 2: 0
SUM OF ALL RESPONSES TO PHQ QUESTIONS 1-9: 0
1. LITTLE INTEREST OR PLEASURE IN DOING THINGS: 0
2. FEELING DOWN, DEPRESSED OR HOPELESS: 0

## 2020-09-16 ASSESSMENT — LIFESTYLE VARIABLES: HOW OFTEN DO YOU HAVE A DRINK CONTAINING ALCOHOL: 0

## 2020-09-16 NOTE — PROGRESS NOTES
Medicare Annual Wellness Visit  Name: Cristobal Centeno Date: 2020   MRN: [id number] Sex: Female   Age: 80 y.o. Ethnicity: Non-/Non    : 1933 Race: White  Unavailable      Orbie Favre is here for Medicare AWV (AWV) and Extremity Weakness (Pt's daughter also wants to discuss outpatient PT)    Screenings for behavioral, psychosocial and functional/safety risks, and cognitive dysfunction are all negative except as indicated below. These results, as well as other patient data from the [address] form, are documented in Flowsheets linked to this Encounter. No Known Allergies      Prior to Visit Medications    Medication Sig Taking? Authorizing Provider   lisinopril (PRINIVIL;ZESTRIL) 2.5 MG tablet Take 1 tablet by mouth daily Yes Juan Jose Lane DO   Liraglutide (VICTOZA) 18 MG/3ML SOPN SC injection Inject 1.2 mg into the skin daily Yes Juan Jose Lane DO   montelukast (SINGULAIR) 10 MG tablet Take 1 tablet by mouth nightly Yes Juan Jsoe Lane DO   busPIRone (BUSPAR) 5 MG tablet Take 1 tablet by mouth daily Yes Juan Jose Lane DO   furosemide (LASIX) 20 MG tablet Take 1 tablet by mouth 2 times daily Yes Juan Jose Lane DO   blood glucose monitor strips Test 3 times a day & as needed for symptoms of irregular blood glucose. Dispense sufficient amount for indicated testing frequency plus additional to accommodate PRN testing needs.  Yes Marvin Lane DO   Lancets MISC 1 each by Does not apply route 3 times daily Yes Juan Jose Lane DO   gabapentin (NEURONTIN) 300 MG capsule TAKE 2 CAPSULES THREE TIMES A DAY Yes Juan Jose Lane DO   atorvastatin (LIPITOR) 40 MG tablet take 1 tablet by mouth at bedtime Yes Juan Jose Lane DO   lansoprazole (PREVACID) 30 MG delayed release capsule take 1 capsule by mouth once daily Yes Juan Jose Lane DO   NYAMYC 789722 UNIT/GM powder apply to affected area three times a day Yes Francesca Lane DO   sertraline (ZOLOFT) 100 MG tablet Take 1.5 tablets by mouth daily Yes Juan Jose Lane DO   carvedilol (COREG) 25 MG tablet Take 0.5 tablets by mouth 2 times daily (with meals) Yes Juan Jose Lane DO   apixaban (ELIQUIS) 5 MG TABS tablet Take 1 tablet by mouth 2 times daily Yes Juan Jose Lane DO   insulin lispro, 1 Unit Dial, (HUMALOG KWIKPEN) 100 UNIT/ML SOPN Inject 0-12 Units into the skin 3 times daily *Per Sliding Scale* Yes Juan Jose Lane DO   BD PEN NEEDLE MUKESH U/F 32G X 4 MM MISC Inject 1 each into the skin 3 times daily Yes Juan Jose Lane DO   aspirin 81 MG chewable tablet Take 1 tablet by mouth daily Yes Thierry Mina MD   Multiple Vitamins-Minerals (THERAPEUTIC MULTIVITAMIN-MINERALS) tablet Take 1 tablet by mouth daily Yes Historical Provider, MD   acetaminophen (TYLENOL) 325 MG tablet Take 650 mg by mouth every 6 hours as needed for Pain Yes Historical Provider, MD   isosorbide mononitrate (IMDUR) 30 MG extended release tablet Take 1 tablet by mouth daily Yes Juventino Jacinto MD   fluticasone (FLONASE) 50 MCG/ACT nasal spray 1 spray by Each Nostril route daily as needed for Rhinitis Yes Historical Provider, MD   mineral oil-hydrophilic petrolatum (HYDROPHOR) ointment Apply 1 g topically daily as needed for Dry Skin (to feet)  Yes Historical Provider, MD   nitroGLYCERIN (NITROSTAT) 0.4 MG SL tablet Place 1 tablet under the tongue every 5 minutes as needed for Chest pain up to max of 3 total doses. If no relief after 1 dose, call 911.  Yes Danny Crowder MD   Omega-3 Fatty Acids (OMEGA-3 EPA FISH OIL PO) Take 1 capsule by mouth 2 times daily  Yes Historical Provider, MD   ipratropium-albuterol (DUONEB) 0.5-2.5 (3) MG/3ML SOLN nebulizer solution Inhale 3 mLs into the lungs every 4 hours as needed for Shortness of Breath Yes Francesca Lane DO   Calcium-Vitamin D (CALTRATE 600 PLUS-VIT D PO) Take 1 tablet by mouth 2 times daily  Yes Historical Provider, MD         Past Medical History:   Diagnosis Date    Anxiety     Depression     Diabetes mellitus ([address]     GERD (gastroesophageal reflux disease)     Hypertension     Peripheral neuropathy     Spinal stenosis        Past Surgical History:   Procedure Laterality Date    CHOLECYSTECTOMY      HIP FRACTURE SURGERY Bilateral     SKIN CANCER EXCISION         History reviewed. No pertinent family history. CareTeam (Including outside providers/suppliers regularly involved in providing care):   Patient Care Team:  Cr Lim DO as PCP - General (Family Medicine)  Cr Lim DO as PCP - [facility] Empaneled Provider  Cr Lim MD as Consulting Physician (Cardiology)    Wt Readings from Last 3 Encounters:   09/16/20 178 lb 9.6 oz (81 kg)   03/04/20 166 lb (75.3 kg)   03/02/20 166 lb 8 oz (75.5 kg)     Vitals:    09/16/20 0948   BP: 120/78   Pulse: 74   Resp: 18   Temp: 98.3 °F (36.8 °C)   TempSrc: Temporal   SpO2: 97%   Weight: 178 lb 9.6 oz (81 kg)   Height: 5' 1\" (1.549 m)     Body mass index is 33.75 kg/m². Based upon direct observation of the patient, evaluation of cognition reveals global memory impairment noted.     General Appearance: in no acute distress and alert  Skin: warm and dry, no rash , there is a pressure skin lesion to her buttock that looks very good  Head: normocephalic and atraumatic  Eyes: pupils equal, round, and reactive to light, extraocular eye movements intact, conjunctivae normal  ENT: tympanic membrane, external ear and ear canal normal bilaterally, oropharynx clear and moist with normal mucous membranes and hearing grossly normal bilaterally  Neck: neck supple and non tender without mass, no thyromegaly or thyroid nodules, no cervical lymphadenopathy   Pulmonary/Chest: clear to auscultation bilaterally- no wheezes, rales or rhonchi, normal air movement, no respiratory distress and no chest wall tenderness  Cardiovascular: normal rate, present  Hearing/Vision  Do you or your family notice any trouble with your hearing?: No  Do you have difficulty driving, watching TV, or doing any of your daily activities because of your eyesight?: No  Have you had an eye exam within the past year?: (!) No  Hearing/Vision Interventions:  · no acute issues    ADL:  ADLs  In the past 7 days, did you need help from others to perform any of the following everyday activities? Eating, dressing, grooming, bathing, toileting, or walking/balance?: (!) Eating, Dressing, Grooming, Bathing, Toileting, Walking/Balance  In the past 7 days, did you need help from others to take care of any of the following?  Laundry, housekeeping, banking/finances, shopping, telephone use, food preparation, transportation, or taking medications?: (!) Laundry, Housekeeping, Banking/Finances, Shopping, Food Preparation, Transportation, Taking Medications  ADL Interventions:  · Patient declines any further evaluation/treatment for this issue    Personalized Preventive Plan   Current Health Maintenance Status  Immunization History   Administered Date(s) Administered    Influenza Virus Vaccine 11/24/2015, 01/23/2018    Influenza, High Dose (Fluzone 65 yrs and older) 11/12/2014, 10/29/2018    Influenza, Quadv, IM, (6 mo and older Fluzone, Flulaval, Fluarix and 3 yrs and older Afluria) 11/22/2016    Influenza, Triv, inactivated, subunit, adjuvanted, IM (Fluad 65 yrs and older) 01/23/2018, 09/23/2019    Pneumococcal Conjugate 13-valent ([id number]) 11/22/2016    Pneumococcal Polysaccharide ([id number]) 10/15/2006, 05/08/2015    Tdap (Boostrix, Adacel) 03/07/2018        Health Maintenance   Topic Date Due    Shingles Vaccine (1 of 2) 06/29/1983    Annual Wellness Visit (AWV)  05/29/2019    Flu vaccine (1) 09/01/2020    Lipid screen  11/23/2020    Potassium monitoring  02/11/2021    Creatinine monitoring  02/11/2021    DTaP/Tdap/Td vaccine (2 - Td) 03/07/2028    Pneumococcal 65+ years Vaccine  Completed    Hepatitis A vaccine  Aged Out    Hib vaccine  Aged Out    Meningococcal (ACWY) vaccine  Aged Out     Recommendations for Novalys Due: see orders and patient instructions/AVS.  . Recommended screening schedule for the next 5-10 years is provided to the patient in written form: see Patient Arlette Arenas was seen today for medicare awv and extremity weakness. Diagnoses and all orders for this visit:    Encounter for Medicare annual wellness exam  -     lisinopril (PRINIVIL;ZESTRIL) 2.5 MG tablet; Take 1 tablet by mouth daily  -     Comprehensive Metabolic Panel; Future  -     CBC Auto Differential; Future    ---VASCULAR PANEL  A) ASA, plavix, aggrenox  B) ELIQUIS, pletal, tzd, STATIN  C) ACE, LASIX, folic, ccb  D) cannikinumab, FISH OILS    ---CARDIAC---ASA, ACE, BETA, STATIN, LASIX, ( ccb )    --patient is stable on good preload and afterload reduction    Patient is doing well on Lasix for a diuretic   --this patient has good systolic support      IFG (impaired fasting glucose)  -     POCT glycosylated hemoglobin (Hb A1C)  -     Comprehensive Metabolic Panel; Future  -     CBC Auto Differential; Future  -     Microalbumin, Ur; Future    Fatigue, unspecified type  -     TSH without Reflex; Future  -     Uric Acid; Future  -     Comprehensive Metabolic Panel; Future  -     CBC Auto Differential; Future    Dyslipidemia  -     Comprehensive Metabolic Panel; Future  -     Lipid Panel;  Future  -     CBC Auto Differential; Future

## 2020-09-16 NOTE — PATIENT INSTRUCTIONS
Patient Education        Well Visit, Over 72: Care Instructions  Your Care Instructions     Physical exams can help you stay healthy. Your doctor has checked your overall health and may have suggested ways to take good care of yourself. He or she also may have recommended tests. At home, you can help prevent illness with healthy eating, regular exercise, and other steps. Follow-up care is a key part of your treatment and safety. Be sure to make and go to all appointments, and call your doctor if you are having problems. It's also a good idea to know your test results and keep a list of the medicines you take. How can you care for yourself at home? · Reach and stay at a healthy weight. This will lower your risk for many problems, such as obesity, diabetes, heart disease, and high blood pressure. · Get at least 30 minutes of exercise on most days of the week. Walking is a good choice. You also may want to do other activities, such as running, swimming, cycling, or playing tennis or team sports. · Do not smoke. Smoking can make health problems worse. If you need help quitting, talk to your doctor about stop-smoking programs and medicines. These can increase your chances of quitting for good. · Protect your skin from too much sun. When you're outdoors from 10 a.m. to 4 p.m., stay in the shade or cover up with clothing and a hat with a wide brim. Wear sunglasses that block UV rays. Even when it's cloudy, put broad-spectrum sunscreen (SPF 30 or higher) on any exposed skin. · See a dentist one or two times a year for checkups and to have your teeth cleaned. · Wear a seat belt in the car. Follow your doctor's advice about when to have certain tests. These tests can spot problems early. For men and women  · Cholesterol. Your doctor will tell you how often to have this done based on your overall health and other things that can increase your risk for heart attack and stroke. · Blood pressure.  Have your blood decide whether to have this test. Some experts say that men ages 79 and older no longer need testing. · Abdominal aortic aneurysm. Ask your doctor whether you should have a test to check for an aneurysm. You may need a test if you ever smoked or if your parent, brother, sister, or child has had an aneurysm. When should you call for help? Watch closely for changes in your health, and be sure to contact your doctor if you have any problems or symptoms that concern you. Where can you learn more? Go to https://pocketvillage.[employer] org and sign in to your HowGood account. Enter C402 in the [employer] box to learn more about \"Well Visit, Over 65: Care Instructions. \"     If you do not have an account, please click on the \"Sign Up Now\" link. Current as of: August 22, 2019               Content Version: 12.5  © 9257-4915 [employer]. Care instructions adapted under license by [facility] ([facility]). If you have questions about a medical condition or this instruction, always ask your healthcare professional. [address] any warranty or liability for your use of this information. Patient Education        Learning About Diabetes Food Guidelines  Your Care Instructions     Meal planning is important to manage diabetes. It helps keep your blood sugar at a target level (which you set with your doctor). You don't have to eat special foods. You can eat what your family eats, including sweets once in a while. But you do have to pay attention to how often you eat and how much you eat of certain foods. You may want to work with a dietitian or a certified diabetes educator (CDE) to help you plan meals and snacks. A dietitian or CDE can also help you lose weight if that is one of your goals. What should you know about eating carbs? Managing the amount of carbohydrate (carbs) you eat is an important part of healthy meals when you have diabetes.  Carbohydrate is found in many foods. · Learn which foods have carbs. And learn the amounts of carbs in different foods. ? Bread, cereal, pasta, and rice have about 15 grams of carbs in a serving. A serving is 1 slice of bread (1 ounce), ½ cup of cooked cereal, or 1/3 cup of cooked pasta or rice. ? Fruits have 15 grams of carbs in a serving. A serving is 1 small fresh fruit, such as an apple or orange; ½ of a banana; ½ cup of cooked or canned fruit; ½ cup of fruit juice; 1 cup of melon or raspberries; or 2 tablespoons of dried fruit. ? Milk and no-sugar-added yogurt have 15 grams of carbs in a serving. A serving is 1 cup of milk or 2/3 cup of no-sugar-added yogurt. ? Starchy vegetables have 15 grams of carbs in a serving. A serving is ½ cup of mashed potatoes or sweet potato; 1 cup winter squash; ½ of a small baked potato; ½ cup of cooked beans; or ½ cup cooked corn or green peas. · Learn how much carbs to eat each day and at each meal. A dietitian or CDE can teach you how to keep track of the amount of carbs you eat. This is called carbohydrate counting. · If you are not sure how to count carbohydrate grams, use the Plate Method to plan meals. It is a good, quick way to make sure that you have a balanced meal. It also helps you spread carbs throughout the day. ? Divide your plate by types of foods. Put non-starchy vegetables on half the plate, meat or other protein food on one-quarter of the plate, and a grain or starchy vegetable in the final quarter of the plate. To this you can add a small piece of fruit and 1 cup of milk or yogurt, depending on how many carbs you are supposed to eat at a meal.  · Try to eat about the same amount of carbs at each meal. Do not \"save up\" your daily allowance of carbs to eat at one meal.  · Proteins have very little or no carbs per serving. Examples of proteins are beef, chicken, turkey, fish, eggs, tofu, cheese, cottage cheese, and peanut butter.  A serving size of meat is 3 ounces, which is about the size of a deck of cards. Examples of meat substitute serving sizes (equal to 1 ounce of meat) are 1/4 cup of cottage cheese, 1 egg, 1 tablespoon of peanut butter, and ½ cup of tofu. How can you eat out and still eat healthy? · Learn to estimate the serving sizes of foods that have carbohydrate. If you measure food at home, it will be easier to estimate the amount in a serving of restaurant food. · If the meal you order has too much carbohydrate (such as potatoes, corn, or baked beans), ask to have a low-carbohydrate food instead. Ask for a salad or green vegetables. · If you use insulin, check your blood sugar before and after eating out to help you plan how much to eat in the future. · If you eat more carbohydrate at a meal than you had planned, take a walk or do other exercise. This will help lower your blood sugar. What else should you know? · Limit saturated fat, such as the fat from meat and dairy products. This is a healthy choice because people who have diabetes are at higher risk of heart disease. So choose lean cuts of meat and nonfat or low-fat dairy products. Use olive or canola oil instead of butter or shortening when cooking. · Don't skip meals. Your blood sugar may drop too low if you skip meals and take insulin or certain medicines for diabetes. · Check with your doctor before you drink alcohol. Alcohol can cause your blood sugar to drop too low. Alcohol can also cause a bad reaction if you take certain diabetes medicines. Follow-up care is a key part of your treatment and safety. Be sure to make and go to all appointments, and call your doctor if you are having problems. It's also a good idea to know your test results and keep a list of the medicines you take. Where can you learn more? Go to https://leslie.[employer]. org and sign in to your App[employer]uit account. Enter U344 in the [place] [employer] box to learn more about \"Learning About Diabetes Food Guidelines. \"     If you do not have an account, please click on the \"Sign Up Now\" link. Current as of: December 20, 2019               Content Version: 12.5  © 2006-2020 Healthwise, Incorporated. Care instructions adapted under license by [facility] ([facility]). If you have questions about a medical condition or this instruction, always ask your healthcare professional. [address] any warranty or liability for your use of this information. Patient Education        Learning About Meal Planning for Diabetes  Why plan your meals? Meal planning can be a key part of managing diabetes. Planning meals and snacks with the right balance of carbohydrate, protein, and fat can help you keep your blood sugar at the target level you set with your doctor. You don't have to eat special foods. You can eat what your family eats, including sweets once in a while. But you do have to pay attention to how often you eat and how much you eat of certain foods. You may want to work with a dietitian or a certified diabetes educator. He or she can give you tips and meal ideas and can answer your questions about meal planning. This health professional can also help you reach a healthy weight if that is one of your goals. What plan is right for you? Your dietitian or diabetes educator may suggest that you start with the plate format or carbohydrate counting. The plate format  The plate format is a simple way to help you manage how you eat. You plan meals by learning how much space each food should take on a plate. Using the plate format helps you spread carbohydrate throughout the day. It can make it easier to keep your blood sugar level within your target range. It also helps you see if you're eating healthy portion sizes. To use the plate format, you put non-starchy vegetables on half your plate. Add meat or meat substitutes on one-quarter of the plate.  Put a grain or starchy vegetable (such as brown rice or a potato) on the for on the label. If you count carbohydrate servings, one serving of carbohydrate is 15 grams. For foods that don't come with labels, such as fresh fruits and vegetables, you'll need a guide that lists carbohydrate in these foods. Ask your doctor, dietitian, or diabetes educator about books or other nutrition guides you can use. If you take insulin, you need to know how many grams of carbohydrate are in a meal. This lets you know how much rapid-acting insulin to take before you eat. If you use an insulin pump, you get a constant rate of insulin during the day. So the pump must be programmed at meals to give you extra insulin to cover the rise in blood sugar after meals. When you know how much carbohydrate you will eat, you can take the right amount of insulin. Or, if you always use the same amount of insulin, you need to make sure that you eat the same amount of carbohydrate at meals. If you need more help to understand carbohydrate counting and food labels, ask your doctor, dietitian, or diabetes educator. How do you get started with meal planning? Here are some tips to get started:  · Plan your meals a week at a time. Don't forget to include snacks too. · Use cookbooks or online recipes to plan several main meals. Plan some quick meals for busy nights. You also can double some recipes that freeze well. Then you can save half for other busy nights when you don't have time to cook. · Make sure you have the ingredients you need for your recipes. If you're running low on basic items, put these items on your shopping list too. · List foods that you use to make breakfasts, lunches, and snacks. List plenty of fruits and vegetables. · Post this list on the refrigerator. Add to it as you think of more things you need. · Take the list to the store to do your weekly shopping. Follow-up care is a key part of your treatment and safety.  Be sure to make and go to all appointments, and call your doctor if you are having problems. It's also a good idea to know your test results and keep a list of the medicines you take. Where can you learn more? Go to https://chpepiceweb.[employer]. org and sign in to your Inter[employer]et account. Enter Z944 in the [facility] box to learn more about \"Learning About Meal Planning for Diabetes. \"     If you do not have an account, please click on the \"Sign Up Now\" link. Current as of: December 20, 2019               Content Version: 12.5  © 4139-2744 Healthwise, Incorporated. Care instructions adapted under license by [facility] ([facility]). If you have questions about a medical condition or this instruction, always ask your healthcare professional. [address] any warranty or liability for your use of this information. Personalized Preventive Plan for Diamond Griffith - 9/16/2020  Medicare offers a range of preventive health benefits. Some of the tests and screenings are paid in full while other may be subject to a deductible, co-insurance, and/or copay. Some of these benefits include a comprehensive review of your medical history including lifestyle, illnesses that may run in your family, and various assessments and screenings as appropriate. After reviewing your medical record and screening and assessments performed today your provider may have ordered immunizations, labs, imaging, and/or referrals for you. A list of these orders (if applicable) as well as your Preventive Care list are included within your After Visit Summary for your review. Other Preventive Recommendations:    · A preventive eye exam performed by an eye specialist is recommended every 1-2 years to screen for glaucoma; cataracts, macular degeneration, and other eye disorders. · A preventive dental visit is recommended every 6 months. · Try to get at least 150 minutes of exercise per week or 10,000 steps per day on a pedometer .   · Order or download the FREE \"Exercise & Physical Activity: Your Everyday Guide\" from [employer]. Call 9-802.417.7150 or search [employer] online. · You need 5852-2017 mg of calcium and 8864-5898 IU of vitamin D per day. It is possible to meet your calcium requirement with diet alone, but a vitamin D supplement is usually necessary to meet this goal.  · When exposed to the sun, use a sunscreen that protects against both UVA and UVB radiation with an SPF of 30 or greater. Reapply every 2 to 3 hours or after sweating, drying off with a towel, or swimming. · Always wear a seat belt when traveling in a car. Always wear a helmet when riding a bicycle or motorcycle.

## 2020-09-17 LAB
ALBUMIN SERPL-MCNC: 3.7 G/DL (ref 3.5–5.2)
ALP BLD-CCNC: 69 U/L (ref 35–104)
ALT SERPL-CCNC: 13 U/L (ref 0–32)
ANION GAP SERPL CALCULATED.3IONS-SCNC: 17 MMOL/L (ref 7–16)
AST SERPL-CCNC: 27 U/L (ref 0–31)
BASOPHILS ABSOLUTE: 0.04 E9/L (ref 0–0.2)
BASOPHILS RELATIVE PERCENT: 0.6 % (ref 0–2)
BILIRUB SERPL-MCNC: 0.4 MG/DL (ref 0–1.2)
BUN BLDV-MCNC: 30 MG/DL (ref 8–23)
CALCIUM SERPL-MCNC: 9.9 MG/DL (ref 8.6–10.2)
CHLORIDE BLD-SCNC: 102 MMOL/L (ref 98–107)
CHOLESTEROL, TOTAL: 104 MG/DL (ref 0–199)
CO2: 25 MMOL/L (ref 22–29)
CREAT SERPL-MCNC: 1.6 MG/DL (ref 0.5–1)
EOSINOPHILS ABSOLUTE: 0.22 E9/L (ref 0.05–0.5)
EOSINOPHILS RELATIVE PERCENT: 3.1 % (ref 0–6)
GFR AFRICAN AMERICAN: 37
GFR NON-AFRICAN AMERICAN: 30 ML/MIN/1.73
GLUCOSE BLD-MCNC: 209 MG/DL (ref 74–99)
HCT VFR BLD CALC: 37.6 % (ref 34–48)
HDLC SERPL-MCNC: 37 MG/DL
HEMOGLOBIN: 11.6 G/DL (ref 11.5–15.5)
IMMATURE GRANULOCYTES #: 0.03 E9/L
IMMATURE GRANULOCYTES %: 0.4 % (ref 0–5)
LDL CHOLESTEROL CALCULATED: 33 MG/DL (ref 0–99)
LYMPHOCYTES ABSOLUTE: 1.46 E9/L (ref 1.5–4)
LYMPHOCYTES RELATIVE PERCENT: 20.7 % (ref 20–42)
MCH RBC QN AUTO: 33.9 PG (ref 26–35)
MCHC RBC AUTO-ENTMCNC: 30.9 % (ref 32–34.5)
MCV RBC AUTO: 109.9 FL (ref 80–99.9)
MONOCYTES ABSOLUTE: 0.4 E9/L (ref 0.1–0.95)
MONOCYTES RELATIVE PERCENT: 5.7 % (ref 2–12)
NEUTROPHILS ABSOLUTE: 4.91 E9/L (ref 1.8–7.3)
NEUTROPHILS RELATIVE PERCENT: 69.5 % (ref 43–80)
PDW BLD-RTO: 13.7 FL (ref 11.5–15)
PLATELET # BLD: 228 E9/L (ref 130–450)
PMV BLD AUTO: 9.1 FL (ref 7–12)
POTASSIUM SERPL-SCNC: 5.2 MMOL/L (ref 3.5–5)
RBC # BLD: 3.42 E12/L (ref 3.5–5.5)
SODIUM BLD-SCNC: 144 MMOL/L (ref 132–146)
TOTAL PROTEIN: 7 G/DL (ref 6.4–8.3)
TRIGL SERPL-MCNC: 170 MG/DL (ref 0–149)
TSH SERPL DL<=0.05 MIU/L-ACNC: 4.22 UIU/ML (ref 0.27–4.2)
URIC ACID, SERUM: 10.7 MG/DL (ref 2.4–5.7)
VLDLC SERPL CALC-MCNC: 34 MG/DL
WBC # BLD: 7.1 E9/L (ref 4.5–11.5)

## 2020-09-24 ENCOUNTER — OFFICE VISIT (OUTPATIENT)
Dept: CARDIOLOGY CLINIC | Age: 85
End: 2020-09-24
Payer: MEDICARE

## 2020-09-24 VITALS
HEART RATE: 75 BPM | SYSTOLIC BLOOD PRESSURE: 108 MMHG | DIASTOLIC BLOOD PRESSURE: 58 MMHG | WEIGHT: 178.6 LBS | BODY MASS INDEX: 35.06 KG/M2 | HEIGHT: 60 IN | RESPIRATION RATE: 18 BRPM

## 2020-09-24 PROCEDURE — G8417 CALC BMI ABV UP PARAM F/U: HCPCS | Performed by: INTERNAL MEDICINE

## 2020-09-24 PROCEDURE — 93000 ELECTROCARDIOGRAM COMPLETE: CPT | Performed by: INTERNAL MEDICINE

## 2020-09-24 PROCEDURE — 4040F PNEUMOC VAC/ADMIN/RCVD: CPT | Performed by: INTERNAL MEDICINE

## 2020-09-24 PROCEDURE — 99214 OFFICE O/P EST MOD 30 MIN: CPT | Performed by: INTERNAL MEDICINE

## 2020-09-24 PROCEDURE — 1036F TOBACCO NON-USER: CPT | Performed by: INTERNAL MEDICINE

## 2020-09-24 PROCEDURE — G8427 DOCREV CUR MEDS BY ELIG CLIN: HCPCS | Performed by: INTERNAL MEDICINE

## 2020-09-24 PROCEDURE — 1090F PRES/ABSN URINE INCON ASSESS: CPT | Performed by: INTERNAL MEDICINE

## 2020-09-24 PROCEDURE — 1123F ACP DISCUSS/DSCN MKR DOCD: CPT | Performed by: INTERNAL MEDICINE

## 2020-09-24 RX ORDER — CARVEDILOL 12.5 MG/1
12.5 TABLET ORAL 2 TIMES DAILY WITH MEALS
COMMUNITY
End: 2020-01-01

## 2020-09-24 NOTE — PROGRESS NOTES
OUTPATIENT CARDIOLOGY FOLLOW-UP    Name: Tere Lagos    Age: 80 y.o. Primary Care Physician: Sarita Manzo DO    Date of Service: 9/24/2020    Chief Complaint:   Chief Complaint   Patient presents with    Chest Pain       Interim History:   Mrs. Saad Williamson is a 26-year-old female with history of obesity, hypertension, chronic kidney disease stage III, diabetes type 2 on insulin, peripheral neuropathy, depression/anxiety and spinal stenosis, status post cholecystectomy and bilateral hips ORIF admitted with chest pain. She was admitted to the hospital in 9/2019 with urine tract infection and acute respiratory failure and was treated with antibiotics and IV diuretic therapy with the Lasix. She was admitted to the hospital in October 2019 with an episode of chest pain and was diagnosed with atypical chest pain with elevated troponins most likely related to pulmonary emboli. She was found to have a pulmonary emboli during that admission. She was initiated on Eliquis for anticoagulation. Her echocardiogram done at the time showed no RV dysfunction. Her atenolol was changed to Coreg because of her age and sex. Her venous Doppler showed no DVT. She was recommended conservative therapy for her chest pain which is most likely related to underlying PE. She denies any further episodes of chest pain or dyspnea. She does not exert much. She is tolerating anticoagulation therapy very well without any bleeding complications. She was again admitted to the hospital with chest pain recently and was diagnosed with non-ST elevation MI and was discharged home on 11/22/2019. She remained chest pain-free in the hospital, she was initiated on antianginal therapy with Ranexa and Imdur which she is tolerating well without any further episodes of chest pain.   She was seen in the office on 3/2/2020, since her last visit, she has not had any further hospitalizations she denies any further episodes of chest pain or dyspnea. She is compliant with medications, as well as salt and fluid intake. He does not take any over-the-counter arthritis medications. Timothy Benitez has lot of generalized weakness but denies any cardiac symptoms. She recently had lab work which showed increased creatinine level from 1.1->>1.6    No new cardiac complaints since last cardiology evaluation. She denies recent chest pain, palpitations, lightheadedness, dizziness, syncope, PND, or orthopnea. SR on EKG. Review of Systems:   Cardiac: As per HPI  General: No fever, chills  Pulmonary: As per HPI  HEENT: No visual disturbances, difficult swallowing  GI: No nausea, vomiting  Endocrine: No thyroid disease or DM  Musculoskeletal: CACERES x 4, no focal motor deficits  Skin: Intact, no rashes  Neuro/Psych: No headache or seizures    Past Medical History:  Past Medical History:   Diagnosis Date    Anxiety     Depression     Diabetes mellitus ([facility] [address]     GERD (gastroesophageal reflux disease)     Hypertension     Peripheral neuropathy     Spinal stenosis        Past Surgical History:  Past Surgical History:   Procedure Laterality Date    CHOLECYSTECTOMY      HIP FRACTURE SURGERY Bilateral     SKIN CANCER EXCISION         Family History:  History reviewed. No pertinent family history. Social History:  Social History     Socioeconomic History    Marital status:       Spouse name: Not on file    Number of children: Not on file    Years of education: Not on file    Highest education level: Not on file   Occupational History    Not on file   Social Needs    Financial resource strain: Not on file    Food insecurity     Worry: Not on file     Inability: Not on file    Transportation needs     Medical: Not on file     Non-medical: Not on file   Tobacco Use    Smoking status: Never Smoker    Smokeless tobacco: Never Used   Substance and Sexual Activity    Alcohol use: No    Drug use: No    Sexual activity: Not Currently   Lifestyle    Physical activity     Days per week: Not on file     Minutes per session: Not on file    Stress: Not on file   Relationships    Social connections     Talks on phone: Not on file     Gets together: Not on file     Attends [employer] service: Not on file     Active member of club or organization: Not on file     Attends meetings of clubs or organizations: Not on file     Relationship status: Not on file    Intimate partner violence     Fear of current or ex partner: Not on file     Emotionally abused: Not on file     Physically abused: Not on file     Forced sexual activity: Not on file   Other Topics Concern    Not on file   Social History Narrative    Not on file       Allergies:  No Known Allergies    Current Medications:  Current Outpatient Medications   Medication Sig Dispense Refill    carvedilol (COREG) 12.5 MG tablet Take 12.5 mg by mouth 2 times daily (with meals)      lisinopril (PRINIVIL;ZESTRIL) 2.5 MG tablet Take 1 tablet by mouth daily 90 tablet 1    diclofenac sodium (VOLTAREN) 1 % GEL Apply 2 g topically 4 times daily 2 Tube 1    Liraglutide (VICTOZA) 18 MG/3ML SOPN SC injection Inject 1.2 mg into the skin daily 18 mL 1    montelukast (SINGULAIR) 10 MG tablet Take 1 tablet by mouth nightly 90 tablet 1    busPIRone (BUSPAR) 5 MG tablet Take 1 tablet by mouth daily 90 tablet 1    furosemide (LASIX) 20 MG tablet Take 1 tablet by mouth 2 times daily 180 tablet 1    blood glucose monitor strips Test 3 times a day & as needed for symptoms of irregular blood glucose. Dispense sufficient amount for indicated testing frequency plus additional to accommodate PRN testing needs.  100 strip 3    Lancets MISC 1 each by Does not apply route 3 times daily 100 each 3    gabapentin (NEURONTIN) 300 MG capsule TAKE 2 CAPSULES THREE TIMES A  capsule 0    atorvastatin (LIPITOR) 40 MG tablet take 1 tablet by mouth at bedtime 90 tablet 1    lansoprazole (PREVACID) 30 MG delayed release capsule take 1 capsule by mouth once daily 90 capsule 1    NYAMYC 368346 UNIT/GM powder apply to affected area three times a day 60 g 1    sertraline (ZOLOFT) 100 MG tablet Take 1.5 tablets by mouth daily 135 tablet 1    apixaban (ELIQUIS) 5 MG TABS tablet Take 1 tablet by mouth 2 times daily 180 tablet 1    insulin lispro, 1 Unit Dial, (HUMALOG KWIKPEN) 100 UNIT/ML SOPN Inject 0-12 Units into the skin 3 times daily *Per Sliding Scale* 32.4 mL 1    BD PEN NEEDLE MUKESH U/F 32G X 4 MM MISC Inject 1 each into the skin 3 times daily 100 each 5    aspirin 81 MG chewable tablet Take 1 tablet by mouth daily 30 tablet 0    Multiple Vitamins-Minerals (THERAPEUTIC MULTIVITAMIN-MINERALS) tablet Take 1 tablet by mouth daily      acetaminophen (TYLENOL) 325 MG tablet Take 650 mg by mouth every 6 hours as needed for Pain      isosorbide mononitrate (IMDUR) 30 MG extended release tablet Take 1 tablet by mouth daily 90 tablet 3    fluticasone (FLONASE) 50 MCG/ACT nasal spray 1 spray by Each Nostril route daily as needed for Rhinitis      mineral oil-hydrophilic petrolatum (HYDROPHOR) ointment Apply 1 g topically daily as needed for Dry Skin (to feet)       nitroGLYCERIN (NITROSTAT) 0.4 MG SL tablet Place 1 tablet under the tongue every 5 minutes as needed for Chest pain up to max of 3 total doses. If no relief after 1 dose, call 911. 25 tablet 3    Omega-3 Fatty Acids (OMEGA-3 EPA FISH OIL PO) Take 1 capsule by mouth 2 times daily       ipratropium-albuterol (DUONEB) 0.5-2.5 (3) MG/3ML SOLN nebulizer solution Inhale 3 mLs into the lungs every 4 hours as needed for Shortness of Breath 360 mL 3    Calcium-Vitamin D (CALTRATE 600 PLUS-VIT D PO) Take 1 tablet by mouth 2 times daily       carvedilol (COREG) 25 MG tablet Take 0.5 tablets by mouth 2 times daily (with meals) 90 tablet 1     No current facility-administered medications for this visit.         Physical Exam:  BP (!) 108/58   Pulse 75   Resp 18   Ht 5' (1.524 m)   Wt 178 lb 9.6 oz (81 kg)   LMP  (LMP Unknown)   BMI 34.88 kg/m²   Wt Readings from Last 3 Encounters:   09/24/20 178 lb 9.6 oz (81 kg)   09/16/20 178 lb 9.6 oz (81 kg)   03/04/20 166 lb (75.3 kg)     Appearance: Awake, alert and oriented x 3, no acute respiratory distress  Skin: Intact, no rash  Head: Normocephalic, atraumatic  Eyes: EOMI, no conjunctival erythema  ENMT: No pharyngeal erythema, MMM, no rhinorrhea  Neck: Supple, no elevated JVP, no carotid bruits  Lungs: Clear to auscultation bilaterally. No wheezes, rales, or rhonchi.   Cardiac: Regular rate and rhythm, +S1S2, no murmurs apparent  Abdomen: Soft, nontender, +bowel sounds  Extremities: Moves all extremities x 4, no lower extremity edema  Neurologic: No focal motor deficits apparent, normal mood and affect, alert and oriented x 3  Peripheral Pulses: Intact posterior tibial pulses bilaterally    Laboratory Tests:  Lab Results   Component Value Date    CREATININE 1.6 (H) 09/16/2020    BUN 30 (H) 09/16/2020     09/16/2020    K 5.2 (H) 09/16/2020     09/16/2020    CO2 25 09/16/2020     Lab Results   Component Value Date    MG 1.9 01/09/2020     Lab Results   Component Value Date    WBC 7.1 09/16/2020    HGB 11.6 09/16/2020    HCT 37.6 09/16/2020    .9 (H) 09/16/2020     09/16/2020     Lab Results   Component Value Date    ALT 13 09/16/2020    AST 27 09/16/2020    ALKPHOS 69 09/16/2020    BILITOT 0.4 09/16/2020     Lab Results   Component Value Date    CKTOTAL 187 (H) 01/09/2020    CKMB 17.7 (H) 01/09/2020    TROPONINI 0.71 (H) 01/09/2020    TROPONINI 0.81 (H) 01/09/2020    TROPONINI 0.79 (H) 01/09/2020     Lab Results   Component Value Date    INR 1.2 01/09/2020    INR 1.2 11/22/2019    INR 1.0 10/21/2019    PROTIME 13.9 (H) 01/09/2020    PROTIME 13.5 (H) 11/22/2019    PROTIME 11.4 10/21/2019     Lab Results   Component Value Date    TSH 4.220 (H) 09/16/2020     Lab Results   Component Value Date    LABA1C 6.6 09/16/2020     No results found for: EAG  Lab Results   Component Value Date    CHOL 104 09/16/2020    CHOL 181 10/21/2019    CHOL 231 (H) 09/12/2019     Lab Results   Component Value Date    TRIG 170 (H) 09/16/2020    TRIG 197 (H) 10/21/2019    TRIG 262 (H) 09/12/2019     Lab Results   Component Value Date    HDL 37 09/16/2020    HDL 46 11/23/2019    HDL 38 10/21/2019     Lab Results   Component Value Date    LDLCALC 33 09/16/2020    LDLCALC 123 (H) 11/23/2019    LDLCALC 104 (H) 10/21/2019     Lab Results   Component Value Date    LABVLDL 34 09/16/2020    LABVLDL 50 11/23/2019    LABVLDL 39 10/21/2019     No results found for: CHOLHDLRATIO    Cardiac Tests:  ECG: Sinus rhythm, anterior MI age undetermined, low voltage precordial leads, baseline artifact,  abnormal EKG. Zadaehhlrvpwaz90/22/2019: EF 45-50%, moderate LVH, stage 1 DD, mild AR, mild AS. TTE-1/9/2020:  Limited echo to assess LV function. Normal left ventricle size and systolic function. Ejection fraction is visually estimated at 60-65%. Normal left ventricle wall thickness. Indeterminate diastolic function. No change in LV function was noted when compared to prior echo and no wall   motion abnormalities noted. No regional wall motion abnormalities seen. Normal right ventricular size and function. No evidence for hemodynamically significant pericardial effusion. Stress test 10/23/2019: LVEF 40%, no reversible perfusion defect. Fixed perfusion defect was noted extending from the apex into the anteroseptal and inferolateral walls. Global hypokinesis with severe hypokinesis of the septum was noted. Cardiac catheterization:     CTA chest 10/21/2019: Sub-segmental pulmonary emboli in the left lower lobe. No evidence of central pulmonary embolism. 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

## 2020-09-28 ENCOUNTER — TELEPHONE (OUTPATIENT)
Dept: CARDIOLOGY CLINIC | Age: 85
End: 2020-09-28

## 2020-09-28 RX ORDER — NITROGLYCERIN 0.4 MG/1
0.4 TABLET SUBLINGUAL EVERY 5 MIN PRN
Qty: 25 TABLET | Refills: 3 | Status: SHIPPED
Start: 2020-09-28 | End: 2021-01-01

## 2020-09-28 NOTE — TELEPHONE ENCOUNTER
Patient's daughter called stating she held patient's evening dose of Lasix on Thursday and by Friday patient's hands were puffy and her ankles were swollen. Daughter restarted Lasix at 20 mg BID. On Saturday morning patient had an episode of chest pain. All vitals were good, except HR was a little elevated at 95. No symptoms since Saturday but patient is still getting Lasix 20 mg BID. Please advise.

## 2020-09-28 NOTE — TELEPHONE ENCOUNTER
Patient's daughter notified of Dr. Machuca's recommendations. Order for BMP already placed, added a BNP.

## 2020-09-28 NOTE — TELEPHONE ENCOUNTER
Patient called the clinical care line requesting medication refill. Chart reviewed and medication sent to the pharmacy.   Electronically signed by Madeleine Oliveros on 9/28/2020 at 2:24 PM

## 2020-10-01 ENCOUNTER — TELEPHONE (OUTPATIENT)
Dept: CARDIOLOGY CLINIC | Age: 85
End: 2020-10-01

## 2020-10-01 ENCOUNTER — HOSPITAL ENCOUNTER (OUTPATIENT)
Age: 85
Discharge: HOME | End: 2020-10-01
Payer: MEDICARE

## 2020-10-01 LAB
ANION GAP SERPL CALCULATED.3IONS-SCNC: 11 MMOL/L (ref 7–16)
BUN BLDV-MCNC: 33 MG/DL (ref 8–23)
CALCIUM SERPL-MCNC: 9.8 MG/DL (ref 8.6–10.2)
CHLORIDE BLD-SCNC: 103 MMOL/L (ref 98–107)
CO2: 27 MMOL/L (ref 22–29)
CREAT SERPL-MCNC: 1.6 MG/DL (ref 0.5–1)
GFR AFRICAN AMERICAN: 37
GFR NON-AFRICAN AMERICAN: 30 ML/MIN/1.73
GLUCOSE BLD-MCNC: 139 MG/DL (ref 74–99)
POTASSIUM SERPL-SCNC: 4.6 MMOL/L (ref 3.5–5)
PRO-BNP: 709 PG/ML (ref 0–450)
SODIUM BLD-SCNC: 141 MMOL/L (ref 132–146)

## 2020-10-01 PROCEDURE — 80048 BASIC METABOLIC PNL TOTAL CA: CPT

## 2020-10-01 PROCEDURE — 83880 ASSAY OF NATRIURETIC PEPTIDE: CPT

## 2020-10-01 PROCEDURE — 36415 COLL VENOUS BLD VENIPUNCTURE: CPT

## 2020-10-01 NOTE — TELEPHONE ENCOUNTER
BNP and BMP drawn today. Resulted to Dr. Prince Listen but ordered by Dr. Jamilah Barry. Please review and advise.

## 2020-10-05 ENCOUNTER — HOSPITAL ENCOUNTER (EMERGENCY)
Age: 85
Discharge: HOME | End: 2020-10-05
Payer: MEDICARE

## 2020-10-05 VITALS
TEMPERATURE: 97.7 F | RESPIRATION RATE: 20 BRPM | OXYGEN SATURATION: 96 % | SYSTOLIC BLOOD PRESSURE: 140 MMHG | HEART RATE: 77 BPM | DIASTOLIC BLOOD PRESSURE: 70 MMHG

## 2020-10-05 LAB
ALBUMIN SERPL-MCNC: 3.5 G/DL (ref 3.5–5.2)
ALP BLD-CCNC: 68 U/L (ref 35–104)
ALT SERPL-CCNC: 13 U/L (ref 0–32)
ANION GAP SERPL CALCULATED.3IONS-SCNC: 12 MMOL/L (ref 7–16)
AST SERPL-CCNC: 20 U/L (ref 0–31)
BACTERIA: ABNORMAL /HPF
BASOPHILS ABSOLUTE: 0.03 E9/L (ref 0–0.2)
BASOPHILS RELATIVE PERCENT: 0.5 % (ref 0–2)
BILIRUB SERPL-MCNC: 0.5 MG/DL (ref 0–1.2)
BILIRUBIN URINE: NEGATIVE
BLOOD, URINE: NEGATIVE
BUN BLDV-MCNC: 29 MG/DL (ref 8–23)
CALCIUM SERPL-MCNC: 9.7 MG/DL (ref 8.6–10.2)
CHLORIDE BLD-SCNC: 101 MMOL/L (ref 98–107)
CLARITY: ABNORMAL
CO2: 27 MMOL/L (ref 22–29)
COLOR: YELLOW
CREAT SERPL-MCNC: 1.6 MG/DL (ref 0.5–1)
EOSINOPHILS ABSOLUTE: 0.17 E9/L (ref 0.05–0.5)
EOSINOPHILS RELATIVE PERCENT: 2.6 % (ref 0–6)
EPITHELIAL CELLS, UA: ABNORMAL /HPF
GFR AFRICAN AMERICAN: 37
GFR NON-AFRICAN AMERICAN: 30 ML/MIN/1.73
GLUCOSE BLD-MCNC: 224 MG/DL (ref 74–99)
GLUCOSE URINE: NEGATIVE MG/DL
HCT VFR BLD CALC: 31.8 % (ref 34–48)
HEMOGLOBIN: 10.5 G/DL (ref 11.5–15.5)
IMMATURE GRANULOCYTES #: 0.02 E9/L
IMMATURE GRANULOCYTES %: 0.3 % (ref 0–5)
KETONES, URINE: NEGATIVE MG/DL
LEUKOCYTE ESTERASE, URINE: ABNORMAL
LYMPHOCYTES ABSOLUTE: 1.73 E9/L (ref 1.5–4)
LYMPHOCYTES RELATIVE PERCENT: 26.9 % (ref 20–42)
MCH RBC QN AUTO: 35.6 PG (ref 26–35)
MCHC RBC AUTO-ENTMCNC: 33 % (ref 32–34.5)
MCV RBC AUTO: 107.8 FL (ref 80–99.9)
MONOCYTES ABSOLUTE: 0.51 E9/L (ref 0.1–0.95)
MONOCYTES RELATIVE PERCENT: 7.9 % (ref 2–12)
NEUTROPHILS ABSOLUTE: 3.98 E9/L (ref 1.8–7.3)
NEUTROPHILS RELATIVE PERCENT: 61.8 % (ref 43–80)
NITRITE, URINE: POSITIVE
PDW BLD-RTO: 13.5 FL (ref 11.5–15)
PH UA: 5 (ref 5–9)
PLATELET # BLD: 188 E9/L (ref 130–450)
PMV BLD AUTO: 10.3 FL (ref 7–12)
POTASSIUM SERPL-SCNC: 4.5 MMOL/L (ref 3.5–5)
PROTEIN UA: NEGATIVE MG/DL
RBC # BLD: 2.95 E12/L (ref 3.5–5.5)
RBC UA: ABNORMAL /HPF (ref 0–2)
RENAL EPITHELIAL, UA: ABNORMAL /HPF
SODIUM BLD-SCNC: 140 MMOL/L (ref 132–146)
SPECIFIC GRAVITY UA: 1.02 (ref 1–1.03)
TOTAL PROTEIN: 6.7 G/DL (ref 6.4–8.3)
UROBILINOGEN, URINE: 0.2 E.U./DL
WBC # BLD: 6.4 E9/L (ref 4.5–11.5)
WBC UA: >20 /HPF (ref 0–5)

## 2020-10-05 PROCEDURE — 36415 COLL VENOUS BLD VENIPUNCTURE: CPT

## 2020-10-05 PROCEDURE — 99283 EMERGENCY DEPT VISIT LOW MDM: CPT

## 2020-10-05 PROCEDURE — 80053 COMPREHEN METABOLIC PANEL: CPT

## 2020-10-05 PROCEDURE — 87186 SC STD MICRODIL/AGAR DIL: CPT

## 2020-10-05 PROCEDURE — 87040 BLOOD CULTURE FOR BACTERIA: CPT

## 2020-10-05 PROCEDURE — 87070 CULTURE OTHR SPECIMN AEROBIC: CPT

## 2020-10-05 PROCEDURE — 81001 URINALYSIS AUTO W/SCOPE: CPT

## 2020-10-05 PROCEDURE — 99284 EMERGENCY DEPT VISIT MOD MDM: CPT

## 2020-10-05 PROCEDURE — 85025 COMPLETE CBC W/AUTO DIFF WBC: CPT

## 2020-10-05 PROCEDURE — 87077 CULTURE AEROBIC IDENTIFY: CPT

## 2020-10-05 RX ORDER — CEPHALEXIN 500 MG/1
500 CAPSULE ORAL 4 TIMES DAILY
Qty: 28 CAPSULE | Refills: 0 | Status: SHIPPED | OUTPATIENT
Start: 2020-10-05 | End: 2020-10-12

## 2020-10-05 RX ORDER — MUPIROCIN CALCIUM 20 MG/G
CREAM TOPICAL
Qty: 15 G | Refills: 0 | Status: SHIPPED | OUTPATIENT
Start: 2020-10-05 | End: 2020-10-21 | Stop reason: ALTCHOICE

## 2020-10-05 NOTE — ED PROVIDER NOTES
[facility]       Department of Emergency Medicine   ED  Provider Note  Admit Date/RoomTime: 10/5/2020  3:22 PM  ED Room: 08/08   Chief Complaint   Wound Check (pt presents for a wound check for her bottom- per daughter \"her bottom looks more red than normal. this is a chronic ongoing problem. it looks like her skin is sloughing off, and there are some dark areas with some drainage\" ) and Urinary Tract Infection (daughter thinks she has a UTI due to urine odor)    History of Present Illness   Source of history provided by:  Patient and daughter. History/Exam Limitations: none. Dione Mehta is a 80 y.o. old female who has a past medical history of:   Past Medical History:   Diagnosis Date    Anxiety     Depression     Diabetes mellitus ([address]     GERD (gastroesophageal reflux disease)     Hypertension     Peripheral neuropathy     Spinal stenosis     presents to the emergency department by private vehicle, for evaluation of sacral wound which has been worsening the past couple of days. Patient's daughter states it is starting to look more erythematous and there is some clear drainage. Patient's daughter also states that there is a foul smell coming from her urine. Patient states her symptoms are mild in severity. Patient denies anything making it better or worse. Pt was going to wound care but has not had to go for several months. Denies fever/chills, headache, vision change, dizziness, cough, hemoptysis, chest pain, dyspnea, abdominal pain, NVD, hematuria, numbness/weakness. ROS    Pertinent positives and negatives are stated within HPI, all other systems reviewed and are negative. Past Surgical History:   Procedure Laterality Date    CHOLECYSTECTOMY      HIP FRACTURE SURGERY Bilateral     SKIN CANCER EXCISION     Social History:  reports that she has never smoked. She has never used smokeless tobacco. She reports that she does not drink alcohol or use drugs.   Family History: family history is not on file. Allergies: Patient has no known allergies. Physical Exam           ED Triage Vitals [10/05/20 1525]   BP Temp Temp Source Pulse Resp SpO2 Height Weight   (!) 122/59 97.7 °F (36.5 °C) Temporal 80 16 95 % -- --      Oxygen Saturation Interpretation: Normal.    Constitutional:  Alert, development consistent with age. HEENT:  NC/NT. Airway patent. Neck:  Normal ROM. Supple. Respiratory:  Clear to auscultation and breath sounds equal.  CV:  Regular rate and rhythm, normal heart sounds, without pathological murmurs, ectopy, gallops, or rubs. GI:  Abdomen Soft, nontender, good bowel sounds. No firm or pulsatile mass. Back:  No costovertebral tenderness. Extremities: No tenderness or edema noted. Integument:  Small open wound to sacral area with surrounding erythema and warmth; Normal turgor. Warm, dry, without visible rash, unless noted elsewhere. Lymphatics: No lymphangitis or adenopathy noted. Neurological:  Oriented. Motor functions intact.      Lab / Imaging Results   (All laboratory and radiology results have been personally reviewed by myself)  Labs:  Results for orders placed or performed during the hospital encounter of 10/05/20   CBC Auto Differential   Result Value Ref Range    WBC 6.4 4.5 - 11.5 E9/L    RBC 2.95 (L) 3.50 - 5.50 E12/L    Hemoglobin 10.5 (L) 11.5 - 15.5 g/dL    Hematocrit 31.8 (L) 34.0 - 48.0 %    .8 (H) 80.0 - 99.9 fL    MCH 35.6 (H) 26.0 - 35.0 pg    MCHC 33.0 32.0 - 34.5 %    RDW 13.5 11.5 - 15.0 fL    Platelets 854 558 - 046 E9/L    MPV 10.3 7.0 - 12.0 fL    Neutrophils % 61.8 43.0 - 80.0 %    Immature Granulocytes % 0.3 0.0 - 5.0 %    Lymphocytes % 26.9 20.0 - 42.0 %    Monocytes % 7.9 2.0 - 12.0 %    Eosinophils % 2.6 0.0 - 6.0 %    Basophils % 0.5 0.0 - 2.0 %    Neutrophils Absolute 3.98 1.80 - 7.30 E9/L    Immature Granulocytes # 0.02 E9/L    Lymphocytes Absolute 1.73 1.50 - 4.00 E9/L    Monocytes Absolute 0.51 0.10 - 0.95 E9/L    Eosinophils Absolute 0.17 0.05 - 0.50 E9/L    Basophils Absolute 0.03 0.00 - 0.20 E9/L   Comprehensive Metabolic Panel   Result Value Ref Range    Sodium 140 132 - 146 mmol/L    Potassium 4.5 3.5 - 5.0 mmol/L    Chloride 101 98 - 107 mmol/L    CO2 27 22 - 29 mmol/L    Anion Gap 12 7 - 16 mmol/L    Glucose 224 (H) 74 - 99 mg/dL    BUN 29 (H) 8 - 23 mg/dL    CREATININE 1.6 (H) 0.5 - 1.0 mg/dL    GFR Non-African American 30 >=60 mL/min/1.73    GFR African American 37     Calcium 9.7 8.6 - 10.2 mg/dL    Total Protein 6.7 6.4 - 8.3 g/dL    Alb 3.5 3.5 - 5.2 g/dL    Total Bilirubin 0.5 0.0 - 1.2 mg/dL    Alkaline Phosphatase 68 35 - 104 U/L    ALT 13 0 - 32 U/L    AST 20 0 - 31 U/L   Urinalysis with Microscopic   Result Value Ref Range    Color, UA Yellow Straw/Yellow    Clarity, UA SL CLOUDY Clear    Glucose, Ur Negative Negative mg/dL    Bilirubin Urine Negative Negative    Ketones, Urine Negative Negative mg/dL    Specific Gravity, UA 1.020 1.005 - 1.030    Blood, Urine Negative Negative    pH, UA 5.0 5.0 - 9.0    Protein, UA Negative Negative mg/dL    Urobilinogen, Urine 0.2 <2.0 E.U./dL    Nitrite, Urine POSITIVE (A) Negative    Leukocyte Esterase, Urine TRACE (A) Negative    WBC, UA >20 (A) 0 - 5 /HPF    RBC, UA 0-1 0 - 2 /HPF    Epithelial Cells, UA FEW /HPF    Renal Epithelial, UA MODERATE /HPF    Bacteria, UA MANY (A) None Seen /HPF     Imaging: All Radiology results interpreted by Radiologist unless otherwise noted. No orders to display     ED Course / Medical Decision Making   Medications - No data to display         Consult(s):   None    Procedure(s):   none    MDM:   Patient presenting with small sacral wound and UTI symptoms. Patient is in no acute distress, afebrile, nontoxic appearance. Wound culture was obtained. Patient's labs are stable. Patient urine showing positive nitrites and bacteria. Patient will be treated with Keflex for her sacral wound and UTI. Recommended patient follow-up with PCP.   Recommended patient return to the ED with new or worsening symptoms. Counseling: The emergency provider has spoken with the patient and daughter and discussed todays results, in addition to providing specific details for the plan of care and counseling regarding the diagnosis and prognosis. Questions are answered at this time and they are agreeable with the plan. Assessment     1. Acute cystitis without hematuria    2. Sacral wound, initial encounter      Plan   Disposition: Discharge to home  Patient condition is stable    New Medications     Discharge Medication List as of 10/5/2020  6:43 PM      START taking these medications    Details   cephALEXin (KEFLEX) 500 MG capsule Take 1 capsule by mouth 4 times daily for 7 days, Disp-28 capsule,R-0Print      mupirocin (BACTROBAN) 2 % cream Apply topically 3 times daily. Pharmacy Notation: May substitute ointment if cream not covered by insurance., Disp-15 g,R-0, Print           Electronically signed by Roxie Samuels PA-C   DD: 10/5/20  **This report was transcribed using voice recognition software. Every effort was made to ensure accuracy; however, inadvertent computerized transcription errors may be present.   END OF ED PROVIDER NOTE     Roxie Samuels PA-C  10/06/20 1115

## 2020-10-06 ENCOUNTER — CARE COORDINATION (OUTPATIENT)
Dept: CARE COORDINATION | Age: 85
End: 2020-10-06

## 2020-10-06 NOTE — CARE COORDINATION
Patient contacted regarding recent discharge and COVID-19 risk. Discussed COVID-19 related testing which was not done at this time. Test results were not done. Patient informed of results, if available? N/A     Care Transition Nurse/ Ambulatory Care Manager contacted the family daughter Karl Aragon by telephone to perform post discharge assessment. Verified name and  with family as identifiers. Patient has following risk factors of: heart failure and diabetes. CTN/ACM reviewed discharge instructions, medical action plan and red flags related to discharge diagnosis. Reviewed and educated them on any new and changed medications related to discharge diagnosis. Advised obtaining a 90-day supply of all daily and as-needed medications. Siri Kinney states Edith Mercado is taking the Keflex as prescribed. She reports she has applied the bactroban to the wound. She reports Edith Mercado has an appointment with wound care 10/14/20. She is active with [employer]. LOOP program was explained and she declined. Care Coordination program was explained and she is agreeable. Siri Kinney reports she had discussed outpatient PT/OT with Dr Arash Dias at [facility] last office visit, but she did not receive a prescription for an evaluation. [facility] will send message to Dr Arash Dias with [facility] requests. Education provided regarding infection prevention, and signs and symptoms of COVID-19 and when to seek medical attention with family who verbalized understanding. Discussed exposure protocols and quarantine from [address] you at higher risk for severe illness  and given an opportunity for questions and concerns. The family agrees to contact the COVID-19 hotline [phone] or PCP office for questions related to their healthcare. CTN/ACM provided contact information for future reference. From CDC: Are you at higher risk for severe illness?  Wash your hands often.    Avoid close contact (6 feet, which is about two arm lengths)

## 2020-10-06 NOTE — CARE COORDINATION
Date/Time:  10/6/2020 3:06 PM  Attempted to reach patient by telephone. Left HIPAA compliant message requesting a return call. Will attempt to reach patient again.

## 2020-10-08 LAB
ORGANISM: ABNORMAL
WOUND/ABSCESS: ABNORMAL

## 2020-10-11 LAB
BLOOD CULTURE, ROUTINE: NORMAL
CULTURE, BLOOD 2: NORMAL

## 2020-10-12 ENCOUNTER — CARE COORDINATION (OUTPATIENT)
Dept: CARE COORDINATION | Age: 85
End: 2020-10-12

## 2020-10-12 NOTE — CARE COORDINATION
ACM attempted to contact patient' daughterUrvashi (on HIPAA) to review patient's status and to discuss/offer Care Coordination. ACM left a message with contact information asking for a return call.      PLAN  Continue to attempt to contact patient's daughter

## 2020-10-13 ENCOUNTER — CARE COORDINATION (OUTPATIENT)
Dept: CARE COORDINATION | Age: 85
End: 2020-10-13

## 2020-10-14 ENCOUNTER — HOSPITAL ENCOUNTER (OUTPATIENT)
Dept: WOUND CARE | Age: 85
Discharge: HOME | End: 2020-10-14
Payer: MEDICARE

## 2020-10-14 VITALS
DIASTOLIC BLOOD PRESSURE: 62 MMHG | SYSTOLIC BLOOD PRESSURE: 122 MMHG | TEMPERATURE: 96.6 F | BODY MASS INDEX: 34.95 KG/M2 | HEIGHT: 60 IN | WEIGHT: 178 LBS | RESPIRATION RATE: 16 BRPM | HEART RATE: 72 BPM

## 2020-10-14 PROCEDURE — 11042 DBRDMT SUBQ TIS 1ST 20SQCM/<: CPT

## 2020-10-14 PROCEDURE — 99214 OFFICE O/P EST MOD 30 MIN: CPT | Performed by: SURGERY

## 2020-10-14 PROCEDURE — 11042 DBRDMT SUBQ TIS 1ST 20SQCM/<: CPT | Performed by: SURGERY

## 2020-10-14 PROCEDURE — 99213 OFFICE O/P EST LOW 20 MIN: CPT

## 2020-10-14 RX ORDER — LIDOCAINE HYDROCHLORIDE 40 MG/ML
SOLUTION TOPICAL ONCE
Status: DISCONTINUED | OUTPATIENT
Start: 2020-10-14 | End: 2020-10-15 | Stop reason: HOSPADM

## 2020-10-14 ASSESSMENT — PAIN SCALES - GENERAL: PAINLEVEL_OUTOF10: 2

## 2020-10-14 ASSESSMENT — PAIN DESCRIPTION - ONSET: ONSET: ON-GOING

## 2020-10-14 ASSESSMENT — PAIN DESCRIPTION - PROGRESSION: CLINICAL_PROGRESSION: NOT CHANGED

## 2020-10-14 ASSESSMENT — PAIN DESCRIPTION - LOCATION: LOCATION: COCCYX

## 2020-10-14 ASSESSMENT — PAIN - FUNCTIONAL ASSESSMENT: PAIN_FUNCTIONAL_ASSESSMENT: ACTIVITIES ARE NOT PREVENTED

## 2020-10-14 ASSESSMENT — PAIN DESCRIPTION - DESCRIPTORS: DESCRIPTORS: SORE

## 2020-10-14 ASSESSMENT — PAIN DESCRIPTION - FREQUENCY: FREQUENCY: INTERMITTENT

## 2020-10-14 ASSESSMENT — PAIN DESCRIPTION - PAIN TYPE: TYPE: CHRONIC PAIN

## 2020-10-14 NOTE — H&P
W[facility] Healing Center Followup Visit Note    Referring Physician : Becca Salazar DO   [place] RECORD NUMBER:  [id number]  AGE: 80 y.o. GENDER: female  : 1933  EPISODE DATE:  10/14/2020    Subjective:     Chief Complaint   Patient presents with    Wound Check     left buttock      HISTORY of PRESENT ILLNESS WILLIAM Rain is a 80 y.o. female who presents today in regards to follow up evaluation and treatment of wound/ulcer. That patient's past medical, family and social hx were reviewed and changes were made if present. History of Wound Context:  Pressure / shear ulcer     10/14/20 -patient returns to wound care center for follow-up of recurrent sacral decubitus ulceration. She is accompanied by her daughter. The daughter denies any specific trauma that she can remember but may be shearing from when the patient is taken off of a chair. Have been treating with topical antibiotic. PAST MEDICAL HISTORY      Diagnosis Date    Anxiety     Depression     Diabetes mellitus ([address]     Frequent urinary tract infections     GERD (gastroesophageal reflux disease)     Hypertension     Peripheral neuropathy     Spinal stenosis      Past Surgical History:   Procedure Laterality Date    CHOLECYSTECTOMY      HIP FRACTURE SURGERY Bilateral     SKIN CANCER EXCISION       History reviewed. No pertinent family history. Social History     Tobacco Use    Smoking status: Never Smoker    Smokeless tobacco: Never Used   Substance Use Topics    Alcohol use: No    Drug use: No     No Known Allergies  Current Outpatient Medications on File Prior to Encounter   Medication Sig Dispense Refill    mupirocin (BACTROBAN) 2 % cream Apply topically 3 times daily. Pharmacy Notation: May substitute ointment if cream not covered by insurance.  15 g 0    nitroGLYCERIN (NITROSTAT) 0.4 MG SL tablet Place 1 tablet under the tongue every 5 minutes as needed for Chest pain up to max of 3 total doses. If no relief after 1 dose, call 911. 25 tablet 3    carvedilol (COREG) 12.5 MG tablet Take 12.5 mg by mouth 2 times daily (with meals)      lisinopril (PRINIVIL;ZESTRIL) 2.5 MG tablet Take 1 tablet by mouth daily 90 tablet 1    diclofenac sodium (VOLTAREN) 1 % GEL Apply 2 g topically 4 times daily 2 Tube 1    Liraglutide (VICTOZA) 18 MG/3ML SOPN SC injection Inject 1.2 mg into the skin daily 18 mL 1    montelukast (SINGULAIR) 10 MG tablet Take 1 tablet by mouth nightly 90 tablet 1    busPIRone (BUSPAR) 5 MG tablet Take 1 tablet by mouth daily 90 tablet 1    furosemide (LASIX) 20 MG tablet Take 1 tablet by mouth 2 times daily 180 tablet 1    blood glucose monitor strips Test 3 times a day & as needed for symptoms of irregular blood glucose. Dispense sufficient amount for indicated testing frequency plus additional to accommodate PRN testing needs.  100 strip 3    Lancets MISC 1 each by Does not apply route 3 times daily 100 each 3    gabapentin (NEURONTIN) 300 MG capsule TAKE 2 CAPSULES THREE TIMES A  capsule 0    atorvastatin (LIPITOR) 40 MG tablet take 1 tablet by mouth at bedtime 90 tablet 1    lansoprazole (PREVACID) 30 MG delayed release capsule take 1 capsule by mouth once daily 90 capsule 1    NYAMYC 251350 UNIT/GM powder apply to affected area three times a day 60 g 1    sertraline (ZOLOFT) 100 MG tablet Take 1.5 tablets by mouth daily 135 tablet 1    apixaban (ELIQUIS) 5 MG TABS tablet Take 1 tablet by mouth 2 times daily 180 tablet 1    insulin lispro, 1 Unit Dial, (HUMALOG KWIKPEN) 100 UNIT/ML SOPN Inject 0-12 Units into the skin 3 times daily *Per Sliding Scale* 32.4 mL 1    BD PEN NEEDLE MUKESH U/F 32G X 4 MM MISC Inject 1 each into the skin 3 times daily 100 each 5    aspirin 81 MG chewable tablet Take 1 tablet by mouth daily 30 tablet 0    Multiple Vitamins-Minerals (THERAPEUTIC MULTIVITAMIN-MINERALS) tablet Take 1 tablet by mouth daily      acetaminophen (TYLENOL) 325 MG tablet Take 650 mg by mouth every 6 hours as needed for Pain      isosorbide mononitrate (IMDUR) 30 MG extended release tablet Take 1 tablet by mouth daily 90 tablet 3    mineral oil-hydrophilic petrolatum (HYDROPHOR) ointment Apply 1 g topically daily as needed for Dry Skin (to feet)       Omega-3 Fatty Acids (OMEGA-3 EPA FISH OIL PO) Take 1 capsule by mouth 2 times daily       Calcium-Vitamin D (CALTRATE 600 PLUS-VIT D PO) Take 1 tablet by mouth 2 times daily       fluticasone (FLONASE) 50 MCG/ACT nasal spray 1 spray by Each Nostril route daily as needed for Rhinitis      ipratropium-albuterol (DUONEB) 0.5-2.5 (3) MG/3ML SOLN nebulizer solution Inhale 3 mLs into the lungs every 4 hours as needed for Shortness of Breath 360 mL 3     No current facility-administered medications on file prior to encounter.         REVIEW OF SYSTEMS See HPI    Objective:    /62   Pulse 72   Temp 96.6 °F (35.9 °C) (Temporal)   Resp 16   Ht 5' (1.524 m)   Wt 178 lb (80.7 kg)   LMP  (LMP Unknown)   BMI 34.76 kg/m²   Wt Readings from Last 3 Encounters:   10/14/20 178 lb (80.7 kg)   09/24/20 178 lb 9.6 oz (81 kg)   09/16/20 178 lb 9.6 oz (81 kg)     PHYSICAL EXAM  CONSTITUTIONAL:   Awake, alert, cooperative   EYES:  lids and lashes normal   ENT: external ears and nose without lesions   NECK:  supple, symmetrical, trachea midline   SKIN:  Open wound Present    Assessment:     Problem List Items Addressed This Visit     * (Principal) Pressure injury of sacral region, stage 2 ([facility] [address]          Pre Debridement Measurements:  Are located in the [place]  Documentation Flow Sheet  Post Debridement Measurements:  Wound/Ulcer Descriptions are Pre Debridement except measurements:     Wound 11/29/19 Left tip of great toe (Active)   Number of days: 320       Wound 11/29/19  Right tip of great toe (Active)   Number of days: 320       Wound 11/29/19 Heel Right (Active)   Number of days: 320       Wound 10/14/20 Buttocks Left #1 (Active)   Wound Image   10/14/20 1433   Wound Etiology Diabetic [place] 3 10/14/20 1433   Wound Length (cm) 2 cm 10/14/20 1433   Wound Width (cm) 1 cm 10/14/20 1433   Wound Depth (cm) 0.1 cm 10/14/20 1433   Wound Surface Area (cm^2) 2 cm^2 10/14/20 1433   Wound Volume (cm^3) 0.2 cm^3 10/14/20 1433   Post-Procedure Length (cm) 2.2 cm 10/14/20 1450   Post-Procedure Width (cm) 1.2 cm 10/14/20 1450   Post-Procedure Depth (cm) 0.1 cm 10/14/20 1450   Post-Procedure Surface Area (cm^2) 2.64 cm^2 10/14/20 1450   Post-Procedure Volume (cm^3) 0.26 cm^3 10/14/20 1450   Wound Assessment Granulation tissue;Fibrin 10/14/20 1433   Drainage Amount Small 10/14/20 1433   Drainage Description Serosanguinous; Serous 10/14/20 1433   Xena-wound Assessment Fragile 10/14/20 1433   Number of days: 0          Procedure Note  Indications:  Based on my examination of this patient's wound(s)/ulcer(s) today, debridement is required to promote healing and evaluate the wound base. Performed by: Tres Albarado MD    Consent obtained:  Yes    Time out taken:  Yes    Pain Control: Anesthetic  Anesthetic: 4% Lidocaine Liquid Topical     Debridement:Excisional Debridement    Using curette the wound(s)/ulcer(s) was/were sharply debrided down through and including the removal of subcutaneous tissue. Devitalized Tissue Debrided:  slough to stimulate bleeding to promote healing, post debridement good bleeding base and wound edges noted    Wound/Ulcer #: 1    Percent of Wound/Ulcer Debrided: 100%    Total Surface Area Debrided:  3 sq cm     Estimated Blood Loss:  Minimal  Hemostasis Achieved:  not needed    Procedural Pain:  2  / 10   Post Procedural Pain:  0 / 10     Response to treatment:  Well tolerated by patient. 10/14/20 -I reviewed with the patient and her daughter that due to the location, dressings will be difficult. What the wound needs most is skin protectant. I would recommend calmoseptine ointment.     Plan:   Treatment Note please see attached Discharge Instructions    Written patient dismissal instructions given to patient and signed by patient or POA. Discharge Instructions       Visit Discharge/Physician Orders    Discharge condition: Stable    Assessment of pain at discharge:NONE    Anesthetic used: LIDOCAINE 4%    Discharge to: Home    Left via:Private automobile    Accompanied by: DAUGHTER    ECF/HENRIETTAA: [facility]    Dressing Orders:WOUND LOCATION LEFT BUTTOCK CLEANSE WOUND WITH NORMAL SALINE APPLY CALMOSEPTINE, DRY DRESSING, KEEP DRY AND APPLY AS NEEDED    Treatment Orders:FOLLOW A NUTRITIOUS DIET HIGH IN PROTEIN AND VITAMIN C TO PROMOTE WOUND HEALING. TAKE A MULTIVITAMIN DAILY. KEEP PRESSURE OFF THE WOUND AT ALL TIMES        [facility] followup visit ______2 WEEKS_______________________  (Please note your next appointment above and if you are unable to keep, kindly give a 24 hour notice. Thank you.)    Physician signature:__________________________      If you experience any of the following, please call the [facility] during business hours:    * Increase in Pain  * Temperature over 101  * Increase in drainage from your wound  * Drainage with a foul odor  * Bleeding  * Increase in swelling  * Need for compression bandage changes due to slippage, breakthrough drainage. If you need medical attention outside of the business hours of the [facility] please contact your PCP or go to the nearest emergency room.         Electronically signed by Ang Patel MD on 10/14/2020 at 2:54 PM

## 2020-10-14 NOTE — CARE COORDINATION
Patient's daughter, Shruti Greer (on HIPAA) returned this AC's call:    Patient contacted regarding COVID-19 risk and screening. Discussed COVID-19 related testing which was not done at this time. Test results were not done. Patient informed of results, if available? N/A. Care Transition Nurse/ Ambulatory Care Manager contacted the family by telephone to perform follow-up assessment. Verified name and  with family as identifiers. Patient has following risk factors of: diabetes and chronic kidney disease. Symptoms reviewed with family who verbalized the following symptoms: no worsening symptoms. Patient presented to the ED for evaluation of a chronic sacral wound. Patient will be seen by wound care on 10/14/2020. Due to no new or worsening symptoms encounter was not routed to provider for escalation. Per patient's daughter, the wound has had some slight improvement. Education provided regarding infection prevention, and signs and symptoms of COVID-19 and when to seek medical attention with family who verbalized understanding. Discussed exposure protocols and quarantine from [address] you at higher risk for severe illness  and given an opportunity for questions and concerns. The family agrees to contact the COVID-19 hotline [phone] or PCP office for questions related to their healthcare. CTN/ACM provided contact information for future reference. Patient is active on My Chart and daughter is aware how to utilize this tool. Healthcare decision makers were reviewed. Daughter states that patient does have a designated POA. She states her mother is a full code status and does not have or want a living will at this time. From CDC: Are you at higher risk for severe illness?  Wash your hands often.  Avoid close contact (6 feet, which is about two arm lengths) with people who are sick.    Put distance between yourself and other people if COVID-19 is spreading in your community.  Clean and disinfect frequently touched surfaces.  Avoid all cruise travel and non-essential air travel.  Call your healthcare professional if you have concerns about COVID-19 and your underlying condition or if you are sick. For more information on steps you can take to protect yourself, see CDC's How to Protect Yourself      Patient's symptoms were not COVID related. Daughter does not think patient needs any further outreaches.  COVID Monitoring Episode will be resolve and ACM will remove name from the care team.

## 2020-10-14 NOTE — CARE COORDINATION
ACM discuss care coordination extensively with daughter, Gayatri Mirza (on HIPAA, and POA). Gayatri Mirza states that she is her mother's primary care giver and she has assistance from family and friends. Gayatri Mirza does not feel she needs assistance in managing her mother's healthcare needs at this time.    Gayatri Mirza does have this ACM's contact information and will call if she does feel her mother would benefit from the Care Coordination program.     PLAN  Daughter declined Care Coordination, therefore, patient will be temporarily excluded from this program.

## 2020-10-21 ENCOUNTER — HOSPITAL ENCOUNTER (OUTPATIENT)
Dept: WOUND CARE | Age: 85
Discharge: HOME | End: 2020-10-21
Payer: MEDICARE

## 2020-10-21 VITALS
HEART RATE: 80 BPM | TEMPERATURE: 97.4 F | RESPIRATION RATE: 16 BRPM | DIASTOLIC BLOOD PRESSURE: 60 MMHG | SYSTOLIC BLOOD PRESSURE: 108 MMHG

## 2020-10-21 PROBLEM — L89.153 PRESSURE INJURY OF SACRAL REGION, STAGE 3 (HCC): Status: ACTIVE | Noted: 2020-02-12

## 2020-10-21 PROCEDURE — 11042 DBRDMT SUBQ TIS 1ST 20SQCM/<: CPT

## 2020-10-21 PROCEDURE — 11042 DBRDMT SUBQ TIS 1ST 20SQCM/<: CPT | Performed by: NURSE PRACTITIONER

## 2020-10-21 RX ORDER — LIDOCAINE HYDROCHLORIDE 40 MG/ML
SOLUTION TOPICAL ONCE
Status: DISCONTINUED | OUTPATIENT
Start: 2020-10-21 | End: 2020-10-22 | Stop reason: HOSPADM

## 2020-10-21 NOTE — PROGRESS NOTES
Wound Healing Center Followup Visit Note    Referring Physician : Jermain Lund DO   [place],[address] RECORD NUMBER:  [id number]  AGE: 80 y.o. GENDER: female  : 1933  EPISODE DATE:  10/21/2020    Subjective:     Chief Complaint   Patient presents with    Wound Check     coccyx      HISTORY of PRESENT ILLNESS HPI   Payal Barillas is a 80 y.o. female who presents today in regards to follow up evaluation and treatment of wound/ulcer. That patient's past medical, family and social hx were reviewed and changes were made if present. History of Wound Context:  Pressure / shear ulcer      10/14/20 -patient returns to wound care center for follow-up of recurrent sacral decubitus ulceration. She is accompanied by her daughter. The daughter denies any specific trauma that she can remember but may be shearing from when the patient is taken off of a chair. Have been treating with topical antibiotic.    10/21/20- No new issues. Daughter states her mother has issues with offloading. Wound/Ulcer Pain Timing/Severity: constant  Quality of pain: burning  Severity:  3  10   Modifying Factors: Pain worsens with presure  Associated Signs/Symptoms: pain    Ulcer Identification:  Ulcer Type: pressure  Contributing Factors: chronic pressure and decreased mobility    Diabetic/Pressure/Non Pressure Ulcers only:  Ulcer: Pressure ulcer, Stage 3    Wound: N/A        PAST MEDICAL HISTORY      Diagnosis Date    Anxiety     Depression     Diabetes mellitus (HCC)     Frequent urinary tract infections     GERD (gastroesophageal reflux disease)     Hypertension     Peripheral neuropathy     Spinal stenosis      Past Surgical History:   Procedure Laterality Date    CHOLECYSTECTOMY      HIP FRACTURE SURGERY Bilateral     SKIN CANCER EXCISION       No family history on file.   Social History     Tobacco Use    Smoking status: Never Smoker    Smokeless tobacco: Never Used   Substance Use Topics    Alcohol use: No    Drug use: No     No Known Allergies  Current Outpatient Medications on File Prior to Encounter   Medication Sig Dispense Refill    carvedilol (COREG) 12.5 MG tablet Take 12.5 mg by mouth 2 times daily (with meals)      lisinopril (PRINIVIL;ZESTRIL) 2.5 MG tablet Take 1 tablet by mouth daily 90 tablet 1    diclofenac sodium (VOLTAREN) 1 % GEL Apply 2 g topically 4 times daily 2 Tube 1    Liraglutide (VICTOZA) 18 MG/3ML SOPN SC injection Inject 1.2 mg into the skin daily 18 mL 1    montelukast (SINGULAIR) 10 MG tablet Take 1 tablet by mouth nightly 90 tablet 1    busPIRone (BUSPAR) 5 MG tablet Take 1 tablet by mouth daily 90 tablet 1    furosemide (LASIX) 20 MG tablet Take 1 tablet by mouth 2 times daily 180 tablet 1    blood glucose monitor strips Test 3 times a day & as needed for symptoms of irregular blood glucose. Dispense sufficient amount for indicated testing frequency plus additional to accommodate PRN testing needs.  100 strip 3    Lancets MISC 1 each by Does not apply route 3 times daily 100 each 3    gabapentin (NEURONTIN) 300 MG capsule TAKE 2 CAPSULES THREE TIMES A  capsule 0    atorvastatin (LIPITOR) 40 MG tablet take 1 tablet by mouth at bedtime 90 tablet 1    lansoprazole (PREVACID) 30 MG delayed release capsule take 1 capsule by mouth once daily 90 capsule 1    NYAMYC 015135 UNIT/GM powder apply to affected area three times a day 60 g 1    sertraline (ZOLOFT) 100 MG tablet Take 1.5 tablets by mouth daily 135 tablet 1    apixaban (ELIQUIS) 5 MG TABS tablet Take 1 tablet by mouth 2 times daily 180 tablet 1    insulin lispro, 1 Unit Dial, (HUMALOG KWIKPEN) 100 UNIT/ML SOPN Inject 0-12 Units into the skin 3 times daily *Per Sliding Scale* 32.4 mL 1    BD PEN NEEDLE MUKESH U/F 32G X 4 MM MISC Inject 1 each into the skin 3 times daily 100 each 5    aspirin 81 MG chewable tablet Take 1 tablet by mouth daily 30 tablet 0    Multiple Vitamins-Minerals (THERAPEUTIC MULTIVITAMIN-MINERALS) tablet Take 1 tablet by mouth daily      acetaminophen (TYLENOL) 325 MG tablet Take 650 mg by mouth every 6 hours as needed for Pain      isosorbide mononitrate (IMDUR) 30 MG extended release tablet Take 1 tablet by mouth daily 90 tablet 3    mineral oil-hydrophilic petrolatum (HYDROPHOR) ointment Apply 1 g topically daily as needed for Dry Skin (to feet)       Omega-3 Fatty Acids (OMEGA-3 EPA FISH OIL PO) Take 1 capsule by mouth 2 times daily       Calcium-Vitamin D (CALTRATE 600 PLUS-VIT D PO) Take 1 tablet by mouth 2 times daily       nitroGLYCERIN (NITROSTAT) 0.4 MG SL tablet Place 1 tablet under the tongue every 5 minutes as needed for Chest pain up to max of 3 total doses. If no relief after 1 dose, call 911. 25 tablet 3    fluticasone (FLONASE) 50 MCG/ACT nasal spray 1 spray by Each Nostril route daily as needed for Rhinitis      ipratropium-albuterol (DUONEB) 0.5-2.5 (3) MG/3ML SOLN nebulizer solution Inhale 3 mLs into the lungs every 4 hours as needed for Shortness of Breath 360 mL 3     No current facility-administered medications on file prior to encounter.         REVIEW OF SYSTEMS See HPI    Objective:    /60   Pulse 80   Temp 97.4 °F (36.3 °C) (Temporal)   Resp 16   LMP  (LMP Unknown)   Wt Readings from Last 3 Encounters:   10/14/20 178 lb (80.7 kg)   09/24/20 178 lb 9.6 oz (81 kg)   09/16/20 178 lb 9.6 oz (81 kg)     PHYSICAL EXAM  CONSTITUTIONAL:   Awake, alert, cooperative   EYES:  lids and lashes normal   ENT: external ears and nose without lesions   NECK:  supple, symmetrical, trachea midline   SKIN:  Open wound Present    Assessment:     Problem List Items Addressed This Visit     None          Pre Debridement Measurements:  Are located in the [place]  Documentation Flow Sheet  Post Debridement Measurements:  Wound/Ulcer Descriptions are Pre Debridement except measurements:     Wound 11/29/19 Left tip of great toe (Active)   Number of days: 327 Wound 11/29/19  Right tip of great toe (Active)   Number of days: 327       Wound 11/29/19 Heel Right (Active)   Number of days: 327       Wound 10/14/20 Buttocks Left #1 (Active)   Wound Image   10/14/20 1433   Wound Etiology Diabetic [place] 3 10/14/20 1433   Wound Cleansed Cleansed with saline 10/14/20 1604   Dressing/Treatment Zinc paste 10/14/20 1604   Wound Length (cm) 2.5 cm 10/21/20 1329   Wound Width (cm) 2.1 cm 10/21/20 1329   Wound Depth (cm) 0.2 cm 10/21/20 1329   Wound Surface Area (cm^2) 5.25 cm^2 10/21/20 1329   Change in Wound Size % (l*w) -162.5 10/21/20 1329   Wound Volume (cm^3) 1.05 cm^3 10/21/20 1329   Wound Healing % -425 10/21/20 1329   Post-Procedure Length (cm) 2.5 cm 10/21/20 1358   Post-Procedure Width (cm) 2.1 cm 10/21/20 1358   Post-Procedure Depth (cm) 0.2 cm 10/21/20 1358   Post-Procedure Surface Area (cm^2) 5.25 cm^2 10/21/20 1358   Post-Procedure Volume (cm^3) 1.05 cm^3 10/21/20 1358   Wound Assessment Pale granulation tissue 10/21/20 1329   Drainage Amount Scant 10/21/20 1329   Drainage Description Serous 10/21/20 1329   Odor None 10/21/20 1329   Xena-wound Assessment Fragile 10/21/20 1329   Number of days: 7          Procedure Note  Indications:  Based on my examination of this patient's wound(s)/ulcer(s) today, debridement is required to promote healing and evaluate the wound base. Performed by: KRISTIE Ko CNP    Consent obtained:  Yes    Time out taken:  Yes    Pain Control: Anesthetic  Anesthetic: 4% Lidocaine Liquid Topical     Debridement:Excisional Debridement    Using curette the wound(s)/ulcer(s) was/were sharply debrided down through and including the removal of subcutaneous tissue.         Devitalized Tissue Debrided:  fibrin and biofilm to stimulate bleeding to promote healing, post debridement good bleeding base and wound edges noted    Wound/Ulcer #: 1    Percent of Wound/Ulcer Debrided: 100%    Total Surface Area Debrided:  5 sq cm     Estimated Blood Loss:  Minimal  Hemostasis Achieved:  not needed    Procedural Pain:  3  / 10   Post Procedural Pain:  0 / 10     Response to treatment:  Well tolerated by patient. Plan:   Treatment Note please see attached Discharge Instructions    Written patient dismissal instructions given to patient and signed by patient or POA. Discharge Instructions       Visit Discharge/Physician Orders     Discharge condition: Stable     Assessment of pain at discharge:NONE     Anesthetic used: LIDOCAINE 4%     Discharge to: Home     Left via:Private automobile     Accompanied by: DAUGHTER     ECF/HHA: NA     Dressing Orders:WOUND LOCATION LEFT BUTTOCK CLEANSE WOUND WITH NORMAL SALINE APPLY alginate, DRY DRESSING, KEEP DRY AND APPLY AS NEEDED     Treatment Orders:FOLLOW A NUTRITIOUS DIET HIGH IN PROTEIN AND VITAMIN C TO PROMOTE WOUND HEALING. TAKE A MULTIVITAMIN DAILY.     KEEP PRESSURE OFF THE WOUND AT ALL TIMES           [address] [place] [address],[address] followup visit ______1 WEEK_______________________  (Please note your next appointment above and if you are unable to keep, kindly give a 24 hour notice.  Thank you.)     Physician signature:__________________________        If you experience any of the following, please call the [address] [place] [address] Road during business hours:     * Increase in Pain  * Temperature over 101  * Increase in drainage from your wound  * Drainage with a foul odor  * Bleeding  * Increase in swelling  * Need for compression bandage changes due to slippage, breakthrough drainage.     If you need medical attention outside of the business hours of the [address] [place] [address] Road please contact your PCP or go to the nearest emergency room.           Electronically signed by KRISTIE Kelsey CNP on 10/21/2020 at 2:40 PM

## 2020-11-04 PROBLEM — L89.152 PRESSURE INJURY OF SACRAL REGION, STAGE 2 (HCC): Chronic | Status: ACTIVE | Noted: 2020-02-12

## 2020-11-04 NOTE — TELEPHONE ENCOUNTER
Pt's daughter left message on [phone] [employer] requesting refill of pt's Gabapentin. Pt's last OV 09/16/2020. Pt's daughter requesting 90 day refill to [facility]. Please advise.     Electronically signed by Yazmin Castaneda MA on 11/4/20 at 3:15 PM EST

## 2020-11-04 NOTE — PROGRESS NOTES
[facility] Followup Visit Note    Referring Physician : Domitila Orellana DO   [place] RECORD NUMBER:  [id number]  AGE: 80 y.o. GENDER: female  : 1933  EPISODE DATE:  2020    Subjective:     Chief Complaint   Patient presents with    Wound Check     coccyx      HISTORY of PRESENT ILLNESS WILLIAM Noland is a 80 y.o. female who presents today in regards to follow up evaluation and treatment of wound/ulcer. That patient's past medical, family and social hx were reviewed and changes were made if present. History of Wound Context:  Pressure / shear ulcer      10/14/20 -patient returns to wound care center for follow-up of recurrent sacral decubitus ulceration. She is accompanied by her daughter. The daughter denies any specific trauma that she can remember but may be shearing from when the patient is taken off of a chair. Have been treating with topical antibiotic.    10/21/20- No new issues. Daughter states her mother has issues with offloading. Wound/Ulcer Pain Timing/Severity: constant  Quality of pain: burning  Severity:  3 / 10   Modifying Factors: Pain worsens with presure  Associated Signs/Symptoms: pain    Ulcer Identification:  Ulcer Type: pressure  Contributing Factors: chronic pressure and decreased mobility    Diabetic/Pressure/Non Pressure Ulcers only:  Ulcer: Pressure ulcer, Stage 3    Wound: N/A     20 -patient accompanied by daughter. She states that the wound is smaller and more superficial.  There is no drainage. She is using an alginate.         PAST MEDICAL HISTORY      Diagnosis Date    Anxiety     Depression     Diabetes mellitus ([address]     Frequent urinary tract infections     GERD (gastroesophageal reflux disease)     Hypertension     Peripheral neuropathy     Spinal stenosis      Past Surgical History:   Procedure Laterality Date    CHOLECYSTECTOMY      HIP FRACTURE SURGERY Bilateral     SKIN CANCER EXCISION       No family history on file. Social History     Tobacco Use    Smoking status: Never Smoker    Smokeless tobacco: Never Used   Substance Use Topics    Alcohol use: No    Drug use: No     No Known Allergies  Current Outpatient Medications on File Prior to Encounter   Medication Sig Dispense Refill    nitroGLYCERIN (NITROSTAT) 0.4 MG SL tablet Place 1 tablet under the tongue every 5 minutes as needed for Chest pain up to max of 3 total doses. If no relief after 1 dose, call 911. 25 tablet 3    carvedilol (COREG) 12.5 MG tablet Take 12.5 mg by mouth 2 times daily (with meals)      lisinopril (PRINIVIL;ZESTRIL) 2.5 MG tablet Take 1 tablet by mouth daily 90 tablet 1    diclofenac sodium (VOLTAREN) 1 % GEL Apply 2 g topically 4 times daily 2 Tube 1    Liraglutide (VICTOZA) 18 MG/3ML SOPN SC injection Inject 1.2 mg into the skin daily 18 mL 1    montelukast (SINGULAIR) 10 MG tablet Take 1 tablet by mouth nightly 90 tablet 1    busPIRone (BUSPAR) 5 MG tablet Take 1 tablet by mouth daily 90 tablet 1    furosemide (LASIX) 20 MG tablet Take 1 tablet by mouth 2 times daily 180 tablet 1    blood glucose monitor strips Test 3 times a day & as needed for symptoms of irregular blood glucose. Dispense sufficient amount for indicated testing frequency plus additional to accommodate PRN testing needs.  100 strip 3    Lancets MISC 1 each by Does not apply route 3 times daily 100 each 3    gabapentin (NEURONTIN) 300 MG capsule TAKE 2 CAPSULES THREE TIMES A  capsule 0    atorvastatin (LIPITOR) 40 MG tablet take 1 tablet by mouth at bedtime 90 tablet 1    lansoprazole (PREVACID) 30 MG delayed release capsule take 1 capsule by mouth once daily 90 capsule 1    NYAMYC 664917 UNIT/GM powder apply to affected area three times a day 60 g 1    sertraline (ZOLOFT) 100 MG tablet Take 1.5 tablets by mouth daily 135 tablet 1    insulin lispro, 1 Unit Dial, (HUMALOG KWIKPEN) 100 UNIT/ML SOPN Inject 0-12 Units into the skin 3 times daily *Per Sliding Scale* 32.4 mL 1    BD PEN NEEDLE MUKESH U/F 32G X 4 MM MISC Inject 1 each into the skin 3 times daily 100 each 5    aspirin 81 MG chewable tablet Take 1 tablet by mouth daily 30 tablet 0    Multiple Vitamins-Minerals (THERAPEUTIC MULTIVITAMIN-MINERALS) tablet Take 1 tablet by mouth daily      acetaminophen (TYLENOL) 325 MG tablet Take 650 mg by mouth every 6 hours as needed for Pain      isosorbide mononitrate (IMDUR) 30 MG extended release tablet Take 1 tablet by mouth daily 90 tablet 3    fluticasone (FLONASE) 50 MCG/ACT nasal spray 1 spray by Each Nostril route daily as needed for Rhinitis      mineral oil-hydrophilic petrolatum (HYDROPHOR) ointment Apply 1 g topically daily as needed for Dry Skin (to feet)       Omega-3 Fatty Acids (OMEGA-3 EPA FISH OIL PO) Take 1 capsule by mouth 2 times daily       ipratropium-albuterol (DUONEB) 0.5-2.5 (3) MG/3ML SOLN nebulizer solution Inhale 3 mLs into the lungs every 4 hours as needed for Shortness of Breath 360 mL 3    Calcium-Vitamin D (CALTRATE 600 PLUS-VIT D PO) Take 1 tablet by mouth 2 times daily        No current facility-administered medications on file prior to encounter.         REVIEW OF SYSTEMS See HPI    Objective:    /70   Pulse 80   Temp 97.8 °F (36.6 °C) (Temporal)   Resp 16   LMP  (LMP Unknown)   Wt Readings from Last 3 Encounters:   10/14/20 178 lb (80.7 kg)   09/24/20 178 lb 9.6 oz (81 kg)   09/16/20 178 lb 9.6 oz (81 kg)     PHYSICAL EXAM  CONSTITUTIONAL:   Awake, alert, cooperative   EYES:  lids and lashes normal   ENT: external ears and nose without lesions   NECK:  supple, symmetrical, trachea midline   SKIN:  Open wound Present    Assessment:     Problem List Items Addressed This Visit     * (Principal) Pressure injury of sacral region, stage 2 ([address] (Chronic)          Pre Debridement Measurements:  Are located in the [facility]  Documentation Flow Sheet  Post Debridement Measurements:  Wound/Ulcer Descriptions are Pre Debridement except measurements:     Wound 11/29/19 Left tip of great toe (Active)   Number of days: 341       Wound 11/29/19  Right tip of great toe (Active)   Number of days: 341       Wound 11/29/19 Heel Right (Active)   Number of days: 341       Wound 10/14/20 Buttocks Left #1 (Active)   Wound Image   10/14/20 1433   Wound Etiology Diabetic [place] 3 10/14/20 1433   Wound Cleansed Cleansed with saline 10/14/20 1604   Dressing/Treatment Alginate;Dry dressing 10/21/20 1454   Wound Length (cm) 1.5 cm 11/04/20 1448   Wound Width (cm) 0.7 cm 11/04/20 1448   Wound Depth (cm) 0.2 cm 11/04/20 1448   Wound Surface Area (cm^2) 1.05 cm^2 11/04/20 1448   Change in Wound Size % (l*w) 47.5 11/04/20 1448   Wound Volume (cm^3) 0.21 cm^3 11/04/20 1448   Wound Healing % -5 11/04/20 1448   Post-Procedure Length (cm) 2.5 cm 10/21/20 1358   Post-Procedure Width (cm) 2.1 cm 10/21/20 1358   Post-Procedure Depth (cm) 0.2 cm 10/21/20 1358   Post-Procedure Surface Area (cm^2) 5.25 cm^2 10/21/20 1358   Post-Procedure Volume (cm^3) 1.05 cm^3 10/21/20 1358   Wound Assessment Fibrin;Pale granulation tissue 11/04/20 1448   Drainage Amount Scant 11/04/20 1448   Drainage Description Serous 11/04/20 1448   Odor None 11/04/20 1448   [place]-wound Assessment Fragile 11/04/20 1448   Number of days: 21          Procedure Note  Indications:  Based on my examination of this patient's wound(s)/ulcer(s) today, debridement is required to promote healing and evaluate the wound base. Performed by: Romulo Chester MD    Consent obtained:  Yes    Time out taken:  Yes    Pain Control: Anesthetic  Anesthetic: 4% Lidocaine Liquid Topical     Debridement:Excisional Debridement    Using curette the wound(s)/ulcer(s) was/were sharply debrided down through and including the removal of .         Devitalized Tissue Debrided:  to stimulate bleeding to promote healing, post debridement good bleeding base and wound edges noted    Wound/Ulcer #: 1    Percent of Wound/Ulcer Debrided: 0%    Total Surface Area Debrided:  0 sq cm     Estimated Blood Loss:  Minimal  Hemostasis Achieved:  not needed    Procedural Pain:    / 10   Post Procedural Pain:   / 10     Response to treatment:  Well tolerated by patient. 11/4/20 -I reviewed with the patient and her daughter that as the wound is very small and has the patient is very weak and difficult to transport, that she can continue monthly visits. I do not feel that she is going to benefit from weekly debridements. I asked him to call with any changes and we can see her sooner. Plan:   Treatment Note please see attached Discharge Instructions    Written patient dismissal instructions given to patient and signed by patient or POA. Discharge Instructions       Visit Discharge/Physician Orders     Discharge condition: Stable     Assessment of pain at discharge:NONE     Anesthetic used: LIDOCAINE 4%     Discharge to: Home     Left via:Private automobile     Accompanied by: DAUGHTER     [facility]: [facility]     Dressing Orders:WOUND LOCATION LEFT BUTTOCK CLEANSE WOUND WITH NORMAL SALINE APPLY alginate, DRY DRESSING, KEEP DRY AND APPLY AS NEEDED     Treatment Orders:FOLLOW A NUTRITIOUS DIET HIGH IN PROTEIN AND VITAMIN C TO PROMOTE WOUND HEALING. TAKE A MULTIVITAMIN DAILY.     KEEP PRESSURE OFF THE WOUND AT ALL TIMES           [facility] followup visit ______1 month_______________________  (Please note your next appointment above and if you are unable to keep, kindly give a 24 hour notice.  Thank you.)     Physician signature:__________________________        If you experience any of the following, please call the [facility] [address] during business hours:     * Increase in Pain  * Temperature over 101  * Increase in drainage from your wound  * Drainage with a foul odor  * Bleeding  * Increase in swelling  * Need for compression bandage changes due to slippage, breakthrough drainage.     If you need medical attention outside

## 2020-11-05 NOTE — TELEPHONE ENCOUNTER
Pt's daughter Zana Barthel called stating that [facility] will not have the Gabapentin to them in time. Pt will be out today. Zana Barthel asking for a short term Rx to be sent to [facility]] until the mail order arrives. Please advise.     Electronically signed by Oralia Cates MA on 11/5/20 at 2:21 PM EST

## 2020-11-12 NOTE — TELEPHONE ENCOUNTER
Patient's daughter brought in [place]' urine, states there is a strong odor. UA done, results are normal with trace of leukocytes. Please advise.     GLU: negative  PUNEET: negative  KET: negative  S.025  BLO: negative  PH:  7.0  PRO: negative  URO: 0.2  NIT: negative  KIRSTIN: trace

## 2020-11-13 NOTE — TELEPHONE ENCOUNTER
John Gore informed. She states that patient is having no symptoms,except urine has odor, does not think that pyridium is necessary at this time. Will call back with further problems/questions.

## 2020-11-16 PROBLEM — E66.9 OBESITY (BMI 30-39.9): Status: ACTIVE | Noted: 2020-02-10

## 2020-11-16 PROBLEM — E87.5 HYPERKALEMIA: Status: ACTIVE | Noted: 2020-01-01

## 2020-11-16 PROBLEM — F32.A ANXIETY AND DEPRESSION: Status: ACTIVE | Noted: 2020-01-01

## 2020-11-16 PROBLEM — U07.1 COVID-19: Status: ACTIVE | Noted: 2020-01-01

## 2020-11-16 PROBLEM — F41.9 ANXIETY AND DEPRESSION: Status: ACTIVE | Noted: 2020-01-01

## 2020-11-16 NOTE — ED PROVIDER NOTES
Patient presents by EMS from home after concern for fatigue and fever. According to EMS her family and health aides have noticed the patient has become more fatigued of late. Triage found a fever 100.7. EMS states that the patient's daughter is a nurse and has been concerned about her urine and states that there has been a pungent smell to it. Patient's only complaints at this time have been chest pain and cough. EMS gave her aspirin for a total of 324 for today. Patient is satting at 90% on room air. There was some concern about her being hypoxic with EMS however they now believe it was due to the patient's nail polish. Patient states her chest pain is over the center of her chest and has been relieved after the aspirin. She states her cough is productive of white sputum. She denies any shortness of breath aside from when she has a coughing spell. Patient states that she has been feeling sick for the past day or so with a getting worse this morning. Patient denies any syncope, nausea, vomiting, abdominal pain, or diarrhea. The history is provided by the patient. No  was used. Chest Pain   Pain location:  Substernal area  Pain quality: tightness    Pain radiates to:  Does not radiate  Pain severity:  Moderate  Onset quality:  Gradual  Duration:  1 day  Timing:  Constant  Progression:  Resolved  Chronicity:  New  Relieved by:  Aspirin  Worsened by:  Nothing  Associated symptoms: cough, fatigue and fever    Associated symptoms: no abdominal pain, no back pain, no headache, no nausea, no near-syncope, no shortness of breath, no syncope, no vomiting and no weakness         Review of Systems   Constitutional: Positive for fatigue and fever. Negative for chills. HENT: Negative for ear pain, sinus pressure and sore throat. Eyes: Negative for pain, discharge and redness. Respiratory: Positive for cough. Negative for shortness of breath and wheezing.     Cardiovascular: Positive for chest pain. Negative for syncope and near-syncope. Gastrointestinal: Negative for abdominal distention, abdominal pain, diarrhea, nausea and vomiting. Genitourinary: Negative for dysuria and frequency. Musculoskeletal: Negative for arthralgias and back pain. Skin: Negative for rash and wound. Neurological: Negative for syncope, weakness, light-headedness and headaches. Hematological: Negative for adenopathy. All other systems reviewed and are negative. Physical Exam  Vitals signs and nursing note reviewed. Constitutional:       General: She is not in acute distress. Appearance: She is well-developed. She is obese. She is not diaphoretic. HENT:      Head: Normocephalic and atraumatic. Eyes:      Conjunctiva/sclera: Conjunctivae normal.   Neck:      Musculoskeletal: Normal range of motion and neck supple. Cardiovascular:      Rate and Rhythm: Normal rate and regular rhythm. Pulses: Normal pulses. Heart sounds: Normal heart sounds. No murmur. Pulmonary:      Effort: Pulmonary effort is normal. No respiratory distress. Breath sounds: Normal breath sounds. No wheezing or rales. Abdominal:      General: Bowel sounds are normal.      Palpations: Abdomen is soft. Tenderness: There is no abdominal tenderness. There is no guarding or rebound. Musculoskeletal:         General: Swelling present. Skin:     General: Skin is warm and dry. Neurological:      Mental Status: She is alert and oriented to person, place, and time. Cranial Nerves: No cranial nerve deficit. Coordination: Coordination normal.          Procedures     MDM  Number of Diagnoses or Management Options  Acute respiratory failure with hypoxia ([facility] [address]):   COVID-19 virus IgG antibody detected:   Diagnosis management comments: Patient presented with shortness of breath, chest pain, and fatigue. Patient was hypoxic at 90% on room air. Patient was placed on oxygen with improvement in her sats. She also had a fever of 100.7. Patient was given Tylenol. EKG revealed a first-degree AV block but no ST elevations. CBC revealed a decrease in her lymphocytes however was otherwise unremarkable. BMP revealed a mildly elevated creatinine however this was at the patient's baseline and an elevated potassium however it was hemolyzed. Patient's troponin was negative. BNP was elevated to 1527. Patient's urinalysis was clear and her lactate was not elevated as well. Patient did test positive for Covid. Patient will be admitted for further evaluation and treatment. Patient informed the results and plan and is agreeable.        Amount and/or Complexity of Data Reviewed  Clinical lab tests: reviewed  Tests in the radiology section of CPT®: reviewed  Tests in the medicine section of CPT®: reviewed         ED Course as of Nov 18 1348 Mon Nov 16, 2020   0854 EKG Interpretation    Interpreted by emergency department physician    Rhythm: normal sinus   Rate: 98  Axis: left  Ectopy: none  Conduction: 1st degree AV block  ST Segments: no acute change  T Waves: no acute change  Q Waves: none    Clinical Impression: sinus arrhythmia and 1st degree AV block    Patricio Williamson      [SHAVONNE]      ED Course User Index  [BB] Patricio Williamson DO      ED Course as of Nov 18 1348 Mon Nov 16, 2020   0854 EKG Interpretation    Interpreted by emergency department physician    Rhythm: normal sinus   Rate: 98  Axis: left  Ectopy: none  Conduction: 1st degree AV block  ST Segments: no acute change  T Waves: no acute change  Q Waves: none    Clinical Impression: sinus arrhythmia and 1st degree AV block    Patricio Williamson      [BB]      ED Course User Index  [BB] Patricio Williamson DO       --------------------------------------------- PAST HISTORY ---------------------------------------------  Past Medical History:  has a past medical history of Anxiety, Depression, Diabetes mellitus ([facility] [address] Frequent urinary tract infections, GERD (gastroesophageal reflux disease), Hypertension, Peripheral neuropathy, and Spinal stenosis. Past Surgical History:  has a past surgical history that includes Cholecystectomy; Skin cancer excision; and Hip fracture surgery (Bilateral). Social History:  reports that she has never smoked. She has never used smokeless tobacco. She reports that she does not drink alcohol or use drugs. Family History: family history is not on file. The patients home medications have been reviewed. Allergies: Patient has no known allergies.     -------------------------------------------------- RESULTS -------------------------------------------------    LABS:  Results for orders placed or performed during the hospital encounter of 11/16/20   Culture, Urine    Specimen: Urine, clean catch   Result Value Ref Range    Organism Escherichia coli (A)     Urine Culture, Routine >100,000 CFU/ml        Susceptibility    Escherichia coli - BACTERIAL SUSCEPTIBILITY PANEL BY PEDRITO     ampicillin <=^2 Sensitive mcg/mL     ceFAZolin <=^4 Sensitive mcg/mL     cefepime <=^0.12 Sensitive mcg/mL     cefTRIAXone <=^0.25 Sensitive mcg/mL     Confirmatory Extended Spectrum Beta-Lactamase Neg  mcg/mL     ertapenem <=^0.12 Sensitive mcg/mL     gentamicin <=^1 Sensitive mcg/mL     levofloxacin <=^0.12 Sensitive mcg/mL     nitrofurantoin <=^16 Sensitive mcg/mL     piperacillin-tazobactam <=^4 Sensitive mcg/mL     trimethoprim-sulfamethoxazole <=^20 Sensitive mcg/mL   Respiratory Panel, Molecular, with COVID-19 (Restricted: peds pts or suitable admitted adults)    Specimen: Nasopharyngeal   Result Value Ref Range    Adenovirus by PCR Not Detected Not Detected    Bordetella parapertussis by PCR Not Detected Not Detected    Bordetella pertussis by PCR Not Detected Not Detected    Chlamydophilia pneumoniae by PCR Not Detected Not Detected    Coronavirus 229E by PCR Not Detected Not Detected    Coronavirus HKU1 by PCR Not Detected Not Detected    Coronavirus NL63 by PCR Not Detected Not Detected    Coronavirus OC43 by PCR Not Detected Not Detected    SARS-CoV-2, PCR DETECTED (A) Not Detected    Human Metapneumovirus by PCR Not Detected Not Detected    Human Rhinovirus/Enterovirus by PCR Not Detected Not Detected    Influenza A by PCR Not Detected Not Detected    Influenza B by PCR Not Detected Not Detected    Mycoplasma pneumoniae by PCR Not Detected Not Detected    Parainfluenza Virus 1 by PCR Not Detected Not Detected    Parainfluenza Virus 2 by PCR Not Detected Not Detected    Parainfluenza Virus 3 by PCR Not Detected Not Detected    Parainfluenza Virus 4 by PCR Not Detected Not Detected    Respiratory Syncytial Virus by PCR Not Detected Not Detected   Culture, Blood 1    Specimen: Blood   Result Value Ref Range    Blood Culture, Routine 24 Hours no growth    Culture, Blood 2    Specimen: Blood   Result Value Ref Range    Culture, Blood 2 24 Hours no growth    CBC Auto Differential   Result Value Ref Range    WBC 4.5 4.5 - 11.5 E9/L    RBC 3.44 (L) 3.50 - 5.50 E12/L    Hemoglobin 11.6 11.5 - 15.5 g/dL    Hematocrit 36.2 34.0 - 48.0 %    .2 (H) 80.0 - 99.9 fL    MCH 33.7 26.0 - 35.0 pg    MCHC 32.0 32.0 - 34.5 %    RDW 12.9 11.5 - 15.0 fL    Platelets 040 (L) 383 - 450 E9/L    MPV 9.1 7.0 - 12.0 fL    Neutrophils % 64.1 43.0 - 80.0 %    Immature Granulocytes % 0.2 0.0 - 5.0 %    Lymphocytes % 23.5 20.0 - 42.0 %    Monocytes % 10.9 2.0 - 12.0 %    Eosinophils % 1.1 0.0 - 6.0 %    Basophils % 0.2 0.0 - 2.0 %    Neutrophils Absolute 2.89 1.80 - 7.30 E9/L    Immature Granulocytes # 0.01 E9/L    Lymphocytes Absolute 1.06 (L) 1.50 - 4.00 E9/L    Monocytes Absolute 0.49 0.10 - 0.95 E9/L    Eosinophils Absolute 0.05 0.05 - 0.50 E9/L    Basophils Absolute 0.01 0.00 - 0.20 I2/B   Basic Metabolic Panel w/ Reflex to MG   Result Value Ref Range    Sodium 138 132 - 146 mmol/L    Potassium reflex Magnesium 5.9 (H) 3.5 - 5.0 mmol/L    Chloride 100 98 - 107 mmol/L    CO2 28 22 - 29 mmol/L Anion Gap 10 7 - 16 mmol/L    Glucose 140 (H) 74 - 99 mg/dL    BUN 30 (H) 8 - 23 mg/dL    CREATININE 1.7 (H) 0.5 - 1.0 mg/dL    GFR Non-African American 28 >=60 mL/min/1.73    GFR African American 34     Calcium 9.3 8.6 - 10.2 mg/dL   Troponin   Result Value Ref Range    Troponin 0.03 0.00 - 0.03 ng/mL   Brain Natriuretic Peptide   Result Value Ref Range    Pro-BNP 1,527 (H) 0 - 450 pg/mL   Urinalysis, reflex to microscopic   Result Value Ref Range    Color, UA Yellow Straw/Yellow    Clarity, UA Clear Clear    Glucose, Ur Negative Negative mg/dL    Bilirubin Urine Negative Negative    Ketones, Urine Negative Negative mg/dL    Specific Gravity, UA 1.015 1.005 - 1.030    Blood, Urine Negative Negative    pH, UA 7.5 5.0 - 9.0    Protein, UA Negative Negative mg/dL    Urobilinogen, Urine 0.2 <2.0 E.U./dL    Nitrite, Urine Negative Negative    Leukocyte Esterase, Urine TRACE (A) Negative   Lactate, Sepsis   Result Value Ref Range    Lactic Acid, Sepsis 1.2 0.5 - 1.9 mmol/L   Lactate, Sepsis   Result Value Ref Range    Lactic Acid, Sepsis 0.9 0.5 - 1.9 mmol/L   Microscopic Urinalysis   Result Value Ref Range    WBC, UA 5-10 (A) 0 - 5 /HPF    RBC, UA NONE 0 - 2 /HPF    Bacteria, UA FEW (A) None Seen /HPF    Amorphous, UA RARE    Potassium   Result Value Ref Range    Potassium 5.5 (H) 3.5 - 5.0 mmol/L   CK   Result Value Ref Range    Total  20 - 180 U/L   CK-MB   Result Value Ref Range    CK-MB 4.9 (H) 0.0 - 4.3 ng/mL   Troponin   Result Value Ref Range    Troponin 0.15 (H) 0.00 - 0.03 ng/mL   Ferritin   Result Value Ref Range    Ferritin 69 ng/mL   Lactate dehydrogenase   Result Value Ref Range     (H) 135 - 214 U/L   C-REACTIVE PROTEIN   Result Value Ref Range    CRP 0.7 (H) 0.0 - 0.4 mg/dL   MISCELLANEOUS SENDOUT 1   Result Value Ref Range    test code IL-6     This Test Sent To [facility]    Basic Metabolic Panel w/ Reflex to MG   Result Value Ref Range    Sodium 142 132 - 146 mmol/L    Potassium reflex Magnesium 4.1 3.5 - 5.0 mmol/L    Chloride 104 98 - 107 mmol/L    CO2 25 22 - 29 mmol/L    Anion Gap 13 7 - 16 mmol/L    Glucose 162 (H) 74 - 99 mg/dL    BUN 29 (H) 8 - 23 mg/dL    CREATININE 1.4 (H) 0.5 - 1.0 mg/dL    GFR Non-African American 36 >=60 mL/min/1.73    GFR African American 43     Calcium 9.0 8.6 - 10.2 mg/dL   CBC   Result Value Ref Range    WBC 3.2 (L) 4.5 - 11.5 E9/L    RBC 3.05 (L) 3.50 - 5.50 E12/L    Hemoglobin 10.3 (L) 11.5 - 15.5 g/dL    Hematocrit 31.5 (L) 34.0 - 48.0 %    .3 (H) 80.0 - 99.9 fL    MCH 33.8 26.0 - 35.0 pg    MCHC 32.7 32.0 - 34.5 %    RDW 12.7 11.5 - 15.0 fL    Platelets 244 (L) 131 - 450 E9/L    MPV 9.1 7.0 - 12.0 fL   Lipid panel   Result Value Ref Range    Cholesterol, Total 83 0 - 199 mg/dL    Triglycerides 87 0 - 149 mg/dL    HDL 38 >40 mg/dL    LDL Calculated 28 0 - 99 mg/dL    VLDL Cholesterol Calculated 17 mg/dL   Hemoglobin A1c   Result Value Ref Range    Hemoglobin A1C 6.8 (H) 4.0 - 5.6 %   Troponin   Result Value Ref Range    Troponin 0.09 (H) 0.00 - 0.03 ng/mL   CK   Result Value Ref Range    Total  20 - 180 U/L   CK-MB   Result Value Ref Range    CK-MB 4.5 (H) 0.0 - 4.3 ng/mL   CBC Auto Differential   Result Value Ref Range    WBC 4.8 4.5 - 11.5 E9/L    RBC 3.35 (L) 3.50 - 5.50 E12/L    Hemoglobin 11.2 (L) 11.5 - 15.5 g/dL    Hematocrit 34.6 34.0 - 48.0 %    .3 (H) 80.0 - 99.9 fL    MCH 33.4 26.0 - 35.0 pg    MCHC 32.4 32.0 - 34.5 %    RDW 12.9 11.5 - 15.0 fL    Platelets 130 (L) 655 - 450 E9/L    MPV 9.4 7.0 - 12.0 fL    Neutrophils % 71.1 43.0 - 80.0 %    Immature Granulocytes % 0.4 0.0 - 5.0 %    Lymphocytes % 24.6 20.0 - 42.0 %    Monocytes % 3.1 2.0 - 12.0 %    Eosinophils % 0.6 0.0 - 6.0 %    Basophils % 0.2 0.0 - 2.0 %    Neutrophils Absolute 3.43 1.80 - 7.30 E9/L    Immature Granulocytes # 0.02 E9/L    Lymphocytes Absolute 1.19 (L) 1.50 - 4.00 E9/L    Monocytes Absolute 0.15 0.10 - 0.95 E9/L    Eosinophils Absolute 0.03 (L) 0.05 - 0.50 degrees       RADIOLOGY:  XR CHEST PORTABLE   Final Result   Subtle opacities in the left mid and lower lung field that could represent   pneumonia.               ------------------------- NURSING NOTES AND VITALS REVIEWED ---------------------------  Date / Time Roomed:  11/16/2020  7:52 AM  ED Bed Assignment:  [id number]    The nursing notes within the ED encounter and vital signs as below have been reviewed. Patient Vitals for the past 24 hrs:   BP Temp Temp src Pulse Resp SpO2 Weight   11/18/20 0945 132/80 96.7 °F (35.9 °C) Oral 78 18 91 % --   11/18/20 0308 130/60 96.2 °F (35.7 °C) Temporal 67 20 92 % 178 lb 2 oz (80.8 kg)   11/18/20 0000 126/64 96.2 °F (35.7 °C) Axillary 67 20 94 % --   11/17/20 1545 123/62 95.9 °F (35.5 °C) Temporal 68 21 97 % --       Oxygen Saturation Interpretation: Abnormal    ------------------------------------------ PROGRESS NOTES ------------------------------------------  Re-evaluation(s):  Time: 5877  Patients symptoms show no change  Repeat physical examination is not changed    Counseling:  I have spoken with the patient and discussed todays results, in addition to providing specific details for the plan of care and counseling regarding the diagnosis and prognosis. Their questions are answered at this time and they are agreeable with the plan of admission.    --------------------------------- ADDITIONAL PROVIDER NOTES ---------------------------------  Consultations:  Time: 1220. Spoke with April for Dr. Elio Garcia. Discussed case. They will admit the patient. This patient's ED course included: a personal history and physicial examination, multiple bedside re-evaluations, cardiac monitoring and continuous pulse oximetry    This patient has remained hemodynamically stable during their ED course. Diagnosis:  1. Acute respiratory failure with hypoxia ([address]    2. COVID-19    3.  Acute cystitis without hematuria        Disposition:  Patient's disposition: Admit to with hypoxia ([facility] [address] Diagnoses of COVID-19 and Acute cystitis without hematuria were also pertinent to this visit.   Current Discharge Medication List        Garrett Son, [address], DO  11/18/20 5489

## 2020-11-16 NOTE — ED NOTES
Bed: 06  Expected date:   Expected time:   Means of arrival:   Comments:  Terminal clean     Aime Sterling RN  11/16/20 1599

## 2020-11-16 NOTE — LETTER
Beneficiary Notification Letter  BPCI Advanced     Your Doctor or [address],    We wanted to let you know that your health care provider, [address] [place] [address] Giovany, has volunteered to take part in our [facility] for [facility] & Medicaid Services (CMS) Bundled Payments for [facility] (BPCI Advanced). This doesnt change your Medicare rights or benefits and you dont need to do anything. What are bundled payments? A bundled payment combines, or bundles together, payments that Medicare makes to your health care providers for the many different kinds of medical services you might get in a specific time period. In BPCI Advanced, this time period could include a hospital inpatient stay or outpatient procedure, plus 90 days. Why would Medicare bundle payments? Bundled payments are thought of as a value-based way to pay because health care providers are responsible for both the quality and cost of medical care they give. This is a relatively new way of paying health care providers compared to thefee-for-service way Medicare has traditionally paid, where providers are paid separately for each service they provide. Bundled payments encourage these providers to work together to provide better, more coordinated care during your hospital stay, or outpatient procedure, and through your recovery. What does BPCI Advance mean for me? Youre more likely to get even better care when hospitals, doctors, and other health care providers work together. In BPCI Advanced, hospitals, doctors, and other health care providers may be rewarded for providing better, more coordinated health care. Medicare will watch BPCI Advanced participants closely to make sure that you and other patients keep getting efficient, high quality care. What do I need to know about BPCI Advanced?   Whats most important for you to know is that your Medicare rights and benefits wont change because your health care provider is participating in [facility]. Medicare will keep covering all of your medically necessary services. Even though Medicare will pay your doctor in a different way under BPCI Advanced, how much you have to pay wont change. Health care providers and suppliers who are enrolled in Medicare will submit their Medicare claims like they always have. Youll have all the same Medicare rights and protections, including the right to choose which hospital, doctor, or other health care provider you see. If you dont want to get care from a health care provider whos participating in [facility], then youll have to choose a different health care provider whos not participating in the Model. How can I give feedback about my health care? Medicare might ask you to take a voluntary survey about the services and care you received from [address] [place] [address] during your hospital stay or outpatient procedure and for a specific period of time afterwards. You can decide whether you want to take the voluntary survey, but if you do, itll help Medicare make BPCI Advanced and the care of other Medicare patients better. If you have concerns or complaints about your care, you can:   · Talk to your doctor or health care provider. · Contact your Beneficiary and Family Centered Care Quality Improvement   Organization [facility]). You can get your [employer]s phone number  at  Medicare.gov/contacts or by calling 1-800-MEDICARE. TTY users can call  1-153.360.6305. Where can I learn more about BPCI Advanced? Learn more about BPCI Advanced at https://innovation.cms.gov/initiatives/bpci-advanced/:  · A list of all the hospitals and physician group practices in the country participating in [address] [place] [address]. · All of the inpatient and outpatient Clinical Episodes that are currently included under BPCI Advanced.  A

## 2020-11-16 NOTE — CARE COORDINATION
Social Work /Discharge Planning:    Pt presents to the ED secondary to fever and fatigue. Pt is from home. Pt is positive for COVID19. ACP documentation completed with pt. SW met with pt who was alert and oriented x3, answering questions appropriately. Pt reports living alone in a one floor home with a ramp at the entrance. Pt has a wheeled walker, wheel chair and transport chair at home. Pt reports she has private duty caretakers 7 days a week including her daughters who regularly assist her at home. Pt has hx at [facility] and with [facility]. Pt reports her PCP is Dr Alphonse Martinez and her daughters provide transportation to her medical appointments. Pt reports she does not walk but is able to stand and pivot. SW also spoke with pt's daughters, both who have POA. They report tentative plan will be for pt to return home upon discharge but would like to see how pt does with PT/OT ailyn. Assigned SW/CM to follow.

## 2020-11-16 NOTE — CONSULTS
Pulmonary Consultation    Admit Date: 2020    Requesting Physician: Humberto Zhang DO    CC: Covid positive    HPI:  This is a 80 y.o. female non-smoker with history of anxiety/depression, DM, GERD, HTN, sacral decubiti being monitored at the wound center who presented with chest pain. She took 2 nitroglycerin and thought it was gone. She continues to cough he came to the hospital.  Now she is on oxygen. She denies any history of asthma or wheezing. She uses a walker or a wheelchair when she feels weak. Saturation 90% on room air  She says she is not sure where she got this virus from she says she has been careful. PMH:    Past Medical History:   Diagnosis Date    Anxiety     Depression     Diabetes mellitus ([address]     Frequent urinary tract infections     GERD (gastroesophageal reflux disease)     Hypertension     Peripheral neuropathy     Spinal stenosis    Arthritis      PSH:   Past Surgical History:   Procedure Laterality Date    CHOLECYSTECTOMY      HIP FRACTURE SURGERY Bilateral     SKIN CANCER EXCISION            Medications:       sodium chloride flush  10 mL Intravenous 2 times per day    cefTRIAXone (ROCEPHIN) IV  1 g Intravenous Q24H    albuterol sulfate HFA  2 puff Inhalation Q6H    vitamin C  500 mg Oral Daily    zinc sulfate  50 mg Oral Daily    dexamethasone  6 mg Intravenous Daily       Allergies:  No Known Allergies    Social History:  She lives with her  she is  with 4 kids 12 grandkids and 7 great grandkids   She does not smoke or drink 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 Family history: Father had coalminer's lung Mom  of heart problems    Respiratory ROS: positive for - cough Otherwise, a complete review of systems is undertaken and is negative.        Physical Examination    Vitals:  VITALS:  BP (!) 111/52   Pulse 80   Temp 100.7 °F (38.2 °C)   Resp 20 LMP  (LMP Unknown)   SpO2 96%   24HR INTAKE/OUTPUT:  No intake or output data in the 24 hours ending 11/16/20 1244    Saturating 95% on 4 L T-max 100.7  General: No distress. Alert. Eyes: PERRL. No sclera icterus. ENT: No discharge. Pharynx clear. Nasal septum midline   Neck: Trachea midline. Normal thyroid. no JVD  Resp: No accessory muscle use. No crackles. No wheezing. No rhonchi. Symmetrical exansion  CV: PMI non displaced. Regular rate. Regular rhythm. No mumur or rub. ABD: Non-tender. Non-distended. No organmegaly. Normal bowel sounds. Skin: Warm and dry. No rash on exposed extremities. Lymph: No cervical LAD. No supraclavicular LAD. Ext: No joint deformity. No clubbing. No cyanosis. No edema  Neuro: Awake. Follows commands. No focal deficits. Moves all ext     Lab Results:    CBC:   Recent Labs     11/16/20  0818   WBC 4.5   HGB 11.6   HCT 36.2   .2*   *     BMP:   Recent Labs     11/16/20  0818      K 5.9*      CO2 28   BUN 30*   CREATININE 1.7*      ALB:3,BILIDIR:3,BILITOT:3,ALKPHOS:3)@  PT/INR: No results for input(s): PROTIME, INR in the last 72 hours. Cultures:  -    Films:  CXR dated 11/16/2020 was reviewed by myself and shows bilateral infiltrates          Assessment/Plan:   80y.o. year old female history of obesity, HTN, CKD stage III, DM on insulin with peripheral neuropathy, anxiety/depression with spinal stenosis, PE 10/2019 on Eliquis who presented with shortness of breath    Covid positive  Elevated proBNP  Chest x-ray showing opacities left midlung and lower lung field      1. Acute hypoxic respiratory failure has been started on dexamethasone and remdesivir  2. Covid pneumonitis  3. Hyperkalemia  4. H/o Dysphasia on thickened diet  5. pulmonary embolus CTA 10/21/2019 Showing subsegmental PE LLL. On Eliquis   6. CKD stage III  7. VHD echo showing EF 45% moderate LVH mild left aortic stenosis  10/22/2019  8.  CAD s/p NSTEMI 11/22/2019 EF 40 to 45% sress test 10/23/2019 EF 40% no reversible defects fixed few perfusion defects from apex to anteroseptal and inferior lateral walls. 9. Hyperlipidemia  10. Anemia   11. GERD   12. Obesity  13. DM with peripheral neuropathy  14. Depression/anxiety on BuSpar  15. Hypertension on carvedilol  16. History of bilateral hip replacements with gait instability  17. Recent admission/visits  · 9/2019 UTI acute respiratory failure treated with antibiotics IV diuretics  · 10/ 20/2019 chest pain with elevated troponins found to have PE. Treated with Eliquis. · 11/22 -11/24/2019. Patient was admitted with chest pain. Non-ST elevated MI. Medications were adjusted. patient was started on Ranexa and Imdur, and Lasix 40 mg a day  · 11/25 2019 seen by cardiology      Watch LFTs and renal function on remdesivir  Has been started on zinc vitamin D CP 1  Follow inflammatory markers  Repeat potassium  Thanks for letting us see this patient in consultation. Please contact us with any questions.       Electronically signed by Silvino Rivas MD on 11/16/2020 at 12:44 PM

## 2020-11-16 NOTE — ACP (ADVANCE CARE PLANNING)
patient desire the use of ventilator (breathing machine)?: Yes      Resuscitation  \"CPR works best to restart the heart when there is a sudden event, like a heart attack, in someone who is otherwise healthy. Unfortunately, CPR does not typically restart the heart for people who have serious health conditions or who are very sick. \"    \"In the event your heart stopped as a result of an underlying serious health condition, would you want attempts to be made to restart your heart (answer \"yes\" for attempt to resuscitate) or would you prefer a natural death (answer \"no\" for do not attempt to resuscitate)? \" yes      NOTE: If the patient has a valid advance directive AND now provides care preference(s) that are inconsistent with that prior directive, advise the patient to consider either: creating a new advance directive that complies with state-specific requirements; or, if that is not possible, orally revoking that prior directive in accordance with state-specific requirements, which must be documented in the EHR. [] Yes   [x] No   Educated Patient / Dona Koch regarding differences between Advance Directives and portable DNR orders.     Length of ACP Conversation in minutes:      Conversation Outcomes:  [x] ACP discussion completed  [] Existing advance directive reviewed with patient; no changes to patient's previously recorded wishes  [] New Advance Directive completed  [] Portable Do Not Rescitate prepared for Provider review and signature  [] POLST/POST/MOLST/MOST prepared for Provider review and signature      Follow-up plan:    [] Schedule follow-up conversation to continue planning  [] Referred individual to Provider for additional questions/concerns   [] Advised patient/agent/surrogate to review completed ACP document and update if needed with changes in condition, patient preferences or care setting    [x] This note routed to one or more involved healthcare providers

## 2020-11-17 NOTE — PROGRESS NOTES
Physical Therapy  Physical Therapy Initial Assessment     Name: Deb Steward  : 1933  MRN: [id number]    Referring Provider: KRISTIE Matthews CNP    Date of Service: 2020    Evaluating PT: Silvino Rasheed, PT, DPT, [id number]    Room #: 9710/2890-V  Diagnosis: Acute respiratory failure with hypoxia  PMHx/PSHx: Peripheral neuropathy, HTN, depression, anxiety, DM, spinal stenosis  Precautions: Fall risk, Droplet Plus Isolation (COVID-19), 3 L O2/min, TAPS, zaragoza, alarm    SUBJECTIVE:    Pt lives alone in a single story house with ramped entry. Bed is on first floor and bath is on first floor. Pt ambulated with [employer] and required assistance for all mobility prior to admission. Family visits daily and provides assistance. OBJECTIVE:   Initial Evaluation  Date: 20 Treatment Date: Short Term/ Long Term   Goals   AM-PAC 6 Clicks      Was pt agreeable to Eval/treatment? Yes     Does pt have pain? No current complaints of pain     Bed Mobility  Rolling: NT  Supine to sit: Max A  Sit to supine: Max A  Scooting: Max A to EOB  Rolling: SBA  Supine to sit: SBA  Sit to supine: SBA  Scooting: SBA   Transfers Sit to stand: NT  Stand to sit: NT  Stand pivot: NT  Sit to stand: Mod A  Stand to sit: Mod A  Stand pivot: Mod A with [employer]   Ambulation   NT  >10 feet with [employer] with Mod A   Stair negotiation: ascended and descended NT  NA   ROM BUE: Refer to OT note  BLE: WFL     Strength BUE: Refer to OT note  BLE: NT     Balance Sitting EOB: SBA  Dynamic Standing: NT  Sitting EOB: Independent   Dynamic Standing: Mod A with [employer]     Pt is A & O x: 4 to person, place, month/year, and situation. Sensation: Pt denies numbness and tingling of extremities. Edema: Unremarkable. Patient education  Pt educated on PT role in acute care setting.     Patient response to education:   Pt verbalized understanding Pt demonstrated skill Pt requires further education in this area   Yes  NA No     ASSESSMENT:    Vitals on 3 L devices or modalities to obtain goals. Patient and family education will also be administered as needed. Frequency of treatments: 2-5x/week x 3-5 days. Time in: 1400  Time out: 1425    Total Treatment Time 20 minutes     Evaluation Time includes thorough review of current medical information, gathering information on past medical history/social history and prior level of function, completion of standardized testing/informal observation of tasks, assessment of data and education on plan of care and goals.     CPT codes:  [] Low Complexity PT evaluation 05801  [x] Moderate Complexity PT evaluation 10870  [] High Complexity PT evaluation 85353  [] PT Re-evaluation 44803  [] Gait training 87826 0 minutes  [] Manual therapy 96748 0 minutes  [x] Therapeutic activities 63101 20 minutes  [] Therapeutic exercises 85083 0 minutes  [] Neuromuscular reeducation 67638 0 minutes     Maryann Carter, PT, DPT  [id number]

## 2020-11-17 NOTE — PROGRESS NOTES
Occupational Therapy  OCCUPATIONAL THERAPY INITIAL EVALUATION      Date:2020  Patient Name: Esau Bautista  MRN: [id number]  : 1933  Room: [address] [place] [address]    Evaluating OT: Erasmo Vizcaino OTR/JAHAIRA #[id number]    Referring physician: KRISTIE Matthews CNP     AM-PAC Daily Activity Raw Score:   Recommended Adaptive Equipment: tbd     Reason for Admission/Diagnosis: Acute respiratory failure with hypoxia [facility])    Pertinent Medical History/Surgery: anxiety, depression, DM, frequent UTIs, HTN, GERD, peripheral neuropathy, spinal stenosis, cholecystecomy, B hip fx surgery, skin cancer excision      Precautions: Droplet plus (COVID-19), Falls, supplemental O2, alarm, TAPS     Home Living: Pt lives alone, with 24/7 caregivers from family, in a one story home with ramp to enter. Bathroom setup: Tub/shower, tub transfer bench, grab, elevated toilet seat with grab bars   Equipment owned: bedside commode, tub transfer bench, fww, wheelchair/transport chair, hospital bed  Prior Level of Function:   Assist with ADLs ,  assist with IADLs. Patient was using transport chair or fww with assistance for functional mobility in home with assistance.     Driving: no                             Occupation: not stated  Leisure: games on tablet, watching TV    Pain Level: No pain reported   Cognition: A&O: self:yes, place:yes, time: month and year, situation:no  Follows 1-2 step directions with increased processing time   Memory:  fair    Sequencing:  fair    Problem solving:  poor   Judgement/safety:  poor     Functional Assessment:   Initial Eval Status  Date: 20 Treatment Status  Date: Short Term Goals=LTG  Treatment frequency: PRN 1-3 x/week   Feeding Minimal Assist   Modified [place]    Grooming Moderate Assist  Combed hair while seated edge of bed   Stand by Assist     UB Dressing Moderate Assist   Stand by Assist    LB Dressing Dependent   Moderate Assist    Bathing Maximal Assist   Moderate Assist Splinting/Positioning Needs           [x] Therapeutic Activity to improve activity tolerance for functional activities and ADLs    [x]Therapeutic Exercise to improve UE strength for functional transfers and ADLs   [] Visual/Perceptual   [x] Delirium Prevention/Treatment to maximize functional outcomes  [x] Positioning to Improve Functional Dixon, Safety, and Skin Integrity   [x] Patient and/or Family Education to Increase Safety and Functional Dixon   [] Other:     Rehab Potential: Good for established goals    Patient / Family Goal: To return home     Evaluation time includes thorough review of current medical information, gathering information on past medical & social history & PLOF, completion of standardized testing, informal observation of tasks, consultation with other medical professions/disciplines, assessment of data & development of POC/goals. Patient and/or family were instructed diagnosis, prognosis/goals and plan of care. yes Demonstrated fair understanding. Time In: 10:28  Time Out: 11:07  Total Treatment Time: 39 minutes   Min Units   OT Eval Low 76069     OT Eval Medium 97166 X 1   OT Eval High 91080     OT Re-Eval [id number]     Therapeutic Ex 35804     Therapeutic Activities 69008 24 2   ADL/Self Care 02784     Orthotic Management 67760     Neuro Re-Ed 46005     Non-Billable Time     TOTAL TIMED TREATMENT 24      [] Malnutrition indicators have been identified and nursing has been notified to ensure a dietitian consult is ordered.       Mod OT Evaluation + 24  treatment minutes    KIMMY Zuniga/JAHAIRA #[id number]

## 2020-11-17 NOTE — PROGRESS NOTES
Patient seen this morning in no acute distress. Alert and oriented x 4. O2 at 3 LPM via NC. Vital signs stable. Gonzalez catheter in place, IV present and intact, flushed in R forearm. Spoke with pt daughter who states she and her sister are nurses and that they take care of her mother at home. Pt placed on bedpan, was unable to have BM. Pt turned and repositioned and micotin powder was applied to abdominal folds.

## 2020-11-17 NOTE — PROGRESS NOTES
data recorded. Intake/Output Summary (Last 24 hours) at 11/17/2020 1627  Last data filed at 11/17/2020 0534  Gross per 24 hour   Intake 0 ml   Output 1250 ml   Net -1250 ml       Patient Vitals for the past 96 hrs (Last 3 readings):   Weight   11/16/20 2340 177 lb 14.4 oz (80.7 kg)   11/16/20 1945 178 lb (80.7 kg)         Recent Labs     11/16/20  0818 11/17/20  0310   WBC 4.5 3.2*   HGB 11.6 10.3*   HCT 36.2 31.5*   .2* 103.3*   * 103*     Recent Labs     11/16/20 0818 11/16/20 1711 11/17/20  0310     --  142   K 5.9* 5.5* 4.1     --  104   CO2 28  --  25   BUN 30*  --  29*   CREATININE 1.7*  --  1.4*       Recent Labs     11/16/20 0818 11/16/20 1711 11/17/20  0600   CKTOTAL  --  100 100   CKMB  --  4.9* 4.5*   TROPONINI 0.03 0.15* 0.09*       Troponin:   Recent Labs     11/16/20 0818 11/16/20 1711 11/17/20  0600   TROPONINI 0.03 0.15* 0.09*     CPK:  Lab Results   Component Value Date    CKTOTAL 100 11/17/2020          -----------------------------------------------------------------  RAD:   Results for orders placed during the hospital encounter of 11/16/20   XR CHEST PORTABLE    Narrative EXAMINATION:  ONE XRAY VIEW OF THE CHEST    11/16/2020 8:55 am    COMPARISON:  01/09/2020    HISTORY:  ORDERING SYSTEM PROVIDED HISTORY: Chest Pain  TECHNOLOGIST PROVIDED HISTORY:  Reason for exam:->Chest Pain  What reading provider will be dictating this exam?->CRC    FINDINGS:  The patient is rotated to the left. Subtle opacity is seen in the left mid  and lower lung field that could be related to pneumonia. Increased  interstitial markings are seen bilaterally. The heart and mediastinum are  not significantly changed and there is no evidence of pleural effusion. No  overt vascular congestion is noted. Impression Subtle opacities in the left mid and lower lung field that could represent  pneumonia.          Micro:  Recent Labs     11/16/20  0912   BC 24 Hours no growth     Recent Labs     20  0818   ORG Escherichia coli*     Recent Labs     20  0912   BLOODCULT2 24 Hours no growth       Recent Labs     20  08   ORG Escherichia coli*       Recent Labs     20   LABURIN >100,000 CFU/ml  Sensitivity to follow       Vent Information  SpO2: 97 %    Additional Respiratory  Assessments  Pulse: 68  Resp: 21  SpO2: 97 %    Objective:   Vitals:   Vitals:    20 1545   BP: 123/62   Pulse: 68   Resp: 21   Temp: 95.9 °F (35.5 °C)   SpO2: 97%      TEMP:Current: Temp: 95.9 °F (35.5 °C)  Max: Temp  Av.1 °F (36.7 °C)  Min: 95.9 °F (35.5 °C)  Max: 100 °F (37.8 °C)    BP Range: Systolic (34SPL), ASS:532 , Min:119 , BNM:704     Diastolic (63ORT), QRV:84, Min:53, Max:74      General appearance: alert, appears stated age and cooperative obese alert  In no acute distress  Skin: No rashes or lesions  HEENT: mucous membranes are moist  Neck: No JVD  Lungs: symmetrical expansion,  Left base rales to auscultation, no use of accessory muscles  Heart: S1S2 no murmurs,  Abdomen: soft, non tender,   Extremities: no peripheral edema  Neurologic: Alert, oriented times 3,  Affect: pleasant      Assessment:   Patient Active Problem List:  80y.o. year old female history of obesity, HTN, CKD stage III, DM on insulin with peripheral neuropathy, anxiety/depression with spinal stenosis, PE 10/2019 on Eliquis who presented with shortness of breath and chest pain treated with sublingual nitroglycerin with relief    Covid positive   Elevated proBNP  chest x-ray showing opacities left midlung and lower lung field  on 2 L   on 3 L. Urine positive for E. Coli. Patient put on room air     1. Acute hypoxic respiratory failure has been started on dexamethasone   2. Covid pneumonitis on remdesivir  3. H/o Dysphasia on thickened diet  4. H/o PE CTA 10/21/2019 + LLL.  On Eliquis   5. CKD stage III  6. VHD echo showing EF 45% moderate LVH mild left aortic stenosis  10/22/2019  7.  CAD s/p

## 2020-11-17 NOTE — H&P
[address] [facility]  History and Physical      CHIEF COMPLAINT: Fever      HISTORY OF PRESENT ILLNESS:      The patient is a 80 y.o. female patient of Dr. Tiwari history of anxiety, depression, GERD, hypertension, obesity, history of VTE on apixaban who presents with fever. Patient brought to ED by EMS with fever and fatigue. Patient reports nonproductive cough with associated chest pain. [place] History of UTIs Per daughter was nurse urine has been stronger smelling than usual and is concerned of UTI. In ED patient sats 90% on room air. Patient reports symptoms worsening for the last 2 days. Denies COVID-19 exposure. In ED patient is found to be positive for Covid. Denies urinary symptoms. She is admitted for further evaluation treatment. Pt is a poor historian. History is largely obtained from chart review and discussions with staff/family as available.      Past Medical History:    Past Medical History:   Diagnosis Date    Anxiety     Depression     Diabetes mellitus ([address]     Frequent urinary tract infections     GERD (gastroesophageal reflux disease)     Hypertension     Peripheral neuropathy     Spinal stenosis        Past Surgical History:    Past Surgical History:   Procedure Laterality Date    CHOLECYSTECTOMY      HIP FRACTURE SURGERY Bilateral     SKIN CANCER EXCISION         Medications Prior to Admission:    Medications Prior to Admission: atorvastatin (LIPITOR) 40 MG tablet, Take 40 mg by mouth nightly  Calcium Carb-Cholecalciferol (CALTRATE 600+D3) 600-800 MG-UNIT TABS, Take 1 tablet by mouth 2 times daily  carvedilol (COREG) 12.5 MG tablet, Take 12.5 mg by mouth 2 times daily (with meals)  diclofenac sodium (VOLTAREN) 1 % GEL, Apply 2 g topically 4 times daily as needed for Pain (apply to knees)  insulin lispro, 1 Unit Dial, (HUMALOG KWIKPEN) 100 UNIT/ML SOPN, Inject 0-12 Units into the skin 3 times daily (before meals)  ipratropium-albuterol (DUONEB) 0.5-2.5 (3) MG/3ML history     REVIEW OF SYSTEMS:  As above in the HPI, otherwise negative    PHYSICAL EXAM:    Vitals:  /66   Pulse 78   Temp 98.9 °F (37.2 °C) (Oral)   Resp 22   Ht 5' (1.524 m)   Wt 177 lb 14.4 oz (80.7 kg)   LMP  (LMP Unknown)   SpO2 96%   BMI 34.74 kg/m²     General:  Awake, alert, oriented X 3. Well developed, well nourished, well groomed. No apparent distress. HEENT:  Normocephalic, atraumatic. Pupils equal, round, reactive to light. No scleral icterus. No conjunctival injection. Normal lips, teeth, and gums. No nasal discharge. Neck:  Supple  Heart:  RRR, no murmurs, gallops, rubs  Lungs:  CTA bilaterally, bilat symmetrical expansion, no wheeze, rales, or rhonchi  Abdomen:   Bowel sounds present, soft, nontender, no masses, no organomegaly, no peritoneal signs  Extremities:  No clubbing, cyanosis, or edema  Skin:  Warm and dry, no open lesions or rash  Neuro:  Cranial nerves 2-12 intact, no focal deficits  Breast: deferred  Rectal: deferred  Genitalia:  deferred    LABS:    CBC with Differential:    Lab Results   Component Value Date    WBC 3.2 11/17/2020    RBC 3.05 11/17/2020    HGB 10.3 11/17/2020    HCT 31.5 11/17/2020     11/17/2020    .3 11/17/2020    MCH 33.8 11/17/2020    MCHC 32.7 11/17/2020    RDW 12.7 11/17/2020    SEGSPCT 58 06/18/2013    LYMPHOPCT 23.5 11/16/2020    MONOPCT 10.9 11/16/2020    BASOPCT 0.2 11/16/2020    MONOSABS 0.49 11/16/2020    LYMPHSABS 1.06 11/16/2020    EOSABS 0.05 11/16/2020    BASOSABS 0.01 11/16/2020     CMP:    Lab Results   Component Value Date     11/17/2020    K 4.1 11/17/2020     11/17/2020    CO2 25 11/17/2020    BUN 29 11/17/2020    CREATININE 1.4 11/17/2020    GFRAA 43 11/17/2020    LABGLOM 36 11/17/2020    GLUCOSE 162 11/17/2020    PROT 6.7 10/05/2020    LABALBU 3.5 10/05/2020    CALCIUM 9.0 11/17/2020    BILITOT 0.5 10/05/2020    ALKPHOS 68 10/05/2020    AST 20 10/05/2020    ALT 13 10/05/2020     Magnesium:    Lab Results   Component Value Date    MG 1.9 01/09/2020     Phosphorus:  No results found for: PHOS  PT/INR:    Lab Results   Component Value Date    PROTIME 13.9 01/09/2020    INR 1.2 01/09/2020     Last 3 Troponin:    Lab Results   Component Value Date    TROPONINI 0.09 11/17/2020    TROPONINI 0.15 11/16/2020    TROPONINI 0.03 11/16/2020     U/A:    Lab Results   Component Value Date    NITRITE NEGATIVE 09/09/2019    COLORU Yellow 11/16/2020    PROTEINU Negative 11/16/2020    PHUR 7.5 11/16/2020    WBCUA 5-10 11/16/2020    RBCUA NONE 11/16/2020    BACTERIA FEW 11/16/2020    CLARITYU Clear 11/16/2020    SPECGRAV 1.015 11/16/2020    LEUKOCYTESUR TRACE 11/16/2020    UROBILINOGEN 0.2 11/16/2020    BILIRUBINUR Negative 11/16/2020    BILIRUBINUR NEGATIVE 09/09/2019    BLOODU Negative 11/16/2020    GLUCOSEU Negative 11/16/2020    AMORPHOUS RARE 11/16/2020     ABG:    Lab Results   Component Value Date    PH 7.383 11/30/2019    PCO2 45.6 11/30/2019    PO2 86.9 11/30/2019    HCO3 26.6 11/30/2019    BE 1.2 11/30/2019    O2SAT 95.3 11/30/2019     HgBA1c:    Lab Results   Component Value Date    LABA1C 6.8 11/17/2020     FLP:    Lab Results   Component Value Date    TRIG 87 11/17/2020    HDL 38 11/17/2020    LDLCALC 28 11/17/2020    LABVLDL 17 11/17/2020     TSH:    Lab Results   Component Value Date    TSH 4.220 09/16/2020       XR CHEST PORTABLE   Final Result   Subtle opacities in the left mid and lower lung field that could represent   pneumonia. ASSESSMENT:      Principal Problem:    Acute respiratory failure with hypoxia (HCC)  Active Problems:    GERD (gastroesophageal reflux disease)    Type 2 diabetes mellitus with hyperglycemia, with long-term current use of insulin (HCC)    CKD (chronic kidney disease) stage 3, GFR 30-59 ml/min (HCC)    UTI (urinary tract infection)    Essential hypertension    History of pulmonary embolus (PE)    Chest pain    Obesity (BMI 30-39. 9)    COVID-19    Hyperkalemia    Anxiety and depression  Resolved Problems:    * No resolved hospital problems.  *      PLAN:    26-year-old female history of diabetes, hypertension, frequent UTIs, obesity admitted with COVID-19 pneumonia    Apixaban  Asa statin  Troponin mildly elevated possible type I versus type II related to hypoxia and CKD  Cardiology consult appreciated--echo, possible stress test to stratify in future  Nuclear stress 10/2019 with decreased EF and fixed defect  SARS-CoV-2 PCR +   Isolation--droplet plus  Supplemental O2 wean as tolerated 96% on 3 L  MDIs, no nebulizers  Avoid NIPPV  Remdesivir  Decadron  Pepcid  Melatonin  On Rocephin for possible UTI--discontinued  Pulmonology consult appreciated  ID consult appreciated  Monitor inflammatory markers  Medications for other co morbidities cont as appropriate w dosage adjustments as necessary  PT/OT  DVT PPx  DC planning        Electronically signed by Nisha Kapoor MD on 11/17/2020 at 7:23 AM

## 2020-11-17 NOTE — PLAN OF CARE
Problem: Airway Clearance - Ineffective  Goal: Achieve or maintain patent airway  11/16/2020 2138 by Susan Ureña RN  Outcome: Met This Shift  11/16/2020 2138 by Susan Ureañ RN  Outcome: Ongoing     Problem: Gas Exchange - Impaired  Goal: Absence of hypoxia  11/16/2020 2138 by Susan Ureña RN  Outcome: Met This Shift  11/16/2020 2138 by Susan Ureña RN  Outcome: Ongoing  Goal: Promote optimal lung function  11/16/2020 2138 by Susan Ureña RN  Outcome: Met This Shift  11/16/2020 2138 by Susan Ureña RN  Outcome: Ongoing     Problem: Breathing Pattern - Ineffective  Goal: Ability to achieve and maintain a regular respiratory rate  11/16/2020 2138 by Susan Ureña RN  Outcome: Met This Shift  11/16/2020 2138 by Susan Ureña RN  Outcome: Ongoing     Problem:  Body Temperature -  Risk of, Imbalanced  Goal: Ability to maintain a body temperature within defined limits  11/16/2020 2138 by Susan Ureña RN  Outcome: Met This Shift  11/16/2020 2138 by Susan Ureña RN  Outcome: Ongoing  Goal: Will regain or maintain usual level of consciousness  11/16/2020 2138 by Susan Ureña RN  Outcome: Met This Shift  11/16/2020 2138 by Susan Ureña RN  Outcome: Ongoing  Goal: Complications related to the disease process, condition or treatment will be avoided or minimized  11/16/2020 2138 by Susan Ureña RN  Outcome: Met This Shift  11/16/2020 2138 by Susan Ureña RN  Outcome: Ongoing     Problem: Isolation Precautions - Risk of Spread of Infection  Goal: Prevent transmission of infection  11/16/2020 2138 by Susan Ureña RN  Outcome: Met This Shift  11/16/2020 2138 by Susan Ureña RN  Outcome: Ongoing     Problem: Nutrition Deficits  Goal: Optimize nutrtional status  11/16/2020 2138 by Susan Ureña RN  Outcome: Met This Shift  11/16/2020 2138 by Susan Ureña RN  Outcome: Ongoing     Problem: Risk for Fluid Volume Deficit  Goal: Maintain normal heart rhythm  11/16/2020 2138 by Asael Chew RN  Outcome: Met This Shift  11/16/2020 2138 by Asael Chew RN  Outcome: Ongoing  Goal: Maintain absence of muscle cramping  11/16/2020 2138 by Asael Chew RN  Outcome: Met This Shift  11/16/2020 2138 by Asael Chew RN  Outcome: Ongoing  Goal: Maintain normal serum potassium, sodium, calcium, phosphorus, and pH  11/16/2020 2138 by Asael Chew RN  Outcome: Met This Shift  11/16/2020 2138 by Asael Chew RN  Outcome: Ongoing     Problem: Loneliness or Risk for Loneliness  Goal: Demonstrate positive use of time alone when socialization is not possible  11/16/2020 2138 by Asael Chew RN  Outcome: Met This Shift  11/16/2020 2138 by Asael Chew RN  Outcome: Ongoing     Problem: Fatigue  Goal: Verbalize increase energy and improved vitality  11/16/2020 2138 by Asael Chew RN  Outcome: Met This Shift  11/16/2020 2138 by Asael Chew RN  Outcome: Ongoing     Problem: Patient Education: Go to Patient Education Activity  Goal: Patient/Family Education  11/16/2020 2138 by Asael Chew RN  Outcome: Met This Shift  11/16/2020 2138 by Asael Chew RN  Outcome: Ongoing

## 2020-11-17 NOTE — PROGRESS NOTES
Department of Internal Medicine  Infectious Diseases  Progress Note      C/C :  COVID 19 pneumonia     Pt is awake and alert  Denies fever   Reports cough and SOB   Afebrile         Current Facility-Administered Medications   Medication Dose Route Frequency Provider Last Rate Last Dose    famotidine (PEPCID) tablet 10 mg  10 mg Oral Daily Steve Farrar MD   10 mg at 11/17/20 0910    melatonin disintegrating tablet 10 mg  10 mg Oral Nightly Steve Farrar MD        sodium chloride flush 0.9 % injection 10 mL  10 mL Intravenous 2 times per day April KRISTIE Schmidt CNP   10 mL at 11/17/20 0911    sodium chloride flush 0.9 % injection 10 mL  10 mL Intravenous PRN April KRISTIE Schmidt CNP        acetaminophen (TYLENOL) tablet 650 mg  650 mg Oral Q6H PRN April KRISTIE Schmidt CNP        Or    acetaminophen (TYLENOL) suppository 650 mg  650 mg Rectal Q6H PRN April KRISTIE Schmidt CNP        polyethylene glycol (GLYCOLAX) packet 17 g  17 g Oral Daily PRN April KRISTIE Schmidt CNP        promethazine (PHENERGAN) tablet 12.5 mg  12.5 mg Oral Q6H PRN April KRISTIE Schmidt CNP        Or    ondansetron [facility]) injection 4 mg  4 mg Intravenous Q6H PRN April KRISTIE Schmidt CNP        cefTRIAXone (ROCEPHIN) 1 g in sterile water 10 mL IV syringe  1 g Intravenous Q24H April KRISTIE Schmidt CNP   Stopped at 11/16/20 1544    albuterol sulfate  (90 Base) MCG/ACT inhaler 2 puff  2 puff Inhalation Q6H April KRISTIE Schmidt CNP   2 puff at 11/17/20 0606    vitamin C (ASCORBIC ACID) tablet 500 mg  500 mg Oral Daily April KRISTIE Schmidt CNP   500 mg at 11/17/20 0911    zinc sulfate (ZINCATE) capsule 50 mg  50 mg Oral Daily April KRISTIE Schmidt CNP   50 mg at 11/17/20 0909    dexamethasone (DECADRON) injection 6 mg  6 mg Intravenous Daily April KRISTIE Schmidt CNP   6 mg at 11/17/20 0928    apixaban (ELIQUIS) tablet 5 mg  5 mg Oral BID April ChapoLashonda, APRN - CNP   5 mg at 11/17/20 0911    aspirin chewable tablet 81 mg  81 mg Oral Daily April Brayan, APRN - CNP   81 mg at 11/17/20 0911    atorvastatin (LIPITOR) tablet 40 mg  40 mg Oral Nightly April Flagler-Temo, APRN - CNP   40 mg at 11/16/20 2213    busPIRone (BUSPAR) tablet 5 mg  5 mg Oral Daily April Storey-Temo, APRN - CNP   5 mg at 11/17/20 0911    calcium-vitamin D (OSCAL-500) 500-200 MG-UNIT per tablet 1 tablet  1 tablet Oral BID April Brayan, APRN - CNP   1 tablet at 11/17/20 0911    carvedilol (COREG) tablet 12.5 mg  12.5 mg Oral BID WC April Flagler-Ponce, APRN - CNP   12.5 mg at 11/17/20 0911    diclofenac sodium (VOLTAREN) 1 % gel 2 g  2 g Topical Q6H PRN April Chapo-Temo, APRN - CNP        fluticasone (FLONASE) 50 MCG/ACT nasal spray 1 spray  1 spray Nasal Daily PRN April Brayan, APRN - CNP        gabapentin (NEURONTIN) capsule 600 mg  600 mg Oral 3 times per day April Storey-Temo, APRN - CNP   600 mg at 11/17/20 3697    isosorbide mononitrate (IMDUR) extended release tablet 30 mg  30 mg Oral Daily April Brayan, APRN - CNP   30 mg at 11/17/20 0911    pantoprazole (PROTONIX) tablet 40 mg  40 mg Oral QAM AC April Brayan, APRN - CNP   40 mg at 11/17/20 5645    Liraglutide (VICTOZA) SC injection 1.2 mg  1.2 mg Subcutaneous Daily April Chapo-Temo, APRN - CNP        mineral oil-hydrophilic petrolatum (HYDROPHOR) ointment   Topical Daily April Flagler-Temo, APRN - CNP        montelukast (SINGULAIR) tablet 10 mg  10 mg Oral Nightly April Brayan, APRN - CNP   10 mg at 11/16/20 2213    therapeutic multivitamin-minerals 1 tablet  1 tablet Oral Daily April KRISTIE Schmidt CNP   1 tablet at 11/17/20 0910    nitroGLYCERIN (NITROSTAT) SL tablet 0.4 mg  0.4 mg Sublingual Q5 Min PRN April KRISTIE Schmidt CNP        rash  HEMATOLOGIC/LYMPHATIC:  Denies lymph node swelling, gum bleeding or easy bruising. MUSCULOSKELETAL:  Denies leg pain , joint pain , joint swelling  NEUROLOGICAL:  Weakness       PHYSICAL EXAM:      Vitals:   BP (!) 129/58   Pulse 69   Temp 96.7 °F (35.9 °C) (Temporal)   Resp 20   Ht 5' (1.524 m)   Wt 177 lb 14.4 oz (80.7 kg)   LMP  (LMP Unknown)   SpO2 93%   BMI 34.74 kg/m²     General Appearance:    Awake, alert , mild distress . Head:    Normocephalic, atraumatic   Eyes:    No pallor, no icterus,   Ears:    No obvious deformity or drainage.    Nose:   No nasal drainage   Throat:   Mucosa moist, no oral thrush   Neck:   Supple, no lymphadenopathy   Lungs:     Diminished breath sound    Heart:    Regular rate and rhythm, no murmur   Abdomen:     Soft, non-tender, bowel sounds present    Extremities:   No edema, no cyanosis    Pulses:   Dorsalis pedis palpable    Skin:   no rashes or lesions       CBC with Differential:      Lab Results   Component Value Date    WBC 3.2 11/17/2020    RBC 3.05 11/17/2020    HGB 10.3 11/17/2020    HCT 31.5 11/17/2020     11/17/2020    .3 11/17/2020    MCH 33.8 11/17/2020    MCHC 32.7 11/17/2020    RDW 12.7 11/17/2020    SEGSPCT 58 06/18/2013    LYMPHOPCT 23.5 11/16/2020    MONOPCT 10.9 11/16/2020    BASOPCT 0.2 11/16/2020    MONOSABS 0.49 11/16/2020    LYMPHSABS 1.06 11/16/2020    EOSABS 0.05 11/16/2020    BASOSABS 0.01 11/16/2020       CMP     Lab Results   Component Value Date     11/17/2020    K 4.1 11/17/2020     11/17/2020    CO2 25 11/17/2020    BUN 29 11/17/2020    CREATININE 1.4 11/17/2020    GFRAA 43 11/17/2020    LABGLOM 36 11/17/2020    GLUCOSE 162 11/17/2020    PROT 6.7 10/05/2020    LABALBU 3.5 10/05/2020    CALCIUM 9.0 11/17/2020    BILITOT 0.5 10/05/2020    ALKPHOS 68 10/05/2020    AST 20 10/05/2020    ALT 13 10/05/2020         Hepatic Function Panel:    Lab Results   Component Value Date    ALKPHOS 68 10/05/2020    ALT 13

## 2020-11-17 NOTE — PLAN OF CARE
Problem: Airway Clearance - Ineffective  Goal: Achieve or maintain patent airway  Outcome: Met This Shift     Problem: Gas Exchange - Impaired  Goal: Absence of hypoxia  Outcome: Met This Shift     Problem: Breathing Pattern - Ineffective  Goal: Ability to achieve and maintain a regular respiratory rate  Outcome: Met This Shift     Problem:  Body Temperature -  Risk of, Imbalanced  Goal: Ability to maintain a body temperature within defined limits  Outcome: Met This Shift  Goal: Will regain or maintain usual level of consciousness  Outcome: Met This Shift  Goal: Complications related to the disease process, condition or treatment will be avoided or minimized  Outcome: Met This Shift     Problem: Isolation Precautions - Risk of Spread of Infection  Goal: Prevent transmission of infection  Outcome: Met This Shift     Problem: Nutrition Deficits  Goal: Optimize nutrtional status  Outcome: Met This Shift     Problem: Risk for Fluid Volume Deficit  Goal: Maintain normal heart rhythm  Outcome: Met This Shift  Goal: Maintain absence of muscle cramping  Outcome: Met This Shift  Goal: Maintain normal serum potassium, sodium, calcium, phosphorus, and pH  Outcome: Met This Shift     Problem: Loneliness or Risk for Loneliness  Goal: Demonstrate positive use of time alone when socialization is not possible  Outcome: Met This Shift     Problem: Falls - Risk of:  Goal: Will remain free from falls  Outcome: Met This Shift  Goal: Absence of physical injury  Outcome: Met This Shift     Problem: Skin Integrity:  Goal: Will show no infection signs and symptoms  Outcome: Met This Shift  Goal: Absence of new skin breakdown  Outcome: Met This Shift

## 2020-11-17 NOTE — CONSULTS
smoker  2. HTN  3. T2DM insulin requiring with neuropathy, and nephropathy. HgbA1c 6.8 on 11/17/2020  4. Hx HLD on fish oil. Statin therapy stopped in 2015 secondary to age. T Chol 219 Triglycerides 252 HDL 46   on 10/21/2019  5. CKD  6. Anxiety/Depression  7. GERD  8. Spinal stenosis/back pain with remote history of injections  9. Ambulates with walker. Needs assistance with ALL ADL's  10. L hip fracture s/p ORIF,  Cholecystectomy, bilateral hip arthroplasty's, skin carcinoma excision.   11. [facility]  9/23/2019-9/26/2019 admitted for weakness, cough and hypoxia.  She was treated for a UTI.  Ultrasound of the lower extremities showed no evidence for DVT.   reated with Omnicef.  Aspirin 81 daily, atenolol 50 mg daily on discharge.  Cardiology was not consulted during that admission.   12. 1st degree AVB  13. [facility] 10/21/2019-10/24/2019 Troponin 0.18-->0.76-->0.84  14. 10/21/2019 CTA Chest: Subsegmental pulmonary embolism in the left lower lobe. There is no central pulmonary embolism. 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 with collateral vessels in the mediastinum  15. TTE 10/21/2019: EF 45-50%, moderate LVH, stage 1 DD, mild AR, mild AS. 16. Lexsican MPS 10/23/2019: LVEF 40%, no reversible perfusion defect.  Fixed perfusion defect was noted extending from the apex into the anteroseptal and inferolateral walls.  Global hypokinesis with severe hypokinesis of the septum was noted  17. [address] with [facility]  18. 11/22/2019 p-, troponin 0.03-->0.17-->0.22-->0. 22. Ranexa, statin, and Imdur added. Coreg increased. 19. 11/23/2019 Per Dr Ibeth Morillo a long discussion with the patient and her daughter. Dash East will continue with Dr. Jordan Banuelos plan to continue with aggressive medication titration. Merced Echevarria will ambulate today and tomorrow. [facility] medical therapy if she is asymptomatic with this. 20. TTE: 1/6/2020 (Dr. Donnie Howard):  Technically difficult examination due to body habitus and unable to position. Normal left ventricle size and systolic function. Ejection fraction is visually estimated at 60-65%. Normal left ventricle wall thickness. No regional wall motion abnormalities seen. Indeterminate diastolic function. Normal right ventricular size and function. TAPSE 21 mm. No hemodynamically significant aortic stenosis is present. Moderate aortic regurgitation is noted. Unable to estimate PA systolic pressure. No evidence for hemodynamically significant pericardial effusion. Compared to prior echo of 10/22/2019, changes noted. LVEF has improved from 45-50% to 60-65%.     Medications Prior to admit:  Prior to Admission medications    Medication Sig Start Date End Date Taking?  Authorizing Provider   atorvastatin (LIPITOR) 40 MG tablet Take 40 mg by mouth nightly   Yes Historical Provider, MD   Calcium Carb-Cholecalciferol (CALTRATE 600+D3) 600-800 MG-UNIT TABS Take 1 tablet by mouth 2 times daily   Yes Historical Provider, MD   carvedilol (COREG) 12.5 MG tablet Take 12.5 mg by mouth 2 times daily (with meals)   Yes Historical Provider, MD   diclofenac sodium (VOLTAREN) 1 % GEL Apply 2 g topically 4 times daily as needed for Pain (apply to knees)   Yes Historical Provider, MD   insulin lispro, 1 Unit Dial, (HUMALOG KWIKPEN) 100 UNIT/ML SOPN Inject 0-12 Units into the skin 3 times daily (before meals)   Yes Historical Provider, MD   ipratropium-albuterol (DUONEB) 0.5-2.5 (3) MG/3ML SOLN nebulizer solution Take 1 vial by nebulization every 4 hours as needed for Shortness of Breath   Yes Historical Provider, MD   isosorbide mononitrate (IMDUR) 30 MG extended release tablet Take 30 mg by mouth daily   Yes Historical Provider, MD   lansoprazole (PREVACID) 30 MG delayed release capsule Take 30 mg by mouth daily   Yes Historical Provider, MD   nystatin (MYCOSTATIN) 121159 UNIT/GM powder Apply 1 each topically 2 times daily Apply to abdominal folds   Yes Historical Provider, MD   Omega-3 Fatty Acids (FISH OIL) 1000 MG CAPS (ZOLOFT) tablet 150 mg, 150 mg, Oral, Daily  benzonatate (TESSALON) capsule 100 mg, 100 mg, Oral, Q8H PRN  glucose (GLUTOSE) 40 % oral gel 15 g, 15 g, Oral, PRN  dextrose 50 % IV solution, 12.5 g, Intravenous, PRN  glucagon (rDNA) injection 1 mg, 1 mg, Intramuscular, PRN  dextrose 5 % solution, 100 mL/hr, Intravenous, PRN  insulin lispro (HUMALOG) injection vial 0-6 Units, 0-6 Units, Subcutaneous, TID WC  insulin lispro (HUMALOG) injection vial 0-3 Units, 0-3 Units, Subcutaneous, Nightly  dexamethasone (DECADRON) injection 6 mg, 6 mg, Intravenous, Once  Vitamin D (CHOLECALCIFEROL) tablet 1,000 Units, 1,000 Units, Oral, Daily  [COMPLETED] remdesivir 200 mg in sodium chloride 0.9 % 250 mL IVPB, 200 mg, Intravenous, Once **FOLLOWED BY** remdesivir 100 mg in sodium chloride 0.9 % 250 mL IVPB, 100 mg, Intravenous, Q24H  0.9 % sodium chloride bolus, 30 mL, Intravenous, PRN  influenza quadrivalent split vaccine (FLUZONE;FLUARIX;FLULAVAL;AFLURIA) injection 0.5 mL, 0.5 mL, Intramuscular, Prior to discharge    Allergies:  Patient has no known allergies. Social History:    Lives alone at home but has family and daughters who come to visit her. She does not wear long term oxygen. She does use a walker for ambulation assistance. Denies tobacco, alcohol, illicit drug use. Full code     Family History: This information was not obtained at this time as it is found noncontributory secondary to the patients advanced age. REVIEW OF SYSTEMS:     · Constitutional: Denies fevers, chills, night sweats, and fatigue  · HEENT: Denies headaches, nose bleeds, and blurred vision,oral pain, abscess or lesion. · Musculoskeletal: Denies falls, pain to BLE with ambulation and edema to BLE. · Neurological: Denies dizziness and lightheadedness, numbness and tingling  · Cardiovascular: Denies chest pain, palpitations, and feelings of heart racing.    · Respiratory: Denies orthopnea and PND,DURAN + cough,   · Gastrointestinal: Denies heartburn, nausea/vomiting, diarrhea and constipation, black/bloody, and tarry stools. · Genitourinary: Denies dysuria and hematuria  · Hematologic: Denies excessive bruising or bleeding  · Lymphatic: Denies lumps and bumps to neck, axilla, breast, and groin      PHYSICAL EXAM: not performed at this time in an attempt to reduce potential exposure and conserve PPE as the patient is in COVID-19  isolation. /66   Pulse 78   Temp 98.9 °F (37.2 °C) (Oral)   Resp 22   Ht 5' (1.524 m)   Wt 177 lb 14.4 oz (80.7 kg)   LMP  (LMP Unknown)   SpO2 96%   BMI 34.74 kg/m²       DATA:    ECG: NSR, HR 98   Tele strips: SR per nursing staff   Diagnostic:      Intake/Output Summary (Last 24 hours) at 11/17/2020 0730  Last data filed at 11/17/2020 0534  Gross per 24 hour   Intake 0 ml   Output 1250 ml   Net -1250 ml       Labs:   CBC:   Recent Labs     11/16/20  0818 11/17/20  0310   WBC 4.5 3.2*   HGB 11.6 10.3*   HCT 36.2 31.5*   * 103*     BMP:   Recent Labs     11/16/20  0818 11/16/20  1711 11/17/20  0310     --  142   K 5.9* 5.5* 4.1   CO2 28  --  25   BUN 30*  --  29*   CREATININE 1.7*  --  1.4*   LABGLOM 28  --  36   CALCIUM 9.3  --  9.0     HgA1c:   Lab Results   Component Value Date    LABA1C 6.8 (H) 11/17/2020     CARDIAC ENZYMES:  Recent Labs     11/16/20  0818 11/16/20  1711 11/17/20  0600   CKTOTAL  --  100 100   CKMB  --  4.9* 4.5*   TROPONINI 0.03 0.15* 0.09*     FASTING LIPID PANEL:  Lab Results   Component Value Date    CHOL 83 11/17/2020    HDL 38 11/17/2020    LDLCALC 28 11/17/2020    TRIG 87 11/17/2020       Assessment and Plan per Dr Nydia Huston   Electronically signed by Oma Mojica on 11/17/2020 at 7:30 AM   I independently performed an evaluation on the patient. I have reviewed the above documentation completed by the advance practitioner. Please see my additional contributions to the HPI, physical exam, assessment and medical decision making.     Physical Exam   /66 Pulse 78   Temp 98.9 °F (37.2 °C) (Oral)   Resp 22   Ht 5' (1.524 m)   Wt 177 lb 14.4 oz (80.7 kg)   LMP  (LMP Unknown)   SpO2 96%   BMI 34.74 kg/m²     COVID 19. See accompanying documentation for full consult. ASSESSMENT AND PLAN:  1. CP/Elevated troponin/CAD:    Echo when able. Medically manage for now. ASA/statin/BB/imdur. Consider stress at some point to risk stratify further in her course. 2. COVID 19    3. Hx of PE: On Eliquis. 4. Improved CMP: Echo when able. 5. HTN: Observe. 6. Lipids: Statin. 7. DM: Per primary service. 8. CKD: Follow labs. Hiram Mejia D.O.   Cardiologist  Cardiology, [facility]

## 2020-11-18 PROBLEM — R82.81 BACTERIURIA WITH PYURIA: Status: ACTIVE | Noted: 2019-09-23

## 2020-11-18 PROBLEM — R82.71 BACTERIURIA WITH PYURIA: Status: ACTIVE | Noted: 2019-09-23

## 2020-11-18 NOTE — PROGRESS NOTES
[facility] Cardiology Inpatient Progress Note    Patient is a 80 y.o. female of [facility] seen in hospital follow up. Chief complaint: CP/COVID 19    HPI: Some SOB. No CP. Patient Active Problem List   Diagnosis    GERD (gastroesophageal reflux disease)    Type 2 diabetes mellitus with hyperglycemia, with long-term current use of insulin (HCC)    CKD (chronic kidney disease) stage 3, GFR 30-59 ml/min ([facility])    UTI (urinary tract infection)    Essential hypertension    History of pulmonary embolus (PE)    Chest pain    Acute respiratory failure with hypoxia ([address]    HCAP (healthcare-associated pneumonia)    Obesity (BMI 30-39. 9)    Pressure injury of sacral region, stage 2 ([facility])    COVID-19    Hyperkalemia    Anxiety and depression       No Known Allergies    Current Facility-Administered Medications   Medication Dose Route Frequency Provider Last Rate Last Dose    famotidine (PEPCID) tablet 10 mg  10 mg Oral Daily Maci Dimas MD   10 mg at 11/17/20 0910    melatonin disintegrating tablet 10 mg  10 mg Oral Nightly Maci Dimas MD   10 mg at 11/17/20 2230    sodium chloride flush 0.9 % injection 10 mL  10 mL Intravenous 2 times per day April Brayan APRN - CNP   10 mL at 11/17/20 2042    sodium chloride flush 0.9 % injection 10 mL  10 mL Intravenous PRN April Brayan, APRN - CNP        acetaminophen (TYLENOL) tablet 650 mg  650 mg Oral Q6H PRN April Antonious, APRN - CNP        Or    acetaminophen (TYLENOL) suppository 650 mg  650 mg Rectal Q6H PRN April Brayan APRN - CNP        polyethylene glycol (GLYCOLAX) packet 17 g  17 g Oral Daily PRN April Brayan, APRN - CNP        promethazine (PHENERGAN) tablet 12.5 mg  12.5 mg Oral Q6H PRN April Brayan APRN - VIVIANE        Or    ondansetron [facility]) injection 4 mg  4 mg Intravenous Q6H PRN April Brayan APRN - CNP        albuterol sulfate  mineral oil-hydrophilic petrolatum (HYDROPHOR) ointment   Topical Daily April ChapoElise, APRN - CNP        montelukast (SINGULAIR) tablet 10 mg  10 mg Oral Nightly April Brayan, APRN - CNP   10 mg at 11/17/20 2042    therapeutic multivitamin-minerals 1 tablet  1 tablet Oral Daily April ChapoElise, APRN - CNP   1 tablet at 11/17/20 0910    nitroGLYCERIN (NITROSTAT) SL tablet 0.4 mg  0.4 mg Sublingual Q5 Min PRN April Brayan, APRN - CNP        miconazole (MICOTIN) 2 % powder   Topical BID April [place], APRN - CNP        sertraline (ZOLOFT) tablet 150 mg  150 mg Oral Daily April [place], APRN - CNP   150 mg at 11/17/20 0910    benzonatate (TESSALON) capsule 100 mg  100 mg Oral Q8H PRN April Antonious, APRN - CNP   100 mg at 11/17/20 2042    glucose (GLUTOSE) 40 % oral gel 15 g  15 g Oral PRN April Chapo-Temo, APRN - CNP        dextrose 50 % IV solution  12.5 g Intravenous PRN April [place], APRN - CNP        glucagon (rDNA) injection 1 mg  1 mg Intramuscular PRN April Chapo-[place], APRN - CNP        dextrose 5 % solution  100 mL/hr Intravenous PRN April Brayan, APRN - CNP        insulin lispro (HUMALOG) injection vial 0-6 Units  0-6 Units Subcutaneous TID [facility] April Brayan, APRN - CNP   1 Units at 11/17/20 1645    insulin lispro (HUMALOG) injection vial 0-3 Units  0-3 Units Subcutaneous Nightly April Brayan APRN - CNP   1 Units at 11/17/20 2224    dexamethasone (DECADRON) injection 6 mg  6 mg Intravenous Once Lisa Stone DO        Vitamin D (CHOLECALCIFEROL) tablet 1,000 Units  1,000 Units Oral Daily Mansoor Chou MD   1,000 Units at 11/17/20 0911    remdesivir 100 mg in sodium chloride 0.9 % 250 mL IVPB  100 mg Intravenous Q24H Alan Geller MD   Stopped at 11/17/20 1823    0.9 % sodium chloride bolus  30 mL Intravenous PRN Alan Geller MD        influenza quadrivalent split vaccine (FLUZONE;FLUARIX;FLULAVAL;AFLURIA) injection 0.5 mL  0.5 mL Intramuscular Prior to discharge Liz Love MD           Review of systems:   Heart: as above   Lungs: as above   Eyes: denies changes in vision or discharge. Ears: denies changes in hearing or pain. Nose: denies epistaxis or masses   Throat: denies sore throat or trouble swallowing. Neuro: denies numbness, tingling, tremors. Skin: denies rashes or itching. : denies hematuria, dysuria   GI: denies vomiting, diarrhea   Psych: denies mood changed, anxiety, depression. Physical Exam   /60   Pulse 67   Temp 96.2 °F (35.7 °C) (Temporal)   Resp 20   Ht 5' (1.524 m)   Wt 178 lb 2 oz (80.8 kg)   LMP  (LMP Unknown)   SpO2 92%   BMI 34.79 kg/m²     COVID 19. CBC:   Lab Results   Component Value Date    WBC 3.2 11/17/2020    RBC 3.05 11/17/2020    HGB 10.3 11/17/2020    HCT 31.5 11/17/2020    .3 11/17/2020    MCH 33.8 11/17/2020    MCHC 32.7 11/17/2020    RDW 12.7 11/17/2020     11/17/2020    MPV 9.1 11/17/2020     BMP:   Lab Results   Component Value Date     11/17/2020    K 4.1 11/17/2020     11/17/2020    CO2 25 11/17/2020    BUN 29 11/17/2020    LABALBU 3.5 10/05/2020    CREATININE 1.4 11/17/2020    CALCIUM 9.0 11/17/2020    GFRAA 43 11/17/2020    LABGLOM 36 11/17/2020     Magnesium:    Lab Results   Component Value Date    MG 1.9 01/09/2020     Cardiac Enzymes:   Lab Results   Component Value Date    CKTOTAL 100 11/17/2020    CKTOTAL 100 11/16/2020    CKTOTAL 187 (H) 01/09/2020    CKMB 4.5 (H) 11/17/2020    CKMB 4.9 (H) 11/16/2020    CKMB 17.7 (H) 01/09/2020    TROPONINI 0.09 (H) 11/17/2020    TROPONINI 0.15 (H) 11/16/2020    TROPONINI 0.03 11/16/2020      PT/INR:    Lab Results   Component Value Date    PROTIME 13.9 01/09/2020    INR 1.2 01/09/2020     TSH:    Lab Results   Component Value Date    TSH 4.220 09/16/2020     Rhythm Strip: normal sinus rhythm.     ASSESSMENT & PLAN:    Patient Active Problem List   Diagnosis    GERD (gastroesophageal reflux disease)    Type 2 diabetes mellitus with hyperglycemia, with long-term current use of insulin (HCC)    CKD (chronic kidney disease) stage 3, GFR 30-59 ml/min (HCC)    UTI (urinary tract infection)    Essential hypertension    History of pulmonary embolus (PE)    Chest pain    Acute respiratory failure with hypoxia ([facility]    HCAP (healthcare-associated pneumonia)    Obesity (BMI 30-39. 9)    Pressure injury of sacral region, stage 2 ([address]    COVID-19    Hyperkalemia    Anxiety and depression     1. CP/Elevated troponin/CAD:     Echo when able.      Medically manage for now. ASA/statin/BB/imdur.     Consider stress at some point to risk stratify further in her course.     2. COVID 19     3. Hx of PE: On Eliquis.      4. Improved CMP: Echo when able.      5. HTN: Observe.      6. Lipids: Statin.      7. DM: Per primary service.      8. CKD: Follow labs.      Jennifer Morales D.O.   Cardiologist  Cardiology, [facility]

## 2020-11-18 NOTE — PLAN OF CARE
Problem: Airway Clearance - Ineffective  Goal: Achieve or maintain patent airway  Outcome: Met This Shift     Problem: Gas Exchange - Impaired  Goal: Promote optimal lung function  Outcome: Met This Shift     Problem: Breathing Pattern - Ineffective  Goal: Ability to achieve and maintain a regular respiratory rate  Outcome: Met This Shift     Problem:  Body Temperature -  Risk of, Imbalanced  Goal: Ability to maintain a body temperature within defined limits  Outcome: Met This Shift  Goal: Will regain or maintain usual level of consciousness  Outcome: Met This Shift     Problem: Risk for Fluid Volume Deficit  Goal: Maintain normal heart rhythm  Outcome: Met This Shift     Problem: Falls - Risk of:  Goal: Will remain free from falls  Description: Will remain free from falls  Outcome: Met This Shift  Goal: Absence of physical injury  Description: Absence of physical injury  Outcome: Met This Shift     Problem: Skin Integrity:  Goal: Will show no infection signs and symptoms  Description: Will show no infection signs and symptoms  Outcome: Met This Shift  Goal: Absence of new skin breakdown  Description: Absence of new skin breakdown  Outcome: Met This Shift

## 2020-11-18 NOTE — PROGRESS NOTES
Pulmonary Progress Note  11/18/2020 3:35 PM  Subjective:   Admit Date: 11/16/2020  PCP: Rosalie Kaur DO  Interval History: Patient is on 2 L she says she desaturated to the 90s and reported on    Diet: DIET CARDIAC; Carb Control: 4 carb choices (60 gms)/meal  SOB is: Mild  Cough: None  Wheezing: None  chest pain: None    Data:   Scheduled Meds:    miconazole   Topical BID    famotidine  10 mg Oral Daily    melatonin  10 mg Oral Nightly    sodium chloride flush  10 mL Intravenous 2 times per day    albuterol sulfate HFA  2 puff Inhalation Q6H    vitamin C  500 mg Oral Daily    zinc sulfate  50 mg Oral Daily    dexamethasone  6 mg Intravenous Daily    apixaban  5 mg Oral BID    aspirin  81 mg Oral Daily    atorvastatin  40 mg Oral Nightly    busPIRone  5 mg Oral Daily    calcium-vitamin D  1 tablet Oral BID    carvedilol  12.5 mg Oral BID WC    gabapentin  600 mg Oral 3 times per day    isosorbide mononitrate  30 mg Oral Daily    pantoprazole  40 mg Oral QAM AC    Liraglutide  1.2 mg Subcutaneous Daily    mineral oil-hydrophilic petrolatum   Topical Daily    montelukast  10 mg Oral Nightly    therapeutic multivitamin-minerals  1 tablet Oral Daily    miconazole   Topical BID    sertraline  150 mg Oral Daily    insulin lispro  0-6 Units Subcutaneous TID WC    insulin lispro  0-3 Units Subcutaneous Nightly    dexamethasone  6 mg Intravenous Once    Vitamin D  1,000 Units Oral Daily    remdesivir IVPB  100 mg Intravenous Q24H    influenza virus vaccine  0.5 mL Intramuscular Prior to discharge       Continuous Infusions:    dextrose         PRN Meds: sodium chloride flush, acetaminophen **OR** acetaminophen, polyethylene glycol, promethazine **OR** ondansetron, diclofenac sodium, fluticasone, nitroGLYCERIN, benzonatate, glucose, dextrose, glucagon (rDNA), dextrose, sodium chloride    I/O last 3 completed shifts:   In: 480 [P.O.:480]  Out: 1150 [Urine:1150]    I/O this shift:  In: - Out: 275 [Urine:275]      Intake/Output Summary (Last 24 hours) at 11/18/2020 1535  Last data filed at 11/18/2020 1509  Gross per 24 hour   Intake 480 ml   Output 1425 ml   Net -945 ml       Patient Vitals for the past 96 hrs (Last 3 readings):   Weight   11/18/20 0308 178 lb 2 oz (80.8 kg)   11/16/20 2340 177 lb 14.4 oz (80.7 kg)   11/16/20 1945 178 lb (80.7 kg)         Recent Labs     11/16/20  0818 11/17/20 0310 11/18/20  1150   WBC 4.5 3.2* 4.8   HGB 11.6 10.3* 11.2*   HCT 36.2 31.5* 34.6   .2* 103.3* 103.3*   * 103* 122*     Recent Labs     11/16/20  0818 11/16/20 1711 11/17/20  0310 11/18/20  1150     --  142 140   K 5.9* 5.5* 4.1 3.9     --  104 106   CO2 28  --  25 27   BUN 30*  --  29* 36*   CREATININE 1.7*  --  1.4* 1.4*     Recent Labs     11/18/20  1150   PROT 6.1*   ALKPHOS 62   ALT 22   AST 39*   BILITOT 0.3     Recent Labs     11/16/20  0818 11/16/20 1711 11/17/20  0600   CKTOTAL  --  100 100   CKMB  --  4.9* 4.5*   TROPONINI 0.03 0.15* 0.09*   Troponin:   Recent Labs     11/16/20 0818 11/16/20 1711 11/17/20  0600   TROPONINI 0.03 0.15* 0.09*     CPK:  Lab Results   Component Value Date    CKTOTAL 100 11/17/2020          -----------------------------------------------------------------  RAD:   Results for orders placed during the hospital encounter of 11/16/20   XR CHEST PORTABLE    Narrative EXAMINATION:  ONE XRAY VIEW OF THE CHEST    11/16/2020 8:55 am    COMPARISON:  01/09/2020    HISTORY:  ORDERING SYSTEM PROVIDED HISTORY: Chest Pain  TECHNOLOGIST PROVIDED HISTORY:  Reason for exam:->Chest Pain  What reading provider will be dictating this exam?->CRC    FINDINGS:  The patient is rotated to the left. Subtle opacity is seen in the left mid  and lower lung field that could be related to pneumonia. Increased  interstitial markings are seen bilaterally. The heart and mediastinum are  not significantly changed and there is no evidence of pleural effusion.   No  overt vascular congestion is noted. Impression Subtle opacities in the left mid and lower lung field that could represent  pneumonia. Micro:  Recent Labs     20  0912   BC 24 Hours no growth     Recent Labs     20   ORG Escherichia coli*     Recent Labs     20   BLOODCULT2 24 Hours no growth     No results for input(s): STREPPNEUMAG in the last 72 hours. No results for input(s): LEGUR in the last 72 hours. Recent Labs     20   ORG Escherichia coli*     No results for input(s): ASO in the last 72 hours. No results for input(s): CULTRESP in the last 72 hours. Recent Labs     20   LABURIN >100,000 CFU/ml     Vent Information  Skin Assessment: Clean, dry, & intact  SpO2: 96 %    Additional Respiratory  Assessments  Pulse: 68  Resp: 18  SpO2: 96 %    Objective:   Vitals:   Vitals:    20 1430   BP: 118/60   Pulse: 68   Resp: 18   Temp: 96.8 °F (36 °C)   SpO2: 96%      TEMP:Current: Temp: 96.8 °F (36 °C)  Max: Temp  Av.4 °F (35.8 °C)  Min: 95.9 °F (35.5 °C)  Max: 96.8 °F (36 °C)    BP Range: Systolic (25BVJ), MJS:765 , Min:118 , LSS:804     Diastolic (26ZDS), CL, Min:60, Max:80      General appearance: alert, appears stated age and cooperative obese alert  In no acute distress  Skin: No rashes or lesions  HEENT: mucous membranes are moist  Neck: No JVD  Lungs: symmetrical expansion,  Left base rales to auscultation, no use of accessory muscles  Heart: S1S2 no murmurs,  Abdomen: soft, non tender,   Extremities: no peripheral edema  Neurologic: Alert, oriented times 3,  Affect: pleasant        Assessment:   Patient Active Problem List:  80 y. o. year old female history of obesity, HTN, CKD stage III, DM on insulin with peripheral neuropathy, anxiety/depression with spinal stenosis, PE 10/2019 on Eliquis who presented with shortness of breath and chest pain treated with sublingual nitroglycerin with relief    Covid positive   Elevated proBNP  chest x-ray showing opacities left midlung and lower lung field  on 2 L  11/17 on 3 L. Patient put on room air saturations 97% on room air     1. Acute hypoxic respiratory failure has been started on dexamethasone   2. Covid pneumonitis on remdesivir  3. H/o Dysphasia on thickened diet  4. Coccygeal wound  5. UTI E. coli  6. H/o PE CTA 10/21/2019 + LLL.  On Eliquis   7. CKD stage III stable  8. VHD echo showing EF 45% moderate LVH mild left aortic stenosis  10/22/2019  9. CAD s/p NSTEMI 11/22/2019 EF 40 to 45% stress test 10/23/2019 EF 40% no reversible defects fixed few perfusion defects from apex to anteroseptal and inferior lateral walls with elevated troponins  10. Hyperlipidemia  11. Anemia   12. GERD   13. Obesity  14. DM with peripheral neuropathy  15. Depression/anxiety on BuSpar  16. Hypertension on carvedilol  17. History of bilateral hip replacements with gait instability  18. Recent admission/visits  · 9/2019 UTI acute respiratory failure treated with antibiotics IV diuretics  · 10/ 20/2019 chest pain with elevated troponins found to have PE.  Treated with Eliquis. · 11/22 -11/24/2019.  Patient was admitted with chest pain.  Non-ST elevated MI.  Medications were adjusted.  patient was started on Ranexa and Imdur, and Lasix 40 mg a day  · 11/25 2019 seen by cardiology        Plan:      Inflammatory markers stable  Has been placed back on 2 L of oxygen  For eventual echo   continue present medications    Bertha OrtizSwedish Medical Center BallardЕЛЕНА

## 2020-11-18 NOTE — PROGRESS NOTES
Department of Internal Medicine  Infectious Diseases  Progress Note      C/C :  COVID 19 pneumonia     Pt is awake and alert  Denies fever   Reports cough and SOB   Afebrile         Current Facility-Administered Medications   Medication Dose Route Frequency Provider Last Rate Last Dose    miconazole (MICOTIN) 2 % powder   Topical BID Emi Dey MD        famotidine (PEPCID) tablet 10 mg  10 mg Oral Daily Emi Dey MD   10 mg at 11/18/20 6727    melatonin disintegrating tablet 10 mg  10 mg Oral Nightly Emi Dey MD   10 mg at 11/17/20 2230    sodium chloride flush 0.9 % injection 10 mL  10 mL Intravenous 2 times per day April KRISTIE Schmidt - CNP   10 mL at 11/18/20 0950    sodium chloride flush 0.9 % injection 10 mL  10 mL Intravenous PRN April SHOSHANA SchmidtN - VIVIANE        acetaminophen (TYLENOL) tablet 650 mg  650 mg Oral Q6H PRN April SHOSHANA SchmidtN - CNP   650 mg at 11/18/20 1143    Or    acetaminophen (TYLENOL) suppository 650 mg  650 mg Rectal Q6H PRN April SHOSHANA SchmidtN - CNP        polyethylene glycol (GLYCOLAX) packet 17 g  17 g Oral Daily PRN April SHOSHANA SchmidtN - CNP        promethazine (PHENERGAN) tablet 12.5 mg  12.5 mg Oral Q6H PRN April KRISTIE Schmidt - VIVIANE        Or    ondansetron [facility]) injection 4 mg  4 mg Intravenous Q6H PRN April KRISTIE Schmidt - CNP        albuterol sulfate  (90 Base) MCG/ACT inhaler 2 puff  2 puff Inhalation Q6H April SHOSHANA SchmidtN - CNP   2 puff at 11/18/20 1148    vitamin C (ASCORBIC ACID) tablet 500 mg  500 mg Oral Daily April KRISTIE Schmidt - CNP   500 mg at 11/18/20 0951    zinc sulfate (ZINCATE) capsule 50 mg  50 mg Oral Daily April KRISTIE Schmidt - CNP   50 mg at 11/18/20 0950    dexamethasone (DECADRON) injection 6 mg  6 mg Intravenous Daily April KRISTIE Schmidt - CNP   6 mg at 11/18/20 0593    apixaban (ELIQUIS) tablet 5 mg  5 mg Oral BID April ChapoLashonda, APRN - CNP   5 mg at 11/18/20 9079    aspirin chewable tablet 81 mg  81 mg Oral Daily April SHOSHANA SchmidtN - CNP   81 mg at 11/18/20 0951    atorvastatin (LIPITOR) tablet 40 mg  40 mg Oral Nightly April StoreyLashonda, APRN - CNP   40 mg at 11/17/20 2042    busPIRone (BUSPAR) tablet 5 mg  5 mg Oral Daily April Storey-Temo, APRN - CNP   5 mg at 11/18/20 7249    calcium-vitamin D (OSCAL-500) 500-200 MG-UNIT per tablet 1 tablet  1 tablet Oral BID April Chapo-SHOSHANA PlascenciaN - CNP   1 tablet at 11/18/20 4856    carvedilol (COREG) tablet 12.5 mg  12.5 mg Oral BID  April Chapo-Temo APRN - CNP   12.5 mg at 11/18/20 0219    diclofenac sodium (VOLTAREN) 1 % gel 2 g  2 g Topical Q6H PRN April Dunklin-Temo APRN - CNP        fluticasone [facility]) 50 MCG/ACT nasal spray 1 spray  1 spray Nasal Daily PRN April Brayan APRN - CNP        gabapentin (NEURONTIN) capsule 600 mg  600 mg Oral 3 times per day April Brayan, APRN - CNP   600 mg at 11/18/20 1430    isosorbide mononitrate (IMDUR) extended release tablet 30 mg  30 mg Oral Daily April SHOSHANA SchmidtN - CNP   30 mg at 11/18/20 0951    pantoprazole (PROTONIX) tablet 40 mg  40 mg Oral QAM AC April Brayan, APRN - CNP   40 mg at 11/18/20 0530    Liraglutide (VICTOZA) SC injection 1.2 mg  1.2 mg Subcutaneous Daily April Storey-SHOSHANA PlascenciaN - CNP        mineral oil-hydrophilic petrolatum (HYDROPHOR) ointment   Topical Daily April Storey-Temo APRN - CNP        montelukast (SINGULAIR) tablet 10 mg  10 mg Oral Nightly April Brayan, APRN - CNP   10 mg at 11/17/20 2042    therapeutic multivitamin-minerals 1 tablet  1 tablet Oral Daily April KRISTIE Schmidt CNP   1 tablet at 11/18/20 0951    nitroGLYCERIN (NITROSTAT) SL tablet 0.4 mg  0.4 mg Sublingual Q5 Min PRN April KRISTIE Schmidt - VIVIANE        miconazole (MICOTIN) 2 % powder   Topical BID [facility], KRISTIE - VIVIANE        sertraline (ZOLOFT) tablet 150 mg  150 mg Oral Daily [facility]   150 mg at 11/18/20 4509    benzonatate (TESSALON) capsule 100 mg  100 mg Oral Q8H PRN [facility]   100 mg at 11/18/20 0952    glucose (GLUTOSE) 40 % oral gel 15 g  15 g Oral PRN April [facility] Center[facility] Plascencia - CNP        dextrose 50 % IV solution  12.5 g Intravenous PRN April [facility] Center[facility] PlascenciaN - CNP        glucagon (rDNA) injection 1 mg  1 mg Intramuscular PRN [facility]        dextrose 5 % solution  100 mL/hr Intravenous PRN April [facility] Center[facility] Plascencia CNP        insulin lispro (HUMALOG) injection vial 0-6 Units  0-6 Units Subcutaneous TID [facility]   2 Units at 11/18/20 1234    insulin lispro (HUMALOG) injection vial 0-3 Units  0-3 Units Subcutaneous Nightly [facility] manuel CNP   1 Units at 11/17/20 2224    dexamethasone (DECADRON) injection 6 mg  6 mg Intravenous Once Lisa Stone DO        Vitamin D (CHOLECALCIFEROL) tablet 1,000 Units  1,000 Units Oral Daily Shmuel Geller MD   1,000 Units at 11/18/20 0952    remdesivir 100 mg in sodium chloride 0.9 % 250 mL IVPB  100 mg Intravenous Q24H Toya Ruiz MD   Stopped at 11/17/20 1823    0.9 % sodium chloride bolus  30 mL Intravenous PRN Toya Ruiz MD        influenza quadrivalent split vaccine (FLUZONE;FLUARIX;FLULAVAL;AFLURIA) injection 0.5 mL  0.5 mL Intramuscular Prior to discharge Liz Love MD           REVIEW OF SYSTEMS:    CONSTITUTIONAL:  Denies fever   HEENT: denies blurring of vision or double vision, denies hearing problem  RESPIRATORY: cough, shortness of breath   CARDIOVASCULAR:  Denies palpitation  GASTROINTESTINAL:  Denies abdomen pain, diarrhea or constipation.   GENITOURINARY:  Denies burning urination or frequency of urination  INTEGUMENT: denies wound , rash  HEMATOLOGIC/LYMPHATIC:  Denies lymph node swelling, gum bleeding or easy bruising. MUSCULOSKELETAL:  Denies leg pain , joint pain , joint swelling  NEUROLOGICAL:  Weakness       PHYSICAL EXAM:      Vitals:   /80   Pulse 78   Temp 96.7 °F (35.9 °C) (Oral)   Resp 18   Ht 5' (1.524 m)   Wt 178 lb 2 oz (80.8 kg)   LMP  (LMP Unknown)   SpO2 96%   BMI 34.79 kg/m²     General Appearance:    Awake, alert , mild distress . Head:    Normocephalic, atraumatic   Eyes:    No pallor, no icterus,   Ears:    No obvious deformity or drainage.    Nose:   No nasal drainage   Throat:   Mucosa moist, no oral thrush   Neck:   Supple, no lymphadenopathy   Lungs:     Diminished breath sound    Heart:    Regular rate and rhythm, no murmur   Abdomen:     Soft, non-tender, bowel sounds present    Extremities:   No edema, no cyanosis    Pulses:   Dorsalis pedis palpable    Skin:   no rashes or lesions       CBC with Differential:      Lab Results   Component Value Date    WBC 4.8 11/18/2020    RBC 3.35 11/18/2020    HGB 11.2 11/18/2020    HCT 34.6 11/18/2020     11/18/2020    .3 11/18/2020    MCH 33.4 11/18/2020    MCHC 32.4 11/18/2020    RDW 12.9 11/18/2020    SEGSPCT 58 06/18/2013    LYMPHOPCT 24.6 11/18/2020    MONOPCT 3.1 11/18/2020    BASOPCT 0.2 11/18/2020    MONOSABS 0.15 11/18/2020    LYMPHSABS 1.19 11/18/2020    EOSABS 0.03 11/18/2020    BASOSABS 0.01 11/18/2020       CMP     Lab Results   Component Value Date     11/18/2020    K 3.9 11/18/2020     11/18/2020    CO2 27 11/18/2020    BUN 36 11/18/2020    CREATININE 1.4 11/18/2020    GFRAA 43 11/18/2020    LABGLOM 36 11/18/2020    GLUCOSE 199 11/18/2020    PROT 6.1 11/18/2020    LABALBU 3.2 11/18/2020    CALCIUM 8.8 11/18/2020    BILITOT 0.3 11/18/2020    ALKPHOS 62 11/18/2020    AST 39 11/18/2020    ALT 22 11/18/2020         Hepatic Function Panel:    Lab Results   Component Value Date    ALKPHOS 62 11/18/2020    ALT 22 11/18/2020    AST 39 11/18/2020    PROT 6.1 11/18/2020 BILITOT 0.3 11/18/2020    LABALBU 3.2 11/18/2020       PT/INR:    Lab Results   Component Value Date    PROTIME 13.9 01/09/2020    INR 1.2 01/09/2020       TSH:    Lab Results   Component Value Date    TSH 4.220 09/16/2020       U/A:    Lab Results   Component Value Date    NITRITE NEGATIVE 09/09/2019    COLORU Yellow 11/16/2020    PHUR 7.5 11/16/2020    WBCUA 5-10 11/16/2020    RBCUA NONE 11/16/2020    BACTERIA FEW 11/16/2020    CLARITYU Clear 11/16/2020    SPECGRAV 1.015 11/16/2020    LEUKOCYTESUR TRACE 11/16/2020    UROBILINOGEN 0.2 11/16/2020    BILIRUBINUR Negative 11/16/2020    BILIRUBINUR NEGATIVE 09/09/2019    BLOODU Negative 11/16/2020    GLUCOSEU Negative 11/16/2020    AMORPHOUS RARE 11/16/2020       ABG:  No results found for: SEU4NPI, BEART, D3RTPOBE, PHART, THGBART, LCC1JTC, PO2ART, MWN6RGX    MICROBIOLOGY:    COVID 19 +Ve     Urine cx - E coli     Radiology :    Chest X ray : Subtle opacities in the left mid and lower lung field     IMPRESSION:     1. COVID 19 pneumonia   2. Respiratory failure - oxygen saturation 96$ on 2 L of oxygen   3. E coli bacteriuria ( asymptomatic )       RECOMMENDATIONS:      1. Remdesivir 100 mg IV q 24 hrs  2. Decadron 6 mg po q 24 hrs   3.  Follow LFTs and Cr

## 2020-11-19 NOTE — PROGRESS NOTES
Department of Internal Medicine  Infectious Diseases  Progress Note      C/C :  COVID 19 pneumonia     Pt is awake and alert  Denies fever   Reports cough and SOB   Afebrile          Current Facility-Administered Medications   Medication Dose Route Frequency Provider Last Rate Last Dose    miconazole (MICOTIN) 2 % powder   Topical BID Emi Dey MD        famotidine (PEPCID) tablet 10 mg  10 mg Oral Daily Emi Dey MD   10 mg at 11/19/20 0900    melatonin disintegrating tablet 10 mg  10 mg Oral Nightly Emi Dey MD   10 mg at 11/18/20 2241    sodium chloride flush 0.9 % injection 10 mL  10 mL Intravenous 2 times per day April KRISTIE Schmidt - CNP   10 mL at 11/19/20 0900    sodium chloride flush 0.9 % injection 10 mL  10 mL Intravenous PRN April SHOSHANA SchmidtN - VIVIANE        acetaminophen (TYLENOL) tablet 650 mg  650 mg Oral Q6H PRN April SHOSHANA SchmidtN - CNP   650 mg at 11/18/20 1143    Or    acetaminophen (TYLENOL) suppository 650 mg  650 mg Rectal Q6H PRN April SHOSHANA SchmidtN - CNP        polyethylene glycol (GLYCOLAX) packet 17 g  17 g Oral Daily PRN April SHOSHANA SchmidtN - CNP        promethazine (PHENERGAN) tablet 12.5 mg  12.5 mg Oral Q6H PRN April KRISTIE Schmidt - VIVIANE        Or    ondansetron [facility]) injection 4 mg  4 mg Intravenous Q6H PRN April KRISTIE Schmidt - CNP        albuterol sulfate  (90 Base) MCG/ACT inhaler 2 puff  2 puff Inhalation Q6H April SHOSHANA SchmidtN - CNP   2 puff at 11/19/20 0616    vitamin C (ASCORBIC ACID) tablet 500 mg  500 mg Oral Daily April KRISTIE Schmidt - CNP   500 mg at 11/19/20 0900    zinc sulfate (ZINCATE) capsule 50 mg  50 mg Oral Daily April KRISTIE Schmidt - CNP   50 mg at 11/19/20 0900    dexamethasone (DECADRON) injection 6 mg  6 mg Intravenous Daily April KRISTIE Schmidt - CNP   6 mg at 11/19/20 0900    apixaban (ELIQUIS) tablet 5 mg  5 mg Oral BID April Storey-Temo, APRN - CNP   5 mg at 11/19/20 0900    aspirin chewable tablet 81 mg  81 mg Oral Daily April Storey-Layton, APRN - CNP   81 mg at 11/19/20 0900    atorvastatin (LIPITOR) tablet 40 mg  40 mg Oral Nightly April Storey-Layton, APRN - CNP   40 mg at 11/18/20 2241    busPIRone (BUSPAR) tablet 5 mg  5 mg Oral Daily April Storey-Layton, APRN - CNP   5 mg at 11/19/20 0900    calcium-vitamin D (OSCAL-500) 500-200 MG-UNIT per tablet 1 tablet  1 tablet Oral BID April Storey-Temo, APRN - CNP   1 tablet at 11/19/20 0900    carvedilol (COREG) tablet 12.5 mg  12.5 mg Oral BID WC April Storey-Layton, APRN - CNP   12.5 mg at 11/19/20 0900    diclofenac sodium (VOLTAREN) 1 % gel 2 g  2 g Topical Q6H PRN April Storey-Layton, APRN - CNP        fluticasone (FLONASE) 50 MCG/ACT nasal spray 1 spray  1 spray Nasal Daily PRN April Chapo-Temo, APRN - CNP        gabapentin (NEURONTIN) capsule 600 mg  600 mg Oral 3 times per day April Powell-Temo, APRN - CNP   600 mg at 11/19/20 0617    isosorbide mononitrate (IMDUR) extended release tablet 30 mg  30 mg Oral Daily April Storey-Layton, APRN - CNP   30 mg at 11/19/20 0900    pantoprazole (PROTONIX) tablet 40 mg  40 mg Oral QAM AC April StoreyJesseLayton, APRN - CNP   40 mg at 11/19/20 0617    Liraglutide (VICTOZA) SC injection 1.2 mg  1.2 mg Subcutaneous Daily April Storey-Temo, APRN - CNP        mineral oil-hydrophilic petrolatum (HYDROPHOR) ointment   Topical Daily April Storey-Temo, APRN - CNP        montelukast (SINGULAIR) tablet 10 mg  10 mg Oral Nightly April Storey-Layton, APRN - CNP   10 mg at 11/18/20 2242    therapeutic multivitamin-minerals 1 tablet  1 tablet Oral Daily April KRISTIE Schmidt CNP   1 tablet at 11/19/20 0900    nitroGLYCERIN (NITROSTAT) SL tablet 0.4 mg  0.4 mg Sublingual Q5 Min PRN April KRISTIE Schmidt - VIVIANE        miconazole (MICOTIN) 2 % powder   Topical BID April ChapoJossueMaljamar, APRN - CNP        sertraline (ZOLOFT) tablet 150 mg  150 mg Oral Daily April Brayan, APRN - CNP   150 mg at 11/19/20 0900    benzonatate (TESSALON) capsule 100 mg  100 mg Oral Q8H PRN April Brayan, APRN - CNP   100 mg at 11/18/20 2241    glucose (GLUTOSE) 40 % oral gel 15 g  15 g Oral PRN April AntonioSHOSHANA manuelN - CNP        dextrose 50 % IV solution  12.5 g Intravenous PRN April Davidelius, APRN - CNP        glucagon (rDNA) injection 1 mg  1 mg Intramuscular PRN April BrayanKRISTIE - CNP        dextrose 5 % solution  100 mL/hr Intravenous PRN April BraynaKRISTIE - CNP        insulin lispro (HUMALOG) injection vial 0-6 Units  0-6 Units Subcutaneous TID R Peggy Janina 23 April ChapoMaljamar, APRN - CNP   2 Units at 11/18/20 1800    insulin lispro (HUMALOG) injection vial 0-3 Units  0-3 Units Subcutaneous Nightly April Brayan, APRN - CNP   2 Units at 11/18/20 2259    dexamethasone (DECADRON) injection 6 mg  6 mg Intravenous Once Lisa Stone DO        Vitamin D (CHOLECALCIFEROL) tablet 1,000 Units  1,000 Units Oral Daily Alan Geller MD   1,000 Units at 11/19/20 0900    remdesivir 100 mg in sodium chloride 0.9 % 250 mL IVPB  100 mg Intravenous Q24H Khushboo Santos MD   Stopped at 11/1934    0.9 % sodium chloride bolus  30 mL Intravenous PRN Khushboo Santos MD        influenza quadrivalent split vaccine (FLUZONE;FLUARIX;FLULAVAL;AFLURIA) injection 0.5 mL  0.5 mL Intramuscular Prior to discharge Dayanna Villalobos MD           REVIEW OF SYSTEMS:      CONSTITUTIONAL:  Denies fever   HEENT: denies blurring of vision or double vision, denies hearing problem  RESPIRATORY: Denies shortness of breath   CARDIOVASCULAR:  Denies palpitation  GASTROINTESTINAL:  Denies abdomen pain, diarrhea or constipation.   GENITOURINARY:  Denies burning urination or frequency of urination  INTEGUMENT: denies wound , rash  HEMATOLOGIC/LYMPHATIC:  Denies lymph node swelling, gum bleeding or easy bruising. MUSCULOSKELETAL:  Denies leg pain , joint pain , joint swelling  NEUROLOGICAL:  Weakness     PHYSICAL EXAM:      Vitals:   BP (!) 120/58   Pulse 57   Temp 97.5 °F (36.4 °C) (Infrared)   Resp 20   Ht 5' (1.524 m)   Wt 178 lb 2 oz (80.8 kg)   LMP  (LMP Unknown)   SpO2 96%   BMI 34.79 kg/m²     General Appearance:    Awake, alert , mild distress . Head:    Normocephalic, atraumatic   Eyes:    No pallor, no icterus,   Ears:    No obvious deformity or drainage.    Nose:   No nasal drainage   Throat:   Mucosa moist, no oral thrush   Neck:   Supple, no lymphadenopathy   Lungs:     Clear bilaterally    Heart:    Regular rate and rhythm, no murmur   Abdomen:     Soft, non-tender, bowel sounds present    Extremities:   No edema, no cyanosis    Pulses:   Dorsalis pedis palpable    Skin:   no rashes or lesions       CBC with Differential:      Lab Results   Component Value Date    WBC 5.7 11/19/2020    RBC 2.95 11/19/2020    HGB 10.0 11/19/2020    HCT 30.1 11/19/2020     11/19/2020    .0 11/19/2020    MCH 33.9 11/19/2020    MCHC 33.2 11/19/2020    RDW 12.8 11/19/2020    SEGSPCT 58 06/18/2013    LYMPHOPCT 30.8 11/19/2020    MONOPCT 5.4 11/19/2020    BASOPCT 0.2 11/19/2020    MONOSABS 0.31 11/19/2020    LYMPHSABS 1.75 11/19/2020    EOSABS 0.03 11/19/2020    BASOSABS 0.01 11/19/2020       CMP     Lab Results   Component Value Date     11/19/2020    K 3.8 11/19/2020     11/19/2020    CO2 29 11/19/2020    BUN 34 11/19/2020    CREATININE 1.2 11/19/2020    GFRAA 51 11/19/2020    LABGLOM 42 11/19/2020    GLUCOSE 138 11/19/2020    PROT 5.9 11/19/2020    LABALBU 3.0 11/19/2020    CALCIUM 8.9 11/19/2020    BILITOT 0.3 11/19/2020    ALKPHOS 55 11/19/2020    AST 29 11/19/2020    ALT 18 11/19/2020         Hepatic Function Panel:    Lab Results   Component Value Date    ALKPHOS 55 11/19/2020    ALT 18 11/19/2020    AST 29 11/19/2020    PROT 5.9 11/19/2020 BILITOT 0.3 11/19/2020    LABALBU 3.0 11/19/2020       PT/INR:    Lab Results   Component Value Date    PROTIME 13.9 01/09/2020    INR 1.2 01/09/2020       TSH:    Lab Results   Component Value Date    TSH 4.220 09/16/2020       U/A:    Lab Results   Component Value Date    NITRITE NEGATIVE 09/09/2019    COLORU Yellow 11/16/2020    PHUR 7.5 11/16/2020    WBCUA 5-10 11/16/2020    RBCUA NONE 11/16/2020    BACTERIA FEW 11/16/2020    CLARITYU Clear 11/16/2020    SPECGRAV 1.015 11/16/2020    LEUKOCYTESUR TRACE 11/16/2020    UROBILINOGEN 0.2 11/16/2020    BILIRUBINUR Negative 11/16/2020    BILIRUBINUR NEGATIVE 09/09/2019    BLOODU Negative 11/16/2020    GLUCOSEU Negative 11/16/2020    AMORPHOUS RARE 11/16/2020       ABG:  No results found for: WVY9AXM, BEART, E0OVCYBB, PHART, THGBART, AMC6XEB, PO2ART, RFO0XOJ    MICROBIOLOGY:    COVID 19 +Ve     Urine cx - E coli     Radiology :    Chest X ray : Subtle opacities in the left mid and lower lung field     IMPRESSION:     1. COVID 19 pneumonia   2. Respiratory failure - oxygen saturation 96%  on 2 L of oxygen   3. E coli bacteriuria ( asymptomatic )       RECOMMENDATIONS:      1. Remdesivir 100 mg IV q 24 hrs  2. Decadron 6 mg po q 24 hrs   3.  Follow LFTs and Cr

## 2020-11-19 NOTE — PROGRESS NOTES
24 hours) at 11/19/2020 1427  Last data filed at 11/19/2020 1317  Gross per 24 hour   Intake 120 ml   Output 775 ml   Net -655 ml       Patient Vitals for the past 96 hrs (Last 3 readings):   Weight   11/18/20 0308 178 lb 2 oz (80.8 kg)   11/16/20 2340 177 lb 14.4 oz (80.7 kg)   11/16/20 1945 178 lb (80.7 kg)         Recent Labs     11/17/20  0310 11/18/20  1150 11/19/20  0925   WBC 3.2* 4.8 5.7   HGB 10.3* 11.2* 10.0*   HCT 31.5* 34.6 30.1*   .3* 103.3* 102.0*   * 122* 110*     Recent Labs     11/17/20  0310 11/18/20  1150 11/19/20  0925    140 143   K 4.1 3.9 3.8    106 107   CO2 25 27 29   BUN 29* 36* 34*   CREATININE 1.4* 1.4* 1.2*     Recent Labs     11/18/20  1150 11/19/20  0925   PROT 6.1* 5.9*   ALKPHOS 62 55   ALT 22 18   AST 39* 29   BILITOT 0.3 0.3     No results for input(s): INR, APTT in the last 72 hours. Recent Labs     11/16/20  1711 11/17/20  0600   CKTOTAL 100 100   CKMB 4.9* 4.5*   TROPONINI 0.15* 0.09*     No results for input(s): BNP in the last 72 hours. Troponin:   Recent Labs     11/16/20  1711 11/17/20  0600   TROPONINI 0.15* 0.09*     CPK:  Lab Results   Component Value Date    CKTOTAL 100 11/17/2020      BNP: No results for input(s): BNP in the last 72 hours. ABGs: No results found for: PHART, PO2ART, UNB4UQU  INR: No results for input(s): INR in the last 72 hours. -----------------------------------------------------------------  RAD:   Results for orders placed during the hospital encounter of 11/16/20   XR CHEST PORTABLE    Narrative EXAMINATION:  ONE XRAY VIEW OF THE CHEST    11/16/2020 8:55 am    COMPARISON:  01/09/2020    HISTORY:  ORDERING SYSTEM PROVIDED HISTORY: Chest Pain  TECHNOLOGIST PROVIDED HISTORY:  Reason for exam:->Chest Pain  What reading provider will be dictating this exam?->CRC    FINDINGS:  The patient is rotated to the left. Subtle opacity is seen in the left mid  and lower lung field that could be related to pneumonia. Increased  interstitial markings are seen bilaterally. The heart and mediastinum are  not significantly changed and there is no evidence of pleural effusion. No  overt vascular congestion is noted. Impression Subtle opacities in the left mid and lower lung field that could represent  pneumonia. Micro:  No results for input(s): BC in the last 72 hours. No results for input(s): ORG in the last 72 hours. No results for input(s): Wendi Will in the last 72 hours. No results for input(s): STREPPNEUMAG in the last 72 hours. No results for input(s): LEGUR in the last 72 hours. No results for input(s): ORG in the last 72 hours. No results for input(s): ASO in the last 72 hours. No results for input(s): CULTRESP in the last 72 hours. No results for input(s): LABURIN in the last 72 hours. Vent Information  Skin Assessment: Clean, dry, & intact  SpO2: 96 %    Additional Respiratory  Assessments  Pulse: 57  Resp: 20  SpO2: 96 %    Objective:   Vitals:   Vitals:    20 1230   BP: (!) 120/58   Pulse: 57   Resp: 20   Temp: 97.5 °F (36.4 °C)   SpO2: 96%      TEMP:Current: Temp: 97.5 °F (36.4 °C)  Max: Temp  Av.4 °F (36.3 °C)  Min: 96.8 °F (36 °C)  Max: 97.9 °F (36.6 °C)    BP Range: Systolic (25LEL), HWK:559 , Min:118 , KQN:479     Diastolic (51AGG), QHB:40, Min:58, Max:70       General appearance: alert, appears stated age and cooperative obese alert  In no acute distress  Skin: No rashes or lesions  HEENT: mucous membranes are moist  Neck: No JVD  Lungs: symmetrical expansion,  Left base rales to auscultation, no use of accessory muscles  Heart: S1S2 no murmurs,  Abdomen: soft, non tender,   Extremities: no peripheral edema  Neurologic: Alert, oriented times 3,  Affect: pleasant        Assessment:   Patient Active Problem List:  80 y. o. year old female history of obesity, HTN, CKD stage III, DM on insulin with peripheral neuropathy, anxiety/depression with spinal stenosis, PE 10/2019 on Eliquis who presented with shortness of breath and chest pain treated with sublingual nitroglycerin with relief  11/16  Covid positive   Elevated proBNP  chest x-ray showing opacities left midlung and lower lung field  on 2 L  11/17 on 3 L. Patient put on room air saturations 97% on room air. 11/19 on 2 L satting 96%     1. Acute hypoxic respiratory failure on dexamethasone started 11/16-   2. Covid pneumonitis on remdesivir started 11/20 -  3. H/o Dysphasia on thickened diet  4. Coccygeal wound  5. CKD stage III stable  6. Chronic anemia  7. UTI E. coli  8. 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  On Eliquis   9. VHD echo showing EF 45% moderate LVH mild left aortic stenosis  10/22/2019  10. CAD s/p NSTEMI 11/22/2019 EF 40 to 45% stress test 10/23/2019 EF 40% no reversible defects fixed few perfusion defects from apex to anteroseptal and inferior lateral walls with elevated troponins  11. HLD   12. GERD   13. Obesity  14. DM with peripheral neuropathy  15. Depression/anxiety on BuSpar  16. HTN on carvedilol  17. H/o of b/l hip replacements with gait instability  18. Recent admission/visits  · 9/2019 UTI acute respiratory failure treated with antibiotics IV diuretics  · 10/ 20/2019 chest pain with elevated troponins found to have PE.  Treated with Eliquis. · 11/22 -11/24/2019.  Patient was admitted with chest pain.  Non-ST elevated MI.  Medications were adjusted.  patient was started on Ranexa and Imdur, and Lasix 40 mg a day  · 11/25 2019 seen by cardiology        Plan:      Inflammatory markers stable  Has been placed back on 2 L of oxygen  For eventual echo   continue present medications     Bertha OrtizDayton General HospitalЕЛЕНА

## 2020-11-19 NOTE — PROGRESS NOTES
[id number] [facility] Cardiology Inpatient Progress Note    Patient is a 80 y.o. female of [facility] seen in hospital follow up. Chief complaint: CP/COVID 19    HPI: Some SOB. No CP. Patient Active Problem List   Diagnosis    GERD (gastroesophageal reflux disease)    Type 2 diabetes mellitus with hyperglycemia, with long-term current use of insulin (HCC)    CKD (chronic kidney disease) stage 3, GFR 30-59 ml/min ([facility]    Bacteriuria with pyuria    Essential hypertension    History of pulmonary embolus (PE)    Chest pain    Acute respiratory failure with hypoxia ([address]    HCAP (healthcare-associated pneumonia)    Obesity (BMI 30-39. 9)    Pressure injury of sacral region, stage 2 ([facility])    COVID-19    Hyperkalemia    Anxiety and depression       No Known Allergies    Current Facility-Administered Medications   Medication Dose Route Frequency Provider Last Rate Last Dose    miconazole (MICOTIN) 2 % powder   Topical BID Bassam Ewing MD        famotidine (PEPCID) tablet 10 mg  10 mg Oral Daily Bassam Ewing MD   10 mg at 11/18/20 7440    melatonin disintegrating tablet 10 mg  10 mg Oral Nightly Bassam Ewing MD   10 mg at 11/18/20 2241    sodium chloride flush 0.9 % injection 10 mL  10 mL Intravenous 2 times per day April Chapo-Temo, APRN - CNP   10 mL at 11/18/20 2243    sodium chloride flush 0.9 % injection 10 mL  10 mL Intravenous PRN April Chapo-Temo, APRN - CNP        acetaminophen (TYLENOL) tablet 650 mg  650 mg Oral Q6H PRN April Chapo-Flagler, APRN - CNP   650 mg at 11/18/20 1143    Or    acetaminophen (TYLENOL) suppository 650 mg  650 mg Rectal Q6H PRN April Chapo-Flagler, APRN - CNP        polyethylene glycol (GLYCOLAX) packet 17 g  17 g Oral Daily PRN April Chapo-Temo, APRN - CNP        promethazine (PHENERGAN) tablet 12.5 mg  12.5 mg Oral Q6H PRN April Hatillo-Temo, APRN - CNP        Or    [facility]ron [facility]) injection 4 mg  4 mg Intravenous Q6H PRN April SHOSHANA SchmidtN - CNP        albuterol sulfate  (90 Base) MCG/ACT inhaler 2 puff  2 puff Inhalation Q6H April SHOSHANA SchmidtN - CNP   2 puff at 11/19/20 0616    vitamin C (ASCORBIC ACID) tablet 500 mg  500 mg Oral Daily April Mason-Temo APRN - CNP   500 mg at 11/18/20 2131    zinc sulfate (ZINCATE) capsule 50 mg  50 mg Oral Daily April Storey-Temo APRN - CNP   50 mg at 11/18/20 0950    dexamethasone (DECADRON) injection 6 mg  6 mg Intravenous Daily April Storey-SHOSHANA PlascenciaN - CNP   6 mg at 11/18/20 0971    apixaban (ELIQUIS) tablet 5 mg  5 mg Oral BID April Storey-Temo APRN - CNP   5 mg at 11/18/20 2242    aspirin chewable tablet 81 mg  81 mg Oral Daily April Mason-SHOSHANA PlascenciaN - CNP   81 mg at 11/18/20 0951    atorvastatin (LIPITOR) tablet 40 mg  40 mg Oral Nightly April StoreyLashonda APRN - CNP   40 mg at 11/18/20 2241    busPIRone (BUSPAR) tablet 5 mg  5 mg Oral Daily April Storey-SHOSHANA PlascenciaN - CNP   5 mg at 11/18/20 1313    calcium-vitamin D (OSCAL-500) 500-200 MG-UNIT per tablet 1 tablet  1 tablet Oral BID April Chapo-SHOSHANA PlascenciaN - CNP   1 tablet at 11/18/20 2241    carvedilol (COREG) tablet 12.5 mg  12.5 mg Oral BID  April Brayan, APRN - CNP   12.5 mg at 11/18/20 1838    diclofenac sodium (VOLTAREN) 1 % gel 2 g  2 g Topical Q6H PRN April SHOSHANA SchmidtN - VIVIANE        fluticasone (FLONASE) 50 MCG/ACT nasal spray 1 spray  1 spray Nasal Daily PRN April Brayan APRN - CNP        gabapentin (NEURONTIN) capsule 600 mg  600 mg Oral 3 times per day April SHOSHANA SchmidtN - CNP   600 mg at 11/19/20 0617    isosorbide mononitrate (IMDUR) extended release tablet 30 mg  30 mg Oral Daily April KRISTIE Schmidt CNP   30 mg at 11/18/20 0951    pantoprazole (PROTONIX) tablet 40 mg  40 mg Oral [facility] April KRISTIE Schmidt CNP   40 mg at 11/19/20 0617    Liraglutide (VICTOZA) SC injection 1.2 mg  1.2 mg Subcutaneous Daily April Storey-Temo, APRN - CNP        mineral oil-hydrophilic petrolatum (HYDROPHOR) ointment   Topical Daily April Storey-Temo, APRN - VIVIANE        montelukast (SINGULAIR) tablet 10 mg  10 mg Oral Nightly April Grant-Culbertson, APRN - CNP   10 mg at 11/18/20 2242    therapeutic multivitamin-minerals 1 tablet  1 tablet Oral Daily April Storey-KRISTIE Plascencia - CNP   1 tablet at 11/18/20 0951    nitroGLYCERIN (NITROSTAT) SL tablet 0.4 mg  0.4 mg Sublingual Q5 Min PRN April Storey-Culbertson, APRN - VIVIANE        miconazole (MICOTIN) 2 % powder   Topical BID April Storey-Temo, APRN - VIVIANE        sertraline (ZOLOFT) tablet 150 mg  150 mg Oral Daily April Storey-Culbertson, APRN - CNP   150 mg at 11/18/20 5351    benzonatate (TESSALON) capsule 100 mg  100 mg Oral Q8H PRN April Grant-Temo, APRN - CNP   100 mg at 11/18/20 2241    glucose (GLUTOSE) 40 % oral gel 15 g  15 g Oral PRN April Chapo-Temo, APRN - CNP        dextrose 50 % IV solution  12.5 g Intravenous PRN April Storey-Culbertson, APRN - CNP        glucagon (rDNA) injection 1 mg  1 mg Intramuscular PRN April Storey-Culbertson, KRISTIE - CNP        dextrose 5 % solution  100 mL/hr Intravenous PRN April Chapo-SHOSHANA PlascenciaN - CNP        insulin lispro (HUMALOG) injection vial 0-6 Units  0-6 Units Subcutaneous TID [facility] April Grant-Temo, APRN - CNP   2 Units at 11/18/20 1800    insulin lispro (HUMALOG) injection vial 0-3 Units  0-3 Units Subcutaneous Nightly April Grant-TemoKRISTIE garcia - CNP   2 Units at 11/18/20 2259    dexamethasone (DECADRON) injection 6 mg  6 mg Intravenous Once Lisa Stone DO        Vitamin D (CHOLECALCIFEROL) tablet 1,000 Units  1,000 Units Oral Daily Zoey Ocampo MD   1,000 Units at 11/18/20 0952    remdesivir 100 mg in sodium chloride 0.9 % 250 mL IVPB  100 mg Intravenous Q24H Zoey Ocampo MD   Stopped at 11/1934    0.9 % sodium chloride bolus  30 mL Intravenous PRN Alan Geller MD        influenza quadrivalent split vaccine (FLUZONE;FLUARIX;FLULAVAL;AFLURIA) injection 0.5 mL  0.5 mL Intramuscular Prior to discharge Alena Gitelman, MD           Review of systems:   Heart: as above   Lungs: as above   Eyes: denies changes in vision or discharge. Ears: denies changes in hearing or pain. Nose: denies epistaxis or masses   Throat: denies sore throat or trouble swallowing. Neuro: denies numbness, tingling, tremors. Skin: denies rashes or itching. : denies hematuria, dysuria   GI: denies vomiting, diarrhea   Psych: denies mood changed, anxiety, depression. Physical Exam   BP (!) 159/70   Pulse 60   Temp 97.5 °F (36.4 °C) (Infrared)   Resp 20   Ht 5' (1.524 m)   Wt 178 lb 2 oz (80.8 kg)   LMP  (LMP Unknown)   SpO2 99%   BMI 34.79 kg/m²     COVID 19.     CBC:   Lab Results   Component Value Date    WBC 4.8 11/18/2020    RBC 3.35 11/18/2020    HGB 11.2 11/18/2020    HCT 34.6 11/18/2020    .3 11/18/2020    MCH 33.4 11/18/2020    MCHC 32.4 11/18/2020    RDW 12.9 11/18/2020     11/18/2020    MPV 9.4 11/18/2020     BMP:   Lab Results   Component Value Date     11/18/2020    K 3.9 11/18/2020     11/18/2020    CO2 27 11/18/2020    BUN 36 11/18/2020    LABALBU 3.2 11/18/2020    CREATININE 1.4 11/18/2020    CALCIUM 8.8 11/18/2020    GFRAA 43 11/18/2020    LABGLOM 36 11/18/2020     Magnesium:    Lab Results   Component Value Date    MG 1.9 01/09/2020     Cardiac Enzymes:   Lab Results   Component Value Date    CKTOTAL 100 11/17/2020    CKTOTAL 100 11/16/2020    CKTOTAL 187 (H) 01/09/2020    CKMB 4.5 (H) 11/17/2020    CKMB 4.9 (H) 11/16/2020    CKMB 17.7 (H) 01/09/2020    TROPONINI 0.09 (H) 11/17/2020    TROPONINI 0.15 (H) 11/16/2020    TROPONINI 0.03 11/16/2020      PT/INR:    Lab Results   Component Value Date    PROTIME 13.9 01/09/2020    INR 1.2 01/09/2020     TSH:    Lab Results   Component Value Date    TSH 4.220 09/16/2020     Rhythm Strip: normal sinus rhythm. ASSESSMENT & PLAN:    Patient Active Problem List   Diagnosis    GERD (gastroesophageal reflux disease)    Type 2 diabetes mellitus with hyperglycemia, with long-term current use of insulin (HCC)    CKD (chronic kidney disease) stage 3, GFR 30-59 ml/min (HCC)    Bacteriuria with pyuria    Essential hypertension    History of pulmonary embolus (PE)    Chest pain    Acute respiratory failure with hypoxia ([address]    HCAP (healthcare-associated pneumonia)    Obesity (BMI 30-39. 9)    Pressure injury of sacral region, stage 2 ([address]    COVID-19    Hyperkalemia    Anxiety and depression     1. CP/Elevated troponin/CAD:     Echo when able.      Medically manage for now. ASA/statin/BB/imdur.     Consider stress at some point to risk stratify further in her course.     2. COVID 19     3. Hx of PE: On Eliquis.      4. Improved CMP: Echo when able.      5. HTN: Observe.      6. Lipids: Statin.      7. DM: Per primary service.      8. CKD: Follow labs.      Fara Barnes D.O.   Cardiologist  Cardiology, [address] [facility] [address]

## 2020-11-19 NOTE — PLAN OF CARE
Problem: Airway Clearance - Ineffective  Goal: Achieve or maintain patent airway  11/18/2020 2353 by Fátima Avery RN  Outcome: Not Met This Shift  11/18/2020 1056 by Santhosh Holloway RN  Outcome: Met This Shift     Problem: Gas Exchange - Impaired  Goal: Absence of hypoxia  Outcome: Not Met This Shift  Goal: Promote optimal lung function  11/18/2020 2353 by Fátima Avery RN  Outcome: Not Met This Shift  11/18/2020 1056 by Santhosh Holloway RN  Outcome: Met This Shift     Problem: Breathing Pattern - Ineffective  Goal: Ability to achieve and maintain a regular respiratory rate  11/18/2020 2353 by Fátima Avery RN  Outcome: Not Met This Shift  11/18/2020 1056 by Santhosh Holloway RN  Outcome: Met This Shift     Problem:  Body Temperature -  Risk of, Imbalanced  Goal: Ability to maintain a body temperature within defined limits  11/18/2020 2353 by Fátima Avery RN  Outcome: Not Met This Shift  11/18/2020 1056 by Santhosh Holloway RN  Outcome: Met This Shift  Goal: Will regain or maintain usual level of consciousness  11/18/2020 2353 by Fátima Avery RN  Outcome: Not Met This Shift  11/18/2020 1056 by Santhosh Holloway RN  Outcome: Met This Shift  Goal: Complications related to the disease process, condition or treatment will be avoided or minimized  Outcome: Not Met This Shift     Problem: Isolation Precautions - Risk of Spread of Infection  Goal: Prevent transmission of infection  Outcome: Not Met This Shift     Problem: Nutrition Deficits  Goal: Optimize nutrtional status  Outcome: Not Met This Shift     Problem: Risk for Fluid Volume Deficit  Goal: Maintain normal heart rhythm  11/18/2020 2353 by Fátima Aveyr RN  Outcome: Not Met This Shift  11/18/2020 1056 by Santhosh Holloway RN  Outcome: Met This Shift  Goal: Maintain absence of muscle cramping  Outcome: Not Met This Shift  Goal: Maintain normal serum potassium, sodium, calcium, phosphorus, and pH  Outcome: Not Met This Shift     Problem: Loneliness or Risk for Loneliness  Goal: Demonstrate positive use of time alone when socialization is not possible  Outcome: Not Met This Shift     Problem: Fatigue  Goal: Verbalize increase energy and improved vitality  Outcome: Not Met This Shift     Problem: Patient Education: Go to Patient Education Activity  Goal: Patient/Family Education  Outcome: Not Met This Shift     Problem: Falls - Risk of:  Goal: Will remain free from falls  Description: Will remain free from falls  11/18/2020 2353 by Ondina Hays RN  Outcome: Not Met This Shift  11/18/2020 1056 by José Miguel Gaytan RN  Outcome: Met This Shift  Goal: Absence of physical injury  Description: Absence of physical injury  11/18/2020 2353 by Ondina Hays RN  Outcome: Not Met This Shift  11/18/2020 1056 by José Miguel Gaytan RN  Outcome: Met This Shift     Problem: Skin Integrity:  Goal: Will show no infection signs and symptoms  Description: Will show no infection signs and symptoms  11/18/2020 2353 by Ondina Hays RN  Outcome: Not Met This Shift  11/18/2020 1056 by José Miguel Gaytan RN  Outcome: Met This Shift  Goal: Absence of new skin breakdown  Description: Absence of new skin breakdown  11/18/2020 2353 by Ondina Hays RN  Outcome: Not Met This Shift  11/18/2020 1056 by José Miguel Gaytan RN  Outcome: Met This Shift

## 2020-11-19 NOTE — PLAN OF CARE
Problem: Airway Clearance - Ineffective  Goal: Achieve or maintain patent airway  Outcome: Met This Shift     Problem: Gas Exchange - Impaired  Goal: Absence of hypoxia  Outcome: Met This Shift  Goal: Promote optimal lung function  Outcome: Met This Shift     Problem: Breathing Pattern - Ineffective  Goal: Ability to achieve and maintain a regular respiratory rate  Outcome: Met This Shift     Problem:  Body Temperature -  Risk of, Imbalanced  Goal: Ability to maintain a body temperature within defined limits  Outcome: Met This Shift  Goal: Will regain or maintain usual level of consciousness  Outcome: Met This Shift  Goal: Complications related to the disease process, condition or treatment will be avoided or minimized  Outcome: Met This Shift     Problem: Isolation Precautions - Risk of Spread of Infection  Goal: Prevent transmission of infection  Outcome: Met This Shift     Problem: Risk for Fluid Volume Deficit  Goal: Maintain normal heart rhythm  Outcome: Met This Shift  Goal: Maintain absence of muscle cramping  Outcome: Met This Shift  Goal: Maintain normal serum potassium, sodium, calcium, phosphorus, and pH  Outcome: Met This Shift     Problem: Loneliness or Risk for Loneliness  Goal: Demonstrate positive use of time alone when socialization is not possible  Outcome: Met This Shift     Problem: Fatigue  Goal: Verbalize increase energy and improved vitality  Outcome: Met This Shift     Problem: Patient Education: Go to Patient Education Activity  Goal: Patient/Family Education  Outcome: Met This Shift     Problem: Falls - Risk of:  Goal: Will remain free from falls  Description: Will remain free from falls  Outcome: Met This Shift  Goal: Absence of physical injury  Description: Absence of physical injury  Outcome: Met This Shift     Problem: Skin Integrity:  Goal: Will show no infection signs and symptoms  Description: Will show no infection signs and symptoms  Outcome: Met This Shift  Goal: Absence of new skin breakdown  Description: Absence of new skin breakdown  Outcome: Met This Shift

## 2020-11-19 NOTE — PROGRESS NOTES
Chief Complaint:  Chief Complaint   Patient presents with    Fever     pt has home health aides who state she has been more fatigued. fever in triage of 100.7    Fatigue     Acute respiratory failure with hypoxia (HCC)     Subjective:    She feels mildly short of breath but otherwise no complaints    Objective:    /60   Pulse 61   Temp 97.8 °F (36.6 °C) (Infrared)   Resp 20   Ht 5' (1.524 m)   Wt 178 lb 2 oz (80.8 kg)   LMP  (LMP Unknown)   SpO2 94%   BMI 34.79 kg/m²     Current medications that patient is taking have been reviewed.     General appearance: NAD, conversant  HEENT: AT/NC, MMM  Neck: FROM, supple  Lungs: Clear bilaterally, WOB normal  CV: RRR, no MRGs  Abdomen: Soft, non-tender; no masses or HSM, +BS  Extremities: No peripheral edema or digital cyanosis  Skin: no rash, lesions or ulcers  Psych: Calm and cooperative  Neuro: Alert and interactive, nonfocal    Labs:  CBC with Differential:    Lab Results   Component Value Date    WBC 5.7 11/19/2020    RBC 2.95 11/19/2020    HGB 10.0 11/19/2020    HCT 30.1 11/19/2020     11/19/2020    .0 11/19/2020    MCH 33.9 11/19/2020    MCHC 33.2 11/19/2020    RDW 12.8 11/19/2020    SEGSPCT 58 06/18/2013    LYMPHOPCT 30.8 11/19/2020    MONOPCT 5.4 11/19/2020    BASOPCT 0.2 11/19/2020    MONOSABS 0.31 11/19/2020    LYMPHSABS 1.75 11/19/2020    EOSABS 0.03 11/19/2020    BASOSABS 0.01 11/19/2020     CMP:    Lab Results   Component Value Date     11/19/2020    K 3.8 11/19/2020     11/19/2020    CO2 29 11/19/2020    BUN 34 11/19/2020    CREATININE 1.2 11/19/2020    GFRAA 51 11/19/2020    LABGLOM 42 11/19/2020    GLUCOSE 138 11/19/2020    PROT 5.9 11/19/2020    LABALBU 3.0 11/19/2020    CALCIUM 8.9 11/19/2020    BILITOT 0.3 11/19/2020    ALKPHOS 55 11/19/2020    AST 29 11/19/2020    ALT 18 11/19/2020            Assessment/Plan:  Principal Problem:    Acute respiratory failure with hypoxia (HCC)  Active Problems:    GERD (gastroesophageal reflux disease)    Type 2 diabetes mellitus with hyperglycemia, with long-term current use of insulin ([facility])    CKD (chronic kidney disease) stage 3, GFR 30-59 ml/min ([facility])    Bacteriuria with pyuria    Essential hypertension    History of pulmonary embolus (PE)    Chest pain    Obesity (BMI 30-39. 9)    COVID-19    Hyperkalemia    Anxiety and depression  Resolved Problems:    * No resolved hospital problems. *       Overall seems to be doing well although still requiring 2 L oxygen. Continue dexamethasone and remdesivir.     Continue Eliquis    Continue Coreg for hypertension    Blood glucose well controlled    Requires continued inpatient level of care    [place] Sos    4:54 PM  11/19/2020  Cell: [phone]

## 2020-11-20 PROBLEM — N18.9 ACUTE KIDNEY INJURY SUPERIMPOSED ON CKD (HCC): Status: ACTIVE | Noted: 2019-11-29

## 2020-11-20 NOTE — PROGRESS NOTES
CLINICAL PHARMACY NOTE: [address] Patient?: No  Total # of Prescriptions Filled: 1   The following medications were delivered to the patient:  · Dexamethasone 6mg  Total # of Interventions Completed: 3  Time Spent (min): 30    Additional Documentation:    Delivered to RN

## 2020-11-20 NOTE — PROGRESS NOTES
Department of Internal Medicine  Infectious Diseases  Progress Note      C/C :  COVID 19 pneumonia     Pt is awake and alert  Denies fever   Reports cough and SOB   Afebrile          Current Facility-Administered Medications   Medication Dose Route Frequency Provider Last Rate Last Dose    miconazole (MICOTIN) 2 % powder   Topical BID Harsh Farmer MD        famotidine (PEPCID) tablet 10 mg  10 mg Oral Daily Harsh Farmer MD   10 mg at 11/20/20 1033    melatonin disintegrating tablet 10 mg  10 mg Oral Nightly Harsh Farmer MD   10 mg at 11/19/20 2045    sodium chloride flush 0.9 % injection 10 mL  10 mL Intravenous 2 times per day April SHOSHANA SchmidtN - CNP   10 mL at 11/20/20 0900    sodium chloride flush 0.9 % injection 10 mL  10 mL Intravenous PRN April SHOSHANA SchmidtN - CNP        acetaminophen (TYLENOL) tablet 650 mg  650 mg Oral Q6H PRN April SHOSHANA SchmidtN - CNP   650 mg at 11/18/20 1143    Or    acetaminophen (TYLENOL) suppository 650 mg  650 mg Rectal Q6H PRN April Brayan, APRN - CNP        polyethylene glycol (GLYCOLAX) packet 17 g  17 g Oral Daily PRN April Brayan, APRN - CNP        promethazine (PHENERGAN) tablet 12.5 mg  12.5 mg Oral Q6H PRN April SHOSHANA SchmidtN - CNP        Or    ondansetron [facility]) injection 4 mg  4 mg Intravenous Q6H PRN April SHOSHANA SchmidtN - CNP        albuterol sulfate  (90 Base) MCG/ACT inhaler 2 puff  2 puff Inhalation Q6H April Brayan APRN - CNP   2 puff at 11/20/20 1308    vitamin C (ASCORBIC ACID) tablet 500 mg  500 mg Oral Daily April SHOSHANA SchmidtN - CNP   500 mg at 11/20/20 1031    zinc sulfate (ZINCATE) capsule 50 mg  50 mg Oral Daily April SHOSHANA SchmidtN - CNP   50 mg at 11/20/20 1032    dexamethasone (DECADRON) injection 6 mg  6 mg Intravenous Daily April Shawano-Rudolph, APRN - CNP   6 mg at 11/20/20 0900    apixaban (ELIQUIS) tablet 5 mg  5 mg Oral BID April Storey-Temo, APRN - CNP   5 mg at 11/20/20 1031    aspirin chewable tablet 81 mg  81 mg Oral Daily April Storey-Flatgap, APRN - CNP   81 mg at 11/20/20 1032    atorvastatin (LIPITOR) tablet 40 mg  40 mg Oral Nightly April Storey-Flatgap, APRN - CNP   40 mg at 11/19/20 2045    busPIRone (BUSPAR) tablet 5 mg  5 mg Oral Daily April Storey-Flatgap, APRN - CNP   5 mg at 11/20/20 1031    calcium-vitamin D (OSCAL-500) 500-200 MG-UNIT per tablet 1 tablet  1 tablet Oral BID April Storey-Temo, APRN - CNP   1 tablet at 11/20/20 1031    carvedilol (COREG) tablet 12.5 mg  12.5 mg Oral BID WC April Storey-Flatgap, APRN - CNP   12.5 mg at 11/20/20 0900    diclofenac sodium (VOLTAREN) 1 % gel 2 g  2 g Topical Q6H PRN April Storey-Flatgap, APRN - CNP        fluticasone (FLONASE) 50 MCG/ACT nasal spray 1 spray  1 spray Nasal Daily PRN April Chapo-Temo, APRN - CNP        gabapentin (NEURONTIN) capsule 600 mg  600 mg Oral 3 times per day April Storey-Temo, APRN - CNP   600 mg at 11/20/20 1309    isosorbide mononitrate (IMDUR) extended release tablet 30 mg  30 mg Oral Daily April Storey-Flatgap, APRN - CNP   30 mg at 11/20/20 1031    pantoprazole (PROTONIX) tablet 40 mg  40 mg Oral QAM AC April Storey-Flatgap, APRN - CNP   40 mg at 11/20/20 0530    Liraglutide (VICTOZA) SC injection 1.2 mg  1.2 mg Subcutaneous Daily April Storey-Cornelius, APRN - CNP   1.2 mg at 11/20/20 1033    mineral oil-hydrophilic petrolatum (HYDROPHOR) ointment   Topical Daily April Storey-Temo, APRN - CNP        montelukast (SINGULAIR) tablet 10 mg  10 mg Oral Nightly April Storey-Temo, APRN - CNP   10 mg at 11/19/20 2045    therapeutic multivitamin-minerals 1 tablet  1 tablet Oral Daily April KRISTIE Schmidt CNP   1 tablet at 11/20/20 1033    nitroGLYCERIN (NITROSTAT) SL tablet 0.4 mg  0.4 mg Sublingual Q5 Min PRN April KRISTIE Schmidt CNP        miconazole (MICOTIN) rash  HEMATOLOGIC/LYMPHATIC:  Denies lymph node swelling, gum bleeding or easy bruising. MUSCULOSKELETAL:  Denies leg pain , joint pain , joint swelling  NEUROLOGICAL:  Weakness     PHYSICAL EXAM:      Vitals:   /72   Pulse 70   Temp 98 °F (36.7 °C) (Infrared)   Resp 18   Ht 5' (1.524 m)   Wt 178 lb 8 oz (81 kg)   LMP  (LMP Unknown)   SpO2 95%   BMI 34.86 kg/m²     General Appearance:    Awake, alert , mild distress . Head:    Normocephalic, atraumatic   Eyes:    No pallor, no icterus,   Ears:    No obvious deformity or drainage.    Nose:   No nasal drainage   Throat:   Mucosa moist, no oral thrush   Neck:   Supple, no lymphadenopathy   Lungs:     Clear bilaterally    Heart:    Regular rate and rhythm, no murmur   Abdomen:     Soft, non-tender, bowel sounds present    Extremities:   No edema, no cyanosis    Pulses:   Dorsalis pedis palpable    Skin:   no rashes or lesions       CBC with Differential:      Lab Results   Component Value Date    WBC 5.1 11/20/2020    RBC 3.30 11/20/2020    HGB 11.0 11/20/2020    HCT 33.6 11/20/2020     11/20/2020    .8 11/20/2020    MCH 33.3 11/20/2020    MCHC 32.7 11/20/2020    RDW 12.3 11/20/2020    SEGSPCT 58 06/18/2013    LYMPHOPCT 31.0 11/20/2020    MONOPCT 6.5 11/20/2020    BASOPCT 0.2 11/20/2020    MONOSABS 0.33 11/20/2020    LYMPHSABS 1.58 11/20/2020    EOSABS 0.02 11/20/2020    BASOSABS 0.01 11/20/2020       CMP     Lab Results   Component Value Date     11/20/2020    K 3.9 11/20/2020     11/20/2020    CO2 25 11/20/2020    BUN 28 11/20/2020    CREATININE 1.1 11/20/2020    GFRAA 57 11/20/2020    LABGLOM 47 11/20/2020    GLUCOSE 142 11/20/2020    PROT 6.3 11/20/2020    LABALBU 3.2 11/20/2020    CALCIUM 9.0 11/20/2020    BILITOT 0.3 11/20/2020    ALKPHOS 57 11/20/2020    AST 27 11/20/2020    ALT 19 11/20/2020         Hepatic Function Panel:    Lab Results   Component Value Date    ALKPHOS 57 11/20/2020    ALT 19 11/20/2020    AST 27 11/20/2020    PROT 6.3 11/20/2020    BILITOT 0.3 11/20/2020    LABALBU 3.2 11/20/2020       PT/INR:    Lab Results   Component Value Date    PROTIME 13.9 01/09/2020    INR 1.2 01/09/2020       TSH:    Lab Results   Component Value Date    TSH 4.220 09/16/2020       U/A:    Lab Results   Component Value Date    NITRITE NEGATIVE 09/09/2019    COLORU Yellow 11/16/2020    PHUR 7.5 11/16/2020    WBCUA 5-10 11/16/2020    RBCUA NONE 11/16/2020    BACTERIA FEW 11/16/2020    CLARITYU Clear 11/16/2020    SPECGRAV 1.015 11/16/2020    LEUKOCYTESUR TRACE 11/16/2020    UROBILINOGEN 0.2 11/16/2020    BILIRUBINUR Negative 11/16/2020    BILIRUBINUR NEGATIVE 09/09/2019    BLOODU Negative 11/16/2020    GLUCOSEU Negative 11/16/2020    AMORPHOUS RARE 11/16/2020       ABG:  No results found for: PNK7ADD, BEART, F2TPUVIW, PHART, THGBART, LZB2CZF, PO2ART, QJM5YTE    MICROBIOLOGY:    COVID 19 +Ve     Urine cx - E coli     Radiology :    Chest X ray : Subtle opacities in the left mid and lower lung field     IMPRESSION:     1. COVID 19 pneumonia   2. Respiratory failure - oxygen saturation 95%  on room air  S/p remdesivir   3. E coli bacteriuria ( asymptomatic )       RECOMMENDATIONS:      1.  Completed remdesivir , hypoxia improved   OK to discharge from ID POV   Discussed with Dr Pedrito Plummer

## 2020-11-20 NOTE — PROGRESS NOTES
Pulmonary Progress Note  11/20/2020 2:30 PM  Subjective:   Admit Date: 11/16/2020  PCP: Becca Salazar, DO  Interval History: Patient on room air doing well    Diet: DIET CARDIAC; Carb Control: 4 carb choices (60 gms)/meal  SOB is: Mild  Cough: None  Wheezing: None  chest pain: None    Data:   Scheduled Meds:    miconazole   Topical BID    famotidine  10 mg Oral Daily    melatonin  10 mg Oral Nightly    sodium chloride flush  10 mL Intravenous 2 times per day    albuterol sulfate HFA  2 puff Inhalation Q6H    vitamin C  500 mg Oral Daily    zinc sulfate  50 mg Oral Daily    dexamethasone  6 mg Intravenous Daily    apixaban  5 mg Oral BID    aspirin  81 mg Oral Daily    atorvastatin  40 mg Oral Nightly    busPIRone  5 mg Oral Daily    calcium-vitamin D  1 tablet Oral BID    carvedilol  12.5 mg Oral BID WC    gabapentin  600 mg Oral 3 times per day    isosorbide mononitrate  30 mg Oral Daily    pantoprazole  40 mg Oral QAM AC    Liraglutide  1.2 mg Subcutaneous Daily    mineral oil-hydrophilic petrolatum   Topical Daily    montelukast  10 mg Oral Nightly    therapeutic multivitamin-minerals  1 tablet Oral Daily    miconazole   Topical BID    sertraline  150 mg Oral Daily    insulin lispro  0-6 Units Subcutaneous TID WC    insulin lispro  0-3 Units Subcutaneous Nightly    dexamethasone  6 mg Intravenous Once    Vitamin D  1,000 Units Oral Daily    remdesivir IVPB  100 mg Intravenous Q24H    influenza virus vaccine  0.5 mL Intramuscular Prior to discharge       Continuous Infusions:    dextrose         PRN Meds: sodium chloride flush, acetaminophen **OR** acetaminophen, polyethylene glycol, promethazine **OR** ondansetron, diclofenac sodium, fluticasone, nitroGLYCERIN, benzonatate, glucose, dextrose, glucagon (rDNA), dextrose, sodium chloride    I/O last 3 completed shifts:   In: 600 [P.O.:600]  Out: 600 [Urine:600]    I/O this shift:  In: 120 [P.O.:120]  Out: 0       Intake/Output Summary (Last 24 hours) at 11/20/2020 1430  Last data filed at 11/20/2020 0800  Gross per 24 hour   Intake 480 ml   Output 600 ml   Net -120 ml       Patient Vitals for the past 96 hrs (Last 3 readings):   Weight   11/20/20 0445 178 lb 8 oz (81 kg)   11/18/20 0308 178 lb 2 oz (80.8 kg)   11/16/20 2340 177 lb 14.4 oz (80.7 kg)         Recent Labs     11/18/20  1150 11/19/20  0925 11/20/20  0500   WBC 4.8 5.7 5.1   HGB 11.2* 10.0* 11.0*   HCT 34.6 30.1* 33.6*   .3* 102.0* 101.8*   * 110* 115*     Recent Labs     11/18/20  1150 11/19/20  0925 11/20/20  0500    143 137   K 3.9 3.8 3.9    107 102   CO2 27 29 25   BUN 36* 34* 28*   CREATININE 1.4* 1.2* 1.1*     Recent Labs     11/18/20  1150 11/19/20  0925 11/20/20  0500   PROT 6.1* 5.9* 6.3*   ALKPHOS 62 55 57   ALT 22 18 19   AST 39* 29 27   BILITOT 0.3 0.3 0.3     CPK:  Lab Results   Component Value Date    CKTOTAL 100 11/17/2020            -----------------------------------------------------------------  RAD:   Results for orders placed during the hospital encounter of 11/16/20   XR CHEST PORTABLE    Narrative EXAMINATION:  ONE XRAY VIEW OF THE CHEST    11/16/2020 8:55 am    COMPARISON:  01/09/2020    HISTORY:  ORDERING SYSTEM PROVIDED HISTORY: Chest Pain  TECHNOLOGIST PROVIDED HISTORY:  Reason for exam:->Chest Pain  What reading provider will be dictating this exam?->CRC    FINDINGS:  The patient is rotated to the left. Subtle opacity is seen in the left mid  and lower lung field that could be related to pneumonia. Increased  interstitial markings are seen bilaterally. The heart and mediastinum are  not significantly changed and there is no evidence of pleural effusion. No  overt vascular congestion is noted. Impression Subtle opacities in the left mid and lower lung field that could represent  pneumonia.          Micro:  Vent Information  Skin Assessment: Clean, dry, & intact  SpO2: 97 %    Additional Respiratory Assessments  Pulse: 69  Resp: 18  SpO2: 97 %    Objective:   Vitals:   Vitals:    20 0800   BP: 133/66   Pulse: 69   Resp: 18   Temp: 97.7 °F (36.5 °C)   SpO2: 97%      TEMP:Current: Temp: 97.7 °F (36.5 °C)  Max: Temp  Av.7 °F (36.5 °C)  Min: 97.1 °F (36.2 °C)  Max: 98.2 °F (36.8 °C)    BP Range: Systolic (33HNK), S , Min:128 , OVU:829     Diastolic (64AQC), WQB:51, Min:56, Max:75       General appearance: alert, appears stated age and cooperative obese alert  In no acute distress  Skin: No rashes or lesions  HEENT: mucous membranes are moist  Neck: No JVD  Lungs: symmetrical expansion,  Left base rales to auscultation, no use of accessory muscles  Heart: S1S2 no murmurs,  Abdomen: soft, non tender,   Extremities: no peripheral edema  Neurologic: Alert, oriented times 3,  Affect: pleasant        Assessment:   Patient Active Problem List:  80 y. o. year old female history of obesity, HTN, CKD stage III, DM on insulin with peripheral neuropathy, anxiety/depression with spinal stenosis, PE 10/2019 on Eliquis who presented with shortness of breath and chest pain treated with sublingual nitroglycerin with relief    Covid positive   Elevated proBNP  chest x-ray showing opacities left midlung and lower lung field  on 2 L   on 3 L. Patient put on room air saturations 97% on room air.  on 2 L satting 96%     1. Acute hypoxic respiratory failure on dexamethasone started -   2. Covid pneumonitis on remdesivir finished today  3. H/o Dysphasia on thickened diet  4. Coccygeal wound  5. CKD stage III stable  6. Chronic anemia  7. UTI E. coli  8. 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  On Eliquis   9. VHD echo showing EF 45% moderate LVH mild left aortic stenosis  10/22/2019  10. CAD s/p NSTEMI 2019 EF 40 to 45% stress test 10/23/2019 EF 40% no reversible defects fixed few perfusion defects from apex to anteroseptal and inferior lateral walls with elevated troponins  11. HLD   12.  GERD

## 2020-11-20 NOTE — PROGRESS NOTES
[facility] Cardiology Inpatient Progress Note    Patient is a 80 y.o. female of Ivana Wallace DO seen in hospital follow up. Chief complaint: CP/COVID 19    HPI: Some SOB. No CP. Patient Active Problem List   Diagnosis    GERD (gastroesophageal reflux disease)    Type 2 diabetes mellitus with hyperglycemia, with long-term current use of insulin ([facility])    CKD (chronic kidney disease) stage 3, GFR 30-59 ml/min ([facility])    Bacteriuria with pyuria    Essential hypertension    History of pulmonary embolus (PE)    Chest pain    Acute respiratory failure with hypoxia ([address]    HCAP (healthcare-associated pneumonia)    Obesity (BMI 30-39. 9)    Pressure injury of sacral region, stage 2 ([facility])    COVID-19    Hyperkalemia    Anxiety and depression       No Known Allergies    Current Facility-Administered Medications   Medication Dose Route Frequency Provider Last Rate Last Dose    miconazole (MICOTIN) 2 % powder   Topical BID Genevieve Dotson MD        famotidine (PEPCID) tablet 10 mg  10 mg Oral Daily Genevieve Dotson MD   10 mg at 11/20/20 1033    melatonin disintegrating tablet 10 mg  10 mg Oral Nightly Genevieve Dotson MD   10 mg at 11/19/20 2045    sodium chloride flush 0.9 % injection 10 mL  10 mL Intravenous 2 times per day April Chapo-Temo, APRN - CNP   10 mL at 11/20/20 0900    sodium chloride flush 0.9 % injection 10 mL  10 mL Intravenous PRN April Antonious, APRN - CNP        acetaminophen (TYLENOL) tablet 650 mg  650 mg Oral Q6H PRN April [place], APRN - CNP   650 mg at 11/18/20 1143    Or    acetaminophen (TYLENOL) suppository 650 mg  650 mg Rectal Q6H PRN April Chapo-Temo, APRN - CNP        polyethylene glycol (GLYCOLAX) packet 17 g  17 g Oral Daily PRN April Chapo-[place], APRN - CNP        promethazine (PHENERGAN) tablet 12.5 mg  12.5 mg Oral Q6H PRN April Chapo-[place], APRN - CNP        Or    [facility]) injection 4 mg  4  0.9 % sodium chloride bolus  30 mL Intravenous PRN Alan Geller MD        influenza quadrivalent split vaccine (FLUZONE;FLUARIX;FLULAVAL;AFLURIA) injection 0.5 mL  0.5 mL Intramuscular Prior to discharge Ke Clark MD           Review of systems:   Heart: as above   Lungs: as above   Eyes: denies changes in vision or discharge. Ears: denies changes in hearing or pain. Nose: denies epistaxis or masses   Throat: denies sore throat or trouble swallowing. Neuro: denies numbness, tingling, tremors. Skin: denies rashes or itching. : denies hematuria, dysuria   GI: denies vomiting, diarrhea   Psych: denies mood changed, anxiety, depression. Physical Exam   /66   Pulse 69   Temp 97.7 °F (36.5 °C) (Infrared)   Resp 18   Ht 5' (1.524 m)   Wt 178 lb 8 oz (81 kg)   LMP  (LMP Unknown)   SpO2 97%   BMI 34.86 kg/m²     COVID 19.     CBC:   Lab Results   Component Value Date    WBC 5.1 11/20/2020    RBC 3.30 11/20/2020    HGB 11.0 11/20/2020    HCT 33.6 11/20/2020    .8 11/20/2020    MCH 33.3 11/20/2020    MCHC 32.7 11/20/2020    RDW 12.3 11/20/2020     11/20/2020    MPV 9.5 11/20/2020     BMP:   Lab Results   Component Value Date     11/20/2020    K 3.9 11/20/2020     11/20/2020    CO2 25 11/20/2020    BUN 28 11/20/2020    LABALBU 3.2 11/20/2020    CREATININE 1.1 11/20/2020    CALCIUM 9.0 11/20/2020    GFRAA 57 11/20/2020    LABGLOM 47 11/20/2020     Magnesium:    Lab Results   Component Value Date    MG 1.9 01/09/2020     Cardiac Enzymes:   Lab Results   Component Value Date    CKTOTAL 100 11/17/2020    CKTOTAL 100 11/16/2020    CKTOTAL 187 (H) 01/09/2020    CKMB 4.5 (H) 11/17/2020    CKMB 4.9 (H) 11/16/2020    CKMB 17.7 (H) 01/09/2020    TROPONINI 0.09 (H) 11/17/2020    TROPONINI 0.15 (H) 11/16/2020    TROPONINI 0.03 11/16/2020      PT/INR:    Lab Results   Component Value Date    PROTIME 13.9 01/09/2020    INR 1.2 01/09/2020     TSH:    Lab Results   Component Value Date    TSH 4.220 09/16/2020     Rhythm Strip: normal sinus rhythm. ASSESSMENT & PLAN:    Patient Active Problem List   Diagnosis    GERD (gastroesophageal reflux disease)    Type 2 diabetes mellitus with hyperglycemia, with long-term current use of insulin (HCC)    CKD (chronic kidney disease) stage 3, GFR 30-59 ml/min (HCC)    Bacteriuria with pyuria    Essential hypertension    History of pulmonary embolus (PE)    Chest pain    Acute respiratory failure with hypoxia ([address]    HCAP (healthcare-associated pneumonia)    Obesity (BMI 30-39. 9)    Pressure injury of sacral region, stage 2 ([address]    COVID-19    Hyperkalemia    Anxiety and depression     1. CP/Elevated troponin/CAD:     Echo when able.      Medically manage for now. ASA/statin/BB/imdur.     Consider stress at some point to risk stratify further in her course.     2. COVID 19     3. Hx of PE: On Eliquis.      4. Improved CMP: Echo when able.      5. HTN: Observe.      6. Lipids: Statin.      7. DM: Per primary service.      8. CKD: Follow labs. Please call if needed. Maribel Macario D.O.   Cardiologist  Cardiology, [address] [facility] [place]

## 2020-11-20 NOTE — DISCHARGE SUMMARY
stage 3, GFR 30-59 ml/min; Type II diabetes mellitus with nephropathy ([facility] [address] Peripheral polyneuropathy      apixaban (ELIQUIS) 5 MG TABS tablet Take 1 tablet by mouth 2 times daily  Qty: 60 tablet, Refills: 5      nitroGLYCERIN (NITROSTAT) 0.4 MG SL tablet Place 1 tablet under the tongue every 5 minutes as needed for Chest pain up to max of 3 total doses. If no relief after 1 dose, call 911.   Qty: 25 tablet, Refills: 3         Liraglutide (VICTOZA) 18 MG/3ML SOPN SC injection Inject 1.2 mg into the skin daily  Qty: 18 mL, Refills: 1    Associated Diagnoses: Uncontrolled type 2 diabetes mellitus with hyperglycemia (HCC)      montelukast (SINGULAIR) 10 MG tablet Take 1 tablet by mouth nightly  Qty: 90 tablet, Refills: 1      busPIRone (BUSPAR) 5 MG tablet Take 1 tablet by mouth daily  Qty: 90 tablet, Refills: 1      sertraline (ZOLOFT) 100 MG tablet Take 1.5 tablets by mouth daily  Qty: 135 tablet, Refills: 1      aspirin 81 MG chewable tablet Take 1 tablet by mouth daily  Qty: 30 tablet, Refills: 0      Multiple Vitamins-Minerals (THERAPEUTIC MULTIVITAMIN-MINERALS) tablet Take 1 tablet by mouth daily      acetaminophen (TYLENOL) 325 MG tablet Take 650 mg by mouth every 6 hours as needed for Pain      fluticasone (FLONASE) 50 MCG/ACT nasal spray 1 spray by Nasal route daily as needed for Rhinitis       mineral oil-hydrophilic petrolatum (HYDROPHOR) ointment Apply 1 g topically daily Apply to both feet         STOP taking these medications       BD PEN NEEDLE MUKESH 2ND GEN 32G X 4 MM MISC Comments:   Reason for Stopping:         diclofenac sodium (VOLTAREN) 1 % GEL Comments:   Reason for Stopping:         blood glucose monitor strips Comments:   Reason for Stopping:         Lancets MISC Comments:   Reason for Stopping:         Calcium-Vitamin D (CALTRATE 600 PLUS-VIT D PO) Comments:   Reason for Stopping:        lisinopril (PRINIVIL;ZESTRIL) 2.5 MG tablet Take 1 tablet by mouth daily  Qty: 90 tablet, Refills: 1 Activity: activity as tolerated  Diet: regular diet    Follow-up with PCP in 1 week.     Note that over 30 minutes was spent in preparing discharge papers, discussing discharge with patient, medication review, etc.    Signed:  Sujata Morgan    11/20/2020  1:51 PM

## 2020-11-20 NOTE — PROGRESS NOTES
CLINICAL PHARMACY: DISCHARGE MED RECONCILIATION/REVIEW    [facility]) Select Patient?: No  Total # of Interventions Recommended: 0   -   Total # Interventions Accepted: 0  Intervention Severity:   - Level 1 Intervention Present?: No   - Level 2 #: 0   - Level 3 #: 0   Time Spent (min): 30    Additional Documentation: See progress note.

## 2020-11-20 NOTE — DISCHARGE INSTR - COC
Continuity of Care Form    Patient Name: Nevin Almanza   :  1933  MRN:  [id number]    Admit date:  2020  Discharge date:  ***    Code Status Order: Full Code   Advance Directives:   [facility] Documentation     Date/Time Healthcare Directive Type of Healthcare Directive Copy in [address] [place] [address] [address] Agent's Name Healthcare Agent's Phone Number    20  Unknown, patient unable to respond due to medical condition -- -- -- -- --          Admitting Physician:  Kathy Brown MD  PCP: Diogenes Wilson DO    Discharging Nurse: [facility] Unit/Room#: 2323/7192-I  Discharging Unit Phone Number: ***    Emergency Contact:   Extended Emergency Contact Information  Primary Emergency Contact: [address] [place] [address] Phone: [phone]  Mobile Phone: [phone]  Relation: Child  Secondary Emergency Contact: Geeta Doty, [address] Phone: [phone]  Relation: Child    Past Surgical History:  Past Surgical History:   Procedure Laterality Date    CHOLECYSTECTOMY      HIP FRACTURE SURGERY Bilateral     SKIN CANCER EXCISION         Immunization History:   Immunization History   Administered Date(s) Administered    Influenza Virus Vaccine 2015, 2018    Influenza, High Dose (Fluzone 65 yrs and older) 2014, 10/29/2018    Influenza, Keli Estela, IM, (6 mo and older Fluzone, Flulaval, Fluarix and 3 yrs and older Afluria) 2016    Influenza, Quadv, IM, PF (6 mo and older Fluzone, Flulaval, Fluarix, and 3 yrs and older Afluria) 2020    Influenza, Triv, inactivated, subunit, adjuvanted, IM (Fluad 65 yrs and older) 2018, 2019    Pneumococcal Conjugate 13-valent ([id number]) 2016    Pneumococcal Polysaccharide ([id number]) 10/15/2006, 2015    Tdap (Boostrix, Adacel) 2018       Active Problems:  Patient Active Problem List   Diagnosis Code    GERD (gastroesophageal reflux disease) K21.9    Type 2 diabetes mellitus with hyperglycemia, with long-term current use of insulin ([facility]) E11.65, Z79.4    CKD (chronic kidney disease) stage 3, GFR 30-59 ml/min ([facility]) N18.30    Bacteriuria with pyuria R82.71, R82.81    Essential hypertension I10    History of pulmonary embolus (PE) Z86.711    Chest pain R07.9    Acute respiratory failure with hypoxia ([facility]) J96.01    HCAP (healthcare-associated pneumonia) J18.9    Obesity (BMI 30-39. 9) E66.9    Pressure injury of sacral region, stage 2 ([facility] [address] L89.152    COVID-19 U07.1    Hyperkalemia E87.5    Anxiety and depression F41.9, F32.9       Isolation/Infection:   Isolation          Droplet Plus        Patient Infection Status     Infection Onset Added Last Indicated Last Indicated By Review Planned Expiration Resolved Resolved By    COVID-19 11/16/20 11/16/20 11/16/20 Respiratory Panel, Molecular, with COVID-19 (Restricted: peds pts or suitable admitted adults) 11/23/20 11/30/20      Resolved    COVID-19 Rule Out 11/16/20 11/16/20 11/16/20 Respiratory Panel, Molecular, with COVID-19 (Restricted: peds pts or suitable admitted adults) (Ordered)   11/16/20 Rule-Out Test Resulted          Nurse Assessment:  Last Vital Signs: /72   Pulse 70   Temp 98 °F (36.7 °C) (Infrared)   Resp 18   Ht 5' (1.524 m)   Wt 178 lb 8 oz (81 kg)   LMP  (LMP Unknown)   SpO2 95%   BMI 34.86 kg/m²     Last documented pain score (0-10 scale): Pain Level: 0  Last Weight:   Wt Readings from Last 1 Encounters:   11/20/20 178 lb 8 oz (81 kg)     Mental Status:  oriented, alert, coherent, logical and thought processes intact    IV Access:  - None    Nursing Mobility/ADLs:  Walking   Assisted  Transfer  Assisted  Bathing  Assisted  Dressing  Assisted  Toileting  Assisted  Feeding  Assisted  Med Admin  Assisted  Med Delivery   crushed    Wound Care Documentation and Therapy:  Wound 11/29/19 Left tip of great toe (Active)   Number of days: 357       Wound 11/29/19 Right tip of great toe (Active)   Number of days: 357       Wound 11/29/19 Heel Right (Active)   Number of days: 357       Wound 10/14/20 Coccyx (Active)   Wound Image   11/18/20 1000   Wound Etiology Pressure Unstageable 11/18/20 1000   Dressing Status Clean;Dry; Intact 11/20/20 0800   Dressing/Treatment Other (comment) 11/20/20 0800   Wound Length (cm) 1.5 cm 11/16/20 2130   Wound Width (cm) 0.7 cm 11/16/20 2130   Wound Depth (cm) 0.2 cm 11/04/20 1448   Wound Surface Area (cm^2) 1.05 cm^2 11/16/20 2130   Change in Wound Size % (l*w) 47.5 11/16/20 2130   Wound Volume (cm^3) 0.21 cm^3 11/04/20 1448   Wound Healing % -5 11/04/20 1448   Post-Procedure Length (cm) 2.5 cm 10/21/20 1358   Post-Procedure Width (cm) 2.1 cm 10/21/20 1358   Post-Procedure Depth (cm) 0.2 cm 10/21/20 1358   Post-Procedure Surface Area (cm^2) 5.25 cm^2 10/21/20 1358   Post-Procedure Volume (cm^3) 1.05 cm^3 10/21/20 1358   Wound Assessment Erythema;Pale granulation tissue 11/19/20 0558   Drainage Amount Scant 11/19/20 0800   Drainage Description Serous 11/19/20 0800   Odor None 11/19/20 0800   Xena-wound Assessment Fragile;Blanchable erythema 11/19/20 0800   Number of days: 37        Elimination:  Continence:   · Bowel: Yes  · Bladder: Yes  Urinary Catheter: None   Colostomy/Ileostomy/Ileal Conduit: No       Date of Last BM: ***    Intake/Output Summary (Last 24 hours) at 11/20/2020 1830  Last data filed at 11/20/2020 0800  Gross per 24 hour   Intake 480 ml   Output 600 ml   Net -120 ml     I/O last 3 completed shifts: In: 480 [P.O.:480]  Out: 600 [Urine:600]    Safety Concerns: At Risk for Falls    Impairments/Disabilities:      None    Nutrition Therapy:  Current Nutrition Therapy:   - Oral Diet:  Carb Control 3 carbs/meal (1500kcals/day)    Routes of Feeding: Oral  Liquids:  Thin Liquids  Daily Fluid Restriction: no  Last Modified Barium Swallow with Video (Video Swallowing Test): not done    Treatments at the Time of Hospital Discharge: Respiratory Treatments: ***  Oxygen Therapy:  is not on home oxygen therapy. Ventilator:    - No ventilator support    Rehab Therapies: Physical Therapy and Occupational Therapy  Weight Bearing Status/Restrictions: No weight bearing restirctions  Other Medical Equipment (for information only, NOT a DME order):  ***  Other Treatments: ***    Patient's personal belongings (please select all that are sent with patient):  Glasses, Dentures upper    RN SIGNATURE:  Electronically signed by Juana De Leon RN on 11/20/20 at 6:32 PM EST    CASE MANAGEMENT/SOCIAL WORK SECTION    Inpatient Status Date: ***    Readmission Risk Assessment Score:  Readmission Risk              Risk of Unplanned Readmission:        29           Discharging to Facility/ Agency   · Name:   · Address:  · Phone:  · Fax:    Dialysis Facility (if applicable)   · Name:  · Address:  · Dialysis Schedule:  · Phone:  · Fax:    / signature: {Esignature:[id number]}    PHYSICIAN SECTION    Prognosis: {Prognosis:1349144202}    Condition at Discharge: [address] [place] [address] Patient Condition:[id number]}    Rehab Potential (if transferring to Rehab): {Prognosis:1553731858}    Recommended Labs or Other Treatments After Discharge: ***    Physician Certification: I certify the above information and transfer of Grady Biswas  is necessary for the continuing treatment of the diagnosis listed and that she requires {Admit to Appropriate Level of Care:05088} for {GREATER/LESS:916702553} 30 days.      Update Admission H&P: {CHP DME Changes in KJFWQ:952049574}    PHYSICIAN SIGNATURE:

## 2020-11-21 NOTE — CARE COORDINATION
[facility] Transitions Initial Follow Up Call    Call within 2 business days of discharge: Yes    Patient: Eliot Verdin Patient : 1933   MRN: [id number]  Reason for Admission: Acute resp failure with hypoxia/COVID-19+  Discharge Date: 20 RARS: Readmission Risk Score: 29      Last Discharge [facility]       Complaint Diagnosis Description Type Department Provider    20 Fever; Fatigue Acute respiratory failure with hypoxia ([facility] [address] . .. ED to Hosp-Admission (Discharged) (ADMITTED) [address] Sadie Castorena MD; Bassem Hernandez... Challenges to be reviewed by the provider   Additional needs identified to be addressed with provider No  none    Discussed COVID-19 related testing which was available at this time. Test results were positive. Patient informed of results, if available? Yes         Method of communication with provider : none    Advance Care Planning:   Does patient have an Advance Directive:  decision maker updated. Was this a readmission? No  Patient stated reason for admission: drippy nose, cough and fever  Patients top risk factors for readmission: medical condition and polypharmacy    Care Transition Nurse (CTN) contacted the family by telephone to perform post hospital discharge assessment. Verified name and  with family as identifiers. Provided introduction to self, and explanation of the CTN role. CTN reviewed discharge instructions, medical action plan and red flags with family who verbalized understanding. Family given an opportunity to ask questions and does not have any further questions or concerns at this time. Were discharge instructions available to patient? Yes. Reviewed appropriate site of care based on symptoms and resources available to patient including: PCP, Specialist, Urgent care clinics, Home health and When to call 911. The family agrees to contact the PCP office for questions related to their healthcare.      Medication reconciliation for treatment adherence and medication management-Advised for patient to take Decadron as directed until completely finished. Establishment or re-establishment of referrals-CTN confirmed with Bernardo Henderson that [facility] will start on Monday 11/23/20. Care Transitions 24 Hour Call    Schedule Follow Up Appointment with PCP:  Declined  Do you have any ongoing symptoms?:  Yes  Patient-reported symptoms:  Cough  Do you have a copy of your discharge instructions?:  Yes  Do you have all of your prescriptions and are they filled?:  Yes  Have you been contacted by a [facility] Pharmacist?:  No  Have you scheduled your follow up appointment?:  No  Were you discharged with any Home Care or Post Acute Services:  Yes  Post Acute Services:  Home Health (Comment: [facility] )  Care Transitions Interventions       Spoke with patient daughter Mohit Price) on Communication Release form regarding initial BPCI-A/hospital discharge follow up. Bernardo Henderson states she is a nurse as well as her daughter who help assist patient. Bernardo Henderson reports patient having ongoing cough but no shortness of breath. Denies patient being on any home oxygen. Denies any chest pain, chest discomfort, nausea, vomiting, diarrhea, chills or fever. Noted patient tested positive for COVID-19 on 11/16/20. States monitoring temperature and admits last reading was 98.2. States last oxygen saturation was 94% this morning while laying down. Provided a complete review of home medications with Bernardo Henderson. Confirmed patient obtained new meds ordered on discharge. Advised to take Decadron as directed. 1111F code entered. Reviewed COVID precautions and knowing when to seek medical attention. Confirmed with Bernardo Henderson that patient has a healing sacral wound but admits it being opened and using Aquacel dressing on it. Confirmed patient is scheduled to follow up with Dr. Urvashi Mcgraw at [facility] wound center on 12/2/0 at 2 pm and with Dr. Luis Avitia ([facility]) on 12/28/20.  States she will follow up with Dr. Brandy Carballo (PCP) on Monday inquire about telephone or vv f/u option d/t being COVID-19 positive. Advised patient can follow up with [facility] (red [facility]) if needed and for re-test which Amina Chiu verbalizes understanding. Denies any other complaints or concerns at this time. Explained BPCI-A bundle program and Amina Chiu is receptive to subsequent calls. Will hand off to central staff team for continued monitoring. Follow Up  Future Appointments   Date Time Provider Kacy Parish   12/2/2020  2:00 PM Briseida Morales MD [address]   12/28/2020 11:20 AM Sujata Hernandez MD [address]     CTN provided contact information for future needs.     KRISTIE Morrow

## 2020-11-24 NOTE — TELEPHONE ENCOUNTER
patient's daughter would like to know if you are ok with her medication changes from the doctors at the Hospital. Please advise.

## 2020-11-29 PROBLEM — J12.82 PNEUMONIA DUE TO COVID-19 VIRUS: Status: ACTIVE | Noted: 2020-01-01

## 2020-11-29 PROBLEM — U07.1 PNEUMONIA DUE TO COVID-19 VIRUS: Status: ACTIVE | Noted: 2020-01-01

## 2020-11-29 NOTE — ED NOTES
3.5 x 2 cm wound on coccyx. Wound blackened. No drainage at present. Daughter states wound drains SS fluid at times. Wound cleansed with NS. Telfa dressing applied per request of daughter.   Patient repositioned on right side     Karey Rivera RN  11/29/20 3140

## 2020-11-29 NOTE — ED NOTES
Dr Trenton Giron informed of elevated BP and pt c/o chest pressure, repeat troponin drawn     Antonio Crowe RN  11/29/20 8728

## 2020-11-29 NOTE — ED NOTES
pts sacral wound cleansed with saline and dressing applied as done at home per daughter     Kevin Hendricks RN  11/29/20 3696

## 2020-11-29 NOTE — ED NOTES
Daughter at bedside with patient.  Ok with Dr Susan Mendez for patient to eat breakfast     Florecita David RN  11/29/20 2031

## 2020-11-29 NOTE — ED NOTES
Patient states \"I am having chest pain. \" Dr Elsi Dumont notified. New EKG obtained- SR with PVC. Troponin drawn at this time VS stable.       Lady Cash RN  11/29/20 9803

## 2020-11-29 NOTE — ED PROVIDER NOTES
HPI:  11/28/20, Time: 10:18 PM EST      HPI per daughter, patient baseline mental status unable to give history     Eliot Verdin is a 80 y.o. female presenting to the ED for shortness of breath, cough, congestion, anorexia and failure to thrive with worsening sacral decubitus ulcer, beginning several days ago. She was admitted on 11/16 (12 days ago with covid pneumonia and UTI) and discharged home 8 days ago to home (family was given the option to go to rehab facility but took her home instead as they were not familiar with the facility available). Per daughter, patient is not getting better, she wants her evaluated. The complaint has been persistent, moderate in severity, and worsened by nothing. There is no report of fever/chills, sore throat, chest pain, edema, headache, visual disturbances, focal paresthesias, focal weakness, abdominal pain, nausea, vomiting, diarrhea, constipation, dysuria, hematuria, trauma, neck or back pain, rash or other complaints. ROS:   A complete review of systems was performed and all pertinent positives and negatives are stated within HPI, all other systems reviewed and are negative.      --------------------------------------------- PAST HISTORY ---------------------------------------------  Past Medical History:  has a past medical history of Anxiety, Depression, Diabetes mellitus ([facility] [address] Frequent urinary tract infections, GERD (gastroesophageal reflux disease), Hypertension, Peripheral neuropathy, and Spinal stenosis. Past Surgical History:  has a past surgical history that includes Cholecystectomy; Skin cancer excision; and Hip fracture surgery (Bilateral). Social History:  reports that she has never smoked. She has never used smokeless tobacco. She reports that she does not drink alcohol or use drugs. Family History: family history is not on file. The patients home medications have been reviewed.     Allergies: Patient has no known allergies. ----------------------------------------PHYSICAL EXAM--------------------------------------  Constitutional:  Well developed, well nourished, no acute distress, non-toxic appearance   Eyes:  PERRL, conjunctiva normal, EOMI  HENT:  Atraumatic, external ears normal, nose normal, oropharynx moist. Neck- normal range of motion, no nuchal rigidity   Respiratory:  No respiratory distress, diffusely diminished breath breath sounds, no rales, no wheezing, no crackles, no rhonchi, persistent junky cough  Cardiovascular:  Normal rate, normal rhythm, no murmurs, no gallops, no rubs. Radial and DP pulses 2+ bilaterally. GI:  Soft, nondistended, normal bowel sounds, nontender no rebound, no guarding   :  No costovertebral angle tenderness   Musculoskeletal:  No edema, no tenderness, no deformities. Back- no tenderness  Integument:  Well hydrated, no visible rash. Adequate perfusion. 2cm sacral decubitus ulcer with central necrosis  - unstageable   Lymphatic:  No cervical lymphadenopathy noted   Neurologic:  Alert & awake, CN 2-12 normal, no focal deficits noted. Psychiatric:  Speech and behavior appropriate       -------------------------------------------------- RESULTS -------------------------------------------------  I have personally reviewed all laboratory and imaging results for this patient. Results are listed below.      LABS:  Results for orders placed or performed during the hospital encounter of 11/28/20   CBC auto differential   Result Value Ref Range    WBC 7.1 4.5 - 11.5 E9/L    RBC 3.03 (L) 3.50 - 5.50 E12/L    Hemoglobin 10.5 (L) 11.5 - 15.5 g/dL    Hematocrit 30.9 (L) 34.0 - 48.0 %    .0 (H) 80.0 - 99.9 fL    MCH 34.7 26.0 - 35.0 pg    MCHC 34.0 32.0 - 34.5 %    RDW 12.9 11.5 - 15.0 fL    Platelets 852 790 - 010 E9/L    MPV 9.3 7.0 - 12.0 fL    Neutrophils % 76.7 43.0 - 80.0 %    Immature Granulocytes % 0.4 0.0 - 5.0 %    Lymphocytes % 16.7 (L) 20.0 - 42.0 %    Monocytes % 5.1 2.0 - 12.0 %    Eosinophils % 1.0 0.0 - 6.0 %    Basophils % 0.1 0.0 - 2.0 %    Neutrophils Absolute 5.43 1.80 - 7.30 E9/L    Immature Granulocytes # 0.03 E9/L    Lymphocytes Absolute 1.18 (L) 1.50 - 4.00 E9/L    Monocytes Absolute 0.36 0.10 - 0.95 E9/L    Eosinophils Absolute 0.07 0.05 - 0.50 E9/L    Basophils Absolute 0.01 0.00 - 0.20 E9/L   Comprehensive Metabolic Panel   Result Value Ref Range    Sodium 138 132 - 146 mmol/L    Potassium 4.4 3.5 - 5.0 mmol/L    Chloride 103 98 - 107 mmol/L    CO2 23 22 - 29 mmol/L    Anion Gap 12 7 - 16 mmol/L    Glucose 230 (H) 74 - 99 mg/dL    BUN 38 (H) 8 - 23 mg/dL    CREATININE 1.5 (H) 0.5 - 1.0 mg/dL    GFR Non-African American 33 >=60 mL/min/1.73    GFR African American 40     Calcium 8.7 8.6 - 10.2 mg/dL    Total Protein 6.5 6.4 - 8.3 g/dL    Alb 3.2 (L) 3.5 - 5.2 g/dL    Total Bilirubin 0.3 0.0 - 1.2 mg/dL    Alkaline Phosphatase 71 35 - 104 U/L    ALT 29 0 - 32 U/L    AST 34 (H) 0 - 31 U/L   Troponin   Result Value Ref Range    Troponin 0.03 0.00 - 0.03 ng/mL   CK   Result Value Ref Range    Total CK 41 20 - 180 U/L   Blood Gas, Arterial   Result Value Ref Range    Arterial pH at Temperature 7.368 7.350 - 7.450    PCO2 43.1 35.0 - 45.0 mmHg    PO2 63.8 0.0 - 100.0 mmHg    HCO3 24.8 22.0 - 26.0 mmol/L    Base Excess -0.6 -3.0 - 3.0 mmol/L    O2 Sat 91.2 (L) 92.0 - 98.5 %    Drawn By CG     Specimen Source Arterial     Sample Site Radial Puncture     Mode Room Air     Inspired O2 21 %   Lactic Acid, Plasma   Result Value Ref Range    Lactic Acid 2.0 0.5 - 2.2 mmol/L   Brain Natriuretic Peptide   Result Value Ref Range    Pro-BNP 1,059 (H) 0 - 450 pg/mL   EKG 12 Lead   Result Value Ref Range    Ventricular Rate 88 BPM    Atrial Rate 88 BPM    P-R Interval 196 ms    QRS Duration 74 ms    Q-T Interval 334 ms    QTc Calculation (Bazett) 404 ms    P Axis 49 degrees    R Axis -34 degrees    T Axis 84 degrees       RADIOLOGY:  Interpreted by Radiologist.  XR CHEST PORTABLE   Final Result   1. Ill-defined opacity in the left mid and lower lung worsened from prior   exam.   2.  Coarse interstitial markings and atherosclerotic disease appears stable. EKG Interpretation  Time: 2307  Rhythm: normal sinus   Rate: 88  Axis: left  Conduction: normal  ST Segments: nonspecific changes  T Waves: non specific changes  Clinical Impression: non-specific EKG  Comparison to prior EKG: stable as compared to patient's most recent EKG      ------------------------- NURSING NOTES AND VITALS REVIEWED ---------------------------  The nursing notes within the ED encounter and vital signs as below have been reviewed by myself. BP (!) 98/48   Pulse 85   Temp 97.7 °F (36.5 °C) (Temporal)   Resp 16   Ht 5' (1.524 m)   Wt 178 lb (80.7 kg)   LMP  (LMP Unknown)   SpO2 97%   BMI 34.76 kg/m²   Oxygen Saturation Interpretation: Abnormal - improved with oxygen      The patients available past medical records and past encounters were reviewed.         ------------------------------ ED COURSE/MEDICAL DECISION MAKING----------------------  Medications   0.9 % sodium chloride bolus (has no administration in time range)   dexamethasone (DECADRON) injection 4 mg (has no administration in time range)   cefepime (MAXIPIME) 2 g IVPB extended (mini-bag) (has no administration in time range)   doxycycline (VIBRAMYCIN) 100 mg in dextrose 5 % 100 mL IVPB (has no administration in time range)           Procedures:   none      Medical Decision Making:    Likely still with covid infection, not doing well at home, in fact is getting worse   CXR worse, hypoxic 87% on room air    dehdydration - IV fluids started   CXr with worsening PNA - IV cefepime, IV doxy started (?bacterial vs all covid vs aspiration)   IV decadron   Family prefers transfer to [address] [place] [address] today    This patient's ED course included: re-evaluation prior to disposition, IV medications, cardiac monitoring, continuous pulse oximetry and a personal history and physicial eaxmination    This patient has remained hemodynamically stable, improved and been closely monitored during their ED course. Re-Evaluations:  Time: 1:01 AM EST   Re-evaluation. Patients symptoms are improving  Repeat physical examination is not changed      Consultations:   1:23 AM EST  Spoke with Dr Eulalio Curling, discussed case, accepts admission to intermediate bed    Critical Care: none    I, Mo Mallory, DO, am the Primary Provider of Record    Counseling: The emergency provider has spoken with the patient and daughter and discussed todays results, in addition to providing specific details for the plan of care and counseling regarding the diagnosis and prognosis. Questions are answered at this time and they are agreeable with the plan.    --------------------------- IMPRESSION AND DISPOSITION ---------------------------------    IMPRESSION  1. Acute respiratory failure with hypoxia ([address]    2. Pneumonia due to organism    3. COVID-19 virus infection    4. Pressure injury of skin of sacral region, unspecified injury stage    5.  Dehydration        DISPOSITION  Disposition: Transfer to [facility] to telemetry  Patient condition is Сергей Tolliver DO  11/29/20 0124

## 2020-11-29 NOTE — ED NOTES
Patient has not voided since arrival.  Does not feel need to void but states will try. Assisted on and off bedpan. Unable to urinate. Dr. Hiral Chappell notified.   No new orders received     Melani Archer RN  11/29/20 0629

## 2020-11-29 NOTE — ED NOTES
accessline called regarding a bed for patient at this time. No bed available. Told to call back between 1030-11. Patient and daughter notified.       Levonne Sicard, RN  11/29/20 4852

## 2020-11-29 NOTE — ED NOTES
accessline called at this time. Still no bed assignment for patient.  Daughter and patient notified and updated      Julio Cesar Quiroz RN  11/29/20 8321

## 2020-11-29 NOTE — ED NOTES
Patient turned and repositioned at this time. incontinence care provided for patient. Pure wick placed.  Patient turned onto R side at this time     Gustabo Mohs, RN  11/29/20 0637

## 2020-11-30 NOTE — PROGRESS NOTES
Occupational Therapy  OCCUPATIONAL THERAPY INITIAL EVALUATION      Date:2020  Patient Name: Hari Durham  MRN: [id number]  : 1933  Room: [address] [place], [place] [address]A    Referring Provider: Prabha Llanos MD     Evaluating OT: Brenton Shannan OTR/L [id number]    AM-PAC Daily Activity Raw Score:     Recommended Adaptive Equipment: TBD    Diagnosis: Pneumonia. Pt presents to ED from home with SOB, cough and worsening sacral decubitus. Pt recent discharge from hospital d/t [facility] with family support. Pertinent Medical History: HTN, DM, spinal stenosis, anxiety, peripheral neuropathy   Precautions:  Falls, droplet plus isolation COVID +, O2, sacral decubitus     Home Living: Pt is a questionable historian. Reports she has been at home with family assist. Pt reports she does walk at home. Family assist in all ADLs. Pain Level: no reported pain    Cognition: A&O: 3-4/4. Pt is alert and oriented but easily confused and slow processing   Problem solving:  poor   Judgement/safety:  poor     Functional Assessment:   Initial Eval Status  Date: 20 Treatment session:  Short Term Goals     Feeding Min A  For lunch tray of hand held sandwich and peaches  Set up   Grooming Min A  Wiping off face  Set up   UB Dressing Max A  Management of hospital gown  Min A   LB Dressing Dependent  Management of hospital socks  Max A    Bathing Max A  Mod A   Toileting Dependent  Incontinent of urine total assist in rajesh care  Mod A   Bed Mobility  Supine <> sit: Max A x2  Scooting: Max A x2  Rolling:  Max A     Functional Transfers STS: Max A x2  Blocking at B feet  Mod A   Functional Mobility Unable to complete     Balance Sitting: poor to poor plus seated EOB  Sitting brittny x7 min    Standing: poor     Activity Tolerance Poor  2L O2: 92%  sitting brittny x10-15 min with fair plus balance during self care tasks             Treatment: Patient educated on techniques for completion of ADL, safe functional transfers and functional mobility. Patient required cues for follow through with proper hand/foot placement, pacing, safety, attention, sequencing and technique in bed mobility, functional transfers, UB dressing, post toileting care, self feeding, grooming and LB dressing in preparation for maximum independence in all self care tasks. Hand Dominance: Right []  Left []   Strength ROM Additional Info:    RUE  Proximally: 2-/5  Distally: 3+/5 WFL elbow to digits.  R shoulder flexion approx 10 degrees, PROM to approx 100 degrees fair  and FMC/dexterity noted during ADL tasks     LUE 3+/5 WFL fair  and FMC/dexterity noted during ADL tasks         Hearing: "[employer]  Vision: WFL   Sensation:  No c/o numbness or tingling   Tone: WFL                             Long Term Goal (1-3 wks): Pt will maximize functional performance in all self care tasks/functional transfers with good follow through of all trained techniques for safe transition to next level of care    Assessment of current deficits   Functional mobility [x]  ADLs [x] Strength [x]  Cognition []  Functional transfers  [x] IADLs [x] Safety Awareness [x]  Endurance [x]  Fine Motor Coordination [x] Balance [x] Vision/perception [] Sensation []   Gross Motor Coordination [x] ROM [x] Delirium []                  Motor Control []    Plan of Care: 2-5 days/week for 1-2 weeks PRN   [x]ADL retraining/adaptive techniques and AE recommendations to increase functional independence within precautions                    [x]Energy conservation techniques to improve tolerance for ADL/IADLs  [x]Functional transfer/mobility training/DME recommendations for increased independence, safety and fall prevention         [x]Patient/family education to increase safety and functional independence during daily routine          [x]Environmental modifications for safe mobility and completion of ADLs                             []Cognitive retraining to improve problem solving skills & safe participation in ADLs/IADLs     []Sensory re-education techniques to improve extremity awareness, maintain skin integrity and improve hand function                             []Visual/Perceptual retraining to improve body awareness and safety during transfers and ADLs  []Splinting/positioning needs to maintain joint/skin integrity and contracture prevention  [x]Therapeutic activity to improve functional performance during ADLs                                        [x]Therapeutic exercise to improve tolerance and functional strength for ADLs   [x]Balance retraining/tolerance tasks for facilitation of postural control with dynamic challenges during ADLs  []Neuromuscular re-education to facilitate righting/equilibrium reactions, midline orientation, scapular stability/mobility, normalize muscle tone and facilitate active functional movement                        []Delirium prevention/treatment    [x]Positioning to improve functional independence and decrease risk of skin breakdown  []Other:     Rehab Potential: Guarded for established goals     Patient/Family Goal: To get home. Patient and/or family were instructed on functional diagnosis, prognosis/goals and OT plan of care. Pt verbalized fair understanding. Upon arrival, patient supine in bed. At end of session, patient supine in bed with call light and phone within reach, all lines and tubes intact. Pt would benefit from continued skilled OT to increase safety and independence with completion of ADL/IADL tasks for functional independence and quality of life.  Bed/chair alarm: ON    Low Evaluation 08407  Time In: 1138   Time Out: 1159       Min Units   Therapeutic Ex 19341     Therapeutic Activities 19200 2    ADL/Self Care 06435 8 1   Orthotic Management 61357     Neuro Re-Ed 33091     TOTAL TIMED TREATMENT 10 1           Evaluation time includes thorough review of current medical information, gathering information on past medical history/social history and prior level of function, completion of standardized testing/informal observation of tasks, assessment of data, and development of POC/Goals    Madhu Romero OTR/L  [id number]

## 2020-11-30 NOTE — CARE COORDINATION
CM NOTE: Per QFR---Covid positive in droplet plus isolation. Transferred from [facility] ED for admission with recent covid infection. Completed course RDV & decadron from [facility] admission. +sacral wound. Using O2 2L. CM spoke with Latrice Van (daughter) to discuss role, anticipated LOS & current plan of care. Reports pt was discharged home too soon last admission & was not able to get into car even with asst. Daughter states pt went home on oral steroids, aerosol Txs & never got better. States pt became much weaker & SOB. Was home for 8 days & daughter felt she needed medical TX so she brought her to ED. Pt lives alone in 1 story home but has much asst from her children who live nearby & private caregivers. Uses [employer] & W/C. Has a transport W/C which they use much more often lately. Does not use O2 at home. IDDM & has glucometer & testing supplies at home & test BGL as directed. HX REESE @ [employer] multiple times at [facility]. Hx home care through [facility] ([facility]). Discussed dx, current TX plan. Latrice Van feels pt may need REESE. Choices provided for local & out of the area facilities. Latrice Van wants to discuss with her sister & will provide decision tomorrow when she hopes covid test results will be reported. Lengthy discussion about rehab vs home with home care. Will contact CM 12/1/20.

## 2020-11-30 NOTE — FLOWSHEET NOTE
Inpatient Wound Care    Admit Date: 11/29/2020  9:00 PM    Reason for consult:  Sacrum    Significant history:  Admitted for pneumonia due to [place] - 19. History includes: DM, HTN, depression. Wound history:  POA    Findings:       11/30/20 1526   Wound 10/14/20 Coccyx   Date First Assessed/Time First Assessed: 10/14/20 1432   Present on Hospital Admission: Yes  Wound Approximate Age at First Assessment (Weeks): 33 weeks  Primary Wound Type: Pressure Injury  Location: Coccyx   Wound Image    Wound Etiology Pressure Unstageable   Dressing/Treatment   (Aquaphor ordered)   Wound Length (cm) 4 cm   Wound Width (cm) 2.5 cm   Wound Surface Area (cm^2) 10 cm^2   Change in Wound Size % (l*w) -400   Wound Assessment Eschar dry  (Black)   Drainage Amount None   Odor None   Xena-wound Assessment   (scar tissue)       Impression:  Unstable pressure injury on sacrum    Interventions in place:  Pt is non-ambulatory. She is incontinent . Recommend Aquaphor topically. Low air loss mattress ordered, SOS precautions are in place. Plan: Low air loss mattress, SOS precautions, Aquaphor .        Doug Blantonden 11/30/2020 3:28 PM

## 2020-11-30 NOTE — TELEPHONE ENCOUNTER
Spoke to patient's daughter. Patient is back in the hospital.  She is still having issues from being COVID (+).

## 2020-11-30 NOTE — TELEPHONE ENCOUNTER
MD Michaelle Youssef,   Please find out from the patient's daughter to see how she is doing in terms of swelling on her legs and any weight gain.

## 2020-11-30 NOTE — PROGRESS NOTES
[facility] Progress Note    Admitting Date and Time: 11/29/2020  9:00 PM  Admit Dx: Pneumonia due to COVID-19 virus [U07.1, J12.89]    Subjective:  Patient is being followed for Pneumonia due to COVID-19 virus [U07.1, J12.89]   Pt feels very weak and complains of persistent headache. Denies fever or chills. Her appetite is low today  Per RN: no new concerns. Still requiring O2 via NC    ROS: denies fever, chills, cp, sob, n/v, HA unless stated above.       insulin lispro  0-6 Units Subcutaneous TID WC    insulin lispro  0-3 Units Subcutaneous Nightly    white petrolatum   Topical BID    therapeutic multivitamin-minerals  1 tablet Oral Daily    aspirin  81 mg Oral Daily    sertraline  150 mg Oral Daily    montelukast  10 mg Oral Nightly    busPIRone  5 mg Oral Daily    apixaban  5 mg Oral BID    gabapentin  600 mg Oral TID    atorvastatin  40 mg Oral Nightly    calcium-vitamin D   Oral BID    carvedilol  12.5 mg Oral BID WC    isosorbide mononitrate  30 mg Oral Daily    pantoprazole  40 mg Oral QAM AC    sodium chloride flush  10 mL Intravenous 2 times per day    cefTRIAXone (ROCEPHIN) IV  1 g Intravenous Q24H    azithromycin  500 mg Intravenous Q24H     fluticasone, 1 spray, Daily PRN  nitroGLYCERIN, 0.4 mg, Q5 Min PRN  albuterol-ipratropium, 1 puff, Q4H PRN  sodium chloride flush, 10 mL, PRN  acetaminophen, 650 mg, Q6H PRN    Or  acetaminophen, 650 mg, Q6H PRN  polyethylene glycol, 17 g, Daily PRN  benzonatate, 200 mg, TID PRN         Objective:    BP (!) 116/43   Pulse 79   Temp 98.9 °F (37.2 °C) (Oral)   Resp 18   Ht 5' (1.524 m)   Wt 170 lb (77.1 kg)   LMP  (LMP Unknown)   SpO2 95%   BMI 33.20 kg/m²     General Appearance: alert and oriented to person, place and time and in no acute distress  Skin: warm and dry  Head: normocephalic and atraumatic  Eyes: pupils equal, round, and reactive to light, extraocular eye movements intact, conjunctivae normal  Neck: neck supple and non tender without mass   Pulmonary/Chest: clear to auscultation bilaterally- no wheezes, rales or rhonchi, normal air movement, no respiratory distress  Cardiovascular: normal rate, normal S1 and S2 and no carotid bruits  Abdomen: soft, non-tender, non-distended, normal bowel sounds, no masses or organomegaly  Extremities: no cyanosis, no clubbing and no edema  Neurologic: no cranial nerve deficit and speech normal        Recent Labs     11/28/20  2330 11/30/20  0425    136   K 4.4 3.8    104   CO2 23 24   BUN 38* 26*   CREATININE 1.5* 1.0   GLUCOSE 230* 211*   CALCIUM 8.7 8.5*       Recent Labs     11/28/20  2330 11/30/20  0425   WBC 7.1 5.9   RBC 3.03* 2.73*   HGB 10.5* 9.2*   HCT 30.9* 27.4*   .0* 100.4*   MCH 34.7 33.7   MCHC 34.0 33.6   RDW 12.9 12.6    137   MPV 9.3 9.5       Radiology:   EXAMINATION:    ONE XRAY VIEW OF THE CHEST    11/28/2020 10:46 pm    COMPARISON:    Chest series from November 16, 2020    HISTORY:    ORDERING SYSTEM PROVIDED HISTORY: dyspnea    TECHNOLOGIST PROVIDED HISTORY:    Reason for exam:->dyspnea    FINDINGS:    Background coarse interstitial markings.  Increased opacity in the left mid    and lower lung when compared to prior exam.  No obvious pleural effusion or    pneumothorax.  Atherosclerotic disease in a mildly tortuous thoracic aorta. The remaining cardiomediastinal silhouette and the pulmonary vascularity    appear within normal limits.  Osseous and thoracic soft tissue structures    demonstrate no acute findings.         Impression    1.  Ill-defined opacity in the left mid and lower lung worsened from prior    exam.    2.  Coarse interstitial markings and atherosclerotic disease appears stable. Assessment:    Active Problems:    Pneumonia due to COVID-19 virus  Resolved Problems:    * No resolved hospital problems. *      Plan:  1. Acute hypoxia 2/2 left sided pneumonia  -  Patient has cough; dx with pneumonia on 11/16.   Now with new left sided infiltrate  -  Now on rocephin and azithromycin IV  -  mucinex prn  -  Wean O2 as tolerated    2. COVID 19 pneumonia  -  S/p remdesivir and decadron    3. PHILIP  -  Hold lasix, trial of IVF hydration  -  Continue to hold lisinopril  -   Monitor BP    4. Decubitus ulcer  -  Wound care on board    5. Physical deconditioning  -PT/OT/SW  -  Patient's daughter is okay with placement if needed    6. Essential hypertension  -   Continue coreg and imdur  -  Hold off on lisinopril    7. Chronic anticoagulation  -  Resume eliquis  NOTE: This report was transcribed using voice recognition software. Every effort was made to ensure accuracy; however, inadvertent computerized transcription errors may be present.   Electronically signed by Maryann Mcintyre MD on 11/30/2020 at 3:46 PM

## 2020-11-30 NOTE — H&P
[facility] History and Physical      CHIEF COMPLAINT:  cough    History of Present Illness: 49-year-old female with a history of diabetes, prior heart failure with EF 40 to 45% but this improved on recent echo to 6-65, history of PE on chronic anticoagulation, essential hypertension. Recently discharged 11/20 due to covid 19 pneumonia. Per daughter she required 2 person assist. Had persistent cough. Was giving her duoneb, mucinex. Her cough has remained the same, sometimes productive of white/yellow. At baseline she uses a walker and family members usually accompany her. Pulse ox 94-95% at rest, has been ambulating with a wheelchair. Has some decreased po intake but not significantly so per daughter. Follows with wound care for a 1cm sacral wound. Per daughter this has progressed since then. While int he ED the patient had an episode of chest pain. Has had recurrent episodes of this, stress test last year was negative. No chest pain when seen. Informant(s) for H&P:daughter mainly    REVIEW OF SYSTEMS:  A comprehensive 14 point review of systems was negative except for: what is in the HPI    PMH:  Past Medical History:   Diagnosis Date    Anxiety     Depression     Diabetes mellitus ([facility] [address]     Frequent urinary tract infections     GERD (gastroesophageal reflux disease)     Hypertension     Peripheral neuropathy     Spinal stenosis        Surgical History:  Past Surgical History:   Procedure Laterality Date    CHOLECYSTECTOMY      HIP FRACTURE SURGERY Bilateral     SKIN CANCER EXCISION         Medications Prior to Admission:    Prior to Admission medications    Medication Sig Start Date End Date Taking?  Authorizing Provider   atorvastatin (LIPITOR) 40 MG tablet Take 40 mg by mouth nightly    Historical Provider, MD   Calcium Carb-Cholecalciferol (CALTRATE 600+D3) 600-800 MG-UNIT TABS Take 1 tablet by mouth 2 times daily    Historical Provider, MD   carvedilol (COREG) 12.5 MG chewable tablet Take 1 tablet by mouth daily 1/14/20   Rich Kinney MD   Multiple Vitamins-Minerals (THERAPEUTIC MULTIVITAMIN-MINERALS) tablet Take 1 tablet by mouth daily    Historical Provider, MD   acetaminophen (TYLENOL) 325 MG tablet Take 650 mg by mouth every 6 hours as needed for Pain    Historical Provider, MD   fluticasone (FLONASE) 50 MCG/ACT nasal spray 1 spray by Nasal route daily as needed for Rhinitis     Historical Provider, MD   mineral oil-hydrophilic petrolatum (HYDROPHOR) ointment Apply 1 g topically daily Apply to both feet    Historical Provider, MD       Allergies:    Patient has no known allergies. Social History:    reports that she has never smoked. She has never used smokeless tobacco. She reports that she does not drink alcohol or use drugs.     Family History:   Mom stroke  Dad MI    PHYSICAL EXAM:  Vitals:  LMP  (LMP Unknown)   General Appearance: alert and oriented to person, place and time and in no acute distress, speaks slowly  Skin: warm and dry, turgor not diminished  Head: normocephalic and atraumatic  Eyes: pupils equal, round, and reactive to light, extraocular eye movements intact, conjunctivae normal  Neck: neck supple and non tender without mass   Pulmonary/Chest: clear to auscultation bilaterally- no wheezes, rales or rhonchi, normal air movement, no respiratory distress  Cardiovascular: normal rate, normal S1 and S2 and no M/R/R  Abdomen: soft, non-tender, non-distended, normal bowel sounds, no masses or organomegaly  Extremities: no cyanosis, no clubbing and no edema  Neurologic: no cranial nerve deficit and speech normal        LABS:  Recent Labs     11/28/20  2330      K 4.4      CO2 23   BUN 38*   CREATININE 1.5*   GLUCOSE 230*   CALCIUM 8.7       Recent Labs     11/28/20  2330   WBC 7.1   RBC 3.03*   HGB 10.5*   HCT 30.9*   .0*   MCH 34.7   MCHC 34.0   RDW 12.9      MPV 9.3       EKG: PVCs    ASSESSMENT:    ?Community acquired

## 2020-11-30 NOTE — PROGRESS NOTES
Sitting EOB: SBA  Dynamic standing: ModA with AAD      Pt is A & O x: 3 (person/place/time), grossly           Patient education  Pt educated on safety with bed mobility, transfers, LE exercises, role of PT in hospital setting     Patient response to education:   Pt verbalized understanding Pt demonstrated skill Pt requires further education in this area   yes Yes with cues/assist yes      Comments:  Mobility as above. Max assist for sitting balance at EOB, progressed to mod assist after sitting and postural activities. Sat at EOB x 10 minutes. Unable to take steps once in stand   Pt's O2 was off upon arrival . Pulse ox 89%. Re-applied at 2 LNC  And pulse ox 92%.      Pts/family goals:    None stated      Patient and or family understand(s) diagnosis, prognosis, and plan of care:  yes     PLAN    PT care will be provided in accordance with the objectives noted above. Whenever appropriate, clear delegation orders will be provided for nursing staff. Exercises and functional mobility practice will be used as well as appropriate assistive devices or modalities to obtain goals.  Patient and family education will also be administered as needed.     Frequency of treatments: 1-3 x/week x 7-14 days.     Time in: 1138  Time out: 1800 [address]   PT [id number]

## 2020-12-01 NOTE — PROGRESS NOTES
Spoke with Daughter Sarah Michelle at [phone]. Patient's daughter stated she would consider [facility] for placement. She will call back once she talks with her family to verify if they want to take the patient home.  said that they don't feel there is an acute need to retest for Covid.

## 2020-12-01 NOTE — CARE COORDINATION
CM NOTE: Chart reviewed---covid positive (11/16/20---previous admission) in droplet plus isolation. Weaned off O2 the other day & remains on room air. Completed course RDV. On decadron, receiving wound care for sacral wound. [facility] 7/24. CM spoke with daughter Stanislav Serrano). States she appreciates the names of the out of town facilities but declines all of them. States she does not want [address] asked again about [facility]. CM explained that there weren't any covid beds today (same situation as yesterday). Karl Mahesh inquired about [facility]. CM explained criteria for consideration there. Cm explained option exists for home with home care, wound care & therapy. Daughter states that isn't going to work at this time. States she still has many \"quality care\" questions. CM encouraged her to ask Dolores (pt's nurse) when she calls in at 1300 today. States she will get answers to her questions & can then make a better decision. CM & SW continue to follow pt.

## 2020-12-01 NOTE — PROGRESS NOTES
[facility] Progress Note    Admitting Date and Time: 11/29/2020  9:00 PM  Admit Dx: Pneumonia due to COVID-19 virus [U07.1, J12.89]    Subjective:  Patient is being followed for Pneumonia due to COVID-19 virus [U07.1, J12.89]   Pt feels tired today. Notes that she is still short of breath. She is able to maintain her O2 without extra oxygen. No diarrhea or nausea. Per RN: no new concerns. No longer requiring O2 via NC.  afebrile    ROS: denies fever, chills, cp, sob, n/v, HA unless stated above.       insulin lispro  0-6 Units Subcutaneous TID WC    insulin lispro  0-3 Units Subcutaneous Nightly    white petrolatum   Topical BID    therapeutic multivitamin-minerals  1 tablet Oral Daily    aspirin  81 mg Oral Daily    sertraline  150 mg Oral Daily    montelukast  10 mg Oral Nightly    busPIRone  5 mg Oral Daily    apixaban  5 mg Oral BID    gabapentin  600 mg Oral TID    atorvastatin  40 mg Oral Nightly    calcium-vitamin D   Oral BID    carvedilol  12.5 mg Oral BID WC    isosorbide mononitrate  30 mg Oral Daily    pantoprazole  40 mg Oral QAM AC    sodium chloride flush  10 mL Intravenous 2 times per day    cefTRIAXone (ROCEPHIN) IV  1 g Intravenous Q24H    azithromycin  500 mg Intravenous Q24H     fluticasone, 1 spray, Daily PRN  nitroGLYCERIN, 0.4 mg, Q5 Min PRN  albuterol-ipratropium, 1 puff, Q4H PRN  sodium chloride flush, 10 mL, PRN  acetaminophen, 650 mg, Q6H PRN    Or  acetaminophen, 650 mg, Q6H PRN  polyethylene glycol, 17 g, Daily PRN  benzonatate, 200 mg, TID PRN         Objective:    /73   Pulse 78   Temp 97.9 °F (36.6 °C) (Oral)   Resp 18   Ht 5' (1.524 m)   Wt 170 lb (77.1 kg)   LMP  (LMP Unknown)   SpO2 98%   BMI 33.20 kg/m²     Due to the patient's COVID-19-positive status, to reduce exposure, contamination, and in an effort to conserve PPE, this patient was evaluated through a combination of review of the medical record, nursing assessments, other physicians' exams and assessments, as well as telemedicine interaction. Recent Labs     11/28/20  2330 11/30/20  0425    136   K 4.4 3.8    104   CO2 23 24   BUN 38* 26*   CREATININE 1.5* 1.0   GLUCOSE 230* 211*   CALCIUM 8.7 8.5*       Recent Labs     11/28/20  2330 11/30/20  0425   WBC 7.1 5.9   RBC 3.03* 2.73*   HGB 10.5* 9.2*   HCT 30.9* 27.4*   .0* 100.4*   MCH 34.7 33.7   MCHC 34.0 33.6   RDW 12.9 12.6    137   MPV 9.3 9.5       Radiology:   EXAMINATION:    ONE XRAY VIEW OF THE CHEST    11/28/2020 10:46 pm    COMPARISON:    Chest series from November 16, 2020    HISTORY:    ORDERING SYSTEM PROVIDED HISTORY: dyspnea    TECHNOLOGIST PROVIDED HISTORY:    Reason for exam:->dyspnea    FINDINGS:    Background coarse interstitial markings.  Increased opacity in the left mid    and lower lung when compared to prior exam.  No obvious pleural effusion or    pneumothorax.  Atherosclerotic disease in a mildly tortuous thoracic aorta. The remaining cardiomediastinal silhouette and the pulmonary vascularity    appear within normal limits.  Osseous and thoracic soft tissue structures    demonstrate no acute findings.         Impression    1.  Ill-defined opacity in the left mid and lower lung worsened from prior    exam.    2.  Coarse interstitial markings and atherosclerotic disease appears stable. Assessment:    Active Problems:    Pneumonia due to COVID-19 virus  Resolved Problems:    * No resolved hospital problems. *      Plan:  1. Acute hypoxia 2/2 left sided pneumonia  -  Patient has cough; dx with pneumonia on 11/16. Now with new left sided infiltrate  -  Now on rocephin and azithromycin IV  -  mucinex prn  -  Weaned off O2     2. COVID 19 pneumonia  -  S/p remdesivir and decadron    3. PHILIP  -  Hold lasix, trial of IVF hydration  -  Continue to hold lisinopril  -   Monitor BP    4. Decubitus ulcer  -  Wound care on board    5.   Physical deconditioning  -PT/OT/SW  - Patient's daughter is okay with placement if needed    6. Essential hypertension  -   Continue coreg and imdur  -  Hold off on lisinopril    7. Chronic anticoagulation  -  Resume eliquis  NOTE: This report was transcribed using voice recognition software. Every effort was made to ensure accuracy; however, inadvertent computerized transcription errors may be present.   Electronically signed by Wesley Cervantes MD on 12/1/2020 at 8:42 AM

## 2020-12-01 NOTE — PROGRESS NOTES
Comprehensive Nutrition Assessment    Type and Reason for Visit:  Initial, Consult, Wound    Nutrition Recommendations/Plan: Continue Diet. Will Start Boost High Calorie Pudding ONS BID. Nutrition Assessment:  Pt adm w/ SOB/Cough and decreased cat/intake x ~2-3 days pta 2/2 COVID19+ per EMR. Pt at risk d/t poor intake pta 2/2 COVID19+ as well as pt w/ increased needs for wound healing. Will Start ONS to aid in PO intake/healing. Malnutrition Assessment:  Malnutrition Status:  Insufficient data    Context:  Acute Illness     Findings of the 6 clinical characteristics of malnutrition:  Energy Intake:  Mild decrease in energy intake (Comment)(decreased x ~3-4 days now total)  Weight Loss:  Unable to assess(2/2 CKD w/ fluid shifts)     Body Fat Loss:  Unable to assess     Muscle Mass Loss:  Unable to assess    Fluid Accumulation:  Unable to assess(edema noted however likely 2/2 CKD at this time)     Strength:  Not Performed    Estimated Daily Nutrient Needs:  Energy (kcal):  7769-3036(MSJ x 1.2 SF per CBW); Weight Used for Energy Requirements:  Current     Protein (g):  60-70(1.3-1.5 gm/kg per IBW as tolerated w/ CKD);  Weight Used for Protein Requirements:  Ideal        Fluid (ml/day):  3860-8338; Method Used for Fluid Requirements:  1 ml/kcal      Nutrition Related Findings:  A&O, Upper Dentures, Abd/BS WDL, +2 BUE/BLE edema, -I/O's      Wounds:  Pressure Injury, Unstageable       Current Nutrition Therapies:    DIET CARB CONTROL; Mildly Thick (Nectar)    Anthropometric Measures:  · Height: 5' (152.4 cm)  · Current Body Weight: 170 lb (77.1 kg)(bed 11/29)   · Admission Body Weight: 170 lb (77.1 kg)(bed 11/29)    · Usual Body Weight: 166 lb (75.3 kg)(actual 3/2/20 per EMR; DYLON wt change properly at this time d/t CKD w/ noted wt fluctuations per EMR wt hx pta likely 2/2 fluid shifts)     · Ideal Body Weight: 100 lbs; % Ideal Body Weight 170 %   · BMI: 33.2  · Adjusted Body Weight:  ; No Adjustment · Adjusted BMI:      · BMI Categories: Obese Class 1 (BMI 30.0-34. 9)       Nutrition Diagnosis:   · Increased nutrient needs related to increase demand for energy/nutrients(2/2 Wound Healing) as evidenced by wounds      Nutrition Interventions:   Food and/or Nutrient Delivery:  Continue Current Diet, Start Oral Nutrition Supplement(Continue Diet. Will Start Boost High Calorie Pudding ONS BID.)  Nutrition Education/Counseling:  No recommendation at this time   Coordination of Nutrition Care:  Continue to monitor while inpatient    Goals:  PO intake >75% of meals/ONS.        Nutrition Monitoring and Evaluation:   Behavioral-Environmental Outcomes:  None Identified   Food/Nutrient Intake Outcomes:  Diet Advancement/Tolerance, Food and Nutrient Intake, Supplement Intake  Physical Signs/Symptoms Outcomes:  Biochemical Data, Chewing or Swallowing, GI Status, Fluid Status or Edema, Nutrition Focused Physical Findings, Skin, Weight     Discharge Planning:    Continue current diet     Electronically signed by Adiel Stone RD, LD on 12/1/20 at 2:42 PM EST    Contact: ext [phone]

## 2020-12-01 NOTE — PLAN OF CARE
Patient is resting comfortably in bed. Using 1l/prn for comfort. Encourage deep breathing exercises and I/S.

## 2020-12-01 NOTE — CARE COORDINATION
GERA received call from patient's daughter Lee Ann Infante who states she wants a referral made to [facility] today as they are accepting covid positive pts. GERA spoke with NADYA who was awaiting the referral. States she will review case & contact CM with decision. States she has limited covid beds & must make sure pt appropriate for ARU. Await decision.

## 2020-12-01 NOTE — PLAN OF CARE
Problem: Increased nutrient needs (NI-5.1)  Goal: Food and/or Nutrient Delivery  Continue Diet. Will Start Boost High Calorie Pudding ONS BID. Description: Individualized approach for food/nutrient provision.   Outcome: Met This Shift

## 2020-12-01 NOTE — PROGRESS NOTES
Attempted patients AM labs. Patient has generalized bruising bilateral upper extremities. New consult to IV team per protocol.

## 2020-12-01 NOTE — PROGRESS NOTES
Patient was on room air for most of the day. Around 4pm patient's family called desk to stated patient was c/o sob advised patient that her 02 levels were 97-98%. Patient's family called the desk again to request that we place patient on 1l/prn for comfort. Iv team was going in the room. They placed patient on 1l/nc prn for nurse.

## 2020-12-02 NOTE — PROGRESS NOTES
Physical Therapy  Facility/Department: [facility] MED SURG  Daily Treatment Note  NAME: Rona Razo  : 1933  MRN: [id number]    Date of Service: 2020      Patient Diagnosis(es): There were no encounter diagnoses. has a past medical history of Anxiety, Depression, Diabetes mellitus ([address] Frequent urinary tract infections, GERD (gastroesophageal reflux disease), Hypertension, Peripheral neuropathy, and Spinal stenosis. has a past surgical history that includes Cholecystectomy; Skin cancer excision; and Hip fracture surgery (Bilateral). Date of Service: 2020     REQUIRES PT FOLLOW UP: Yes       Patient Diagnosis(es): There were no encounter diagnoses.      has a past medical history of Anxiety, Depression, Diabetes mellitus ([address] Frequent urinary tract infections, GERD (gastroesophageal reflux disease), Hypertension, Peripheral neuropathy, and Spinal stenosis. has a past surgical history that includes Cholecystectomy; Skin cancer excision; and Hip fracture surgery (Bilateral).     Evaluating PT: Lennox Moros      Room #:  611     Diagnosis: PNA due to COVID   Precautions: fall risk, nectar liquids, bed alarm, droplet plus      PMHx: peripheral neuropathy, HTN, anxiety, depression, diabetes, spinal stenosis  Recommended DME: to be determined     Pt lives with alone, but family provides 24 hour care in shifts per pt report in a single story house with ramped entrance. Bed is on the first floor and bath is on the first floor. Pt ambulated with FWW and assist from family for household distances, uses transport chair for community distances prior to admission.                          Initial Evaluation  Date: 2020 Treatment Date:  2020 Short Term/ Long Term   Goals   AM-PAC 6 Clicks   0     Was pt agreeable to Eval/treatment? yes  yes      Does pt have pain?  None reported   none reported      Bed Mobility  Rolling: Max A  Supine to sit: Max A x 2    Sit to supine: Max A x 2 Scooting: Max A  x 2  rolling : max assist   Supine to sit : max assist   Sit to supine : max assist x 2   Scooting : max assist x 2  Rolling: Min A  Supine to sit: Mod A  Sit to supine: Mod A  Scooting: Mod  A   Transfers Sit to stand: Max A x2  Stand to sit: Max A x2  Stand pivot: NT  Sit <> stand : max assist  Sit to stand: Mod A  Stand to sit: Mod A  Stand pivot: Mod A    Ambulation   Unable  Unable  TBA   Stair negotiation: NA, pt has ramp to enter/exit her home.   NA   ROM    B LE: AAROM WFL        Strength    B LE: 3- to 3+/ 5        Balance Sitting EOB: min assist   Standing:dep assist    Sitting EOB: SBA  Dynamic standing: ModA with AAD      Pt is A & O x: 3, grossly            Patient education  Pt educated on safety with bed mobility, transfers, LE exercises, role of PT in hospital setting     Patient response to education:   Pt verbalized understanding Pt demonstrated skill Pt requires further education in this area   yes Yes with cues/assist yes      Comments:  Mobility as above. Pt able to sit at EOB min assist. Unable to perform 10 LAQs on B LEs without assist, cues to continue performance. Max assist x 2 to stand and maintain stand for < 30 seconds. High fall risk . Cues to reposition LEs in stand, but pt unable. Unable to step.           PLAN     PT care will be provided in accordance with the objectives noted above. Whenever appropriate, clear delegation orders will be provided for nursing staff. Exercises and functional mobility practice will be used as well as appropriate assistive devices or modalities to obtain goals. Patient and family education will also be administered as needed.     Frequency of treatments: 1-3 x/week x 7-14 days.     Con't with PT POC      Time in: 0945   Time out: [address] [facility] [address]   PT [id number]

## 2020-12-02 NOTE — CARE COORDINATION
GERA received call from [facility]. States pt will not be able to tolerate AR & recommends REESE. States she is contacting daughter with decision as daughter has talked with her personally. GERA spoke with Kaiden Nayeli (daughter) & does not wish to choose any of the available facilities. Daughter has decided that pt will return home with [facility]. DAUGHTER REQUESTING TO SPEAK WITH PCP. Is requesting PCP contact her to discuss pt status. GERA spoke with Giovana Bucio (pt's nurse) to facilitate discussion. GERA spoke with Meng @ [address] [place] [address].

## 2020-12-02 NOTE — PROGRESS NOTES
[facility] Progress Note    Admitting Date and Time: 11/29/2020  9:00 PM  Admit Dx: Pneumonia due to COVID-19 virus [U07.1, J12.89]    Subjective:  Patient is being followed for Pneumonia due to COVID-19 virus [U07.1, J12.89]   Pt feels very weak today. She does not like the heart monitor sticky is on her chest.  She notes that she feels like she cannot get enough oxygen. She is actually maintaining oxygen saturation of 94% on room air  Per RN: No new complaints. Afebrile. ROS: denies fever, chills, cp, sob, n/v, HA unless stated above.       insulin lispro  0-6 Units Subcutaneous TID WC    insulin lispro  0-3 Units Subcutaneous Nightly    white petrolatum   Topical BID    therapeutic multivitamin-minerals  1 tablet Oral Daily    aspirin  81 mg Oral Daily    sertraline  150 mg Oral Daily    montelukast  10 mg Oral Nightly    busPIRone  5 mg Oral Daily    apixaban  5 mg Oral BID    gabapentin  600 mg Oral TID    atorvastatin  40 mg Oral Nightly    calcium-vitamin D   Oral BID    carvedilol  12.5 mg Oral BID WC    isosorbide mononitrate  30 mg Oral Daily    pantoprazole  40 mg Oral QAM AC    sodium chloride flush  10 mL Intravenous 2 times per day    cefTRIAXone (ROCEPHIN) IV  1 g Intravenous Q24H    azithromycin  500 mg Intravenous Q24H     fluticasone, 1 spray, Daily PRN  nitroGLYCERIN, 0.4 mg, Q5 Min PRN  albuterol-ipratropium, 1 puff, Q4H PRN  sodium chloride flush, 10 mL, PRN  acetaminophen, 650 mg, Q6H PRN    Or  acetaminophen, 650 mg, Q6H PRN  polyethylene glycol, 17 g, Daily PRN  benzonatate, 200 mg, TID PRN         Objective:    BP (!) 146/71   Pulse 80   Temp 98.1 °F (36.7 °C) (Oral)   Resp 18   Ht 5' (1.524 m)   Wt 170 lb (77.1 kg)   LMP  (LMP Unknown)   SpO2 94%   BMI 33.20 kg/m²     General Appearance: alert and oriented to person, place and time and in no acute distress  Skin: warm and dry  Head: normocephalic and atraumatic  Eyes: pupils equal, round, Pneumonia due to COVID-19 virus  Resolved Problems:    * No resolved hospital problems. *      Plan:  1. Acute hypoxia 2/2 left sided pneumonia  -  Patient has cough; dx with pneumonia on 11/16. Now with new left sided infiltrate  -  Now on rocephin and azithromycin IV with some clinical improvement  -  mucinex prn  -  Weaned off O2   -  Respiratory culture pending  -  Strep and legionella Ag negative    2. COVID 19 pneumonia  -  S/p remdesivir and decadron    3. PHILIP-improved  -  Hold lasix, trial of IVF hydration  -  Continue to hold lisinopril, if elevated again tomorrow, consider restarting  -   Monitor BP    4. Decubitus ulcer  -  Wound care on board  -  Patient will need orders for wound care/home health    5. Physical deconditioning  -PT/OT/SW    6. Essential hypertension  -   Continue coreg and imdur  -  Hold off on lisinopril; if still having BP elevation tomorrow, consider restart lisinopril. 7.  Chronic anticoagulation  -  Resume eliquis    Dispo: Following recommendations from PT/OT and case management, patient would qualify for REESE. However, patient's daughter wishes to take the patient home. I spoke with the patient's daughter this afternoon, and she is already set up with home health care. Her daughter was not happy with the 2 options that were available for subacute rehab. Upon discharge, the patient will need specific orders for wound care, PT/OT with her home health care. NOTE: This report was transcribed using voice recognition software. Every effort was made to ensure accuracy; however, inadvertent computerized transcription errors may be present.   Electronically signed by Blaze Santiago MD on 12/2/2020 at 4:21 PM

## 2020-12-02 NOTE — PROGRESS NOTES
Occupational Therapy  OT BEDSIDE TREATMENT NOTE      Date:2020  Patient Name: Abhinav Hayes  MRN: [id number]  : 1933  Room: [address] [place] [address]-A     Referring Provider: Vaughn Castañeda MD      Evaluating OT: Dary Padilla OTR/L [id number]     AM-PAC Daily Activity Raw Score:      Recommended Adaptive Equipment: TBD     Diagnosis: Pneumonia. Pt presents to ED from home with SOB, cough and worsening sacral decubitus. Pt recent discharge from hospital d/t [facility] with family support. Pertinent Medical History: HTN, DM, spinal stenosis, anxiety, peripheral neuropathy   Precautions:  Falls, droplet plus isolation COVID +, O2, sacral decubitus     Home Living: Pt is a questionable historian. Reports she has been at home with family assist. Pt reports she does walk at home. Family assist in all ADLs.    Pain Level: no reported pain     Cognition: A&O: 3-4/4. Pt is alert and oriented but easily confused and slow processing              Problem solving:  poor              Judgement/safety:  poor                Functional Assessment:    Initial Eval Status  Date: 20 Treatment session:   20 Short Term Goals      Feeding Min A  For lunch tray of hand held sandwich and peaches  Min A  For breakfast tray  Limited tolerance for food, pt reports not interested Set up   Grooming Min A  Wiping off face  Mod A  Washing face and hands Set up   UB Dressing Max A  Management of hospital gown  Max A  Management of hospital gown Min A   LB Dressing Dependent  Management of hospital socks  Dependent  Donning/doffing socks Max A    Bathing Max A   Mod A   Toileting Dependent  Incontinent of urine total assist in rajesh care  Dependent  Incontinent upon standing at EOB  Total assist bed level rajesh care as pt unable to tolerate standing for completion of rajesh care Mod A   Bed Mobility  Supine <> sit: Max A x2  Scooting: Max A x2  Rolling: Max A  Supine <> Sit: Max A  Rolling:  Max A     Functional Transfers STS: Max A x2  Blocking at B feet  STS: Max A x2  2 trials, blocking at B feet Mod A   Functional Mobility Unable to complete  attempted but unable to tolerate stepping     Balance Sitting: poor to poor plus seated EOB  Sitting brittny x7 min     Standing: poor  Sitting: fair/fair minus seated EOB x9-10 min     Standing: Poor  Standing brittny x2 trials 15-30 seconds     Activity Tolerance Poor  2L O2: 92% Poor  1L O2 no SOB throughout session  Reports mild lightheadedness with initial positional changes  Poor activity tolerance for functional tasks  sitting brittny x10-15 min with fair plus balance during self care tasks     Education: Patient educated on techniques for completion of ADL, safe functional transfers and functional mobility. Patient required cues for follow through with proper hand/foot placement, pacing, safety, attention, sequencing and technique in bed mobility, functional transfers, UB dressing, toileting, grooming, self feeding and LB dressing in preparation for maximum independence in all self care tasks. Comments: Upon arrival pt supine in bed. At end of session supine in bed all lines and tubes intact, call light and phone within reach. Bed/chair alarm: ON    · Pt has made limited progress towards set goals.      Time In: 1032  Time Out: [address] [address] 5    ADL/Self Care [id number] 20 2   Orthotic Management [id number]     Neuro Re-Ed [id number]     TOTAL TIMED TREATMENT 25 2       Karen Fernando OTR/L  [id number]

## 2020-12-02 NOTE — CARE COORDINATION
GERA NOTE: Chart reviewed. VM left for [facility] Acute rehab. Await call back with decision. Pt having intermittent dyspnea & was placed on O2 1L. Continues on 1L this am.     CM spoke with London Gauthier this am. [facility] pt needs updated therapy evals prior to Glen's decision. CM spoke with Li Hawkins from therapy.  States will update this am.

## 2020-12-03 NOTE — PLAN OF CARE
Problem: Gas Exchange - Impaired  Goal: Absence of hypoxia  Outcome: Met This Shift     Problem: Gas Exchange - Impaired  Goal: Promote optimal lung function  Outcome: Met This Shift     Problem: Breathing Pattern - Ineffective  Goal: Ability to achieve and maintain a regular respiratory rate  Outcome: Met This Shift

## 2020-12-03 NOTE — PROGRESS NOTES
[facility] Progress Note    Admitting Date and Time: 11/29/2020  9:00 PM  Admit Dx: Pneumonia due to COVID-19 virus [U07.1, J12.89]    Subjective:  Patient is being followed for Pneumonia due to COVID-19 virus [U07.1, J12.89]     Patient awake, alert, sitting up in bed in no acute distress  Reporting feeling weak  Decreased appetite  On RA          ROS: denies fever, chills, cp, sob, n/v, HA unless stated above.      insulin lispro  0-6 Units Subcutaneous TID WC    insulin lispro  0-3 Units Subcutaneous Nightly    white petrolatum   Topical BID    therapeutic multivitamin-minerals  1 tablet Oral Daily    aspirin  81 mg Oral Daily    sertraline  150 mg Oral Daily    montelukast  10 mg Oral Nightly    busPIRone  5 mg Oral Daily    apixaban  5 mg Oral BID    gabapentin  600 mg Oral TID    atorvastatin  40 mg Oral Nightly    calcium-vitamin D   Oral BID    carvedilol  12.5 mg Oral BID WC    isosorbide mononitrate  30 mg Oral Daily    pantoprazole  40 mg Oral QAM AC    sodium chloride flush  10 mL Intravenous 2 times per day    cefTRIAXone (ROCEPHIN) IV  1 g Intravenous Q24H    azithromycin  500 mg Intravenous Q24H     fluticasone, 1 spray, Daily PRN  nitroGLYCERIN, 0.4 mg, Q5 Min PRN  albuterol-ipratropium, 1 puff, Q4H PRN  sodium chloride flush, 10 mL, PRN  acetaminophen, 650 mg, Q6H PRN    Or  acetaminophen, 650 mg, Q6H PRN  polyethylene glycol, 17 g, Daily PRN  benzonatate, 200 mg, TID PRN         Objective:    BP (!) 123/90   Pulse 83   Temp 97.8 °F (36.6 °C) (Oral)   Resp 18   Ht 5' (1.524 m)   Wt 170 lb (77.1 kg)   LMP  (LMP Unknown)   SpO2 92%   BMI 33.20 kg/m²   General Appearance: alert and oriented to person, place and time and in no acute distress  Skin: warm and dry  Head: normocephalic and atraumatic  Neck: neck supple and non tender without mass   Pulmonary/Chest: diminished throughout to auscultation   Cardiovascular: normal rate, normal S1 and S2 and no carotid bruits  Abdomen: soft, non-tender, non-distended, normal bowel sounds, no masses or organomegaly  Extremities: no cyanosis, no clubbing and no edema  Neurologic: speech normal       Recent Labs     12/01/20  1720 12/02/20  0640 12/03/20  0635    138 137   K 4.0 3.9 3.7    102 101   CO2 26 26 27   BUN 17 13 16   CREATININE 0.9 0.9 1.1*   GLUCOSE 147* 176* 166*   CALCIUM 9.0 9.1 9.1       Recent Labs     12/01/20  1720 12/02/20  0640 12/03/20  0635   WBC 6.0 5.3 6.3   RBC 3.10* 3.39* 3.22*   HGB 10.3* 11.4* 10.6*   HCT 30.9* 33.1* 32.2*   MCV 99.7 97.6 100.0*   MCH 33.2 33.6 32.9   MCHC 33.3 34.4 32.9   RDW 12.8 12.6 12.7    142 135   MPV 9.2 9.2 9.2         Assessment:    Active Problems:    Pneumonia due to COVID-19 virus    Uncontrolled type 2 diabetes mellitus with hyperglycemia (HCC)  Resolved Problems:    * No resolved hospital problems. *      Plan:  1. Acute hypoxic respiratory failure related to + COVID- 19: pt presented to the ER with complaints of cough. She was recently discharged on 11/20 following hospitalization at [facility]. She was given remdesivir at that time and was started on steroid taper. She returns with cough/ weakness. CXR reviewed. She was stated on abx for possible superimposed bacterial pneumonia. procal neg. Strep / legionella neg. Consult speech for possible worsening dysphagia. + cough while eating- on nectar thick liquids at baseline. 2. COVID 19 viral pneumonia: s/p remdesivir and decadron    3. PHILIP: holding lasix/ lisinopril. Creatinine 1.5 on admission- creatinine 1.1 today    4. HTN: continue coreg/imdur. Holding ace    5. Decubitus ulcer: wound care on board: per nursing black escar noted and per nursing may need debrided. Consult surgery. Per daughter she had been following with Dr. Lydia Barrera had been healing. 6. Chronic anticoagulation: on eliquis    7.  Dysphagia; on nectar thick liquids- consult speech pt noted to be weak- coughing moderate amount with eating. 8. Deconditioning: am pac 8      NOTE: This report was transcribed using voice recognition software. Every effort was made to ensure accuracy; however, inadvertent computerized transcription errors may be present. Electronically signed by SAMANTA Nj on 12/3/2020 at 4:02 PM  HOSPITALIST ATTENDING PHYSICIAN NOTE 12/3/2020 2158PM:    Details of the evaluation - subjective assessment (including medication profile, past medical, family and social history when applicable), examination, review of lab and test data, diagnostic impressions and medical decision making - performed by SAMANTA Nj, were discussed with me on the date of service and I agree with clinical information herein unless otherwise noted. The patient has been evaluated by me personally earlier today. Pt reports no fevers, chills,n/v. PHYSICAL EXAM:    Vitals:  /60   Pulse 78   Temp 98.5 °F (36.9 °C) (Oral)   Resp 18   Ht 5' (1.524 m)   Wt 170 lb (77.1 kg)   LMP  (LMP Unknown)   SpO2 94%   BMI 33.20 kg/m²     General:  Appears comfortable. Answers questions appropriately and cooperative with exam  HEENT:  Mucous membranes moist. No erythema, rhinorrhea, or post-nasal drip noted. Neck:  No carotid bruits. Heart:  Rhythm regular at rate of 80  Lungs:  CTA. No wheeze, rales, or rhonchi  Abdomen:  Positive bowel sounds positive. Soft. Non-tender. No guarding, rebound or rigidity. Breast/Rectal/Genitourinary: not pertinent. Extremities:  Negative for lower extremity edema  Skin:  Warm and dry  Vascular: 2/4 Dorsalis Pedis pulses bilaterally. Neuro:  Cranial nerves 2-12 grossly intact, no focal weakness or change in sensation noted. Extraocular muscles intact. Pupils equal, round, reactive to light. I agree with the assessment and plan of SAMANTA Nj.     Acute respiratory failure with hypoxia  Pneumonia due to covid 19  katlin  htn  Dm type 2 controlled  gerd  depression  Decubitus ulcer  Dysphagia  deconditioning        Chart reviewed and updated by nursing    Time spent is 35 min    Electronically signed by Maureen Rosario D.O.   Hospitalist  4M Hospitalist Service at [facility]

## 2020-12-03 NOTE — CARE COORDINATION
GERA NOTE: GERA spoke with pt's nurse this am. Plan is home. Will need resumption of care orders for previous home care referral (last admission).

## 2020-12-04 NOTE — PROGRESS NOTES
Appetite: poor. 15% meal eaten. States \"dont think I can eat anymore'  Last BM: 12/04/2020  Diarrhea/nausea: no  SOB: no  SMI:  Cough: yes  Oxygen: 1L 95% weaned to room air.  Will attempt to ambulate  Incontinent: yes  Out of bed: no  Swelling: BUE, BLE  Dizziness: no  Glasses/ hearing aids / dentures: upper/lower, glasses here with patient

## 2020-12-04 NOTE — PROGRESS NOTES
Physical Therapy  [facility]  Daily Treatment Note  NAME: Abhinav Hayes  : 1933  MRN: [id number]    Date of Service: 2020    Patient Diagnosis(es): The encounter diagnosis was Uncontrolled type 2 diabetes mellitus with hyperglycemia ([facility] [address] has a past medical history of Anxiety, Depression, Diabetes mellitus ([facility] [address] Frequent urinary tract infections, GERD (gastroesophageal reflux disease), Hypertension, Peripheral neuropathy, and Spinal stenosis. has a past surgical history that includes Cholecystectomy; Skin cancer excision; and Hip fracture surgery (Bilateral). Evaluating PT: Tatum Antwancammie      Room #:  611     Diagnosis: PNA due to COVID   Precautions: fall risk, nectar liquids, bed alarm, droplet plus      PMHx: peripheral neuropathy, HTN, anxiety, depression, diabetes, spinal stenosis  Recommended DME: to be determined     Pt lives with alone, but family provides 24 hour care in shifts per pt report in a single story house with ramped entrance. Bed is on the first floor and bath is on the first floor. Pt ambulated with FWW and assist from family for household distances, uses transport chair for community distances prior to admission.                          Initial Evaluation  Date: 2020 Treatment Date:  2020 Short Term/ Long Term   Goals   AM-PAC 6 Clicks      Was pt agreeable to Eval/treatment? yes  yes      Does pt have pain?  None reported   none reported      Bed Mobility  Rolling: Max A  Supine to sit: Max A x 2    Sit to supine: Max A x 2   Scooting: Max A  x 2  rolling : max assist   Supine to sit : max assist   Sit to supine : max assist x 2   Scooting : max assist x 2  Rolling: Min A  Supine to sit: Mod A  Sit to supine: Mod A  Scooting: Mod  A   Transfers Sit to stand: Max A x2  Stand to sit: Max A x2  Stand pivot: NT  Sit <> stand : max assist of 2 Sit to stand: Mod A  Stand to sit: Mod A  Stand pivot: Mod A    Ambulation   Unable  Unable  TBA   Stair negotiation: NA, pt has ramp to enter/exit her home.   NA   ROM    B LE: AAROM WFL        Strength    B LE: 3- to 3+/ 5        Balance Sitting EOB: min assist   Standing:dep assist    Sitting EOB: SBA  Dynamic standing: ModA with AAD      Patient education  Pt was educated on importance of mobiltyi      Additional Comments/treatment:  Functional mobility as above. Pt sat edge of bed with min assist for sitting balance. Cues to use UE's for support. Seated LAQ and ankle pumps edge of bed. Pt performed sit to stand transfer max assist of 2. Incontinent of bowel upon standing. Pt returned to bed for hygiene. Dependent for hygiene. Sheets changed and pt repositioned in bed. Pt was left in bed with call light left by patient. Time in: 130  Time out: 155    Total treatment time 25 minutes    Pt is making fair progress toward established Physical Therapy goals. Continue with physical therapy current plan of care.     Peter Troy PT [id number]      CPT codes:  [] Low Complexity PT evaluation 03463  [] Moderate Complexity PT evaluation 15033  [] High Complexity PT evaluation 71333  [] PT Re-evaluation 93387  [] Gait training 66685  minutes  [] Manual therapy 22236    minutes  [x] Therapeutic activities 77061  15  minutes  [x] Therapeutic exercises 72305   10  minutes  [] Neuromuscular reeducation 25310     minutes

## 2020-12-04 NOTE — CONSULTS
GENERAL SURGERY  CONSULT NOTE  12/4/2020    Physician Consulted: Dr. Kaleigh Tolentino  Reason for Consult: Sacral wound  Referring Physician: Dr. Denice THOMAS  Esau Bautista is a 80 y.o. female with PMH of DM, GERD, HTN presents for treatment of COVID. She is known to have a small sacral wound and follows with Dr. Johanny Mccarty in wound care clinic. General surgery was called to evaluate for possible need for debridement per wound care staff. It does have a small eschar but there are no signs of infection. There does not seem to be anything warranting debridement while she is an inpatient. Past Medical History:   Diagnosis Date    Anxiety     Depression     Diabetes mellitus (HCC)     Frequent urinary tract infections     GERD (gastroesophageal reflux disease)     Hypertension     Peripheral neuropathy     Spinal stenosis        Past Surgical History:   Procedure Laterality Date    CHOLECYSTECTOMY      HIP FRACTURE SURGERY Bilateral     SKIN CANCER EXCISION         Medications Prior to Admission:    Prior to Admission medications    Medication Sig Start Date End Date Taking?  Authorizing Provider   atorvastatin (LIPITOR) 40 MG tablet Take 40 mg by mouth nightly    Historical Provider, MD   Calcium Carb-Cholecalciferol (CALTRATE 600+D3) 600-800 MG-UNIT TABS Take 1 tablet by mouth 2 times daily    Historical Provider, MD   carvedilol (COREG) 12.5 MG tablet Take 12.5 mg by mouth 2 times daily (with meals)    Historical Provider, MD   diclofenac sodium (VOLTAREN) 1 % GEL Apply 2 g topically 4 times daily as needed for Pain (apply to knees)    Historical Provider, MD   insulin lispro, 1 Unit Dial, (HUMALOG KWIKPEN) 100 UNIT/ML SOPN Inject 0-12 Units into the skin 3 times daily (before meals)    Historical Provider, MD   ipratropium-albuterol (DUONEB) 0.5-2.5 (3) MG/3ML SOLN nebulizer solution Take 1 vial by nebulization every 4 hours as needed for Shortness of Breath    Historical Provider, MD   isosorbide mononitrate (IMDUR) 30 MG extended release tablet Take 30 mg by mouth daily    Historical Provider, MD   lansoprazole (PREVACID) 30 MG delayed release capsule Take 30 mg by mouth daily    Historical Provider, MD   nystatin (MYCOSTATIN) 386099 UNIT/GM powder Apply 1 each topically 2 times daily Apply to abdominal folds    Historical Provider, MD   Omega-3 Fatty Acids (FISH OIL) 1000 MG CAPS Take 1,000 mg by mouth 2 times daily    Historical Provider, MD   gabapentin (NEURONTIN) 300 MG capsule Take 2 capsules by mouth 3 times daily for 30 days. 11/9/20 12/9/20  Juan Jose Lane,    apixaban (ELIQUIS) 5 MG TABS tablet Take 1 tablet by mouth 2 times daily 11/4/20 5/3/21  Juan Jose Lane,    nitroGLYCERIN (NITROSTAT) 0.4 MG SL tablet Place 1 tablet under the tongue every 5 minutes as needed for Chest pain up to max of 3 total doses. If no relief after 1 dose, call 911. 9/28/20   Beatriz Lane, DO   montelukast (SINGULAIR) 10 MG tablet Take 1 tablet by mouth nightly 9/3/20   Juan Jose Baugh,    busPIRone (BUSPAR) 5 MG tablet Take 1 tablet by mouth daily 9/3/20   Ericka Meckel,    sertraline (ZOLOFT) 100 MG tablet Take 1.5 tablets by mouth daily 6/11/20 12/8/20  Beatriz Lane DO   aspirin 81 MG chewable tablet Take 1 tablet by mouth daily 1/14/20   Marco Begum MD   Multiple Vitamins-Minerals (THERAPEUTIC MULTIVITAMIN-MINERALS) tablet Take 1 tablet by mouth daily    Historical Provider, MD   acetaminophen (TYLENOL) 325 MG tablet Take 650 mg by mouth every 6 hours as needed for Pain    Historical Provider, MD   fluticasone (FLONASE) 50 MCG/ACT nasal spray 1 spray by Nasal route daily as needed for Rhinitis     Historical Provider, MD   mineral oil-hydrophilic petrolatum (HYDROPHOR) ointment Apply 1 g topically daily Apply to both feet    Historical Provider, MD       No Known Allergies    No family history on file.     Social History     Tobacco Use    Smoking status: Never Smoker  Smokeless tobacco: Never Used   Substance Use Topics    Alcohol use: No    Drug use: No         Review of Systems   General ROS: negative  Hematological and Lymphatic ROS: negative  Respiratory ROS: negative  Cardiovascular ROS: negative  Gastrointestinal ROS: negative  Genito-Urinary ROS: negative  Musculoskeletal ROS: negative      PHYSICAL EXAM:    Vitals:    12/04/20 0600   BP:    Pulse:    Resp:    Temp:    SpO2: 93%       General Appearance:  awake, alert, oriented, in no acute distress  Skin:  Skin color, texture, turgor normal. Small sacral wound with area of eschar, no active signs of infection  Head/face:  NCAT  Eyes:  No gross abnormalities. Lungs:  Normal expansion. Heart:  Heart regular rate and rhythm  Abdomen:  Soft, non-tender, non-distended  Extremities: Extremities warm to touch, pink, with no edema. LABS:    CBC  Recent Labs     12/04/20  0423   WBC 7.3   HGB 10.6*   HCT 31.5*        BMP  Recent Labs     12/04/20  0423      K 5.2*      CO2 26   BUN 17   CREATININE 1.0   CALCIUM 8.9     Liver Function  No results for input(s): AMYLASE, LIPASE, BILITOT, BILIDIR, AST, ALT, ALKPHOS, PROT, LABALBU in the last 72 hours. No results for input(s): LACTATE in the last 72 hours. No results for input(s): INR, PTT in the last 72 hours. Invalid input(s): PT    RADIOLOGY    No results found.       ASSESSMENT:  80 y.o. female with COVID and known sacral wound    PLAN:  - No need for debridement while inpatient, she can follow up with Dr. Arleen Chaney in wound clinic  - Surgery will sign off, please call if needed    Discussed with Dr. Biggs Hearing    Electronically signed by Lauren Panda MD on 12/4/20 at 7:37 AM EST

## 2020-12-04 NOTE — PROGRESS NOTES
Occupational Therapy  OT BEDSIDE TREATMENT NOTE      Date:2020  Patient Name: Yamil Garcia  MRN: [id number]  : 1933  Room: [address] [place] [address]-A     Referring Sania Naranjo MD      Evaluating OT: Adan Ester OTR/L [id number]     AM-PAC Daily Activity Raw Score:      Recommended Adaptive Equipment: TBD     Diagnosis: Pneumonia. Pt presents to ED from home with SOB, cough and worsening sacral decubitus. Pt recent discharge from hospital d/t COVID home with family support.      Pertinent Medical History: HTN, DM, spinal stenosis, anxiety, peripheral neuropathy   Precautions:  Falls, droplet plus isolation COVID +, O2, sacral decubitus     Home Living: Pt is a questionable historian. Reports she has been at home with family assist. Pt reports she does walk at home. Family assist in all ADLs.       Pain Level: discomfort at buttocks, positional devices placed to offload upon completion of session     Cognition: A&O: 3-. Pt is alert and oriented but easily confused and slow processing              Problem solving:  poor              Judgement/safety:  poor                Functional Assessment:    Initial Eval Status  Date: 20 Treatment session:   20 Short Term Goals      Feeding Min A  For lunch tray of hand held sandwich and peaches  Min A  Hold glass to drink Set up   Grooming Min A  Wiping off face  Mod A  Washing face and hands  Combing hair Set up   UB Dressing Max A  Management of hospital gown  Max A  Donning/doffing hospital gown Min A   LB Dressing Dependent  Management of hospital socks  Dependent  Donning/doffing socks Max A    Bathing Max A   Mod A   Toileting Dependent  Incontinent of urine total assist in rajesh care  Dependent  Incontinent of urine standing at EOB total dependence in rajesh care  Pt becomes incontinent of BM requiring return to supine and bed level change Mod A   Bed Mobility  Supine <> sit: Max A x2  Scooting: Max A x2  Rolling:  Max A  Supine <> Sit: Max A x2  Rolling: Max A     Functional Transfers STS: Max A x2  Blocking at B feet  STS: Max A x2   blocking at B feet Mod A   Functional Mobility Unable to complete      Balance Sitting: poor to poor plus seated EOB  Sitting brittny x7 min     Standing: poor  Sitting: fair/fair minus seated EOB x8 min      Standing: Poor  Standing brittny x1 trial 30 seconds     Activity Tolerance Poor  2L O2: 92% Poor  1L O2 no SOB throughout session    Poor activity tolerance for functional tasks  sitting brittny x10-15 min with fair plus balance during self care tasks      Education: Patient educated on techniques for completion of ADL, safe functional transfers and functional mobility. Patient required cues for follow through with proper hand/foot placement, pacing, safety, attention, sequencing and technique in bed mobility, functional transfers, UB dressing, toileting, grooming, self feeding and LB dressing in preparation for maximum independence in all self care tasks.     Comments: Upon arrival pt supine in bed. At end of session supine in bed all lines and tubes intact, call light and phone within reach.  Bed/chair alarm: ON     · Pt has made limited progress towards set goals.      Time In: 1331  Time Out: 1356       Min Units   Therapeutic Ex 30830       Therapeutic Activities 68941 5     ADL/Self Care 96712 19 2   Orthotic Management 61779       Neuro Re-Ed [id number]       TOTAL TIMED TREATMENT 24 2         Mildred Lawler OTR/L  [id number]

## 2020-12-04 NOTE — PROGRESS NOTES
[facility] Progress Note    Admitting Date and Time: 11/29/2020  9:00 PM  Admit Dx: Pneumonia due to COVID-19 virus [U07.1, J12.89]    Subjective:  Patient is being followed for Pneumonia due to COVID-19 virus [U07.1, J12.89]     Patient awake, alert, sitting up in bed in no acute distress  Reporting not hungry  Denies sob  Currently on RA  Moist cough      ROS: denies fever, chills, cp, sob, n/v, HA unless stated above.       collagenase   Topical Daily    insulin lispro  0-6 Units Subcutaneous TID WC    insulin lispro  0-3 Units Subcutaneous Nightly    white petrolatum   Topical BID    therapeutic multivitamin-minerals  1 tablet Oral Daily    sertraline  150 mg Oral Daily    montelukast  10 mg Oral Nightly    busPIRone  5 mg Oral Daily    gabapentin  600 mg Oral TID    atorvastatin  40 mg Oral Nightly    calcium-vitamin D   Oral BID    carvedilol  12.5 mg Oral BID WC    isosorbide mononitrate  30 mg Oral Daily    pantoprazole  40 mg Oral QAM AC    sodium chloride flush  10 mL Intravenous 2 times per day    cefTRIAXone (ROCEPHIN) IV  1 g Intravenous Q24H    azithromycin  500 mg Intravenous Q24H     fluticasone, 1 spray, Daily PRN  nitroGLYCERIN, 0.4 mg, Q5 Min PRN  albuterol-ipratropium, 1 puff, Q4H PRN  sodium chloride flush, 10 mL, PRN  acetaminophen, 650 mg, Q6H PRN    Or  acetaminophen, 650 mg, Q6H PRN  polyethylene glycol, 17 g, Daily PRN  benzonatate, 200 mg, TID PRN         Objective:    BP (!) 143/70   Pulse 80   Temp 97.7 °F (36.5 °C) (Oral)   Resp 17   Ht 5' (1.524 m)   Wt 170 lb (77.1 kg)   LMP  (LMP Unknown)   SpO2 93%   BMI 33.20 kg/m²   General Appearance: alert and oriented to person, place and time and in no acute distress  Skin: warm and dry  Head: normocephalic and atraumatic  Neck: neck supple and non tender without mass   Pulmonary/Chest: diminished throughout to auscultation   Cardiovascular: normal rate, normal S1 and S2 and no carotid bruits  Abdomen: soft, non-tender, non-distended, normal bowel sounds, no masses or organomegaly  Extremities: no cyanosis, no clubbing and no edema  Neurologic: speech normal          Recent Labs     12/02/20  0640 12/03/20  0635 12/04/20  0423    137 137   K 3.9 3.7 5.2*    101 100   CO2 26 27 26   BUN 13 16 17   CREATININE 0.9 1.1* 1.0   GLUCOSE 176* 166* 152*   CALCIUM 9.1 9.1 8.9       Recent Labs     12/02/20  0640 12/03/20  0635 12/04/20  0423   WBC 5.3 6.3 7.3   RBC 3.39* 3.22* 3.13*   HGB 11.4* 10.6* 10.6*   HCT 33.1* 32.2* 31.5*   MCV 97.6 100.0* 100.6*   MCH 33.6 32.9 33.9   MCHC 34.4 32.9 33.7   RDW 12.6 12.7 13.1    135 194   MPV 9.2 9.2 9.9         Assessment:    Active Problems:    Pneumonia due to COVID-19 virus    Uncontrolled type 2 diabetes mellitus with hyperglycemia (HCC)  Resolved Problems:    * No resolved hospital problems. *      Plan:  1. Acute hypoxic respiratory failure related to + COVID- 19: pt presented to the ER with complaints of cough. She was recently discharged on 11/20 following hospitalization at [facility]. She was given remdesivir at that time and was started on steroid taper. She returns with cough/ weakness. CXR reviewed. She was stated on abx for possible superimposed bacterial pneumonia. procal neg. Strep / legionella neg. Consult speech for possible worsening dysphagia. + cough while eating- on nectar thick liquids at baseline. CXR appears improved.     2. COVID 19 viral pneumonia: s/p remdesivir and decadron     3. PHILIP: holding lasix/ lisinopril. Creatinine 1.5 on admission- creatinine 1.1 today     4. HTN: continue coreg/imdur. Holding ace     5. Decubitus ulcer: wound care on board: per nursing black escar noted and per nursing may need debrided. Consult surgery. Per daughter she had been following with Dr. Arlette Perdomo had been healing. Per surgery no need for debridement- will follow with Dr. Kyung Cameron  6.  Chronic anticoagulation: on eliquis     7. Dysphagia; on nectar thick liquids- consult speech pt noted to be weak- coughing moderate amount with eating. Speech consulted and appreciate input.      8. Deconditioning: am pac 8        Spent greater than 15-20 minutes discussing plan of care with daughter. Daughter adamately refusing REESE. Per daughter she googled REESE choices that were given and she did not like the malpractice issues that were listed. She agrees that pt is weak and would benefit from REESE- however would like pt to return home. Discussed concern of weakness that pt will not do well at home and could possible have worsening condition or need to return to hospital.       Plan for likely dc home in 24 hours. NOTE: This report was transcribed using voice recognition software. Every effort was made to ensure accuracy; however, inadvertent computerized transcription errors may be present. Electronically signed by SAMANTA Camacho on 12/4/2020 at 4:23 PM  HOSPITALIST ATTENDING PHYSICIAN NOTE 12/4/2020 2211PM:    Details of the evaluation - subjective assessment (including medication profile, past medical, family and social history when applicable), examination, review of lab and test data, diagnostic impressions and medical decision making - performed by SAMANTA Camacho, were discussed with me on the date of service and I agree with clinical information herein unless otherwise noted. The patient has been evaluated by me personally earlier today. Pt reports no fevers, chills,n/v.     Exam: heart reg at rate of 84,lungs cta, abd pos bs soft nt,ext neg for le edema    I agree with the assessment and plan of SAMANTA Camacho. Acute respiratory failure with hypoxia  Pneumonia due to covid 19  katlin  htn  Dm type 2 controlled  gerd  depression  Decubitus ulcer  Dysphagia  deconditioning      Electronically signed by Maureen Osuna D.O.   Hospitalist  [facility] Hospitalist Service at [facility]

## 2020-12-04 NOTE — CARE COORDINATION
CM NOTE: Per QFR with PCP, SW & pt's nurse---- although pt can go to [facility]---daughter Latrice Van continues to decline all rehab choices offered & stated pt was returning home with home care & therapy when ready for discharge. PCP noted surgery does not plan to debride wound during this hospital stay. States pt having difficulty swallowing & poor po intake. Speech consult for swallow eval today. Await results & further TX plan. UPON DISCHARGE HOME----WILL NEED C[facility]I ORDERS FOR [facility]. CM & SW continue to follow pt.

## 2020-12-04 NOTE — PROGRESS NOTES
SPEECH/LANGUAGE PATHOLOGY  CLINICAL ASSESSMENT OF SWALLOWING FUNCTION    PATIENT NAME:  Roxanne Arce      :  1933      TODAY'S DATE:  2020  ROOM:  [facility]/[id number]-    SUMMARY OF EVALUATION    Chart reviewed and discussed with RN. Pt on NTL prior to admit. Pt reports she was also told to not use straws. DYSPHAGIA DIAGNOSIS:  Oropharyngeal dysphagia-wet cough noted before, during and after eval. Difficult to assess bedside. However no immediate cough or change in vocal quality noted post swallow. Thin liquid was not assessed due to hx of aspiration. DIET RECOMMENDATIONS:  Dysphagia 2,  Mechanical Soft (Minced & Moist) solids with  nectar consistency (mildly thick) liquids as tolerated-NO STRAWS    If change in respiratory status occurs and pt requiring significant increase in oxygen needs please notify SLP to reassess toleration of current diet. FEEDING RECOMMENDATIONS:     Assistance level:  Stand by assistance is needed during all oral intake      Compensatory strategies recommended: Small bites/sips and No straw    THERAPY RECOMMENDATIONS:      Pt will tolerate the least restrictive PO diet to maintain adequate nutrition/ hydration via use of safe swallow/ compensatory strategies. SLP to follow-up with nursing and/or patient to ensure diet tolerance and adjust as needed. PROCEDURE     Consistencies Administered During the Evaluation   Liquids: nectar thick liquid   Solids:  pureed foods and solid foods      Method of Intake:   cup, spoon  Self fed, Fed by clinician      Position:   Seated, upright                  RESULTS     Oral Stage:         Decreased mastication due to:  weakness and Delayed A-P transit due to: reduced lingual strength       Pharyngeal Stage:      Wet cough noted before, during and after eval. Difficult to assess bedside. However no immediate cough or change in vocal quality noted post swallow.                          The Speech Language Pathologist (SLP) completed education with the patient regarding results of evaluation. Explained that Speech Pathology intervention is warranted  at this time   Prognosis for improvements is fair +     This plan will be re-evaluated and revised in 1 week  if warranted. Patient stated goals: Agreed with above,   Treatment goals discussed with Patient   The Patient understand(s) the diagnosis, prognosis and plan of care         INTERVENTION/EDUCATION    Pt educated on above results and plan of care. Pt trained on compensatory strategies for safe swallow with good outcome. Pt encouraged to engage in question/answer session. All questions answered and pt verbalized understanding of above. CPT code:  17947  bedside swallow eval, 03227 dysphagia therapy 15 mins      [x]The admitting diagnosis and active problem list, as listed below have been reviewed prior to initiation of this evaluation. ADMITTING DIAGNOSIS: Pneumonia due to COVID-19 virus [U07.1, J12.89]     ACTIVE PROBLEM LIST:   Patient Active Problem List   Diagnosis    GERD (gastroesophageal reflux disease)    Type 2 diabetes mellitus with hyperglycemia, with long-term current use of insulin ([facility]    CKD (chronic kidney disease) stage 3, GFR 30-59 ml/min ([facility])    Bacteriuria with pyuria    Essential hypertension    History of pulmonary embolus (PE)    Chest pain    Acute respiratory failure with hypoxia ([facility])    Acute kidney injury superimposed on CKD ([address]    HCAP (healthcare-associated pneumonia)    Obesity (BMI 30-39. 9)    Pressure injury of sacral region, stage 2 ([address]    COVID-19    Hyperkalemia    Anxiety and depression    Pneumonia due to COVID-19 virus    Uncontrolled type 2 diabetes mellitus with hyperglycemia ([address]

## 2020-12-05 NOTE — PLAN OF CARE
Problem: Airway Clearance - Ineffective  Goal: Achieve or maintain patent airway  Outcome: Completed     Problem: Gas Exchange - Impaired  Goal: Absence of hypoxia  Outcome: Completed  Goal: Promote optimal lung function  Outcome: Completed     Problem: Breathing Pattern - Ineffective  Goal: Ability to achieve and maintain a regular respiratory rate  Outcome: Completed     Problem:  Body Temperature -  Risk of, Imbalanced  Goal: Ability to maintain a body temperature within defined limits  Outcome: Completed  Goal: Will regain or maintain usual level of consciousness  Outcome: Completed  Goal: Complications related to the disease process, condition or treatment will be avoided or minimized  Outcome: Completed     Problem: Isolation Precautions - Risk of Spread of Infection  Goal: Prevent transmission of infection  Outcome: Completed     Problem: Nutrition Deficits  Goal: Optimize nutrtional status  Outcome: Completed     Problem: Risk for Fluid Volume Deficit  Goal: Maintain normal heart rhythm  Outcome: Completed  Goal: Maintain absence of muscle cramping  Outcome: Completed  Goal: Maintain normal serum potassium, sodium, calcium, phosphorus, and pH  Outcome: Completed     Problem: Loneliness or Risk for Loneliness  Goal: Demonstrate positive use of time alone when socialization is not possible  Outcome: Completed     Problem: Fatigue  Goal: Verbalize increase energy and improved vitality  Outcome: Completed     Problem: Patient Education: Go to Patient Education Activity  Goal: Patient/Family Education  Outcome: Completed     Problem: Skin Integrity:  Goal: Will show no infection signs and symptoms  Outcome: Completed  Goal: Absence of new skin breakdown  Outcome: Completed     Problem: Falls - Risk of:  Goal: Will remain free from falls  Outcome: Completed  Goal: Absence of physical injury  Outcome: Completed     Problem: Pain:  Goal: Pain level will decrease  Outcome: Completed  Goal: Control of acute

## 2020-12-05 NOTE — PROGRESS NOTES
[facility], patient being [address] notified them patient has orders to resume homecare. The oncall RN stated patient will show on their census tomorrow and they will arrange to meet with patient at home. Called [facility] to  the patient to take her home. Will call them back with the logistics in the home for transport.     Rosa Mcnulty RN

## 2020-12-05 NOTE — CONSULTS
Nutrition Note    Consult noted for family requesting Cristobal supplement. Due to current diet order of Mildly Thick (nectar) liquids, unable to order Cristobal/Ensure. Will continue Boost pudding as ordered.      Electronically signed by Lexie Hernandez MS, RD, LD on 12/5/20 at 8:46 AM EST    Contact: [phone]

## 2020-12-05 NOTE — PROGRESS NOTES
Called [facility] to arrange  time will be 2030. notified daughter of patients departure time. Daughter stated she is worried about how patient will be managed at home since her PT scores were low and pt. Does not walk. Daughter stated they have a 30 day window with [facility] to bring patient back to the hospital if she cannot be managed at home. Daughter remarked about the wound on the patients buttox, and what care will be needed. I told her [facility] will contact them tomorrow. Daughter stated she is worried because patient still sounds congested and is worried about managing her at home. Daughter did not want rehab because her choices are unacceptable to her because of COVID her choices are limited. Daughter called the unit back to see what the criterion is to bring her back to the ED. She is concerned about her oxygen level on room air is 92-93%. Dr. Rosalio Fragoso updated on daughters conversation and concerns.     Brina Escalona RN

## 2020-12-05 NOTE — PLAN OF CARE
Problem: Gas Exchange - Impaired  Goal: Absence of hypoxia  Outcome: Met This Shift     Problem: Gas Exchange - Impaired  Goal: Promote optimal lung function  Outcome: Met This Shift     Problem: Breathing Pattern - Ineffective  Goal: Ability to achieve and maintain a regular respiratory rate  Outcome: Met This Shift     Problem:  Body Temperature -  Risk of, Imbalanced  Goal: Ability to maintain a body temperature within defined limits  Outcome: Met This Shift

## 2020-12-05 NOTE — DISCHARGE SUMMARY
[facility] Physician Discharge Summary       Darrel Conley [place], [place]  [address] [facility] [phone]    Call  Call to notify PCP of hospitalization, medications, and follow up. Omaira Schaffer MD  [address] [place] [address]  [phone]      Follow up outpatient regarding pressure ulcer. Activity level: As tolerated     Dispo:HOME with [facility]     Condition on discharge: Stable     Patient ID:  Pj Harden  [id number]  6 y.o.  6/29/1933    Admit date: 11/29/2020    Discharge date and time:  12/5/2020  5:23 PM    Admission Diagnoses: Active Problems:    Pneumonia due to COVID-19 virus    Uncontrolled type 2 diabetes mellitus with hyperglycemia ([facility]  Resolved Problems:    * No resolved hospital problems. *      Discharge Diagnoses: Active Problems:    Pneumonia due to COVID-19 virus    Uncontrolled type 2 diabetes mellitus with hyperglycemia ([facility]  Resolved Problems:    * No resolved hospital problems. *      Consults:  IP CONSULT TO SOCIAL WORK  IP CONSULT TO DIETITIAN  IP CONSULT TO IV TEAM  IP CONSULT TO GENERAL SURGERY  IP CONSULT TO DIETITIAN    Procedures: none    Hospital Course:   Patient Pj Harden is a 80 y.o. presented with Pneumonia due to COVID-19 virus [U07.1, J12.89]   Patient presented to the ER with complaints of cough. Known + COVID 19. She had recently been discharged from [address] [place] [address]. She was treated for;    1.  Acute hypoxic respiratory failure related to + COVID- 19: pt presented to the ER with complaints of cough. She was recently discharged on 11/20 following hospitalization at [address] [place] [address]. She was given remdesivir at that time and was started on steroid taper. She returns with cough/ weakness. CXR reviewed. She was stated on abx for possible superimposed bacterial pneumonia. procal neg. She was on IV ceftriaxone/ IV azithromycin which was transitioned to po at [place] to complete a total of 7 days. Strep / legionella neg. Consult speech for possible worsening dysphagia. + cough while eating- on nectar thick liquids at baseline- continue modified diet. CXR appears improved. Currently on RA with no hypoxia. Unable to ambulate to check pulse ox.     2. COVID 19 viral pneumonia: s/p remdesivir and decadron     3. PHILIP: holding lasix/ lisinopril. Holding ace an lasix. Repeat bmp pending prior to dc.     4. HTN: continue coreg/imdur. Holding ace     5. Decubitus ulcer: wound care on board: per nursing black escar noted and per nursing may need debrided. Consult surgery. Per daughter she had been following with Dr. Kim Ready had been healing. Per surgery no need for debridement- will follow with Dr. Nel Pike  6. Chronic anticoagulation: on eliquis     7. Dysphagia; on nectar thick liquids- consult speech pt noted to be weak- coughing moderate amount with eating. Speech consulted and appreciate input.      8. Deconditioning: am pac 8      Patient's daughter knows pt continues to be weak/ deconditioned. Am pac score is 8. Pt not able to ambulate and needing moderate/ max assistance. Daughter reporting she does not want her to go to [facility] and has equipment at home to take care of her. Patient continued to improve- however still weak. Appetite better with encouragement. Discharged in stable condition with the following medications, instructions, and follow up. Discharge Exam:  General Appearance: alert and oriented to person, place and time and in no acute distress  Skin: warm and dry  Head: normocephalic and atraumatic  Neck: neck supple and non tender without mass   Pulmonary/Chest: diminished throughout to auscultation   Cardiovascular: normal rate, normal S1 and S2 and no carotid bruits  Abdomen: soft, non-tender, non-distended, normal bowel sounds, no masses or organomegaly  Extremities: no cyanosis, no clubbing and no edema  Neurologic: speech normal      I/O last 3 completed shifts:   In: 0445 [P.O.:540; IV Piggyback:500]  Out: -   No intake/output data recorded. LABS:  Recent Labs     12/03/20 0635 12/04/20 0423 12/05/20 0620    137 138   K 3.7 5.2* 4.0    100 101   CO2 27 26 27   BUN 16 17 15   CREATININE 1.1* 1.0 1.4*   GLUCOSE 166* 152* 157*   CALCIUM 9.1 8.9 9.1       Recent Labs     12/03/20  0635 12/04/20 0423 12/05/20 0620   WBC 6.3 7.3 6.9   RBC 3.22* 3.13* 3.03*   HGB 10.6* 10.6* 10.1*   HCT 32.2* 31.5* 30.2*   .0* 100.6* 99.7   MCH 32.9 33.9 33.3   MCHC 32.9 33.7 33.4   RDW 12.7 13.1 13.0    194 148   MPV 9.2 9.9 9.6       No results for input(s): POCGLU in the last 72 hours. Imaging:  No results found. Patient Instructions:      Medication List      START taking these medications    azithromycin 500 MG tablet  Commonly known as:  Zithromax  Take 1 tablet by mouth daily for 1 day     cefdinir 300 MG capsule  Commonly known as:  OMNICEF  Take 1 capsule by mouth daily for 1 day     collagenase 250 UNIT/GM ointment  Apply topically daily. Start taking on:  December 6, 2020        CONTINUE taking these medications    acetaminophen 325 MG tablet  Commonly known as:  TYLENOL     apixaban 5 MG Tabs tablet  Commonly known as:  Eliquis  Take 1 tablet by mouth 2 times daily     aspirin 81 MG chewable tablet  Take 1 tablet by mouth daily     atorvastatin 40 MG tablet  Commonly known as:  LIPITOR     busPIRone 5 MG tablet  Commonly known as:  BUSPAR  Take 1 tablet by mouth daily     Caltrate 600+D3 600-800 MG-UNIT Tabs  Generic drug:  Calcium Carb-Cholecalciferol     carvedilol 12.5 MG tablet  Commonly known as:  COREG     fish oil 1000 MG Caps     fluticasone 50 MCG/ACT nasal spray  Commonly known as:  FLONASE     gabapentin 300 MG capsule  Commonly known as:  NEURONTIN  Take 2 capsules by mouth 3 times daily for 30 days.      HumaLOG KwikPen 100 UNIT/ML Sopn  Generic drug:  insulin lispro (1 Unit Dial)     ipratropium-albuterol 0.5-2.5 (3) MG/3ML Soln nebulizer solution  Commonly known as:  DUONEB     isosorbide mononitrate 30 MG extended release tablet  Commonly known as:  IMDUR     lansoprazole 30 MG delayed release capsule  Commonly known as:  PREVACID     mineral oil-hydrophilic petrolatum ointment     montelukast 10 MG tablet  Commonly known as:  SINGULAIR  Take 1 tablet by mouth nightly     nitroGLYCERIN 0.4 MG SL tablet  Commonly known as:  Nitrostat  Place 1 tablet under the tongue every 5 minutes as needed for Chest pain up to max of 3 total doses. If no relief after 1 dose, call 911.     nystatin 360581 UNIT/GM powder  Commonly known as:  MYCOSTATIN     sertraline 100 MG tablet  Commonly known as:  ZOLOFT  Take 1.5 tablets by mouth daily     therapeutic multivitamin-minerals tablet     Voltaren 1 % Gel  Generic drug:  diclofenac sodium        STOP taking these medications    Victoza 18 MG/3ML Sopn SC injection  Generic drug:  Liraglutide           Where to Get Your Medications      These medications were sent to [address] [facility]   [facility], [address] [place] [address]    Phone:  [phone]   · azithromycin 500 MG tablet  · cefdinir 300 MG capsule  · collagenase 250 UNIT/GM ointment           Note that more than 30 minutes was spent in preparing discharge papers, discussing discharge with patient, medication review, etc.    Signed:  Electronically signed by SAMANTA Camacho on 12/5/2020 at 5:23 PM   HOSPITALIST ATTENDING PHYSICIAN NOTE 12/5/2020 2006PM:    Details of the evaluation - subjective assessment (including medication profile, past medical, family and social history when applicable), examination, review of lab and test data, diagnostic impressions and medical decision making - performed by SAMANTA Camacho, were discussed with me on the date of service and I agree with clinical information herein unless otherwise noted.     The patient has been evaluated

## 2020-12-06 NOTE — PROGRESS NOTES
Daughter called back again concerned about patient coming home, repeated she will bring her back if breathing worsens. Packed belongings, cell phone, , coffee cup, all creams used on patients skin, will put pull ups on patient for discharge, no other belongings in room at this time.   Juanito Moya RN

## 2020-12-07 NOTE — CARE COORDINATION
Priyanka 45 Transitions Initial Follow Up Call    Call within 2 business days of discharge: Yes    Patient: Dione Mehta Patient : 1933   MRN: [id number]  Reason for Admission: PNA d/t COVID-19  Discharge Date: 20 RARS: Readmission Risk Score: 27      Last Discharge [facility]       Complaint Diagnosis Description Type Department Provider    20  Pneumonia due to COVID-19 virus . .. Admission (Discharged) [facility] 6W Ruddy Marcus,      Challenges to be reviewed by the provider   Additional needs identified to be addressed with provider No  none    Discussed COVID-19 related testing which was available at this time. Test results were positive. Patient informed of results, if available? Yes         Method of communication with provider : none    Advance Care Planning:   Does patient have an Advance Directive:  decision maker updated. Was this a readmission? Yes  Patient stated reason for admission: cough/SOB/weakness  Patients top risk factors for readmission: functional physical ability, medical condition and polypharmacy    Care Transition Nurse (CTN) contacted the family by telephone to perform post hospital discharge assessment. Verified name and  with family as identifiers. Provided introduction to self, and explanation of the CTN role. CTN reviewed discharge instructions, medical action plan and red flags with family who verbalized understanding. Family given an opportunity to ask questions and does not have any further questions or concerns at this time. Were discharge instructions available to patient? Yes. Reviewed appropriate site of care based on symptoms and resources available to patient including: PCP, Specialist, Home health, When to call 911 and Condition related references. The family agrees to contact the PCP office for questions related to their healthcare.      Medication reconciliation was performed with family, who verbalizes understanding of administration of home medications. Advised obtaining a 90-day supply of all daily and as-needed medications. Covid Risk Education    Patient has following risk factors of: pneumonia, acute respiratory failure, diabetes and chronic kidney disease. Education provided regarding infection prevention, and signs and symptoms of COVID-19 and when to seek medical attention with family who verbalized understanding. Discussed exposure protocols and quarantine From CDC: Are you at higher risk for severe illness?   and given an opportunity for questions and concerns. The family agrees to contact the COVID-19 hotline [phone] or PCP office for questions related to COVID-19. For more information on steps you can take to protect yourself, see CDC's How to Protect Yourself     Patient/family/caregiver given information for GetWell Loop and agrees to enroll no  Patient's preferred e-mail: declines  Patient's preferred phone number: declines    Discussed follow-up appointments. If no appointment was previously scheduled, appointment scheduling offered: Yes. Is follow up appointment scheduled within 7 days of discharge? No  Non-[facility] follow up appointment(s): Pt lio Jackson) aware to follow up with PCP and Dr. Billie Austin (Gen Surg)    Plan for follow-up call in 5-7 days based on severity of symptoms and risk factors. Non-face-to-face services provided:  Scheduled appointment with PCP-CTN confirmed with patient lio Jackson) that she is aware to follow up with Dr. Maryuri Hendrickson (PCP)  Scheduled appointment with Specialist-CTN confirmed with patient lio Jackson) that she is aware to follow up with Dr. Billie Austin (Gen Surg) regarding pressure ulcer  Obtained and reviewed discharge summary and/or continuity of care documents  Education of patient/family/caregiver/guardian to support self-management-CTN discussed PNA zone tool/COVID-19 precautions and knowing when to seek medical attention.    Assessment and support for treatment adherence and medication management-Advised for patient to take Zithromax/Cefdinir as directed until finished.,  Establishment or re-establishment of referrals-CTN confirmed with Dennie Dylan that patient is CANDI with [facility] and had nurse isit yesterday. Care Transitions 24 Hour Call    Schedule Follow Up Appointment with PCP:  Declined  Do you have any ongoing symptoms?:  Yes  Patient-reported symptoms:  Shortness of Breath, Cough, Other, Weakness (Comment: draining coccyx wound)  Do you have a copy of your discharge instructions?:  Yes  Do you have all of your prescriptions and are they filled?:  Yes  Have you been contacted by a [facility] Pharmacist?:  No  Have you scheduled your follow up appointment?:  No  Were you discharged with any Home Care or Post Acute Services:  Yes  Post Acute Services:  Home Health (Comment: [facility] )  Do you feel like you have everything you need to keep you well at home?:  Yes  Care Transitions Interventions         Spoke with patient daughter Soumya Moraes) on HIPAA and primary decision maker regarding BPCI-A/hospital discharge follow up. Dennie Bucker verbalizes her displeasure saying her mother was discharged from the hospital too soon. Dennie Dylan aware that she wanted [facility] who would not accept patient based off of therapy evaluation and patient was brought back home. Dennie Bucker states she is a nurse and that her and sister are taking care of patient 24/7 in addition to private caregiver. Dennie Bucker reports patient continues with shortness of breath and utilizing Duoneb as directed. Denies patient being home oxygen and admits she is monitoring pulse ox. Dennie Dylan states patient PAO2 is running between 91%-92% on room air. Denies any chest pain or chest discomfort. States patient having an ongoing cough which is \"about the same\" and no worse. Denies patient having any LE swelling, nausea, vomiting, diarrhea, chills or fever.  States last temperature reading today was 97.9 F. Noted patient tested positive for COVID-19 on 11/16/20. John Gore reports patient having a wound to coccyx which has gotten bigger with this last hospital admission. States wound is draining serosanguinous and providing wound care in addition to applying Collagenase ointment as directed. John Gore states she will follow up with Dr. Vanessa Patel [facility]) who has been following patient for this wound. Johnlogan Gore is aware that Gen Surg decided not to perform debridement to wound while IP. [address] [facility] nurse was out yesterday for visit as she is CANDI. States patient does not walk. States FBS today was 172. Reviewed meds and confirmed patient obtained new meds ordered on discharge. 1111F code entered. Reviewed PNA zone tool/COVID-19 precautions and knowing when to seek medical attention. John Gore receptive to subsequent calls. Will hand off to central staff for continued monitoring. Follow Up  No future appointments. CTN provided contact information for future needs.     KRISTIE Lou

## 2020-12-10 NOTE — TELEPHONE ENCOUNTER
She had acute kidney injury while she was in the hospital and that was the reason her lisinopril and Lasix were held. Let us get BMP and proBNP today or tomorrow then we can decide about lisinopril and Lasix.

## 2020-12-14 NOTE — PROGRESS NOTES
Paul Goodman is a 80 y.o. female. HPI/Chief C/O:  Chief Complaint   Patient presents with    Positive For Covid-19     HFU for Covid. daughter states patient has turned the corner and is starting to feel better. c/o intermittent moist cough, patient is using her nebulizer; profound weakness, PT is evaluating.  Wound Check     on tailbone-worse since hospital stay. Very difficult for patient to get comfortable at bedtime. Patient does have [facility] care coming to the house; needs wound nurse consulted. No Known Allergies  TeleMedicine Video Visit    This visit was performed as a virtual video visit using a synchronous, two-way, audio-video telehealth technology platform. Patient identification was verified at the start of the visit, including the patient's telephone number and physical location. I discussed with the patient the nature of our telehealth visits, that:     Due to the nature of an audio- video modality, the only components of a physical exam that could be done are the elements supported by direct observation. I would evaluate the patient and recommend diagnostics and treatments based on my assessment. If it was felt that the patient should be evaluated in clinic or an emergency room setting, then they would be directed there. Our sessions are not being recorded and that personal health information is protected. Our team would provide follow up care in person if/when the patient needs it. Patient does agree to proceed with telemedicine consultation. Patient's location: home address in [facility]  Physician  location home address in [facility] other people involved in call, is her daughter       Time spent: Greater than 30    This visit was completed virtually using Doxy. me  The patient is here for a medication list and treatment planning review  We will go over our care planning goals as well as take care of all refills  We will set up labs as well She is post hospital for covid pneumonia             Diabetes  She presents for her follow-up diabetic visit. She has type 2 diabetes mellitus. Hypoglycemia symptoms include confusion and nervousness/anxiousness. Pertinent negatives for hypoglycemia include no dizziness, headaches, pallor, seizures, speech difficulty, sweats or tremors. Associated symptoms include fatigue, foot paresthesias, visual change and weakness. Pertinent negatives for diabetes include no blurred vision, no chest pain, no foot ulcerations, no polydipsia, no polyphagia, no polyuria and no weight loss. There are no hypoglycemic complications. Diabetic complications include heart disease, nephropathy and peripheral neuropathy. Pertinent negatives for diabetic complications include no autonomic neuropathy, CVA, PVD or retinopathy. Risk factors for coronary artery disease include obesity, post-menopausal, sedentary lifestyle, stress, hypertension, diabetes mellitus and family history. Current diabetic treatment includes diet and insulin injections. She is compliant with treatment some of the time. She is following a generally unhealthy diet. An ACE inhibitor/angiotensin II receptor blocker is not being taken.    Hypertension This is a chronic problem. The current episode started more than 1 year ago. Associated symptoms include malaise/fatigue, neck pain, peripheral edema and shortness of breath. Pertinent negatives include no anxiety, blurred vision, chest pain, headaches, orthopnea, palpitations, PND or sweats. Risk factors for coronary artery disease include obesity, post-menopausal state, diabetes mellitus and dyslipidemia. Past treatments include lifestyle changes, beta blockers and direct vasodilators. Compliance problems include psychosocial issues, exercise and diet. Hypertensive end-organ damage includes kidney disease and CAD/MI. There is no history of angina, CVA, heart failure, left ventricular hypertrophy, PVD or retinopathy. Identifiable causes of hypertension include chronic renal disease (CKD stage 3). There is no history of coarctation of the aorta, hyperaldosteronism, hypercortisolism, hyperparathyroidism, a hypertension causing med, pheochromocytoma, renovascular disease, sleep apnea or a thyroid problem. ROS:  Review of Systems   Constitutional: Positive for fatigue and malaise/fatigue. Negative for activity change, appetite change, chills, diaphoresis, fever, unexpected weight change and weight loss. HENT: Negative. Negative for congestion, dental problem, drooling, ear discharge, ear pain, facial swelling, hearing loss, mouth sores, nosebleeds, postnasal drip, rhinorrhea, sinus pressure, sinus pain, sneezing, sore throat, tinnitus, trouble swallowing and voice change. Eyes: Negative. Negative for blurred vision, photophobia, pain, discharge, redness, itching and visual disturbance. Respiratory: Positive for shortness of breath. Negative for apnea, cough, choking, chest tightness, wheezing and stridor. Cardiovascular: Positive for leg swelling. Negative for chest pain, palpitations, orthopnea and PND. Pt has hypertension. Gastrointestinal: Negative. Negative for abdominal distention, abdominal pain, anal bleeding, blood in stool, constipation, diarrhea, nausea, rectal pain and vomiting. Endocrine: Negative. Negative for cold intolerance, heat intolerance, polydipsia, polyphagia and polyuria. Genitourinary: Positive for frequency. Negative for decreased urine volume, difficulty urinating, dysuria, enuresis, flank pain, genital sores, hematuria and urgency. Musculoskeletal: Positive for arthralgias, back pain, gait problem, myalgias and neck pain. Negative for joint swelling and neck stiffness. Skin: Negative for color change, pallor, rash and wound. She has a deep skin ulcer    Allergic/Immunologic: Negative. Negative for environmental allergies, food allergies and immunocompromised state. Neurological: Positive for weakness and numbness. Negative for dizziness, tremors, seizures, syncope, facial asymmetry, speech difficulty, light-headedness and headaches. Hematological: Negative for adenopathy. Bruises/bleeds easily. Pt has type 2 DM. Psychiatric/Behavioral: Positive for confusion, decreased concentration and dysphoric mood. Negative for agitation, behavioral problems, hallucinations, self-injury, sleep disturbance and suicidal ideas. The patient is nervous/anxious. The patient is not hyperactive. Past Medical/Surgical Hx;  Reviewed with patient      Diagnosis Date    Anxiety     Depression     Diabetes mellitus ([facility] [address]     Frequent urinary tract infections     GERD (gastroesophageal reflux disease)     Hypertension     Peripheral neuropathy     Spinal stenosis      Past Surgical History:   Procedure Laterality Date    CHOLECYSTECTOMY      HIP FRACTURE SURGERY Bilateral     SKIN CANCER EXCISION         Past Family Hx:  Reviewed with patient  History reviewed. No pertinent family history.     Social Hx:  Reviewed with patient  Social History     Tobacco Use  Calcium Carb-Cholecalciferol (CALTRATE 600+D3) 600-800 MG-UNIT TABS Take 1 tablet by mouth 2 times daily      carvedilol (COREG) 12.5 MG tablet Take 12.5 mg by mouth 2 times daily (with meals)      diclofenac sodium (VOLTAREN) 1 % GEL Apply 2 g topically 4 times daily as needed for Pain (apply to knees)      insulin lispro, 1 Unit Dial, (HUMALOG KWIKPEN) 100 UNIT/ML SOPN Inject 0-12 Units into the skin 3 times daily (before meals)      ipratropium-albuterol (DUONEB) 0.5-2.5 (3) MG/3ML SOLN nebulizer solution Take 1 vial by nebulization every 4 hours as needed for Shortness of Breath      isosorbide mononitrate (IMDUR) 30 MG extended release tablet Take 30 mg by mouth daily      lansoprazole (PREVACID) 30 MG delayed release capsule Take 30 mg by mouth daily      nystatin (MYCOSTATIN) 264068 UNIT/GM powder Apply 1 each topically 2 times daily Apply to abdominal folds      Omega-3 Fatty Acids (FISH OIL) 1000 MG CAPS Take 1,000 mg by mouth 2 times daily      apixaban (ELIQUIS) 5 MG TABS tablet Take 1 tablet by mouth 2 times daily 60 tablet 5    nitroGLYCERIN (NITROSTAT) 0.4 MG SL tablet Place 1 tablet under the tongue every 5 minutes as needed for Chest pain up to max of 3 total doses.  If no relief after 1 dose, call 911. 25 tablet 3    montelukast (SINGULAIR) 10 MG tablet Take 1 tablet by mouth nightly 90 tablet 1    busPIRone (BUSPAR) 5 MG tablet Take 1 tablet by mouth daily 90 tablet 1    aspirin 81 MG chewable tablet Take 1 tablet by mouth daily 30 tablet 0    Multiple Vitamins-Minerals (THERAPEUTIC MULTIVITAMIN-MINERALS) tablet Take 1 tablet by mouth daily      acetaminophen (TYLENOL) 325 MG tablet Take 650 mg by mouth every 6 hours as needed for Pain      fluticasone (FLONASE) 50 MCG/ACT nasal spray 1 spray by Nasal route daily as needed for Rhinitis       mineral oil-hydrophilic petrolatum (HYDROPHOR) ointment Apply 1 g topically daily Apply to both feet  gabapentin (NEURONTIN) 300 MG capsule Take 2 capsules by mouth 3 times daily for 30 days. 180 capsule 0    sertraline (ZOLOFT) 100 MG tablet Take 1.5 tablets by mouth daily 135 tablet 1     No facility-administered encounter medications on file as of 12/14/2020. No follow-ups on file.         Reviewed recent labs related to Grace's current problems      Discussed importance of regular Health Maintenance follow up  Health Maintenance   Topic    Shingles Vaccine (1 of 2)    Annual Wellness Visit (AWV)     Lipid screen     Potassium monitoring     Creatinine monitoring     DTaP/Tdap/Td vaccine (2 - Td)    Flu vaccine     Pneumococcal 65+ years Vaccine     Hepatitis A vaccine     Hib vaccine     Meningococcal (ACWY) vaccine

## 2020-12-14 NOTE — TELEPHONE ENCOUNTER
She can start taking Lasix 20 mg p.o. daily and recheck BMP in 1 week. Hold lisinopril for now. Kidney functions are better than before.

## 2020-12-15 NOTE — TELEPHONE ENCOUNTER
Patient's daughter notified of Dr. Shantanu Gil recommendations. Patient already has Lasix at home.   Order for [facility] faxed to [facility] [phone]

## 2020-12-15 NOTE — TELEPHONE ENCOUNTER
Patient called the clinical care line requesting medication refill. Chart reviewed and medication sent to the pharmacy.   Electronically signed by Reva Mancilla on 12/15/2020 at 4:11 PM

## 2020-12-15 NOTE — TELEPHONE ENCOUNTER
Verbal order given to [facility] to consult wound nurse.   Electronically signed by Reva Mancilla on 12/15/2020 at 1:07 PM

## 2020-12-18 NOTE — TELEPHONE ENCOUNTER
Leonardo Ron from home health called in she said patient needs a low air loss mattress and she needs to schedule pt a office visit. The pt is coming in 12/23/20 at 10:30 and she asked if we can fax a script and the office note saying she needs the mattress to [facility] at [phone]. Pt has a stage 3 coccyx wound and would benefit from the mattress.

## 2020-12-18 NOTE — TELEPHONE ENCOUNTER
Cristobal nutritional supplement sent to the pharmacy.    Electronically signed by Alirio Amanda on 12/18/2020 at 11:37 AM

## 2020-12-22 NOTE — CARE COORDINATION
Priyanka 45 Transitions Follow Up Call    2020    Patient: Dione Mehta  Patient : 1933   MRN: [id number]  Reason for Admission:   Discharge Date: 20 RARS: Readmission Risk Score: 27         Spoke with: Patient's daughter/caregiver, Karl Aragon. Karl Aragon is providing patient update on the Subsequent call. 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 -reports patient is turning the corner   -denies patient with fever/chills  -patient had appointment with PCP yesterday   -patient to start on Cristobal nutritional supplement     Emotional support provided; discussed will continue to follow.      Follow Up  Future Appointments   Date Time Provider Kacy Parish   2021  1:00 PM Rosalie Kaur MD [facility], [place]

## 2020-12-23 NOTE — TELEPHONE ENCOUNTER
Patient's daughter notified of lab results and Dr. Machuca's recommendation. Chart amended to add Lisinopril, pt has already at home.   Order for [facility] faxed to [facility] [phone]

## 2020-12-26 PROBLEM — S31.000A SACRAL WOUND: Status: ACTIVE | Noted: 2020-01-01

## 2020-12-26 PROBLEM — L89.153 PRESSURE INJURY OF SACRAL REGION, STAGE 3 (HCC): Status: ACTIVE | Noted: 2020-01-01

## 2020-12-27 NOTE — PROGRESS NOTES
Lamin García is a 80 y.o. female. HPI/Chief C/O:wound care   Chief Complaint   Patient presents with    Orders     Patient needs low air loss mattress (face to face visit requirement.)    Wound Check     Ccoccyx wound - patient is being seen by wound care and [facility] care.  Leg Swelling     Water retention    Discuss Medications     Victoza was d/c'd in hospital     No Known Allergies  The patient is here for a medication list and treatment planning review  We will go over our care planning goals as well as take care of all refills  We will set up labs as well   She is having skin breakdown to the buttock   This 80year old female presents with stage 3 sacral, coccynx wound and needs a low air loss mattress. Pt follows with wound clinic. Diabetes  She presents for her follow-up diabetic visit. She has type 2 diabetes mellitus. Hypoglycemia symptoms include confusion and nervousness/anxiousness. Pertinent negatives for hypoglycemia include no dizziness, headaches, pallor, seizures, speech difficulty, sweats or tremors. Associated symptoms include fatigue, foot paresthesias, visual change and weakness. Pertinent negatives for diabetes include no blurred vision, no chest pain, no foot ulcerations, no polydipsia, no polyphagia, no polyuria and no weight loss. There are no hypoglycemic complications. Diabetic complications include heart disease, nephropathy and peripheral neuropathy. Pertinent negatives for diabetic complications include no autonomic neuropathy, CVA, PVD or retinopathy. Risk factors for coronary artery disease include obesity, post-menopausal, sedentary lifestyle, stress, hypertension, diabetes mellitus and family history. Current diabetic treatment includes diet and insulin injections (Victoza). She is compliant with treatment some of the time. She is following a generally unhealthy diet. An ACE inhibitor/angiotensin II receptor blocker is being taken.    Hypertension This is a chronic problem. The current episode started more than 1 year ago. Associated symptoms include malaise/fatigue, neck pain, peripheral edema and shortness of breath. Pertinent negatives include no anxiety, blurred vision, chest pain, headaches, orthopnea, palpitations, PND or sweats. Risk factors for coronary artery disease include obesity, post-menopausal state, diabetes mellitus and dyslipidemia. Past treatments include lifestyle changes, diuretics, beta blockers, ACE inhibitors and direct vasodilators. The current treatment provides significant improvement. Compliance problems include psychosocial issues, exercise and diet. Hypertensive end-organ damage includes kidney disease and CAD/MI. There is no history of angina, CVA, heart failure, left ventricular hypertrophy, PVD or retinopathy. Identifiable causes of hypertension include chronic renal disease (CKD stage 3). There is no history of coarctation of the aorta, hyperaldosteronism, hypercortisolism, hyperparathyroidism, a hypertension causing med, pheochromocytoma, renovascular disease, sleep apnea or a thyroid problem. Wound Check        ROS:  Review of Systems   Constitutional: Positive for fatigue and malaise/fatigue. Negative for activity change, appetite change, chills, diaphoresis, fever, unexpected weight change and weight loss. HENT: Negative. Negative for congestion, dental problem, drooling, ear discharge, ear pain, facial swelling, hearing loss, mouth sores, nosebleeds, postnasal drip, rhinorrhea, sinus pressure, sinus pain, sneezing, sore throat, tinnitus, trouble swallowing and voice change. Eyes: Negative. Negative for blurred vision, photophobia, pain, discharge, redness, itching and visual disturbance. Respiratory: Positive for shortness of breath. Negative for apnea, cough, choking, chest tightness, wheezing and stridor. Cardiovascular: Positive for leg swelling. Negative for chest pain, palpitations, orthopnea and PND. Pt has hypertension. Gastrointestinal: Negative. Negative for abdominal distention, abdominal pain, anal bleeding, blood in stool, constipation, diarrhea, nausea, rectal pain and vomiting. Endocrine: Negative. Negative for cold intolerance, heat intolerance, polydipsia, polyphagia and polyuria. Genitourinary: Positive for frequency. Negative for decreased urine volume, difficulty urinating, dysuria, enuresis, flank pain, genital sores, hematuria and urgency. Musculoskeletal: Positive for arthralgias, back pain, gait problem, myalgias and neck pain. Negative for joint swelling and neck stiffness. Skin: Positive for wound. Negative for color change, pallor and rash. Wound on gluteal cleft to both left and right cheeks   Allergic/Immunologic: Negative. Negative for environmental allergies, food allergies and immunocompromised state. Neurological: Positive for weakness and numbness. Negative for dizziness, tremors, seizures, syncope, facial asymmetry, speech difficulty, light-headedness and headaches. Hematological: Negative for adenopathy. Bruises/bleeds easily. Pt has type 2 DM. Psychiatric/Behavioral: Positive for confusion. Negative for agitation, behavioral problems, decreased concentration, dysphoric mood, hallucinations, self-injury, sleep disturbance and suicidal ideas. The patient is nervous/anxious. The patient is not hyperactive.          Past Medical/Surgical Hx;  Reviewed with patient      Diagnosis Date    Anxiety     Depression     Diabetes mellitus ([facility] [address]     Frequent urinary tract infections     GERD (gastroesophageal reflux disease)     Hypertension     Peripheral neuropathy     Spinal stenosis      Past Surgical History:   Procedure Laterality Date    CHOLECYSTECTOMY      HIP FRACTURE SURGERY Bilateral     SKIN CANCER EXCISION         Past Family Hx: Reviewed with patient  History reviewed. No pertinent family history. Social Hx:  Reviewed with patient  Social History     Tobacco Use    Smoking status: Never Smoker    Smokeless tobacco: Never Used   Substance Use Topics    Alcohol use: No       OBJECTIVE  BP (!) 92/48 (Site: Right Upper Arm, Position: Sitting, Cuff Size: Large Adult)   Pulse 85   Temp 98 °F (36.7 °C) (Temporal)   Resp 16   Ht 5' (1.524 m)   Wt 171 lb (77.6 kg)   LMP  (LMP Unknown)   SpO2 95%   BMI 33.40 kg/m²     Problem List:  Mamta Charlton does not have any pertinent problems on file. PHYS EX:  Physical Exam  Vitals signs and nursing note reviewed. Constitutional:       General: She is not in acute distress. Appearance: Normal appearance. She is well-developed. She is obese. She is not ill-appearing, toxic-appearing or diaphoretic. HENT:      Head: Normocephalic and atraumatic. Right Ear: Tympanic membrane, ear canal and external ear normal. There is no impacted cerumen. Left Ear: Tympanic membrane, ear canal and external ear normal. There is no impacted cerumen. Nose: Nose normal. No congestion or rhinorrhea. Mouth/Throat:      Mouth: Mucous membranes are moist.      Pharynx: No oropharyngeal exudate or posterior oropharyngeal erythema. Eyes:      General: No scleral icterus. Right eye: No discharge. Left eye: No discharge. Extraocular Movements: Extraocular movements intact. Conjunctiva/sclera: Conjunctivae normal.      Pupils: Pupils are equal, round, and reactive to light. Neck:      Musculoskeletal: Normal range of motion and neck supple. No neck rigidity or muscular tenderness. Thyroid: No thyromegaly. Vascular: No carotid bruit or JVD. Trachea: No tracheal deviation. Cardiovascular:      Rate and Rhythm: Normal rate and regular rhythm. Pulses: Normal pulses. Heart sounds: Normal heart sounds. No murmur. No friction rub. No gallop. Pulmonary:      Effort: Pulmonary effort is normal. No respiratory distress. Breath sounds: Normal breath sounds. No stridor. No wheezing, rhonchi or rales. Chest:      Chest wall: No tenderness. Abdominal:      General: Bowel sounds are normal. There is no distension. Palpations: Abdomen is soft. There is no mass. Tenderness: There is no abdominal tenderness. There is no right CVA tenderness, left CVA tenderness, guarding or rebound. Hernia: No hernia is present. Genitourinary:     Vagina: Normal.   Musculoskeletal: Normal range of motion. General: Tenderness present. No swelling, deformity or signs of injury. Right lower leg: Edema present. Left lower leg: Edema present. Comments: Pt uses wheelchair,and is helped by daughter. Pain and decreased ROM multiple joints. Lymphadenopathy:      Cervical: No cervical adenopathy. Skin:     General: Skin is warm. Coloration: Skin is not jaundiced or pale. Findings: Erythema (gluteal cleft, small wound to left and right cheeks) and lesion present. No bruising or rash. Comments: Pt has stage 3 sacral, and coccyx lesion just to left and pt needs low air loss mattress. Neurological:      General: No focal deficit present. Mental Status: She is alert. Cranial Nerves: No cranial nerve deficit. Sensory: Sensory deficit present. Motor: Weakness present. No abnormal muscle tone. Coordination: Coordination abnormal.      Gait: Gait abnormal.      Deep Tendon Reflexes: Reflexes abnormal.      Comments: In a wheelchair           Morbidly obese ([facility] [address]  --talk on diet, exercise, vitamins, weight loss for sugar and wound care  - Basic Metabolic Panel; Future  - CBC Auto Differential; Future      CKD (chronic kidney disease) stage 3, GFR 30-59 ml/min ([facility])    - Basic Metabolic Panel; Future  - CBC Auto Differential; Future     Essential hypertension    - Basic Metabolic Panel;  Future - CBC Auto Differential; Future     IDDM (insulin dependent diabetes mellitus) ([facility] [address]  --stable on current care planning  -- continue treatment as we are meeting goals     Stage 3 sacral and coccyx lesion  Not controlled. Wound clinic. Pt needs low air loss mattress. ASSESSMENT/PLAN  John Mchugh was seen today for wound check. Diagnoses and all orders for this visit:    Pressure injury of skin of buttock, unspecified injury stage, unspecified laterality  -     Basic Metabolic Panel; Future  -     CBC Auto Differential; Future  -     doxycycline hyclate (VIBRA-TABS) 100 MG tablet; Take 1 tablet by mouth 2 times daily for 10 days  -PLAN--debridement of debris and dead tissue               Clean and dress  --set up wound care     Morbidly obese ([facility] [address]  -     Basic Metabolic Panel; Future  -     CBC Auto Differential; Future    CKD (chronic kidney disease) stage 3, GFR 30-59 ml/min ([facility])  -     Basic Metabolic Panel; Future  -     CBC Auto Differential; Future  ---VASCULAR PANEL  A) ASA, plavix, aggrenox  B) ELIQUIS, pletal, tzd, STATIN  C) ACE, LASIX, folic, ccb  D) cannikinumab, FISH OILS      Essential hypertension ---controlled   --patient is instructed on low to moderate sodium ( 2 to 2.5 grams ), daily    Also to increase potassium in the diet to about 3.5 grams daily    Literature is provided   ---CARDIAC---ASA, ACE, BETA, STATIN, LASIX, ( ccb )  -     Basic Metabolic Panel; Future  -     CBC Auto Differential; Future    IDDM (insulin dependent diabetes mellitus) ([facility] [address]  --diabetic diet     Pt instructed if any worse go ED ASAP.     Outpatient Encounter Medications as of 12/21/2020   Medication Sig Dispense Refill    furosemide (LASIX) 20 MG tablet Take 20 mg by mouth daily      ipratropium-albuterol (DUONEB) 0.5-2.5 (3) MG/3ML SOLN nebulizer solution Take 3 mLs by nebulization every 4 hours as needed for Shortness of Breath 360 mL 2  acetaminophen (TYLENOL) 325 MG tablet Take 650 mg by mouth every 6 hours as needed for Pain      fluticasone (FLONASE) 50 MCG/ACT nasal spray 1 spray by Nasal route daily as needed for Rhinitis       mineral oil-hydrophilic petrolatum (HYDROPHOR) ointment Apply 1 g topically daily Apply to both feet      Nutritional Supplements (CRUZ) POWD Take 1 packet by mouth 2 times daily (Patient not taking: Reported on 12/21/2020) 60 Can 3     No facility-administered encounter medications on file as of 12/21/2020. Return in about 6 weeks (around 2/1/2021).         Reviewed recent labs related to Grace's current problems      Discussed importance of regular Health Maintenance follow up  Health Maintenance   Topic    Shingles Vaccine (1 of 2)    Annual Wellness Visit (AWV)     Lipid screen     Potassium monitoring     Creatinine monitoring     DTaP/Tdap/Td vaccine (2 - Td)    Flu vaccine     Pneumococcal 65+ years Vaccine     Hepatitis A vaccine     Hib vaccine     Meningococcal (ACWY) vaccine

## 2020-12-29 NOTE — TELEPHONE ENCOUNTER
Bertha Vivar from home health called in said pt wound is looking better but she wanted to know if she can start using prevnar?

## 2020-12-30 NOTE — TELEPHONE ENCOUNTER
Patient's daughter notified of Dr. Machuca's recommendations. Daughter will call with an update on Monday. If she is still having swelling we will address additional Lasix and BMP.

## 2020-12-30 NOTE — TELEPHONE ENCOUNTER
Patient's daughter called stating patient's legs are very swollen, Lasix 20 mg daily does not seem to be helping much.  2(+) left, 1(+) right, left leg is always worse. Daughter denies patient having any shortness of breath at rest but she does have a tiny bit of exertional dyspnea. Patient was COVID (+) and is still recovering from that. BMP done yesterday. Please advise.

## 2020-12-30 NOTE — TELEPHONE ENCOUNTER
Orders placed. Office note, demographic sheet, and order faxed to "[employer]. Waiting for confirmation.       Electronically signed by Oralia Cates MA on 12/30/20 at 3:35 PM EST

## 2020-12-30 NOTE — TELEPHONE ENCOUNTER
Multiple attempts made to contact [facility]. Left voicemail requesting return call.     Electronically signed by Louann Parrish MA on 12/30/20 at 2:11 PM EST

## 2021-01-01 ENCOUNTER — HOSPITAL ENCOUNTER (INPATIENT)
Age: 86
LOS: 3 days | Discharge: HOME | DRG: 193 | End: 2021-05-28
Attending: EMERGENCY MEDICINE | Admitting: INTERNAL MEDICINE
Payer: MEDICARE

## 2021-01-01 ENCOUNTER — CARE COORDINATION (OUTPATIENT)
Dept: CASE MANAGEMENT | Age: 86
End: 2021-01-01

## 2021-01-01 ENCOUNTER — TELEPHONE (OUTPATIENT)
Dept: FAMILY MEDICINE CLINIC | Age: 86
End: 2021-01-01

## 2021-01-01 ENCOUNTER — TELEPHONE (OUTPATIENT)
Dept: CARDIOLOGY CLINIC | Age: 86
End: 2021-01-01

## 2021-01-01 ENCOUNTER — HOSPITAL ENCOUNTER (OUTPATIENT)
Dept: WOUND CARE | Age: 86
Discharge: HOME | End: 2021-03-03
Payer: MEDICARE

## 2021-01-01 ENCOUNTER — VIRTUAL VISIT (OUTPATIENT)
Dept: FAMILY MEDICINE CLINIC | Age: 86
End: 2021-01-01
Payer: MEDICARE

## 2021-01-01 ENCOUNTER — APPOINTMENT (OUTPATIENT)
Dept: CT IMAGING | Age: 86
End: 2021-01-01
Payer: MEDICARE

## 2021-01-01 ENCOUNTER — HOSPITAL ENCOUNTER (INPATIENT)
Age: 86
LOS: 3 days | Discharge: HOME HEALTH | DRG: 291 | End: 2021-04-08
Attending: EMERGENCY MEDICINE | Admitting: INTERNAL MEDICINE
Payer: MEDICARE

## 2021-01-01 ENCOUNTER — APPOINTMENT (OUTPATIENT)
Dept: GENERAL RADIOLOGY | Age: 86
DRG: 193 | End: 2021-01-01
Payer: MEDICARE

## 2021-01-01 ENCOUNTER — HOSPITAL ENCOUNTER (OUTPATIENT)
Dept: WOUND CARE | Age: 86
Discharge: HOME | End: 2021-04-28
Payer: MEDICARE

## 2021-01-01 ENCOUNTER — APPOINTMENT (OUTPATIENT)
Dept: CT IMAGING | Age: 86
DRG: 291 | End: 2021-01-01
Payer: MEDICARE

## 2021-01-01 ENCOUNTER — APPOINTMENT (OUTPATIENT)
Dept: GENERAL RADIOLOGY | Age: 86
DRG: 177 | End: 2021-01-01
Payer: MEDICARE

## 2021-01-01 ENCOUNTER — APPOINTMENT (OUTPATIENT)
Dept: GENERAL RADIOLOGY | Age: 86
DRG: 291 | End: 2021-01-01
Payer: MEDICARE

## 2021-01-01 ENCOUNTER — ANESTHESIA EVENT (OUTPATIENT)
Dept: ENDOSCOPY | Age: 86
End: 2021-01-01

## 2021-01-01 ENCOUNTER — HOSPITAL ENCOUNTER (OUTPATIENT)
Dept: WOUND CARE | Age: 86
Discharge: HOME | End: 2021-05-26
Payer: MEDICARE

## 2021-01-01 ENCOUNTER — HOSPITAL ENCOUNTER (OUTPATIENT)
Dept: WOUND CARE | Age: 86
Discharge: HOME | End: 2021-03-24
Payer: MEDICARE

## 2021-01-01 ENCOUNTER — HOSPITAL ENCOUNTER (OUTPATIENT)
Age: 86
Setting detail: OBSERVATION
Discharge: NURSING, REHAB OR LONG-TERM CARE | End: 2021-03-19
Attending: INTERNAL MEDICINE | Admitting: INTERNAL MEDICINE
Payer: MEDICARE

## 2021-01-01 ENCOUNTER — ANESTHESIA (OUTPATIENT)
Dept: ENDOSCOPY | Age: 86
End: 2021-01-01

## 2021-01-01 ENCOUNTER — HOSPITAL ENCOUNTER (INPATIENT)
Age: 86
LOS: 5 days | Discharge: HOSPICE | DRG: 177 | End: 2021-06-11
Attending: EMERGENCY MEDICINE | Admitting: INTERNAL MEDICINE
Payer: MEDICARE

## 2021-01-01 ENCOUNTER — APPOINTMENT (OUTPATIENT)
Dept: CT IMAGING | Age: 86
DRG: 871 | End: 2021-01-01
Payer: MEDICARE

## 2021-01-01 ENCOUNTER — TELEPHONE (OUTPATIENT)
Dept: OTHER | Facility: CLINIC | Age: 86
End: 2021-01-01

## 2021-01-01 ENCOUNTER — APPOINTMENT (OUTPATIENT)
Dept: CT IMAGING | Age: 86
DRG: 193 | End: 2021-01-01
Payer: MEDICARE

## 2021-01-01 ENCOUNTER — OFFICE VISIT (OUTPATIENT)
Dept: FAMILY MEDICINE CLINIC | Age: 86
End: 2021-01-01
Payer: MEDICARE

## 2021-01-01 ENCOUNTER — OFFICE VISIT (OUTPATIENT)
Dept: CARDIOLOGY CLINIC | Age: 86
End: 2021-01-01
Payer: MEDICARE

## 2021-01-01 ENCOUNTER — APPOINTMENT (OUTPATIENT)
Dept: CT IMAGING | Age: 86
DRG: 177 | End: 2021-01-01
Payer: MEDICARE

## 2021-01-01 ENCOUNTER — HOSPITAL ENCOUNTER (OUTPATIENT)
Dept: WOUND CARE | Age: 86
Discharge: HOME | End: 2021-05-12
Payer: MEDICARE

## 2021-01-01 ENCOUNTER — APPOINTMENT (OUTPATIENT)
Dept: INTERVENTIONAL RADIOLOGY/VASCULAR | Age: 86
DRG: 871 | End: 2021-01-01
Payer: MEDICARE

## 2021-01-01 ENCOUNTER — TELEPHONE (OUTPATIENT)
Dept: PHARMACY | Facility: CLINIC | Age: 86
End: 2021-01-01

## 2021-01-01 ENCOUNTER — HOSPITAL ENCOUNTER (EMERGENCY)
Age: 86
Discharge: ANOTHER HOSPITAL | End: 2021-03-17
Attending: EMERGENCY MEDICINE
Payer: MEDICARE

## 2021-01-01 ENCOUNTER — HOSPITAL ENCOUNTER (OUTPATIENT)
Dept: WOUND CARE | Age: 86
Discharge: HOME | End: 2021-02-17
Payer: MEDICARE

## 2021-01-01 ENCOUNTER — HOSPITAL ENCOUNTER (INPATIENT)
Age: 86
LOS: 5 days | Discharge: HOME | DRG: 871 | End: 2021-04-03
Attending: EMERGENCY MEDICINE | Admitting: INTERNAL MEDICINE
Payer: MEDICARE

## 2021-01-01 ENCOUNTER — SCHEDULED TELEPHONE ENCOUNTER (OUTPATIENT)
Dept: PHARMACY | Facility: CLINIC | Age: 86
End: 2021-01-01

## 2021-01-01 ENCOUNTER — APPOINTMENT (OUTPATIENT)
Dept: GENERAL RADIOLOGY | Age: 86
DRG: 871 | End: 2021-01-01
Payer: MEDICARE

## 2021-01-01 VITALS
TEMPERATURE: 98.1 F | DIASTOLIC BLOOD PRESSURE: 61 MMHG | OXYGEN SATURATION: 100 % | SYSTOLIC BLOOD PRESSURE: 132 MMHG | BODY MASS INDEX: 31.03 KG/M2 | WEIGHT: 158.06 LBS | RESPIRATION RATE: 18 BRPM | HEIGHT: 60 IN | HEART RATE: 85 BPM

## 2021-01-01 VITALS
TEMPERATURE: 98 F | HEIGHT: 60 IN | RESPIRATION RATE: 16 BRPM | OXYGEN SATURATION: 96 % | DIASTOLIC BLOOD PRESSURE: 70 MMHG | BODY MASS INDEX: 28.27 KG/M2 | HEART RATE: 82 BPM | WEIGHT: 144 LBS | SYSTOLIC BLOOD PRESSURE: 118 MMHG

## 2021-01-01 VITALS
SYSTOLIC BLOOD PRESSURE: 108 MMHG | RESPIRATION RATE: 20 BRPM | WEIGHT: 144 LBS | BODY MASS INDEX: 28.27 KG/M2 | HEIGHT: 60 IN | HEART RATE: 88 BPM | TEMPERATURE: 96.8 F | DIASTOLIC BLOOD PRESSURE: 64 MMHG

## 2021-01-01 VITALS
OXYGEN SATURATION: 93 % | HEART RATE: 82 BPM | RESPIRATION RATE: 18 BRPM | TEMPERATURE: 99.7 F | SYSTOLIC BLOOD PRESSURE: 135 MMHG | DIASTOLIC BLOOD PRESSURE: 61 MMHG

## 2021-01-01 VITALS
SYSTOLIC BLOOD PRESSURE: 124 MMHG | WEIGHT: 158 LBS | TEMPERATURE: 97.9 F | HEIGHT: 60 IN | DIASTOLIC BLOOD PRESSURE: 62 MMHG | HEART RATE: 72 BPM | RESPIRATION RATE: 22 BRPM | BODY MASS INDEX: 31.02 KG/M2

## 2021-01-01 VITALS
HEART RATE: 74 BPM | HEIGHT: 60 IN | RESPIRATION RATE: 16 BRPM | WEIGHT: 144.6 LBS | DIASTOLIC BLOOD PRESSURE: 60 MMHG | BODY MASS INDEX: 28.39 KG/M2 | SYSTOLIC BLOOD PRESSURE: 118 MMHG

## 2021-01-01 VITALS
HEIGHT: 60 IN | TEMPERATURE: 96.7 F | HEART RATE: 80 BPM | RESPIRATION RATE: 18 BRPM | WEIGHT: 144 LBS | BODY MASS INDEX: 28.27 KG/M2

## 2021-01-01 VITALS
TEMPERATURE: 97.7 F | OXYGEN SATURATION: 96 % | BODY MASS INDEX: 29.45 KG/M2 | WEIGHT: 150 LBS | HEIGHT: 60 IN | RESPIRATION RATE: 18 BRPM | DIASTOLIC BLOOD PRESSURE: 62 MMHG | HEART RATE: 75 BPM | SYSTOLIC BLOOD PRESSURE: 112 MMHG

## 2021-01-01 VITALS
TEMPERATURE: 98.8 F | OXYGEN SATURATION: 96 % | HEART RATE: 92 BPM | BODY MASS INDEX: 30.59 KG/M2 | RESPIRATION RATE: 16 BRPM | SYSTOLIC BLOOD PRESSURE: 143 MMHG | DIASTOLIC BLOOD PRESSURE: 64 MMHG | WEIGHT: 166.2 LBS | HEIGHT: 62 IN

## 2021-01-01 VITALS
SYSTOLIC BLOOD PRESSURE: 108 MMHG | WEIGHT: 158 LBS | RESPIRATION RATE: 18 BRPM | HEIGHT: 60 IN | HEART RATE: 68 BPM | DIASTOLIC BLOOD PRESSURE: 64 MMHG | TEMPERATURE: 97.8 F | BODY MASS INDEX: 31.02 KG/M2

## 2021-01-01 VITALS
HEIGHT: 60 IN | OXYGEN SATURATION: 95 % | BODY MASS INDEX: 29.19 KG/M2 | TEMPERATURE: 98.7 F | HEART RATE: 77 BPM | RESPIRATION RATE: 20 BRPM | WEIGHT: 148.7 LBS | SYSTOLIC BLOOD PRESSURE: 133 MMHG | DIASTOLIC BLOOD PRESSURE: 63 MMHG

## 2021-01-01 VITALS
SYSTOLIC BLOOD PRESSURE: 139 MMHG | DIASTOLIC BLOOD PRESSURE: 63 MMHG | HEART RATE: 98 BPM | BODY MASS INDEX: 34.2 KG/M2 | TEMPERATURE: 97 F | WEIGHT: 174.19 LBS | OXYGEN SATURATION: 98 % | HEIGHT: 60 IN | RESPIRATION RATE: 18 BRPM

## 2021-01-01 VITALS
BODY MASS INDEX: 28.27 KG/M2 | SYSTOLIC BLOOD PRESSURE: 102 MMHG | HEIGHT: 60 IN | RESPIRATION RATE: 20 BRPM | DIASTOLIC BLOOD PRESSURE: 58 MMHG | WEIGHT: 144 LBS | HEART RATE: 92 BPM | TEMPERATURE: 96.6 F

## 2021-01-01 VITALS
HEART RATE: 76 BPM | SYSTOLIC BLOOD PRESSURE: 108 MMHG | WEIGHT: 150 LBS | DIASTOLIC BLOOD PRESSURE: 54 MMHG | HEIGHT: 60 IN | BODY MASS INDEX: 29.45 KG/M2 | RESPIRATION RATE: 16 BRPM

## 2021-01-01 DIAGNOSIS — E11.65 TYPE 2 DIABETES MELLITUS WITH HYPERGLYCEMIA, WITH LONG-TERM CURRENT USE OF INSULIN (HCC): Primary | ICD-10-CM

## 2021-01-01 DIAGNOSIS — I50.33 ACUTE ON CHRONIC DIASTOLIC CONGESTIVE HEART FAILURE (HCC): ICD-10-CM

## 2021-01-01 DIAGNOSIS — L89.612 PRESSURE ULCER OF RIGHT HEEL, STAGE 2 (HCC): ICD-10-CM

## 2021-01-01 DIAGNOSIS — J96.01 ACUTE RESPIRATORY FAILURE WITH HYPOXIA (HCC): Primary | ICD-10-CM

## 2021-01-01 DIAGNOSIS — L98.429 ULCER OF SACRAL REGION, STAGE 3 (HCC): Chronic | ICD-10-CM

## 2021-01-01 DIAGNOSIS — L89.612 PRESSURE ULCER OF RIGHT HEEL, STAGE 2 (HCC): Chronic | ICD-10-CM

## 2021-01-01 DIAGNOSIS — L89.154 PRESSURE INJURY OF SACRAL REGION, STAGE 4 (HCC): Primary | ICD-10-CM

## 2021-01-01 DIAGNOSIS — U07.1 PNEUMONIA DUE TO COVID-19 VIRUS: ICD-10-CM

## 2021-01-01 DIAGNOSIS — E11.9 CONTROLLED TYPE 2 DIABETES MELLITUS WITHOUT COMPLICATION, UNSPECIFIED WHETHER LONG TERM INSULIN USE (HCC): ICD-10-CM

## 2021-01-01 DIAGNOSIS — Z79.4 TYPE 2 DIABETES MELLITUS WITH HYPERGLYCEMIA, WITH LONG-TERM CURRENT USE OF INSULIN (HCC): ICD-10-CM

## 2021-01-01 DIAGNOSIS — L98.429 ULCER OF SACRAL REGION, STAGE 3 (HCC): ICD-10-CM

## 2021-01-01 DIAGNOSIS — Z87.01 HISTORY OF ASPIRATION PNEUMONIA: ICD-10-CM

## 2021-01-01 DIAGNOSIS — I10 ESSENTIAL HYPERTENSION: ICD-10-CM

## 2021-01-01 DIAGNOSIS — L89.152 PRESSURE INJURY OF SACRAL REGION, STAGE 2 (HCC): Primary | Chronic | ICD-10-CM

## 2021-01-01 DIAGNOSIS — M79.89 LEG SWELLING: ICD-10-CM

## 2021-01-01 DIAGNOSIS — G62.9 PERIPHERAL POLYNEUROPATHY: ICD-10-CM

## 2021-01-01 DIAGNOSIS — S31.000D WOUND OF SACRAL REGION, SUBSEQUENT ENCOUNTER: ICD-10-CM

## 2021-01-01 DIAGNOSIS — I73.9 PVD (PERIPHERAL VASCULAR DISEASE) (HCC): ICD-10-CM

## 2021-01-01 DIAGNOSIS — L89.153 PRESSURE INJURY OF SACRAL REGION, STAGE 3 (HCC): ICD-10-CM

## 2021-01-01 DIAGNOSIS — R63.0 DECREASED APPETITE: ICD-10-CM

## 2021-01-01 DIAGNOSIS — L03.317 CELLULITIS AND ABSCESS OF BUTTOCK: ICD-10-CM

## 2021-01-01 DIAGNOSIS — F41.9 ANXIETY AND DEPRESSION: ICD-10-CM

## 2021-01-01 DIAGNOSIS — J40 BRONCHITIS: ICD-10-CM

## 2021-01-01 DIAGNOSIS — J12.82 PNEUMONIA DUE TO COVID-19 VIRUS: ICD-10-CM

## 2021-01-01 DIAGNOSIS — Z79.4 TYPE 2 DIABETES MELLITUS WITH HYPERGLYCEMIA, WITH LONG-TERM CURRENT USE OF INSULIN (HCC): Primary | ICD-10-CM

## 2021-01-01 DIAGNOSIS — S31.000A WOUND OF SACRAL REGION, INITIAL ENCOUNTER: Primary | ICD-10-CM

## 2021-01-01 DIAGNOSIS — Z51.5 HOSPICE CARE: ICD-10-CM

## 2021-01-01 DIAGNOSIS — L89.154 PRESSURE INJURY OF SACRAL REGION, STAGE 4 (HCC): ICD-10-CM

## 2021-01-01 DIAGNOSIS — L89.612 PRESSURE ULCER OF RIGHT HEEL, STAGE 2 (HCC): Primary | ICD-10-CM

## 2021-01-01 DIAGNOSIS — D64.9 NORMOCHROMIC ANEMIA: ICD-10-CM

## 2021-01-01 DIAGNOSIS — E86.0 DEHYDRATION: ICD-10-CM

## 2021-01-01 DIAGNOSIS — R10.84 GENERALIZED ABDOMINAL PAIN: Primary | ICD-10-CM

## 2021-01-01 DIAGNOSIS — I10 ESSENTIAL HYPERTENSION: Primary | ICD-10-CM

## 2021-01-01 DIAGNOSIS — F32.A ANXIETY AND DEPRESSION: ICD-10-CM

## 2021-01-01 DIAGNOSIS — R41.82 ALTERED MENTAL STATUS, UNSPECIFIED ALTERED MENTAL STATUS TYPE: ICD-10-CM

## 2021-01-01 DIAGNOSIS — J18.9 PNEUMONIA DUE TO ORGANISM: ICD-10-CM

## 2021-01-01 DIAGNOSIS — G62.9 NEUROPATHY: ICD-10-CM

## 2021-01-01 DIAGNOSIS — L89.153 PRESSURE INJURY OF SACRAL REGION, STAGE 3 (HCC): Primary | ICD-10-CM

## 2021-01-01 DIAGNOSIS — E11.21 TYPE II DIABETES MELLITUS WITH NEPHROPATHY (HCC): ICD-10-CM

## 2021-01-01 DIAGNOSIS — K21.9 GASTROESOPHAGEAL REFLUX DISEASE WITHOUT ESOPHAGITIS: Primary | ICD-10-CM

## 2021-01-01 DIAGNOSIS — N18.32 STAGE 3B CHRONIC KIDNEY DISEASE (HCC): ICD-10-CM

## 2021-01-01 DIAGNOSIS — E66.01 MORBIDLY OBESE (HCC): ICD-10-CM

## 2021-01-01 DIAGNOSIS — L02.31 CELLULITIS AND ABSCESS OF BUTTOCK: ICD-10-CM

## 2021-01-01 DIAGNOSIS — E11.65 TYPE 2 DIABETES MELLITUS WITH HYPERGLYCEMIA, WITH LONG-TERM CURRENT USE OF INSULIN (HCC): ICD-10-CM

## 2021-01-01 DIAGNOSIS — K44.9 HIATAL HERNIA: ICD-10-CM

## 2021-01-01 DIAGNOSIS — N18.30 CKD (CHRONIC KIDNEY DISEASE) STAGE 3, GFR 30-59 ML/MIN (HCC): ICD-10-CM

## 2021-01-01 DIAGNOSIS — N18.32 STAGE 3B CHRONIC KIDNEY DISEASE (HCC): Chronic | ICD-10-CM

## 2021-01-01 DIAGNOSIS — R07.89 OTHER CHEST PAIN: Primary | ICD-10-CM

## 2021-01-01 DIAGNOSIS — L89.309 PRESSURE INJURY OF SKIN OF BUTTOCK, UNSPECIFIED INJURY STAGE, UNSPECIFIED LATERALITY: Primary | ICD-10-CM

## 2021-01-01 DIAGNOSIS — J40 BRONCHITIS: Primary | ICD-10-CM

## 2021-01-01 DIAGNOSIS — N18.31 STAGE 3A CHRONIC KIDNEY DISEASE (HCC): Primary | Chronic | ICD-10-CM

## 2021-01-01 DIAGNOSIS — J18.9 PNEUMONIA OF BOTH LOWER LOBES DUE TO INFECTIOUS ORGANISM: ICD-10-CM

## 2021-01-01 LAB
ABO/RH: NORMAL
ACINETOBACTER BAUMANNII BY PCR: NOT DETECTED
ADENOVIRUS BY PCR: NOT DETECTED
ALBUMIN SERPL-MCNC: 1.8 G/DL (ref 3.5–5.2)
ALBUMIN SERPL-MCNC: 2 G/DL (ref 3.5–5.2)
ALBUMIN SERPL-MCNC: 2.2 G/DL (ref 3.5–5.2)
ALBUMIN SERPL-MCNC: 2.4 G/DL (ref 3.5–5.2)
ALBUMIN SERPL-MCNC: 2.4 G/DL (ref 3.5–5.2)
ALBUMIN SERPL-MCNC: 2.6 G/DL (ref 3.5–5.2)
ALBUMIN SERPL-MCNC: 2.9 G/DL (ref 3.5–5.2)
ALBUMIN SERPL-MCNC: 3 G/DL (ref 3.5–5.2)
ALP BLD-CCNC: 101 U/L (ref 35–104)
ALP BLD-CCNC: 107 U/L (ref 35–104)
ALP BLD-CCNC: 117 U/L (ref 35–104)
ALP BLD-CCNC: 120 U/L (ref 35–104)
ALP BLD-CCNC: 120 U/L (ref 35–104)
ALP BLD-CCNC: 63 U/L (ref 35–104)
ALP BLD-CCNC: 65 U/L (ref 35–104)
ALP BLD-CCNC: 68 U/L (ref 35–104)
ALP BLD-CCNC: 74 U/L (ref 35–104)
ALP BLD-CCNC: 75 U/L (ref 35–104)
ALP BLD-CCNC: 80 U/L (ref 35–104)
ALP BLD-CCNC: 81 U/L (ref 35–104)
ALP BLD-CCNC: 82 U/L (ref 35–104)
ALT SERPL-CCNC: 10 U/L (ref 0–32)
ALT SERPL-CCNC: 10 U/L (ref 0–32)
ALT SERPL-CCNC: 11 U/L (ref 0–32)
ALT SERPL-CCNC: 13 U/L (ref 0–32)
ALT SERPL-CCNC: 16 U/L (ref 0–32)
ALT SERPL-CCNC: 18 U/L (ref 0–32)
ALT SERPL-CCNC: 5 U/L (ref 0–32)
ALT SERPL-CCNC: 6 U/L (ref 0–32)
ALT SERPL-CCNC: 7 U/L (ref 0–32)
ALT SERPL-CCNC: 8 U/L (ref 0–32)
ALT SERPL-CCNC: 9 U/L (ref 0–32)
AMMONIA: 14 UMOL/L (ref 11–51)
ANION GAP SERPL CALCULATED.3IONS-SCNC: 10 MMOL/L (ref 7–16)
ANION GAP SERPL CALCULATED.3IONS-SCNC: 10 MMOL/L (ref 7–16)
ANION GAP SERPL CALCULATED.3IONS-SCNC: 11 MMOL/L (ref 7–16)
ANION GAP SERPL CALCULATED.3IONS-SCNC: 12 MMOL/L (ref 7–16)
ANION GAP SERPL CALCULATED.3IONS-SCNC: 13 MMOL/L (ref 7–16)
ANION GAP SERPL CALCULATED.3IONS-SCNC: 13 MMOL/L (ref 7–16)
ANION GAP SERPL CALCULATED.3IONS-SCNC: 14 MMOL/L (ref 7–16)
ANION GAP SERPL CALCULATED.3IONS-SCNC: 14 MMOL/L (ref 7–16)
ANION GAP SERPL CALCULATED.3IONS-SCNC: 7 MMOL/L (ref 7–16)
ANION GAP SERPL CALCULATED.3IONS-SCNC: 8 MMOL/L (ref 7–16)
ANION GAP SERPL CALCULATED.3IONS-SCNC: 9 MMOL/L (ref 7–16)
ANTIBODY SCREEN: NORMAL
AST SERPL-CCNC: 11 U/L (ref 0–31)
AST SERPL-CCNC: 12 U/L (ref 0–31)
AST SERPL-CCNC: 13 U/L (ref 0–31)
AST SERPL-CCNC: 14 U/L (ref 0–31)
AST SERPL-CCNC: 15 U/L (ref 0–31)
AST SERPL-CCNC: 20 U/L (ref 0–31)
AST SERPL-CCNC: 21 U/L (ref 0–31)
AST SERPL-CCNC: 25 U/L (ref 0–31)
AST SERPL-CCNC: 26 U/L (ref 0–31)
AST SERPL-CCNC: 26 U/L (ref 0–31)
AST SERPL-CCNC: 27 U/L (ref 0–31)
AST SERPL-CCNC: 30 U/L (ref 0–31)
AST SERPL-CCNC: 40 U/L (ref 0–31)
B.E.: -0.1 MMOL/L (ref -3–3)
B.E.: 1.5 MMOL/L (ref -3–3)
BACTERIA: ABNORMAL /HPF
BASOPHILS ABSOLUTE: 0.02 E9/L (ref 0–0.2)
BASOPHILS ABSOLUTE: 0.03 E9/L (ref 0–0.2)
BASOPHILS ABSOLUTE: 0.04 E9/L (ref 0–0.2)
BASOPHILS RELATIVE PERCENT: 0.2 % (ref 0–2)
BASOPHILS RELATIVE PERCENT: 0.3 % (ref 0–2)
BASOPHILS RELATIVE PERCENT: 0.4 % (ref 0–2)
BILIRUB SERPL-MCNC: 0.2 MG/DL (ref 0–1.2)
BILIRUB SERPL-MCNC: 0.3 MG/DL (ref 0–1.2)
BILIRUB SERPL-MCNC: 0.4 MG/DL (ref 0–1.2)
BILIRUB SERPL-MCNC: 0.5 MG/DL (ref 0–1.2)
BILIRUB SERPL-MCNC: 0.5 MG/DL (ref 0–1.2)
BILIRUB SERPL-MCNC: <0.2 MG/DL (ref 0–1.2)
BILIRUBIN DIRECT: <0.2 MG/DL (ref 0–0.3)
BILIRUBIN DIRECT: <0.2 MG/DL (ref 0–0.3)
BILIRUBIN URINE: NEGATIVE
BILIRUBIN, INDIRECT: ABNORMAL MG/DL (ref 0–1)
BILIRUBIN, INDIRECT: ABNORMAL MG/DL (ref 0–1)
BLOOD CULTURE, ROUTINE: ABNORMAL
BLOOD CULTURE, ROUTINE: NORMAL
BLOOD, URINE: ABNORMAL
BLOOD, URINE: NEGATIVE
BORDETELLA PARAPERTUSSIS BY PCR: NOT DETECTED
BORDETELLA PERTUSSIS BY PCR: NOT DETECTED
BOTTLE TYPE: ABNORMAL
BOTTLE TYPE: ABNORMAL
BUN BLDV-MCNC: 10 MG/DL (ref 8–23)
BUN BLDV-MCNC: 11 MG/DL (ref 8–23)
BUN BLDV-MCNC: 13 MG/DL (ref 8–23)
BUN BLDV-MCNC: 17 MG/DL (ref 8–23)
BUN BLDV-MCNC: 18 MG/DL (ref 6–23)
BUN BLDV-MCNC: 20 MG/DL (ref 6–23)
BUN BLDV-MCNC: 20 MG/DL (ref 8–23)
BUN BLDV-MCNC: 21 MG/DL (ref 8–23)
BUN BLDV-MCNC: 22 MG/DL (ref 8–23)
BUN BLDV-MCNC: 27 MG/DL (ref 8–23)
BUN BLDV-MCNC: 29 MG/DL (ref 8–23)
BUN BLDV-MCNC: 30 MG/DL (ref 6–23)
BUN BLDV-MCNC: 30 MG/DL (ref 6–23)
BUN BLDV-MCNC: 30 MG/DL (ref 8–23)
BUN BLDV-MCNC: 31 MG/DL (ref 8–23)
BUN BLDV-MCNC: 32 MG/DL (ref 8–23)
BUN BLDV-MCNC: 34 MG/DL (ref 6–23)
BUN BLDV-MCNC: 34 MG/DL (ref 8–23)
BUN BLDV-MCNC: 36 MG/DL (ref 6–23)
BUN BLDV-MCNC: 40 MG/DL (ref 8–23)
BUN BLDV-MCNC: 49 MG/DL (ref 6–23)
BUN BLDV-MCNC: 69 MG/DL (ref 6–23)
C-REACTIVE PROTEIN: 18.4 MG/DL (ref 0–0.4)
C-REACTIVE PROTEIN: 3.8 MG/DL (ref 0–0.4)
C-REACTIVE PROTEIN: 3.9 MG/DL (ref 0–0.4)
C-REACTIVE PROTEIN: 9.5 MG/DL (ref 0–0.4)
CALCIUM SERPL-MCNC: 8.2 MG/DL (ref 8.6–10.2)
CALCIUM SERPL-MCNC: 8.3 MG/DL (ref 8.6–10.2)
CALCIUM SERPL-MCNC: 8.4 MG/DL (ref 8.6–10.2)
CALCIUM SERPL-MCNC: 8.5 MG/DL (ref 8.6–10.2)
CALCIUM SERPL-MCNC: 8.7 MG/DL (ref 8.6–10.2)
CALCIUM SERPL-MCNC: 8.8 MG/DL (ref 8.6–10.2)
CALCIUM SERPL-MCNC: 8.8 MG/DL (ref 8.6–10.2)
CALCIUM SERPL-MCNC: 8.9 MG/DL (ref 8.6–10.2)
CALCIUM SERPL-MCNC: 9 MG/DL (ref 8.6–10.2)
CALCIUM SERPL-MCNC: 9.2 MG/DL (ref 8.6–10.2)
CALCIUM SERPL-MCNC: 9.3 MG/DL (ref 8.6–10.2)
CALCIUM SERPL-MCNC: 9.3 MG/DL (ref 8.6–10.2)
CALCIUM SERPL-MCNC: 9.4 MG/DL (ref 8.6–10.2)
CALCIUM SERPL-MCNC: 9.5 MG/DL (ref 8.6–10.2)
CALCIUM SERPL-MCNC: 9.6 MG/DL (ref 8.6–10.2)
CANDIDA ALBICANS BY PCR: NOT DETECTED
CANDIDA GLABRATA BY PCR: NOT DETECTED
CANDIDA KRUSEI BY PCR: NOT DETECTED
CANDIDA PARAPSILOSIS BY PCR: NOT DETECTED
CANDIDA TROPICALIS BY PCR: NOT DETECTED
CHLAMYDOPHILIA PNEUMONIAE BY PCR: NOT DETECTED
CHLORIDE BLD-SCNC: 100 MMOL/L (ref 98–107)
CHLORIDE BLD-SCNC: 101 MMOL/L (ref 98–107)
CHLORIDE BLD-SCNC: 101 MMOL/L (ref 98–107)
CHLORIDE BLD-SCNC: 102 MMOL/L (ref 98–107)
CHLORIDE BLD-SCNC: 103 MMOL/L (ref 98–107)
CHLORIDE BLD-SCNC: 104 MMOL/L (ref 98–107)
CHLORIDE BLD-SCNC: 105 MMOL/L (ref 98–107)
CHLORIDE BLD-SCNC: 107 MMOL/L (ref 98–107)
CHLORIDE BLD-SCNC: 108 MMOL/L (ref 98–107)
CHLORIDE BLD-SCNC: 111 MMOL/L (ref 98–107)
CHLORIDE BLD-SCNC: 111 MMOL/L (ref 98–107)
CHLORIDE BLD-SCNC: 96 MMOL/L (ref 98–107)
CHLORIDE BLD-SCNC: 97 MMOL/L (ref 98–107)
CHLORIDE BLD-SCNC: 98 MMOL/L (ref 98–107)
CHP ED QC CHECK: NORMAL
CLARITY: CLEAR
CO2: 22 MMOL/L (ref 22–29)
CO2: 23 MMOL/L (ref 22–29)
CO2: 24 MMOL/L (ref 22–29)
CO2: 25 MMOL/L (ref 22–29)
CO2: 26 MMOL/L (ref 22–29)
CO2: 27 MMOL/L (ref 22–29)
CO2: 27 MMOL/L (ref 22–29)
CO2: 28 MMOL/L (ref 22–29)
CO2: 33 MMOL/L (ref 22–29)
CO2: 34 MMOL/L (ref 22–29)
COHB: 0.2 % (ref 0–1.5)
COHB: 0.4 % (ref 0–1.5)
COLOR: YELLOW
CORONAVIRUS 229E BY PCR: NOT DETECTED
CORONAVIRUS HKU1 BY PCR: NOT DETECTED
CORONAVIRUS NL63 BY PCR: NOT DETECTED
CORONAVIRUS OC43 BY PCR: NOT DETECTED
CREAT SERPL-MCNC: 0.7 MG/DL (ref 0.5–1)
CREAT SERPL-MCNC: 0.9 MG/DL (ref 0.5–1)
CREAT SERPL-MCNC: 1 MG/DL (ref 0.5–1)
CREAT SERPL-MCNC: 1.1 MG/DL (ref 0.5–1)
CREAT SERPL-MCNC: 1.2 MG/DL (ref 0.5–1)
CREAT SERPL-MCNC: 1.3 MG/DL (ref 0.5–1)
CREAT SERPL-MCNC: 1.3 MG/DL (ref 0.5–1)
CREAT SERPL-MCNC: 1.4 MG/DL (ref 0.5–1)
CREAT SERPL-MCNC: 1.7 MG/DL (ref 0.5–1)
CREAT SERPL-MCNC: 2 MG/DL (ref 0.5–1)
CREAT SERPL-MCNC: 2.3 MG/DL (ref 0.5–1)
CRITICAL: ABNORMAL
CRITICAL: ABNORMAL
CULTURE, BLOOD 2: ABNORMAL
CULTURE, BLOOD 2: NORMAL
DATE ANALYZED: ABNORMAL
DATE ANALYZED: ABNORMAL
DATE OF COLLECTION: ABNORMAL
DATE OF COLLECTION: ABNORMAL
EKG ATRIAL RATE: 122 BPM
EKG ATRIAL RATE: 72 BPM
EKG ATRIAL RATE: 82 BPM
EKG ATRIAL RATE: 83 BPM
EKG ATRIAL RATE: 95 BPM
EKG ATRIAL RATE: 95 BPM
EKG P AXIS: 44 DEGREES
EKG P AXIS: 45 DEGREES
EKG P AXIS: 76 DEGREES
EKG P AXIS: 89 DEGREES
EKG P AXIS: 91 DEGREES
EKG P-R INTERVAL: 178 MS
EKG P-R INTERVAL: 184 MS
EKG P-R INTERVAL: 184 MS
EKG P-R INTERVAL: 192 MS
EKG P-R INTERVAL: 200 MS
EKG Q-T INTERVAL: 274 MS
EKG Q-T INTERVAL: 320 MS
EKG Q-T INTERVAL: 342 MS
EKG Q-T INTERVAL: 346 MS
EKG Q-T INTERVAL: 358 MS
EKG Q-T INTERVAL: 386 MS
EKG QRS DURATION: 68 MS
EKG QRS DURATION: 68 MS
EKG QRS DURATION: 74 MS
EKG QRS DURATION: 76 MS
EKG QRS DURATION: 76 MS
EKG QRS DURATION: 78 MS
EKG QTC CALCULATION (BAZETT): 344 MS
EKG QTC CALCULATION (BAZETT): 399 MS
EKG QTC CALCULATION (BAZETT): 420 MS
EKG QTC CALCULATION (BAZETT): 422 MS
EKG QTC CALCULATION (BAZETT): 434 MS
EKG QTC CALCULATION (BAZETT): 454 MS
EKG R AXIS: -25 DEGREES
EKG R AXIS: -26 DEGREES
EKG R AXIS: -35 DEGREES
EKG R AXIS: -7 DEGREES
EKG T AXIS: 102 DEGREES
EKG T AXIS: 132 DEGREES
EKG T AXIS: 133 DEGREES
EKG T AXIS: 165 DEGREES
EKG T AXIS: 53 DEGREES
EKG T AXIS: 63 DEGREES
EKG VENTRICULAR RATE: 121 BPM
EKG VENTRICULAR RATE: 72 BPM
EKG VENTRICULAR RATE: 82 BPM
EKG VENTRICULAR RATE: 83 BPM
EKG VENTRICULAR RATE: 95 BPM
EKG VENTRICULAR RATE: 95 BPM
ENTEROBACTER CLOACAE COMPLEX BY PCR: NOT DETECTED
ENTEROBACTERALES BY PCR: NOT DETECTED
ENTEROCOCCUS BY PCR: NOT DETECTED
EOSINOPHILS ABSOLUTE: 0.03 E9/L (ref 0.05–0.5)
EOSINOPHILS ABSOLUTE: 0.07 E9/L (ref 0.05–0.5)
EOSINOPHILS ABSOLUTE: 0.08 E9/L (ref 0.05–0.5)
EOSINOPHILS ABSOLUTE: 0.09 E9/L (ref 0.05–0.5)
EOSINOPHILS ABSOLUTE: 0.1 E9/L (ref 0.05–0.5)
EOSINOPHILS ABSOLUTE: 0.13 E9/L (ref 0.05–0.5)
EOSINOPHILS ABSOLUTE: 0.15 E9/L (ref 0.05–0.5)
EOSINOPHILS ABSOLUTE: 0.16 E9/L (ref 0.05–0.5)
EOSINOPHILS ABSOLUTE: 0.18 E9/L (ref 0.05–0.5)
EOSINOPHILS ABSOLUTE: 0.2 E9/L (ref 0.05–0.5)
EOSINOPHILS ABSOLUTE: 0.23 E9/L (ref 0.05–0.5)
EOSINOPHILS RELATIVE PERCENT: 0.2 % (ref 0–6)
EOSINOPHILS RELATIVE PERCENT: 0.7 % (ref 0–6)
EOSINOPHILS RELATIVE PERCENT: 0.8 % (ref 0–6)
EOSINOPHILS RELATIVE PERCENT: 0.9 % (ref 0–6)
EOSINOPHILS RELATIVE PERCENT: 1.2 % (ref 0–6)
EOSINOPHILS RELATIVE PERCENT: 1.3 % (ref 0–6)
EOSINOPHILS RELATIVE PERCENT: 1.4 % (ref 0–6)
EOSINOPHILS RELATIVE PERCENT: 1.7 % (ref 0–6)
EOSINOPHILS RELATIVE PERCENT: 1.7 % (ref 0–6)
EOSINOPHILS RELATIVE PERCENT: 2 % (ref 0–6)
EOSINOPHILS RELATIVE PERCENT: 2.2 % (ref 0–6)
EPITHELIAL CELLS, UA: ABNORMAL /HPF
ESCHERICHIA COLI BY PCR: NOT DETECTED
FOLATE: >20 NG/ML (ref 4.8–24.2)
FOLATE: >20 NG/ML (ref 4.8–24.2)
GFR AFRICAN AMERICAN: 24
GFR AFRICAN AMERICAN: 28
GFR AFRICAN AMERICAN: 34
GFR AFRICAN AMERICAN: 43
GFR AFRICAN AMERICAN: 47
GFR AFRICAN AMERICAN: 47
GFR AFRICAN AMERICAN: 51
GFR AFRICAN AMERICAN: 57
GFR AFRICAN AMERICAN: >60
GFR NON-AFRICAN AMERICAN: 20 ML/MIN/1.73
GFR NON-AFRICAN AMERICAN: 24 ML/MIN/1.73
GFR NON-AFRICAN AMERICAN: 28 ML/MIN/1.73
GFR NON-AFRICAN AMERICAN: 36 ML/MIN/1.73
GFR NON-AFRICAN AMERICAN: 39 ML/MIN/1.73
GFR NON-AFRICAN AMERICAN: 39 ML/MIN/1.73
GFR NON-AFRICAN AMERICAN: 42 ML/MIN/1.73
GFR NON-AFRICAN AMERICAN: 47 ML/MIN/1.73
GFR NON-AFRICAN AMERICAN: 52 ML/MIN/1.73
GFR NON-AFRICAN AMERICAN: 59 ML/MIN/1.73
GFR NON-AFRICAN AMERICAN: >60 ML/MIN/1.73
GLUCOSE BLD-MCNC: 100 MG/DL (ref 74–99)
GLUCOSE BLD-MCNC: 101 MG/DL (ref 74–99)
GLUCOSE BLD-MCNC: 103 MG/DL (ref 74–99)
GLUCOSE BLD-MCNC: 117 MG/DL (ref 74–99)
GLUCOSE BLD-MCNC: 117 MG/DL (ref 74–99)
GLUCOSE BLD-MCNC: 118 MG/DL
GLUCOSE BLD-MCNC: 118 MG/DL (ref 74–99)
GLUCOSE BLD-MCNC: 135 MG/DL (ref 74–99)
GLUCOSE BLD-MCNC: 137 MG/DL (ref 74–99)
GLUCOSE BLD-MCNC: 156 MG/DL (ref 74–99)
GLUCOSE BLD-MCNC: 156 MG/DL (ref 74–99)
GLUCOSE BLD-MCNC: 166 MG/DL (ref 74–99)
GLUCOSE BLD-MCNC: 175 MG/DL (ref 74–99)
GLUCOSE BLD-MCNC: 188 MG/DL (ref 74–99)
GLUCOSE BLD-MCNC: 208 MG/DL (ref 74–99)
GLUCOSE BLD-MCNC: 222 MG/DL (ref 74–99)
GLUCOSE BLD-MCNC: 236 MG/DL (ref 74–99)
GLUCOSE BLD-MCNC: 247 MG/DL (ref 74–99)
GLUCOSE BLD-MCNC: 261 MG/DL (ref 74–99)
GLUCOSE BLD-MCNC: 279 MG/DL (ref 74–99)
GLUCOSE BLD-MCNC: 284 MG/DL (ref 74–99)
GLUCOSE BLD-MCNC: 290 MG/DL (ref 74–99)
GLUCOSE BLD-MCNC: 291 MG/DL (ref 74–99)
GLUCOSE BLD-MCNC: 91 MG/DL (ref 74–99)
GLUCOSE BLD-MCNC: 93 MG/DL (ref 74–99)
GLUCOSE URINE: 250 MG/DL
GLUCOSE URINE: NEGATIVE MG/DL
HAEMOPHILUS INFLUENZAE BY PCR: NOT DETECTED
HBA1C MFR BLD: 6.6 % (ref 4–5.6)
HBA1C MFR BLD: 7 % (ref 4–5.6)
HBA1C MFR BLD: 7 % (ref 4–5.6)
HBA1C MFR BLD: 7.5 % (ref 4–5.6)
HCO3: 23.6 MMOL/L (ref 22–26)
HCO3: 26.3 MMOL/L (ref 22–26)
HCT VFR BLD CALC: 23.1 % (ref 34–48)
HCT VFR BLD CALC: 24.8 % (ref 34–48)
HCT VFR BLD CALC: 25 % (ref 34–48)
HCT VFR BLD CALC: 25.2 % (ref 34–48)
HCT VFR BLD CALC: 25.4 % (ref 34–48)
HCT VFR BLD CALC: 25.7 % (ref 34–48)
HCT VFR BLD CALC: 26.1 % (ref 34–48)
HCT VFR BLD CALC: 26.2 % (ref 34–48)
HCT VFR BLD CALC: 26.8 % (ref 34–48)
HCT VFR BLD CALC: 27.2 % (ref 34–48)
HCT VFR BLD CALC: 27.7 % (ref 34–48)
HCT VFR BLD CALC: 28.1 % (ref 34–48)
HCT VFR BLD CALC: 28.3 % (ref 34–48)
HCT VFR BLD CALC: 29.2 % (ref 34–48)
HCT VFR BLD CALC: 29.4 % (ref 34–48)
HCT VFR BLD CALC: 30.4 % (ref 34–48)
HCT VFR BLD CALC: 30.8 % (ref 34–48)
HCT VFR BLD CALC: 30.9 % (ref 34–48)
HCT VFR BLD CALC: 30.9 % (ref 34–48)
HCT VFR BLD CALC: 32.2 % (ref 34–48)
HCT VFR BLD CALC: 32.7 % (ref 34–48)
HCT VFR BLD CALC: 33.3 % (ref 34–48)
HCT VFR BLD CALC: 33.9 % (ref 34–48)
HCT VFR BLD CALC: 34.8 % (ref 34–48)
HCT VFR BLD CALC: 49.3 % (ref 34–48)
HEMOGLOBIN: 10.1 G/DL (ref 11.5–15.5)
HEMOGLOBIN: 10.2 G/DL (ref 11.5–15.5)
HEMOGLOBIN: 10.4 G/DL (ref 11.5–15.5)
HEMOGLOBIN: 15.2 G/DL (ref 11.5–15.5)
HEMOGLOBIN: 7 G/DL (ref 11.5–15.5)
HEMOGLOBIN: 7.4 G/DL (ref 11.5–15.5)
HEMOGLOBIN: 7.5 G/DL (ref 11.5–15.5)
HEMOGLOBIN: 7.5 G/DL (ref 11.5–15.5)
HEMOGLOBIN: 7.6 G/DL (ref 11.5–15.5)
HEMOGLOBIN: 7.7 G/DL (ref 11.5–15.5)
HEMOGLOBIN: 7.7 G/DL (ref 11.5–15.5)
HEMOGLOBIN: 7.8 G/DL (ref 11.5–15.5)
HEMOGLOBIN: 8 G/DL (ref 11.5–15.5)
HEMOGLOBIN: 8 G/DL (ref 11.5–15.5)
HEMOGLOBIN: 8.1 G/DL (ref 11.5–15.5)
HEMOGLOBIN: 8.1 G/DL (ref 11.5–15.5)
HEMOGLOBIN: 8.3 G/DL (ref 11.5–15.5)
HEMOGLOBIN: 8.7 G/DL (ref 11.5–15.5)
HEMOGLOBIN: 8.8 G/DL (ref 11.5–15.5)
HEMOGLOBIN: 8.8 G/DL (ref 11.5–15.5)
HEMOGLOBIN: 8.9 G/DL (ref 11.5–15.5)
HEMOGLOBIN: 9 G/DL (ref 11.5–15.5)
HEMOGLOBIN: 9.3 G/DL (ref 11.5–15.5)
HEMOGLOBIN: 9.3 G/DL (ref 11.5–15.5)
HEMOGLOBIN: 9.6 G/DL (ref 11.5–15.5)
HEMOGLOBIN: 9.8 G/DL (ref 11.5–15.5)
HEMOGLOBIN: 9.8 G/DL (ref 11.5–15.5)
HHB: 11 % (ref 0–5)
HHB: 12.6 % (ref 0–5)
HUMAN METAPNEUMOVIRUS BY PCR: NOT DETECTED
HUMAN RHINOVIRUS/ENTEROVIRUS BY PCR: DETECTED
IMMATURE GRANULOCYTES #: 0.03 E9/L
IMMATURE GRANULOCYTES #: 0.03 E9/L
IMMATURE GRANULOCYTES #: 0.04 E9/L
IMMATURE GRANULOCYTES #: 0.05 E9/L
IMMATURE GRANULOCYTES #: 0.06 E9/L
IMMATURE GRANULOCYTES #: 0.07 E9/L
IMMATURE GRANULOCYTES #: 0.08 E9/L
IMMATURE GRANULOCYTES #: 0.09 E9/L
IMMATURE GRANULOCYTES #: 0.1 E9/L
IMMATURE GRANULOCYTES #: 0.17 E9/L
IMMATURE GRANULOCYTES %: 0.4 % (ref 0–5)
IMMATURE GRANULOCYTES %: 0.4 % (ref 0–5)
IMMATURE GRANULOCYTES %: 0.5 % (ref 0–5)
IMMATURE GRANULOCYTES %: 0.6 % (ref 0–5)
IMMATURE GRANULOCYTES %: 0.7 % (ref 0–5)
IMMATURE GRANULOCYTES %: 0.8 % (ref 0–5)
IMMATURE GRANULOCYTES %: 0.9 % (ref 0–5)
IMMATURE GRANULOCYTES %: 0.9 % (ref 0–5)
IMMATURE GRANULOCYTES %: 1 % (ref 0–5)
INFLUENZA A BY PCR: NOT DETECTED
INFLUENZA B BY PCR: NOT DETECTED
IRON SATURATION: 11 % (ref 15–50)
IRON: 18 MCG/DL (ref 37–145)
KETONES, URINE: NEGATIVE MG/DL
KLEBSIELLA OXYTOCA BY PCR: NOT DETECTED
KLEBSIELLA PNEUMONIAE GROUP BY PCR: NOT DETECTED
L. PNEUMOPHILA SEROGP 1 UR AG: NORMAL
L. PNEUMOPHILA SEROGP 1 UR AG: NORMAL
LAB: ABNORMAL
LAB: ABNORMAL
LACTIC ACID, SEPSIS: 0.9 MMOL/L (ref 0.5–1.9)
LACTIC ACID, SEPSIS: 1.5 MMOL/L (ref 0.5–1.9)
LACTIC ACID, SEPSIS: 1.8 MMOL/L (ref 0.5–1.9)
LACTIC ACID: 0.6 MMOL/L (ref 0.5–2.2)
LACTIC ACID: 0.9 MMOL/L (ref 0.5–2.2)
LACTIC ACID: 1.1 MMOL/L (ref 0.5–2.2)
LACTIC ACID: 1.2 MMOL/L (ref 0.5–2.2)
LEUKOCYTE ESTERASE, URINE: ABNORMAL
LEUKOCYTE ESTERASE, URINE: NEGATIVE
LIPASE: 20 U/L (ref 13–60)
LISTERIA MONOCYTOGENES BY PCR: NOT DETECTED
LV EF: 48 %
LV EF: 57 %
LVEF MODALITY: NORMAL
LVEF MODALITY: NORMAL
LYMPHOCYTES ABSOLUTE: 0.99 E9/L (ref 1.5–4)
LYMPHOCYTES ABSOLUTE: 1.28 E9/L (ref 1.5–4)
LYMPHOCYTES ABSOLUTE: 1.38 E9/L (ref 1.5–4)
LYMPHOCYTES ABSOLUTE: 1.43 E9/L (ref 1.5–4)
LYMPHOCYTES ABSOLUTE: 1.43 E9/L (ref 1.5–4)
LYMPHOCYTES ABSOLUTE: 1.45 E9/L (ref 1.5–4)
LYMPHOCYTES ABSOLUTE: 1.51 E9/L (ref 1.5–4)
LYMPHOCYTES ABSOLUTE: 1.59 E9/L (ref 1.5–4)
LYMPHOCYTES ABSOLUTE: 1.59 E9/L (ref 1.5–4)
LYMPHOCYTES ABSOLUTE: 1.63 E9/L (ref 1.5–4)
LYMPHOCYTES ABSOLUTE: 1.72 E9/L (ref 1.5–4)
LYMPHOCYTES ABSOLUTE: 1.74 E9/L (ref 1.5–4)
LYMPHOCYTES ABSOLUTE: 1.97 E9/L (ref 1.5–4)
LYMPHOCYTES ABSOLUTE: 1.99 E9/L (ref 1.5–4)
LYMPHOCYTES ABSOLUTE: 2.05 E9/L (ref 1.5–4)
LYMPHOCYTES ABSOLUTE: 2.23 E9/L (ref 1.5–4)
LYMPHOCYTES ABSOLUTE: 2.31 E9/L (ref 1.5–4)
LYMPHOCYTES RELATIVE PERCENT: 12 % (ref 20–42)
LYMPHOCYTES RELATIVE PERCENT: 12.3 % (ref 20–42)
LYMPHOCYTES RELATIVE PERCENT: 13.5 % (ref 20–42)
LYMPHOCYTES RELATIVE PERCENT: 14.7 % (ref 20–42)
LYMPHOCYTES RELATIVE PERCENT: 15.8 % (ref 20–42)
LYMPHOCYTES RELATIVE PERCENT: 16 % (ref 20–42)
LYMPHOCYTES RELATIVE PERCENT: 16.4 % (ref 20–42)
LYMPHOCYTES RELATIVE PERCENT: 16.5 % (ref 20–42)
LYMPHOCYTES RELATIVE PERCENT: 17 % (ref 20–42)
LYMPHOCYTES RELATIVE PERCENT: 17.7 % (ref 20–42)
LYMPHOCYTES RELATIVE PERCENT: 17.9 % (ref 20–42)
LYMPHOCYTES RELATIVE PERCENT: 18 % (ref 20–42)
LYMPHOCYTES RELATIVE PERCENT: 20.2 % (ref 20–42)
LYMPHOCYTES RELATIVE PERCENT: 25.1 % (ref 20–42)
LYMPHOCYTES RELATIVE PERCENT: 26.6 % (ref 20–42)
LYMPHOCYTES RELATIVE PERCENT: 9.2 % (ref 20–42)
LYMPHOCYTES RELATIVE PERCENT: 9.4 % (ref 20–42)
Lab: ABNORMAL
Lab: ABNORMAL
MAGNESIUM: 1.7 MG/DL (ref 1.6–2.6)
MAGNESIUM: 1.7 MG/DL (ref 1.6–2.6)
MAGNESIUM: 2.1 MG/DL (ref 1.6–2.6)
MAGNESIUM: 2.2 MG/DL (ref 1.6–2.6)
MCH RBC QN AUTO: 27.4 PG (ref 26–35)
MCH RBC QN AUTO: 27.4 PG (ref 26–35)
MCH RBC QN AUTO: 27.5 PG (ref 26–35)
MCH RBC QN AUTO: 27.6 PG (ref 26–35)
MCH RBC QN AUTO: 27.6 PG (ref 26–35)
MCH RBC QN AUTO: 27.7 PG (ref 26–35)
MCH RBC QN AUTO: 27.7 PG (ref 26–35)
MCH RBC QN AUTO: 28 PG (ref 26–35)
MCH RBC QN AUTO: 28 PG (ref 26–35)
MCH RBC QN AUTO: 28.1 PG (ref 26–35)
MCH RBC QN AUTO: 28.1 PG (ref 26–35)
MCH RBC QN AUTO: 28.2 PG (ref 26–35)
MCH RBC QN AUTO: 28.3 PG (ref 26–35)
MCH RBC QN AUTO: 28.7 PG (ref 26–35)
MCH RBC QN AUTO: 28.8 PG (ref 26–35)
MCH RBC QN AUTO: 29.1 PG (ref 26–35)
MCH RBC QN AUTO: 29.1 PG (ref 26–35)
MCH RBC QN AUTO: 29.2 PG (ref 26–35)
MCH RBC QN AUTO: 29.3 PG (ref 26–35)
MCH RBC QN AUTO: 29.7 PG (ref 26–35)
MCHC RBC AUTO-ENTMCNC: 29.2 % (ref 32–34.5)
MCHC RBC AUTO-ENTMCNC: 29.4 % (ref 32–34.5)
MCHC RBC AUTO-ENTMCNC: 29.8 % (ref 32–34.5)
MCHC RBC AUTO-ENTMCNC: 29.8 % (ref 32–34.5)
MCHC RBC AUTO-ENTMCNC: 29.9 % (ref 32–34.5)
MCHC RBC AUTO-ENTMCNC: 30 % (ref 32–34.5)
MCHC RBC AUTO-ENTMCNC: 30.1 % (ref 32–34.5)
MCHC RBC AUTO-ENTMCNC: 30.2 % (ref 32–34.5)
MCHC RBC AUTO-ENTMCNC: 30.2 % (ref 32–34.5)
MCHC RBC AUTO-ENTMCNC: 30.3 % (ref 32–34.5)
MCHC RBC AUTO-ENTMCNC: 30.5 % (ref 32–34.5)
MCHC RBC AUTO-ENTMCNC: 30.5 % (ref 32–34.5)
MCHC RBC AUTO-ENTMCNC: 30.6 % (ref 32–34.5)
MCHC RBC AUTO-ENTMCNC: 30.8 % (ref 32–34.5)
MCHC RBC AUTO-ENTMCNC: 31 % (ref 32–34.5)
MCHC RBC AUTO-ENTMCNC: 31.1 % (ref 32–34.5)
MCHC RBC AUTO-ENTMCNC: 31.2 % (ref 32–34.5)
MCHC RBC AUTO-ENTMCNC: 31.7 % (ref 32–34.5)
MCV RBC AUTO: 91.7 FL (ref 80–99.9)
MCV RBC AUTO: 92.3 FL (ref 80–99.9)
MCV RBC AUTO: 92.4 FL (ref 80–99.9)
MCV RBC AUTO: 92.4 FL (ref 80–99.9)
MCV RBC AUTO: 92.5 FL (ref 80–99.9)
MCV RBC AUTO: 92.8 FL (ref 80–99.9)
MCV RBC AUTO: 93 FL (ref 80–99.9)
MCV RBC AUTO: 93 FL (ref 80–99.9)
MCV RBC AUTO: 93.1 FL (ref 80–99.9)
MCV RBC AUTO: 93.4 FL (ref 80–99.9)
MCV RBC AUTO: 93.6 FL (ref 80–99.9)
MCV RBC AUTO: 93.6 FL (ref 80–99.9)
MCV RBC AUTO: 93.7 FL (ref 80–99.9)
MCV RBC AUTO: 93.7 FL (ref 80–99.9)
MCV RBC AUTO: 93.8 FL (ref 80–99.9)
MCV RBC AUTO: 93.9 FL (ref 80–99.9)
MCV RBC AUTO: 93.9 FL (ref 80–99.9)
MCV RBC AUTO: 94.2 FL (ref 80–99.9)
MCV RBC AUTO: 94.3 FL (ref 80–99.9)
MCV RBC AUTO: 95 FL (ref 80–99.9)
MCV RBC AUTO: 95.1 FL (ref 80–99.9)
MCV RBC AUTO: 95.9 FL (ref 80–99.9)
METER GLUCOSE: 106 MG/DL (ref 74–99)
METER GLUCOSE: 106 MG/DL (ref 74–99)
METER GLUCOSE: 108 MG/DL (ref 74–99)
METER GLUCOSE: 110 MG/DL (ref 74–99)
METER GLUCOSE: 113 MG/DL (ref 74–99)
METER GLUCOSE: 113 MG/DL (ref 74–99)
METER GLUCOSE: 120 MG/DL (ref 74–99)
METER GLUCOSE: 122 MG/DL (ref 74–99)
METER GLUCOSE: 123 MG/DL (ref 74–99)
METER GLUCOSE: 123 MG/DL (ref 74–99)
METER GLUCOSE: 127 MG/DL (ref 74–99)
METER GLUCOSE: 130 MG/DL (ref 74–99)
METER GLUCOSE: 130 MG/DL (ref 74–99)
METER GLUCOSE: 131 MG/DL (ref 74–99)
METER GLUCOSE: 131 MG/DL (ref 74–99)
METER GLUCOSE: 135 MG/DL (ref 74–99)
METER GLUCOSE: 137 MG/DL (ref 74–99)
METER GLUCOSE: 138 MG/DL (ref 74–99)
METER GLUCOSE: 145 MG/DL (ref 74–99)
METER GLUCOSE: 149 MG/DL (ref 74–99)
METER GLUCOSE: 151 MG/DL (ref 74–99)
METER GLUCOSE: 153 MG/DL (ref 74–99)
METER GLUCOSE: 156 MG/DL (ref 74–99)
METER GLUCOSE: 156 MG/DL (ref 74–99)
METER GLUCOSE: 160 MG/DL (ref 74–99)
METER GLUCOSE: 162 MG/DL (ref 74–99)
METER GLUCOSE: 162 MG/DL (ref 74–99)
METER GLUCOSE: 163 MG/DL (ref 74–99)
METER GLUCOSE: 164 MG/DL (ref 74–99)
METER GLUCOSE: 165 MG/DL (ref 74–99)
METER GLUCOSE: 166 MG/DL (ref 74–99)
METER GLUCOSE: 168 MG/DL (ref 74–99)
METER GLUCOSE: 168 MG/DL (ref 74–99)
METER GLUCOSE: 173 MG/DL (ref 74–99)
METER GLUCOSE: 174 MG/DL (ref 74–99)
METER GLUCOSE: 175 MG/DL (ref 74–99)
METER GLUCOSE: 177 MG/DL (ref 74–99)
METER GLUCOSE: 181 MG/DL (ref 74–99)
METER GLUCOSE: 182 MG/DL (ref 74–99)
METER GLUCOSE: 185 MG/DL (ref 74–99)
METER GLUCOSE: 188 MG/DL (ref 74–99)
METER GLUCOSE: 189 MG/DL (ref 74–99)
METER GLUCOSE: 190 MG/DL (ref 74–99)
METER GLUCOSE: 190 MG/DL (ref 74–99)
METER GLUCOSE: 191 MG/DL (ref 74–99)
METER GLUCOSE: 192 MG/DL (ref 74–99)
METER GLUCOSE: 197 MG/DL (ref 74–99)
METER GLUCOSE: 198 MG/DL (ref 74–99)
METER GLUCOSE: 200 MG/DL (ref 74–99)
METER GLUCOSE: 201 MG/DL (ref 74–99)
METER GLUCOSE: 203 MG/DL (ref 74–99)
METER GLUCOSE: 206 MG/DL (ref 74–99)
METER GLUCOSE: 208 MG/DL (ref 74–99)
METER GLUCOSE: 215 MG/DL (ref 74–99)
METER GLUCOSE: 226 MG/DL (ref 74–99)
METER GLUCOSE: 233 MG/DL (ref 74–99)
METER GLUCOSE: 242 MG/DL (ref 74–99)
METER GLUCOSE: 243 MG/DL (ref 74–99)
METER GLUCOSE: 246 MG/DL (ref 74–99)
METER GLUCOSE: 247 MG/DL (ref 74–99)
METER GLUCOSE: 250 MG/DL (ref 74–99)
METER GLUCOSE: 260 MG/DL (ref 74–99)
METER GLUCOSE: 261 MG/DL (ref 74–99)
METER GLUCOSE: 267 MG/DL (ref 74–99)
METER GLUCOSE: 269 MG/DL (ref 74–99)
METER GLUCOSE: 273 MG/DL (ref 74–99)
METER GLUCOSE: 281 MG/DL (ref 74–99)
METER GLUCOSE: 320 MG/DL (ref 74–99)
METER GLUCOSE: 354 MG/DL (ref 74–99)
METER GLUCOSE: 51 MG/DL (ref 74–99)
METER GLUCOSE: 53 MG/DL (ref 74–99)
METHB: 0.3 % (ref 0–1.5)
METHB: 0.4 % (ref 0–1.5)
METHICILLIN RESISTANCE MECA/C  BY PCR: DETECTED
MODE: ABNORMAL
MODE: ABNORMAL
MONOCYTES ABSOLUTE: 0.38 E9/L (ref 0.1–0.95)
MONOCYTES ABSOLUTE: 0.38 E9/L (ref 0.1–0.95)
MONOCYTES ABSOLUTE: 0.39 E9/L (ref 0.1–0.95)
MONOCYTES ABSOLUTE: 0.4 E9/L (ref 0.1–0.95)
MONOCYTES ABSOLUTE: 0.48 E9/L (ref 0.1–0.95)
MONOCYTES ABSOLUTE: 0.5 E9/L (ref 0.1–0.95)
MONOCYTES ABSOLUTE: 0.51 E9/L (ref 0.1–0.95)
MONOCYTES ABSOLUTE: 0.52 E9/L (ref 0.1–0.95)
MONOCYTES ABSOLUTE: 0.55 E9/L (ref 0.1–0.95)
MONOCYTES ABSOLUTE: 0.55 E9/L (ref 0.1–0.95)
MONOCYTES ABSOLUTE: 0.62 E9/L (ref 0.1–0.95)
MONOCYTES ABSOLUTE: 0.65 E9/L (ref 0.1–0.95)
MONOCYTES ABSOLUTE: 0.66 E9/L (ref 0.1–0.95)
MONOCYTES ABSOLUTE: 0.76 E9/L (ref 0.1–0.95)
MONOCYTES ABSOLUTE: 0.8 E9/L (ref 0.1–0.95)
MONOCYTES RELATIVE PERCENT: 3.6 % (ref 2–12)
MONOCYTES RELATIVE PERCENT: 3.9 % (ref 2–12)
MONOCYTES RELATIVE PERCENT: 4.7 % (ref 2–12)
MONOCYTES RELATIVE PERCENT: 4.7 % (ref 2–12)
MONOCYTES RELATIVE PERCENT: 4.8 % (ref 2–12)
MONOCYTES RELATIVE PERCENT: 4.8 % (ref 2–12)
MONOCYTES RELATIVE PERCENT: 5.1 % (ref 2–12)
MONOCYTES RELATIVE PERCENT: 5.2 % (ref 2–12)
MONOCYTES RELATIVE PERCENT: 5.3 % (ref 2–12)
MONOCYTES RELATIVE PERCENT: 5.4 % (ref 2–12)
MONOCYTES RELATIVE PERCENT: 5.4 % (ref 2–12)
MONOCYTES RELATIVE PERCENT: 5.5 % (ref 2–12)
MONOCYTES RELATIVE PERCENT: 5.6 % (ref 2–12)
MONOCYTES RELATIVE PERCENT: 5.6 % (ref 2–12)
MONOCYTES RELATIVE PERCENT: 5.7 % (ref 2–12)
MONOCYTES RELATIVE PERCENT: 5.7 % (ref 2–12)
MONOCYTES RELATIVE PERCENT: 6.6 % (ref 2–12)
MRSA CULTURE ONLY: NORMAL
MYCOPLASMA PNEUMONIAE BY PCR: NOT DETECTED
NEISSERIA MENINGITIDIS BY PCR: NOT DETECTED
NEUTROPHILS ABSOLUTE: 10.46 E9/L (ref 1.8–7.3)
NEUTROPHILS ABSOLUTE: 14.37 E9/L (ref 1.8–7.3)
NEUTROPHILS ABSOLUTE: 5.18 E9/L (ref 1.8–7.3)
NEUTROPHILS ABSOLUTE: 5.5 E9/L (ref 1.8–7.3)
NEUTROPHILS ABSOLUTE: 6.02 E9/L (ref 1.8–7.3)
NEUTROPHILS ABSOLUTE: 6.86 E9/L (ref 1.8–7.3)
NEUTROPHILS ABSOLUTE: 7.01 E9/L (ref 1.8–7.3)
NEUTROPHILS ABSOLUTE: 7.12 E9/L (ref 1.8–7.3)
NEUTROPHILS ABSOLUTE: 7.48 E9/L (ref 1.8–7.3)
NEUTROPHILS ABSOLUTE: 7.63 E9/L (ref 1.8–7.3)
NEUTROPHILS ABSOLUTE: 8.03 E9/L (ref 1.8–7.3)
NEUTROPHILS ABSOLUTE: 8.32 E9/L (ref 1.8–7.3)
NEUTROPHILS ABSOLUTE: 8.32 E9/L (ref 1.8–7.3)
NEUTROPHILS ABSOLUTE: 8.37 E9/L (ref 1.8–7.3)
NEUTROPHILS ABSOLUTE: 8.69 E9/L (ref 1.8–7.3)
NEUTROPHILS ABSOLUTE: 9.16 E9/L (ref 1.8–7.3)
NEUTROPHILS ABSOLUTE: 9.18 E9/L (ref 1.8–7.3)
NEUTROPHILS RELATIVE PERCENT: 65.6 % (ref 43–80)
NEUTROPHILS RELATIVE PERCENT: 65.8 % (ref 43–80)
NEUTROPHILS RELATIVE PERCENT: 72.5 % (ref 43–80)
NEUTROPHILS RELATIVE PERCENT: 74.6 % (ref 43–80)
NEUTROPHILS RELATIVE PERCENT: 74.8 % (ref 43–80)
NEUTROPHILS RELATIVE PERCENT: 74.9 % (ref 43–80)
NEUTROPHILS RELATIVE PERCENT: 75.3 % (ref 43–80)
NEUTROPHILS RELATIVE PERCENT: 75.5 % (ref 43–80)
NEUTROPHILS RELATIVE PERCENT: 76.4 % (ref 43–80)
NEUTROPHILS RELATIVE PERCENT: 77.5 % (ref 43–80)
NEUTROPHILS RELATIVE PERCENT: 77.6 % (ref 43–80)
NEUTROPHILS RELATIVE PERCENT: 77.7 % (ref 43–80)
NEUTROPHILS RELATIVE PERCENT: 78.9 % (ref 43–80)
NEUTROPHILS RELATIVE PERCENT: 79.7 % (ref 43–80)
NEUTROPHILS RELATIVE PERCENT: 79.8 % (ref 43–80)
NEUTROPHILS RELATIVE PERCENT: 84.5 % (ref 43–80)
NEUTROPHILS RELATIVE PERCENT: 85.2 % (ref 43–80)
NITRITE, URINE: NEGATIVE
NITRITE, URINE: NEGATIVE
NITRITE, URINE: POSITIVE
NITRITE, URINE: POSITIVE
O2 CONTENT: 13.9 ML/DL
O2 CONTENT: 14.3 ML/DL
O2 SATURATION: 87.3 % (ref 92–98.5)
O2 SATURATION: 88.9 % (ref 92–98.5)
O2HB: 86.7 % (ref 94–97)
O2HB: 88.4 % (ref 94–97)
OPERATOR ID: 1102
OPERATOR ID: 797
ORDER NUMBER: ABNORMAL
ORDER NUMBER: ABNORMAL
ORGANISM: ABNORMAL
PARAINFLUENZA VIRUS 1 BY PCR: NOT DETECTED
PARAINFLUENZA VIRUS 2 BY PCR: NOT DETECTED
PARAINFLUENZA VIRUS 3 BY PCR: NOT DETECTED
PARAINFLUENZA VIRUS 4 BY PCR: NOT DETECTED
PATIENT TEMP: 37 C
PATIENT TEMP: 37 C
PCO2: 35.1 MMHG (ref 35–45)
PCO2: 42.1 MMHG (ref 35–45)
PDW BLD-RTO: 13.9 FL (ref 11.5–15)
PDW BLD-RTO: 14 FL (ref 11.5–15)
PDW BLD-RTO: 14.2 FL (ref 11.5–15)
PDW BLD-RTO: 14.3 FL (ref 11.5–15)
PDW BLD-RTO: 14.4 FL (ref 11.5–15)
PDW BLD-RTO: 14.5 FL (ref 11.5–15)
PDW BLD-RTO: 14.5 FL (ref 11.5–15)
PDW BLD-RTO: 14.8 FL (ref 11.5–15)
PDW BLD-RTO: 14.8 FL (ref 11.5–15)
PDW BLD-RTO: 14.9 FL (ref 11.5–15)
PDW BLD-RTO: 16.7 FL (ref 11.5–15)
PDW BLD-RTO: 17.5 FL (ref 11.5–15)
PDW BLD-RTO: 17.6 FL (ref 11.5–15)
PDW BLD-RTO: 17.9 FL (ref 11.5–15)
PDW BLD-RTO: 17.9 FL (ref 11.5–15)
PDW BLD-RTO: 18 FL (ref 11.5–15)
PDW BLD-RTO: 18.1 FL (ref 11.5–15)
PDW BLD-RTO: 19.5 FL (ref 11.5–15)
PDW BLD-RTO: 19.7 FL (ref 11.5–15)
PDW BLD-RTO: 20.2 FL (ref 11.5–15)
PH BLOOD GAS: 7.41 (ref 7.35–7.45)
PH BLOOD GAS: 7.45 (ref 7.35–7.45)
PH UA: 5 (ref 5–9)
PH UA: 5 (ref 5–9)
PH UA: 6 (ref 5–9)
PH UA: 6 (ref 5–9)
PHOSPHORUS: 2.5 MG/DL (ref 2.5–4.5)
PLATELET # BLD: 202 E9/L (ref 130–450)
PLATELET # BLD: 212 E9/L (ref 130–450)
PLATELET # BLD: 250 E9/L (ref 130–450)
PLATELET # BLD: 251 E9/L (ref 130–450)
PLATELET # BLD: 251 E9/L (ref 130–450)
PLATELET # BLD: 253 E9/L (ref 130–450)
PLATELET # BLD: 263 E9/L (ref 130–450)
PLATELET # BLD: 269 E9/L (ref 130–450)
PLATELET # BLD: 274 E9/L (ref 130–450)
PLATELET # BLD: 284 E9/L (ref 130–450)
PLATELET # BLD: 288 E9/L (ref 130–450)
PLATELET # BLD: 300 E9/L (ref 130–450)
PLATELET # BLD: 302 E9/L (ref 130–450)
PLATELET # BLD: 307 E9/L (ref 130–450)
PLATELET # BLD: 313 E9/L (ref 130–450)
PLATELET # BLD: 319 E9/L (ref 130–450)
PLATELET # BLD: 326 E9/L (ref 130–450)
PLATELET # BLD: 335 E9/L (ref 130–450)
PLATELET # BLD: 346 E9/L (ref 130–450)
PLATELET # BLD: 367 E9/L (ref 130–450)
PLATELET # BLD: 368 E9/L (ref 130–450)
PLATELET # BLD: 382 E9/L (ref 130–450)
PMV BLD AUTO: 8.3 FL (ref 7–12)
PMV BLD AUTO: 8.4 FL (ref 7–12)
PMV BLD AUTO: 8.4 FL (ref 7–12)
PMV BLD AUTO: 8.6 FL (ref 7–12)
PMV BLD AUTO: 8.7 FL (ref 7–12)
PMV BLD AUTO: 8.8 FL (ref 7–12)
PMV BLD AUTO: 8.9 FL (ref 7–12)
PMV BLD AUTO: 8.9 FL (ref 7–12)
PMV BLD AUTO: 9.1 FL (ref 7–12)
PMV BLD AUTO: 9.1 FL (ref 7–12)
PMV BLD AUTO: 9.2 FL (ref 7–12)
PMV BLD AUTO: 9.2 FL (ref 7–12)
PMV BLD AUTO: 9.3 FL (ref 7–12)
PMV BLD AUTO: 9.3 FL (ref 7–12)
PMV BLD AUTO: 9.5 FL (ref 7–12)
PMV BLD AUTO: 9.7 FL (ref 7–12)
PMV BLD AUTO: 9.9 FL (ref 7–12)
PO2: 54.3 MMHG (ref 75–100)
PO2: 55.1 MMHG (ref 75–100)
POTASSIUM REFLEX MAGNESIUM: 3.4 MMOL/L (ref 3.5–5)
POTASSIUM REFLEX MAGNESIUM: 3.5 MMOL/L (ref 3.5–5)
POTASSIUM REFLEX MAGNESIUM: 3.7 MMOL/L (ref 3.5–5)
POTASSIUM REFLEX MAGNESIUM: 3.7 MMOL/L (ref 3.5–5)
POTASSIUM REFLEX MAGNESIUM: 4 MMOL/L (ref 3.5–5)
POTASSIUM SERPL-SCNC: 3.3 MMOL/L (ref 3.5–5)
POTASSIUM SERPL-SCNC: 3.4 MMOL/L (ref 3.5–5)
POTASSIUM SERPL-SCNC: 3.5 MMOL/L (ref 3.5–5)
POTASSIUM SERPL-SCNC: 3.6 MMOL/L (ref 3.5–5)
POTASSIUM SERPL-SCNC: 3.9 MMOL/L (ref 3.5–5)
POTASSIUM SERPL-SCNC: 3.9 MMOL/L (ref 3.5–5)
POTASSIUM SERPL-SCNC: 4 MMOL/L (ref 3.5–5)
POTASSIUM SERPL-SCNC: 4 MMOL/L (ref 3.5–5)
POTASSIUM SERPL-SCNC: 4.1 MMOL/L (ref 3.5–5)
POTASSIUM SERPL-SCNC: 4.1 MMOL/L (ref 3.5–5)
POTASSIUM SERPL-SCNC: 4.3 MMOL/L (ref 3.5–5)
POTASSIUM SERPL-SCNC: 4.3 MMOL/L (ref 3.5–5)
POTASSIUM SERPL-SCNC: 4.4 MMOL/L (ref 3.5–5)
POTASSIUM SERPL-SCNC: 4.5 MMOL/L (ref 3.5–5)
POTASSIUM SERPL-SCNC: 5 MMOL/L (ref 3.5–5)
PREALBUMIN: 8 MG/DL (ref 20–40)
PRO-BNP: 3219 PG/ML (ref 0–450)
PRO-BNP: 3988 PG/ML (ref 0–450)
PRO-BNP: ABNORMAL PG/ML (ref 0–450)
PROCALCITONIN: 0.06 NG/ML (ref 0–0.08)
PROCALCITONIN: 0.08 NG/ML (ref 0–0.08)
PROCALCITONIN: 0.21 NG/ML (ref 0–0.08)
PROCALCITONIN: 0.24 NG/ML (ref 0–0.08)
PROCALCITONIN: 0.25 NG/ML (ref 0–0.08)
PROTEIN UA: NEGATIVE MG/DL
PROTEUS SPECIES BY PCR: NOT DETECTED
PSEUDOMONAS AERUGINOSA BY PCR: NOT DETECTED
RBC # BLD: 2.67 E12/L (ref 3.5–5.5)
RBC # BLD: 2.68 E12/L (ref 3.5–5.5)
RBC # BLD: 2.69 E12/L (ref 3.5–5.5)
RBC # BLD: 2.75 E12/L (ref 3.5–5.5)
RBC # BLD: 2.84 E12/L (ref 3.5–5.5)
RBC # BLD: 2.87 E12/L (ref 3.5–5.5)
RBC # BLD: 2.9 E12/L (ref 3.5–5.5)
RBC # BLD: 2.93 E12/L (ref 3.5–5.5)
RBC # BLD: 2.96 E12/L (ref 3.5–5.5)
RBC # BLD: 3 E12/L (ref 3.5–5.5)
RBC # BLD: 3.13 E12/L (ref 3.5–5.5)
RBC # BLD: 3.16 E12/L (ref 3.5–5.5)
RBC # BLD: 3.2 E12/L (ref 3.5–5.5)
RBC # BLD: 3.29 E12/L (ref 3.5–5.5)
RBC # BLD: 3.29 E12/L (ref 3.5–5.5)
RBC # BLD: 3.37 E12/L (ref 3.5–5.5)
RBC # BLD: 3.42 E12/L (ref 3.5–5.5)
RBC # BLD: 3.49 E12/L (ref 3.5–5.5)
RBC # BLD: 3.58 E12/L (ref 3.5–5.5)
RBC # BLD: 3.64 E12/L (ref 3.5–5.5)
RBC # BLD: 3.75 E12/L (ref 3.5–5.5)
RBC # BLD: 5.3 E12/L (ref 3.5–5.5)
RBC UA: ABNORMAL /HPF (ref 0–2)
RESPIRATORY SYNCYTIAL VIRUS BY PCR: NOT DETECTED
SARS-COV-2, NAAT: NOT DETECTED
SARS-COV-2, PCR: NOT DETECTED
SEDIMENTATION RATE, ERYTHROCYTE: 103 MM/HR (ref 0–20)
SEDIMENTATION RATE, ERYTHROCYTE: 123 MM/HR (ref 0–20)
SEDIMENTATION RATE, ERYTHROCYTE: 65 MM/HR (ref 0–20)
SEDIMENTATION RATE, ERYTHROCYTE: 76 MM/HR (ref 0–20)
SEDIMENTATION RATE, ERYTHROCYTE: 97 MM/HR (ref 0–20)
SERRATIA MARCESCENS BY PCR: NOT DETECTED
SODIUM BLD-SCNC: 133 MMOL/L (ref 132–146)
SODIUM BLD-SCNC: 135 MMOL/L (ref 132–146)
SODIUM BLD-SCNC: 135 MMOL/L (ref 132–146)
SODIUM BLD-SCNC: 136 MMOL/L (ref 132–146)
SODIUM BLD-SCNC: 137 MMOL/L (ref 132–146)
SODIUM BLD-SCNC: 137 MMOL/L (ref 132–146)
SODIUM BLD-SCNC: 138 MMOL/L (ref 132–146)
SODIUM BLD-SCNC: 139 MMOL/L (ref 132–146)
SODIUM BLD-SCNC: 139 MMOL/L (ref 132–146)
SODIUM BLD-SCNC: 140 MMOL/L (ref 132–146)
SODIUM BLD-SCNC: 142 MMOL/L (ref 132–146)
SODIUM BLD-SCNC: 145 MMOL/L (ref 132–146)
SODIUM BLD-SCNC: 145 MMOL/L (ref 132–146)
SOURCE OF BLOOD CULTURE: ABNORMAL
SOURCE OF BLOOD CULTURE: ABNORMAL
SOURCE, BLOOD GAS: ABNORMAL
SOURCE, BLOOD GAS: ABNORMAL
SPECIFIC GRAVITY UA: 1.01 (ref 1–1.03)
SPECIFIC GRAVITY UA: 1.02 (ref 1–1.03)
SPECIFIC GRAVITY UA: 1.02 (ref 1–1.03)
SPECIFIC GRAVITY UA: >=1.03 (ref 1–1.03)
STAPHYLOCOCCUS AUREUS BY PCR: NOT DETECTED
STAPHYLOCOCCUS SPECIES BY PCR: DETECTED
STREP PNEUMONIAE ANTIGEN, URINE: NORMAL
STREP PNEUMONIAE ANTIGEN, URINE: NORMAL
STREPTOCOCCUS AGALACTIAE BY PCR: NOT DETECTED
STREPTOCOCCUS PNEUMONIAE BY PCR: NOT DETECTED
STREPTOCOCCUS PYOGENES  BY PCR: NOT DETECTED
STREPTOCOCCUS SPECIES BY PCR: NOT DETECTED
THB: 11.4 G/DL (ref 11.5–16.5)
THB: 11.5 G/DL (ref 11.5–16.5)
TIME ANALYZED: 1034
TIME ANALYZED: 1109
TOTAL IRON BINDING CAPACITY: 169 MCG/DL (ref 250–450)
TOTAL PROTEIN: 5.7 G/DL (ref 6.4–8.3)
TOTAL PROTEIN: 5.9 G/DL (ref 6.4–8.3)
TOTAL PROTEIN: 6.1 G/DL (ref 6.4–8.3)
TOTAL PROTEIN: 6.2 G/DL (ref 6.4–8.3)
TOTAL PROTEIN: 6.3 G/DL (ref 6.4–8.3)
TOTAL PROTEIN: 6.4 G/DL (ref 6.4–8.3)
TOTAL PROTEIN: 6.5 G/DL (ref 6.4–8.3)
TOTAL PROTEIN: 7 G/DL (ref 6.4–8.3)
TOTAL PROTEIN: 7.1 G/DL (ref 6.4–8.3)
TOTAL PROTEIN: 7.2 G/DL (ref 6.4–8.3)
TOTAL PROTEIN: 7.2 G/DL (ref 6.4–8.3)
TROPONIN, HIGH SENSITIVITY: 38 NG/L (ref 0–9)
TROPONIN, HIGH SENSITIVITY: 39 NG/L (ref 0–9)
TROPONIN, HIGH SENSITIVITY: 47 NG/L (ref 0–9)
TROPONIN, HIGH SENSITIVITY: 56 NG/L (ref 0–9)
TROPONIN: 0.03 NG/ML (ref 0–0.03)
TROPONIN: 0.03 NG/ML (ref 0–0.03)
TROPONIN: 0.04 NG/ML (ref 0–0.03)
TROPONIN: 0.04 NG/ML (ref 0–0.03)
TROPONIN: 0.05 NG/ML (ref 0–0.03)
TROPONIN: 0.06 NG/ML (ref 0–0.03)
TROPONIN: 0.06 NG/ML (ref 0–0.03)
TSH SERPL DL<=0.05 MIU/L-ACNC: 5.7 UIU/ML (ref 0.27–4.2)
URINE CULTURE, ROUTINE: ABNORMAL
URINE CULTURE, ROUTINE: ABNORMAL
UROBILINOGEN, URINE: 0.2 E.U./DL
VITAMIN B-12: 1020 PG/ML (ref 211–946)
VITAMIN B-12: 845 PG/ML (ref 211–946)
WBC # BLD: 10.2 E9/L (ref 4.5–11.5)
WBC # BLD: 10.3 E9/L (ref 4.5–11.5)
WBC # BLD: 10.4 E9/L (ref 4.5–11.5)
WBC # BLD: 10.6 E9/L (ref 4.5–11.5)
WBC # BLD: 10.8 E9/L (ref 4.5–11.5)
WBC # BLD: 11.5 E9/L (ref 4.5–11.5)
WBC # BLD: 11.5 E9/L (ref 4.5–11.5)
WBC # BLD: 11.6 E9/L (ref 4.5–11.5)
WBC # BLD: 13.5 E9/L (ref 4.5–11.5)
WBC # BLD: 17 E9/L (ref 4.5–11.5)
WBC # BLD: 6.8 E9/L (ref 4.5–11.5)
WBC # BLD: 7.5 E9/L (ref 4.5–11.5)
WBC # BLD: 7.9 E9/L (ref 4.5–11.5)
WBC # BLD: 8 E9/L (ref 4.5–11.5)
WBC # BLD: 8.2 E9/L (ref 4.5–11.5)
WBC # BLD: 8.4 E9/L (ref 4.5–11.5)
WBC # BLD: 8.4 E9/L (ref 4.5–11.5)
WBC # BLD: 9.1 E9/L (ref 4.5–11.5)
WBC # BLD: 9.2 E9/L (ref 4.5–11.5)
WBC # BLD: 9.5 E9/L (ref 4.5–11.5)
WBC # BLD: 9.5 E9/L (ref 4.5–11.5)
WBC # BLD: 9.7 E9/L (ref 4.5–11.5)
WBC UA: ABNORMAL /HPF (ref 0–5)
WOUND/ABSCESS: ABNORMAL
WOUND/ABSCESS: NORMAL
WOUND/ABSCESS: NORMAL
YEAST: PRESENT /HPF

## 2021-01-01 PROCEDURE — 6360000002 HC RX W HCPCS

## 2021-01-01 PROCEDURE — 4040F PNEUMOC VAC/ADMIN/RCVD: CPT | Performed by: INTERNAL MEDICINE

## 2021-01-01 PROCEDURE — 94640 AIRWAY INHALATION TREATMENT: CPT

## 2021-01-01 PROCEDURE — 87070 CULTURE OTHR SPECIMN AEROBIC: CPT

## 2021-01-01 PROCEDURE — 82962 GLUCOSE BLOOD TEST: CPT

## 2021-01-01 PROCEDURE — 85651 RBC SED RATE NONAUTOMATED: CPT

## 2021-01-01 PROCEDURE — 6370000000 HC RX 637 (ALT 250 FOR IP): Performed by: NURSE PRACTITIONER

## 2021-01-01 PROCEDURE — 36415 COLL VENOUS BLD VENIPUNCTURE: CPT

## 2021-01-01 PROCEDURE — 2500000003 HC RX 250 WO HCPCS: Performed by: INTERNAL MEDICINE

## 2021-01-01 PROCEDURE — 71275 CT ANGIOGRAPHY CHEST: CPT

## 2021-01-01 PROCEDURE — 6370000000 HC RX 637 (ALT 250 FOR IP): Performed by: INTERNAL MEDICINE

## 2021-01-01 PROCEDURE — 36600 WITHDRAWAL OF ARTERIAL BLOOD: CPT

## 2021-01-01 PROCEDURE — 99214 OFFICE O/P EST MOD 30 MIN: CPT | Performed by: INTERNAL MEDICINE

## 2021-01-01 PROCEDURE — 96374 THER/PROPH/DIAG INJ IV PUSH: CPT

## 2021-01-01 PROCEDURE — 83735 ASSAY OF MAGNESIUM: CPT

## 2021-01-01 PROCEDURE — 2140000000 HC CCU INTERMEDIATE R&B

## 2021-01-01 PROCEDURE — 1090F PRES/ABSN URINE INCON ASSESS: CPT | Performed by: FAMILY MEDICINE

## 2021-01-01 PROCEDURE — 6360000002 HC RX W HCPCS: Performed by: INTERNAL MEDICINE

## 2021-01-01 PROCEDURE — 2500000003 HC RX 250 WO HCPCS: Performed by: NURSE PRACTITIONER

## 2021-01-01 PROCEDURE — 85027 COMPLETE CBC AUTOMATED: CPT

## 2021-01-01 PROCEDURE — 85018 HEMOGLOBIN: CPT

## 2021-01-01 PROCEDURE — 80053 COMPREHEN METABOLIC PANEL: CPT

## 2021-01-01 PROCEDURE — 96372 THER/PROPH/DIAG INJ SC/IM: CPT | Performed by: FAMILY MEDICINE

## 2021-01-01 PROCEDURE — 2580000003 HC RX 258: Performed by: INTERNAL MEDICINE

## 2021-01-01 PROCEDURE — 86140 C-REACTIVE PROTEIN: CPT

## 2021-01-01 PROCEDURE — G0378 HOSPITAL OBSERVATION PER HR: HCPCS

## 2021-01-01 PROCEDURE — 2580000003 HC RX 258: Performed by: NURSE PRACTITIONER

## 2021-01-01 PROCEDURE — 2060000000 HC ICU INTERMEDIATE R&B

## 2021-01-01 PROCEDURE — 97530 THERAPEUTIC ACTIVITIES: CPT

## 2021-01-01 PROCEDURE — 99232 SBSQ HOSP IP/OBS MODERATE 35: CPT | Performed by: INTERNAL MEDICINE

## 2021-01-01 PROCEDURE — 2500000003 HC RX 250 WO HCPCS: Performed by: STUDENT IN AN ORGANIZED HEALTH CARE EDUCATION/TRAINING PROGRAM

## 2021-01-01 PROCEDURE — 80048 BASIC METABOLIC PNL TOTAL CA: CPT

## 2021-01-01 PROCEDURE — 83880 ASSAY OF NATRIURETIC PEPTIDE: CPT

## 2021-01-01 PROCEDURE — 85025 COMPLETE CBC W/AUTO DIFF WBC: CPT

## 2021-01-01 PROCEDURE — 99222 1ST HOSP IP/OBS MODERATE 55: CPT | Performed by: SURGERY

## 2021-01-01 PROCEDURE — 82746 ASSAY OF FOLIC ACID SERUM: CPT

## 2021-01-01 PROCEDURE — 97166 OT EVAL MOD COMPLEX 45 MIN: CPT

## 2021-01-01 PROCEDURE — 2580000003 HC RX 258: Performed by: STUDENT IN AN ORGANIZED HEALTH CARE EDUCATION/TRAINING PROGRAM

## 2021-01-01 PROCEDURE — 99285 EMERGENCY DEPT VISIT HI MDM: CPT

## 2021-01-01 PROCEDURE — 82607 VITAMIN B-12: CPT

## 2021-01-01 PROCEDURE — 99217 PR OBSERVATION CARE DISCHARGE MANAGEMENT: CPT | Performed by: STUDENT IN AN ORGANIZED HEALTH CARE EDUCATION/TRAINING PROGRAM

## 2021-01-01 PROCEDURE — 1036F TOBACCO NON-USER: CPT | Performed by: FAMILY MEDICINE

## 2021-01-01 PROCEDURE — 86901 BLOOD TYPING SEROLOGIC RH(D): CPT

## 2021-01-01 PROCEDURE — C9113 INJ PANTOPRAZOLE SODIUM, VIA: HCPCS | Performed by: INTERNAL MEDICINE

## 2021-01-01 PROCEDURE — 94664 DEMO&/EVAL PT USE INHALER: CPT

## 2021-01-01 PROCEDURE — 11042 DBRDMT SUBQ TIS 1ST 20SQCM/<: CPT | Performed by: SURGERY

## 2021-01-01 PROCEDURE — 2700000000 HC OXYGEN THERAPY PER DAY

## 2021-01-01 PROCEDURE — 97161 PT EVAL LOW COMPLEX 20 MIN: CPT

## 2021-01-01 PROCEDURE — 81001 URINALYSIS AUTO W/SCOPE: CPT

## 2021-01-01 PROCEDURE — 83540 ASSAY OF IRON: CPT

## 2021-01-01 PROCEDURE — 11042 DBRDMT SUBQ TIS 1ST 20SQCM/<: CPT

## 2021-01-01 PROCEDURE — G8417 CALC BMI ABV UP PARAM F/U: HCPCS | Performed by: FAMILY MEDICINE

## 2021-01-01 PROCEDURE — 6370000000 HC RX 637 (ALT 250 FOR IP): Performed by: SPECIALIST

## 2021-01-01 PROCEDURE — 2580000003 HC RX 258: Performed by: EMERGENCY MEDICINE

## 2021-01-01 PROCEDURE — 85014 HEMATOCRIT: CPT

## 2021-01-01 PROCEDURE — 84484 ASSAY OF TROPONIN QUANT: CPT

## 2021-01-01 PROCEDURE — 97161 PT EVAL LOW COMPLEX 20 MIN: CPT | Performed by: PHYSICAL THERAPIST

## 2021-01-01 PROCEDURE — 97535 SELF CARE MNGMENT TRAINING: CPT

## 2021-01-01 PROCEDURE — 94667 MNPJ CHEST WALL 1ST: CPT

## 2021-01-01 PROCEDURE — 84443 ASSAY THYROID STIM HORMONE: CPT

## 2021-01-01 PROCEDURE — 70450 CT HEAD/BRAIN W/O DYE: CPT

## 2021-01-01 PROCEDURE — 86900 BLOOD TYPING SEROLOGIC ABO: CPT

## 2021-01-01 PROCEDURE — 84145 PROCALCITONIN (PCT): CPT

## 2021-01-01 PROCEDURE — 93010 ELECTROCARDIOGRAM REPORT: CPT | Performed by: INTERNAL MEDICINE

## 2021-01-01 PROCEDURE — 0202U NFCT DS 22 TRGT SARS-COV-2: CPT

## 2021-01-01 PROCEDURE — 6360000004 HC RX CONTRAST MEDICATION: Performed by: RADIOLOGY

## 2021-01-01 PROCEDURE — 96365 THER/PROPH/DIAG IV INF INIT: CPT

## 2021-01-01 PROCEDURE — 87081 CULTURE SCREEN ONLY: CPT

## 2021-01-01 PROCEDURE — 2580000003 HC RX 258: Performed by: SPECIALIST

## 2021-01-01 PROCEDURE — 93000 ELECTROCARDIOGRAM COMPLETE: CPT | Performed by: INTERNAL MEDICINE

## 2021-01-01 PROCEDURE — 6370000000 HC RX 637 (ALT 250 FOR IP): Performed by: EMERGENCY MEDICINE

## 2021-01-01 PROCEDURE — G8427 DOCREV CUR MEDS BY ELIG CLIN: HCPCS | Performed by: INTERNAL MEDICINE

## 2021-01-01 PROCEDURE — G8482 FLU IMMUNIZE ORDER/ADMIN: HCPCS | Performed by: INTERNAL MEDICINE

## 2021-01-01 PROCEDURE — 93922 UPR/L XTREMITY ART 2 LEVELS: CPT

## 2021-01-01 PROCEDURE — 87186 SC STD MICRODIL/AGAR DIL: CPT

## 2021-01-01 PROCEDURE — 87635 SARS-COV-2 COVID-19 AMP PRB: CPT

## 2021-01-01 PROCEDURE — 4040F PNEUMOC VAC/ADMIN/RCVD: CPT | Performed by: FAMILY MEDICINE

## 2021-01-01 PROCEDURE — 99284 EMERGENCY DEPT VISIT MOD MDM: CPT

## 2021-01-01 PROCEDURE — 6360000002 HC RX W HCPCS: Performed by: NURSE PRACTITIONER

## 2021-01-01 PROCEDURE — 6360000002 HC RX W HCPCS: Performed by: SPECIALIST

## 2021-01-01 PROCEDURE — 74177 CT ABD & PELVIS W/CONTRAST: CPT

## 2021-01-01 PROCEDURE — 87147 CULTURE TYPE IMMUNOLOGIC: CPT

## 2021-01-01 PROCEDURE — 87150 DNA/RNA AMPLIFIED PROBE: CPT

## 2021-01-01 PROCEDURE — 87449 NOS EACH ORGANISM AG IA: CPT

## 2021-01-01 PROCEDURE — 96361 HYDRATE IV INFUSION ADD-ON: CPT

## 2021-01-01 PROCEDURE — 1111F DSCHRG MED/CURRENT MED MERGE: CPT | Performed by: INTERNAL MEDICINE

## 2021-01-01 PROCEDURE — 87077 CULTURE AEROBIC IDENTIFY: CPT

## 2021-01-01 PROCEDURE — 93005 ELECTROCARDIOGRAM TRACING: CPT | Performed by: EMERGENCY MEDICINE

## 2021-01-01 PROCEDURE — G8427 DOCREV CUR MEDS BY ELIG CLIN: HCPCS | Performed by: FAMILY MEDICINE

## 2021-01-01 PROCEDURE — 97530 THERAPEUTIC ACTIVITIES: CPT | Performed by: PHYSICAL THERAPIST

## 2021-01-01 PROCEDURE — 1111F DSCHRG MED/CURRENT MED MERGE: CPT | Performed by: FAMILY MEDICINE

## 2021-01-01 PROCEDURE — 6370000000 HC RX 637 (ALT 250 FOR IP): Performed by: STUDENT IN AN ORGANIZED HEALTH CARE EDUCATION/TRAINING PROGRAM

## 2021-01-01 PROCEDURE — 6360000002 HC RX W HCPCS: Performed by: STUDENT IN AN ORGANIZED HEALTH CARE EDUCATION/TRAINING PROGRAM

## 2021-01-01 PROCEDURE — 82140 ASSAY OF AMMONIA: CPT

## 2021-01-01 PROCEDURE — 92526 ORAL FUNCTION THERAPY: CPT

## 2021-01-01 PROCEDURE — 83036 HEMOGLOBIN GLYCOSYLATED A1C: CPT

## 2021-01-01 PROCEDURE — 93005 ELECTROCARDIOGRAM TRACING: CPT | Performed by: STUDENT IN AN ORGANIZED HEALTH CARE EDUCATION/TRAINING PROGRAM

## 2021-01-01 PROCEDURE — C9113 INJ PANTOPRAZOLE SODIUM, VIA: HCPCS | Performed by: NURSE PRACTITIONER

## 2021-01-01 PROCEDURE — 96360 HYDRATION IV INFUSION INIT: CPT

## 2021-01-01 PROCEDURE — 82805 BLOOD GASES W/O2 SATURATION: CPT

## 2021-01-01 PROCEDURE — 84100 ASSAY OF PHOSPHORUS: CPT

## 2021-01-01 PROCEDURE — 99223 1ST HOSP IP/OBS HIGH 75: CPT | Performed by: INTERNAL MEDICINE

## 2021-01-01 PROCEDURE — G8417 CALC BMI ABV UP PARAM F/U: HCPCS | Performed by: INTERNAL MEDICINE

## 2021-01-01 PROCEDURE — 93005 ELECTROCARDIOGRAM TRACING: CPT | Performed by: NURSE PRACTITIONER

## 2021-01-01 PROCEDURE — 86850 RBC ANTIBODY SCREEN: CPT

## 2021-01-01 PROCEDURE — 1123F ACP DISCUSS/DSCN MKR DOCD: CPT | Performed by: FAMILY MEDICINE

## 2021-01-01 PROCEDURE — 1123F ACP DISCUSS/DSCN MKR DOCD: CPT | Performed by: INTERNAL MEDICINE

## 2021-01-01 PROCEDURE — 1036F TOBACCO NON-USER: CPT | Performed by: INTERNAL MEDICINE

## 2021-01-01 PROCEDURE — 83550 IRON BINDING TEST: CPT

## 2021-01-01 PROCEDURE — 1090F PRES/ABSN URINE INCON ASSESS: CPT | Performed by: INTERNAL MEDICINE

## 2021-01-01 PROCEDURE — 99231 SBSQ HOSP IP/OBS SF/LOW 25: CPT | Performed by: SURGERY

## 2021-01-01 PROCEDURE — 71045 X-RAY EXAM CHEST 1 VIEW: CPT

## 2021-01-01 PROCEDURE — 93005 ELECTROCARDIOGRAM TRACING: CPT | Performed by: INTERNAL MEDICINE

## 2021-01-01 PROCEDURE — 71250 CT THORAX DX C-: CPT

## 2021-01-01 PROCEDURE — 99222 1ST HOSP IP/OBS MODERATE 55: CPT | Performed by: NURSE PRACTITIONER

## 2021-01-01 PROCEDURE — 97165 OT EVAL LOW COMPLEX 30 MIN: CPT

## 2021-01-01 PROCEDURE — 97162 PT EVAL MOD COMPLEX 30 MIN: CPT | Performed by: PHYSICAL THERAPIST

## 2021-01-01 PROCEDURE — 80076 HEPATIC FUNCTION PANEL: CPT

## 2021-01-01 PROCEDURE — 93306 TTE W/DOPPLER COMPLETE: CPT

## 2021-01-01 PROCEDURE — 87040 BLOOD CULTURE FOR BACTERIA: CPT

## 2021-01-01 PROCEDURE — 97165 OT EVAL LOW COMPLEX 30 MIN: CPT | Performed by: OCCUPATIONAL THERAPIST

## 2021-01-01 PROCEDURE — 99214 OFFICE O/P EST MOD 30 MIN: CPT

## 2021-01-01 PROCEDURE — 83605 ASSAY OF LACTIC ACID: CPT

## 2021-01-01 PROCEDURE — 99496 TRANSJ CARE MGMT HIGH F2F 7D: CPT | Performed by: FAMILY MEDICINE

## 2021-01-01 PROCEDURE — 97162 PT EVAL MOD COMPLEX 30 MIN: CPT

## 2021-01-01 PROCEDURE — 6360000002 HC RX W HCPCS: Performed by: EMERGENCY MEDICINE

## 2021-01-01 PROCEDURE — 94669 MECHANICAL CHEST WALL OSCILL: CPT

## 2021-01-01 PROCEDURE — 2580000003 HC RX 258: Performed by: RADIOLOGY

## 2021-01-01 PROCEDURE — 1200000000 HC SEMI PRIVATE

## 2021-01-01 PROCEDURE — 71046 X-RAY EXAM CHEST 2 VIEWS: CPT

## 2021-01-01 PROCEDURE — 99222 1ST HOSP IP/OBS MODERATE 55: CPT | Performed by: INTERNAL MEDICINE

## 2021-01-01 PROCEDURE — APPSS60 APP SPLIT SHARED TIME 46-60 MINUTES: Performed by: PHYSICIAN ASSISTANT

## 2021-01-01 PROCEDURE — 51798 US URINE CAPACITY MEASURE: CPT

## 2021-01-01 PROCEDURE — 99214 OFFICE O/P EST MOD 30 MIN: CPT | Performed by: FAMILY MEDICINE

## 2021-01-01 PROCEDURE — 97597 DBRDMT OPN WND 1ST 20 CM/<: CPT | Performed by: SURGERY

## 2021-01-01 PROCEDURE — 99215 OFFICE O/P EST HI 40 MIN: CPT | Performed by: FAMILY MEDICINE

## 2021-01-01 PROCEDURE — 83690 ASSAY OF LIPASE: CPT

## 2021-01-01 PROCEDURE — APPSS180 APP SPLIT SHARED TIME > 60 MINUTES: Performed by: NURSE PRACTITIONER

## 2021-01-01 PROCEDURE — 97110 THERAPEUTIC EXERCISES: CPT | Performed by: PHYSICAL THERAPIST

## 2021-01-01 PROCEDURE — 99225 PR SBSQ OBSERVATION CARE/DAY 25 MINUTES: CPT | Performed by: STUDENT IN AN ORGANIZED HEALTH CARE EDUCATION/TRAINING PROGRAM

## 2021-01-01 PROCEDURE — 6360000004 HC RX CONTRAST MEDICATION: Performed by: STUDENT IN AN ORGANIZED HEALTH CARE EDUCATION/TRAINING PROGRAM

## 2021-01-01 PROCEDURE — 97597 DBRDMT OPN WND 1ST 20 CM/<: CPT

## 2021-01-01 PROCEDURE — 92610 EVALUATE SWALLOWING FUNCTION: CPT | Performed by: SPEECH-LANGUAGE PATHOLOGIST

## 2021-01-01 PROCEDURE — 3051F HG A1C>EQUAL 7.0%<8.0%: CPT | Performed by: FAMILY MEDICINE

## 2021-01-01 PROCEDURE — 84134 ASSAY OF PREALBUMIN: CPT

## 2021-01-01 RX ORDER — LANSOPRAZOLE 15 MG/1
30 CAPSULE, DELAYED RELEASE ORAL
Status: DISCONTINUED | OUTPATIENT
Start: 2021-01-01 | End: 2021-01-01 | Stop reason: CLARIF

## 2021-01-01 RX ORDER — MONTELUKAST SODIUM 10 MG/1
10 TABLET ORAL NIGHTLY
Status: DISCONTINUED | OUTPATIENT
Start: 2021-01-01 | End: 2021-01-01 | Stop reason: HOSPADM

## 2021-01-01 RX ORDER — PSEUDOEPHEDRINE HCL 30 MG
200 TABLET ORAL 2 TIMES DAILY
Qty: 60 CAPSULE | Refills: 0 | Status: SHIPPED | OUTPATIENT
Start: 2021-01-01

## 2021-01-01 RX ORDER — CARVEDILOL 12.5 MG/1
12.5 TABLET ORAL 2 TIMES DAILY WITH MEALS
Qty: 180 TABLET | Refills: 3 | Status: ON HOLD | OUTPATIENT
Start: 2021-01-01 | End: 2021-01-01 | Stop reason: HOSPADM

## 2021-01-01 RX ORDER — DEXTROSE AND SODIUM CHLORIDE 5; .45 G/100ML; G/100ML
INJECTION, SOLUTION INTRAVENOUS CONTINUOUS
Status: DISCONTINUED | OUTPATIENT
Start: 2021-01-01 | End: 2021-01-01 | Stop reason: HOSPADM

## 2021-01-01 RX ORDER — OYSTER SHELL CALCIUM WITH VITAMIN D 500; 200 MG/1; [IU]/1
1 TABLET, FILM COATED ORAL 2 TIMES DAILY
Status: DISCONTINUED | OUTPATIENT
Start: 2021-01-01 | End: 2021-01-01 | Stop reason: HOSPADM

## 2021-01-01 RX ORDER — IPRATROPIUM BROMIDE AND ALBUTEROL SULFATE 2.5; .5 MG/3ML; MG/3ML
1 SOLUTION RESPIRATORY (INHALATION) EVERY 4 HOURS PRN
Status: DISCONTINUED | OUTPATIENT
Start: 2021-01-01 | End: 2021-01-01 | Stop reason: HOSPADM

## 2021-01-01 RX ORDER — SODIUM CHLORIDE 0.9 % (FLUSH) 0.9 %
5-40 SYRINGE (ML) INJECTION PRN
Status: DISCONTINUED | OUTPATIENT
Start: 2021-01-01 | End: 2021-01-01 | Stop reason: HOSPADM

## 2021-01-01 RX ORDER — SODIUM CHLORIDE 0.9 % (FLUSH) 0.9 %
5-40 SYRINGE (ML) INJECTION EVERY 12 HOURS SCHEDULED
Status: DISCONTINUED | OUTPATIENT
Start: 2021-01-01 | End: 2021-01-01 | Stop reason: HOSPADM

## 2021-01-01 RX ORDER — SODIUM CHLORIDE 0.9 % (FLUSH) 0.9 %
10 SYRINGE (ML) INJECTION EVERY 12 HOURS SCHEDULED
Status: DISCONTINUED | OUTPATIENT
Start: 2021-01-01 | End: 2021-01-01 | Stop reason: HOSPADM

## 2021-01-01 RX ORDER — GABAPENTIN 300 MG/1
300 CAPSULE ORAL 2 TIMES DAILY
Qty: 180 CAPSULE | Refills: 1
Start: 2021-01-01 | End: 2021-01-01 | Stop reason: SDUPTHER

## 2021-01-01 RX ORDER — LIDOCAINE 50 MG/G
OINTMENT TOPICAL ONCE
Status: CANCELLED | OUTPATIENT
Start: 2021-01-01 | End: 2021-01-01

## 2021-01-01 RX ORDER — BUMETANIDE 1 MG/1
1 TABLET ORAL DAILY
Status: DISCONTINUED | OUTPATIENT
Start: 2021-01-01 | End: 2021-01-01 | Stop reason: HOSPADM

## 2021-01-01 RX ORDER — GENTAMICIN SULFATE 1 MG/G
OINTMENT TOPICAL ONCE
Status: CANCELLED | OUTPATIENT
Start: 2021-01-01 | End: 2021-01-01

## 2021-01-01 RX ORDER — POTASSIUM CHLORIDE 20 MEQ/1
40 TABLET, EXTENDED RELEASE ORAL PRN
Status: DISCONTINUED | OUTPATIENT
Start: 2021-01-01 | End: 2021-01-01 | Stop reason: HOSPADM

## 2021-01-01 RX ORDER — SODIUM CHLORIDE 9 MG/ML
500 INJECTION, SOLUTION INTRAVENOUS CONTINUOUS
Status: DISCONTINUED | OUTPATIENT
Start: 2021-01-01 | End: 2021-01-01 | Stop reason: HOSPADM

## 2021-01-01 RX ORDER — ISOSORBIDE MONONITRATE 30 MG/1
30 TABLET, EXTENDED RELEASE ORAL DAILY
Qty: 90 TABLET | Refills: 1 | Status: ON HOLD | OUTPATIENT
Start: 2021-01-01 | End: 2021-01-01 | Stop reason: HOSPADM

## 2021-01-01 RX ORDER — SODIUM CHLORIDE 0.9 % (FLUSH) 0.9 %
10 SYRINGE (ML) INJECTION PRN
Status: DISCONTINUED | OUTPATIENT
Start: 2021-01-01 | End: 2021-01-01 | Stop reason: HOSPADM

## 2021-01-01 RX ORDER — HEPARIN SODIUM (PORCINE) LOCK FLUSH IV SOLN 100 UNIT/ML 100 UNIT/ML
3 SOLUTION INTRAVENOUS PRN
Status: DISCONTINUED | OUTPATIENT
Start: 2021-01-01 | End: 2021-01-01 | Stop reason: HOSPADM

## 2021-01-01 RX ORDER — BETAMETHASONE DIPROPIONATE 0.05 %
OINTMENT (GRAM) TOPICAL ONCE
Status: CANCELLED | OUTPATIENT
Start: 2021-01-01 | End: 2021-01-01

## 2021-01-01 RX ORDER — LIRAGLUTIDE 6 MG/ML
1.2 INJECTION SUBCUTANEOUS DAILY
Qty: 2 PEN | Refills: 3 | Status: SHIPPED
Start: 2021-01-01 | End: 2021-01-01

## 2021-01-01 RX ORDER — DOCUSATE SODIUM 100 MG/1
200 CAPSULE, LIQUID FILLED ORAL 2 TIMES DAILY
Status: DISCONTINUED | OUTPATIENT
Start: 2021-01-01 | End: 2021-01-01 | Stop reason: HOSPADM

## 2021-01-01 RX ORDER — PANTOPRAZOLE SODIUM 40 MG/10ML
40 INJECTION, POWDER, LYOPHILIZED, FOR SOLUTION INTRAVENOUS DAILY
Status: DISCONTINUED | OUTPATIENT
Start: 2021-01-01 | End: 2021-01-01 | Stop reason: HOSPADM

## 2021-01-01 RX ORDER — LACTOBACILLUS RHAMNOSUS GG 10B CELL
1 CAPSULE ORAL DAILY
Status: DISCONTINUED | OUTPATIENT
Start: 2021-01-01 | End: 2021-01-01 | Stop reason: HOSPADM

## 2021-01-01 RX ORDER — 0.9 % SODIUM CHLORIDE 0.9 %
1000 INTRAVENOUS SOLUTION INTRAVENOUS ONCE
Status: COMPLETED | OUTPATIENT
Start: 2021-01-01 | End: 2021-01-01

## 2021-01-01 RX ORDER — INSULIN LISPRO 100 [IU]/ML
0-10 INJECTION, SOLUTION INTRAVENOUS; SUBCUTANEOUS
Status: ON HOLD | COMMUNITY
End: 2021-01-01 | Stop reason: HOSPADM

## 2021-01-01 RX ORDER — BACITRACIN, NEOMYCIN, POLYMYXIN B 400; 3.5; 5 [USP'U]/G; MG/G; [USP'U]/G
OINTMENT TOPICAL ONCE
Status: CANCELLED | OUTPATIENT
Start: 2021-01-01 | End: 2021-01-01

## 2021-01-01 RX ORDER — POLYETHYLENE GLYCOL 3350 17 G/17G
17 POWDER, FOR SOLUTION ORAL DAILY PRN
Status: DISCONTINUED | OUTPATIENT
Start: 2021-01-01 | End: 2021-01-01 | Stop reason: HOSPADM

## 2021-01-01 RX ORDER — BACITRACIN ZINC AND POLYMYXIN B SULFATE 500; 1000 [USP'U]/G; [USP'U]/G
OINTMENT TOPICAL ONCE
Status: CANCELLED | OUTPATIENT
Start: 2021-01-01 | End: 2021-01-01

## 2021-01-01 RX ORDER — LIDOCAINE HYDROCHLORIDE 40 MG/ML
SOLUTION TOPICAL ONCE
Status: DISCONTINUED | OUTPATIENT
Start: 2021-01-01 | End: 2021-01-01 | Stop reason: HOSPADM

## 2021-01-01 RX ORDER — ACETAMINOPHEN 650 MG/1
650 SUPPOSITORY RECTAL EVERY 6 HOURS PRN
Status: DISCONTINUED | OUTPATIENT
Start: 2021-01-01 | End: 2021-01-01 | Stop reason: HOSPADM

## 2021-01-01 RX ORDER — PANTOPRAZOLE SODIUM 40 MG/1
40 TABLET, DELAYED RELEASE ORAL
Refills: 1 | Status: DISCONTINUED | OUTPATIENT
Start: 2021-01-01 | End: 2021-01-01 | Stop reason: HOSPADM

## 2021-01-01 RX ORDER — ASPIRIN 81 MG/1
81 TABLET, CHEWABLE ORAL DAILY
Status: DISCONTINUED | OUTPATIENT
Start: 2021-01-01 | End: 2021-01-01 | Stop reason: HOSPADM

## 2021-01-01 RX ORDER — MORPHINE SULFATE 2 MG/ML
2 INJECTION, SOLUTION INTRAMUSCULAR; INTRAVENOUS EVERY 4 HOURS PRN
Status: DISCONTINUED | OUTPATIENT
Start: 2021-01-01 | End: 2021-01-01 | Stop reason: HOSPADM

## 2021-01-01 RX ORDER — LEVOFLOXACIN 500 MG/1
500 TABLET, FILM COATED ORAL EVERY OTHER DAY
Qty: 3 TABLET | Refills: 0 | Status: SHIPPED | OUTPATIENT
Start: 2021-01-01 | End: 2021-01-01

## 2021-01-01 RX ORDER — DEXTROSE MONOHYDRATE 50 MG/ML
100 INJECTION, SOLUTION INTRAVENOUS PRN
Status: DISCONTINUED | OUTPATIENT
Start: 2021-01-01 | End: 2021-01-01 | Stop reason: HOSPADM

## 2021-01-01 RX ORDER — MORPHINE SULFATE 10 MG/5ML
2 SOLUTION ORAL EVERY 4 HOURS PRN
Status: DISCONTINUED | OUTPATIENT
Start: 2021-01-01 | End: 2021-01-01

## 2021-01-01 RX ORDER — ATORVASTATIN CALCIUM 40 MG/1
40 TABLET, FILM COATED ORAL NIGHTLY
Status: DISCONTINUED | OUTPATIENT
Start: 2021-01-01 | End: 2021-01-01 | Stop reason: HOSPADM

## 2021-01-01 RX ORDER — GUAIFENESIN 600 MG/1
600 TABLET, EXTENDED RELEASE ORAL 2 TIMES DAILY
Qty: 30 TABLET | Refills: 0 | Status: SHIPPED | OUTPATIENT
Start: 2021-01-01 | End: 2021-01-01

## 2021-01-01 RX ORDER — FLUCONAZOLE 100 MG/1
200 TABLET ORAL DAILY
Status: DISCONTINUED | OUTPATIENT
Start: 2021-01-01 | End: 2021-01-01 | Stop reason: HOSPADM

## 2021-01-01 RX ORDER — DOXYCYCLINE HYCLATE 100 MG/1
100 CAPSULE ORAL EVERY 12 HOURS SCHEDULED
Qty: 20 CAPSULE | Refills: 0 | Status: ON HOLD | OUTPATIENT
Start: 2021-01-01 | End: 2021-01-01 | Stop reason: HOSPADM

## 2021-01-01 RX ORDER — SODIUM CHLORIDE 9 MG/ML
25 INJECTION, SOLUTION INTRAVENOUS PRN
Status: DISCONTINUED | OUTPATIENT
Start: 2021-01-01 | End: 2021-01-01 | Stop reason: HOSPADM

## 2021-01-01 RX ORDER — SODIUM CHLORIDE 9 MG/ML
10 INJECTION INTRAVENOUS DAILY
Status: DISCONTINUED | OUTPATIENT
Start: 2021-01-01 | End: 2021-01-01 | Stop reason: HOSPADM

## 2021-01-01 RX ORDER — HEPARIN SODIUM (PORCINE) LOCK FLUSH IV SOLN 100 UNIT/ML 100 UNIT/ML
3 SOLUTION INTRAVENOUS EVERY 12 HOURS SCHEDULED
Status: DISCONTINUED | OUTPATIENT
Start: 2021-01-01 | End: 2021-01-01 | Stop reason: HOSPADM

## 2021-01-01 RX ORDER — PANTOPRAZOLE SODIUM 40 MG/1
40 TABLET, DELAYED RELEASE ORAL
Status: DISCONTINUED | OUTPATIENT
Start: 2021-01-01 | End: 2021-01-01 | Stop reason: HOSPADM

## 2021-01-01 RX ORDER — LANSOPRAZOLE 30 MG/1
30 CAPSULE, DELAYED RELEASE ORAL DAILY
Qty: 90 CAPSULE | Refills: 1 | Status: ON HOLD
Start: 2021-01-01 | End: 2021-01-01 | Stop reason: SDUPTHER

## 2021-01-01 RX ORDER — BISACODYL 10 MG
10 SUPPOSITORY, RECTAL RECTAL DAILY PRN
COMMUNITY

## 2021-01-01 RX ORDER — FAMOTIDINE 20 MG/1
20 TABLET, FILM COATED ORAL 2 TIMES DAILY
Status: DISCONTINUED | OUTPATIENT
Start: 2021-01-01 | End: 2021-01-01 | Stop reason: DRUGHIGH

## 2021-01-01 RX ORDER — IPRATROPIUM BROMIDE AND ALBUTEROL SULFATE 2.5; .5 MG/3ML; MG/3ML
1 SOLUTION RESPIRATORY (INHALATION)
Status: DISCONTINUED | OUTPATIENT
Start: 2021-01-01 | End: 2021-01-01 | Stop reason: HOSPADM

## 2021-01-01 RX ORDER — ONDANSETRON HYDROCHLORIDE 4 MG/5ML
4 SOLUTION ORAL 2 TIMES DAILY PRN
Qty: 50 ML | Refills: 0 | Status: SHIPPED
Start: 2021-01-01 | End: 2021-01-01 | Stop reason: ALTCHOICE

## 2021-01-01 RX ORDER — 0.9 % SODIUM CHLORIDE 0.9 %
500 INTRAVENOUS SOLUTION INTRAVENOUS ONCE
Status: COMPLETED | OUTPATIENT
Start: 2021-01-01 | End: 2021-01-01

## 2021-01-01 RX ORDER — BUMETANIDE 1 MG/1
1 TABLET ORAL DAILY
Qty: 90 TABLET | Refills: 1 | Status: SHIPPED
Start: 2021-01-01 | End: 2021-01-01 | Stop reason: SDUPTHER

## 2021-01-01 RX ORDER — BISACODYL 10 MG
10 SUPPOSITORY, RECTAL RECTAL DAILY PRN
Status: DISCONTINUED | OUTPATIENT
Start: 2021-01-01 | End: 2021-01-01 | Stop reason: HOSPADM

## 2021-01-01 RX ORDER — LIDOCAINE 40 MG/G
CREAM TOPICAL ONCE
Status: CANCELLED | OUTPATIENT
Start: 2021-01-01 | End: 2021-01-01

## 2021-01-01 RX ORDER — ACETAMINOPHEN 325 MG/1
650 TABLET ORAL EVERY 6 HOURS PRN
Status: DISCONTINUED | OUTPATIENT
Start: 2021-01-01 | End: 2021-01-01 | Stop reason: HOSPADM

## 2021-01-01 RX ORDER — AMOXICILLIN 500 MG/1
500 CAPSULE ORAL EVERY 12 HOURS SCHEDULED
Qty: 14 CAPSULE | Refills: 0 | Status: SHIPPED | OUTPATIENT
Start: 2021-01-01 | End: 2021-01-01

## 2021-01-01 RX ORDER — GINSENG 100 MG
CAPSULE ORAL ONCE
Status: CANCELLED | OUTPATIENT
Start: 2021-01-01 | End: 2021-01-01

## 2021-01-01 RX ORDER — AMOXICILLIN 250 MG/1
500 CAPSULE ORAL EVERY 12 HOURS SCHEDULED
Status: DISCONTINUED | OUTPATIENT
Start: 2021-01-01 | End: 2021-01-01 | Stop reason: HOSPADM

## 2021-01-01 RX ORDER — DOXYCYCLINE HYCLATE 100 MG/1
100 CAPSULE ORAL EVERY 12 HOURS SCHEDULED
Status: DISCONTINUED | OUTPATIENT
Start: 2021-01-01 | End: 2021-01-01 | Stop reason: HOSPADM

## 2021-01-01 RX ORDER — BUSPIRONE HYDROCHLORIDE 5 MG/1
5 TABLET ORAL DAILY
Qty: 90 TABLET | Refills: 1 | Status: ON HOLD
Start: 2021-01-01 | End: 2021-01-01 | Stop reason: HOSPADM

## 2021-01-01 RX ORDER — ONDANSETRON 2 MG/ML
4 INJECTION INTRAMUSCULAR; INTRAVENOUS EVERY 6 HOURS PRN
Status: DISCONTINUED | OUTPATIENT
Start: 2021-01-01 | End: 2021-01-01 | Stop reason: HOSPADM

## 2021-01-01 RX ORDER — GABAPENTIN 300 MG/1
600 CAPSULE ORAL 3 TIMES DAILY
Qty: 180 CAPSULE | Refills: 0 | Status: SHIPPED
Start: 2021-01-01 | End: 2021-01-01

## 2021-01-01 RX ORDER — DEXTROSE MONOHYDRATE 25 G/50ML
12.5 INJECTION, SOLUTION INTRAVENOUS PRN
Status: DISCONTINUED | OUTPATIENT
Start: 2021-01-01 | End: 2021-01-01 | Stop reason: HOSPADM

## 2021-01-01 RX ORDER — FAMOTIDINE 20 MG/1
20 TABLET, FILM COATED ORAL 2 TIMES DAILY
Qty: 180 TABLET | Refills: 1 | Status: ON HOLD | OUTPATIENT
Start: 2021-01-01 | End: 2021-01-01 | Stop reason: HOSPADM

## 2021-01-01 RX ORDER — FUROSEMIDE 20 MG/1
20 TABLET ORAL DAILY
Status: ON HOLD | COMMUNITY
End: 2021-01-01 | Stop reason: HOSPADM

## 2021-01-01 RX ORDER — 0.9 % SODIUM CHLORIDE 0.9 %
25 INTRAVENOUS SOLUTION INTRAVENOUS EVERY 12 HOURS
Status: DISCONTINUED | OUTPATIENT
Start: 2021-01-01 | End: 2021-01-01 | Stop reason: HOSPADM

## 2021-01-01 RX ORDER — SODIUM CHLORIDE 0.9 % (FLUSH) 0.9 %
10 SYRINGE (ML) INJECTION EVERY 12 HOURS SCHEDULED
Status: DISCONTINUED | OUTPATIENT
Start: 2021-01-01 | End: 2021-01-01 | Stop reason: SDUPTHER

## 2021-01-01 RX ORDER — GABAPENTIN 300 MG/1
300 CAPSULE ORAL 2 TIMES DAILY
Status: DISCONTINUED | OUTPATIENT
Start: 2021-01-01 | End: 2021-01-01 | Stop reason: HOSPADM

## 2021-01-01 RX ORDER — POLYETHYLENE GLYCOL 3350 17 G/17G
17 POWDER, FOR SOLUTION ORAL ONCE
Status: COMPLETED | OUTPATIENT
Start: 2021-01-01 | End: 2021-01-01

## 2021-01-01 RX ORDER — BUDESONIDE AND FORMOTEROL FUMARATE DIHYDRATE 160; 4.5 UG/1; UG/1
2 AEROSOL RESPIRATORY (INHALATION) 2 TIMES DAILY
Qty: 1 INHALER | Refills: 3 | Status: ON HOLD | OUTPATIENT
Start: 2021-01-01 | End: 2021-01-01 | Stop reason: HOSPADM

## 2021-01-01 RX ORDER — BUSPIRONE HYDROCHLORIDE 5 MG/1
5 TABLET ORAL DAILY
Status: DISCONTINUED | OUTPATIENT
Start: 2021-01-01 | End: 2021-01-01 | Stop reason: HOSPADM

## 2021-01-01 RX ORDER — ACETYLCYSTEINE 100 MG/ML
4 SOLUTION ORAL; RESPIRATORY (INHALATION) EVERY 4 HOURS
Status: DISCONTINUED | OUTPATIENT
Start: 2021-01-01 | End: 2021-01-01 | Stop reason: HOSPADM

## 2021-01-01 RX ORDER — LISINOPRIL 2.5 MG/1
2.5 TABLET ORAL DAILY
Qty: 30 TABLET | Refills: 5 | Status: ON HOLD | OUTPATIENT
Start: 2021-01-01 | End: 2021-01-01 | Stop reason: HOSPADM

## 2021-01-01 RX ORDER — GUAIFENESIN 600 MG/1
600 TABLET, EXTENDED RELEASE ORAL 2 TIMES DAILY
Status: DISCONTINUED | OUTPATIENT
Start: 2021-01-01 | End: 2021-01-01 | Stop reason: CLARIF

## 2021-01-01 RX ORDER — SERTRALINE HYDROCHLORIDE 100 MG/1
150 TABLET, FILM COATED ORAL DAILY
Status: ON HOLD | COMMUNITY
End: 2021-01-01 | Stop reason: HOSPADM

## 2021-01-01 RX ORDER — LIDOCAINE HYDROCHLORIDE 20 MG/ML
JELLY TOPICAL ONCE
Status: CANCELLED | OUTPATIENT
Start: 2021-01-01 | End: 2021-01-01

## 2021-01-01 RX ORDER — BUMETANIDE 0.25 MG/ML
1 INJECTION, SOLUTION INTRAMUSCULAR; INTRAVENOUS DAILY
Status: DISCONTINUED | OUTPATIENT
Start: 2021-01-01 | End: 2021-01-01

## 2021-01-01 RX ORDER — CLOBETASOL PROPIONATE 0.5 MG/G
OINTMENT TOPICAL ONCE
Status: CANCELLED | OUTPATIENT
Start: 2021-01-01 | End: 2021-01-01

## 2021-01-01 RX ORDER — GABAPENTIN 300 MG/1
600 CAPSULE ORAL 3 TIMES DAILY
Status: DISCONTINUED | OUTPATIENT
Start: 2021-01-01 | End: 2021-01-01 | Stop reason: DRUGHIGH

## 2021-01-01 RX ORDER — CHLORAL HYDRATE 500 MG
1000 CAPSULE ORAL 2 TIMES DAILY
Status: DISCONTINUED | OUTPATIENT
Start: 2021-01-01 | End: 2021-01-01 | Stop reason: CLARIF

## 2021-01-01 RX ORDER — LEVOFLOXACIN 500 MG/1
500 TABLET, FILM COATED ORAL DAILY
Qty: 10 TABLET | Refills: 0 | Status: SHIPPED | OUTPATIENT
Start: 2021-01-01 | End: 2021-01-01

## 2021-01-01 RX ORDER — CARVEDILOL 6.25 MG/1
12.5 TABLET ORAL 2 TIMES DAILY WITH MEALS
Status: DISCONTINUED | OUTPATIENT
Start: 2021-01-01 | End: 2021-01-01 | Stop reason: HOSPADM

## 2021-01-01 RX ORDER — PROMETHAZINE HYDROCHLORIDE 25 MG/1
12.5 TABLET ORAL EVERY 6 HOURS PRN
Status: DISCONTINUED | OUTPATIENT
Start: 2021-01-01 | End: 2021-01-01 | Stop reason: HOSPADM

## 2021-01-01 RX ORDER — IPRATROPIUM BROMIDE AND ALBUTEROL SULFATE 2.5; .5 MG/3ML; MG/3ML
1 SOLUTION RESPIRATORY (INHALATION)
Status: DISCONTINUED | OUTPATIENT
Start: 2021-01-01 | End: 2021-01-01

## 2021-01-01 RX ORDER — LEVOFLOXACIN 500 MG/1
500 TABLET, FILM COATED ORAL EVERY OTHER DAY
Status: DISCONTINUED | OUTPATIENT
Start: 2021-01-01 | End: 2021-01-01 | Stop reason: HOSPADM

## 2021-01-01 RX ORDER — GABAPENTIN 300 MG/1
300 CAPSULE ORAL 3 TIMES DAILY
Status: DISCONTINUED | OUTPATIENT
Start: 2021-01-01 | End: 2021-01-01 | Stop reason: HOSPADM

## 2021-01-01 RX ORDER — NICOTINE POLACRILEX 4 MG
15 LOZENGE BUCCAL PRN
Status: DISCONTINUED | OUTPATIENT
Start: 2021-01-01 | End: 2021-01-01 | Stop reason: HOSPADM

## 2021-01-01 RX ORDER — FUROSEMIDE 20 MG/1
20 TABLET ORAL DAILY
Status: DISCONTINUED | OUTPATIENT
Start: 2021-01-01 | End: 2021-01-01 | Stop reason: HOSPADM

## 2021-01-01 RX ORDER — SODIUM CHLORIDE 9 MG/ML
1000 INJECTION, SOLUTION INTRAVENOUS CONTINUOUS
Status: DISCONTINUED | OUTPATIENT
Start: 2021-01-01 | End: 2021-01-01

## 2021-01-01 RX ORDER — BUMETANIDE 0.5 MG/1
0.5 TABLET ORAL DAILY
Qty: 30 TABLET | Refills: 5 | Status: ON HOLD | OUTPATIENT
Start: 2021-01-01 | End: 2021-01-01 | Stop reason: HOSPADM

## 2021-01-01 RX ORDER — AMPICILLIN AND SULBACTAM 2; 1 G/1; G/1
INJECTION, POWDER, FOR SOLUTION INTRAMUSCULAR; INTRAVENOUS
Status: COMPLETED
Start: 2021-01-01 | End: 2021-01-01

## 2021-01-01 RX ORDER — GUAIFENESIN 400 MG/1
400 TABLET ORAL 3 TIMES DAILY
Status: DISCONTINUED | OUTPATIENT
Start: 2021-01-01 | End: 2021-01-01 | Stop reason: HOSPADM

## 2021-01-01 RX ORDER — DOCUSATE SODIUM 100 MG/1
100 CAPSULE, LIQUID FILLED ORAL DAILY
Status: DISCONTINUED | OUTPATIENT
Start: 2021-01-01 | End: 2021-01-01 | Stop reason: HOSPADM

## 2021-01-01 RX ORDER — FAMOTIDINE 20 MG/1
10 TABLET, FILM COATED ORAL 2 TIMES DAILY
Status: DISCONTINUED | OUTPATIENT
Start: 2021-01-01 | End: 2021-01-01

## 2021-01-01 RX ORDER — LORAZEPAM 2 MG/ML
1 CONCENTRATE ORAL EVERY 6 HOURS PRN
Qty: 14 ML | Refills: 0 | Status: SHIPPED | OUTPATIENT
Start: 2021-01-01 | End: 2021-01-01

## 2021-01-01 RX ORDER — LIDOCAINE HYDROCHLORIDE 40 MG/ML
SOLUTION TOPICAL ONCE
Status: CANCELLED | OUTPATIENT
Start: 2021-01-01 | End: 2021-01-01

## 2021-01-01 RX ORDER — ACETYLCYSTEINE 200 MG/ML
600 SOLUTION ORAL; RESPIRATORY (INHALATION) 3 TIMES DAILY
Status: DISCONTINUED | OUTPATIENT
Start: 2021-01-01 | End: 2021-01-01 | Stop reason: HOSPADM

## 2021-01-01 RX ORDER — METOPROLOL TARTRATE 5 MG/5ML
2.5 INJECTION INTRAVENOUS EVERY 6 HOURS
Status: DISCONTINUED | OUTPATIENT
Start: 2021-01-01 | End: 2021-01-01 | Stop reason: HOSPADM

## 2021-01-01 RX ORDER — LIDOCAINE HYDROCHLORIDE 10 MG/ML
5 INJECTION, SOLUTION EPIDURAL; INFILTRATION; INTRACAUDAL; PERINEURAL ONCE
Status: DISCONTINUED | OUTPATIENT
Start: 2021-01-01 | End: 2021-01-01 | Stop reason: HOSPADM

## 2021-01-01 RX ORDER — IPRATROPIUM BROMIDE AND ALBUTEROL SULFATE 2.5; .5 MG/3ML; MG/3ML
1 SOLUTION RESPIRATORY (INHALATION) EVERY 4 HOURS
Status: DISCONTINUED | OUTPATIENT
Start: 2021-01-01 | End: 2021-01-01 | Stop reason: HOSPADM

## 2021-01-01 RX ORDER — LIRAGLUTIDE 6 MG/ML
1.2 INJECTION SUBCUTANEOUS DAILY
Qty: 5 PEN | Refills: 1 | Status: SHIPPED
Start: 2021-01-01 | End: 2021-01-01 | Stop reason: ALTCHOICE

## 2021-01-01 RX ORDER — MONTELUKAST SODIUM 10 MG/1
10 TABLET ORAL NIGHTLY
Qty: 90 TABLET | Refills: 1 | Status: ON HOLD | OUTPATIENT
Start: 2021-01-01 | End: 2021-01-01 | Stop reason: HOSPADM

## 2021-01-01 RX ORDER — GUAIFENESIN AND CODEINE PHOSPHATE 100; 10 MG/5ML; MG/5ML
5 SOLUTION ORAL 4 TIMES DAILY PRN
Qty: 120 ML | Refills: 0 | Status: ON HOLD | OUTPATIENT
Start: 2021-01-01 | End: 2021-01-01 | Stop reason: HOSPADM

## 2021-01-01 RX ORDER — GABAPENTIN 300 MG/1
600 CAPSULE ORAL 3 TIMES DAILY
Status: DISCONTINUED | OUTPATIENT
Start: 2021-01-01 | End: 2021-01-01 | Stop reason: HOSPADM

## 2021-01-01 RX ORDER — MORPHINE SULFATE 10 MG/.5ML
2 SOLUTION ORAL EVERY 4 HOURS PRN
Status: DISCONTINUED | OUTPATIENT
Start: 2021-01-01 | End: 2021-01-01 | Stop reason: HOSPADM

## 2021-01-01 RX ORDER — DEXAMETHASONE SODIUM PHOSPHATE 4 MG/ML
4 INJECTION, SOLUTION INTRA-ARTICULAR; INTRALESIONAL; INTRAMUSCULAR; INTRAVENOUS; SOFT TISSUE ONCE
Status: COMPLETED | OUTPATIENT
Start: 2021-01-01 | End: 2021-01-01

## 2021-01-01 RX ORDER — LANSOPRAZOLE 30 MG/1
30 CAPSULE, DELAYED RELEASE ORAL DAILY
Qty: 30 CAPSULE | Refills: 0 | Status: SHIPPED | OUTPATIENT
Start: 2021-01-01 | End: 2021-01-01

## 2021-01-01 RX ORDER — GABAPENTIN 300 MG/1
300 CAPSULE ORAL 3 TIMES DAILY
Qty: 270 CAPSULE | Refills: 0 | Status: ON HOLD | OUTPATIENT
Start: 2021-01-01 | End: 2021-01-01 | Stop reason: HOSPADM

## 2021-01-01 RX ORDER — LIDOCAINE HYDROCHLORIDE 10 MG/ML
5 INJECTION, SOLUTION INFILTRATION; PERINEURAL ONCE
Status: DISCONTINUED | OUTPATIENT
Start: 2021-01-01 | End: 2021-01-01 | Stop reason: HOSPADM

## 2021-01-01 RX ORDER — BUMETANIDE 0.25 MG/ML
0.5 INJECTION, SOLUTION INTRAMUSCULAR; INTRAVENOUS EVERY 12 HOURS
Status: DISCONTINUED | OUTPATIENT
Start: 2021-01-01 | End: 2021-01-01

## 2021-01-01 RX ORDER — SODIUM CHLORIDE 0.9 % (FLUSH) 0.9 %
10 SYRINGE (ML) INJECTION PRN
Status: COMPLETED | OUTPATIENT
Start: 2021-01-01 | End: 2021-01-01

## 2021-01-01 RX ORDER — METHYLPREDNISOLONE 4 MG/1
TABLET ORAL
COMMUNITY
Start: 2021-01-01 | End: 2021-01-01 | Stop reason: ALTCHOICE

## 2021-01-01 RX ORDER — BUMETANIDE 0.5 MG/1
0.5 TABLET ORAL DAILY
Qty: 15 TABLET | Refills: 5 | Status: SHIPPED
Start: 2021-01-01 | End: 2021-01-01 | Stop reason: SDUPTHER

## 2021-01-01 RX ORDER — NITROGLYCERIN 0.4 MG/1
0.4 TABLET SUBLINGUAL EVERY 5 MIN PRN
Status: ON HOLD | COMMUNITY
End: 2021-01-01 | Stop reason: HOSPADM

## 2021-01-01 RX ORDER — ATORVASTATIN CALCIUM 40 MG/1
40 TABLET, FILM COATED ORAL NIGHTLY
Qty: 90 TABLET | Refills: 3 | Status: ON HOLD | OUTPATIENT
Start: 2021-01-01 | End: 2021-01-01 | Stop reason: HOSPADM

## 2021-01-01 RX ORDER — FLUTICASONE PROPIONATE 50 MCG
1 SPRAY, SUSPENSION (ML) NASAL DAILY PRN
Status: DISCONTINUED | OUTPATIENT
Start: 2021-01-01 | End: 2021-01-01 | Stop reason: HOSPADM

## 2021-01-01 RX ORDER — POTASSIUM CHLORIDE 7.45 MG/ML
10 INJECTION INTRAVENOUS PRN
Status: DISCONTINUED | OUTPATIENT
Start: 2021-01-01 | End: 2021-01-01 | Stop reason: HOSPADM

## 2021-01-01 RX ORDER — SCOLOPAMINE TRANSDERMAL SYSTEM 1 MG/1
1 PATCH, EXTENDED RELEASE TRANSDERMAL
Qty: 3 PATCH | Refills: 0 | Status: SHIPPED | OUTPATIENT
Start: 2021-01-01 | End: 2021-01-01

## 2021-01-01 RX ORDER — NITROGLYCERIN 0.4 MG/1
0.4 TABLET SUBLINGUAL EVERY 5 MIN PRN
Status: DISCONTINUED | OUTPATIENT
Start: 2021-01-01 | End: 2021-01-01 | Stop reason: HOSPADM

## 2021-01-01 RX ORDER — PANTOPRAZOLE SODIUM 40 MG/1
40 TABLET, DELAYED RELEASE ORAL
Status: DISCONTINUED | OUTPATIENT
Start: 2021-01-01 | End: 2021-01-01

## 2021-01-01 RX ORDER — GABAPENTIN 300 MG/1
600 CAPSULE ORAL 3 TIMES DAILY
Status: ON HOLD | COMMUNITY
End: 2021-01-01 | Stop reason: HOSPADM

## 2021-01-01 RX ORDER — MORPHINE SULFATE 10 MG/.5ML
2 SOLUTION ORAL EVERY 4 HOURS PRN
Qty: 4.2 ML | Refills: 0 | Status: SHIPPED | OUTPATIENT
Start: 2021-01-01 | End: 2021-01-01

## 2021-01-01 RX ORDER — BUMETANIDE 0.25 MG/ML
1 INJECTION, SOLUTION INTRAMUSCULAR; INTRAVENOUS 2 TIMES DAILY
Status: DISCONTINUED | OUTPATIENT
Start: 2021-01-01 | End: 2021-01-01 | Stop reason: HOSPADM

## 2021-01-01 RX ORDER — SODIUM CHLORIDE 0.9 % (FLUSH) 0.9 %
5-40 SYRINGE (ML) INJECTION EVERY 12 HOURS SCHEDULED
Status: DISCONTINUED | OUTPATIENT
Start: 2021-01-01 | End: 2021-01-01 | Stop reason: SDUPTHER

## 2021-01-01 RX ORDER — LIDOCAINE HYDROCHLORIDE 40 MG/ML
SOLUTION TOPICAL ONCE
Status: COMPLETED | OUTPATIENT
Start: 2021-01-01 | End: 2021-01-01

## 2021-01-01 RX ORDER — ASPIRIN 81 MG/1
81 TABLET ORAL DAILY
Status: ON HOLD | COMMUNITY
End: 2021-01-01 | Stop reason: HOSPADM

## 2021-01-01 RX ORDER — GABAPENTIN 300 MG/1
300 CAPSULE ORAL 3 TIMES DAILY
Status: DISCONTINUED | OUTPATIENT
Start: 2021-01-01 | End: 2021-01-01

## 2021-01-01 RX ORDER — POTASSIUM CHLORIDE 20 MEQ/1
40 TABLET, EXTENDED RELEASE ORAL ONCE
Status: COMPLETED | OUTPATIENT
Start: 2021-01-01 | End: 2021-01-01

## 2021-01-01 RX ORDER — PANTOPRAZOLE SODIUM 40 MG/10ML
40 INJECTION, POWDER, LYOPHILIZED, FOR SOLUTION INTRAVENOUS ONCE
Status: COMPLETED | OUTPATIENT
Start: 2021-01-01 | End: 2021-01-01

## 2021-01-01 RX ORDER — ACETYLCYSTEINE 200 MG/ML
600 SOLUTION ORAL; RESPIRATORY (INHALATION) 3 TIMES DAILY
Qty: 120 ML | Refills: 0 | Status: ON HOLD
Start: 2021-01-01 | End: 2021-01-01 | Stop reason: HOSPADM

## 2021-01-01 RX ORDER — ISOSORBIDE MONONITRATE 30 MG/1
30 TABLET, EXTENDED RELEASE ORAL DAILY
Status: DISCONTINUED | OUTPATIENT
Start: 2021-01-01 | End: 2021-01-01 | Stop reason: HOSPADM

## 2021-01-01 RX ORDER — SODIUM CHLORIDE 9 MG/ML
INJECTION, SOLUTION INTRAVENOUS CONTINUOUS
Status: DISCONTINUED | OUTPATIENT
Start: 2021-01-01 | End: 2021-01-01

## 2021-01-01 RX ORDER — INSULIN LISPRO 100 [IU]/ML
0-12 INJECTION, SOLUTION INTRAVENOUS; SUBCUTANEOUS
Qty: 10.8 ML | Refills: 1 | Status: SHIPPED
Start: 2021-01-01 | End: 2021-01-01

## 2021-01-01 RX ORDER — ONDANSETRON 4 MG/1
4 TABLET, ORALLY DISINTEGRATING ORAL EVERY 12 HOURS PRN
COMMUNITY

## 2021-01-01 RX ORDER — BUMETANIDE 1 MG/1
1 TABLET ORAL DAILY
Qty: 30 TABLET | Refills: 3 | Status: SHIPPED | OUTPATIENT
Start: 2021-01-01 | End: 2021-01-01 | Stop reason: SDUPTHER

## 2021-01-01 RX ORDER — PETROLATUM 42 G/100G
OINTMENT TOPICAL DAILY
Status: DISCONTINUED | OUTPATIENT
Start: 2021-01-01 | End: 2021-01-01 | Stop reason: CLARIF

## 2021-01-01 RX ORDER — ASPIRIN 81 MG/1
81 TABLET ORAL DAILY
Status: DISCONTINUED | OUTPATIENT
Start: 2021-01-01 | End: 2021-01-01 | Stop reason: HOSPADM

## 2021-01-01 RX ORDER — SODIUM CHLORIDE, SODIUM LACTATE, POTASSIUM CHLORIDE, CALCIUM CHLORIDE 600; 310; 30; 20 MG/100ML; MG/100ML; MG/100ML; MG/100ML
INJECTION, SOLUTION INTRAVENOUS CONTINUOUS
Status: DISCONTINUED | OUTPATIENT
Start: 2021-01-01 | End: 2021-01-01

## 2021-01-01 RX ORDER — GUAIFENESIN AND CODEINE PHOSPHATE 100; 10 MG/5ML; MG/5ML
5 SOLUTION ORAL 2 TIMES DAILY PRN
Qty: 60 ML | Refills: 0 | Status: SHIPPED | OUTPATIENT
Start: 2021-01-01 | End: 2021-01-01 | Stop reason: SDUPTHER

## 2021-01-01 RX ORDER — IPRATROPIUM BROMIDE AND ALBUTEROL SULFATE 2.5; .5 MG/3ML; MG/3ML
3 SOLUTION RESPIRATORY (INHALATION) ONCE
Status: COMPLETED | OUTPATIENT
Start: 2021-01-01 | End: 2021-01-01

## 2021-01-01 RX ORDER — SODIUM HYPOCHLORITE 1.25 MG/ML
SOLUTION TOPICAL DAILY
Status: DISCONTINUED | OUTPATIENT
Start: 2021-01-01 | End: 2021-01-01 | Stop reason: HOSPADM

## 2021-01-01 RX ORDER — FENTANYL CITRATE 50 UG/ML
50 INJECTION, SOLUTION INTRAMUSCULAR; INTRAVENOUS ONCE
Status: COMPLETED | OUTPATIENT
Start: 2021-01-01 | End: 2021-01-01

## 2021-01-01 RX ADMIN — SERTRALINE 150 MG: 100 TABLET, FILM COATED ORAL at 10:18

## 2021-01-01 RX ADMIN — SODIUM CHLORIDE 25 ML: 9 INJECTION, SOLUTION INTRAVENOUS at 23:00

## 2021-01-01 RX ADMIN — PANTOPRAZOLE SODIUM 40 MG: 40 TABLET, DELAYED RELEASE ORAL at 05:57

## 2021-01-01 RX ADMIN — ACETYLCYSTEINE 400 MG: 100 INHALANT RESPIRATORY (INHALATION) at 19:41

## 2021-01-01 RX ADMIN — ACETAMINOPHEN 650 MG: 325 TABLET ORAL at 15:39

## 2021-01-01 RX ADMIN — CALCIUM CARBONATE-VITAMIN D TAB 500 MG-200 UNIT 1 TABLET: 500-200 TAB at 21:18

## 2021-01-01 RX ADMIN — Medication: at 11:26

## 2021-01-01 RX ADMIN — CALCIUM CARBONATE-VITAMIN D TAB 500 MG-200 UNIT 1 TABLET: 500-200 TAB at 21:39

## 2021-01-01 RX ADMIN — INSULIN LISPRO 1 UNITS: 100 INJECTION, SOLUTION INTRAVENOUS; SUBCUTANEOUS at 20:16

## 2021-01-01 RX ADMIN — BUMETANIDE 0.5 MG: 0.25 INJECTION, SOLUTION INTRAMUSCULAR; INTRAVENOUS at 23:15

## 2021-01-01 RX ADMIN — PANTOPRAZOLE SODIUM 40 MG: 40 INJECTION, POWDER, FOR SOLUTION INTRAVENOUS at 10:47

## 2021-01-01 RX ADMIN — IPRATROPIUM BROMIDE AND ALBUTEROL SULFATE 1 AMPULE: .5; 2.5 SOLUTION RESPIRATORY (INHALATION) at 19:40

## 2021-01-01 RX ADMIN — ASPIRIN 81 MG CHEWABLE TABLET 81 MG: 81 TABLET CHEWABLE at 08:26

## 2021-01-01 RX ADMIN — Medication 10 ML: at 08:14

## 2021-01-01 RX ADMIN — SODIUM CHLORIDE, PRESERVATIVE FREE 10 ML: 5 INJECTION INTRAVENOUS at 13:32

## 2021-01-01 RX ADMIN — MICONAZOLE NITRATE: 20.6 POWDER TOPICAL at 22:02

## 2021-01-01 RX ADMIN — LIDOCAINE HYDROCHLORIDE: 40 SOLUTION TOPICAL at 14:11

## 2021-01-01 RX ADMIN — PANTOPRAZOLE SODIUM 40 MG: 40 TABLET, DELAYED RELEASE ORAL at 09:44

## 2021-01-01 RX ADMIN — ACETYLCYSTEINE 600 MG: 200 SOLUTION ORAL; RESPIRATORY (INHALATION) at 19:31

## 2021-01-01 RX ADMIN — ACETAMINOPHEN 650 MG: 325 TABLET ORAL at 12:14

## 2021-01-01 RX ADMIN — DEXTROSE MONOHYDRATE 12.5 G: 25 INJECTION, SOLUTION INTRAVENOUS at 22:28

## 2021-01-01 RX ADMIN — ONDANSETRON 4 MG: 2 INJECTION INTRAMUSCULAR; INTRAVENOUS at 14:44

## 2021-01-01 RX ADMIN — INSULIN LISPRO 3 UNITS: 100 INJECTION, SOLUTION INTRAVENOUS; SUBCUTANEOUS at 16:37

## 2021-01-01 RX ADMIN — SERTRALINE 150 MG: 50 TABLET, FILM COATED ORAL at 10:08

## 2021-01-01 RX ADMIN — INSULIN LISPRO 1 UNITS: 100 INJECTION, SOLUTION INTRAVENOUS; SUBCUTANEOUS at 16:21

## 2021-01-01 RX ADMIN — DOCUSATE SODIUM 200 MG: 100 CAPSULE, LIQUID FILLED ORAL at 09:09

## 2021-01-01 RX ADMIN — APIXABAN 5 MG: 5 TABLET, FILM COATED ORAL at 22:19

## 2021-01-01 RX ADMIN — DEXTROSE AND SODIUM CHLORIDE: 5; 450 INJECTION, SOLUTION INTRAVENOUS at 17:24

## 2021-01-01 RX ADMIN — PETROLATUM: 420 OINTMENT TOPICAL at 09:19

## 2021-01-01 RX ADMIN — BUMETANIDE 1 MG: 0.25 INJECTION INTRAMUSCULAR; INTRAVENOUS at 08:25

## 2021-01-01 RX ADMIN — MICONAZOLE NITRATE: 20 POWDER TOPICAL at 12:35

## 2021-01-01 RX ADMIN — IPRATROPIUM BROMIDE AND ALBUTEROL SULFATE 1 AMPULE: .5; 2.5 SOLUTION RESPIRATORY (INHALATION) at 18:18

## 2021-01-01 RX ADMIN — GABAPENTIN 300 MG: 300 CAPSULE ORAL at 10:08

## 2021-01-01 RX ADMIN — IPRATROPIUM BROMIDE AND ALBUTEROL SULFATE 1 AMPULE: .5; 2.5 SOLUTION RESPIRATORY (INHALATION) at 01:34

## 2021-01-01 RX ADMIN — FLUTICASONE PROPIONATE 1 SPRAY: 50 SPRAY, METERED NASAL at 10:10

## 2021-01-01 RX ADMIN — APIXABAN 5 MG: 5 TABLET, FILM COATED ORAL at 00:12

## 2021-01-01 RX ADMIN — IOPAMIDOL 60 ML: 755 INJECTION, SOLUTION INTRAVENOUS at 16:39

## 2021-01-01 RX ADMIN — DOCUSATE SODIUM 200 MG: 100 CAPSULE, LIQUID FILLED ORAL at 08:27

## 2021-01-01 RX ADMIN — LIDOCAINE HYDROCHLORIDE: 40 SOLUTION TOPICAL at 14:21

## 2021-01-01 RX ADMIN — CARVEDILOL 12.5 MG: 6.25 TABLET, FILM COATED ORAL at 17:08

## 2021-01-01 RX ADMIN — ACETAMINOPHEN 650 MG: 325 TABLET ORAL at 11:35

## 2021-01-01 RX ADMIN — IPRATROPIUM BROMIDE AND ALBUTEROL SULFATE 1 AMPULE: .5; 2.5 SOLUTION RESPIRATORY (INHALATION) at 19:30

## 2021-01-01 RX ADMIN — GABAPENTIN 300 MG: 300 CAPSULE ORAL at 00:12

## 2021-01-01 RX ADMIN — INSULIN LISPRO 1 UNITS: 100 INJECTION, SOLUTION INTRAVENOUS; SUBCUTANEOUS at 22:43

## 2021-01-01 RX ADMIN — ISOSORBIDE MONONITRATE 30 MG: 30 TABLET, EXTENDED RELEASE ORAL at 08:25

## 2021-01-01 RX ADMIN — ATORVASTATIN CALCIUM 40 MG: 40 TABLET, FILM COATED ORAL at 20:46

## 2021-01-01 RX ADMIN — SODIUM CHLORIDE, PRESERVATIVE FREE 10 ML: 5 INJECTION INTRAVENOUS at 09:18

## 2021-01-01 RX ADMIN — DOCUSATE SODIUM 200 MG: 100 CAPSULE, LIQUID FILLED ORAL at 09:11

## 2021-01-01 RX ADMIN — INSULIN LISPRO 1 UNITS: 100 INJECTION, SOLUTION INTRAVENOUS; SUBCUTANEOUS at 17:13

## 2021-01-01 RX ADMIN — APIXABAN 5 MG: 5 TABLET, FILM COATED ORAL at 08:13

## 2021-01-01 RX ADMIN — IPRATROPIUM BROMIDE AND ALBUTEROL SULFATE 1 AMPULE: .5; 2.5 SOLUTION RESPIRATORY (INHALATION) at 23:15

## 2021-01-01 RX ADMIN — PIPERACILLIN AND TAZOBACTAM 3375 MG: 3; .375 INJECTION, POWDER, LYOPHILIZED, FOR SOLUTION INTRAVENOUS at 02:15

## 2021-01-01 RX ADMIN — CARVEDILOL 12.5 MG: 6.25 TABLET, FILM COATED ORAL at 08:13

## 2021-01-01 RX ADMIN — DOXYCYCLINE 100 MG: 100 INJECTION, POWDER, LYOPHILIZED, FOR SOLUTION INTRAVENOUS at 18:03

## 2021-01-01 RX ADMIN — CARVEDILOL 12.5 MG: 6.25 TABLET, FILM COATED ORAL at 10:17

## 2021-01-01 RX ADMIN — ACETYLCYSTEINE 400 MG: 100 INHALANT RESPIRATORY (INHALATION) at 02:20

## 2021-01-01 RX ADMIN — SODIUM CHLORIDE, PRESERVATIVE FREE 10 ML: 5 INJECTION INTRAVENOUS at 22:06

## 2021-01-01 RX ADMIN — ASPIRIN 81 MG CHEWABLE TABLET 81 MG: 81 TABLET CHEWABLE at 08:29

## 2021-01-01 RX ADMIN — SODIUM CHLORIDE 500 ML: 9 INJECTION, SOLUTION INTRAVENOUS at 13:51

## 2021-01-01 RX ADMIN — SERTRALINE HYDROCHLORIDE 50 MG: 50 TABLET ORAL at 09:43

## 2021-01-01 RX ADMIN — CARVEDILOL 12.5 MG: 6.25 TABLET, FILM COATED ORAL at 17:11

## 2021-01-01 RX ADMIN — Medication: at 08:18

## 2021-01-01 RX ADMIN — IPRATROPIUM BROMIDE AND ALBUTEROL SULFATE 3 ML: .5; 3 SOLUTION RESPIRATORY (INHALATION) at 00:12

## 2021-01-01 RX ADMIN — IOPAMIDOL 90 ML: 755 INJECTION, SOLUTION INTRAVENOUS at 15:31

## 2021-01-01 RX ADMIN — INSULIN LISPRO 1 UNITS: 100 INJECTION, SOLUTION INTRAVENOUS; SUBCUTANEOUS at 23:55

## 2021-01-01 RX ADMIN — VANCOMYCIN HYDROCHLORIDE 1000 MG: 1 INJECTION, POWDER, LYOPHILIZED, FOR SOLUTION INTRAVENOUS at 21:30

## 2021-01-01 RX ADMIN — MORPHINE SULFATE 2 MG: 2 INJECTION, SOLUTION INTRAMUSCULAR; INTRAVENOUS at 23:43

## 2021-01-01 RX ADMIN — IPRATROPIUM BROMIDE AND ALBUTEROL SULFATE 1 AMPULE: .5; 3 SOLUTION RESPIRATORY (INHALATION) at 19:30

## 2021-01-01 RX ADMIN — ENOXAPARIN SODIUM 70 MG: 80 INJECTION SUBCUTANEOUS at 10:06

## 2021-01-01 RX ADMIN — SODIUM CHLORIDE 25 ML: 9 INJECTION, SOLUTION INTRAVENOUS at 21:28

## 2021-01-01 RX ADMIN — SERTRALINE 150 MG: 100 TABLET, FILM COATED ORAL at 08:33

## 2021-01-01 RX ADMIN — IPRATROPIUM BROMIDE AND ALBUTEROL SULFATE 1 AMPULE: .5; 2.5 SOLUTION RESPIRATORY (INHALATION) at 10:20

## 2021-01-01 RX ADMIN — METOPROLOL TARTRATE 2.5 MG: 1 INJECTION, SOLUTION INTRAVENOUS at 12:28

## 2021-01-01 RX ADMIN — ATORVASTATIN CALCIUM 40 MG: 40 TABLET, FILM COATED ORAL at 23:16

## 2021-01-01 RX ADMIN — FLUTICASONE PROPIONATE 1 SPRAY: 50 SPRAY, METERED NASAL at 09:18

## 2021-01-01 RX ADMIN — Medication 10 ML: at 12:40

## 2021-01-01 RX ADMIN — POLYETHYLENE GLYCOL 3350 17 G: 17 POWDER, FOR SOLUTION ORAL at 13:01

## 2021-01-01 RX ADMIN — ASPIRIN 81 MG: 81 TABLET, CHEWABLE ORAL at 13:19

## 2021-01-01 RX ADMIN — CEFEPIME HYDROCHLORIDE 2000 MG: 2 INJECTION, POWDER, FOR SOLUTION INTRAVENOUS at 18:28

## 2021-01-01 RX ADMIN — GUAIFENESIN 400 MG: 400 TABLET ORAL at 09:10

## 2021-01-01 RX ADMIN — CEFTRIAXONE 1000 MG: 1 INJECTION, POWDER, FOR SOLUTION INTRAMUSCULAR; INTRAVENOUS at 18:03

## 2021-01-01 RX ADMIN — INSULIN LISPRO 1 UNITS: 100 INJECTION, SOLUTION INTRAVENOUS; SUBCUTANEOUS at 17:18

## 2021-01-01 RX ADMIN — DOCUSATE SODIUM 200 MG: 100 CAPSULE, LIQUID FILLED ORAL at 10:06

## 2021-01-01 RX ADMIN — ENOXAPARIN SODIUM 70 MG: 80 INJECTION SUBCUTANEOUS at 23:16

## 2021-01-01 RX ADMIN — INSULIN LISPRO 1 UNITS: 100 INJECTION, SOLUTION INTRAVENOUS; SUBCUTANEOUS at 11:36

## 2021-01-01 RX ADMIN — ISOSORBIDE MONONITRATE 30 MG: 30 TABLET, EXTENDED RELEASE ORAL at 09:16

## 2021-01-01 RX ADMIN — IPRATROPIUM BROMIDE AND ALBUTEROL SULFATE 1 AMPULE: .5; 2.5 SOLUTION RESPIRATORY (INHALATION) at 09:09

## 2021-01-01 RX ADMIN — Medication: at 17:29

## 2021-01-01 RX ADMIN — ASPIRIN 81 MG CHEWABLE TABLET 81 MG: 81 TABLET CHEWABLE at 08:13

## 2021-01-01 RX ADMIN — IPRATROPIUM BROMIDE AND ALBUTEROL SULFATE 1 AMPULE: .5; 3 SOLUTION RESPIRATORY (INHALATION) at 20:56

## 2021-01-01 RX ADMIN — ASPIRIN 81 MG CHEWABLE TABLET 81 MG: 81 TABLET CHEWABLE at 09:17

## 2021-01-01 RX ADMIN — POTASSIUM CHLORIDE 40 MEQ: 1500 TABLET, EXTENDED RELEASE ORAL at 14:10

## 2021-01-01 RX ADMIN — DOCUSATE SODIUM 200 MG: 100 CAPSULE, LIQUID FILLED ORAL at 23:15

## 2021-01-01 RX ADMIN — INSULIN LISPRO 2 UNITS: 100 INJECTION, SOLUTION INTRAVENOUS; SUBCUTANEOUS at 12:31

## 2021-01-01 RX ADMIN — INSULIN LISPRO 1 UNITS: 100 INJECTION, SOLUTION INTRAVENOUS; SUBCUTANEOUS at 11:59

## 2021-01-01 RX ADMIN — CARVEDILOL 12.5 MG: 6.25 TABLET, FILM COATED ORAL at 17:17

## 2021-01-01 RX ADMIN — FLUTICASONE PROPIONATE 1 SPRAY: 50 SPRAY, METERED NASAL at 09:15

## 2021-01-01 RX ADMIN — PIPERACILLIN AND TAZOBACTAM 3375 MG: 3; .375 INJECTION, POWDER, LYOPHILIZED, FOR SOLUTION INTRAVENOUS at 02:30

## 2021-01-01 RX ADMIN — MICONAZOLE NITRATE: 20.6 POWDER TOPICAL at 21:20

## 2021-01-01 RX ADMIN — DEXTROSE AND SODIUM CHLORIDE: 5; 450 INJECTION, SOLUTION INTRAVENOUS at 13:31

## 2021-01-01 RX ADMIN — ACETAMINOPHEN 650 MG: 325 TABLET ORAL at 22:19

## 2021-01-01 RX ADMIN — MONTELUKAST SODIUM 10 MG: 10 TABLET ORAL at 20:15

## 2021-01-01 RX ADMIN — IPRATROPIUM BROMIDE AND ALBUTEROL SULFATE 1 AMPULE: .5; 2.5 SOLUTION RESPIRATORY (INHALATION) at 22:30

## 2021-01-01 RX ADMIN — IPRATROPIUM BROMIDE AND ALBUTEROL SULFATE 1 AMPULE: .5; 2.5 SOLUTION RESPIRATORY (INHALATION) at 14:56

## 2021-01-01 RX ADMIN — SODIUM CHLORIDE, PRESERVATIVE FREE 10 ML: 5 INJECTION INTRAVENOUS at 09:08

## 2021-01-01 RX ADMIN — CARVEDILOL 12.5 MG: 6.25 TABLET, FILM COATED ORAL at 08:29

## 2021-01-01 RX ADMIN — GABAPENTIN 600 MG: 300 CAPSULE ORAL at 22:19

## 2021-01-01 RX ADMIN — ATORVASTATIN CALCIUM 40 MG: 40 TABLET, FILM COATED ORAL at 21:41

## 2021-01-01 RX ADMIN — IPRATROPIUM BROMIDE AND ALBUTEROL SULFATE 1 AMPULE: .5; 2.5 SOLUTION RESPIRATORY (INHALATION) at 09:38

## 2021-01-01 RX ADMIN — BUMETANIDE 1 MG: 0.25 INJECTION INTRAMUSCULAR; INTRAVENOUS at 21:33

## 2021-01-01 RX ADMIN — ASPIRIN 81 MG: 81 TABLET, CHEWABLE ORAL at 10:49

## 2021-01-01 RX ADMIN — Medication 10 ML: at 22:20

## 2021-01-01 RX ADMIN — ACETYLCYSTEINE 400 MG: 100 INHALANT RESPIRATORY (INHALATION) at 14:39

## 2021-01-01 RX ADMIN — DEXAMETHASONE SODIUM PHOSPHATE 4 MG: 4 INJECTION, SOLUTION INTRA-ARTICULAR; INTRALESIONAL; INTRAMUSCULAR; INTRAVENOUS; SOFT TISSUE at 13:31

## 2021-01-01 RX ADMIN — GABAPENTIN 600 MG: 300 CAPSULE ORAL at 08:29

## 2021-01-01 RX ADMIN — CALCIUM CARBONATE-VITAMIN D TAB 500 MG-200 UNIT 1 TABLET: 500-200 TAB at 10:07

## 2021-01-01 RX ADMIN — ACETYLCYSTEINE 400 MG: 100 INHALANT RESPIRATORY (INHALATION) at 02:31

## 2021-01-01 RX ADMIN — APIXABAN 5 MG: 5 TABLET, FILM COATED ORAL at 08:29

## 2021-01-01 RX ADMIN — INSULIN LISPRO 1 UNITS: 100 INJECTION, SOLUTION INTRAVENOUS; SUBCUTANEOUS at 17:26

## 2021-01-01 RX ADMIN — SODIUM CHLORIDE 1000 ML: 9 INJECTION, SOLUTION INTRAVENOUS at 00:00

## 2021-01-01 RX ADMIN — SERTRALINE 150 MG: 100 TABLET, FILM COATED ORAL at 08:27

## 2021-01-01 RX ADMIN — CEFEPIME HYDROCHLORIDE 2000 MG: 2 INJECTION, POWDER, FOR SOLUTION INTRAVENOUS at 20:43

## 2021-01-01 RX ADMIN — IPRATROPIUM BROMIDE AND ALBUTEROL SULFATE 1 AMPULE: .5; 2.5 SOLUTION RESPIRATORY (INHALATION) at 22:54

## 2021-01-01 RX ADMIN — SODIUM CHLORIDE, PRESERVATIVE FREE 10 ML: 5 INJECTION INTRAVENOUS at 17:20

## 2021-01-01 RX ADMIN — GUAIFENESIN 400 MG: 400 TABLET ORAL at 21:18

## 2021-01-01 RX ADMIN — VANCOMYCIN HYDROCHLORIDE 1000 MG: 1 INJECTION, POWDER, LYOPHILIZED, FOR SOLUTION INTRAVENOUS at 22:35

## 2021-01-01 RX ADMIN — ACETYLCYSTEINE 400 MG: 100 INHALANT RESPIRATORY (INHALATION) at 19:30

## 2021-01-01 RX ADMIN — NITROGLYCERIN 0.4 MG: 0.4 TABLET SUBLINGUAL at 07:49

## 2021-01-01 RX ADMIN — ANTI-FUNGAL POWDER MICONAZOLE NITRATE TALC FREE: 1.42 POWDER TOPICAL at 01:08

## 2021-01-01 RX ADMIN — Medication 10 ML: at 00:38

## 2021-01-01 RX ADMIN — GABAPENTIN 300 MG: 300 CAPSULE ORAL at 21:18

## 2021-01-01 RX ADMIN — GABAPENTIN 300 MG: 300 CAPSULE ORAL at 23:15

## 2021-01-01 RX ADMIN — IPRATROPIUM BROMIDE AND ALBUTEROL SULFATE 1 AMPULE: .5; 3 SOLUTION RESPIRATORY (INHALATION) at 12:06

## 2021-01-01 RX ADMIN — ONDANSETRON 4 MG: 2 INJECTION INTRAMUSCULAR; INTRAVENOUS at 17:57

## 2021-01-01 RX ADMIN — INSULIN LISPRO 2 UNITS: 100 INJECTION, SOLUTION INTRAVENOUS; SUBCUTANEOUS at 10:58

## 2021-01-01 RX ADMIN — DOCUSATE SODIUM 200 MG: 100 CAPSULE, LIQUID FILLED ORAL at 21:20

## 2021-01-01 RX ADMIN — SODIUM CHLORIDE 25 ML: 9 INJECTION, SOLUTION INTRAVENOUS at 12:00

## 2021-01-01 RX ADMIN — PANTOPRAZOLE SODIUM 40 MG: 40 TABLET, DELAYED RELEASE ORAL at 05:47

## 2021-01-01 RX ADMIN — MONTELUKAST SODIUM 10 MG: 10 TABLET ORAL at 20:52

## 2021-01-01 RX ADMIN — ACETYLCYSTEINE 400 MG: 100 INHALANT RESPIRATORY (INHALATION) at 23:16

## 2021-01-01 RX ADMIN — DOXYCYCLINE 100 MG: 100 INJECTION, POWDER, LYOPHILIZED, FOR SOLUTION INTRAVENOUS at 05:35

## 2021-01-01 RX ADMIN — CARVEDILOL 12.5 MG: 6.25 TABLET, FILM COATED ORAL at 17:10

## 2021-01-01 RX ADMIN — ENOXAPARIN SODIUM 70 MG: 80 INJECTION SUBCUTANEOUS at 09:09

## 2021-01-01 RX ADMIN — AMPICILLIN SODIUM AND SULBACTAM SODIUM 3000 MG: 2; 1 INJECTION, POWDER, FOR SOLUTION INTRAMUSCULAR; INTRAVENOUS at 00:51

## 2021-01-01 RX ADMIN — GABAPENTIN 300 MG: 300 CAPSULE ORAL at 09:44

## 2021-01-01 RX ADMIN — ACETYLCYSTEINE 400 MG: 100 INHALANT RESPIRATORY (INHALATION) at 02:17

## 2021-01-01 RX ADMIN — CARVEDILOL 12.5 MG: 6.25 TABLET, FILM COATED ORAL at 09:16

## 2021-01-01 RX ADMIN — LEVOFLOXACIN 500 MG: 500 TABLET, FILM COATED ORAL at 13:10

## 2021-01-01 RX ADMIN — SODIUM CHLORIDE, PRESERVATIVE FREE 10 ML: 5 INJECTION INTRAVENOUS at 22:38

## 2021-01-01 RX ADMIN — IPRATROPIUM BROMIDE AND ALBUTEROL SULFATE 1 AMPULE: .5; 3 SOLUTION RESPIRATORY (INHALATION) at 09:38

## 2021-01-01 RX ADMIN — ACETAMINOPHEN 650 MG: 325 TABLET ORAL at 08:26

## 2021-01-01 RX ADMIN — ISOSORBIDE MONONITRATE 30 MG: 30 TABLET, EXTENDED RELEASE ORAL at 11:27

## 2021-01-01 RX ADMIN — PIPERACILLIN AND TAZOBACTAM 3375 MG: 3; .375 INJECTION, POWDER, LYOPHILIZED, FOR SOLUTION INTRAVENOUS at 10:41

## 2021-01-01 RX ADMIN — IPRATROPIUM BROMIDE AND ALBUTEROL SULFATE 1 AMPULE: .5; 3 SOLUTION RESPIRATORY (INHALATION) at 14:30

## 2021-01-01 RX ADMIN — METOPROLOL TARTRATE 2.5 MG: 1 INJECTION, SOLUTION INTRAVENOUS at 13:00

## 2021-01-01 RX ADMIN — ACETYLCYSTEINE 400 MG: 100 INHALANT RESPIRATORY (INHALATION) at 09:38

## 2021-01-01 RX ADMIN — INSULIN LISPRO 1 UNITS: 100 INJECTION, SOLUTION INTRAVENOUS; SUBCUTANEOUS at 06:00

## 2021-01-01 RX ADMIN — MICONAZOLE NITRATE: 20.6 POWDER TOPICAL at 23:05

## 2021-01-01 RX ADMIN — ANTI-FUNGAL POWDER MICONAZOLE NITRATE TALC FREE: 1.42 POWDER TOPICAL at 10:00

## 2021-01-01 RX ADMIN — MONTELUKAST SODIUM 10 MG: 10 TABLET, FILM COATED ORAL at 20:05

## 2021-01-01 RX ADMIN — ACETYLCYSTEINE 400 MG: 100 INHALANT RESPIRATORY (INHALATION) at 22:54

## 2021-01-01 RX ADMIN — ASPIRIN 81 MG CHEWABLE TABLET 81 MG: 81 TABLET CHEWABLE at 09:09

## 2021-01-01 RX ADMIN — INSULIN LISPRO 1 UNITS: 100 INJECTION, SOLUTION INTRAVENOUS; SUBCUTANEOUS at 12:47

## 2021-01-01 RX ADMIN — CEFEPIME HYDROCHLORIDE 2000 MG: 2 INJECTION, POWDER, FOR SOLUTION INTRAVENOUS at 06:00

## 2021-01-01 RX ADMIN — CARVEDILOL 12.5 MG: 6.25 TABLET, FILM COATED ORAL at 10:07

## 2021-01-01 RX ADMIN — GUAIFENESIN 400 MG: 400 TABLET ORAL at 14:10

## 2021-01-01 RX ADMIN — DOCUSATE SODIUM 100 MG: 100 CAPSULE, LIQUID FILLED ORAL at 09:43

## 2021-01-01 RX ADMIN — IPRATROPIUM BROMIDE AND ALBUTEROL SULFATE 1 AMPULE: .5; 2.5 SOLUTION RESPIRATORY (INHALATION) at 10:28

## 2021-01-01 RX ADMIN — INSULIN LISPRO 9 UNITS: 100 INJECTION, SOLUTION INTRAVENOUS; SUBCUTANEOUS at 11:36

## 2021-01-01 RX ADMIN — GABAPENTIN 300 MG: 300 CAPSULE ORAL at 09:17

## 2021-01-01 RX ADMIN — IPRATROPIUM BROMIDE AND ALBUTEROL SULFATE 1 AMPULE: .5; 2.5 SOLUTION RESPIRATORY (INHALATION) at 18:59

## 2021-01-01 RX ADMIN — PIPERACILLIN AND TAZOBACTAM 3375 MG: 3; .375 INJECTION, POWDER, LYOPHILIZED, FOR SOLUTION INTRAVENOUS at 17:36

## 2021-01-01 RX ADMIN — PANTOPRAZOLE SODIUM 40 MG: 40 TABLET, DELAYED RELEASE ORAL at 05:39

## 2021-01-01 RX ADMIN — ACETYLCYSTEINE 400 MG: 100 INHALANT RESPIRATORY (INHALATION) at 09:09

## 2021-01-01 RX ADMIN — MONTELUKAST SODIUM 10 MG: 10 TABLET ORAL at 00:02

## 2021-01-01 RX ADMIN — SODIUM CHLORIDE 500 MG: 9 INJECTION, SOLUTION INTRAVENOUS at 16:16

## 2021-01-01 RX ADMIN — IPRATROPIUM BROMIDE AND ALBUTEROL SULFATE 1 AMPULE: .5; 2.5 SOLUTION RESPIRATORY (INHALATION) at 07:22

## 2021-01-01 RX ADMIN — ENOXAPARIN SODIUM 70 MG: 80 INJECTION SUBCUTANEOUS at 21:39

## 2021-01-01 RX ADMIN — VANCOMYCIN HYDROCHLORIDE 1000 MG: 1 INJECTION, POWDER, LYOPHILIZED, FOR SOLUTION INTRAVENOUS at 19:22

## 2021-01-01 RX ADMIN — ACETYLCYSTEINE 400 MG: 100 INHALANT RESPIRATORY (INHALATION) at 15:09

## 2021-01-01 RX ADMIN — ASPIRIN 81 MG CHEWABLE TABLET 81 MG: 81 TABLET CHEWABLE at 11:27

## 2021-01-01 RX ADMIN — APIXABAN 5 MG: 5 TABLET, FILM COATED ORAL at 20:16

## 2021-01-01 RX ADMIN — ACETYLCYSTEINE 400 MG: 100 INHALANT RESPIRATORY (INHALATION) at 18:18

## 2021-01-01 RX ADMIN — GABAPENTIN 300 MG: 300 CAPSULE ORAL at 10:49

## 2021-01-01 RX ADMIN — CEFEPIME HYDROCHLORIDE 2000 MG: 2 INJECTION, POWDER, FOR SOLUTION INTRAVENOUS at 18:05

## 2021-01-01 RX ADMIN — METOPROLOL TARTRATE 2.5 MG: 1 INJECTION, SOLUTION INTRAVENOUS at 18:30

## 2021-01-01 RX ADMIN — BUSPIRONE HYDROCHLORIDE 5 MG: 5 TABLET ORAL at 09:43

## 2021-01-01 RX ADMIN — SERTRALINE HYDROCHLORIDE 50 MG: 50 TABLET ORAL at 10:49

## 2021-01-01 RX ADMIN — BUSPIRONE HYDROCHLORIDE 5 MG: 5 TABLET ORAL at 08:29

## 2021-01-01 RX ADMIN — PANTOPRAZOLE SODIUM 40 MG: 40 INJECTION, POWDER, FOR SOLUTION INTRAVENOUS at 02:37

## 2021-01-01 RX ADMIN — PANTOPRAZOLE SODIUM 40 MG: 40 TABLET, DELAYED RELEASE ORAL at 05:26

## 2021-01-01 RX ADMIN — ATORVASTATIN CALCIUM 40 MG: 40 TABLET, FILM COATED ORAL at 00:02

## 2021-01-01 RX ADMIN — APIXABAN 5 MG: 5 TABLET, FILM COATED ORAL at 11:27

## 2021-01-01 RX ADMIN — METOPROLOL TARTRATE 2.5 MG: 1 INJECTION, SOLUTION INTRAVENOUS at 11:34

## 2021-01-01 RX ADMIN — ACETYLCYSTEINE 400 MG: 100 INHALANT RESPIRATORY (INHALATION) at 05:44

## 2021-01-01 RX ADMIN — Medication 10 ML: at 02:24

## 2021-01-01 RX ADMIN — MICONAZOLE NITRATE: 20 POWDER TOPICAL at 22:44

## 2021-01-01 RX ADMIN — SERTRALINE 150 MG: 100 TABLET, FILM COATED ORAL at 09:12

## 2021-01-01 RX ADMIN — ACETAMINOPHEN 650 MG: 325 TABLET ORAL at 20:04

## 2021-01-01 RX ADMIN — METOPROLOL TARTRATE 2.5 MG: 1 INJECTION, SOLUTION INTRAVENOUS at 14:15

## 2021-01-01 RX ADMIN — ISOSORBIDE MONONITRATE 30 MG: 30 TABLET, EXTENDED RELEASE ORAL at 10:07

## 2021-01-01 RX ADMIN — CARVEDILOL 12.5 MG: 6.25 TABLET, FILM COATED ORAL at 09:12

## 2021-01-01 RX ADMIN — Medication 10 ML: at 08:34

## 2021-01-01 RX ADMIN — VANCOMYCIN HYDROCHLORIDE 1000 MG: 1 INJECTION, POWDER, LYOPHILIZED, FOR SOLUTION INTRAVENOUS at 20:36

## 2021-01-01 RX ADMIN — GABAPENTIN 300 MG: 300 CAPSULE ORAL at 22:43

## 2021-01-01 RX ADMIN — ATORVASTATIN CALCIUM 40 MG: 40 TABLET, FILM COATED ORAL at 20:52

## 2021-01-01 RX ADMIN — Medication 10 ML: at 20:46

## 2021-01-01 RX ADMIN — PIPERACILLIN AND TAZOBACTAM 3375 MG: 3; .375 INJECTION, POWDER, LYOPHILIZED, FOR SOLUTION INTRAVENOUS at 17:01

## 2021-01-01 RX ADMIN — CARVEDILOL 12.5 MG: 6.25 TABLET, FILM COATED ORAL at 17:13

## 2021-01-01 RX ADMIN — DOCUSATE SODIUM 100 MG: 100 CAPSULE, LIQUID FILLED ORAL at 10:49

## 2021-01-01 RX ADMIN — APIXABAN 5 MG: 5 TABLET, FILM COATED ORAL at 20:52

## 2021-01-01 RX ADMIN — CALCIUM CARBONATE-VITAMIN D TAB 500 MG-200 UNIT 1 TABLET: 500-200 TAB at 09:16

## 2021-01-01 RX ADMIN — IPRATROPIUM BROMIDE AND ALBUTEROL SULFATE 1 AMPULE: .5; 3 SOLUTION RESPIRATORY (INHALATION) at 08:28

## 2021-01-01 RX ADMIN — METOPROLOL TARTRATE 2.5 MG: 1 INJECTION, SOLUTION INTRAVENOUS at 00:28

## 2021-01-01 RX ADMIN — ACETYLCYSTEINE 400 MG: 100 INHALANT RESPIRATORY (INHALATION) at 22:30

## 2021-01-01 RX ADMIN — ACETYLCYSTEINE 400 MG: 100 INHALANT RESPIRATORY (INHALATION) at 10:20

## 2021-01-01 RX ADMIN — ACETYLCYSTEINE 400 MG: 100 INHALANT RESPIRATORY (INHALATION) at 14:56

## 2021-01-01 RX ADMIN — PIPERACILLIN AND TAZOBACTAM 3375 MG: 3; .375 INJECTION, POWDER, LYOPHILIZED, FOR SOLUTION INTRAVENOUS at 17:28

## 2021-01-01 RX ADMIN — INSULIN LISPRO 1 UNITS: 100 INJECTION, SOLUTION INTRAVENOUS; SUBCUTANEOUS at 21:00

## 2021-01-01 RX ADMIN — CARVEDILOL 12.5 MG: 6.25 TABLET, FILM COATED ORAL at 09:43

## 2021-01-01 RX ADMIN — SODIUM CHLORIDE, PRESERVATIVE FREE 10 ML: 5 INJECTION INTRAVENOUS at 23:16

## 2021-01-01 RX ADMIN — SODIUM CHLORIDE, PRESERVATIVE FREE 10 ML: 5 INJECTION INTRAVENOUS at 09:17

## 2021-01-01 RX ADMIN — ACETYLCYSTEINE 400 MG: 100 INHALANT RESPIRATORY (INHALATION) at 23:05

## 2021-01-01 RX ADMIN — CARVEDILOL 12.5 MG: 6.25 TABLET, FILM COATED ORAL at 11:27

## 2021-01-01 RX ADMIN — IPRATROPIUM BROMIDE AND ALBUTEROL SULFATE 1 AMPULE: .5; 2.5 SOLUTION RESPIRATORY (INHALATION) at 02:17

## 2021-01-01 RX ADMIN — SODIUM CHLORIDE, PRESERVATIVE FREE 10 ML: 5 INJECTION INTRAVENOUS at 19:15

## 2021-01-01 RX ADMIN — MONTELUKAST SODIUM 10 MG: 10 TABLET, FILM COATED ORAL at 23:16

## 2021-01-01 RX ADMIN — IPRATROPIUM BROMIDE AND ALBUTEROL SULFATE 1 AMPULE: .5; 2.5 SOLUTION RESPIRATORY (INHALATION) at 02:32

## 2021-01-01 RX ADMIN — ENOXAPARIN SODIUM 70 MG: 80 INJECTION SUBCUTANEOUS at 21:26

## 2021-01-01 RX ADMIN — PIPERACILLIN AND TAZOBACTAM 3375 MG: 3; .375 INJECTION, POWDER, LYOPHILIZED, FOR SOLUTION INTRAVENOUS at 11:24

## 2021-01-01 RX ADMIN — SODIUM CHLORIDE, PRESERVATIVE FREE 10 ML: 5 INJECTION INTRAVENOUS at 16:44

## 2021-01-01 RX ADMIN — MICONAZOLE NITRATE: 20 POWDER TOPICAL at 20:05

## 2021-01-01 RX ADMIN — DOCUSATE SODIUM 200 MG: 100 CAPSULE, LIQUID FILLED ORAL at 09:17

## 2021-01-01 RX ADMIN — ONDANSETRON 4 MG: 2 INJECTION INTRAMUSCULAR; INTRAVENOUS at 09:17

## 2021-01-01 RX ADMIN — GUAIFENESIN 400 MG: 400 TABLET ORAL at 21:39

## 2021-01-01 RX ADMIN — ISOSORBIDE MONONITRATE 30 MG: 30 TABLET, EXTENDED RELEASE ORAL at 10:49

## 2021-01-01 RX ADMIN — INSULIN LISPRO 1 UNITS: 100 INJECTION, SOLUTION INTRAVENOUS; SUBCUTANEOUS at 12:09

## 2021-01-01 RX ADMIN — CARVEDILOL 12.5 MG: 6.25 TABLET, FILM COATED ORAL at 10:49

## 2021-01-01 RX ADMIN — CEFEPIME HYDROCHLORIDE 2000 MG: 2 INJECTION, POWDER, FOR SOLUTION INTRAVENOUS at 06:13

## 2021-01-01 RX ADMIN — Medication 10 ML: at 16:40

## 2021-01-01 RX ADMIN — SODIUM CHLORIDE 500 ML: 9 INJECTION, SOLUTION INTRAVENOUS at 01:15

## 2021-01-01 RX ADMIN — SODIUM CHLORIDE 25 ML: 9 INJECTION, SOLUTION INTRAVENOUS at 22:36

## 2021-01-01 RX ADMIN — INSULIN LISPRO 3 UNITS: 100 INJECTION, SOLUTION INTRAVENOUS; SUBCUTANEOUS at 16:47

## 2021-01-01 RX ADMIN — PANTOPRAZOLE SODIUM 40 MG: 40 TABLET, DELAYED RELEASE ORAL at 06:10

## 2021-01-01 RX ADMIN — APIXABAN 5 MG: 5 TABLET, FILM COATED ORAL at 10:13

## 2021-01-01 RX ADMIN — INSULIN LISPRO 2 UNITS: 100 INJECTION, SOLUTION INTRAVENOUS; SUBCUTANEOUS at 10:57

## 2021-01-01 RX ADMIN — CARVEDILOL 12.5 MG: 6.25 TABLET, FILM COATED ORAL at 16:22

## 2021-01-01 RX ADMIN — ANTI-FUNGAL POWDER MICONAZOLE NITRATE TALC FREE: 1.42 POWDER TOPICAL at 20:35

## 2021-01-01 RX ADMIN — METOPROLOL TARTRATE 2.5 MG: 1 INJECTION, SOLUTION INTRAVENOUS at 05:57

## 2021-01-01 RX ADMIN — ACETAMINOPHEN 650 MG: 650 SUPPOSITORY RECTAL at 20:46

## 2021-01-01 RX ADMIN — MICONAZOLE NITRATE: 20.6 POWDER TOPICAL at 09:16

## 2021-01-01 RX ADMIN — ACETAMINOPHEN 650 MG: 325 TABLET ORAL at 23:16

## 2021-01-01 RX ADMIN — ACETAMINOPHEN 650 MG: 325 TABLET ORAL at 10:49

## 2021-01-01 RX ADMIN — ACETYLCYSTEINE 400 MG: 100 INHALANT RESPIRATORY (INHALATION) at 01:33

## 2021-01-01 RX ADMIN — ASPIRIN 81 MG CHEWABLE TABLET 81 MG: 81 TABLET CHEWABLE at 09:12

## 2021-01-01 RX ADMIN — INSULIN LISPRO 2 UNITS: 100 INJECTION, SOLUTION INTRAVENOUS; SUBCUTANEOUS at 22:25

## 2021-01-01 RX ADMIN — CALCIUM CARBONATE-VITAMIN D TAB 500 MG-200 UNIT 1 TABLET: 500-200 TAB at 23:55

## 2021-01-01 RX ADMIN — INSULIN LISPRO 9 UNITS: 100 INJECTION, SOLUTION INTRAVENOUS; SUBCUTANEOUS at 17:19

## 2021-01-01 RX ADMIN — GUAIFENESIN 400 MG: 400 TABLET ORAL at 14:33

## 2021-01-01 RX ADMIN — INSULIN LISPRO 12 UNITS: 100 INJECTION, SOLUTION INTRAVENOUS; SUBCUTANEOUS at 11:49

## 2021-01-01 RX ADMIN — INSULIN LISPRO 1 UNITS: 100 INJECTION, SOLUTION INTRAVENOUS; SUBCUTANEOUS at 21:18

## 2021-01-01 RX ADMIN — BUMETANIDE 1 MG: 1 TABLET ORAL at 17:19

## 2021-01-01 RX ADMIN — MONTELUKAST SODIUM 10 MG: 10 TABLET, FILM COATED ORAL at 21:19

## 2021-01-01 RX ADMIN — ACETYLCYSTEINE 400 MG: 100 INHALANT RESPIRATORY (INHALATION) at 16:29

## 2021-01-01 RX ADMIN — MORPHINE SULFATE 2 MG: 2 INJECTION, SOLUTION INTRAMUSCULAR; INTRAVENOUS at 13:11

## 2021-01-01 RX ADMIN — CEFEPIME HYDROCHLORIDE 2000 MG: 2 INJECTION, POWDER, FOR SOLUTION INTRAVENOUS at 17:26

## 2021-01-01 RX ADMIN — ONDANSETRON 4 MG: 2 INJECTION INTRAMUSCULAR; INTRAVENOUS at 11:34

## 2021-01-01 RX ADMIN — GABAPENTIN 300 MG: 300 CAPSULE ORAL at 12:35

## 2021-01-01 RX ADMIN — SODIUM CHLORIDE 500 ML: 9 INJECTION, SOLUTION INTRAVENOUS at 00:00

## 2021-01-01 RX ADMIN — SODIUM CHLORIDE: 9 INJECTION, SOLUTION INTRAVENOUS at 01:19

## 2021-01-01 RX ADMIN — MICONAZOLE NITRATE: 20.6 POWDER TOPICAL at 09:32

## 2021-01-01 RX ADMIN — GUAIFENESIN 400 MG: 400 TABLET ORAL at 10:07

## 2021-01-01 RX ADMIN — INSULIN LISPRO 1 UNITS: 100 INJECTION, SOLUTION INTRAVENOUS; SUBCUTANEOUS at 22:03

## 2021-01-01 RX ADMIN — ONDANSETRON 4 MG: 2 INJECTION INTRAMUSCULAR; INTRAVENOUS at 11:41

## 2021-01-01 RX ADMIN — SODIUM CHLORIDE, PRESERVATIVE FREE 10 ML: 5 INJECTION INTRAVENOUS at 10:50

## 2021-01-01 RX ADMIN — ACETAMINOPHEN 650 MG: 325 TABLET ORAL at 22:43

## 2021-01-01 RX ADMIN — ISOSORBIDE MONONITRATE 30 MG: 30 TABLET, EXTENDED RELEASE ORAL at 09:12

## 2021-01-01 RX ADMIN — ATORVASTATIN CALCIUM 40 MG: 40 TABLET, FILM COATED ORAL at 21:19

## 2021-01-01 RX ADMIN — ATORVASTATIN CALCIUM 40 MG: 40 TABLET, FILM COATED ORAL at 21:39

## 2021-01-01 RX ADMIN — SERTRALINE 150 MG: 50 TABLET, FILM COATED ORAL at 09:10

## 2021-01-01 RX ADMIN — GUAIFENESIN 400 MG: 400 TABLET ORAL at 09:17

## 2021-01-01 RX ADMIN — METOPROLOL TARTRATE 2.5 MG: 1 INJECTION, SOLUTION INTRAVENOUS at 06:08

## 2021-01-01 RX ADMIN — IPRATROPIUM BROMIDE AND ALBUTEROL SULFATE 1 AMPULE: .5; 3 SOLUTION RESPIRATORY (INHALATION) at 09:11

## 2021-01-01 RX ADMIN — BUMETANIDE 1 MG: 1 TABLET ORAL at 09:10

## 2021-01-01 RX ADMIN — CEFEPIME HYDROCHLORIDE 2000 MG: 2 INJECTION, POWDER, FOR SOLUTION INTRAVENOUS at 06:10

## 2021-01-01 RX ADMIN — INSULIN LISPRO 1 UNITS: 100 INJECTION, SOLUTION INTRAVENOUS; SUBCUTANEOUS at 17:11

## 2021-01-01 RX ADMIN — SODIUM CHLORIDE 1000 ML: 9 INJECTION, SOLUTION INTRAVENOUS at 18:23

## 2021-01-01 RX ADMIN — GABAPENTIN 300 MG: 300 CAPSULE ORAL at 14:10

## 2021-01-01 RX ADMIN — PIPERACILLIN AND TAZOBACTAM 3375 MG: 3; .375 INJECTION, POWDER, LYOPHILIZED, FOR SOLUTION INTRAVENOUS at 09:14

## 2021-01-01 RX ADMIN — INSULIN LISPRO 1 UNITS: 100 INJECTION, SOLUTION INTRAVENOUS; SUBCUTANEOUS at 12:05

## 2021-01-01 RX ADMIN — SODIUM CHLORIDE, PRESERVATIVE FREE 10 ML: 5 INJECTION INTRAVENOUS at 09:00

## 2021-01-01 RX ADMIN — SERTRALINE 150 MG: 100 TABLET, FILM COATED ORAL at 09:09

## 2021-01-01 RX ADMIN — Medication 10 ML: at 08:35

## 2021-01-01 RX ADMIN — MONTELUKAST SODIUM 10 MG: 10 TABLET, FILM COATED ORAL at 21:39

## 2021-01-01 RX ADMIN — CARVEDILOL 12.5 MG: 6.25 TABLET, FILM COATED ORAL at 16:16

## 2021-01-01 RX ADMIN — Medication 10 ML: at 08:00

## 2021-01-01 RX ADMIN — INSULIN LISPRO 2 UNITS: 100 INJECTION, SOLUTION INTRAVENOUS; SUBCUTANEOUS at 00:15

## 2021-01-01 RX ADMIN — ACETAMINOPHEN 650 MG: 650 SUPPOSITORY RECTAL at 14:15

## 2021-01-01 RX ADMIN — IOPAMIDOL 90 ML: 755 INJECTION, SOLUTION INTRAVENOUS at 22:25

## 2021-01-01 RX ADMIN — SERTRALINE 150 MG: 100 TABLET, FILM COATED ORAL at 11:27

## 2021-01-01 RX ADMIN — INSULIN LISPRO 1 UNITS: 100 INJECTION, SOLUTION INTRAVENOUS; SUBCUTANEOUS at 06:34

## 2021-01-01 RX ADMIN — IPRATROPIUM BROMIDE AND ALBUTEROL SULFATE 1 AMPULE: .5; 2.5 SOLUTION RESPIRATORY (INHALATION) at 23:05

## 2021-01-01 RX ADMIN — ACETAMINOPHEN 650 MG: 325 TABLET ORAL at 08:30

## 2021-01-01 RX ADMIN — PANTOPRAZOLE SODIUM 40 MG: 40 INJECTION, POWDER, FOR SOLUTION INTRAVENOUS at 10:14

## 2021-01-01 RX ADMIN — ENOXAPARIN SODIUM 70 MG: 80 INJECTION SUBCUTANEOUS at 09:17

## 2021-01-01 RX ADMIN — ACETYLCYSTEINE 600 MG: 200 SOLUTION ORAL; RESPIRATORY (INHALATION) at 14:31

## 2021-01-01 RX ADMIN — SODIUM CHLORIDE, PRESERVATIVE FREE 10 ML: 5 INJECTION INTRAVENOUS at 21:44

## 2021-01-01 RX ADMIN — FENTANYL CITRATE 50 MCG: 50 INJECTION, SOLUTION INTRAMUSCULAR; INTRAVENOUS at 13:51

## 2021-01-01 RX ADMIN — VANCOMYCIN HYDROCHLORIDE 1000 MG: 1 INJECTION, POWDER, LYOPHILIZED, FOR SOLUTION INTRAVENOUS at 00:02

## 2021-01-01 RX ADMIN — INSULIN LISPRO 2 UNITS: 100 INJECTION, SOLUTION INTRAVENOUS; SUBCUTANEOUS at 20:42

## 2021-01-01 RX ADMIN — GABAPENTIN 300 MG: 300 CAPSULE ORAL at 20:04

## 2021-01-01 RX ADMIN — FAMOTIDINE 20 MG: 20 TABLET, FILM COATED ORAL at 00:12

## 2021-01-01 RX ADMIN — IPRATROPIUM BROMIDE AND ALBUTEROL SULFATE 1 AMPULE: .5; 3 SOLUTION RESPIRATORY (INHALATION) at 13:47

## 2021-01-01 RX ADMIN — SODIUM CHLORIDE 25 ML: 9 INJECTION, SOLUTION INTRAVENOUS at 23:44

## 2021-01-01 RX ADMIN — Medication: at 12:39

## 2021-01-01 RX ADMIN — SODIUM CHLORIDE, PRESERVATIVE FREE 10 ML: 5 INJECTION INTRAVENOUS at 11:35

## 2021-01-01 RX ADMIN — AMOXICILLIN 500 MG: 250 CAPSULE ORAL at 20:52

## 2021-01-01 RX ADMIN — BUMETANIDE 1 MG: 0.25 INJECTION INTRAMUSCULAR; INTRAVENOUS at 09:12

## 2021-01-01 RX ADMIN — CARVEDILOL 12.5 MG: 6.25 TABLET, FILM COATED ORAL at 20:04

## 2021-01-01 RX ADMIN — AMOXICILLIN 500 MG: 250 CAPSULE ORAL at 08:12

## 2021-01-01 RX ADMIN — FUROSEMIDE 20 MG: 20 TABLET ORAL at 08:29

## 2021-01-01 RX ADMIN — PANTOPRAZOLE SODIUM 40 MG: 40 INJECTION, POWDER, FOR SOLUTION INTRAVENOUS at 09:00

## 2021-01-01 RX ADMIN — CEFTRIAXONE 1000 MG: 1 INJECTION, POWDER, FOR SOLUTION INTRAMUSCULAR; INTRAVENOUS at 19:15

## 2021-01-01 RX ADMIN — DEXTROSE AND SODIUM CHLORIDE: 5; 450 INJECTION, SOLUTION INTRAVENOUS at 22:33

## 2021-01-01 RX ADMIN — APIXABAN 5 MG: 5 TABLET, FILM COATED ORAL at 09:09

## 2021-01-01 RX ADMIN — ACETAMINOPHEN 650 MG: 325 TABLET ORAL at 12:35

## 2021-01-01 RX ADMIN — ANTI-FUNGAL POWDER MICONAZOLE NITRATE TALC FREE: 1.42 POWDER TOPICAL at 22:34

## 2021-01-01 RX ADMIN — ACETYLCYSTEINE 400 MG: 100 INHALANT RESPIRATORY (INHALATION) at 07:22

## 2021-01-01 RX ADMIN — APIXABAN 5 MG: 5 TABLET, FILM COATED ORAL at 20:46

## 2021-01-01 RX ADMIN — ACETAMINOPHEN 650 MG: 650 SUPPOSITORY RECTAL at 16:04

## 2021-01-01 RX ADMIN — IPRATROPIUM BROMIDE AND ALBUTEROL SULFATE 1 AMPULE: .5; 2.5 SOLUTION RESPIRATORY (INHALATION) at 06:13

## 2021-01-01 RX ADMIN — SODIUM CHLORIDE, PRESERVATIVE FREE 10 ML: 5 INJECTION INTRAVENOUS at 10:17

## 2021-01-01 RX ADMIN — BISACODYL 10 MG: 10 SUPPOSITORY RECTAL at 10:04

## 2021-01-01 RX ADMIN — IPRATROPIUM BROMIDE AND ALBUTEROL SULFATE 1 AMPULE: .5; 3 SOLUTION RESPIRATORY (INHALATION) at 09:46

## 2021-01-01 RX ADMIN — ATORVASTATIN CALCIUM 40 MG: 40 TABLET, FILM COATED ORAL at 21:33

## 2021-01-01 RX ADMIN — DAPTOMYCIN 350 MG: 500 INJECTION, POWDER, LYOPHILIZED, FOR SOLUTION INTRAVENOUS at 18:36

## 2021-01-01 RX ADMIN — DOCUSATE SODIUM 200 MG: 100 CAPSULE, LIQUID FILLED ORAL at 00:12

## 2021-01-01 RX ADMIN — Medication 10 ML: at 08:55

## 2021-01-01 RX ADMIN — ACETYLCYSTEINE 400 MG: 100 INHALANT RESPIRATORY (INHALATION) at 10:29

## 2021-01-01 RX ADMIN — IPRATROPIUM BROMIDE AND ALBUTEROL SULFATE 1 AMPULE: .5; 2.5 SOLUTION RESPIRATORY (INHALATION) at 15:09

## 2021-01-01 RX ADMIN — ANTI-FUNGAL POWDER MICONAZOLE NITRATE TALC FREE: 1.42 POWDER TOPICAL at 10:37

## 2021-01-01 RX ADMIN — ISOSORBIDE MONONITRATE 30 MG: 30 TABLET, EXTENDED RELEASE ORAL at 09:10

## 2021-01-01 RX ADMIN — SODIUM CHLORIDE, POTASSIUM CHLORIDE, SODIUM LACTATE AND CALCIUM CHLORIDE: 600; 310; 30; 20 INJECTION, SOLUTION INTRAVENOUS at 09:03

## 2021-01-01 RX ADMIN — MONTELUKAST SODIUM 10 MG: 10 TABLET, FILM COATED ORAL at 22:43

## 2021-01-01 RX ADMIN — IPRATROPIUM BROMIDE AND ALBUTEROL SULFATE 1 AMPULE: .5; 2.5 SOLUTION RESPIRATORY (INHALATION) at 16:29

## 2021-01-01 RX ADMIN — GABAPENTIN 300 MG: 300 CAPSULE ORAL at 09:10

## 2021-01-01 RX ADMIN — DEXTROSE AND SODIUM CHLORIDE: 5; 450 INJECTION, SOLUTION INTRAVENOUS at 02:27

## 2021-01-01 RX ADMIN — BUMETANIDE 0.5 MG: 0.25 INJECTION, SOLUTION INTRAMUSCULAR; INTRAVENOUS at 09:17

## 2021-01-01 RX ADMIN — ACETYLCYSTEINE 400 MG: 100 INHALANT RESPIRATORY (INHALATION) at 06:15

## 2021-01-01 RX ADMIN — IPRATROPIUM BROMIDE AND ALBUTEROL SULFATE 3 AMPULE: .5; 2.5 SOLUTION RESPIRATORY (INHALATION) at 10:41

## 2021-01-01 RX ADMIN — GABAPENTIN 300 MG: 300 CAPSULE ORAL at 21:39

## 2021-01-01 RX ADMIN — METOPROLOL TARTRATE 2.5 MG: 1 INJECTION, SOLUTION INTRAVENOUS at 18:31

## 2021-01-01 RX ADMIN — METOPROLOL TARTRATE 2.5 MG: 1 INJECTION, SOLUTION INTRAVENOUS at 17:29

## 2021-01-01 RX ADMIN — ACETAMINOPHEN 650 MG: 325 TABLET ORAL at 20:15

## 2021-01-01 RX ADMIN — SERTRALINE 150 MG: 100 TABLET, FILM COATED ORAL at 08:13

## 2021-01-01 RX ADMIN — METOPROLOL TARTRATE 2.5 MG: 1 INJECTION, SOLUTION INTRAVENOUS at 06:11

## 2021-01-01 RX ADMIN — ANTI-FUNGAL POWDER MICONAZOLE NITRATE TALC FREE: 1.42 POWDER TOPICAL at 10:47

## 2021-01-01 RX ADMIN — BUMETANIDE 1 MG: 0.25 INJECTION INTRAMUSCULAR; INTRAVENOUS at 21:45

## 2021-01-01 RX ADMIN — ATORVASTATIN CALCIUM 40 MG: 40 TABLET, FILM COATED ORAL at 00:12

## 2021-01-01 RX ADMIN — MICONAZOLE NITRATE: 20 POWDER TOPICAL at 10:55

## 2021-01-01 RX ADMIN — BUMETANIDE 1 MG: 1 TABLET ORAL at 10:07

## 2021-01-01 RX ADMIN — INSULIN LISPRO 3 UNITS: 100 INJECTION, SOLUTION INTRAVENOUS; SUBCUTANEOUS at 14:16

## 2021-01-01 RX ADMIN — ASPIRIN 81 MG CHEWABLE TABLET 81 MG: 81 TABLET CHEWABLE at 10:07

## 2021-01-01 RX ADMIN — SODIUM CHLORIDE, PRESERVATIVE FREE 10 ML: 5 INJECTION INTRAVENOUS at 10:06

## 2021-01-01 RX ADMIN — CARVEDILOL 12.5 MG: 6.25 TABLET, FILM COATED ORAL at 16:27

## 2021-01-01 RX ADMIN — PANTOPRAZOLE SODIUM 40 MG: 40 TABLET, DELAYED RELEASE ORAL at 05:52

## 2021-01-01 RX ADMIN — INSULIN LISPRO 9 UNITS: 100 INJECTION, SOLUTION INTRAVENOUS; SUBCUTANEOUS at 13:00

## 2021-01-01 RX ADMIN — PETROLATUM: 420 OINTMENT TOPICAL at 09:16

## 2021-01-01 RX ADMIN — IOPAMIDOL 75 ML: 755 INJECTION, SOLUTION INTRAVENOUS at 00:38

## 2021-01-01 RX ADMIN — PANTOPRAZOLE SODIUM 40 MG: 40 INJECTION, POWDER, FOR SOLUTION INTRAVENOUS at 11:34

## 2021-01-01 RX ADMIN — ONDANSETRON 4 MG: 2 INJECTION INTRAMUSCULAR; INTRAVENOUS at 13:15

## 2021-01-01 RX ADMIN — SODIUM CHLORIDE 500 ML: 9 INJECTION, SOLUTION INTRAVENOUS at 14:01

## 2021-01-01 RX ADMIN — DOXYCYCLINE 100 MG: 100 INJECTION, POWDER, LYOPHILIZED, FOR SOLUTION INTRAVENOUS at 17:20

## 2021-01-01 RX ADMIN — INSULIN LISPRO 3 UNITS: 100 INJECTION, SOLUTION INTRAVENOUS; SUBCUTANEOUS at 21:31

## 2021-01-01 RX ADMIN — GABAPENTIN 600 MG: 300 CAPSULE ORAL at 13:10

## 2021-01-01 RX ADMIN — SODIUM CHLORIDE: 9 INJECTION, SOLUTION INTRAVENOUS at 02:21

## 2021-01-01 RX ADMIN — INSULIN LISPRO 1 UNITS: 100 INJECTION, SOLUTION INTRAVENOUS; SUBCUTANEOUS at 22:30

## 2021-01-01 RX ADMIN — IPRATROPIUM BROMIDE AND ALBUTEROL SULFATE 1 AMPULE: .5; 3 SOLUTION RESPIRATORY (INHALATION) at 20:34

## 2021-01-01 RX ADMIN — CALCIUM CARBONATE-VITAMIN D TAB 500 MG-200 UNIT 1 TABLET: 500-200 TAB at 09:10

## 2021-01-01 RX ADMIN — VANCOMYCIN HYDROCHLORIDE 1500 MG: 10 INJECTION, POWDER, LYOPHILIZED, FOR SOLUTION INTRAVENOUS at 16:44

## 2021-01-01 RX ADMIN — SODIUM CHLORIDE, PRESERVATIVE FREE 10 ML: 5 INJECTION INTRAVENOUS at 08:25

## 2021-01-01 RX ADMIN — METOPROLOL TARTRATE 2.5 MG: 1 INJECTION, SOLUTION INTRAVENOUS at 00:58

## 2021-01-01 RX ADMIN — CEFEPIME HYDROCHLORIDE 2000 MG: 2 INJECTION, POWDER, FOR SOLUTION INTRAVENOUS at 05:53

## 2021-01-01 RX ADMIN — INSULIN LISPRO 2 UNITS: 100 INJECTION, SOLUTION INTRAVENOUS; SUBCUTANEOUS at 17:11

## 2021-01-01 RX ADMIN — IPRATROPIUM BROMIDE AND ALBUTEROL SULFATE 1 AMPULE: .5; 3 SOLUTION RESPIRATORY (INHALATION) at 15:42

## 2021-01-01 RX ADMIN — SODIUM CHLORIDE, PRESERVATIVE FREE 10 ML: 5 INJECTION INTRAVENOUS at 14:52

## 2021-01-01 RX ADMIN — PIPERACILLIN AND TAZOBACTAM 3375 MG OF PIPERACILLIN: 3; .375 INJECTION, POWDER, LYOPHILIZED, FOR SOLUTION INTRAVENOUS at 01:27

## 2021-01-01 RX ADMIN — MICONAZOLE NITRATE: 20.6 POWDER TOPICAL at 10:09

## 2021-01-01 RX ADMIN — INSULIN LISPRO 1 UNITS: 100 INJECTION, SOLUTION INTRAVENOUS; SUBCUTANEOUS at 20:41

## 2021-01-01 RX ADMIN — PETROLATUM: 420 OINTMENT TOPICAL at 09:56

## 2021-01-01 RX ADMIN — GUAIFENESIN 400 MG: 400 TABLET ORAL at 15:26

## 2021-01-01 RX ADMIN — PANTOPRAZOLE SODIUM 40 MG: 40 TABLET, DELAYED RELEASE ORAL at 05:35

## 2021-01-01 RX ADMIN — ATORVASTATIN CALCIUM 40 MG: 40 TABLET, FILM COATED ORAL at 20:15

## 2021-01-01 RX ADMIN — ONDANSETRON 4 MG: 2 INJECTION INTRAMUSCULAR; INTRAVENOUS at 14:52

## 2021-01-01 RX ADMIN — ACETAMINOPHEN 650 MG: 325 TABLET ORAL at 17:47

## 2021-01-01 RX ADMIN — INSULIN LISPRO 1 UNITS: 100 INJECTION, SOLUTION INTRAVENOUS; SUBCUTANEOUS at 12:03

## 2021-01-01 RX ADMIN — APIXABAN 5 MG: 5 TABLET, FILM COATED ORAL at 21:18

## 2021-01-01 RX ADMIN — ATORVASTATIN CALCIUM 40 MG: 40 TABLET, FILM COATED ORAL at 22:42

## 2021-01-01 RX ADMIN — PANTOPRAZOLE SODIUM 40 MG: 40 TABLET, DELAYED RELEASE ORAL at 06:11

## 2021-01-01 RX ADMIN — CARVEDILOL 12.5 MG: 6.25 TABLET, FILM COATED ORAL at 09:10

## 2021-01-01 RX ADMIN — SODIUM CHLORIDE 25 ML: 9 INJECTION, SOLUTION INTRAVENOUS at 00:28

## 2021-01-01 RX ADMIN — ACETAMINOPHEN 650 MG: 325 TABLET ORAL at 05:35

## 2021-01-01 RX ADMIN — DOXYCYCLINE 100 MG: 100 INJECTION, POWDER, LYOPHILIZED, FOR SOLUTION INTRAVENOUS at 05:42

## 2021-01-01 RX ADMIN — MICONAZOLE NITRATE: 20 POWDER TOPICAL at 09:44

## 2021-01-01 RX ADMIN — IPRATROPIUM BROMIDE AND ALBUTEROL SULFATE 1 AMPULE: .5; 3 SOLUTION RESPIRATORY (INHALATION) at 20:20

## 2021-01-01 RX ADMIN — GUAIFENESIN 400 MG: 400 TABLET ORAL at 23:16

## 2021-01-01 RX ADMIN — ATORVASTATIN CALCIUM 40 MG: 40 TABLET, FILM COATED ORAL at 20:05

## 2021-01-01 RX ADMIN — APIXABAN 5 MG: 5 TABLET, FILM COATED ORAL at 08:33

## 2021-01-01 RX ADMIN — SODIUM CHLORIDE: 9 INJECTION, SOLUTION INTRAVENOUS at 13:19

## 2021-01-01 RX ADMIN — CEFEPIME HYDROCHLORIDE 2000 MG: 2 INJECTION, POWDER, FOR SOLUTION INTRAVENOUS at 06:31

## 2021-01-01 RX ADMIN — ONDANSETRON 4 MG: 2 INJECTION INTRAMUSCULAR; INTRAVENOUS at 18:31

## 2021-01-01 RX ADMIN — ANTI-FUNGAL POWDER MICONAZOLE NITRATE TALC FREE: 1.42 POWDER TOPICAL at 00:54

## 2021-01-01 RX ADMIN — SODIUM CHLORIDE, POTASSIUM CHLORIDE, SODIUM LACTATE AND CALCIUM CHLORIDE 125 ML/HR: 600; 310; 30; 20 INJECTION, SOLUTION INTRAVENOUS at 06:10

## 2021-01-01 RX ADMIN — MONTELUKAST SODIUM 10 MG: 10 TABLET ORAL at 20:46

## 2021-01-01 RX ADMIN — ASPIRIN 81 MG CHEWABLE TABLET 81 MG: 81 TABLET CHEWABLE at 08:33

## 2021-01-01 RX ADMIN — IPRATROPIUM BROMIDE AND ALBUTEROL SULFATE 1 AMPULE: .5; 2.5 SOLUTION RESPIRATORY (INHALATION) at 06:47

## 2021-01-01 RX ADMIN — CEFTRIAXONE 1000 MG: 1 INJECTION, POWDER, FOR SOLUTION INTRAMUSCULAR; INTRAVENOUS at 17:20

## 2021-01-01 RX ADMIN — ISOSORBIDE MONONITRATE 30 MG: 30 TABLET, EXTENDED RELEASE ORAL at 09:43

## 2021-01-01 RX ADMIN — SODIUM CHLORIDE, PRESERVATIVE FREE 10 ML: 5 INJECTION INTRAVENOUS at 10:47

## 2021-01-01 RX ADMIN — CARVEDILOL 12.5 MG: 6.25 TABLET, FILM COATED ORAL at 08:09

## 2021-01-01 RX ADMIN — ASPIRIN 81 MG CHEWABLE TABLET 81 MG: 81 TABLET CHEWABLE at 10:16

## 2021-01-01 RX ADMIN — SODIUM CHLORIDE, PRESERVATIVE FREE 10 ML: 5 INJECTION INTRAVENOUS at 00:53

## 2021-01-01 RX ADMIN — INSULIN LISPRO 3 UNITS: 100 INJECTION, SOLUTION INTRAVENOUS; SUBCUTANEOUS at 18:27

## 2021-01-01 RX ADMIN — IPRATROPIUM BROMIDE AND ALBUTEROL SULFATE 1 AMPULE: .5; 2.5 SOLUTION RESPIRATORY (INHALATION) at 14:38

## 2021-01-01 RX ADMIN — ANTI-FUNGAL POWDER MICONAZOLE NITRATE TALC FREE: 1.42 POWDER TOPICAL at 10:18

## 2021-01-01 RX ADMIN — IPRATROPIUM BROMIDE AND ALBUTEROL SULFATE 1 AMPULE: .5; 2.5 SOLUTION RESPIRATORY (INHALATION) at 02:19

## 2021-01-01 RX ADMIN — SODIUM CHLORIDE, POTASSIUM CHLORIDE, SODIUM LACTATE AND CALCIUM CHLORIDE: 600; 310; 30; 20 INJECTION, SOLUTION INTRAVENOUS at 05:47

## 2021-01-01 RX ADMIN — PANTOPRAZOLE SODIUM 40 MG: 40 INJECTION, POWDER, FOR SOLUTION INTRAVENOUS at 13:31

## 2021-01-01 RX ADMIN — SERTRALINE 150 MG: 50 TABLET, FILM COATED ORAL at 09:16

## 2021-01-01 RX ADMIN — DEXTROSE AND SODIUM CHLORIDE: 5; 450 INJECTION, SOLUTION INTRAVENOUS at 11:41

## 2021-01-01 RX ADMIN — SODIUM CHLORIDE, PRESERVATIVE FREE 10 ML: 5 INJECTION INTRAVENOUS at 21:19

## 2021-01-01 RX ADMIN — DOCUSATE SODIUM 100 MG: 100 CAPSULE, LIQUID FILLED ORAL at 12:35

## 2021-01-01 RX ADMIN — DOCUSATE SODIUM 200 MG: 100 CAPSULE, LIQUID FILLED ORAL at 21:39

## 2021-01-01 RX ADMIN — Medication 10 ML: at 16:44

## 2021-01-01 RX ADMIN — SERTRALINE 150 MG: 100 TABLET, FILM COATED ORAL at 08:29

## 2021-01-01 RX ADMIN — ACETYLCYSTEINE 400 MG: 100 INHALANT RESPIRATORY (INHALATION) at 06:47

## 2021-01-01 RX ADMIN — SODIUM CHLORIDE: 9 INJECTION, SOLUTION INTRAVENOUS at 13:01

## 2021-01-01 RX ADMIN — METOPROLOL TARTRATE 2.5 MG: 1 INJECTION, SOLUTION INTRAVENOUS at 01:42

## 2021-01-01 RX ADMIN — CARVEDILOL 12.5 MG: 6.25 TABLET, FILM COATED ORAL at 08:25

## 2021-01-01 RX ADMIN — PANTOPRAZOLE SODIUM 40 MG: 40 TABLET, DELAYED RELEASE ORAL at 06:46

## 2021-01-01 RX ADMIN — CEFEPIME HYDROCHLORIDE 2000 MG: 2 INJECTION, POWDER, FOR SOLUTION INTRAVENOUS at 17:16

## 2021-01-01 RX ADMIN — GABAPENTIN 300 MG: 300 CAPSULE ORAL at 15:26

## 2021-01-01 RX ADMIN — MONTELUKAST 10 MG: 10 TABLET, FILM COATED ORAL at 00:12

## 2021-01-01 RX ADMIN — CARVEDILOL 12.5 MG: 6.25 TABLET, FILM COATED ORAL at 08:33

## 2021-01-01 RX ADMIN — ACETYLCYSTEINE 400 MG: 100 INHALANT RESPIRATORY (INHALATION) at 18:59

## 2021-01-01 RX ADMIN — MORPHINE SULFATE 2 MG: 2 INJECTION, SOLUTION INTRAMUSCULAR; INTRAVENOUS at 15:00

## 2021-01-01 RX ADMIN — IPRATROPIUM BROMIDE AND ALBUTEROL SULFATE 1 AMPULE: .5; 2.5 SOLUTION RESPIRATORY (INHALATION) at 05:44

## 2021-01-01 RX ADMIN — INSULIN LISPRO 1 UNITS: 100 INJECTION, SOLUTION INTRAVENOUS; SUBCUTANEOUS at 12:12

## 2021-01-01 RX ADMIN — Medication: at 09:03

## 2021-01-01 RX ADMIN — INSULIN LISPRO 2 UNITS: 100 INJECTION, SOLUTION INTRAVENOUS; SUBCUTANEOUS at 16:27

## 2021-01-01 RX ADMIN — GABAPENTIN 300 MG: 300 CAPSULE ORAL at 14:33

## 2021-01-01 RX ADMIN — ATORVASTATIN CALCIUM 40 MG: 40 TABLET, FILM COATED ORAL at 21:17

## 2021-01-01 RX ADMIN — SODIUM CHLORIDE, PRESERVATIVE FREE 10 ML: 5 INJECTION INTRAVENOUS at 21:30

## 2021-01-01 ASSESSMENT — PAIN DESCRIPTION - LOCATION
LOCATION: COCCYX;BUTTOCKS
LOCATION: ABDOMEN
LOCATION: BUTTOCKS
LOCATION: BACK
LOCATION: BUTTOCKS

## 2021-01-01 ASSESSMENT — PAIN SCALES - GENERAL
PAINLEVEL_OUTOF10: 3
PAINLEVEL_OUTOF10: 0
PAINLEVEL_OUTOF10: 9
PAINLEVEL_OUTOF10: 0
PAINLEVEL_OUTOF10: 2
PAINLEVEL_OUTOF10: 0
PAINLEVEL_OUTOF10: 3
PAINLEVEL_OUTOF10: 0
PAINLEVEL_OUTOF10: 4
PAINLEVEL_OUTOF10: 0
PAINLEVEL_OUTOF10: 8
PAINLEVEL_OUTOF10: 0
PAINLEVEL_OUTOF10: 2
PAINLEVEL_OUTOF10: 5
PAINLEVEL_OUTOF10: 5
PAINLEVEL_OUTOF10: 0
PAINLEVEL_OUTOF10: 0
PAINLEVEL_OUTOF10: 5
PAINLEVEL_OUTOF10: 0
PAINLEVEL_OUTOF10: 2
PAINLEVEL_OUTOF10: 10
PAINLEVEL_OUTOF10: 0
PAINLEVEL_OUTOF10: 0

## 2021-01-01 ASSESSMENT — PAIN DESCRIPTION - PROGRESSION
CLINICAL_PROGRESSION: GRADUALLY WORSENING

## 2021-01-01 ASSESSMENT — ENCOUNTER SYMPTOMS
EYE PAIN: 0
ABDOMINAL DISTENTION: 0
ABDOMINAL PAIN: 0
DIARRHEA: 0
ABDOMINAL DISTENTION: 0
BACK PAIN: 0
WHEEZING: 0
BLOOD IN STOOL: 0
COUGH: 1
SINUS PRESSURE: 0
SHORTNESS OF BREATH: 1
SORE THROAT: 0
NAUSEA: 0
SHORTNESS OF BREATH: 1
BACK PAIN: 0
VOMITING: 0
COUGH: 0
SHORTNESS OF BREATH: 0
COUGH: 1
WHEEZING: 0
EYE DISCHARGE: 0
VOMITING: 0
ABDOMINAL PAIN: 1
EYE REDNESS: 0
EYE PAIN: 0
WHEEZING: 0
DIARRHEA: 0
SORE THROAT: 0
DIARRHEA: 0
SINUS PRESSURE: 0
NAUSEA: 0
CONSTIPATION: 0
VOMITING: 0
NAUSEA: 0
EYE REDNESS: 0
DIFFICULTY BREATHING: 1
EYE DISCHARGE: 0

## 2021-01-01 ASSESSMENT — PAIN DESCRIPTION - FREQUENCY
FREQUENCY: CONTINUOUS

## 2021-01-01 ASSESSMENT — PAIN DESCRIPTION - PAIN TYPE
TYPE: ACUTE PAIN
TYPE: CHRONIC PAIN
TYPE: ACUTE PAIN
TYPE: CHRONIC PAIN
TYPE: ACUTE PAIN
TYPE: CHRONIC PAIN
TYPE: ACUTE PAIN

## 2021-01-01 ASSESSMENT — PAIN - FUNCTIONAL ASSESSMENT
PAIN_FUNCTIONAL_ASSESSMENT: PREVENTS OR INTERFERES WITH MANY ACTIVE NOT PASSIVE ACTIVITIES
PAIN_FUNCTIONAL_ASSESSMENT: PREVENTS OR INTERFERES SOME ACTIVE ACTIVITIES AND ADLS
PAIN_FUNCTIONAL_ASSESSMENT: ACTIVITIES ARE NOT PREVENTED
PAIN_FUNCTIONAL_ASSESSMENT: PREVENTS OR INTERFERES WITH MANY ACTIVE NOT PASSIVE ACTIVITIES

## 2021-01-01 ASSESSMENT — PAIN SCALES - WONG BAKER
WONGBAKER_NUMERICALRESPONSE: 0
WONGBAKER_NUMERICALRESPONSE: 4

## 2021-01-01 ASSESSMENT — PAIN DESCRIPTION - DESCRIPTORS
DESCRIPTORS: CONSTANT;DISCOMFORT
DESCRIPTORS: ACHING;DISCOMFORT;DULL
DESCRIPTORS: PRESSURE
DESCRIPTORS: ACHING;DISCOMFORT;SORE

## 2021-01-01 ASSESSMENT — PAIN DESCRIPTION - ORIENTATION
ORIENTATION: MID

## 2021-01-01 ASSESSMENT — PAIN DESCRIPTION - ONSET
ONSET: ON-GOING

## 2021-01-01 ASSESSMENT — LIFESTYLE VARIABLES: SMOKING_STATUS: 0

## 2021-01-04 NOTE — TELEPHONE ENCOUNTER
Fax confirmation scanned to media.     Electronically signed by Louann Parrish MA on 1/4/21 at 10:22 AM EST

## 2021-01-05 NOTE — CARE COORDINATION
[facility] Transitions Follow Up Call    2021    Patient: Alcon Burks  Patient : 1933   MRN: [id number]  Reason for Admission: *  Discharge Date: 20 RARS: Readmission Risk Score: 32         Spoke briefly with: Patient's daughter, Clementeen Fabry provides patient update on the Subsequent call. Care Transitions Subsequent and Final Call    Subsequent and Final Calls  Do you have any ongoing symptoms?: Yes  Patient-reported symptoms: Weakness, Fatigue  -reports symptoms are improving   -reports patient with Stage 3 Wound to coccyx; Hoang Tuttle [facility] Nurse CM following patient  -persistent edema in BLE's with increased edema in LLE  -Dr. Hayward Estimable following; patient took extra doses of Lasix as instructed at the end of last week, labs drawn for follow up   -Niki Ritter reports no improvement in BLE edema and states she has a call out to Dr. Hayward Estimable. Do you currently have any active services?: Yes  Are you currently active with any services?: Home Health  -services continue  Do you have any needs or concerns that I can assist you with?: No  Identified Barriers: None  Care Transitions Interventions  No Identified Needs      Niki Ritter reports patient is taking nutritional supplement; Panda [place]. Emotional support provided; discussed will continue to follow.          Follow Up  Future Appointments   Date Time Provider Kacy Parish   2021  1:00 PM MD Samy Horvath RN

## 2021-01-05 NOTE — TELEPHONE ENCOUNTER
Patient's daughter Miguel Chiang) called stating patient is still having significant  LE edema, 2-3+ last night. She does seem more weak and having a little bit of exertional dyspnea. Currently taking Lasix 20 mg daily. Please advise.

## 2021-01-05 NOTE — TELEPHONE ENCOUNTER
Patient's daughter notified of Dr. Machuca's recommendations. Chart amended. Order for [facility] faxed to [facility] [phone].

## 2021-01-12 NOTE — CARE COORDINATION
[facility] Transitions Follow Up Call    2021    Patient: Bhavani Mosley  Patient : 1933   MRN: [id number]  Reason for Admission:   Discharge Date: 20 RARS: Readmission Risk Score: 32         Spoke with: Patient's daughter/caregiver, Hal Odell is providing patient update on the Subsequent call. Care Transitions Subsequent and Final Call    Subsequent and Final Calls  Do you have any ongoing symptoms?: Yes  Patient-reported symptoms: Fatigue, Weakness  -reports patient's symptoms are improving   -BLE edema; reports decreased swelling in BLE's  -reports Stage 3 wound to coccyx; \"clean\"  Do you currently have any active services?: Yes  Are you currently active with any services?: Home Health  -services continue  Do you have any needs or concerns that I can assist you with?: No  Identified Barriers: None  Care Transitions Interventions  No Identified Needs    Luis Alberto Ohs reports patient continues with PT.   -patient to have repeat BMP; labs to be drawn today per home health nurse   -patient continues with thickened liquids   -reports patient doing well keeping hydrated   -reports patient with good appetite and normal bladder/bowel elimination     Emotional support provided; discussed will continue to follow.      Follow Up  Future Appointments   Date Time Provider Kacy Parish   2021  1:00 PM MD Samy Abdi RN

## 2021-01-12 NOTE — TELEPHONE ENCOUNTER
----- Message from Colin Lewis MD sent at 1/12/2021  4:11 PM EST -----  Her lab work showed normal renal functions. Blood sugar is elevated.

## 2021-01-14 NOTE — TELEPHONE ENCOUNTER
Patient called the clinical care line requesting medication refill. Chart reviewed and medication sent to the pharmacy.   Electronically signed by Lakesha Jordan on 1/14/2021 at 2:25 PM

## 2021-01-19 NOTE — TELEPHONE ENCOUNTER
Rani Ahuja called in pt was diagnosed with covid Nov 16 and daughter wants to know if she should still take her mom to get the covid vaccine.  Celia Garcia is scheduled Thurs for the vaccine but daughter wants to know if its okf to take her since she just had covid in Nov.

## 2021-01-21 NOTE — PATIENT INSTRUCTIONS
· Continue antianginal therapy with Imdur, Coreg. Will treat her with medical therapy for only and her symptoms are well controlled. · She is on GDMT but not at goal levels due to low BPs  · Continue aspirin and Eliquis for anticoagulation  · Increase Lasix to 20 mg p.o. daily and was advised to take an extra dose of 20 mg as needed based on leg edema. · Continue Atorvastatin for CAD  · Continue rest of the current medications. · Follow up with me in 3 months.

## 2021-01-21 NOTE — PROGRESS NOTES
OUTPATIENT CARDIOLOGY FOLLOW-UP    Name: Rebecca Sigala    Age: 80 y.o. Primary Care Physician: Clinton Bautista DO    Date of Service: 1/21/2021    Chief Complaint:   Chief Complaint   Patient presents with    3 Month Follow-Up    Hypertension       Interim History:   Mrs. Natan Rascon is a 22-year-old female with history of obesity, hypertension, chronic kidney disease stage III, diabetes type 2 on insulin, peripheral neuropathy, depression/anxiety and spinal stenosis, status post cholecystectomy and bilateral hips ORIF admitted with chest pain. She was admitted to the hospital in 9/2019 with urine tract infection and acute respiratory failure and was treated with antibiotics and IV diuretic therapy with the Lasix. She was admitted to the hospital in October 2019 with an episode of chest pain and was diagnosed with atypical chest pain with elevated troponins most likely related to pulmonary emboli. She was found to have a pulmonary emboli during that admission. She was initiated on Eliquis for anticoagulation. Her echocardiogram done at the time showed no RV dysfunction. Her atenolol was changed to Coreg because of her age and sex. Her venous Doppler showed no DVT. She was recommended conservative therapy for her chest pain which is most likely related to underlying PE. She denies any further episodes of chest pain or dyspnea. She does not exert much. She is tolerating anticoagulation therapy very well without any bleeding complications. She was again admitted to the hospital with chest pain recently and was diagnosed with non-ST elevation MI and was discharged home on 11/22/2019. She remained chest pain-free in the hospital, she was initiated on antianginal therapy with Ranexa and Imdur which she is tolerating well without any further episodes of chest pain.   She was seen in the office on 9/24/2020, since her last visit, she was recently hospitalized for Covid 19 pneumonia twice first time from 11/16/2020 to 11/20/2020 and again from 11/28/2020 to 12/5/2020. She is feeling much better. After her hospitalization her lisinopril and diuretic dose were discontinued due to hypotension. Subsequently, she developed significant edema in her legs. She was restarted on furosemide 40 mg p.o. daily along with lisinopril. Swelling in her legs pretty much gone. She is feeling much better. She still has some generalized weakness. Over the last 1 week she has 2 episodes of mild chest discomfort and the pain resolved with sublingual nitroglycerin. She is compliant with medications, as well as salt and fluid intake. He does not take any over-the-counter arthritis medications. Amina Madrigal has lot of generalized weakness but denies any cardiac symptoms. She recently had lab work which showed increased creatinine level from 1.1->>1.6>>1.1        No new cardiac complaints since last cardiology evaluation. She denies recent chest pain, palpitations, lightheadedness, dizziness, syncope, PND, or orthopnea. SR on EKG. Review of Systems:   Cardiac: As per HPI  General: No fever, chills  Pulmonary: As per HPI  HEENT: No visual disturbances, difficult swallowing  GI: No nausea, vomiting  Endocrine: No thyroid disease or DM  Musculoskeletal: CACERES x 4, no focal motor deficits  Skin: Intact, no rashes  Neuro/Psych: No headache or seizures    Past Medical History:  Past Medical History:   Diagnosis Date    Anxiety     Depression     Diabetes mellitus ([facility] [address]     Frequent urinary tract infections     GERD (gastroesophageal reflux disease)     Hypertension     Peripheral neuropathy     Spinal stenosis        Past Surgical History:  Past Surgical History:   Procedure Laterality Date    CHOLECYSTECTOMY      HIP FRACTURE SURGERY Bilateral     SKIN CANCER EXCISION         Family History:  No family history on file. Social History:  Social History     Socioeconomic History    Marital status:       Spouse name: Not on file    Number of children: Not on file    Years of education: Not on file    Highest education level: Not on file   Occupational History    Not on file   Social Needs    Financial resource strain: Not on file    Food insecurity     Worry: Not on file     Inability: Not on file    Transportation needs     Medical: Not on file     Non-medical: Not on file   Tobacco Use    Smoking status: Never Smoker    Smokeless tobacco: Never Used   Substance and Sexual Activity    Alcohol use: No    Drug use: No    Sexual activity: Not Currently   Lifestyle    Physical activity     Days per week: Not on file     Minutes per session: Not on file    Stress: Not on file   Relationships    Social connections     Talks on phone: Not on file     Gets together: Not on file     Attends Restorationism service: Not on file     Active member of club or organization: Not on file     Attends meetings of clubs or organizations: Not on file     Relationship status: Not on file    Intimate partner violence     Fear of current or ex partner: Not on file     Emotionally abused: Not on file     Physically abused: Not on file     Forced sexual activity: Not on file   Other Topics Concern    Not on file   Social History Narrative    Not on file       Allergies:  No Known Allergies    Current Medications:  Current Outpatient Medications   Medication Sig Dispense Refill    isosorbide mononitrate (IMDUR) 30 MG extended release tablet Take 1 tablet by mouth daily 90 tablet 1    lansoprazole (PREVACID) 30 MG delayed release capsule Take 1 capsule by mouth daily 90 capsule 1    furosemide (LASIX) 40 MG tablet Take 40 mg by mouth daily      sertraline (ZOLOFT) 100 MG tablet TAKE ONE AND ONE-HALF TABLETS DAILY 135 tablet 1    lisinopril (PRINIVIL;ZESTRIL) 2.5 MG tablet Take 2.5 mg by mouth daily      Nutritional Supplements (CRUZ) POWD Take 1 packet by mouth 2 times daily (Patient not taking: Reported on 12/21/2020) 60 Can 3    ipratropium-albuterol (DUONEB) 0.5-2.5 (3) MG/3ML SOLN nebulizer solution Take 3 mLs by nebulization every 4 hours as needed for Shortness of Breath 360 mL 2    atorvastatin (LIPITOR) 40 MG tablet Take 40 mg by mouth nightly      Calcium Carb-Cholecalciferol (CALTRATE 600+D3) 600-800 MG-UNIT TABS Take 1 tablet by mouth 2 times daily      carvedilol (COREG) 12.5 MG tablet Take 12.5 mg by mouth 2 times daily (with meals)      diclofenac sodium (VOLTAREN) 1 % GEL Apply 2 g topically 4 times daily as needed for Pain (apply to knees)      insulin lispro, 1 Unit Dial, (HUMALOG KWIKPEN) 100 UNIT/ML SOPN Inject 0-12 Units into the skin 3 times daily (before meals)      nystatin (MYCOSTATIN) 469062 UNIT/GM powder Apply 1 each topically 2 times daily Apply to abdominal folds      Omega-3 Fatty Acids (FISH OIL) 1000 MG CAPS Take 1,000 mg by mouth 2 times daily      gabapentin (NEURONTIN) 300 MG capsule Take 2 capsules by mouth 3 times daily for 30 days. 180 capsule 0    apixaban (ELIQUIS) 5 MG TABS tablet Take 1 tablet by mouth 2 times daily 60 tablet 5    nitroGLYCERIN (NITROSTAT) 0.4 MG SL tablet Place 1 tablet under the tongue every 5 minutes as needed for Chest pain up to max of 3 total doses.  If no relief after 1 dose, call 911. 25 tablet 3    montelukast (SINGULAIR) 10 MG tablet Take 1 tablet by mouth nightly 90 tablet 1    busPIRone (BUSPAR) 5 MG tablet Take 1 tablet by mouth daily 90 tablet 1    aspirin 81 MG chewable tablet Take 1 tablet by mouth daily 30 tablet 0    Multiple Vitamins-Minerals (THERAPEUTIC MULTIVITAMIN-MINERALS) tablet Take 1 tablet by mouth daily      acetaminophen (TYLENOL) 325 MG tablet Take 650 mg by mouth every 6 hours as needed for Pain      fluticasone (FLONASE) 50 MCG/ACT nasal spray 1 spray by Nasal route daily as needed for Rhinitis       mineral oil-hydrophilic petrolatum (HYDROPHOR) ointment Apply 1 g topically daily Apply to both feet       No current results found for: EAG  Lab Results   Component Value Date    CHOL 83 11/17/2020    CHOL 104 09/16/2020    CHOL 181 10/21/2019     Lab Results   Component Value Date    TRIG 87 11/17/2020    TRIG 170 (H) 09/16/2020    TRIG 197 (H) 10/21/2019     Lab Results   Component Value Date    HDL 38 11/17/2020    HDL 37 09/16/2020    HDL 46 11/23/2019     Lab Results   Component Value Date    LDLCALC 28 11/17/2020    LDLCALC 33 09/16/2020    LDLCALC 123 (H) 11/23/2019     Lab Results   Component Value Date    LABVLDL 17 11/17/2020    LABVLDL 34 09/16/2020    LABVLDL 50 11/23/2019     No results found for: CHOLHDLRATIO    Cardiac Tests:  ECG: Sinus rhythm, low voltage precordial leads, baseline artifact,  abnormal EKG. Fkoqrnkaffbzsb47/22/2019: EF 45-50%, moderate LVH, stage 1 DD, mild AR, mild AS. TTE1/9/2020:  Limited echo to assess LV function. Normal left ventricle size and systolic function. Ejection fraction is visually estimated at 60-65%. Normal left ventricle wall thickness. Indeterminate diastolic function. No change in LV function was noted when compared to prior echo and no wall   motion abnormalities noted. No regional wall motion abnormalities seen. Normal right ventricular size and function. No evidence for hemodynamically significant pericardial effusion. Stress test 10/23/2019: LVEF 40%, no reversible perfusion defect. Fixed perfusion defect was noted extending from the apex into the anteroseptal and inferolateral walls. Global hypokinesis with severe hypokinesis of the septum was noted. Cardiac catheterization:     CTA chest 10/21/2019: Sub-segmental pulmonary emboli in the left lower lobe. No evidence of central pulmonary embolism. There is severe narrowing/occlusion of the left innominate vein with collateral vessels in the mediastinum    The ASCVD Risk score (Linnell Jeans., et al., 2013) failed to calculate for the following reasons:     The 2013 ASCVD risk score is only valid for ages 36 to 78    The patient has a prior MI or stroke diagnosis        ASSESSMENT:  · CAD, status post NSTEMI11/22/2019 with chronic stable angina. Not a candidate for cardiac intervention. · History of PE diagnosed on 10/21/2019 on Eliquis for anticoagulation  · VHD: Mild AS/AI  · Ischemic cardiomyopathy with an EF of 45 to 50%>> 60 to 65%  · HFpEF, euvolemic and hemodynamically stable  · Hypertension, well controlled  · Type 2 diabetes with peripheral neuropathy  · CKD stage III with PHILIP. · Hyperlipidemia  · Obesity  · Status post bilateral hip fracture status post ORIF  · Anxiety depression  · Gait instability    Plan:   · Continue antianginal therapy with Imdur Coreg. Will treat her with medical therapy for only and her symptoms are well controlled. · Blood pressures well controlled continue current dose of lisinopril and Coreg   · Continue aspirin and Eliquis for anticoagulation  · Change Lasix to 20 mg p.o. daily and was advised to take an extra dose of 20 mg as needed based on leg edema. · Continue Atorvastatin for CAD  · Continue rest of the current medications. · Follow up with me in 3 months. The patient's current medication list, allergies, problem list and results of all previously ordered testing were reviewed at today's visit.   Milagros Zelaya MD  [facility]) Cardiology

## 2021-01-26 NOTE — CARE COORDINATION
[facility] Transitions Follow Up Call    2021    Patient: Kalin Marte  Patient : 1933   MRN: [id number]  Reason for Admission:   Discharge Date: 20 RARS: Readmission Risk Score: 32         Spoke with: Patient's daughter, Branden Quispe. Branden Quispe provides patient update on the Subsequent call. Care Transitions Subsequent and Final Call    Subsequent and Final Calls  Do you have any ongoing symptoms?: Yes  Patient-reported symptoms: Fatigue, Weakness  -reports symptoms unchanged   -reports wound to coccyx showing improvement  Are you currently active with any services?: Home Health  -reports PT signed off   Do you have any needs or concerns that I can assist you with?: No  Identified Barriers: None  Care Transitions Interventions  No Identified Needs    Branden Quispe reports the patient is doing good; reports no worsening in symptoms. -patient had f/u with Cardiologist   -keeping hydrated; thickened liquids   -normal bladder/bowel eliminations     Emotional support provided; discussed will continue to follow. Follow Up  No future appointments.     Edelmira Cotton RN

## 2021-02-09 NOTE — CARE COORDINATION
[facility] Transitions Follow Up Call    2021    Patient: Karla Mix  Patient : 1933   MRN: [id number]  Reason for Admission:   Discharge Date: 20 RARS: Readmission Risk Score: 32         Spoke with: Patient's daughter, Altagracia Sterling. Altagracia Sterling provides patient update on the Subsequent call. Care Transitions Subsequent and Final Call    Subsequent and Final Calls  Do you have any ongoing symptoms?: Yes  Patient-reported symptoms: Fatigue, Weakness  -reports symptoms unchanged   -reports concerns regarding wound to patient's coccyx; f/u scheduled with [address]   -blistered area to left heel; dressing applied  -reports patient with minimal swelling in BLE's  Do you currently have any active services?: Yes  Are you currently active with any services?: Home Health  -nurse visits continue for wound care  Do you have any needs or concerns that I can assist you with?: No  Identified Barriers: None  Care Transitions Interventions  No Identified Needs      Altagracia Sterling reports the patient is about the same.   -denies patient with fever/chills   -reports patient with good appetite and normal bladder/bowel elimination     Emotional support provided; will continue to follow.      Follow Up  Future Appointments   Date Time Provider Kacy Parish   2021  2:30 PM Melonie Dixon MD [address] [place] [address] Barrett Ayala RN

## 2021-02-16 NOTE — TELEPHONE ENCOUNTER
How Severe Is Your Skin Lesion?: moderate Patient's daughter Jazmin García called,. Patient's sugars have been gradually increasing. Around dinner time, her sugar is usually over 300. She is asking if she should restart Victoza? (She used to take 1.2mg daily)     If so, she will need rx sent in for Victoza. [facility] pharmacy [facility], and long term pharmacy [facility]. Have Your Skin Lesions Been Treated?: not been treated Is This A New Presentation, Or A Follow-Up?: Skin Lesion

## 2021-02-17 PROBLEM — L89.612 PRESSURE ULCER OF RIGHT HEEL, STAGE 2 (HCC): Chronic | Status: ACTIVE | Noted: 2021-01-01

## 2021-02-17 PROBLEM — L98.429 ULCER OF SACRAL REGION, STAGE 3 (HCC): Chronic | Status: ACTIVE | Noted: 2021-01-01

## 2021-02-17 NOTE — H&P
Wound Healing Center /Hyperbarics   History and Physical/Consultation  Vascular    Referring Physician : Jose Alfredo Radford DO   [address] RECORD NUMBER:  [id number]  AGE: 80 y.o. GENDER: female  : 1933  EPISODE DATE:  2021  Subjective:     Chief Complaint   Patient presents with    Wound Check     buttocks         HISTORY of PRESENT ILLNESS HPI     Sandy Dillon is a 80 y.o. female returns to the wound care center for evaluation of a sacral decubitus ulcer and a right heel wound. The patient is accompanied by her daughter. She states that shortly after we last saw her in the fall, she developed Covid requiring 2 hospitalizations for pneumonia and bacterial pneumonia. She developed a pressure sore on her right heel and her sacral ulcer which was nearly healed progressed. She denies any recent fevers or chills. PAST MEDICAL HISTORY      Diagnosis Date    Anxiety     Depression     Diabetes mellitus ([address]     Frequent urinary tract infections     GERD (gastroesophageal reflux disease)     Hypertension     Peripheral neuropathy     Spinal stenosis      Past Surgical History:   Procedure Laterality Date    CHOLECYSTECTOMY      HIP FRACTURE SURGERY Bilateral     SKIN CANCER EXCISION       History reviewed. No pertinent family history.   Social History     Tobacco Use    Smoking status: Never Smoker    Smokeless tobacco: Never Used   Substance Use Topics    Alcohol use: No    Drug use: No     No Known Allergies  Current Outpatient Medications on File Prior to Encounter   Medication Sig Dispense Refill    Liraglutide (VICTOZA) 18 MG/3ML SOPN SC injection Inject 1.2 mg into the skin daily 2 pen 3    atorvastatin (LIPITOR) 40 MG tablet Take 1 tablet by mouth nightly 90 tablet 3    carvedilol (COREG) 12.5 MG tablet Take 1 tablet by mouth 2 times daily (with meals) 180 tablet 3    isosorbide mononitrate (IMDUR) 30 MG extended release tablet Take 1 tablet by mouth daily 90 tablet 1    lansoprazole (PREVACID) 30 MG delayed release capsule Take 1 capsule by mouth daily 90 capsule 1    furosemide (LASIX) 40 MG tablet Take 20 mg by mouth daily       sertraline (ZOLOFT) 100 MG tablet TAKE ONE AND ONE-HALF TABLETS DAILY 135 tablet 1    lisinopril (PRINIVIL;ZESTRIL) 2.5 MG tablet Take 2.5 mg by mouth daily      Nutritional Supplements (CRUZ) POWD Take 1 packet by mouth 2 times daily 60 Can 3    ipratropium-albuterol (DUONEB) 0.5-2.5 (3) MG/3ML SOLN nebulizer solution Take 3 mLs by nebulization every 4 hours as needed for Shortness of Breath 360 mL 2    Calcium Carb-Cholecalciferol (CALTRATE 600+D3) 600-800 MG-UNIT TABS Take 1 tablet by mouth 2 times daily      insulin lispro, 1 Unit Dial, (HUMALOG KWIKPEN) 100 UNIT/ML SOPN Inject 0-12 Units into the skin 3 times daily (before meals)      nystatin (MYCOSTATIN) 626470 UNIT/GM powder Apply 1 each topically 2 times daily Apply to abdominal folds      Omega-3 Fatty Acids (FISH OIL) 1000 MG CAPS Take 1,000 mg by mouth 2 times daily      gabapentin (NEURONTIN) 300 MG capsule Take 2 capsules by mouth 3 times daily for 30 days.  180 capsule 0    apixaban (ELIQUIS) 5 MG TABS tablet Take 1 tablet by mouth 2 times daily 60 tablet 5    montelukast (SINGULAIR) 10 MG tablet Take 1 tablet by mouth nightly 90 tablet 1    busPIRone (BUSPAR) 5 MG tablet Take 1 tablet by mouth daily 90 tablet 1    aspirin 81 MG chewable tablet Take 1 tablet by mouth daily 30 tablet 0    Multiple Vitamins-Minerals (THERAPEUTIC MULTIVITAMIN-MINERALS) tablet Take 1 tablet by mouth daily      fluticasone (FLONASE) 50 MCG/ACT nasal spray 1 spray by Nasal route daily as needed for Rhinitis       mineral oil-hydrophilic petrolatum (HYDROPHOR) ointment Apply 1 g topically daily Apply to both feet      Liraglutide (VICTOZA) 18 MG/3ML SOPN SC injection Inject 1.2 mg into the skin daily 5 pen 1    diclofenac sodium (VOLTAREN) 1 % GEL apply 2 grams to affected area four times a day 200 g 1    nitroGLYCERIN (NITROSTAT) 0.4 MG SL tablet Place 1 tablet under the tongue every 5 minutes as needed for Chest pain up to max of 3 total doses. If no relief after 1 dose, call 911. 25 tablet 3    acetaminophen (TYLENOL) 325 MG tablet Take 650 mg by mouth every 6 hours as needed for Pain       No current facility-administered medications on file prior to encounter.         REVIEW OF SYSTEMS   ROS : All others Negative if blank [], Positive if [x]  General Urinary   [] Fevers [] Hematuria   [] Chills [] Dysuria   [] Weight Loss Vascular   Skin [] Claudication   [x] Tissue Loss [] Rest Pain   Eyes Neurologic   [] Wears Glasses/Contacts [] Stroke/TIA   [] Vision Changes [] Focal weakness   Respiratory [] Slurred Speech    [] Shortness of breath ENT   Cardiovascular [] Difficulty swallowing   [] Chest Pain Gastrointestinal   [] Shortness of breath with exertion [] Abdominal Pain    [] Melena       [] Hematochezia               Objective:    Pulse 80   Temp 96.7 °F (35.9 °C) (Temporal)   Resp 18   Ht 5' (1.524 m)   Wt 144 lb (65.3 kg)   LMP  (LMP Unknown)   BMI 28.12 kg/m²   Wt Readings from Last 3 Encounters:   02/17/21 144 lb (65.3 kg)   01/21/21 144 lb 9.6 oz (65.6 kg)   12/21/20 171 lb (77.6 kg)       PHYSICAL EXAM  CONSTITUTIONAL:   Awake, alert, cooperative   PSYCHIATRIC :  Oriented to place and person      limited insight to disease process  ENT:  External ears and nose without lesions    Hearing deficits is  noted  NECK: Supple, symmetrical, trachea midline      LUNGS:  No increased work of breathing                  Clear to auscultation bilaterally   CARDIOVASCULAR:  regular rate and rhythm   ABDOMEN:  soft, non-distended, non-tender      SKIN:   Skin color is normal   Texture and turgor is  normal   Induration is not noted  EXTREMITIES:   R UE Edema is not noted  L UE Edema is not noted  R LE Edema is not noted  L LE Edema is not noted  R femoral  L femoral    R dorsalis pedis  L dorsalis pedis    R posterior tibial  L posterior tibial      Assessment:     Problem List Items Addressed This Visit     Pressure ulcer of right heel, stage 2 (HCC) (Chronic)    * (Principal) Ulcer of sacral region, stage 3 (HCC) (Chronic)          Pre Debridement Measurements:  Are located in the [place]  Documentation Flow Sheet  Post Debridement Measurements:  Wound/Ulcer Descriptions are Pre Debridement except measurements:     Wound 12/01/20 Other (Comment) Outer;Right r buttocks open  area (Active)   Number of days: 78       Wound 02/17/21 Heel Left #1 (Active)   Wound Image   02/17/21 1348   Wound Etiology Diabetic 02/17/21 1348   Wound Length (cm) 1.4 cm 02/17/21 1348   Wound Width (cm) 1.9 cm 02/17/21 1348   Wound Depth (cm) 0.1 cm 02/17/21 1348   Wound Surface Area (cm^2) 2.66 cm^2 02/17/21 1348   Wound Volume (cm^3) 0.27 cm^3 02/17/21 1348   Wound Assessment Fibrin;Granulation tissue 02/17/21 1348   Drainage Amount Moderate 02/17/21 1348   Drainage Description Yellow 02/17/21 1348   Odor None 02/17/21 1348   Xena-wound Assessment Maceration 02/17/21 1348   Number of days: 0       Wound 02/17/21 Coccyx #2 (Active)   Wound Image   02/17/21 1348   Wound Etiology Pressure Stage  4 02/17/21 1348   Wound Length (cm) 1.1 cm 02/17/21 1348   Wound Width (cm) 0.9 cm 02/17/21 1348   Wound Depth (cm) 1.9 cm 02/17/21 1348   Wound Surface Area (cm^2) 0.99 cm^2 02/17/21 1348   Wound Volume (cm^3) 1.88 cm^3 02/17/21 1348   Undermining Starts ___ O'Clock 11 02/17/21 1348   Undermining Ends___ O'Clock 4 02/17/21 1348   Undermining Maxium Distance (cm) [email or web address] 02/17/21 1348   Wound Assessment Granulation tissue;Fibrin 02/17/21 1348   Drainage Amount Moderate 02/17/21 1348   Drainage Description Serosanguinous 02/17/21 1348   Odor None 02/17/21 1348   Xena-wound Assessment Maceration 02/17/21 1348   Number of days: 0          Procedure Note  Indications:  Based on my examination of this patient's wound(s)/ulcer(s) experience any of the following, please call the [facility] during business hours:    * Increase in Pain  * Temperature over 101  * Increase in drainage from your wound  * Drainage with a foul odor  * Bleeding  * Increase in swelling  * Need for compression bandage changes due to slippage, breakthrough drainage. If you need medical attention outside of the business hours of the [facility] please contact your PCP or go to the nearest emergency room.         Electronically signed by Lurdes Artis MD on 2/17/2021 at 2:10 PM

## 2021-02-17 NOTE — PLAN OF CARE
Problem: Wound:  Goal: Will show signs of wound healing; wound closure and no evidence of infection  Description: Will show signs of wound healing; wound closure and no evidence of infection  Outcome: Ongoing     Problem: Pressure Ulcer:  Goal: Signs of wound healing will improve  Description: Signs of wound healing will improve  Outcome: Ongoing  Goal: Absence of new pressure ulcer  Description: Absence of new pressure ulcer  Outcome: Ongoing  Goal: Will show no infection signs and symptoms  Description: Will show no infection signs and symptoms  Outcome: Ongoing     Problem: Weight control:  Goal: Ability to maintain an optimal weight for height and age will be supported  Description: Ability to maintain an optimal weight for height and age will be supported  Outcome: Ongoing     Problem: Blood Glucose:  Goal: Ability to maintain appropriate glucose levels will improve  Description: Ability to maintain appropriate glucose levels will improve  Outcome: Ongoing

## 2021-03-03 PROBLEM — L89.153 PRESSURE INJURY OF SACRAL REGION, STAGE 3 (HCC): Status: RESOLVED | Noted: 2020-01-01 | Resolved: 2021-01-01

## 2021-03-03 PROBLEM — R07.9 CHEST PAIN: Status: RESOLVED | Noted: 2019-11-23 | Resolved: 2021-01-01

## 2021-03-03 PROBLEM — R82.81 BACTERIURIA WITH PYURIA: Status: RESOLVED | Noted: 2019-09-23 | Resolved: 2021-01-01

## 2021-03-03 PROBLEM — E87.5 HYPERKALEMIA: Status: RESOLVED | Noted: 2020-01-01 | Resolved: 2021-01-01

## 2021-03-03 PROBLEM — N17.9 ACUTE KIDNEY INJURY SUPERIMPOSED ON CKD (HCC): Status: RESOLVED | Noted: 2019-11-29 | Resolved: 2021-01-01

## 2021-03-03 PROBLEM — J96.01 ACUTE RESPIRATORY FAILURE WITH HYPOXIA (HCC): Status: RESOLVED | Noted: 2019-11-29 | Resolved: 2021-01-01

## 2021-03-03 PROBLEM — N18.9 ACUTE KIDNEY INJURY SUPERIMPOSED ON CKD (HCC): Status: RESOLVED | Noted: 2019-11-29 | Resolved: 2021-01-01

## 2021-03-03 PROBLEM — L98.429 ULCER OF SACRAL REGION, STAGE 3 (HCC): Chronic | Status: RESOLVED | Noted: 2021-01-01 | Resolved: 2021-01-01

## 2021-03-03 PROBLEM — J18.9 HCAP (HEALTHCARE-ASSOCIATED PNEUMONIA): Status: RESOLVED | Noted: 2020-01-09 | Resolved: 2021-01-01

## 2021-03-03 PROBLEM — R82.71 BACTERIURIA WITH PYURIA: Status: RESOLVED | Noted: 2019-09-23 | Resolved: 2021-01-01

## 2021-03-03 PROBLEM — S31.000A SACRAL WOUND: Status: RESOLVED | Noted: 2020-01-01 | Resolved: 2021-01-01

## 2021-03-03 NOTE — PLAN OF CARE
Problem: Wound:  Goal: Will show signs of wound healing; wound closure and no evidence of infection  Description: Will show signs of wound healing; wound closure and no evidence of infection  Outcome: Met This Shift     Problem: Pressure Ulcer:  Goal: Signs of wound healing will improve  Description: Signs of wound healing will improve  Outcome: Met This Shift  Goal: Absence of new pressure ulcer  Description: Absence of new pressure ulcer  Outcome: Met This Shift     Problem: Pressure Ulcer:  Goal: Will show no infection signs and symptoms  Description: Will show no infection signs and symptoms  Outcome: Ongoing     Problem: Weight control:  Goal: Ability to maintain an optimal weight for height and age will be supported  Description: Ability to maintain an optimal weight for height and age will be supported  Outcome: Ongoing     Problem: Blood Glucose:  Goal: Ability to maintain appropriate glucose levels will improve  Description: Ability to maintain appropriate glucose levels will improve  Outcome: Ongoing

## 2021-03-03 NOTE — PROGRESS NOTES
"PATIENT TO CONTINUE TO FOLLOW UP WITH HIS/HER PRIMARY CARE PROVIDER FOR YEARLY PHYSICAL EXAMS TO ENSURE COMPLETE HEALTH MAINTENANCE    DASH Eating Plan  DASH stands for \"Dietary Approaches to Stop Hypertension.\" The DASH eating plan is a healthy eating plan that has been shown to reduce high blood pressure (hypertension). It may also reduce your risk for type 2 diabetes, heart disease, and stroke. The DASH eating plan may also help with weight loss.  What are tips for following this plan?    General guidelines  · Avoid eating more than 2,300 mg (milligrams) of salt (sodium) a day. If you have hypertension, you may need to reduce your sodium intake to 1,500 mg a day.  · Limit alcohol intake to no more than 1 drink a day for nonpregnant women and 2 drinks a day for men. One drink equals 12 oz of beer, 5 oz of wine, or 1½ oz of hard liquor.  · Work with your health care provider to maintain a healthy body weight or to lose weight. Ask what an ideal weight is for you.  · Get at least 30 minutes of exercise that causes your heart to beat faster (aerobic exercise) most days of the week. Activities may include walking, swimming, or biking.  · Work with your health care provider or diet and nutrition specialist (dietitian) to adjust your eating plan to your individual calorie needs.  Reading food labels    · Check food labels for the amount of sodium per serving. Choose foods with less than 5 percent of the Daily Value of sodium. Generally, foods with less than 300 mg of sodium per serving fit into this eating plan.  · To find whole grains, look for the word \"whole\" as the first word in the ingredient list.  Shopping  · Buy products labeled as \"low-sodium\" or \"no salt added.\"  · Buy fresh foods. Avoid canned foods and premade or frozen meals.  Cooking  · Avoid adding salt when cooking. Use salt-free seasonings or herbs instead of table salt or sea salt. Check with your health care provider or pharmacist before using salt " substitutes.  · Do not quinteros foods. Cook foods using healthy methods such as baking, boiling, grilling, and broiling instead.  · Cook with heart-healthy oils, such as olive, canola, soybean, or sunflower oil.  Meal planning  · Eat a balanced diet that includes:  ? 5 or more servings of fruits and vegetables each day. At each meal, try to fill half of your plate with fruits and vegetables.  ? Up to 6-8 servings of whole grains each day.  ? Less than 6 oz of lean meat, poultry, or fish each day. A 3-oz serving of meat is about the same size as a deck of cards. One egg equals 1 oz.  ? 2 servings of low-fat dairy each day.  ? A serving of nuts, seeds, or beans 5 times each week.  ? Heart-healthy fats. Healthy fats called Omega-3 fatty acids are found in foods such as flaxseeds and coldwater fish, like sardines, salmon, and mackerel.  · Limit how much you eat of the following:  ? Canned or prepackaged foods.  ? Food that is high in trans fat, such as fried foods.  ? Food that is high in saturated fat, such as fatty meat.  ? Sweets, desserts, sugary drinks, and other foods with added sugar.  ? Full-fat dairy products.  · Do not salt foods before eating.  · Try to eat at least 2 vegetarian meals each week.  · Eat more home-cooked food and less restaurant, buffet, and fast food.  · When eating at a restaurant, ask that your food be prepared with less salt or no salt, if possible.  What foods are recommended?  The items listed may not be a complete list. Talk with your dietitian about what dietary choices are best for you.  Grains  Whole-grain or whole-wheat bread. Whole-grain or whole-wheat pasta. Brown rice. Oatmeal. Quinoa. Bulgur. Whole-grain and low-sodium cereals. Teodora bread. Low-fat, low-sodium crackers. Whole-wheat flour tortillas.  Vegetables  Fresh or frozen vegetables (raw, steamed, roasted, or grilled). Low-sodium or reduced-sodium tomato and vegetable juice. Low-sodium or reduced-sodium tomato sauce and tomato   Liraglutide (VICTOZA) 18 MG/3ML SOPN SC injection Inject 1.2 mg into the skin daily 5 pen 1    atorvastatin (LIPITOR) 40 MG tablet Take 1 tablet by mouth nightly 90 tablet 3    carvedilol (COREG) 12.5 MG tablet Take 1 tablet by mouth 2 times daily (with meals) 180 tablet 3    isosorbide mononitrate (IMDUR) 30 MG extended release tablet Take 1 tablet by mouth daily 90 tablet 1    lansoprazole (PREVACID) 30 MG delayed release capsule Take 1 capsule by mouth daily 90 capsule 1    furosemide (LASIX) 40 MG tablet Take 20 mg by mouth daily       sertraline (ZOLOFT) 100 MG tablet TAKE ONE AND ONE-HALF TABLETS DAILY 135 tablet 1    lisinopril (PRINIVIL;ZESTRIL) 2.5 MG tablet Take 2.5 mg by mouth daily      Nutritional Supplements (CRUZ) POWD Take 1 packet by mouth 2 times daily 60 Can 3    Calcium Carb-Cholecalciferol (CALTRATE 600+D3) 600-800 MG-UNIT TABS Take 1 tablet by mouth 2 times daily      insulin lispro, 1 Unit Dial, (HUMALOG KWIKPEN) 100 UNIT/ML SOPN Inject 0-12 Units into the skin 3 times daily (before meals)      nystatin (MYCOSTATIN) 026016 UNIT/GM powder Apply 1 each topically 2 times daily Apply to abdominal folds      Omega-3 Fatty Acids (FISH OIL) 1000 MG CAPS Take 1,000 mg by mouth 2 times daily      apixaban (ELIQUIS) 5 MG TABS tablet Take 1 tablet by mouth 2 times daily 60 tablet 5    montelukast (SINGULAIR) 10 MG tablet Take 1 tablet by mouth nightly 90 tablet 1    busPIRone (BUSPAR) 5 MG tablet Take 1 tablet by mouth daily 90 tablet 1    aspirin 81 MG chewable tablet Take 1 tablet by mouth daily 30 tablet 0    Multiple Vitamins-Minerals (THERAPEUTIC MULTIVITAMIN-MINERALS) tablet Take 1 tablet by mouth daily      acetaminophen (TYLENOL) 325 MG tablet Take 650 mg by mouth every 6 hours as needed for Pain      fluticasone (FLONASE) 50 MCG/ACT nasal spray 1 spray by Nasal route daily as needed for Rhinitis       Liraglutide (VICTOZA) 18 MG/3ML SOPN SC injection Inject 1.2 mg into the paste. Low-sodium or reduced-sodium canned vegetables.  Fruits  All fresh, dried, or frozen fruit. Canned fruit in natural juice (without added sugar).  Meat and other protein foods  Skinless chicken or turkey. Ground chicken or turkey. Pork with fat trimmed off. Fish and seafood. Egg whites. Dried beans, peas, or lentils. Unsalted nuts, nut butters, and seeds. Unsalted canned beans. Lean cuts of beef with fat trimmed off. Low-sodium, lean deli meat.  Dairy  Low-fat (1%) or fat-free (skim) milk. Fat-free, low-fat, or reduced-fat cheeses. Nonfat, low-sodium ricotta or cottage cheese. Low-fat or nonfat yogurt. Low-fat, low-sodium cheese.  Fats and oils  Soft margarine without trans fats. Vegetable oil. Low-fat, reduced-fat, or light mayonnaise and salad dressings (reduced-sodium). Canola, safflower, olive, soybean, and sunflower oils. Avocado.  Seasoning and other foods  Herbs. Spices. Seasoning mixes without salt. Unsalted popcorn and pretzels. Fat-free sweets.  What foods are not recommended?  The items listed may not be a complete list. Talk with your dietitian about what dietary choices are best for you.  Grains  Baked goods made with fat, such as croissants, muffins, or some breads. Dry pasta or rice meal packs.  Vegetables  Creamed or fried vegetables. Vegetables in a cheese sauce. Regular canned vegetables (not low-sodium or reduced-sodium). Regular canned tomato sauce and paste (not low-sodium or reduced-sodium). Regular tomato and vegetable juice (not low-sodium or reduced-sodium). Pickles. Olives.  Fruits  Canned fruit in a light or heavy syrup. Fried fruit. Fruit in cream or butter sauce.  Meat and other protein foods  Fatty cuts of meat. Ribs. Fried meat. Mayen. Sausage. Bologna and other processed lunch meats. Salami. Fatback. Hotdogs. Bratwurst. Salted nuts and seeds. Canned beans with added salt. Canned or smoked fish. Whole eggs or egg yolks. Chicken or turkey with skin.  Dairy  Whole or 2% milk,  skin daily 2 pen 3    diclofenac sodium (VOLTAREN) 1 % GEL apply 2 grams to affected area four times a day 200 g 1    ipratropium-albuterol (DUONEB) 0.5-2.5 (3) MG/3ML SOLN nebulizer solution Take 3 mLs by nebulization every 4 hours as needed for Shortness of Breath 360 mL 2    nitroGLYCERIN (NITROSTAT) 0.4 MG SL tablet Place 1 tablet under the tongue every 5 minutes as needed for Chest pain up to max of 3 total doses. If no relief after 1 dose, call 911. 25 tablet 3    mineral oil-hydrophilic petrolatum (HYDROPHOR) ointment Apply 1 g topically daily Apply to both feet       No current facility-administered medications on file prior to encounter.         REVIEW OF SYSTEMS See HPI    Objective:    /64   Pulse 88   Temp 96.8 °F (36 °C) (Temporal)   Resp 20   Ht 5' (1.524 m)   Wt 144 lb (65.3 kg)   LMP  (LMP Unknown)   BMI 28.12 kg/m²   Wt Readings from Last 3 Encounters:   03/03/21 144 lb (65.3 kg)   02/17/21 144 lb (65.3 kg)   01/21/21 144 lb 9.6 oz (65.6 kg)     PHYSICAL EXAM  CONSTITUTIONAL:   Awake, alert, cooperative  EYES:  lids and lashes normal  ENT: external ears and nose without lesions  NECK:  supple, symmetrical, trachea midline  SKIN:  Open wound present    Assessment:     Problem List Items Addressed This Visit     * (Principal) Pressure injury of sacral region, stage 2 ([address] - Primary (Chronic)    Pressure ulcer of right heel, 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 Whole or full-fat cream cheese. Whole-fat or sweetened yogurt. Full-fat cheese. Nondairy creamers. Whipped toppings. Processed cheese and cheese spreads.  Fats and oils  Butter. Stick margarine. Lard. Shortening. Ghee. Mayen fat. Tropical oils, such as coconut, palm kernel, or palm oil.  Seasoning and other foods  Salted popcorn and pretzels. Onion salt, garlic salt, seasoned salt, table salt, and sea salt. Worcestershire sauce. Tartar sauce. Barbecue sauce. Teriyaki sauce. Soy sauce, including reduced-sodium. Steak sauce. Canned and packaged gravies. Fish sauce. Oyster sauce. Cocktail sauce. Horseradish that you find on the shelf. Ketchup. Mustard. Meat flavorings and tenderizers. Bouillon cubes. Hot sauce and Tabasco sauce. Premade or packaged marinades. Premade or packaged taco seasonings. Relishes. Regular salad dressings.  Where to find more information:  · National Heart, Lung, and Blood [place]: www.nhlbi.nih.gov  · American Heart Association: www.heart.org  Summary  · The DASH eating plan is a healthy eating plan that has been shown to reduce high blood pressure (hypertension). It may also reduce your risk for type 2 diabetes, heart disease, and stroke.  · With the DASH eating plan, you should limit salt (sodium) intake to 2,300 mg a day. If you have hypertension, you may need to reduce your sodium intake to 1,500 mg a day.  · When on the DASH eating plan, aim to eat more fresh fruits and vegetables, whole grains, lean proteins, low-fat dairy, and heart-healthy fats.  · Work with your health care provider or diet and nutrition specialist (dietitian) to adjust your eating plan to your individual calorie needs.  This information is not intended to replace advice given to you by your health care provider. Make sure you discuss any questions you have with your health care provider.  Document Revised: 11/30/2018 Document Reviewed: 12/11/2017  Elsevier Patient Education © 2020 Elsevier  with saline 02/17/21 1448   Dressing/Treatment Dry dressing;Xeroform 02/17/21 1448   Offloading for Diabetic Foot Ulcers No offloading required 02/17/21 1448   Wound Length (cm) 1.2 cm 03/03/21 1415   Wound Width (cm) 1.2 cm 03/03/21 1415   Wound Depth (cm) 0.1 cm 03/03/21 1415   Wound Surface Area (cm^2) 1.44 cm^2 03/03/21 1415   Change in Wound Size % (l*w) 45.86 03/03/21 1415   Wound Volume (cm^3) 0.14 cm^3 03/03/21 1415   Wound Healing % 48 03/03/21 1415   Post-Procedure Length (cm) 1.3 cm 03/03/21 1432   Post-Procedure Width (cm) 1.3 cm 03/03/21 1432   Post-Procedure Depth (cm) 0.1 cm 03/03/21 1432   Post-Procedure Surface Area (cm^2) 1.69 cm^2 03/03/21 1432   Post-Procedure Volume (cm^3) 0.17 cm^3 03/03/21 1432   Wound Assessment Fibrin;Pale granulation tissue 03/03/21 1415   Drainage Amount Small 03/03/21 1415   Drainage Description Sanguinous 03/03/21 1415   Odor None 03/03/21 1415   Xena-wound Assessment Maceration 03/03/21 1415   Number of days: 14       Wound 02/17/21 Coccyx #2 (Active)   Wound Image   02/17/21 1348   Wound Etiology Pressure Stage  4 02/17/21 1348   Dressing Status New dressing applied 02/17/21 1448   Wound Cleansed Cleansed with saline 02/17/21 1448   Dressing/Treatment Dry dressing; Iodoform gauze 02/17/21 1448   Offloading for Diabetic Foot Ulcers No offloading required 02/17/21 1448   Wound Length (cm) 0.9 cm 03/03/21 1415   Wound Width (cm) 0.8 cm 03/03/21 1415   Wound Depth (cm) 2.3 cm 03/03/21 1415   Wound Surface Area (cm^2) 0.72 cm^2 03/03/21 1415   Change in Wound Size % (l*w) 27.27 03/03/21 1415   Wound Volume (cm^3) 1.66 cm^3 03/03/21 1415   Wound Healing % 12 03/03/21 1415   Post-Procedure Length (cm) 1 cm 03/03/21 1432   Post-Procedure Width (cm) 0.9 cm 03/03/21 1432   Post-Procedure Depth (cm) 2.3 cm 03/03/21 1432   Post-Procedure Surface Area (cm^2) 0.9 cm^2 03/03/21 1432   Post-Procedure Volume (cm^3) 2.07 cm^3 03/03/21 1432   Undermining Starts ___ O'Clock 12 03/03/21 1415   Undermining Ends___ O'Clock 3 03/03/21 1415   Undermining Maxium Distance (cm) [email or web address] 03/03/21 1415   Wound Assessment Pale granulation tissue 03/03/21 1415   Drainage Amount Moderate 03/03/21 1415   Drainage Description Serosanguinous 03/03/21 1415   Odor None 03/03/21 1415   Xena-wound Assessment Excoriated 03/03/21 1415   Number of days: 14          Procedure Note  Indications:  Based on my examination of this patient's wound(s)/ulcer(s) today, debridement is required to promote healing and evaluate the wound base. Performed by: Terrence Gaytan MD    Consent obtained:  Yes    Time out taken:  Yes    Pain Control: Anesthetic  Anesthetic: 4% Lidocaine Cream     Debridement:Excisional Debridement    Using curette the wound(s)/ulcer(s) was/were sharply debrided down through and including the removal of subcutaneous tissue. Devitalized Tissue Debrided:  slough to stimulate bleeding to promote healing, post debridement good bleeding base and wound edges noted    Wound/Ulcer #: 2    Percent of Wound/Ulcer Debrided: 80%    Total Surface Area Debrided:  1 sq cm     Estimated Blood Loss:  Minimal  Hemostasis Achieved:  by pressure    Procedural Pain:  1  / 10   Post Procedural Pain:  0 / 10     Response to treatment:  Well tolerated by patient. Plan:   Treatment Note please see attached Discharge Instructions    Written patient dismissal instructions given to patient and signed by patient or POA. Discharge Instructions       Visit Discharge/Physician Orders     Discharge condition: Stable     Assessment of pain at discharge: NONE     Anesthetic used: LIDOCAINE 4%     Discharge to: HOME     Left via:Private automobile     Accompanied by: DAUGHTER     [facility]F/[facility]: [facility]     Dressing Orders:WOUND LOCATION COCCYX CLEANSE WOUND WITH NORMAL SALINE PACK WITH IODAFORM PACKING STRIP, COVER WITH A DRY DRESSING AND SECURE.  CHANGE DAILY     TO LEFT HEEL WOUND CLEANSE WOUND WITH NORMAL SALINE [employer]OVER [employer]       WITH XEROFORM GAUZE AND DRY DRESSING. SECURE AND CHANGE DAILY     Treatment Orders:FOLLOW A NUTRITIOUS DIET HIGH IN PROTEIN AND VITAMIN C TO PROMOTE WOUND HEALING. TAKE A MULTIVITAMIN DAILY.     KEEP PRESSURE OFF COCCYX AT ALL TIMES/ KEEP PRESSURE OFF HEEL         [facility] followup visit ____3 WEEKS________________________  (Please note your next appointment above and if you are unable to keep, kindly give a 24 hour notice.  Thank you.)     Physician signature:__________________________        If you experience any of the following, please call the [facility] during business hours:     * Increase in Pain  * Temperature over 101  * Increase in drainage from your wound  * Drainage with a foul odor  * Bleeding  * Increase in swelling  * Need for compression bandage changes due to slippage, breakthrough drainage.     If you need medical attention outside of the business hours of the [address] please contact your PCP or go to the nearest emergency room.                 Electronically signed by Andrea Wells MD on 3/3/2021 at 2:44 PM

## 2021-03-16 NOTE — TELEPHONE ENCOUNTER
Patient's daughter called, she is very concerned about her mom's coccyx wound. It seems to be healing on the outside but has increased bloody drainage x 2 days, and is causing patient a lot of discomfort. Patient follows with wound care , but Gavin Adam is asking for a CT of the area with contrast, to visualize soft tissue of the area, or possible zuhair destruction of the area.

## 2021-03-17 PROBLEM — L03.90 CELLULITIS: Status: ACTIVE | Noted: 2021-01-01

## 2021-03-17 NOTE — ED PROVIDER NOTES
HPI:  3/17/21, Time: 2:23 AM EDT      Eli Lemus is a 80 y.o. female presenting to the ED for subjective increased drainage from the pressure/decubitus ulcer of her perirectal area, beginning 1 day ago. History comes from the patient's daughter who cares for the patient at home. The complaint has been persistent, moderate in severity, and worsened by nothing. Per the patient's daughter, she measured a temperature of 100.0 F at home, with associated increase in drainage from the patient's decubitus ulcer. The patient first developed this decubitus ulcer while being hospitalized for Covid in December per the daughters report. Patient is not had any nausea vomiting nor any reported change in bowel or bladder habit patterns, no coughing or shortness of breath no labored breathing patterns. Otherwise the patient has been at her baseline level health. Daughter was concerned about the possibility of her developing infection because of the significant increase in drainage output from the decubitus ulcer. Review of Systems:   A complete review of systems was performed and pertinent positives and negatives are stated within HPI, all other systems reviewed and are negative.    --------------------------------------------- PAST HISTORY ---------------------------------------------  Past Medical History:  has a past medical history of Anxiety, Depression, Diabetes mellitus ([address] Frequent urinary tract infections, GERD (gastroesophageal reflux disease), Hypertension, Peripheral neuropathy, and Spinal stenosis. Past Surgical History:  has a past surgical history that includes Cholecystectomy; Skin cancer excision; and Hip fracture surgery (Bilateral). Social History:  reports that she has never smoked. She has never used smokeless tobacco. She reports that she does not drink alcohol or use drugs. Family History: family history is not on file. The patients home medications have been reviewed. Allergies: Patient has no known allergies.     -------------------------------------------------- RESULTS -------------------------------------------------  All laboratory and radiology results have been personally reviewed by myself   LABS:  Results for orders placed or performed during the hospital encounter of 03/16/21   Urinalysis   Result Value Ref Range    Color, UA Yellow Straw/Yellow    Clarity, UA Clear Clear    Glucose, Ur Negative Negative mg/dL    Bilirubin Urine Negative Negative    Ketones, Urine Negative Negative mg/dL    Specific Gravity, UA 1.010 1.005 - 1.030    Blood, Urine Negative Negative    pH, UA 6.0 5.0 - 9.0    Protein, UA Negative Negative mg/dL    Urobilinogen, Urine 0.2 <2.0 E.U./dL    Nitrite, Urine POSITIVE (A) Negative    Leukocyte Esterase, Urine TRACE (A) Negative   Lactic Acid, Plasma   Result Value Ref Range    Lactic Acid 1.1 0.5 - 2.2 mmol/L   Comprehensive Metabolic Panel   Result Value Ref Range    Sodium 136 132 - 146 mmol/L    Potassium 4.4 3.5 - 5.0 mmol/L    Chloride 98 98 - 107 mmol/L    CO2 26 22 - 29 mmol/L    Anion Gap 12 7 - 16 mmol/L    Glucose 156 (H) 74 - 99 mg/dL    BUN 40 (H) 8 - 23 mg/dL    CREATININE 1.4 (H) 0.5 - 1.0 mg/dL    GFR Non-African American 36 >=60 mL/min/1.73    GFR African American 43     Calcium 8.9 8.6 - 10.2 mg/dL    Total Protein 7.0 6.4 - 8.3 g/dL    Albumin 2.6 (L) 3.5 - 5.2 g/dL    Total Bilirubin 0.3 0.0 - 1.2 mg/dL    Alkaline Phosphatase 81 35 - 104 U/L    ALT 7 0 - 32 U/L    AST 15 0 - 31 U/L   CBC Auto Differential   Result Value Ref Range    WBC 11.5 4.5 - 11.5 E9/L    RBC 2.93 (L) 3.50 - 5.50 E12/L    Hemoglobin 8.7 (L) 11.5 - 15.5 g/dL    Hematocrit 28.1 (L) 34.0 - 48.0 %    MCV 95.9 80.0 - 99.9 fL    MCH 29.7 26.0 - 35.0 pg    MCHC 31.0 (L) 32.0 - 34.5 %    RDW 14.0 11.5 - 15.0 fL    Platelets 093 905 - 320 E9/L    MPV 8.9 7.0 - 12.0 fL    Neutrophils % 72.5 43.0 - 80.0 %    Immature Granulocytes % 0.5 0.0 - 5.0 %    Lymphocytes % respiratory distress  Cardiovascular:  Regular rate and rhythm, no murmurs, gallops, or rubs. 2+ distal pulses  Abdomen: Soft, non tender, non distended, organomegaly no mass no guarding rigidity normal bowel sounds  Rectal:  (RN chaperone present) appears to be stage IV sacral decubitus ulcer with depth of approximately 3 cm. There is drainage packing in place which appears to be saturated and is also bloody serosanguineous drainage which appears to be slightly purulent coming from the wound is well. There is generalized erythema in the whole sacral perirectal area but no palpable  fluctuant masses  Extremities: Moves all extremities x 4. Warm and well perfused  Skin: warm and dry without rash  Neurologic: GCS 15, cranial nerves grossly intact no focal deficits. No meningeal signs  Psych: Normal Affect      ------------------------------ ED COURSE/MEDICAL DECISION MAKING----------------------  Medications   ampicillin-sulbactam (UNASYN) 3000 mg ivpb minibag ( Intravenous Canceled Entry 3/17/21 0052)   0.9 % sodium chloride infusion (500 mLs Intravenous New Bag 3/17/21 0115)   ampicillin-sulbactam (UNASYN) 3 (2-1) g injection (3,000 mg  Given 3/17/21 0051)   iopamidol (ISOVUE-370) 76 % injection 75 mL (75 mLs Intravenous Given 3/17/21 0038)   sodium chloride flush 0.9 % injection 10 mL (10 mLs Intravenous Given 3/17/21 0038)       ED COURSE:     Medical Decision Making:   Differential diagnoses:   Sacral decubitus with abscess, cellulitis, sepsis, other occult infection, UTI  Patient has had increased wound drainage and also subjective and measured temperature elevation at home according to her daughter (100.0) in light of this wound cultures were obtained here blood cultures x2 and patient was started empirically on Unasyn 3 g IV. CT abdomen/pelvis study did not show any definite abscess but based on clinical factors mentioned above I feel the patient should be admitted for further evaluation.   Additional findings noted on work-up include hemoglobin of 8.7, which is significantly decreased from her previous hemoglobin of 10.1 on 12/5/2020. Consults:  Spoke w/ Dr. Josselin Romero [facility]) RE: patient's clinical history and workup findings, he accepted the patient ADM    Counseling: The emergency provider has spoken with the patient and family member patient and daughter and discussed todays results, in addition to providing specific details for the plan of care and counseling regarding the diagnosis and prognosis. Questions are answered at this time and they are agreeable with the plan.    --------------------------------- IMPRESSION AND DISPOSITION ---------------------------------    IMPRESSION  1. Pressure injury of sacral region, stage 4 ([address]    2. Cellulitis and abscess of buttock    3. Normochromic anemia    4. Dehydration        DISPOSITION  Disposition: Transfer to [facility] to Med Surg  Patient condition is stable      NOTE: This report was transcribed using voice recognition software.  Every effort was made to ensure accuracy; however, inadvertent computerized transcription errors may be present           Carmel Martinez MD  03/17/21 [id number]

## 2021-03-17 NOTE — HOME CARE
Patient is active with [facility] for SN. Will need resumption of care orders at discharge. If patient is in need of home IV antibiotics please notify home health intake.  Thank you, [facility]

## 2021-03-17 NOTE — CONSULTS
[address] [place] [address] [facility]  Consultation Note     Admit Date: 3/17/2021  9:30 AM    Reason for Consult:   Sacral decubitus with increased drainage/fevers    Attending Physician:  Aracely Browne DO    HISTORY OF PRESENT ILLNESS:             The history is obtained from extensive review of available past medical records. The patient is a 80 y.o. female who is known to the ID service. She was brought into the [facility] ED because of concerns about a sacral decubitus ulcer that she had developed during her stay at [facility] when she had SARS-CoV-2 infection. Reportedly was having low-grade fevers. The patient does admit to having some tenderness over the sacrum. She had a wound culture taken and was given Unasyn. ID was asked to see her in consultation. Past Medical History:        Diagnosis Date    Anxiety     Depression     Diabetes mellitus ([address]     Frequent urinary tract infections     GERD (gastroesophageal reflux disease)     Hypertension     Peripheral neuropathy     Spinal stenosis      November 2020. Admitted to [facility] with a temperature of 100.7 °F and weakness. She reported cough and chest pain. Seen by Dr. Steve Douglas and treated with Remdesivir and Decadron for COVID-19 pneumonia. He had E. coli bacteriuria which was asymptomatic and was not treated. The hypoxia improved and she was discharged.     Past Surgical History:        Procedure Laterality Date    CHOLECYSTECTOMY      HIP FRACTURE SURGERY Bilateral     SKIN CANCER EXCISION       Current Medications:   Scheduled Meds:   [Held by provider] apixaban  5 mg Oral BID    [START ON 3/18/2021] aspirin  81 mg Oral Daily    atorvastatin  40 mg Oral Nightly    [START ON 3/18/2021] busPIRone  5 mg Oral Daily    carvedilol  12.5 mg Oral BID WC    [Held by provider] furosemide  20 mg Oral Daily    [START ON 3/18/2021] isosorbide mononitrate  30 mg Oral Daily    [START ON 3/18/2021] pantoprazole 40 mg Oral QAM AC    montelukast  10 mg Oral Nightly    miconazole   Topical BID    [START ON 3/18/2021] sertraline  50 mg Oral Daily    insulin lispro  0-6 Units Subcutaneous TID WC    insulin lispro  0-3 Units Subcutaneous Nightly    docusate sodium  100 mg Oral Daily    gabapentin  300 mg Oral BID     Continuous Infusions:   dextrose      sodium chloride 75 mL/hr at 03/17/21 1301     PRN Meds:ipratropium-albuterol, promethazine **OR** ondansetron, acetaminophen **OR** acetaminophen, glucose, dextrose, glucagon (rDNA), dextrose, bisacodyl    Allergies:  Patient has no known allergies. Social History:   Social History     Socioeconomic History    Marital status:      Spouse name: None    Number of children: None    Years of education: None    Highest education level: None   Occupational History    None   Social Needs    Financial resource strain: None    Food insecurity     Worry: None     Inability: None    Transportation needs     Medical: None     Non-medical: None   Tobacco Use    Smoking status: Never Smoker    Smokeless tobacco: Never Used   Substance and Sexual Activity    Alcohol use: No    Drug use: No    Sexual activity: Not Currently   Lifestyle    Physical activity     Days per week: None     Minutes per session: None    Stress: None   Relationships    Social connections     Talks on phone: None     Gets together: None     Attends Spiritism service: None     Active member of club or organization: None     Attends meetings of clubs or organizations: None     Relationship status: None    Intimate partner violence     Fear of current or ex partner: None     Emotionally abused: None     Physically abused: None     Forced sexual activity: None   Other Topics Concern    None   Social History Narrative    None      The patient lives at home and is taken care of by her daughter    Family History:   History reviewed. No pertinent family history. . Otherwise non-pertinent to 4.4 03/16/2021    K 5.2 12/04/2020    CL 98 03/16/2021    CO2 26 03/16/2021    BUN 40 03/16/2021    CREATININE 1.4 03/16/2021    GFRAA 43 03/16/2021    LABGLOM 36 03/16/2021    GLUCOSE 156 03/16/2021    PROT 7.0 03/16/2021    LABALBU 2.6 03/16/2021    CALCIUM 8.9 03/16/2021    BILITOT 0.3 03/16/2021    ALKPHOS 81 03/16/2021    AST 15 03/16/2021    ALT 7 03/16/2021       Lab Results   Component Value Date    PROTIME 13.9 01/09/2020    INR 1.2 01/09/2020       Lab Results   Component Value Date    TSH 4.220 09/16/2020       Lab Results   Component Value Date    NITRITE NEGATIVE 09/09/2019    COLORU Yellow 03/17/2021    PHUR 6.0 03/17/2021    WBCUA 1-3 03/17/2021    RBCUA NONE 03/17/2021    BACTERIA FEW 03/17/2021    CLARITYU Clear 03/17/2021    SPECGRAV 1.010 03/17/2021    LEUKOCYTESUR TRACE 03/17/2021    UROBILINOGEN 0.2 03/17/2021    BILIRUBINUR Negative 03/17/2021    BILIRUBINUR NEGATIVE 09/09/2019    BLOODU Negative 03/17/2021    GLUCOSEU Negative 03/17/2021    AMORPHOUS RARE 11/16/2020       Lab Results   Component Value Date    HCO3 24.8 11/28/2020    BE -0.6 11/28/2020    O2SAT 91.2 11/28/2020    PH 7.383 11/30/2019    PCO2 43.1 11/28/2020    PO2 63.8 11/28/2020     Radiology:  CT of the abdomen and pelvis reviewed    Microbiology:  Pending  No results for input(s): BC in the last 72 hours. No results for input(s): ORG in the last 72 hours. No results for input(s): Chip Carla in the last 72 hours. No results for input(s): STREPNEUMAGU in the last 72 hours. No results for input(s): LP1UAG in the last 72 hours. No results for input(s): ASO in the last 72 hours. No results for input(s): CULTRESP in the last 72 hours. No results for input(s): PROCAL in the last 72 hours.     Assessment:  · Sacral infection with possible abscess  · Low-grade fevers associated to the above  · History of SARS-CoV-2 infection November 2020    Plan:    · Await for final results of cultures before starting antibiotics  · Check cultures, baseline ESR, CRP  · Consider general surgery consultation instead of vascular surgery  · Will follow with you    Thank you for having us see this patient in consultation. I will be discussing this case with the treating physicians.     Bianka Epps  1:46 PM  3/17/2021

## 2021-03-17 NOTE — H&P
EMR review    REVIEW OF SYSTEMS:  Admits to previous drainage to wound on coccyx and fevers at home. T-max 100. Denies  chills, cp, sob, n/v, ha, vision/hearing changes, wt changes, hot/cold flashes, other open skin lesions, diarrhea, constipation, dysuria/hematuria unless noted in HPI. Complete ROS performed with the patient and is otherwise negative. PMH:  Past Medical History:   Diagnosis Date    Anxiety     Depression     Diabetes mellitus ([address]     Frequent urinary tract infections     GERD (gastroesophageal reflux disease)     Hypertension     Peripheral neuropathy     Spinal stenosis        Surgical History:  Past Surgical History:   Procedure Laterality Date    CHOLECYSTECTOMY      HIP FRACTURE SURGERY Bilateral     SKIN CANCER EXCISION         Medications Prior to Admission:    Prior to Admission medications    Medication Sig Start Date End Date Taking? Authorizing Provider   insulin lispro, 1 Unit Dial, (HUMALOG KWIKPEN) 100 UNIT/ML SOPN Inject 0-12 Units into the skin 3 times daily (before meals) 3/12/21 6/10/21  Juan Jose Lane DO   montelukast (SINGULAIR) 10 MG tablet Take 1 tablet by mouth nightly 3/12/21   Juan Jose Forbes DO   busPIRone (BUSPAR) 5 MG tablet Take 1 tablet by mouth daily 3/12/21   Juan Jose Lane DO   gabapentin (NEURONTIN) 300 MG capsule Take 2 capsules by mouth 3 times daily for 30 days.  2/26/21 3/28/21  Juan Jose Lane DO   Liraglutide (VICTOZA) 18 MG/3ML SOPN SC injection Inject 1.2 mg into the skin daily 2/16/21 8/15/21  Portillo Lane DO   Liraglutide (VICTOZA) 18 MG/3ML SOPN SC injection Inject 1.2 mg into the skin daily 2/16/21   Juan Jose Lane DO   diclofenac sodium (VOLTAREN) 1 % GEL apply 2 grams to affected area four times a day 2/10/21   Portillo Lane DO   atorvastatin (LIPITOR) 40 MG tablet Take 1 tablet by mouth nightly 1/21/21   Brad Odom MD   carvedilol (COREG) 12.5 MG tablet Take 1 tablet by mouth 2 times daily (with meals) 1/21/21   Clinton Bautista MD   isosorbide mononitrate (IMDUR) 30 MG extended release tablet Take 1 tablet by mouth daily 1/14/21   Juan Jose Almaguer,    lansoprazole (PREVACID) 30 MG delayed release capsule Take 1 capsule by mouth daily 1/14/21   Paula Lane DO   furosemide (LASIX) 40 MG tablet Take 20 mg by mouth daily     Historical Provider, MD   sertraline (ZOLOFT) 100 MG tablet TAKE ONE AND ONE-HALF TABLETS DAILY 12/23/20   Juan Jose Lane, DO   lisinopril (PRINIVIL;ZESTRIL) 2.5 MG tablet Take 2.5 mg by mouth daily    Historical Provider, MD   Nutritional Supplements (Elane Feather) POWD Take 1 packet by mouth 2 times daily 12/18/20   Paula Lane DO   ipratropium-albuterol (DUONEB) 0.5-2.5 (3) MG/3ML SOLN nebulizer solution Take 3 mLs by nebulization every 4 hours as needed for Shortness of Breath 12/15/20   Paula Lane DO   Calcium Carb-Cholecalciferol (CALTRATE 600+D3) 600-800 MG-UNIT TABS Take 1 tablet by mouth 2 times daily    Historical Provider, MD   nystatin (MYCOSTATIN) 540940 UNIT/GM powder Apply 1 each topically 2 times daily Apply to abdominal folds    Historical Provider, MD   Omega-3 Fatty Acids (FISH OIL) 1000 MG CAPS Take 1,000 mg by mouth 2 times daily    Historical Provider, MD   apixaban (ELIQUIS) 5 MG TABS tablet Take 1 tablet by mouth 2 times daily 11/4/20 5/3/21  Juan Jose Lane DO   nitroGLYCERIN (NITROSTAT) 0.4 MG SL tablet Place 1 tablet under the tongue every 5 minutes as needed for Chest pain up to max of 3 total doses.  If no relief after 1 dose, call 911. 9/28/20   Clinton Bautista DO   aspirin 81 MG chewable tablet Take 1 tablet by mouth daily 1/14/20   Genaro Ureña MD   Multiple Vitamins-Minerals (THERAPEUTIC MULTIVITAMIN-MINERALS) tablet Take 1 tablet by mouth daily    Historical Provider, MD   acetaminophen (TYLENOL) 325 MG tablet Take 650 mg by mouth every 6 hours as needed for Pain Historical Provider, MD   fluticasone (FLONASE) 50 MCG/ACT nasal spray 1 spray by Nasal route daily as needed for Rhinitis     Historical Provider, MD   mineral oil-hydrophilic petrolatum (HYDROPHOR) ointment Apply 1 g topically daily Apply to both feet    Historical Provider, MD       Allergies:    Patient has no known allergies. Social History:    reports that she has never smoked. She has never used smokeless tobacco. She reports that she does not drink alcohol or use drugs. Family History:   family history is not on file. Unable to obtain at this time. Family not at bedside. PHYSICAL EXAM:  Vitals:  /60   Pulse 87   Temp 98.5 °F (36.9 °C) (Oral)   Resp 16   LMP  (LMP Unknown)   SpO2 95%     General Appearance: alert and oriented to person, place and time and in no acute distress  Skin: warm and dry. 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 Head: normocephalic and atraumatic  Eyes: pupils equal, round, and reactive to light, extraocular eye movements intact, conjunctivae normal  Neck: neck supple and non tender without mass   Pulmonary/Chest: Diminished to auscultation bilaterally- no wheezes, rales or rhonchi, normal air movement, no respiratory distress  Cardiovascular: normal rate, normal S1 and S2 and no rubs, gallops, murmur noted. Abdomen: soft, non-tender, non-distended, normal bowel sounds, no rebound, guarding, rigidity noted. Extremities: no cyanosis, no clubbing and no edema. Left heel wound. Neurologic: no cranial nerve deficit and speech normal    LABS:  Recent Labs     03/16/21  2358      K 4.4   CL 98   CO2 26   BUN 40*   CREATININE 1.4*   GLUCOSE 156*   CALCIUM 8.9       Recent Labs     03/16/21  2358   WBC 11.5   RBC 2.93*   HGB 8.7*   HCT 28.1*   MCV 95.9   MCH 29.7   MCHC 31.0*   RDW 14.0      MPV 8.9       No results for input(s): POCGLU in the last 72 hours.         Radiology: Ct Abdomen Pelvis W Iv Contrast Additional Contrast? None    Result Date: 3/17/2021  EXAMINATION: CT OF THE ABDOMEN AND PELVIS WITH CONTRAST 3/16/2021 11:39 pm TECHNIQUE: CT of the abdomen and pelvis was performed with the administration of intravenous contrast. Multiplanar reformatted images are provided for review. Dose modulation, iterative reconstruction, and/or weight based adjustment of the mA/kV was utilized to reduce the radiation dose to as low as reasonably achievable. COMPARISON: 07/08/2019 HISTORY: ORDERING SYSTEM PROVIDED HISTORY: evaluate for decubitus/abscess TECHNOLOGIST PROVIDED HISTORY: Reason for exam:->evaluate for decubitus/abscess Additional Contrast?->None Decision Support Exception->Emergency Medical Condition (MA) FINDINGS: Lower Chest: Lung bases are unremarkable. Moderate size hiatal hernia. Organs: The liver, spleen, adrenal glands and pancreas are unremarkable. The gallbladder is surgically absent. Bilateral renal cortical atrophy. Prominent common bile duct most likely related to reservoir effect. GI/Bowel: Diffuse colonic fecal retention. Sigmoid diverticulosis. The small bowel and appendix are unremarkable. Pelvis: Normal appearing urinary bladder. Peritoneum/Retroperitoneum: No free air or free fluid. Bones/Soft Tissues: Multilevel degenerative changes. Grade 1 anterolisthesis L4 over L5.  significant degenerative changes involving both hip joints. Bilateral proximal femoral fixation hardware. Extensive soft tissue thickening posterior to the lower sacrum and upper coccyx with no evidence of a walled-off fluid collection consistent with abscess. Mild erosive changes of the adjacent posterior aspect of the S3 segment cannot be excluded. Extensive soft tissue thickening posterior to the lower sacrum and upper coccyx with no evidence of walled-off fluid collection that would be consistent with abscess. Mild erosive changes of the adjacent posterior aspect of the S3 segment cannot be excluded. Moderate-sized hiatal hernia.  Bilateral renal cortical atrophy. Sigmoid diverticulosis. Diffuse colonic fecal retention. Degenerative changes lumbar spine with grade 1 anterolisthesis L4 over L5. Degenerative changes involving both hip joints. EKG: None    ASSESSMENT:      Active Problems:    Cellulitis  Resolved Problems:    * No resolved hospital problems. *      PLAN:    1. Pressure Injury of Sacral Region Stage IV-patient developed pressure ulcer while hospitalized for COVID-19 per chart review. Discharged with follow-up to wound care clinic. Last seen by Dr. Seferino Barnhart her 3/3/2021. At that time did require debridement. Was then cleansed and packed with iodoform with DSD order to be changed daily. Increased drainage from wound daughter. Wound cultures obtained at [facility] ED. Patient was given Unasyn 3 g IV x1. Consult wound care. Turn S/S. ID/Vascular surgery input appreciated. 2. Left heel wound-followed at wound care clinic by Dr. Seferino Barnhart. Dressing Xeroform with DSD daily. Keep heels elevated off bed. 3. Dehydration-continue IVF hydration. Follow closely. 4. PHILIP superimposed on CKD- 2/2 above? Crt 1.4. Continue IVF hydration. Avoid nephrotoxic agents. Hold furosemide/lisinopril for now. Continue to follow closely  5. Anemia- 2/2 acute blood loss from wound/iron deficiency? Hgb 8.7. Check iron panel/B12/folate. No overt signs of bleeding noted. Continue to follow closely transfuse as needed. 6. DM-11/2020 A1c 6.8. Glucose 156. A1c in a.m.  SSI/hypoglycemic protocol/carb controlled diet. Follow adjust as needed. 7. GERD-continue pantoprazole. 8. HTN-continue isosorbide mononitrate/carvedilol. Holding furosemide/lisinopril 2/2 PHILIP. Follow closely  9. Peripheral neuropathy-we will continue gabapentin at renal dosing. 10. Anxiety/Depression-continue buspirone and Zoloft. Code Status: Full  DVT prophylaxis: SCDs (patient Eliquis at home. Holding at this time for possible procedure and debridement of wound.   Will resume following.)    NOTE: This report was transcribed using voice recognition software. Every effort was made to ensure accuracy; however, inadvertent computerized transcription errors may be present.     Electronically signed by Logan Lundberg. Read Pipes - CNP on 3/17/2021 at 11:03 AM   HOSPITALIST ATTENDING PHYSICIAN NOTE 3/17/2021 1854PM:    Details of the evaluation - subjective assessment (including medication profile, past medical, family and social history when applicable), examination, review of lab and test data, diagnostic impressions and medical decision making - performed by Logan Lundberg. Read Pipes - CNP, were discussed with me on the date of service and I agree with clinical information herein unless otherwise noted. The patient has been evaluated by me personally earlier today. Pt reports no fevers, chills,n/v. PHYSICAL EXAM:    Vitals:  /60   Pulse 87   Temp 98.5 °F (36.9 °C) (Oral)   Resp 16   LMP  (LMP Unknown)   SpO2 95%     General:  Appears comfortable. Answers questions appropriately and cooperative with exam  HEENT:  Mucous membranes moist. No erythema, rhinorrhea, or post-nasal drip noted. Neck:  No carotid bruits. Heart:  Rhythm regular at rate of 88  Lungs:  CTA. No wheeze, rales, or rhonchi  Abdomen:  Positive bowel sounds positive. Soft. Non-tender. No guarding, rebound or rigidity. Breast/Rectal/Genitourinary: not pertinent. Extremities:  Negative for lower extremity edema  Skin:  Warm and dry  Vascular: 2/4 Dorsalis Pedis pulses bilaterally. Neuro:  Limited due to pt's status        I agree with the assessment and plan of Logan Lundberg. Read Pipes - CNP    Sacral decubitus ulcer infection/cellultis with possible abscess  Dm type 2 controlled   gerd  htn    Electronically signed by Juana Richard D.O.   Hospitalist  [facility] Hospitalist [facility] at [facility]

## 2021-03-18 NOTE — CONSULTS
Vascular Surgery Inpatient Consultation Note      Reason for Consultation:  Increase drainage in sacral wound, fever  HISTORY OF PRESENT ILLNESS:                The patient is a 80 y.o. female who is admitted to the hospital for treatment of fever, sacral wound infection. The patient was brought to the ED by her daughter due to concern of fever and increased drainage from sacral wound. CT reported no evidence of abscess. Cultures pending. The patient is known to Dr. Farida Campos in the wound care center for sacral wound and left heel wound. The patient developed these wounds following hospitalization for COVID-19 in 12/2020. Vascular surgery is consulted for evaluation and treatment. The patient currently denies any pain in her wounds. She has pain when there is pressure on the areas. She states she was having some numbness in her feet yesterday, but this has since resolved. IMPRESSION: Chronic, nonhealing pressure ulcer coccyx, L heel    RECOMMENDATIONS:  Based on exam, the patient has adequate arterial flow to heal her L heel wound. Her symptoms of numbness are not due to arterial insufficiency. No plan for vascular intervention. Her heel and coccyx wounds are due to pressure. Neither wound appears to be infected in appearance. Appreciate ID recs. The importance of offloading was discussed with the patient. General surgery has been consulted for coccyx wound. Will defer to them for management of this. OK for discharge from vascular standpoint. F/u in the wound care center as outpatient. Patient Active Problem List   Diagnosis Code    GERD (gastroesophageal reflux disease) K21.9    Type 2 diabetes mellitus with hyperglycemia, with long-term current use of insulin ([facility]) E11.65, Z79.4    CKD (chronic kidney disease) stage 3, GFR 30-59 ml/min ([facility]) N18.30    Essential hypertension I10    History of pulmonary embolus (PE) Z86.711    Obesity (BMI 30-39. 9) E66.9    Pressure injury of sacral region, stage 2 ([facility] L89.152    COVID-19 U07.1    Anxiety and depression F41.9, F32.9    Pneumonia due to COVID-19 virus U07.1, J12.82    Uncontrolled type 2 diabetes mellitus with hyperglycemia (HCC) E11.65    Pressure ulcer of right heel, stage 2 ([facility]) L89.612    Cellulitis L03.90    Controlled type 2 diabetes mellitus without complication ([facility]) R51.3       Past Medical History:   Diagnosis Date    Anxiety     Depression     Diabetes mellitus ([facility]     Frequent urinary tract infections     GERD (gastroesophageal reflux disease)     Hypertension     Peripheral neuropathy     Spinal stenosis         Past Surgical History:   Procedure Laterality Date    CHOLECYSTECTOMY      HIP FRACTURE SURGERY Bilateral     SKIN CANCER EXCISION         Current Medications:    dextrose      sodium chloride 75 mL/hr at 03/18/21 0119      ipratropium-albuterol, promethazine **OR** ondansetron, acetaminophen **OR** acetaminophen, glucose, dextrose, glucagon (rDNA), dextrose, bisacodyl    [Held by provider] apixaban  5 mg Oral BID    aspirin  81 mg Oral Daily    atorvastatin  40 mg Oral Nightly    busPIRone  5 mg Oral Daily    carvedilol  12.5 mg Oral BID WC    [Held by provider] furosemide  20 mg Oral Daily    isosorbide mononitrate  30 mg Oral Daily    pantoprazole  40 mg Oral QAM AC    montelukast  10 mg Oral Nightly    miconazole   Topical BID    sertraline  50 mg Oral Daily    insulin lispro  0-6 Units Subcutaneous TID WC    insulin lispro  0-3 Units Subcutaneous Nightly    docusate sodium  100 mg Oral Daily    gabapentin  300 mg Oral BID        Allergies:  Patient has no known allergies. Social History     Socioeconomic History    Marital status:       Spouse name: Not on file    Number of children: Not on file    Years of education: Not on file    Highest education level: Not on file   Occupational History    Not on file   Social Needs    Financial resource strain: Not on file    Food insecurity Worry: Not on file     Inability: Not on file    Transportation needs     Medical: Not on file     Non-medical: Not on file   Tobacco Use    Smoking status: Never Smoker    Smokeless tobacco: Never Used   Substance and Sexual Activity    Alcohol use: No    Drug use: No    Sexual activity: Not Currently   Lifestyle    Physical activity     Days per week: Not on file     Minutes per session: Not on file    Stress: Not on file   Relationships    Social connections     Talks on phone: Not on file     Gets together: Not on file     Attends Ten[employer] service: Not on file     Active member of club or organization: Not on file     Attends meetings of clubs or organizations: Not on file     Relationship status: Not on file    Intimate partner violence     Fear of current or ex partner: Not on file     Emotionally abused: Not on file     Physically abused: Not on file     Forced sexual activity: Not on file   Other Topics Concern    Not on file   Social History Narrative    Not on file        History reviewed. No pertinent family history.     REVIEW OF SYSTEMS:      Eyes:      Blurred vision:  No [x]/Yes []               Diplopia:   No [x]/Yes []               Vision loss:       No [x]/Yes []   Ears, nose, throat:             Hearing loss:    No [x]/Yes []      Vertigo:   No [x]/Yes []                       Swallowing problem:  No [x]/Yes []               Nose bleeds:   No [x]/Yes []      Voice hoarseness:  No [x]/Yes []  Respiratory:             Cough:   No [x]/Yes []      Pleuritic chest pain:  No [x]/Yes []                        Dyspnea:   No [x]/Yes []      Wheezing:   No [x]/Yes []  Cardiovascular:             Angina:   No [x]/Yes []      Palpitations:   No [x]/Yes []          Claudication:    No [x]/Yes []      Leg swelling:   No [x]/Yes []  Gastrointestinal:             Nausea or vomiting:  No [x]/Yes []               Abdominal pain:  No [x]/Yes []                     Intestinal bleeding: No [x]/Yes [] Musculoskeletal:             Leg pain:   No [x]/Yes []      Back pain:   No [x]/Yes []                    Weakness:   No [x]/Yes []  Neurologic:             Numbness:   No [x]/Yes []      Paralysis:   No [x]/Yes []                       Headaches:   No [x]/Yes []  Hematologic, lymphatic:   Anemia:   No [x]/Yes []              Bleeding or bruising:  No [x]/Yes []              Fevers or chills: No [x]/Yes []  Endocrine:             Temp intolerance:   No [x]/Yes []                       Polydipsia, polyuria:  No [x]/Yes []  Skin:              Rash:    No [x]/Yes []      Ulcers:   No []/Yes [x]              Abnorm pigment: No [x]/Yes []  :              Frequency/urgency:  No [x]/Yes []      Hematuria:    No [x]/Yes []                      Incontinence:    No [x]/Yes []    PHYSICAL EXAM:  Vitals:    03/17/21 2000   BP: (!) 108/56   Pulse: 92   Resp: 18   Temp: 99.3 °F (37.4 °C)   SpO2: 93%     General Appearance: alert and oriented to person, place and time, in no acute distress, well developed and well- nourished  Neurologic: no cranial nerve deficit, speech normal  Head: normocephalic and atraumatic  Eyes: extraocular eye movements intact, conjunctivae normal  ENT: external ear and ear canal normal bilaterally, nose without deformity  Pulmonary/Chest: normal air movement, no respiratory distress  Cardiovascular: normal rate, regular rhythm  Abdomen: non-distended, no masses  Musculoskeletal: no joint deformity or tenderness  Extremities: + mild leg edema bilaterally  Skin: warm and dry, no rash or erythema. L LE: Superficial heel wound covered in slough. No drainage for surrounding erythema. Coccyx: Open wound with moderate amount of serosanguinous drainage on dressing. No malodor. No surrounding erythema.      PULSE EXAM      Right      Left   Brachial     Radial 2 2   Femoral 3 3   Popliteal     Dorsalis Pedis 0, biphasic 0, biphasic   Posterior Tibial 0, biphasic 0, biphasic   (3=normal, 2=diminished, 1=barely palpable, 4=widened)      LABS:    Lab Results   Component Value Date    WBC 7.9 03/18/2021    HGB 7.7 (L) 03/18/2021    HCT 25.4 (L) 03/18/2021     03/18/2021    PROTIME 13.9 (H) 01/09/2020    INR 1.2 01/09/2020    K 4.0 03/18/2021    BUN 29 (H) 03/18/2021    CREATININE 1.2 (H) 03/18/2021       RADIOLOGY:  All pertinent imaging reviewed

## 2021-03-18 NOTE — PROGRESS NOTES
Occupational Therapy  OCCUPATIONAL THERAPY INITIAL EVALUATION      Date:3/18/2021  Patient Name: Karla Mix  MRN: [id number]  : 1933  Room: [address] [place] [address]-A    Referring Provider: SAMANTA Allison    Evaluating OT: Valeria Jimenez OTR/L [id number]    AM-PAC Daily Activity Raw Score:     Recommended Adaptive Equipment: TBD    Diagnosis: cellulitis. Pt presents to ED from home with fever and increased drainage from sacral decubitus      Pertinent Medical History: HTN, DM, spinal stenosis, anxiety, peripheral neuropathy, h/o COVID 2020   Precautions:  Falls, sacral decubitus     Home Living: Pt lives with family in a single story home. Pt is a questionable historian. Pt reports she gets into a wheelchair but unable to state how. Then states she just stays in her hospital bed. Daughter provides assist in all ADL/IADLs. Pain Level: pt reports discomfort at buttocks, TAPS in place to offload post session    Cognition: A&O: 3-. Pt is alert and oriented but easily confused regarding situation. Slow processing. Problem solving:  poor   Judgement/safety:  poor     Functional Assessment:   Initial Eval Status  Date: 3/18/21 Treatment session:  Short Term Goals     Feeding Min A  Set up   Grooming Min A  Wiping face  Set up   UB Dressing Mod A  Management of hospital gown  Set up   LB Dressing Dependent  Donning/doffing socks      Bathing Max A  Mod A   Toileting Dependent  Incontinent of BM  Total assist in rajesh care  Unable to maintain standing to allow for rajesh care, transitioned to bed level  Mod A   Bed Mobility  Supine <> sit: Max A x2  Rolling:  Max A  Scooting to HOB: Max A x2     Functional Transfers STS: Max A x2  Mod A   Functional Mobility Unable to tolerate step  Max A during ADLs   Balance Sitting: varying levels from fair minus to fair    Standing: poor HHA     Activity Tolerance poor  standing brittny x3-4 min with fair minus balance during self care tasks             Treatment: Patient educated on techniques for completion of ADL, safe functional transfers and functional mobility. Patient required cues for follow through with proper hand/foot placement, pacing, safety, compensatory strategies, breathing, attention, sequencing and technique in bed mobility, functional transfers, toileting and LB dressing in preparation for maximum independence in all self care tasks. Hand Dominance: Right [x]  Left []   Strength ROM Additional Info:    RUE  Proximally: 2-/5  Distally: 3+/5 WFL elbow to digits.  R shoulder flexion approx 100 degrees fair  and FMC/dexterity noted during ADL tasks     LUE 3+/5 WFL  fair  and FMC/dexterity noted during ADL tasks         Hearing: [place]  Vision: WFL   Sensation:  No c/o numbness or tingling   Tone: WFL   Edema: B LEs                            Long Term Goal (1-3 wks): Pt will maximize functional performance in all self care tasks/functional transfers with good follow through of all trained techniques for safe transition to next level of care    Assessment of current deficits   Functional mobility [x]  ADLs [x] Strength [x]  Cognition []  Functional transfers  [x] IADLs [x] Safety Awareness [x]  Endurance [x]  Fine Motor Coordination [] Balance [x] Vision/perception [] Sensation []   Gross Motor Coordination [] ROM [] Delirium []                  Motor Control []    Plan of Care: 2-5 days/week for 1-2 weeks PRN   [x]ADL retraining/adaptive techniques and AE recommendations to increase functional independence within precautions                    [x]Energy conservation techniques to improve tolerance for ADL/IADLs  [x]Functional transfer/mobility training/DME recommendations for increased independence, safety and fall prevention         [x]Patient/family education to increase safety and functional independence during daily routine          [x]Environmental modifications for safe mobility and completion of ADLs                             []Cognitive retraining to improve problem solving skills & safe participation in ADLs/IADLs     []Sensory re-education techniques to improve extremity awareness, maintain skin integrity and improve hand function                             []Visual/Perceptual retraining to improve body awareness and safety during transfers and ADLs  []Splinting/positioning needs to maintain joint/skin integrity and contracture prevention  [x]Therapeutic activity to improve functional performance during ADLs                                        [x]Therapeutic exercise to improve tolerance and functional strength for ADLs   [x]Balance retraining/tolerance tasks for facilitation of postural control with dynamic challenges during ADLs  []Neuromuscular re-education to facilitate righting/equilibrium reactions, midline orientation, scapular stability/mobility, normalize muscle tone and facilitate active functional movement                        []Delirium prevention/treatment    [x]Positioning to improve functional independence and decrease risk of skin breakdown  []Other:     Rehab Potential: Fair for established goals     Patient/Family Goal: To get home. Patient and/or family were instructed on functional diagnosis, prognosis/goals and OT plan of care. Pt verbalized questionable understanding. Upon arrival, patient supine in bed. At end of session, patient supine in bed with call light and phone within reach, all lines and tubes intact. Pt would benefit from continued skilled OT to increase safety and independence with completion of ADL/IADL tasks for functional independence and quality of life. Bed/chair alarm: ON. Nursing notified of need for dressing change post bowel incontinent episode requiring removal of dressing.     Low Evaluation [id number]  Time In: 1332   Time Out: 1353       Min Units   Therapeutic Ex 73063     Therapeutic Activities 28098     ADL/Self Care 19543 10 1   Orthotic Management 25757     Neuro Re-Ed 55140     TOTAL TIMED TREATMENT 10 1       Evaluation time includes thorough review of current medical information, gathering information on past medical history/social history and prior level of function, completion of standardized testing/informal observation of tasks, assessment of data, and development of POC/Goals    Usman Guillory OTR/L  [id number]

## 2021-03-18 NOTE — PROGRESS NOTES
Physical Therapy    Facility/Department: [facility] MED SURG  Initial Assessment    NAME: Chrissie Melendrez  : 1933  MRN: [id number]    Date of Service: 3/18/2021       REQUIRES PT FOLLOW UP: Yes       Patient Diagnosis(es): There were no encounter diagnoses. has a past medical history of Anxiety, Depression, Diabetes mellitus ([address] Frequent urinary tract infections, GERD (gastroesophageal reflux disease), Hypertension, Peripheral neuropathy, and Spinal stenosis. has a past surgical history that includes Cholecystectomy; Skin cancer excision; and Hip fracture surgery (Bilateral). Evaluating PT: [facility]      Room #:  617      Diagnosis: cellulitis  Sacral wound   Precautions: fall risk, thickened  liquids, bed alarm     PMHx: peripheral neuropathy, HTN, anxiety, depression, diabetes, spinal stenosis, COVID        Pt lives with alone, but family provides 24 hour care in shifts per pt report in a single story house with ramped entrance. Bed is on the first floor and bath is on the first floor. Pt reports she was not walking and needed assist for transfers                           Initial Evaluation  Date: 3/18/2021  Treatment Date: Short Term/ Long Term   Goals   AM-PAC 6 Clicks        Was pt agreeable to Eval/treatment? yes       Does pt have pain?  buttocks        Bed Mobility  Rolling: Max A  Supine to sit: Max A x 2    Sit to supine: Max A x 2   Scooting: Max A  x 2   Rolling: Min A  Supine to sit: Mod A  Sit to supine: Mod A  Scooting: Mod  A   Transfers Sit to stand: Max A x2  Stand to sit: Max A x2  Stand pivot: NT   Sit to stand: Mod A  Stand to sit: Mod A  Stand pivot: Mod A    Ambulation   Unable    TBA   Stair negotiation: NA, pt has ramp to enter/exit her home.   NA   ROM    B LE: AAROM WFL        Strength    B LE: 3- to 3+/ 5        Balance Sitting EOB: min/mod assist   Standing:dep assist    Sitting EOB: SBA   standing: ModA with AAD      Pt is A & O x: 3, grossly            Patient

## 2021-03-18 NOTE — CARE COORDINATION
CM NOTE: Admission past Nov for covid 19. From home & here for cellulitis & sacral wound with possible abscess. ID on case & following. Cultures pending. Will monitor off ATB. Surgery consulted for above. Wound care nurse following pt. Vascular follows pt at wound care center--- evaluated & signed off. Active with [facility]---will need CANDI order at discharge. Pt lives alone in 1 story home but has much asst from her children who live nearby & private caregivers. Uses [employer] & W/C. Has a transport W/C. Does not use O2 at home. IDDM & has glucometer & testing supplies at home & test BGL as directed. HX REESE @ [employer]. Active with [address] [place] [address]. CM met with pt to discuss [facility] letter with change in pt status to observation. Asked that CM contact her daughter Terri Rivas) to discuss letter. CM spoke with daughter regarding letter. CM also discussed role, anticipated LOS & current [place] plan. CM explained no further intervention planned by vascular/surgery. ID monitoring pt off ATB. Cultures pending. Plan is home with [address] [place] [address] orders.

## 2021-03-18 NOTE — FLOWSHEET NOTE
Initial Inpatient Wound Care    Admit Date: 3/17/2021  9:30 AM    Reason for consult:  Sacral decub, left heel wound  Significant history:  DM, COVID    Wound history:  POA  Findings:responds, very tired    03/18/21 1123   Wound 02/17/21 Coccyx #2   Date First Assessed/Time First Assessed: 02/17/21 1349   Wound Approximate Age at First Assessment (Weeks): 52 weeks  Primary Wound Type: Pressure Injury  Location: Coccyx  Wound Description (Comments): #2   Wound Image    Wound Etiology Pressure Stage  4   Wound Length (cm) 1 cm   Wound Width (cm) 1 cm   Wound Depth (cm) 2 cm   Wound Surface Area (cm^2) 1 cm^2   Change in Wound Size % (l*w) -1.01   Wound Volume (cm^3) 2 cm^3   Wound Healing % -6   Distance Tunneling (cm) 5 cm   Tunneling Position ___ O'Clock 12   Wound Assessment   (red)   Drainage Amount Small   Drainage Description Sanguinous; Serosanguinous   Odor None   Xena-wound Assessment Intact   Wound 02/17/21 Heel Left #1   Date First Assessed/Time First Assessed: 02/17/21 1349   Present on Hospital Admission: Yes  Wound Approximate Age at First Assessment (Weeks): 2 weeks  Primary Wound Type: Diabetic Ulcer  Location: Heel  Wound Location Orientation: Left  Wound Descri. .. Wound Image    Wound Length (cm) 1 cm   Wound Width (cm) 1 cm   Wound Depth (cm) 0.2 cm   Wound Surface Area (cm^2) 1 cm^2   Change in Wound Size % (l*w) 62.41   Wound Volume (cm^3) 0.2 cm^3   Wound Healing % 26   Drainage Amount Small   Drainage Description Serosanguinous   Odor None   Xena-wound Assessment   (macerated, pink, scarring)     Impression:  Stage 4 coccyx POA  Stage 3 left heel POA  Interventions in place:  Comfort glide, egg crates, SOS    Plan: lo air loss  Surgery consult  Plan  aquacel packing to coccyx  Dressing to left heel  Dietician  Adrien Villalba, SUSANA, [address] 3/18/2021 11:32 AM

## 2021-03-18 NOTE — PROGRESS NOTES
[address] [place] [address] [facility]  Progress Note  CC: pain to coccyx area  Face to face encounter   SUBJECTIVE:  Patient is tolerating medications. No reported adverse drug reactions. ROS: No nausea, vomiting, diarrhea. No fevers. tmax- 99.3,  Patient reports tenderness to coccyx area. In bed- no distress. On room air. Medications:  Scheduled Meds:   [Held by provider] apixaban  5 mg Oral BID    aspirin  81 mg Oral Daily    atorvastatin  40 mg Oral Nightly    busPIRone  5 mg Oral Daily    carvedilol  12.5 mg Oral BID WC    [Held by provider] furosemide  20 mg Oral Daily    isosorbide mononitrate  30 mg Oral Daily    pantoprazole  40 mg Oral QAM AC    montelukast  10 mg Oral Nightly    miconazole   Topical BID    sertraline  50 mg Oral Daily    insulin lispro  0-6 Units Subcutaneous TID WC    insulin lispro  0-3 Units Subcutaneous Nightly    docusate sodium  100 mg Oral Daily    gabapentin  300 mg Oral BID     Continuous Infusions:   dextrose      sodium chloride 75 mL/hr at 03/18/21 0119     PRN Meds:ipratropium-albuterol, promethazine **OR** ondansetron, acetaminophen **OR** acetaminophen, glucose, dextrose, glucagon (rDNA), dextrose, bisacodyl  OBJECTIVE:  Patient Vitals for the past 24 hrs:   BP Temp Temp src Pulse Resp SpO2   03/18/21 0930 (!) 126/54 99.1 °F (37.3 °C) Oral 90 18 92 %   03/17/21 2000 (!) 108/56 99.3 °F (37.4 °C) Oral 92 18 93 %   03/17/21 1015 108/60 98.5 °F (36.9 °C) Oral 87 16 95 %     Constitutional: The patient is awake, alert, laying on side. Skin: Warm and dry. No rashes were noted. Coccyx wound noted- does tunnel in about 1-2 o'clock- for about 3 cm  Sanguinous drainage  Head: Eyes show round, and reactive pupils. No jaundice. Mouth: Moist mucous membranes, no ulcerations, no thrush. Neck: Supple to movements. No lymphadenopathy. Chest: No use of accessory muscles to breathe. Symmetrical expansion.  Auscultation reveals no wheezing, crackles, or rhonchi. Cardiovascular: S1 and S2 are rhythmic and regular. No murmurs appreciated. Abdomen: Positive bowel sounds to auscultation. Benign to palpation. Soft. Extremities: No clubbing, no cyanosis, some edema. Gonzalez cath with yellow urine   PIV     Laboratory and Tests Review:  Lab Results   Component Value Date    WBC 7.9 03/18/2021    WBC 11.5 03/16/2021    WBC 6.9 12/05/2020    HGB 7.7 (L) 03/18/2021    HCT 25.4 (L) 03/18/2021    MCV 95.1 03/18/2021     03/18/2021     Lab Results   Component Value Date    NEUTROABS 5.18 03/18/2021    NEUTROABS 8.32 (H) 03/16/2021    NEUTROABS 5.33 12/04/2020     Lab Results   Component Value Date    CRP 9.5 (H) 03/17/2021    CRP 1.0 (H) 11/18/2020    CRP 0.7 (H) 11/16/2020     Lab Results   Component Value Date    SEDRATE 103 (H) 03/17/2021    SEDRATE 41 (H) 02/25/2019    SEDRATE 27 (H) 12/08/2014     Lab Results   Component Value Date    ALT 7 03/16/2021    AST 15 03/16/2021    ALKPHOS 81 03/16/2021    BILITOT 0.3 03/16/2021     Lab Results   Component Value Date     03/18/2021    K 4.0 03/18/2021     03/18/2021    CO2 26 03/18/2021    BUN 29 03/18/2021    CREATININE 1.2 03/18/2021    GFRAA 51 03/18/2021    LABGLOM 42 03/18/2021    GLUCOSE 103 03/18/2021    PROT 7.0 03/16/2021    LABALBU 2.6 03/16/2021    CALCIUM 8.3 03/18/2021    BILITOT 0.3 03/16/2021    ALKPHOS 81 03/16/2021    AST 15 03/16/2021    ALT 7 03/16/2021     Radiology:  Reviewed     Microbiology:   Wound culture 3/16/2021: Mixed ankita, including alphahemolytic Streptococcus, GNR and beta-hemolytic Streptococcus    ASSESSMENT:  · 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   It is not clear whether it is infected or not  · Low grade fevers  · History of SARS-CoV-2 infection November 2020    PLAN:  · Surgery saw patient- no interventions at this time  · Continue wound care- wound care nurse to see- would recommend packing   · Check cultures  · Monitor labs    Tamir Lechuga Carmita  10:02 AM  3/18/2021     Patient seen and examined. I had a face to face encounter with the patient. Agree with exam.  Assessment and plan as outlined above and directed by me. Addition and corrections were done as deemed appropriate. My exam and plan include: The patient is doing well. She is complaining of some pain over the a coccygeal area. The wound is clean. There is no longer any significant amount of drainage. There is no purulence and or erythema. I spoke with daughter Niki Ritter ([phone]). All of the CRP and the sedimentation rate are elevated just does not necessarily indicate an infection. Most people with an open wound will have elevation of inflammatory markers. We will continue to observe and keep an eye on the wound. If we believe that there is an infection we will start empiric antibiotic, probably with Augmentin and Levofloxacin.   If she is to be discharged we will see her in the office in follow-up    Jr Saravia  3/18/2021  3:36 PM

## 2021-03-18 NOTE — PROGRESS NOTES
[facility] Progress Note    Admitting Date and Time: 3/17/2021  9:30 AM  Admit Dx: Cellulitis [L03.90]    Subjective:  Patient is being followed for Cellulitis [L03.90]     Pt resting in bed comfortably in no acute distress  Reporting she feels ok  C/o discomfort in sacral ulcer at times  thirsty      ROS: denies fever, chills, cp, sob, n/v, HA unless stated above.    [Held by provider] apixaban  5 mg Oral BID    aspirin  81 mg Oral Daily    atorvastatin  40 mg Oral Nightly    busPIRone  5 mg Oral Daily    carvedilol  12.5 mg Oral BID WC    [Held by provider] furosemide  20 mg Oral Daily    isosorbide mononitrate  30 mg Oral Daily    pantoprazole  40 mg Oral QAM AC    montelukast  10 mg Oral Nightly    miconazole   Topical BID    sertraline  50 mg Oral Daily    insulin lispro  0-6 Units Subcutaneous TID WC    insulin lispro  0-3 Units Subcutaneous Nightly    docusate sodium  100 mg Oral Daily    gabapentin  300 mg Oral BID     ipratropium-albuterol, 1 vial, Q4H PRN  promethazine, 12.5 mg, Q6H PRN    Or  ondansetron, 4 mg, Q6H PRN  acetaminophen, 650 mg, Q6H PRN    Or  acetaminophen, 650 mg, Q6H PRN  glucose, 15 g, PRN  dextrose, 12.5 g, PRN  glucagon (rDNA), 1 mg, PRN  dextrose, 100 mL/hr, PRN  bisacodyl, 10 mg, Daily PRN         Objective:    BP (!) 126/54   Pulse 90   Temp 99.1 °F (37.3 °C) (Oral)   Resp 18   LMP  (LMP Unknown)   SpO2 92%     General Appearance: alert and oriented to person, place and time and in no acute distress  Skin: warm and dry- see wound care notes  Neck: neck supple and non tender without mass   Pulmonary/Chest: clear to auscultation bilaterally  Cardiovascular: normal rate, normal S1 and S2 and no carotid bruits  Abdomen: soft, non-tender, non-distended, normal bowel sounds, no masses or organomegaly  Extremities: no cyanosis, no clubbing and no edema  Neurologic: speech normal         Recent Labs     03/16/21  2358 03/18/21  0315    138   K

## 2021-03-18 NOTE — CONSULTS
GENERAL SURGERY  CONSULT NOTE  3/18/2021    Physician Consulted: Dr. Emily Malik  Reason for Consult: Sacral decubitus ulcer with increased drainage, concern for abscess  Referring Physician: Dr. Magalie Centeno is a 80 y.o. female who presents for evaluation of sacral decubitus ulcer with increased drainage. Patient brought to ED by family secondary to increased drainage from sacral wound and concern of fevers. Patient follows with Dr. Yancy Torres at the wound care center for her wounds. She states her sacrum is sore when she lays on it. In the ED a CT scan was obtained that shows no abscess. Her Tm is 99.3. She does not have a leukocytosis. She is on eliquis which is currently being held. Past Medical History:   Diagnosis Date    Anxiety     Depression     Diabetes mellitus (HCC)     Frequent urinary tract infections     GERD (gastroesophageal reflux disease)     Hypertension     Peripheral neuropathy     Spinal stenosis        Past Surgical History:   Procedure Laterality Date    CHOLECYSTECTOMY      HIP FRACTURE SURGERY Bilateral     SKIN CANCER EXCISION         Medications Prior to Admission:    Prior to Admission medications    Medication Sig Start Date End Date Taking? Authorizing Provider   insulin lispro, 1 Unit Dial, (HUMALOG KWIKPEN) 100 UNIT/ML SOPN Inject 0-12 Units into the skin 3 times daily (before meals) 3/12/21 6/10/21  Juan Jose Lane DO   montelukast (SINGULAIR) 10 MG tablet Take 1 tablet by mouth nightly 3/12/21   Juan Jose Dnaiel DO   busPIRone (BUSPAR) 5 MG tablet Take 1 tablet by mouth daily 3/12/21   Juan Jose Lane DO   gabapentin (NEURONTIN) 300 MG capsule Take 2 capsules by mouth 3 times daily for 30 days.  2/26/21 3/28/21  Juan Jose Lane DO   Liraglutide (VICTOZA) 18 MG/3ML SOPN SC injection Inject 1.2 mg into the skin daily 2/16/21 8/15/21  Juan Jose Lnae DO   Liraglutide (VICTOZA) 18 MG/3ML SOPN SC injection Inject 1.2 mg into the skin daily 2/16/21   Juan Jose Rothman [facility] DO Ariana   diclofenac sodium (VOLTAREN) 1 % GEL apply 2 grams to affected area four times a day 2/10/21   Narda Lane DO   atorvastatin (LIPITOR) 40 MG tablet Take 1 tablet by mouth nightly 1/21/21   Lennox Heckler, MD   carvedilol (COREG) 12.5 MG tablet Take 1 tablet by mouth 2 times daily (with meals) 1/21/21   Lennox Heckler, MD   isosorbide mononitrate (IMDUR) 30 MG extended release tablet Take 1 tablet by mouth daily 1/14/21   Juan Jose Granger DO   lansoprazole (PREVACID) 30 MG delayed release capsule Take 1 capsule by mouth daily 1/14/21   Narda Lane DO   furosemide (LASIX) 40 MG tablet Take 20 mg by mouth daily     Historical Provider, MD   sertraline (ZOLOFT) 100 MG tablet TAKE ONE AND ONE-HALF TABLETS DAILY 12/23/20   Narda Lane DO   lisinopril (PRINIVIL;ZESTRIL) 2.5 MG tablet Take 2.5 mg by mouth daily    Historical Provider, MD   Nutritional Supplements (Mitzi Carbon) POWD Take 1 packet by mouth 2 times daily 12/18/20   Narda Lane DO   ipratropium-albuterol (DUONEB) 0.5-2.5 (3) MG/3ML SOLN nebulizer solution Take 3 mLs by nebulization every 4 hours as needed for Shortness of Breath 12/15/20   Narda Lane DO   Calcium Carb-Cholecalciferol (CALTRATE 600+D3) 600-800 MG-UNIT TABS Take 1 tablet by mouth 2 times daily    Historical Provider, MD   nystatin (MYCOSTATIN) 770964 UNIT/GM powder Apply 1 each topically 2 times daily Apply to abdominal folds    Historical Provider, MD   Omega-3 Fatty Acids (FISH OIL) 1000 MG CAPS Take 1,000 mg by mouth 2 times daily    Historical Provider, MD   apixaban (ELIQUIS) 5 MG TABS tablet Take 1 tablet by mouth 2 times daily 11/4/20 5/3/21  Juan Jose Lane DO   nitroGLYCERIN (NITROSTAT) 0.4 MG SL tablet Place 1 tablet under the tongue every 5 minutes as needed for Chest pain up to max of 3 total doses.  If no relief after 1 dose, call 911. 9/28/20   Lennox Heckler, DO aspirin 81 MG chewable tablet Take 1 tablet by mouth daily 1/14/20   Chase Dukes MD   Multiple Vitamins-Minerals (THERAPEUTIC MULTIVITAMIN-MINERALS) tablet Take 1 tablet by mouth daily    Historical Provider, MD   acetaminophen (TYLENOL) 325 MG tablet Take 650 mg by mouth every 6 hours as needed for Pain    Historical Provider, MD   fluticasone (FLONASE) 50 MCG/ACT nasal spray 1 spray by Nasal route daily as needed for Rhinitis     Historical Provider, MD   mineral oil-hydrophilic petrolatum (HYDROPHOR) ointment Apply 1 g topically daily Apply to both feet    Historical Provider, MD       No Known Allergies    History reviewed. No pertinent family history. Social History     Tobacco Use    Smoking status: Never Smoker    Smokeless tobacco: Never Used   Substance Use Topics    Alcohol use: No    Drug use: No         Review of Systems   General ROS: negative  Hematological and Lymphatic ROS: negative  Respiratory ROS: negative  Cardiovascular ROS: negative  Gastrointestinal ROS: negative  Genito-Urinary ROS: negative  Musculoskeletal ROS: negative      PHYSICAL EXAM:    Vitals:    03/17/21 2000   BP: (!) 108/56   Pulse: 92   Resp: 18   Temp: 99.3 °F (37.4 °C)   SpO2: 93%       General Appearance:  awake, alert, in no acute distress  Skin:  Chronic sacral decubitus ulcer with good granulation tissue, minimal serosanguinous drainage  Lungs:  No increased work of breathing  Heart:  Regular rate  Abdomen:  Soft, NT, ND      LABS:    CBC  Recent Labs     03/18/21  0315   WBC 7.9   HGB 7.7*   HCT 25.4*        BMP  Recent Labs     03/18/21  0315      K 4.0      CO2 26   BUN 29*   CREATININE 1.2*   CALCIUM 8.3*     Liver Function  Recent Labs     03/16/21  2358   BILITOT 0.3   AST 15   ALT 7   ALKPHOS 81   PROT 7.0   LABALBU 2.6*     No results for input(s): LACTATE in the last 72 hours. No results for input(s): INR, PTT in the last 72 hours.     Invalid input(s): PT    RADIOLOGY    Ct Abdomen excluded. Moderate-sized hiatal hernia. Bilateral renal cortical atrophy. Sigmoid diverticulosis. Diffuse colonic fecal retention. Degenerative changes lumbar spine with grade 1 anterolisthesis L4 over L5. Degenerative changes involving both hip joints. ASSESSMENT:  80 y.o. female with chronic sacral decubitus ulcer, no signs of infection    PLAN:  - Appreciate vascular surgery recommendations, agree there are no signs of infection of the sacral decubitus ulcer  - No need for further debridement at this time.    - Abx as per ID  - Pain/nausea control as per primary    Electronically signed by Ranee Dubin, MD on 3/18/21 at 9:30 AM EDT    Seen/examined  Chronic ulcer without acute changes to suggest source of infection; CT findings also without evidence of undrained infection  Sean Mcrae MD

## 2021-03-19 NOTE — DISCHARGE SUMMARY
[facility] Physician Discharge Summary       Cassandra Magallon [place], [place]  [address]  [phone]    Schedule an appointment as soon as possible for a visit in 1 week  Make an appointment in 1 week to go over hospitaliztion, medications, and follow up. Jaimie Hurtado [facility]  [place]  [phone]      As needed- Infectious Disease     Andrea Wells MD  [address] [facility] [place] [address]  [phone]    Schedule an appointment as soon as possible for a visit  Vascular     Dl Levine MD  [address] [place] [address]    Schedule an appointment as soon as possible for a visit in 2 weeks  For follow-up    [place] [address]  [phone]          Activity level: As tolerated     Dispo:REESE     Condition on discharge: Stable    Patient ID:  Justin Merida  [id number]  33 y.o.  6/29/1933    Admit date: 3/17/2021    Discharge date and time:  3/19/2021  4:04 PM    Admission Diagnoses: Active Problems:    Cellulitis    Controlled type 2 diabetes mellitus without complication ([address]  Resolved Problems:    * No resolved hospital problems. *      Discharge Diagnoses: Active Problems:    Cellulitis    Controlled type 2 diabetes mellitus without complication ([address]  Resolved Problems:    * No resolved hospital problems. *      Consults:  IP CONSULT TO VASCULAR SURGERY  IP CONSULT TO INFECTIOUS DISEASES  IP CONSULT TO GENERAL SURGERY    Procedures: none    Hospital Course:   Patient Justin Merida is a 80 y.o. presented with Cellulitis [L03.90]   Patient presented to the ER with c/o increased wound drainage and fever. She was followed by surgery, ID and vascular surgery.  She was treated for;    Plan:  1. Sacral decubitus ulcer - present on admission with infection/ cellulitis: pt developed pressure ulcer when hospitalized with COVID 19 in November. She has been following with vascular. Came to ER for concern for worsening pain/ drainage/ fever. ID and surgery following, Observing off abx. Per surgery - no debridement needed. Vascular does not feel wound infected. ID following cultures. Continue wound care. Ok to dc per ID     2. Left heel wound: wound care      3. Elevated creatinine: creatinine 1.4 on admission. Appears creatinine runs 1.1 to 1.2. rehydrated. Creatinine 1.0     4. DM hg a1c 6.8: on insulin ss     5. HTN; continue meds      6. Anemia: hg 8.7 on admission. Hg 7.7. check occult stool/ anemia panel. Appears hg 10 in december 7. Anxiety/ depression: continue meds      8. H/o PE on eliquis     9. Deconditioning: PT/OT- am pac 7     10. Chest pain: right sided- reproducible. Likely musculoskeletal. Troponin ok. ekg with no ischemia. Pt continued to improve. She was then discharged in stable condition with the following medications, instructions, and follow up.       Discharge Exam:  General Appearance: alert and oriented to person, place and time and in no acute distress  Skin: warm and dry- see wound care notes  Neck: neck supple and non tender without mass   Pulmonary/Chest: clear to auscultation bilaterally  Cardiovascular: normal rate, normal S1 and S2 and no carotid bruits  Abdomen: soft, non-tender, non-distended, normal bowel sounds, no masses or organomegaly  Extremities: no cyanosis, no clubbing and no edema  Neurologic: speech normal           I/O last 3 completed shifts:  In: -   Out: 1550 [Urine:650; Drains:900]  No intake/output data recorded.       LABS:  Recent Labs     03/16/21 2358 03/18/21 0315 03/19/21  1045    138 138   K 4.4 4.0 4.1   CL 98 105 105   CO2 26 26 24   BUN 40* 29* 17   CREATININE 1.4* 1.2* 1.0   GLUCOSE 156* 103* 208*   CALCIUM 8.9 8.3* 8.7       Recent Labs     03/16/21 2358 03/18/21 0315 03/19/21  1045   WBC 11.5 7.9 8.4   RBC 2.93* 2.67* 3.00*   HGB 8.7* 7.7* 8.8*   HCT 28.1* 25.4* 28.3*   MCV 95.9 95.1 94.3   MCH 29.7 28.8 29.3   MCHC 31.0* 30.3* 31.1*   RDW 14.0 14.2 13.9    251 274   MPV 8.9 8.6 8.6       No results for input(s): POCGLU in the last 72 hours. Imaging:  Ct Abdomen Pelvis W Iv Contrast Additional Contrast? None    Result Date: 3/17/2021  EXAMINATION: CT OF THE ABDOMEN AND PELVIS WITH CONTRAST 3/16/2021 11:39 pm TECHNIQUE: CT of the abdomen and pelvis was performed with the administration of intravenous contrast. Multiplanar reformatted images are provided for review. Dose modulation, iterative reconstruction, and/or weight based adjustment of the mA/kV was utilized to reduce the radiation dose to as low as reasonably achievable. COMPARISON: 07/08/2019 HISTORY: ORDERING SYSTEM PROVIDED HISTORY: evaluate for decubitus/abscess TECHNOLOGIST PROVIDED HISTORY: Reason for exam:->evaluate for decubitus/abscess Additional Contrast?->None Decision Support Exception->Emergency Medical Condition (MA) FINDINGS: Lower Chest: Lung bases are unremarkable. Moderate size hiatal hernia. Organs: The liver, spleen, adrenal glands and pancreas are unremarkable. The gallbladder is surgically absent. Bilateral renal cortical atrophy. Prominent common bile duct most likely related to reservoir effect. GI/Bowel: Diffuse colonic fecal retention. Sigmoid diverticulosis. The small bowel and appendix are unremarkable. Pelvis: Normal appearing urinary bladder. Peritoneum/Retroperitoneum: No free air or free fluid. Bones/Soft Tissues: Multilevel degenerative changes. Grade 1 anterolisthesis L4 over L5.  significant degenerative changes involving both hip joints. Bilateral proximal femoral fixation hardware. Extensive soft tissue thickening posterior to the lower sacrum and upper coccyx with no evidence of a walled-off fluid collection consistent with abscess. Mild erosive changes of the adjacent posterior aspect of the S3 segment cannot be excluded.      Extensive soft tissue thickening posterior to the lower sacrum and upper coccyx with no evidence of walled-off fluid collection that would be consistent with abscess. Mild erosive changes of the adjacent posterior aspect of the S3 segment cannot be excluded. Moderate-sized hiatal hernia. Bilateral renal cortical atrophy. Sigmoid diverticulosis. Diffuse colonic fecal retention. Degenerative changes lumbar spine with grade 1 anterolisthesis L4 over L5. Degenerative changes involving both hip joints. Patient Instructions:      Medication List      CONTINUE taking these medications    acetaminophen 325 MG tablet  Commonly known as: TYLENOL     apixaban 5 MG Tabs tablet  Commonly known as: Eliquis  Take 1 tablet by mouth 2 times daily     aspirin 81 MG chewable tablet  Take 1 tablet by mouth daily     atorvastatin 40 MG tablet  Commonly known as: LIPITOR  Take 1 tablet by mouth nightly     busPIRone 5 MG tablet  Commonly known as: BUSPAR  Take 1 tablet by mouth daily     Caltrate 600+D3 600-800 MG-UNIT Tabs  Generic drug: Calcium Carb-Cholecalciferol     carvedilol 12.5 MG tablet  Commonly known as: COREG  Take 1 tablet by mouth 2 times daily (with meals)     diclofenac sodium 1 % Gel  Commonly known as: VOLTAREN  apply 2 grams to affected area four times a day     fish oil 1000 MG Caps     fluticasone 50 MCG/ACT nasal spray  Commonly known as: FLONASE     furosemide 40 MG tablet  Commonly known as: LASIX     gabapentin 300 MG capsule  Commonly known as: NEURONTIN  Take 2 capsules by mouth 3 times daily for 30 days.      insulin lispro (1 Unit Dial) 100 UNIT/ML Sopn  Commonly known as: HumaLOG KwikPen  Inject 0-12 Units into the skin 3 times daily (before meals)     ipratropium-albuterol 0.5-2.5 (3) MG/3ML Soln nebulizer solution  Commonly known as: DUONEB  Take 3 mLs by nebulization every 4 hours as needed for Shortness of Breath     isosorbide mononitrate 30 MG extended release tablet  Commonly known as: IMDUR  Take 1 tablet by mouth daily Cristobal Powd  Take 1 packet by mouth 2 times daily     lansoprazole 30 MG delayed release capsule  Commonly known as: PREVACID  Take 1 capsule by mouth daily     lisinopril 2.5 MG tablet  Commonly known as: PRINIVIL;ZESTRIL     mineral oil-hydrophilic petrolatum ointment     montelukast 10 MG tablet  Commonly known as: SINGULAIR  Take 1 tablet by mouth nightly     nitroGLYCERIN 0.4 MG SL tablet  Commonly known as: Nitrostat  Place 1 tablet under the tongue every 5 minutes as needed for Chest pain up to max of 3 total doses. If no relief after 1 dose, call 911.     nystatin 872117 UNIT/GM powder  Commonly known as: MYCOSTATIN     sertraline 100 MG tablet  Commonly known as: ZOLOFT  TAKE ONE AND ONE-HALF TABLETS DAILY     therapeutic multivitamin-minerals tablet     * Victoza 18 MG/3ML Sopn SC injection  Generic drug: Liraglutide  Inject 1.2 mg into the skin daily     * Victoza 18 MG/3ML Sopn SC injection  Generic drug: Liraglutide  Inject 1.2 mg into the skin daily         * This list has 2 medication(s) that are the same as other medications prescribed for you. Read the directions carefully, and ask your doctor or other care provider to review them with you.                   Note that more than 30 minutes was spent in preparing discharge papers, discussing discharge with patient, medication review, etc.    Signed:  Electronically signed by SAMANTA Matthews on 3/19/2021 at 4:04 PM

## 2021-03-19 NOTE — PROGRESS NOTES
Nurse to nurse report called to [facility] at [address]    Electronically signed by Walker Pacheco RN on 3/19/2021 at 7:38 PM

## 2021-03-19 NOTE — DISCHARGE INSTR - COC
Continuity of Care Form    Patient Name: Mick Macario   :  1933  MRN:  [id number]    Admit date:  3/17/2021  Discharge date:  3/19/21    Code Status Order: Full Code   Advance Directives:     Admitting Physician:  Yuli Quintero MD  PCP: Crista Terrazas DO    Discharging Nurse: Russell Goldman RN  Discharging Hospital Unit/Room#: 0652/4407-U  Discharging Unit Phone Number: [phone]    Emergency Contact:   Extended Emergency Contact Information  Primary Emergency Contact: Abena Hay [address] Phone: [phone]  Mobile Phone: [phone]  Relation: Child  Secondary Emergency Contact: Brenda Griffin  [place] Phone: [phone]  Relation: Child    Past Surgical History:  Past Surgical History:   Procedure Laterality Date    CHOLECYSTECTOMY      HIP FRACTURE SURGERY Bilateral     SKIN CANCER EXCISION         Immunization History:   Immunization History   Administered Date(s) Administered    Influenza Virus Vaccine 2015, 2018    Influenza, High Dose (Fluzone 65 yrs and older) 2014, 10/29/2018    Influenza, Cookie Daniel, IM, (6 mo and older Fluzone, Flulaval, Fluarix and 3 yrs and older Afluria) 2016    Influenza, Quadv, IM, PF (6 mo and older Fluzone, Flulaval, Fluarix, and 3 yrs and older Afluria) 2020    Influenza, Triv, inactivated, subunit, adjuvanted, IM (Fluad 65 yrs and older) 2018, 2019    Pneumococcal Conjugate 13-valent (Kifabso88) 2016    Pneumococcal Polysaccharide ([id number]) 10/15/2006, 2015    Tdap (Boostrix, Adacel) 2018       Active Problems:  Patient Active Problem List   Diagnosis Code    GERD (gastroesophageal reflux disease) K21.9    Type 2 diabetes mellitus with hyperglycemia, with long-term current use of insulin ([facility]) E11.65, Z79.4    CKD (chronic kidney disease) stage 3, GFR 30-59 ml/min ([facility]) N18.30    Essential hypertension I10    History of pulmonary embolus (PE) Z86.711    Obesity (BMI 30-39. 9) E66.9    Pressure injury of sacral region, stage 2 ([facility] L89.152    COVID-19 U07.1    Anxiety and depression F41.9, F32.9    Pneumonia due to COVID-19 virus U07.1, J12.82    Uncontrolled type 2 diabetes mellitus with hyperglycemia (HCC) E11.65    Pressure ulcer of right heel, stage 2 (HCC) L89.612    Cellulitis L03.90    Controlled type 2 diabetes mellitus without complication ([facility] K08.2       Isolation/Infection:   Isolation          No Isolation        Patient Infection Status     Infection Onset Added Last Indicated Last Indicated By Review Planned Expiration Resolved Resolved By    None active    Resolved    COVID-19 20 Respiratory Panel, Molecular, with COVID-19 (Restricted: peds pts or suitable admitted adults)   20     COVID-19 Rule Out 20 Respiratory Panel, Molecular, with COVID-19 (Restricted: peds pts or suitable admitted adults) (Ordered)   20 Rule-Out Test Resulted          Nurse Assessment:  Last Vital Signs: /61   Pulse 82   Temp 99.7 °F (37.6 °C) (Oral)   Resp 18   LMP  (LMP Unknown)   SpO2 93%     Last documented pain score (0-10 scale): Pain Level: 3  Last Weight:   Wt Readings from Last 1 Encounters:   21 144 lb (65.3 kg)     Mental Status:  oriented and alert    IV Access:  - None    Nursing Mobility/ADLs:  Walking   Dependent  Transfer  Dependent  Bathing  Dependent  Dressing  Dependent  Toileting  Dependent  Feeding  Assisted  Med Admin  Assisted  Med Delivery   whole and prefers mixed with apple sauce    Wound Care Documentation and Therapy:  Wound 20 Other (Comment) Outer;Right r buttocks open  area (Active)   Number of days: 108       Wound 21 Heel Left #1 (Active)   Wound Image   21 1123   Dressing/Treatment Xeroform 21 1103   Wound Length (cm) 1 cm 21 1123   Wound Width (cm) 1 cm 21 1123   Wound Depth (cm) 0.2 cm 21 1123   Wound Surface Area (cm^2) 1 cm^2 03/18/21 1123   Change in Wound Size % (l*w) 62.41 03/18/21 1123   Wound Volume (cm^3) 0.2 cm^3 03/18/21 1123   Wound Healing % 26 03/18/21 1123   Drainage Amount Small 03/18/21 1123   Drainage Description Serosanguinous 03/18/21 1123   Odor None 03/18/21 1123   Number of days: 30       Wound 02/17/21 Coccyx #2 (Active)   Wound Image   03/18/21 1123   Wound Etiology Pressure Stage  4 03/18/21 1123   Dressing Status New dressing applied 03/17/21 1101   Dressing/Treatment Dry dressing 03/17/21 1101   Wound Length (cm) 1 cm 03/18/21 1123   Wound Width (cm) 1 cm 03/18/21 1123   Wound Depth (cm) 2 cm 03/18/21 1123   Wound Surface Area (cm^2) 1 cm^2 03/18/21 1123   Change in Wound Size % (l*w) -1.01 03/18/21 1123   Wound Volume (cm^3) 2 cm^3 03/18/21 1123   Wound Healing % -6 03/18/21 1123   Distance Tunneling (cm) 5 cm 03/18/21 1123   Tunneling Position ___ O'Clock 12 03/18/21 1123   Wound Assessment Pale granulation tissue 03/17/21 1101   Drainage Amount Small 03/18/21 1123   Drainage Description Sanguinous; Serosanguinous 03/18/21 1123   Odor None 03/18/21 1123   [place]-wound Assessment Intact 03/18/21 1123   Number of days: 30        Elimination:  Continence:   · Bowel: Yes  · Bladder: Yes  Urinary Catheter: Removal Date 3/19/21   Colostomy/Ileostomy/Ileal Conduit: No       Date of Last BM: 3/17/21    Intake/Output Summary (Last 24 hours) at 3/19/2021 1547  Last data filed at 3/19/2021 0555  Gross per 24 hour   Intake    Output 1550 ml   Net -1550 ml     I/O last 3 completed shifts:  In: -   Out: 1550 [Urine:650; Drains:900]    Safety Concerns:      At Risk for Falls    Impairments/Disabilities:      None    Nutrition Therapy:  Current Nutrition Therapy:   - Oral Diet:  Carb Control 3 carbs/meal (1500kcals/day)    Routes of Feeding: Oral  Liquids: Nectar Thick Liquids  Daily Fluid Restriction: no  Last Modified Barium Swallow with Video (Video Swallowing Test): not done    Treatments at the Time of Hospital Discharge:   Respiratory Treatments: NA  Oxygen Therapy:  is not on home oxygen therapy. Ventilator:    - No ventilator support    Rehab Therapies: Physical Therapy and Occupational Therapy  Weight Bearing Status/Restrictions: No weight bearing restirctions  Other Medical Equipment (for information only, NOT a DME order):  wheelchair  Other Treatments: Coccyx wound, left heel wound, PT/OT    Patient's personal belongings (please select all that are sent with patient):  Glasses, Dentures upper, Jewelry    RN SIGNATURE:  Electronically signed by Scarlett Cedillo RN on 3/19/21 at 5:42 PM EDT    CASE MANAGEMENT/SOCIAL WORK SECTION    Inpatient Status Date: ***    Readmission Risk Assessment Score:  Readmission Risk              Risk of Unplanned Readmission:        21           Discharging to Facility/ Agency   · Name: [facility] [address] [facility]  · Address:[address] [place] [address]  · Phone:[phone]  · Fax:[phone]    Dialysis Facility (if applicable)   · Name:  · Address:  · Dialysis Schedule:  · Phone:  · Fax:    / signature: Electronically signed by JOSE ALEJANDRO Live on 3/19/21 at 3:48 PM EDT    PHYSICIAN SECTION    Prognosis: {Prognosis:0821263995}    Condition at Discharge: Stable    Rehab Potential (if transferring to Rehab): {Prognosis:[id number]}    Recommended Labs or Other Treatments After Discharge: ***    Physician Certification: I certify the above information and transfer of Portillo Rivero  is necessary for the continuing treatment of the diagnosis listed and that she requires [place] for less 30 days.      Update Admission H&P: {CHP DME Changes in ZSWK:[id number]}    PHYSICIAN SIGNATURE:  {Esignature:[id number]}

## 2021-03-19 NOTE — CARE COORDINATION
CM spoke with Dr. Tyler Sanford to update that ID signed off & that pt accepted at [facility]. States she will place discharge order. Daughter updated that pt accepted & being discharged today.

## 2021-03-19 NOTE — PROGRESS NOTES
[facility] Quality Flow/Interdisciplinary Rounds Progress Note        Quality Flow Rounds held on March 19, 2021    Disciplines Attending:  Bedside Nurse, ,  and Nursing Unit Leadership    Kyra Solis was admitted on 3/17/2021  9:30 AM    Anticipated Discharge Date:       Disposition:    Christian Score:  Christian Scale Score: 12    Readmission Risk              Risk of Unplanned Readmission:        21           Discussed patient goal for the day, patient clinical progression, and barriers to discharge. The following Goal(s) of the Day/Commitment(s) have been identified: monitor for signs of infection, monitor labs, increase mobility, and discharge planning.       Isreal Higginbotham  March 19, 2021

## 2021-03-19 NOTE — CARE COORDINATION
GERA NOTE: CM spoke with daughter twice to discuss therapy scores & transition of care. She wants pt to go to [facility] for rehab. Pt agreeable. SNF list discussed & she chose 1. [facility], 2. [facility]. Referral called to Pedro Keane @ [facility]. Await decision & return call. GERA soke with Pedro Keane from [facility]. They accepted pt when ready for discharge. No covid test required but pt will quarantine for 10 days.

## 2021-03-19 NOTE — PROGRESS NOTES
[facility] Progress Note    Admitting Date and Time: 3/17/2021  9:30 AM  Admit Dx: Cellulitis [L03.90]    Subjective:  Patient is being followed for Cellulitis [L03.90]     Pt resting in bed in no acute distress  C/o right sided chest pain  Denies sob or nausea  Reporting pain in wound better today    ROS: denies fever, chills, cp, sob, n/v, HA unless stated above.       [Held by provider] apixaban  5 mg Oral BID    aspirin  81 mg Oral Daily    atorvastatin  40 mg Oral Nightly    busPIRone  5 mg Oral Daily    carvedilol  12.5 mg Oral BID WC    [Held by provider] furosemide  20 mg Oral Daily    isosorbide mononitrate  30 mg Oral Daily    pantoprazole  40 mg Oral QAM AC    montelukast  10 mg Oral Nightly    miconazole   Topical BID    sertraline  50 mg Oral Daily    insulin lispro  0-6 Units Subcutaneous TID WC    insulin lispro  0-3 Units Subcutaneous Nightly    docusate sodium  100 mg Oral Daily    gabapentin  300 mg Oral BID     ipratropium-albuterol, 1 vial, Q4H PRN  promethazine, 12.5 mg, Q6H PRN    Or  ondansetron, 4 mg, Q6H PRN  acetaminophen, 650 mg, Q6H PRN    Or  acetaminophen, 650 mg, Q6H PRN  glucose, 15 g, PRN  dextrose, 12.5 g, PRN  glucagon (rDNA), 1 mg, PRN  dextrose, 100 mL/hr, PRN  bisacodyl, 10 mg, Daily PRN         Objective:    /61   Pulse 82   Temp 99.7 °F (37.6 °C) (Oral)   Resp 18   LMP  (LMP Unknown)   SpO2 93%   General Appearance: alert and oriented to person, place and time and in no acute distress  Skin: warm and dry- see wound care notes  Neck: neck supple and non tender without mass   Pulmonary/Chest: clear to auscultation bilaterally  Cardiovascular: normal rate, normal S1 and S2 and no carotid bruits  Abdomen: soft, non-tender, non-distended, normal bowel sounds, no masses or organomegaly  Extremities: no cyanosis, no clubbing and no edema  Neurologic: speech normal         Recent Labs     03/16/21  2358 03/18/21  0315 03/19/21  1045   NA 136 138 138   K 4.4 4.0 4.1   CL 98 105 105   CO2 26 26 24   BUN 40* 29* 17   CREATININE 1.4* 1.2* 1.0   GLUCOSE 156* 103* 208*   CALCIUM 8.9 8.3* 8.7       Recent Labs     03/16/21  2358 03/18/21  0315 03/19/21  1045   WBC 11.5 7.9 8.4   RBC 2.93* 2.67* 3.00*   HGB 8.7* 7.7* 8.8*   HCT 28.1* 25.4* 28.3*   MCV 95.9 95.1 94.3   MCH 29.7 28.8 29.3   MCHC 31.0* 30.3* 31.1*   RDW 14.0 14.2 13.9    251 274   MPV 8.9 8.6 8.6         Assessment:    Active Problems:    Cellulitis    Controlled type 2 diabetes mellitus without complication (HCC)  Resolved Problems:    * No resolved hospital problems. *      Plan:  1. Sacral decubitus ulcer - present on admission with infection/ cellulitis: pt developed pressure ulcer when hospitalized with COVID 19 in November. She has been following with vascular. Came to ER for concern for worsening pain/ drainage/ fever. ID and surgery following, Observing off abx. Per surgery - no debridement needed. Vascular does not feel wound infected. ID following cultures. Continue wound care. Ok to dc per ID     2. Left heel wound: wound care      3. Elevated creatinine: creatinine 1.4 on admission. Appears creatinine runs 1.1 to 1.2. rehydrated. Creatinine 1.0    4. DM hg a1c 6.8: on insulin ss     5. HTN; continue meds      6. Anemia: hg 8.7 on admission. Hg 7.7. check occult stool/ anemia panel. Appears hg 10 in december 7. Anxiety/ depression: continue meds      8. H/o PE on eliquis     9. Deconditioning: PT/OT     Dispo: am pac 7. Family reporting today interested in [address]. Social work looking into. Dc once REESE set up         NOTE: This report was transcribed using voice recognition software. Every effort was made to ensure accuracy; however, inadvertent computerized transcription errors may be present.   Electronically signed by SAMANTA Johnson on 3/19/2021 at 3:36 PM

## 2021-03-19 NOTE — CARE COORDINATION
Social work / Discharge Planning:       CM updated social work that patient has been accepted by [facility]. No precert needed. No covid test needed. N-17, PASRR and ambulance form completed. Electronically signed by JOSE ALEJANDRO Rivers on 3/19/2021 at 3:47 PM          [facility] [address] [place] [place] [address]. CM updated facility liaison. RN is updating patient's daughter.   Electronically signed by JOSE ALEJANDRO Rivers on 3/19/2021 at 4:09 PM

## 2021-03-19 NOTE — PROGRESS NOTES
[address] [place] [address] [facility]  AD[facility]ANE[facility]  Progress Note    CC: pain to coccyx area is better today   Face to face encounter   SUBJECTIVE:    Patient reports that pain to coccyx is better today than yesterday. No fevers or chills over night. In no distress. Medications:  Scheduled Meds:   [Held by provider] apixaban  5 mg Oral BID    aspirin  81 mg Oral Daily    atorvastatin  40 mg Oral Nightly    busPIRone  5 mg Oral Daily    carvedilol  12.5 mg Oral BID WC    [Held by provider] furosemide  20 mg Oral Daily    isosorbide mononitrate  30 mg Oral Daily    pantoprazole  40 mg Oral QAM AC    montelukast  10 mg Oral Nightly    miconazole   Topical BID    sertraline  50 mg Oral Daily    insulin lispro  0-6 Units Subcutaneous TID WC    insulin lispro  0-3 Units Subcutaneous Nightly    docusate sodium  100 mg Oral Daily    gabapentin  300 mg Oral BID     Continuous Infusions:   dextrose       PRN Meds:ipratropium-albuterol, promethazine **OR** ondansetron, acetaminophen **OR** acetaminophen, glucose, dextrose, glucagon (rDNA), dextrose, bisacodyl  OBJECTIVE:  Patient Vitals for the past 24 hrs:   BP Temp Temp src Pulse Resp SpO2   03/19/21 0030 133/61 98.2 °F (36.8 °C) Oral 81 18 93 %   03/18/21 2045 (!) 152/67 98.9 °F (37.2 °C) Oral 97 18 96 %   03/18/21 1700 134/65 98.9 °F (37.2 °C) Oral 89 18 92 %     Constitutional: The patient is awake, alert, laying on right side. Skin: Warm and dry. No rashes were noted. Coccyx wound noted- does tunnel in about 1-2 o'clock- for about 3 cm  Head: Eyes show round, and reactive pupils. No jaundice. Mouth: Moist mucous membranes, no ulcerations, no thrush. Neck: Supple to movements. No lymphadenopathy. Chest: No respiratory distress. Clear breath sounds bilaterally. Cardiovascular: S1 and S2 are rhythmic and regular. No murmurs appreciated. Abdomen: Positive bowel sounds to auscultation. Benign to palpation. Soft.    Extremities:  Trace edema. Gonzalez cath with clear yellow urine   PIV     Laboratory and Tests Review:  Lab Results   Component Value Date    WBC 7.9 03/18/2021    WBC 11.5 03/16/2021    WBC 6.9 12/05/2020    HGB 7.7 (L) 03/18/2021    HCT 25.4 (L) 03/18/2021    MCV 95.1 03/18/2021     03/18/2021     Lab Results   Component Value Date    NEUTROABS 5.18 03/18/2021    NEUTROABS 8.32 (H) 03/16/2021    NEUTROABS 5.33 12/04/2020     Lab Results   Component Value Date    CRP 9.5 (H) 03/17/2021    CRP 1.0 (H) 11/18/2020    CRP 0.7 (H) 11/16/2020     Lab Results   Component Value Date    SEDRATE 103 (H) 03/17/2021    SEDRATE 41 (H) 02/25/2019    SEDRATE 27 (H) 12/08/2014     Lab Results   Component Value Date    ALT 7 03/16/2021    AST 15 03/16/2021    ALKPHOS 81 03/16/2021    BILITOT 0.3 03/16/2021     Lab Results   Component Value Date     03/18/2021    K 4.0 03/18/2021     03/18/2021    CO2 26 03/18/2021    BUN 29 03/18/2021    CREATININE 1.2 03/18/2021    GFRAA 51 03/18/2021    LABGLOM 42 03/18/2021    GLUCOSE 103 03/18/2021    PROT 7.0 03/16/2021    LABALBU 2.6 03/16/2021    CALCIUM 8.3 03/18/2021    BILITOT 0.3 03/16/2021    ALKPHOS 81 03/16/2021    AST 15 03/16/2021    ALT 7 03/16/2021     Radiology:  Reviewed     Microbiology:   Wound culture 3/16/2021: Mixed ankita, including alphahemolytic Streptococcus, GNR and beta-hemolytic Streptococcus    ASSESSMENT:  · 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 It is not clear whether it is infected or not  · Low grade fevers, improved   · History of SARS-CoV-2 infection November 2020    PLAN:  · Continue to follow off antibiotics at this time. · Surgery planning no interventions at this time  · Continue wound care- wound care nurse recommending Aquacel packing - agree with this. · Check cultures  · Monitor labs  · Will discuss discharge plan with Dr. Taty Multani  10:30 AM  3/19/2021     Patient seen and examined.  I had a face to face encounter with the patient. Agree with exam.  Assessment and plan as outlined above and directed by me. Addition and corrections were done as deemed appropriate. My exam and plan include: The patient is no longer complaining of pain in the coccygeal area. Wound dressings being done. She can be discharged. We will see her in the office in follow-up.     Jasmin Byrd  3/19/2021  3:20 PM

## 2021-03-24 NOTE — PLAN OF CARE
Problem: Pressure Ulcer:  Goal: Absence of new pressure ulcer  Description: Absence of new pressure ulcer  Outcome: Met This Shift     Problem: Wound:  Goal: Will show signs of wound healing; wound closure and no evidence of infection  Description: Will show signs of wound healing; wound closure and no evidence of infection  Outcome: Ongoing     Problem: Pressure Ulcer:  Goal: Signs of wound healing will improve  Description: Signs of wound healing will improve  Outcome: Ongoing     Problem: Weight control:  Goal: Ability to maintain an optimal weight for height and age will be supported  Description: Ability to maintain an optimal weight for height and age will be supported  Outcome: Ongoing     Problem: Blood Glucose:  Goal: Ability to maintain appropriate glucose levels will improve  Description: Ability to maintain appropriate glucose levels will improve  Outcome: Ongoing     Problem: Pressure Ulcer:  Goal: Will show no infection signs and symptoms  Description: Will show no infection signs and symptoms  Outcome: Not Met This Shift

## 2021-03-25 PROBLEM — L89.152 PRESSURE INJURY OF SACRAL REGION, STAGE 2 (HCC): Chronic | Status: RESOLVED | Noted: 2020-02-12 | Resolved: 2021-01-01

## 2021-03-25 NOTE — PROGRESS NOTES
[facility] Followup Visit Note    Referring Physician : Bro Scott DO   [place] RECORD NUMBER:  [id number]  AGE: 80 y.o. GENDER: female  : 1933  EPISODE DATE:  3/24/2021    Subjective:     Chief Complaint   Patient presents with    Wound Check     buttocks      HISTORY of PRESENT ILLNESS HPI   Alcon Burks is a 80 y.o. female who presents today in regards to follow up evaluation and treatment of wound/ulcer. That patient's past medical, family and social hx were reviewed and changes were made if present. 21 - Alcon Burks is a 80 y.o. female returns to the wound care center for evaluation of a sacral decubitus ulcer and a right heel wound. The patient is accompanied by her daughter. She states that shortly after we last saw her in the fall, she developed Covid requiring 2 hospitalizations for pneumonia and bacterial pneumonia. She developed a pressure sore on her right heel and her sacral ulcer which was nearly healed progressed. She denies any recent fevers or chills. 3/3/21 -patient returns. Daughter present. She denies any new problems. 3/24/21 -patient was admitted for increased drainage from sacral wound with erythema. Currently in skilled nursing. Daughter not pleased with care. PAST MEDICAL HISTORY      Diagnosis Date    Anxiety     Depression     Diabetes mellitus ([address]     Frequent urinary tract infections     GERD (gastroesophageal reflux disease)     Hypertension     Peripheral neuropathy     Spinal stenosis      Past Surgical History:   Procedure Laterality Date    CHOLECYSTECTOMY      HIP FRACTURE SURGERY Bilateral     SKIN CANCER EXCISION       No family history on file.   Social History     Tobacco Use    Smoking status: Never Smoker    Smokeless tobacco: Never Used   Substance Use Topics    Alcohol use: No    Drug use: No     No Known Allergies  Current Outpatient Medications on File Prior to Encounter Medication Sig Dispense Refill    insulin lispro, 1 Unit Dial, (HUMALOG KWIKPEN) 100 UNIT/ML SOPN Inject 0-12 Units into the skin 3 times daily (before meals) 10.8 mL 1    montelukast (SINGULAIR) 10 MG tablet Take 1 tablet by mouth nightly 90 tablet 1    busPIRone (BUSPAR) 5 MG tablet Take 1 tablet by mouth daily 90 tablet 1    gabapentin (NEURONTIN) 300 MG capsule Take 2 capsules by mouth 3 times daily for 30 days.  180 capsule 0    Liraglutide (VICTOZA) 18 MG/3ML SOPN SC injection Inject 1.2 mg into the skin daily 5 pen 1    Liraglutide (VICTOZA) 18 MG/3ML SOPN SC injection Inject 1.2 mg into the skin daily 2 pen 3    diclofenac sodium (VOLTAREN) 1 % GEL apply 2 grams to affected area four times a day 200 g 1    atorvastatin (LIPITOR) 40 MG tablet Take 1 tablet by mouth nightly 90 tablet 3    carvedilol (COREG) 12.5 MG tablet Take 1 tablet by mouth 2 times daily (with meals) 180 tablet 3    isosorbide mononitrate (IMDUR) 30 MG extended release tablet Take 1 tablet by mouth daily 90 tablet 1    lansoprazole (PREVACID) 30 MG delayed release capsule Take 1 capsule by mouth daily 90 capsule 1    furosemide (LASIX) 40 MG tablet Take 20 mg by mouth daily       sertraline (ZOLOFT) 100 MG tablet TAKE ONE AND ONE-HALF TABLETS DAILY 135 tablet 1    lisinopril (PRINIVIL;ZESTRIL) 2.5 MG tablet Take 2.5 mg by mouth daily      Nutritional Supplements (CRUZ) POWD Take 1 packet by mouth 2 times daily 60 Can 3    ipratropium-albuterol (DUONEB) 0.5-2.5 (3) MG/3ML SOLN nebulizer solution Take 3 mLs by nebulization every 4 hours as needed for Shortness of Breath 360 mL 2    Calcium Carb-Cholecalciferol (CALTRATE 600+D3) 600-800 MG-UNIT TABS Take 1 tablet by mouth 2 times daily      nystatin (MYCOSTATIN) 924246 UNIT/GM powder Apply 1 each topically 2 times daily Apply to abdominal folds      Omega-3 Fatty Acids (FISH OIL) 1000 MG CAPS Take 1,000 mg by mouth 2 times daily      apixaban (ELIQUIS) 5 MG TABS tablet Take 1 tablet by mouth 2 times daily 60 tablet 5    nitroGLYCERIN (NITROSTAT) 0.4 MG SL tablet Place 1 tablet under the tongue every 5 minutes as needed for Chest pain up to max of 3 total doses. If no relief after 1 dose, call 911. 25 tablet 3    aspirin 81 MG chewable tablet Take 1 tablet by mouth daily 30 tablet 0    Multiple Vitamins-Minerals (THERAPEUTIC MULTIVITAMIN-MINERALS) tablet Take 1 tablet by mouth daily      acetaminophen (TYLENOL) 325 MG tablet Take 650 mg by mouth every 6 hours as needed for Pain      fluticasone (FLONASE) 50 MCG/ACT nasal spray 1 spray by Nasal route daily as needed for Rhinitis       mineral oil-hydrophilic petrolatum (HYDROPHOR) ointment Apply 1 g topically daily Apply to both feet       No current facility-administered medications on file prior to encounter.         REVIEW OF SYSTEMS See HPI    Objective:    BP (!) 102/58   Pulse 92   Temp 96.6 °F (35.9 °C)   Resp 20   Ht 5' (1.524 m)   Wt 144 lb (65.3 kg)   LMP  (LMP Unknown)   BMI 28.12 kg/m²   Wt Readings from Last 3 Encounters:   03/24/21 144 lb (65.3 kg)   03/17/21 144 lb (65.3 kg)   03/03/21 144 lb (65.3 kg)     PHYSICAL EXAM  CONSTITUTIONAL:   Awake, alert, cooperative  EYES:  lids and lashes normal  ENT: external ears and nose without lesions  NECK:  supple, symmetrical, trachea midline  SKIN:  Open wound present    Assessment:     Problem List Items Addressed This Visit     Pressure ulcer of right heel, stage 2 (HCC) (Chronic)          Pre Debridement Measurements:  Are located in the [facility]  Documentation Flow Sheet  Post Debridement Measurements:  Wound/Ulcer Descriptions are Pre Debridement except measurements:     Wound 12/01/20 Other (Comment) Outer;Right r buttocks open  area (Active)   Number of days: 114       Wound 02/17/21 Heel Left #1 (Active)   Wound Image   03/24/21 1429   Wound Etiology Diabetic 02/17/21 1348   Dressing Status New dressing applied 03/24/21 1517   Wound Cleansed Cleansed with saline 03/24/21 1517   Dressing/Treatment Dry dressing;Xeroform 03/24/21 1517   Offloading for Diabetic Foot Ulcers No offloading required 02/17/21 1448   Wound Length (cm) 1 cm 03/24/21 1429   Wound Width (cm) 1 cm 03/24/21 1429   Wound Depth (cm) 0.1 cm 03/24/21 1429   Wound Surface Area (cm^2) 1 cm^2 03/24/21 1429   Change in Wound Size % (l*w) 62.41 03/24/21 1429   Wound Volume (cm^3) 0.1 cm^3 03/24/21 1429   Wound Healing % 63 03/24/21 1429   Post-Procedure Length (cm) 1.3 cm 03/03/21 1432   Post-Procedure Width (cm) 1.3 cm 03/03/21 1432   Post-Procedure Depth (cm) 0.1 cm 03/03/21 1432   Post-Procedure Surface Area (cm^2) 1.69 cm^2 03/03/21 1432   Post-Procedure Volume (cm^3) 0.17 cm^3 03/03/21 1432   Wound Assessment Fibrin;Pink/red 03/24/21 1429   Drainage Amount Small 03/24/21 1429   Drainage Description Serosanguinous 03/24/21 1429   Odor None 03/24/21 1429   Xena-wound Assessment Non-blanchable erythema 03/24/21 1429   Number of days: 36       Wound 02/17/21 Coccyx #2 (Active)   Wound Image   03/24/21 1429   Wound Etiology Pressure Stage  4 03/18/21 1123   Dressing Status New dressing applied 03/24/21 1517   Wound Cleansed Vashe 03/24/21 1517   Dressing/Treatment Moist to dry 03/24/21 1517   Offloading for Diabetic Foot Ulcers No offloading required 03/24/21 1517   Wound Length (cm) 1.2 cm 03/24/21 1429   Wound Width (cm) 0.9 cm 03/24/21 1429   Wound Depth (cm) 0.9 cm 03/24/21 1429   Wound Surface Area (cm^2) 1.08 cm^2 03/24/21 1429   Change in Wound Size % (l*w) -9.09 03/24/21 1429   Wound Volume (cm^3) 0.97 cm^3 03/24/21 1429   Wound Healing % 48 03/24/21 1429   Post-Procedure Length (cm) 1 cm 03/03/21 1432   Post-Procedure Width (cm) 0.9 cm 03/03/21 1432   Post-Procedure Depth (cm) 2.3 cm 03/03/21 1432   Post-Procedure Surface Area (cm^2) 0.9 cm^2 03/03/21 1432   Post-Procedure Volume (cm^3) 2.07 cm^3 03/03/21 1432   Distance Tunneling (cm) 4.4 cm 03/24/21 1429   Tunneling Position ___ O'Clock 12 03/24/21 1429   Undermining Starts ___ O'Clock 12 03/03/21 1415   Undermining Ends___ O'Clock 3 03/03/21 1415   Undermining Maxium Distance (cm) [email or web address] 03/03/21 1415   Wound Assessment Fibrin;Pink/red 03/24/21 1429   Drainage Amount Large 03/24/21 1429   Drainage Description Yellow;Brown 03/24/21 1429   Odor Mild 03/24/21 1429   Xena-wound Assessment Maceration 03/24/21 1429   Number of days: 36          Procedure Note  Indications:  Based on my examination of this patient's wound(s)/ulcer(s) today, debridement is required to promote healing and evaluate the wound base. Performed by: Martha Lyons MD    Consent obtained:  Yes    Time out taken:  Yes    Pain Control: Anesthetic  Anesthetic: 4% Lidocaine Liquid Topical     Debridement:Excisional Debridement    Using curette the wound(s)/ulcer(s) was/were sharply debrided down through and including the removal of subcutaneous tissue. Devitalized Tissue Debrided:  slough to stimulate bleeding to promote healing, post debridement good bleeding base and wound edges noted    Wound/Ulcer #: 2    Percent of Wound/Ulcer Debrided: 80%    Total Surface Area Debrided:  1 sq cm     Estimated Blood Loss:  Minimal  Hemostasis Achieved:  by pressure    Procedural Pain:  1  / 10   Post Procedural Pain:  0 / 10     Response to treatment:  Well tolerated by patient. Plan:   Treatment Note please see attached Discharge Instructions    Written patient dismissal instructions given to patient and signed by patient or POA. 3/24/21 -continue treatment with Dakin's packing or iodoform gauze. Follow-up 1 month or sooner as needed.          Discharge Instructions        Visit Discharge/Physician Orders     Discharge condition: Stable     Assessment of pain at discharge: NONE     Anesthetic used: LIDOCAINE 4%     Discharge to: HOME     Left via:Private automobile     Accompanied by: DAUGHTER     ECF/[facility]: [facility]     Dressing Orders:WOUND LOCATION COCCYX CLEANSE WOUND WITH NORMAL SALINE PACK WITH 1/4 STRENGTH DAKINS MOISTENED COVER WITH A DRY DRESSING AND SECURE. CHANGE DAILY     TO LEFT HEEL WOUND CLEANSE WOUND WITH NORMAL SALINE COVER WITH XEROFORM GAUZE AND DRY DRESSING. SECURE AND CHANGE DAILY     Treatment Orders:FOLLOW A NUTRITIOUS DIET HIGH IN PROTEIN AND VITAMIN C TO PROMOTE WOUND HEALING. TAKE A MULTIVITAMIN DAILY.     KEEP PRESSURE OFF COCCYX AT ALL TIMES/ KEEP PRESSURE OFF HEEL         WCC followup visit ____1 MONTH_______________________  (Please note your next appointment above and if you are unable to keep, kindly give a 24 hour notice.  Thank you.)     Physician signature:__________________________        If you experience any of the following, please call the [facility] during business hours:     * Increase in Pain  * Temperature over 101  * Increase in drainage from your wound  * Drainage with a foul odor  * Bleeding  * Increase in swelling  * Need for compression bandage changes due to slippage, breakthrough drainage.     If you need medical attention outside of the business hours of the [facility] please contact your PCP or go to the nearest emergency room.                  Electronically signed by Roni Rivas MD on 3/25/2021 at 4:01 PM

## 2021-03-29 PROBLEM — A41.9 SEPSIS (HCC): Status: ACTIVE | Noted: 2021-01-01

## 2021-03-29 NOTE — ED PROVIDER NOTES
[facility]  Department of Emergency Medicine   ED  Encounter Note  Admit Date/RoomTime: 3/29/2021 11:38 AM  ED Room:     NAME: Zeynep Mike  : 1933  MRN: [id number]     Chief Complaint:  Hypotension (With a low hgb of 7.6 B/P is currently 102/60. Pt sent in by her PCP. ) and Wound Infection (Pt is being treated for a coccyx wound. )    History of Present Illness        Zeyenp Mike is a 80 y.o. old female who presents to the emergency department for evaluation of hypertension. She was sent from the infectious disease clinic where she was being evaluated for a chronic sacral wound. Her systolic pressure was reportedly 80. She is also had increased in leukocytosis. Daughter is here and states that she thinks the wound is draining more than it usually does. Patient is complaining of more pain in her wound as well. She also believes her mother aspirated at the nursing home a few days ago. She states that she heard her coughing on the phone and that her voice has been hoarse. Daughter is unfortunately unable to visit her mom at the nursing home due to Covid restrictions. Patient did have Covid infection in 2020. ROS   Pertinent positives and negatives are stated within HPI, all other systems reviewed and are negative. Past Medical History:  has a past medical history of Anxiety, Depression, Diabetes mellitus ([address] Frequent urinary tract infections, GERD (gastroesophageal reflux disease), Hypertension, Peripheral neuropathy, and Spinal stenosis. Surgical History:  has a past surgical history that includes Cholecystectomy; Skin cancer excision; and Hip fracture surgery (Bilateral). Social History:  reports that she has never smoked. She has never used smokeless tobacco. She reports that she does not drink alcohol or use drugs. Family History: family history is not on file. Allergies: Patient has no known allergies.     Physical Exam   Oxygen Saturation Interpretation: Normal.        ED Triage Vitals   BP Temp Temp Source Pulse Resp SpO2 Height Weight   03/29/21 1149 03/29/21 1137 03/29/21 1149 03/29/21 1137 03/29/21 1149 03/29/21 1137 -- 03/29/21 1149   102/60 97.3 °F (36.3 °C) Temporal 80 19 97 %  144 lb (65.3 kg)         Constitutional:  Alert, development consistent with age. HEENT:  NC/NT. Airway patent. Neck:  Normal ROM. Supple. Respiratory: Breath sounds equal, rhonchorous sounds bilaterally at the bases  CV:  Regular rate and rhythm, normal heart sounds, without pathological murmurs, ectopy, gallops, or rubs. GI:  Abdomen Soft, nontender, good bowel sounds. No firm or pulsatile mass. Back: Sacral wound with small area of tunneling, there is a moderate amount of dark yellow drainage on her dressing. Please see photo below. Extremities: No tenderness or edema noted. Tiny noninfected appearing ulcer to left heel. Please see photo below  Integument:  Normal turgor. Warm, dry, without visible rash, unless noted elsewhere. Lymphatics: No lymphangitis or adenopathy noted. Neurological:  Oriented. Motor functions intact.                  Lab / Imaging Results   (All laboratory and radiology results have been personally reviewed by myself)  Labs:  Results for orders placed or performed during the hospital encounter of 03/29/21   CBC Auto Differential   Result Value Ref Range    WBC 13.5 (H) 4.5 - 11.5 E9/L    RBC 3.29 (L) 3.50 - 5.50 E12/L    Hemoglobin 9.3 (L) 11.5 - 15.5 g/dL    Hematocrit 30.9 (L) 34.0 - 48.0 %    MCV 93.9 80.0 - 99.9 fL    MCH 28.3 26.0 - 35.0 pg    MCHC 30.1 (L) 32.0 - 34.5 %    RDW 14.3 11.5 - 15.0 fL    Platelets 830 916 - 654 E9/L    MPV 8.7 7.0 - 12.0 fL    Neutrophils % 77.5 43.0 - 80.0 %    Immature Granulocytes % 0.7 0.0 - 5.0 %    Lymphocytes % 14.7 (L) 20.0 - 42.0 %    Monocytes % 5.6 2.0 - 12.0 %    Eosinophils % 1.2 0.0 - 6.0 %    Basophils % 0.3 0.0 - 2.0 %    Neutrophils Absolute 10.46 (H) 1.80 - 7.30 E9/L    Immature Granulocytes # 0.09 E9/L    Lymphocytes Absolute 1.99 1.50 - 4.00 E9/L    Monocytes Absolute 0.76 0.10 - 0.95 E9/L    Eosinophils Absolute 0.16 0.05 - 0.50 E9/L    Basophils Absolute 0.04 0.00 - 0.20 E9/L   Troponin   Result Value Ref Range    Troponin 0.06 (H) 0.00 - 0.03 ng/mL   Comprehensive Metabolic Panel   Result Value Ref Range    Sodium 138 132 - 146 mmol/L    Potassium 4.0 3.5 - 5.0 mmol/L    Chloride 102 98 - 107 mmol/L    CO2 28 22 - 29 mmol/L    Anion Gap 8 7 - 16 mmol/L    Glucose 175 (H) 74 - 99 mg/dL    BUN 31 (H) 8 - 23 mg/dL    CREATININE 1.2 (H) 0.5 - 1.0 mg/dL    GFR Non-African American 42 >=60 mL/min/1.73    GFR African American 51     Calcium 9.5 8.6 - 10.2 mg/dL    Total Protein 7.2 6.4 - 8.3 g/dL    Albumin 2.6 (L) 3.5 - 5.2 g/dL    Total Bilirubin 0.4 0.0 - 1.2 mg/dL    Alkaline Phosphatase 107 (H) 35 - 104 U/L    ALT 11 0 - 32 U/L    AST 21 0 - 31 U/L   SEDIMENTATION RATE   Result Value Ref Range    Sed Rate 123 (H) 0 - 20 mm/Hr   C-REACTIVE PROTEIN   Result Value Ref Range    CRP 18.4 (H) 0.0 - 0.4 mg/dL   Urinalysis   Result Value Ref Range    Color, UA Yellow Straw/Yellow    Clarity, UA Clear Clear    Glucose, Ur Negative Negative mg/dL    Bilirubin Urine Negative Negative    Ketones, Urine Negative Negative mg/dL    Specific Gravity, UA 1.020 1.005 - 1.030    Blood, Urine Negative Negative    pH, UA 5.0 5.0 - 9.0    Protein, UA Negative Negative mg/dL    Urobilinogen, Urine 0.2 <2.0 E.U./dL    Nitrite, Urine POSITIVE (A) Negative    Leukocyte Esterase, Urine Negative Negative   Microscopic Urinalysis   Result Value Ref Range    WBC, UA 1-3 0 - 5 /HPF    RBC, UA 0-1 0 - 2 /HPF    Bacteria, UA MANY (A) None Seen /HPF   EKG 12 Lead   Result Value Ref Range    Ventricular Rate 83 BPM    Atrial Rate 83 BPM    P-R Interval 184 ms    QRS Duration 68 ms    Q-T Interval 358 ms    QTc Calculation (Bazett) 420 ms    P Axis 76 degrees    R Axis -7 degrees    T Axis 165 degrees TYPE AND SCREEN   Result Value Ref Range    ABO/Rh B POS     Antibody Screen NEG      Imaging: All Radiology results interpreted by Radiologist unless otherwise noted. CT CHEST WO CONTRAST   Final Result   Atelectasis/beginning infiltrates in the right lower lobe concerning for   developing pneumonia. CT ABDOMEN PELVIS W IV CONTRAST Additional Contrast? None   Final Result   Sacral decubitus ulcer with several new, small locules of gas in the   subcutaneous fat posterior to the L5-S1 level. A developing abscess cannot   be excluded. There are erosive changes of the S3 vertebral segment, stable   in appearance compared with the prior study. Additionally, there is new   peripheral hyperenhancement with a small volume of fluid along the right   sacrospinous ligament. A developing abscess in this location cannot be   excluded. Stable dilation of the common bile duct with mild intrahepatic biliary   dilatation. This may be related to the patient's prior cholecystectomy. Consider correlation with liver function test.         XR CHEST PORTABLE   Final Result   Suspect some scarring/infiltrative changes in left lower hemithorax medially.    Recommend follow-up PA and lateral views           ED Course / Medical Decision Making     Medications   sodium chloride flush 0.9 % injection 10 mL (has no administration in time range)   0.9 % sodium chloride infusion (has no administration in time range)   piperacillin-tazobactam (ZOSYN) 3,375 mg in dextrose 5 % 100 mL IVPB extended infusion (mini-bag) (has no administration in time range)   vancomycin 1000 mg IVPB in 250 mL D5W addavial (has no administration in time range)   vancomycin (VANCOCIN) intermittent dosing (placeholder) (has no administration in time range)   fentaNYL (SUBLIMAZE) injection 50 mcg (50 mcg Intravenous Given 3/29/21 1351)   0.9 % sodium chloride bolus (0 mLs Intravenous Stopped 3/29/21 1421)   iopamidol (ISOVUE-370) 76 % injection 90 mL (90 mLs Intravenous Given 3/29/21 1531)   piperacillin-tazobactam (ZOSYN) 3,375 mg in dextrose 5 % 100 mL IVPB extended infusion (mini-bag) (3,375 mg Intravenous New Bag 3/29/21 1701)        Re-examination:  3/29/21       Time: Pain improved    Consult(s):   IP CONSULT TO INTERNAL MEDICINE  IP CONSULT TO INFECTIOUS DISEASES  IP CONSULT TO GENERAL SURGERY  PHARMACY TO DOSE VANCOMYCIN    Procedure(s):   none    MDM:   Patient presents to the ED for evaluation of hypotension and a sacral wound. Daughter is also concerned that she aspirated at the nursing home. Patient and daughter state the wound is draining more and is more painful. She does have an elevated leukocytosis, sed rate and CRP. CT chest concerning for developing pneumonia. CT of the sacral wound also shows some new areas of gas. Patient was treated with broad-spectrum antibiotics after cultures were obtained and she was admitted to Dr. Maynor Henson for further management. Counseling:  I reviewed today's visit with the patient in addition to providing specific details for the plan of care and counseling regarding the diagnosis and prognosis. Questions are answered at this time and are agreeable with the plan. Assessment     1. Wound of sacral region, initial encounter    2. Pneumonia due to organism    3. History of aspiration pneumonia      Plan   Admission to Telemetry Unit. Patient condition is stable    New Medications     New Prescriptions    No medications on file     Electronically signed by Gilmar Pride DO   DD: 3/29/21  **This report was transcribed using voice recognition software. Every effort was made to ensure accuracy; however, inadvertent computerized transcription errors may be present.   END OF ED PROVIDER NOTE        Gilmar Pride DO  03/29/21 5006

## 2021-03-29 NOTE — PROGRESS NOTES
Pharmacy Consultation Note  (Antibiotic Dosing and Monitoring)    Initial consult date: 3/29/21  Consulting physician: Dr. George Rayo  Drug: Vancomycin  Indication: skin and soft tissue infection     Age/  Gender Height Weight IBW Dosing weight  Allergy Information   87 y.o./female 5' 65.3kg   Patient height not recorded  53.4kg  Patient has no known allergies. @WXXB(21)@        Date  WBC BUN SCr CrCl  (mL/min) Drug/Dose Time   Given Level(s)   (Time) Comments   3/29/21 13.5 31 1.2  23.7   Vancomycin 1000 mg IV    Ordered x1 with intermittent placeholder for 3/29/21 @1900                                          Intake/Output Summary (Last 24 hours) at 3/29/2021 1813  Last data filed at 3/29/2021 1421  Gross per 24 hour   Intake 500 ml   Output    Net 500 ml       Historical Cultures:  Organism   Date Value Ref Range Status   11/16/2020 Escherichia coli (A)  Final     No results for input(s): BC in the last 72 hours. Cultures:  available culture and sensitivity results were reviewed in [facility]    Assessment:  · 80 y.o. female has been initiated Vancomycin.   · Estimated CrCl = 23.7 mL/min  · Goal trough level = 15-20 mcg/mL    Plan:  · Will initiate vancomycin at a dose of 1000mg x 1 (with intermittent placeholder)   · Monitor renal function   · Clinical pharmacy to follow      Suman Galvan [facility] 3/29/2021 6:13 PM

## 2021-03-29 NOTE — LETTER
Beneficiary Notification Letter  BPCI Advanced     Your Doctor or [address],    We wanted to let you know that your health care provider, [address] [place] [address] Gi[place], has volunteered to take part in our [employer] [place] & Medicaid Services (CMS) Bundled Payments for [facility] (BPCI Advanced). This doesnt change your Medicare rights or benefits and you dont need to do anything. What are bundled payments? A bundled payment combines, or bundles together, payments that Medicare makes to your health care providers for the many different kinds of medical services you might get in a specific time period. In BPCI Advanced, this time period could include a hospital inpatient stay or outpatient procedure, plus 90 days. Why would Medicare bundle payments? Bundled payments are thought of as a value-based way to pay because health care providers are responsible for both the quality and cost of medical care they give. This is a relatively new way of paying health care providers compared to thefee-for-service way Medicare has traditionally paid, where providers are paid separately for each service they provide. Bundled payments encourage these providers to work together to provide better, more coordinated care during your hospital stay, or outpatient procedure, and through your recovery. What does BPCI Advance mean for me? Youre more likely to get even better care when hospitals, doctors, and other health care providers work together. In BPCI Advanced, hospitals, doctors, and other health care providers may be rewarded for providing better, more coordinated health care. Medicare will watch BPCI Advanced participants closely to make sure that you and other patients keep getting efficient, high quality care. What do I need to know about BPCI Advanced?   Whats most important for you to know is that your Medicare rights and benefits wont change because your health care provider is participating in [facility]. Medicare will keep covering all of your medically necessary services. Even though Medicare will pay your doctor in a different way under BPCI Advanced, how much you have to pay wont change. Health care providers and suppliers who are enrolled in Medicare will submit their Medicare claims like they always have. Youll have all the same Medicare rights and protections, including the right to choose which hospital, doctor, or other health care provider you see. If you dont want to get care from a health care provider whos participating in [facility], then youll have to choose a different health care provider whos not participating in the Model. How can I give feedback about my health care? Medicare might ask you to take a voluntary survey about the services and care you received from [address] [place] [address] during your hospital stay or outpatient procedure and for a specific period of time afterwards. You can decide whether you want to take the voluntary survey, but if you do, itll help Medicare make BPCI Advanced and the care of other Medicare patients better. If you have concerns or complaints about your care, you can:   · Talk to your doctor or health care provider. · Contact your Beneficiary and Family Centered Care Quality Improvement   Organization [facility]). You can get your [employer]IOs phone number  at  Medicare.gov/contacts or by calling 1-800-MEDICARE. TTY users can call  3-615.804.4894. Where can I learn more about BPCI Advanced? Learn more about BPCI Advanced at https://innovation.cms.gov/initiatives/bpci-advanced/:  · A list of all the hospitals and physician group practices in the country participating in [address] [place] [address]. · All of the inpatient and outpatient Clinical Episodes that are currently included under BPCI Advanced.  A Clinical Episode is a grouping of medical conditions or diagnoses that are included in the [address].

## 2021-03-29 NOTE — ED NOTES
Bed: 08  Expected date: 3/29/21  Expected time:   Means of arrival:   Comments:  MARCELLO Mendes, LEXIE  03/29/21 1497

## 2021-03-30 PROBLEM — G93.41 ACUTE METABOLIC ENCEPHALOPATHY: Status: ACTIVE | Noted: 2021-01-01

## 2021-03-30 PROBLEM — R79.89 ELEVATED TROPONIN: Status: ACTIVE | Noted: 2021-01-01

## 2021-03-30 PROBLEM — I73.9 PVD (PERIPHERAL VASCULAR DISEASE) (HCC): Status: ACTIVE | Noted: 2021-01-01

## 2021-03-30 PROBLEM — R77.8 ELEVATED TROPONIN: Status: ACTIVE | Noted: 2021-01-01

## 2021-03-30 PROBLEM — E44.0 MODERATE PROTEIN-CALORIE MALNUTRITION (HCC): Chronic | Status: ACTIVE | Noted: 2021-01-01

## 2021-03-30 NOTE — PROGRESS NOTES
Comprehensive Nutrition Assessment    Type and Reason for Visit:  Initial, Wound, Positive Nutrition Screen    Nutrition Recommendations/Plan: Continue Diet and ONS to meet nutritional needs. Nutrition Assessment:  Pt adm with wounds increased needs for wound healing. Noted moderate malnutrition dysphagia. Rec SLP eval will add ONS and monitor. Malnutrition Assessment:  Malnutrition Status: Moderate malnutrition    Context:  Chronic Illness     Findings of the 6 clinical characteristics of malnutrition:  Energy Intake:  Mild decrease in energy intake (Comment)(Pt stated intake has decreased for past month.)  Weight Loss:  1 - Mild weight loss (specify amount and time period)(2#s in 2 days)     Body Fat Loss:  1 - Mild body fat loss     Muscle Mass Loss:  1 - Mild muscle mass loss    Fluid Accumulation:  No significant fluid accumulation     Strength:  Not Performed    Estimated Daily Nutrient Needs:  Energy (kcal):  7028-2392; Weight Used for Energy Requirements:  Current     Protein (g):  60-70g/kg (1.3-1.5g/kg); Weight Used for Protein Requirements:  Ideal        Fluid (ml/day):  7856-2186; Method Used for Fluid Requirements:  1 ml/kcal      Nutrition Related Findings:  A&Ox4,  Abd WDL, +I/Os, Edema none, missing teeth, wound consult pending      Wounds:  Multiple       Current Nutrition Therapies:    DIET CARB CONTROL; Dental Soft; Mildly Thick (Nectar)    Anthropometric Measures:  · Height: 5' (152.4 cm)  · Current Body Weight: 174 lb 3 oz (79 kg)(3/30)   · Admission Body Weight:      · Usual Body Weight: 178 lb 9.6 oz (81 kg)(9/16/20 [facility])     · Ideal Body Weight: 100 lbs; % Ideal Body Weight 174.2 %   · BMI: 34    · B MI Categories: Obese Class 1 (BMI 30.0-34. 9)       Nutrition Diagnosis:   · Moderate malnutrition, In context of chronic illness related to swallowing difficulty as evidenced by intake 0-25%, poor intake prior to admission, mild muscle loss, mild loss of subcutaneous fat Nutrition Interventions:   Food and/or Nutrient Delivery:  Continue Current Diet, Start Oral Nutrition Supplement  Nutrition Education/Counseling:  Education not indicated   Coordination of Nutrition Care:  Continue to monitor while inpatient    Goals:  >50% of meals consumed and ONS       Nutrition Monitoring and Evaluation:   Behavioral-Environmental Outcomes:  None Identified   Food/Nutrient Intake Outcomes:  Food and Nutrient Intake, Supplement Intake  Physical Signs/Symptoms Outcomes:  GI Status, Biochemical Data, Nutrition Focused Physical Findings, Skin, Weight, Fluid Status or Edema, Chewing or Swallowing     Discharge Planning:     Too soon to determine     Electronically signed by Kaylan Mccarty RD, LD on 3/30/21 at 2:55 PM EDT    Contact: [phone]

## 2021-03-30 NOTE — CARE COORDINATION
Met with pt at bedside to discuss discharge / transition of care plan however, pt could not recall where she came from and was in agreement for me to speak with one of her daughters. I called pt's daughter Jenn Hensley however, no answer. I called pt's other daughter ODALISDavenport ANTWON whom relayed pt is from [place] in [facility] and would like a referral made to [facility]. I made a referral to [facility], awaiting acceptance, will complete HENS once accepting facility has been obtained. The Plan for Transition of Care is related to the following treatment goals: medical stability    The Patient and/or patient representative [facility] was provided with a choice of provider and agrees   with the discharge plan. [x] Yes [] No    Freedom of choice list was provided with basic dialogue that supports the patient's individualized plan of care/goals, treatment preferences and shares the quality data associated with the providers.  [x] Yes [] No

## 2021-03-30 NOTE — H&P
[address] [facility]  History and Physical      CHIEF COMPLAINT:    Chief Complaint   Patient presents with    Hypotension     With a low hgb of 7.6 B/P is currently 102/60. Pt sent in by her PCP.  Wound Infection     Pt is being treated for a coccyx wound. Patient of Beatricelizbet PortergermaineDO presents with:  Sepsis [facility])    History of Present Illness: The patient is very lethargic and a poor historian. She was sent here due to low blood pressures and increased drainage from her previously known sacral decubitus ulcer. Reportedly this has been going on for a few days. She does complain of a burning low back pain at the sacrum which does not radiate and is severe. She is unable to provide much further context such as alleviating or exacerbating factors or associated symptoms. There have been no reported fevers to my knowledge. There was some question about aspiration event, although the patient herself cannot provide any information about this. She denies shortness of breath. She denies cough. REVIEW OF SYSTEMS:  Pertinent negatives are above in HPI. 10 point ROS otherwise negative.       Past Medical History:   Diagnosis Date    Anxiety     Depression     Diabetes mellitus (HCC)     Frequent urinary tract infections     GERD (gastroesophageal reflux disease)     Hypertension     Peripheral neuropathy     Spinal stenosis          Past Surgical History:   Procedure Laterality Date    CHOLECYSTECTOMY      HIP FRACTURE SURGERY Bilateral     SKIN CANCER EXCISION         Medications Prior to Admission:    Medications Prior to Admission: insulin lispro, 1 Unit Dial, (HUMALOG KWIKPEN) 100 UNIT/ML SOPN, Inject 0-10 Units into the skin 3 times daily (before meals)  diclofenac sodium (VOLTAREN) 1 % GEL, Apply 2 g topically 4 times daily as needed for Pain (apply to hands)  sertraline (ZOLOFT) 100 MG tablet, Take 150 mg by mouth daily  Liraglutide (VICTOZA) 18 MG/3ML SOPN SC injection, Inject 1.2 mg into the skin daily  bisacodyl (DULCOLAX) 10 MG suppository, Place 10 mg rectally daily as needed for Constipation  gabapentin (NEURONTIN) 300 MG capsule, Take 600 mg by mouth 3 times daily.   magnesium hydroxide (MILK OF MAGNESIA) 400 MG/5ML suspension, Take 30 mLs by mouth daily as needed for Constipation  nitroGLYCERIN (NITROSTAT) 0.4 MG SL tablet, Place 0.4 mg under the tongue every 5 minutes as needed for Chest pain  montelukast (SINGULAIR) 10 MG tablet, Take 1 tablet by mouth nightly  busPIRone (BUSPAR) 5 MG tablet, Take 1 tablet by mouth daily  diclofenac sodium (VOLTAREN) 1 % GEL, apply 2 grams to affected area four times a day  atorvastatin (LIPITOR) 40 MG tablet, Take 1 tablet by mouth nightly  carvedilol (COREG) 12.5 MG tablet, Take 1 tablet by mouth 2 times daily (with meals)  isosorbide mononitrate (IMDUR) 30 MG extended release tablet, Take 1 tablet by mouth daily  lansoprazole (PREVACID) 30 MG delayed release capsule, Take 1 capsule by mouth daily  furosemide (LASIX) 20 MG tablet, Take 20 mg by mouth daily   lisinopril (PRINIVIL;ZESTRIL) 2.5 MG tablet, Take 2.5 mg by mouth daily  ipratropium-albuterol (DUONEB) 0.5-2.5 (3) MG/3ML SOLN nebulizer solution, Take 3 mLs by nebulization every 4 hours as needed for Shortness of Breath  Calcium Carb-Cholecalciferol (CALTRATE 600+D3) 600-800 MG-UNIT TABS, Take 1 tablet by mouth 2 times daily  nystatin (MYCOSTATIN) 259771 UNIT/GM powder, Apply 1 each topically 2 times daily Apply to abdominal folds  Omega-3 Fatty Acids (FISH OIL) 1000 MG CAPS, Take 1,000 mg by mouth 2 times daily  apixaban (ELIQUIS) 5 MG TABS tablet, Take 1 tablet by mouth 2 times daily  aspirin 81 MG chewable tablet, Take 1 tablet by mouth daily  acetaminophen (TYLENOL) 325 MG tablet, Take 650 mg by mouth every 4 hours as needed for Pain or Fever   fluticasone (FLONASE) 50 MCG/ACT nasal spray, 1 spray by Nasal route daily as needed for Rhinitis   mineral oil-hydrophilic 03/29/2021    K 4.0 03/29/2021    K 4.0 03/18/2021     03/29/2021    CO2 28 03/29/2021    BUN 31 03/29/2021    CREATININE 1.2 03/29/2021    GFRAA 51 03/29/2021    LABGLOM 42 03/29/2021    GLUCOSE 175 03/29/2021    PROT 7.2 03/29/2021    LABALBU 2.6 03/29/2021    CALCIUM 9.5 03/29/2021    BILITOT 0.4 03/29/2021    ALKPHOS 107 03/29/2021    AST 21 03/29/2021    ALT 11 03/29/2021       Imaging:  I've personally reviewed the patient's CT CAP  Sacral decubitus ulcer with several new, small locules of gas in the   subcutaneous fat posterior to the L5-S1 level.  A developing abscess cannot   be excluded.  There are erosive changes of the S3 vertebral segment, stable   in appearance compared with the prior study.  Additionally, there is new   peripheral hyperenhancement with a small volume of fluid along the right   sacrospinous ligament.  A developing abscess in this location cannot be   excluded.       Stable dilation of the common bile duct with mild intrahepatic biliary   dilatation.  This may be related to the patient's prior cholecystectomy. Consider correlation with liver function test.     Atelectasis/beginning infiltrates in the right lower lobe concerning for   developing pneumonia. (On my review of the CT chest, I really don't see PNA)    EKG:  I've personally reviewed the patient's EKG:  NSR lateral TWI's new since Jan 2021    Telemetry:  I've personally reviewed the patient's telemetry:       ASSESSMENT/PLAN:  Principal Problem:    Sepsis ([address]  Active Problems:    CKD (chronic kidney disease) stage 3, GFR 30-59 ml/min (HCC)    Essential hypertension    History of pulmonary embolus (PE)    Uncontrolled type 2 diabetes mellitus with hyperglycemia (HCC)    Pressure injury of sacral region, stage 3 (HCC)    Elevated troponin  Resolved Problems:    * No resolved hospital problems. *      Doubt PNA    Suspect deeper infection in the sacral ulcer region. Externally doesn't appear to be infected.   I took

## 2021-03-30 NOTE — PROGRESS NOTES
Pharmacy Consultation Note  (Antibiotic Dosing and Monitoring)    Initial consult date: 3/29/21  Consulting physician: Dr. Drea Aviles  Drug: Vancomycin  Indication: skin and soft tissue infection     Age/  Gender Height Weight IBW Dosing weight  Allergy Information   87 y.o./female 5' (152.4 cm)(per previous record 3/24/21) 144 lb (65.3 kg)     Ideal body weight: 45.5 kg (100 lb 4.9 oz)  Adjusted ideal body weight: 53.4 kg (117 lb 12.6 oz)  53.4kg  Patient has no known allergies. Other anti-infectives Start date Stop date   Pip-tazo 3/29                Temp (24hrs), Av.8 °F (36.6 °C), Min:97.3 °F (36.3 °C), Max:98.7 °F (37.1 °C)          Date  WBC BUN/SCr CrCl  (mL/min) Drug/Dose Time   Given Level(s)   (Time) Comments   3/29 13.5 31/1.2  ~25   Vancomycin 1000 mg IV x1 0002 (3/30)     3/30 11.5 27/1.3 ~25 Vancomycin 750 mg IV q24h <0000> (3/31)                             Intake/Output Summary (Last 24 hours) at 3/30/2021 0850  Last data filed at 3/30/2021 0615  Gross per 24 hour   Intake 850 ml   Output    Net 850 ml       Cultures:    Site Date Result   Blood x2 3/29 In process   Wound 3/29 In process          Assessment:  · Pharmacy consulted to dose vancomycin for this 80 y.o. female for skin and soft tissue infection (chronic sacral wound). · Goal trough level = 15-20 mcg/mL    Plan:  · Vancomycin 750 mg IV q24h. · Pharmacist will follow and monitor/adjust dosing as necessary.      Kristin Quintana, [facility] 3/30/2021 8:50 AM

## 2021-03-30 NOTE — PROGRESS NOTES
Hand dominance: R   Strength ROM Additional Info:    RUE  Distal: 3+/5   Proximal: 2+/5 Distal AROM: WFL  Proximal AROM: limited   good  and fair FMC/dexterity noted during ADL tasks     LUE Distal: 3+/5   Proximal: 2+/5  Distal AROM: WFL  Proximal AROM: limited   good  and fair FMC/dexterity noted during ADL tasks       Hearing: WFL  Sensation:  No c/o numbness or tingling  Tone: WFL                   Comments: Upon arrival patient lying in bed. Pt agreeable to OT session this date. At end of session, patient lying in bed (bed alarm on, repositioned) with call light and phone within reach, all lines and tubes intact. Overall patient demonstrated decreased independence and safety during completion of ADL/functional transfer/mobility tasks. Pt would benefit from continued skilled OT to increase safety and independence with completion of ADL/IADL tasks for functional independence and quality of life. Treatment: OT treatment provided this date includes:  Therapist facilitated self-care retraining: UB self-care tasks (gown) and various grooming tasks (combed hair, washed hands and face) while educating/cuing pt on modified techniques, sequencing, attention and energy conservation techniques. Facilitation of bed mobility (rolling L/R, repositioning w/ education/cues for sequencing, attention (max cues) and body mechanics. Reinforced benefits of repositioning for skin and joint integrity. Repositioned w/ pillow props, TAPS and B heel protectors). Skilled monitoring of HR, O2 sats and pts response to treatment.      mod  Profile and History- med (extensive chart review)  Assessment of Occupational Performance and Identification of Deficits- med  Clinical Decision Making- med    Evaluation Time includes thorough review of current medical information, gathering information on past medical history/social history and prior level of function, completion of standardized testing/informal observation of tasks, assessment of data and education on plan of care and goals. Assessment of current deficits   Functional mobility [x]  ADLs [x] Strength [x]  Cognition [x]  Functional transfers  [x] IADLs [x] Safety Awareness [x]  Endurance [x]  Fine Motor Coordination [x] Balance [x] Vision/perception [] Sensation []   Gross Motor Coordination [] ROM [x] Delirium []                  Motor Control []    Plan of Care: 1-3 days/week for 1-2 weeks PRN   Instruction/training on adapted ADL techniques and AE recommendations to increase functional independence within precautions  Training on energy conservation strategies/techniques to improve independence/tolerance for self-care routine  Functional transfer/mobility training/DME recommendations for increased independence, safety, and fall prevention  Patient/Family education to increase follow through with safety techniques and functional independence  Recommendation of environmental modifications for increased safety with functional transfers/mobility and ADLs  Cognitive retraining/development of therapeutic activities to improve problem solving, judgement, memory, and attention for increased safety/participation in ADL/IADL tasks  Splinting/positioning for increased function, prevention of contractures, and improve skin integrity  Therapeutic exercise to improve motor endurance, ROM, and functional strength for ADLs/functional transfers  Therapeutic activities to facilitate/challenge dynamic balance, stand tolerance, fine motor dexterity/in-hand manipulation for increased independence with ADLs  Positioning to improve functional independence    Rehab Potential: Good for established goals    Patient / Family Goal: Not stated     Patient and/or family were instructed on diagnosis, prognosis/goals and plan of care. Demonstrated poor understanding. [] Malnutrition indicators have been identified and nursing has been notified to ensure a dietitian consult is ordered.        Mod Evaluation completed +              Time In: 13:45  Time Out: 14:02  Total Treatment Time: 9 minutes   Min Units   OT Eval Low 57799     OT Eval Medium 97166 X 1   OT Eval High [id number]     OT Re-Eval [id number]     Therapeutic Ex 66444     Therapeutic Activities 98564     ADL/Self Care 93943 9 1   Orthotic Management 36190     Neuro Re-Ed [address], OTR/L [address][id number]

## 2021-03-30 NOTE — PROGRESS NOTES
Wound care notified of new consult for L heel wound. Wound culture of coccyx collected and sent to lab.

## 2021-03-30 NOTE — CONSULTS
Chief Complaint: Patient seen for evaluation of sacral coccygeal decubitus ulcer       HPI: This patient, who was hospitalized in November of last year, developed Covid pneumonia, respiratory failure, confined to bed, developed a sacral coccygeal decubitus ulcer, also superficial ulcer left heel, has been following with Dr. Julianna Darden at  the wound care center, seen last week on 24 March, was hospitalized with low hemoglobin, and concern regarding the possibility of her coccygeal sacral wound infection and general surgery and vascular surgery services were consulted for further evaluation along with the wound care nursing staff    Patient was already seen by general surgery service, the nurses have informed me that they have decided to follow the patient conservatively as the wound has undermining but otherwise fairly clean and did not need any acute debridement at the present time    When I came to see patient, patient is resting comfortably, extremely pleasant, but laying on her back, emphasized that she needs to lay on the side most of the time if possible, denies any history of fever or chills, tells me that she has not ambulated for a few months and at the point of discomfort bed and wheelchair because of generalized weakness in the lower extremities      Patient denies any chest pain or shortness of breath    No Known Allergies    Current Facility-Administered Medications   Medication Dose Route Frequency Provider Last Rate Last Admin    perflutren lipid microspheres (DEFINITY) injection 1.65 mg  1.5 mL Intravenous ONCE PRN Seb Chavira MD        morphine (PF) injection 2 mg  2 mg Intravenous Q4H PRN Seb Chavira MD   2 mg at 03/30/21 1311    sodium chloride flush 0.9 % injection 10 mL  10 mL Intravenous PRN Seb Chavira MD        0.9 % sodium chloride infusion  25 mL Intravenous PRN Seb Chavira MD        piperacillin-tazobactam (ZOSYN) 3,375 mg in dextrose 5 % 100 mL IVPB extended infusion (mini-bag) 3,375 mg Intravenous Janae Turk MD   Stopped at 03/30/21 1440    apixaban (ELIQUIS) tablet 5 mg  5 mg Oral BID Dennys Gómez MD   5 mg at 03/30/21 4735    aspirin chewable tablet 81 mg  81 mg Oral Daily Dennys Gómez MD   81 mg at 03/30/21 3445    atorvastatin (LIPITOR) tablet 40 mg  40 mg Oral Nightly Dennys Gómez MD   40 mg at 03/30/21 0002    [Held by provider] busPIRone (BUSPAR) tablet 5 mg  5 mg Oral Daily Dennys Gómez MD   5 mg at 03/30/21 6985    carvedilol (COREG) tablet 12.5 mg  12.5 mg Oral BID  Dennys Gómez MD   12.5 mg at 03/30/21 1063    furosemide (LASIX) tablet 20 mg  20 mg Oral Daily Dennys Gómez MD   20 mg at 03/30/21 2646    [Held by provider] gabapentin (NEURONTIN) capsule 600 mg  600 mg Oral TID Dennys Gómez MD   600 mg at 03/30/21 1310    ipratropium-albuterol (DUONEB) nebulizer solution 3 mL  1 vial Nebulization Q4H PRN Dennys Gómez MD        pantoprazole (PROTONIX) tablet 40 mg  40 mg Oral QAM AC Dennys Gómez MD   40 mg at 03/30/21 0557    montelukast (SINGULAIR) tablet 10 mg  10 mg Oral Nightly Dennys Gómez MD   10 mg at 03/30/21 0002    sertraline (ZOLOFT) tablet 150 mg  150 mg Oral Daily Dennys Gómez MD   150 mg at 03/30/21 0829    glucose (GLUTOSE) 40 % oral gel 15 g  15 g Oral PRN Dennys Gómez MD        dextrose 50 % IV solution  12.5 g Intravenous PRN Dennys Gómez MD        glucagon (rDNA) injection 1 mg  1 mg Intramuscular PRN Dennys Gómez MD        dextrose 5 % solution  100 mL/hr Intravenous PRN Dennys Gómez MD        insulin lispro (HUMALOG) injection vial 0-6 Units  0-6 Units Subcutaneous TID  Dennys Gómez MD   2 Units at 03/30/21 1058    insulin lispro (HUMALOG) injection vial 0-3 Units  0-3 Units Subcutaneous Nightly Dennys Gómez MD   1 Units at 03/29/21 2230    sodium chloride flush 0.9 % injection 10 mL  10 mL Intravenous 2 times per day Dennys Gómez MD   10 mL at 03/30/21 0834    sodium chloride flush 0.9 % injection 10 mL  10 mL Intravenous PRN Amelie Garcia MD        0.9 % sodium chloride infusion  25 mL Intravenous PRN Amelie Garcia MD        promethazine (PHENERGAN) tablet 12.5 mg  12.5 mg Oral Q6H PRN Amelie Garcia MD        Or    ondansetron [facility]) injection 4 mg  4 mg Intravenous Q6H PRN Amelie Garcia MD        polyethylene glycol (GLYCOLAX) packet 17 g  17 g Oral Daily PRN Amelie Garcia MD        acetaminophen (TYLENOL) tablet 650 mg  650 mg Oral Q6H PRN Amelie Garcia MD   650 mg at 03/30/21 0830    Or    acetaminophen (TYLENOL) suppository 650 mg  650 mg Rectal Q6H PRN Amelie Garcia MD           Past Medical History:   Diagnosis Date    Anxiety     Depression     [facility] ([facility]     Frequent urinary tract infections     GERD (gastroesophageal reflux disease)     Hypertension     Peripheral neuropathy     Spinal stenosis        Past Surgical History:   Procedure Laterality Date    CHOLECYSTECTOMY      HIP FRACTURE SURGERY Bilateral     SKIN CANCER EXCISION         No family history on file. Social History     Socioeconomic History    Marital status:       Spouse name: Not on file    Number of children: Not on file    Years of education: Not on file    Highest education level: Not on file   Occupational History    Not on file   Social Needs    Financial resource strain: Not on file    Food insecurity     Worry: Not on file     Inability: Not on file    Transportation needs     Medical: Not on file     Non-medical: Not on file   Tobacco Use    Smoking status: Never Smoker    Smokeless tobacco: Never Used   Substance and Sexual Activity    Alcohol use: No    Drug use: No    Sexual activity: Not Currently   Lifestyle    Physical activity     Days per week: Not on file     Minutes per session: Not on file    Stress: Not on file   Relationships    Social connections     Talks on phone: Not on file     Gets together: Not on file     Attends Christianity service: Not on file     Active member of club or organization: Not on file     Attends meetings of clubs or organizations: Not on file     Relationship status: Not on file    Intimate partner violence     Fear of current or ex partner: Not on file     Emotionally abused: Not on file     Physically abused: Not on file     Forced sexual activity: Not on file   Other Topics Concern    Not on file   Social History Narrative    Not on file       Review of Systems:  Skin:  No abnormal pigmentation or rash. Eyes:  No blurring, diplopia or vision loss. Ears/Nose/Throat:  No hearing loss or vertigo. Respiratory:  No cough, pleuritic chest pain, dyspnea, or wheezing. History of KB pneumonia in November 2020    Cardiovascular: No angina, palpitations . Hypertension    Gastrointestinal:  No nausea or vomiting; no abdominal pain or rectal bleeding. GERD    Musculoskeletal:  No arthritis or weakness. Patient has a coccygeal decubitus ulcer, 1 cm x 1 cm with some undermining by 2.5 cm, on probing the wound, slightly    Neurologic:  No paralysis, paresis, seizures or headaches. Hematologic/Lymphatic/Immunologic:  No anemia, abnormal bleeding/bruising. Endocrine:  No heat or cold intolerance. No polyphagia, polydipsia or polyuria. Diabetes mellitus    Physical Exam:  BP (!) 115/49   Pulse 77   Temp 98 °F (36.7 °C) (Temporal)   Resp 18   Ht 5' (1.524 m)   Wt 174 lb 3 oz (79 kg)   LMP  (LMP Unknown)   SpO2 97%   BMI 34.02 kg/m²   General appearance:  Alert, awake, oriented x 3. No distress. Skin:  Warm and dry. Head:  Normocephalic. No masses, lesions or tenderness. Eyes:  Conjunctivae appear normal; PERRL. Ears:  External ears normal.  Nose/Sinuses:  Septum midline, mucosa normal; no drainage. Oropharynx:  Clear, no exudate noted. Neck:  No jugular venous distention, lymphadenopathy or thyromegaly. No evidence of carotid bruit      Lungs:  Clear to ausculation bilaterally. No rhonchi, crackles, wheezes.   Heart:  Regular rate and rhythm. No rub or murmur. .    Abdomen:  Soft, non-tender. No masses, organomegaly. Musculoskeletal: No joint effusions, tenderness swelling or warmth. Bloodstained drainage noted, no purulent material, clinically no obvious evidence of any purulent material cellulitis noted           Neuro: Speech is intact. Moving all extremities. No focal motor or sensory deficits. Extremities:  Both feet are warm to touch. The color of both feet is normal.      Patient does have a small left heel ulcer, involving the skin with minimal erythema                 Pulses Right  Left    Brachial 3 3    Radial    3=normal   Femoral 2 2  2=diminished   Popliteal    1=barely palpable   Dorsalis pedis    0=absent   Posterior tibial    4=aneurysmal           Other pertinent information:1. The past medical records were reviewed. 2.    Lab Results   Component Value Date    WBC 11.5 03/30/2021    HGB 8.3 (L) 03/30/2021    HCT 27.7 (L) 03/30/2021    MCV 93.6 03/30/2021     03/30/2021      Lab Results   Component Value Date     03/30/2021    K 3.7 03/30/2021     03/30/2021    CO2 26 03/30/2021    BUN 27 (H) 03/30/2021    CREATININE 1.3 (H) 03/30/2021    GLUCOSE 156 (H) 03/30/2021    CALCIUM 8.7 03/30/2021    PROT 6.1 (L) 03/30/2021    LABALBU 2.2 (L) 03/30/2021    BILITOT 0.4 03/30/2021    ALKPHOS 80 03/30/2021    AST 26 03/30/2021    ALT 13 03/30/2021    LABGLOM 39 03/30/2021    GFRAA 47 03/30/2021     Lab Results   Component Value Date    APTT 40.6 (H) 01/11/2020      Lab Results   Component Value Date    INR 1.2 01/09/2020    INR 1.2 11/22/2019    INR 1.0 10/21/2019    PROTIME 13.9 (H) 01/09/2020    PROTIME 13.5 (H) 11/22/2019    PROTIME 11.4 10/21/2019        3. CT CHEST WO CONTRAST   Final Result   Atelectasis/beginning infiltrates in the right lower lobe concerning for   developing pneumonia.          CT ABDOMEN PELVIS W IV CONTRAST Additional Contrast? None   Final Result   Sacral decubitus ulcer with several new, small locules of gas in the   subcutaneous fat posterior to the L5-S1 level. A developing abscess cannot   be excluded. There are erosive changes of the S3 vertebral segment, stable   in appearance compared with the prior study. Additionally, there is new   peripheral hyperenhancement with a small volume of fluid along the right   sacrospinous ligament. A developing abscess in this location cannot be   excluded. Stable dilation of the common bile duct with mild intrahepatic biliary   dilatation. This may be related to the patient's prior cholecystectomy. Consider correlation with liver function test.         XR CHEST PORTABLE   Final Result   Suspect some scarring/infiltrative changes in left lower hemithorax medially. Recommend follow-up PA and lateral views           4. The wound care notes by Dr. Jt Daniels were reviewed    5. The CT scan abdomen revealed evidence of air in the sacral wound, this is not due to gas infection but due to air collected underneath the undermined area of sacral decubitus ulcer which looks fairly clean and clear examination and probing the wound at the bedside    Assessment:    1. Sacral decubitus ulcer 1 cm 1 cm with undermining by approximately 2.5 cm, fairly clean at the present time    2.   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 Jt Daniels known to the patient to evaluate the patient tomorrow for me any additional recommendations    Thank you for letting us participate in the care of your patient Electronically signed by Africa Garcia MD on 3/30/2021 at 3:50 PM

## 2021-03-30 NOTE — PLAN OF CARE
Problem: Skin Integrity:  Goal: Will show no infection signs and symptoms  Description: Will show no infection signs and symptoms  3/30/2021 1321 by Mitchel Cedeno RN  Outcome: Met This Shift  3/30/2021 0311 by Vanessa Givens RN  Outcome: Met This Shift     Problem: Skin Integrity:  Goal: Absence of new skin breakdown  Description: Absence of new skin breakdown  3/30/2021 1321 by Mitchel Cedeno RN  Outcome: Met This Shift  3/30/2021 0311 by Vanessa Givens RN  Outcome: Met This Shift     Problem: Falls - Risk of:  Goal: Will remain free from falls  Description: Will remain free from falls  3/30/2021 1321 by Mitchel Cedeno RN  Outcome: Met This Shift  3/30/2021 0311 by Vanessa Givens RN  Outcome: Met This Shift     Problem: Falls - Risk of:  Goal: Absence of physical injury  Description: Absence of physical injury  3/30/2021 1321 by Mitchel Cedeno RN  Outcome: Met This Shift  3/30/2021 0311 by Vanessa Givens RN  Outcome: Met This Shift

## 2021-03-30 NOTE — CONSULTS
Department of Internal Medicine  Infectious Diseases   Consult Note      Reason for Consult:  Coccyx wound infection     Requesting Physician: Dr Rafa Ocasio:              Discussed with pt's daughter . This is an 80 y.o. female who developed a chronic non healing sacral pressure ulcer , COVID 19 ( 11/2020) , DM presented to the ER with worsening sacral wound . Pt was seen by our NP yesterday in our office . The reported that pt had low grade fever, and increased pain and drainage from the wound . WBC was 13 K   CT scan of abdomen and pelvis - sacral decubitus ulcer with several new, small locules of gas in the   subcutaneous fat posterior to the L5-S1 level  She was given vancomycin and zosyn   Of note, pt was recently discharged from [facility] about 2 weeks ago .       Past Medical History:      Past Medical History:   Diagnosis Date    Anxiety     Depression     Diabetes mellitus ([address]     Frequent urinary tract infections     GERD (gastroesophageal reflux disease)     Hypertension     Peripheral neuropathy     Spinal stenosis          Past Surgical History:      Past Surgical History:   Procedure Laterality Date    CHOLECYSTECTOMY      HIP FRACTURE SURGERY Bilateral     SKIN CANCER EXCISION           Current Medications:      Current Facility-Administered Medications   Medication Dose Route Frequency Provider Last Rate Last Admin    perflutren lipid microspheres (DEFINITY) injection 1.65 mg  1.5 mL Intravenous ONCE PRN Zuleima Torres MD        [START ON 3/31/2021] vancomycin (VANCOCIN) 750 mg in dextrose 5 % 250 mL IVPB  750 mg Intravenous Q24H Alberto Dia MD        sodium chloride flush 0.9 % injection 10 mL  10 mL Intravenous PRN Zuleima Torres MD        0.9 % sodium chloride infusion  25 mL Intravenous PRN Zuleima Torres MD        piperacillin-tazobactam (ZOSYN) 3,375 mg in dextrose 5 % 100 mL IVPB extended infusion (mini-bag)  3,375 mg Intravenous Q8H Alberto CROWLEY Olga Segura MD 25 mL/hr at 03/30/21 1041 3,375 mg at 03/30/21 1041    apixaban (ELIQUIS) tablet 5 mg  5 mg Oral BID Mari Childs MD   5 mg at 03/30/21 9697    aspirin chewable tablet 81 mg  81 mg Oral Daily Mari Childs MD   81 mg at 03/30/21 5722    atorvastatin (LIPITOR) tablet 40 mg  40 mg Oral Nightly Mari Childs MD   40 mg at 03/30/21 0002    busPIRone (BUSPAR) tablet 5 mg  5 mg Oral Daily Mari Childs MD   5 mg at 03/30/21 0829    carvedilol (COREG) tablet 12.5 mg  12.5 mg Oral BID  Mari Childs MD   12.5 mg at 03/30/21 2068    furosemide (LASIX) tablet 20 mg  20 mg Oral Daily Mari Childs MD   20 mg at 03/30/21 4566    gabapentin (NEURONTIN) capsule 600 mg  600 mg Oral TID Mari Childs MD   600 mg at 03/30/21 0829    ipratropium-albuterol (DUONEB) nebulizer solution 3 mL  1 vial Nebulization Q4H PRN Mari Childs MD        pantoprazole (PROTONIX) tablet 40 mg  40 mg Oral QAM AC Mari Childs MD   40 mg at 03/30/21 0557    montelukast (SINGULAIR) tablet 10 mg  10 mg Oral Nightly Mari Childs MD   10 mg at 03/30/21 0002    sertraline (ZOLOFT) tablet 150 mg  150 mg Oral Daily Mari Childs MD   150 mg at 03/30/21 0829    glucose (GLUTOSE) 40 % oral gel 15 g  15 g Oral PRN Mari Childs MD        dextrose 50 % IV solution  12.5 g Intravenous PRN Mari Childs MD        glucagon (rDNA) injection 1 mg  1 mg Intramuscular PRN Mari Childs MD        dextrose 5 % solution  100 mL/hr Intravenous PRN Mari Childs MD        insulin lispro (HUMALOG) injection vial 0-6 Units  0-6 Units Subcutaneous TID SRINI Childs MD   2 Units at 03/30/21 1058    insulin lispro (HUMALOG) injection vial 0-3 Units  0-3 Units Subcutaneous Nightly Mari Childs MD   1 Units at 03/29/21 2230    sodium chloride flush 0.9 % injection 10 mL  10 mL Intravenous 2 times per day Mari Childs MD   10 mL at 03/30/21 0834    sodium chloride flush 0.9 % injection 10 mL  10 mL Intravenous PRN Mari Childs MD        0.9 % palpable    Skin:       Sacral wound tunnels about 5.5 cm at 1 pm position, foul odor, tender, no periwound erythema      CBC with Differential:      Lab Results   Component Value Date    WBC 11.5 03/30/2021    RBC 2.96 03/30/2021    HGB 8.3 03/30/2021    HCT 27.7 03/30/2021     03/30/2021    MCV 93.6 03/30/2021    MCH 28.0 03/30/2021    MCHC 30.0 03/30/2021    RDW 14.4 03/30/2021    SEGSPCT 58 06/18/2013    LYMPHOPCT 12.0 03/30/2021    MONOPCT 5.4 03/30/2021    BASOPCT 0.3 03/30/2021    MONOSABS 0.62 03/30/2021    LYMPHSABS 1.38 03/30/2021    EOSABS 0.23 03/30/2021    BASOSABS 0.04 03/30/2021       CMP     Lab Results   Component Value Date     03/30/2021    K 3.7 03/30/2021     03/30/2021    CO2 26 03/30/2021    BUN 27 03/30/2021    CREATININE 1.3 03/30/2021    GFRAA 47 03/30/2021    LABGLOM 39 03/30/2021    GLUCOSE 156 03/30/2021    PROT 6.1 03/30/2021    LABALBU 2.2 03/30/2021    CALCIUM 8.7 03/30/2021    BILITOT 0.4 03/30/2021    ALKPHOS 80 03/30/2021    AST 26 03/30/2021    ALT 13 03/30/2021         Hepatic Function Panel:    Lab Results   Component Value Date    ALKPHOS 80 03/30/2021    ALT 13 03/30/2021    AST 26 03/30/2021    PROT 6.1 03/30/2021    BILITOT 0.4 03/30/2021    LABALBU 2.2 03/30/2021       PT/INR:    Lab Results   Component Value Date    PROTIME 13.9 01/09/2020    INR 1.2 01/09/2020       TSH:    Lab Results   Component Value Date    TSH 4.220 09/16/2020       U/A:    Lab Results   Component Value Date    NITRITE NEGATIVE 09/09/2019    COLORU Yellow 03/29/2021    PHUR 5.0 03/29/2021    WBCUA 1-3 03/29/2021    RBCUA 0-1 03/29/2021    BACTERIA MANY 03/29/2021    CLARITYU Clear 03/29/2021    SPECGRAV 1.020 03/29/2021    LEUKOCYTESUR Negative 03/29/2021    UROBILINOGEN 0.2 03/29/2021    BILIRUBINUR Negative 03/29/2021    BILIRUBINUR NEGATIVE 09/09/2019    BLOODU Negative 03/29/2021    GLUCOSEU Negative 03/29/2021    AMORPHOUS RARE 11/16/2020       ABG:  No results found for: OEV8PUC, BEART, K7XNVMVT, PHART, THGBART, XUC9CFB, PO2ART, AWE2XSV    MICROBIOLOGY:    Wound Culture -     Growth present, evaluating for:   Mixed Gram positive organisms   Gram negative rods    Narrative:                 Radiology :    CT scan of abdomen and pelvis -  Impression:        Sacral decubitus ulcer with several new, small locules of gas in the   subcutaneous fat posterior to the L5-S1 level.  A developing abscess cannot   be excluded.  There are erosive changes of the S3 vertebral segment, stable   in appearance compared with the prior study.  Additionally, there is new   peripheral hyperenhancement with a small volume of fluid along the right   sacrospinous ligament.  A developing abscess in this location cannot be   excluded. Stable dilation of the common bile duct with mild intrahepatic biliary   dilatation.  This may be related to the patient's prior cholecystectomy. Consider correlation with liver function test.        IMPRESSION:    1. Sacral wound infection , tunneling wound -chronic non healing wound   2. Leukocytosis          RECOMMENDATIONS:      1. Stop vancomycin   2. Continue zosyn 3.375 grams IV q 8 hrs   3.  Wound care consult     Thank you Dr Shayna Berumen for the consult

## 2021-03-30 NOTE — CONSULTS
GENERAL SURGERY  CONSULT NOTE  3/30/2021    Physician Consulted: Dr. Jyoti Santana  Reason for Consult: Sacral wound  Referring Physician: Dr. Elena THOMAS  Kassy Araujo is a 80 y.o. female who presents for evaluation of sacral wound. Patient is known to service, last seen 3/18 for same. Patient's daughter was at bedside and assisted with the history. Patient was at an infectious disease outpatient follow up appointment. She had lab work as part of her evaluation and was noted to have a leukocytosis and was hypotensive to 80s/40s. Per daughter ID was concerned that her lungs sounded congested and recommended they come to the hospital. During evaluation a CT chest/abd/pelv was obtained and showed a possible developing pneumonia. The CT abd/pelv was read as could not rule out abscess in sacral area 2/2 air and loculations. Patient denies any worsening pain in her sacrum. She was just seen in wound clinic by Dr. Cory Tom 3/24, recommended to continue packing wound, recommended 1/4 strength Dakins solution, no need for further debridement. Past Medical History:   Diagnosis Date    Anxiety     Depression     Diabetes mellitus (HCC)     Frequent urinary tract infections     GERD (gastroesophageal reflux disease)     Hypertension     Peripheral neuropathy     Spinal stenosis        Past Surgical History:   Procedure Laterality Date    CHOLECYSTECTOMY      HIP FRACTURE SURGERY Bilateral     SKIN CANCER EXCISION         Medications Prior to Admission:    Prior to Admission medications    Medication Sig Start Date End Date Taking?  Authorizing Provider   insulin lispro, 1 Unit Dial, (HUMALOG KWIKPEN) 100 UNIT/ML SOPN Inject 0-10 Units into the skin 3 times daily (before meals)   Yes Historical Provider, MD   diclofenac sodium (VOLTAREN) 1 % GEL Apply 2 g topically 4 times daily as needed for Pain (apply to hands)   Yes Historical Provider, MD   sertraline (ZOLOFT) 100 MG tablet Take 150 mg by mouth daily   Yes Historical Provider, MD   nystatin (MYCOSTATIN) 681364 UNIT/GM powder Apply 1 each topically 2 times daily Apply to abdominal folds    Historical Provider, MD   Omega-3 Fatty Acids (FISH OIL) 1000 MG CAPS Take 1,000 mg by mouth 2 times daily    Historical Provider, MD   apixaban (ELIQUIS) 5 MG TABS tablet Take 1 tablet by mouth 2 times daily 11/4/20 5/3/21  Juan Jose Silva DO   aspirin 81 MG chewable tablet Take 1 tablet by mouth daily 1/14/20   Poonam Roberto MD   acetaminophen (TYLENOL) 325 MG tablet Take 650 mg by mouth every 4 hours as needed for Pain or Fever     Historical Provider, MD   fluticasone (FLONASE) 50 MCG/ACT nasal spray 1 spray by Nasal route daily as needed for Rhinitis     Historical Provider, MD   mineral oil-hydrophilic petrolatum (HYDROPHOR) ointment Apply topically daily Apply to both feet    Historical Provider, MD       No Known Allergies    No family history on file. Social History     Tobacco Use    Smoking status: Never Smoker    Smokeless tobacco: Never Used   Substance Use Topics    Alcohol use: No    Drug use: No         Review of Systems   General ROS: negative  Hematological and Lymphatic ROS: negative  Respiratory ROS: negative  Cardiovascular ROS: negative  Gastrointestinal ROS: negative  Genito-Urinary ROS: negative  Musculoskeletal ROS: negative      PHYSICAL EXAM:    Vitals:    03/30/21 0826   BP: (!) 115/49   Pulse: 77   Resp: 18   Temp: 98 °F (36.7 °C)   SpO2: 97%       General Appearance:  awake, alert, oriented, in no acute distress  Skin:  Coccyx wound with healing edges, Tunnels several centimeters in multiple directions. Dominguez discharge on qtip after probing, no obvious purulence. No erythema or signs of cellulitis around wound.    Lungs:  No increased work of breathing  Heart:  Heart regular rate   Abdomen:  Soft, NT, ND  Extremities: L heel wound      LABS:    CBC  Recent Labs     03/30/21  0743   WBC 11.5   HGB 8.3*   HCT 27.7*        BMP  Recent Labs     03/30/21  0743      K 3.7      CO2 26   BUN 27*   CREATININE 1.3*   CALCIUM 8.7     Liver Function  Recent Labs     03/30/21  0743   BILITOT 0.4   AST 26   ALT 13   ALKPHOS 80   PROT 6.1*   LABALBU 2.2*     No results for input(s): LACTATE in the last 72 hours. No results for input(s): INR, PTT in the last 72 hours. Invalid input(s): PT    RADIOLOGY    Ct Chest Wo Contrast    Result Date: 3/29/2021  EXAMINATION: CT OF THE CHEST WITHOUT CONTRAST 3/29/2021 3:32 pm TECHNIQUE: CT of the chest was performed without the administration of intravenous contrast. Multiplanar reformatted images are provided for review. Dose modulation, iterative reconstruction, and/or weight based adjustment of the mA/kV was utilized to reduce the radiation dose to as low as reasonably achievable. COMPARISON: 01/09/2020. HISTORY: ORDERING SYSTEM PROVIDED HISTORY: ?aspiration PNA TECHNOLOGIST PROVIDED HISTORY: Reason for exam:->?aspiration PNA Decision Support Exception->Emergency Medical Condition ([place]) What reading provider will be dictating this exam?->CRC FINDINGS: The heart size is in the upper limits of normal.  The great vessels are normal.  Moderately enlarged mediastinal lymph nodes are present. There is minimal atelectasis/early infiltrates in the right lower lobe. The remainder of lungs are clear. Liver is of normal architecture. The gallbladder is absent. Degenerative changes are identified in the thoracic spine. A hiatal hernia is noted. Atelectasis/beginning infiltrates in the right lower lobe concerning for developing pneumonia. Ct Abdomen Pelvis W Iv Contrast Additional Contrast? None    Result Date: 3/29/2021  EXAMINATION: CT OF THE ABDOMEN AND PELVIS WITH CONTRAST 3/29/2021 3:32 pm TECHNIQUE: CT of the abdomen and pelvis was performed with the administration of intravenous contrast. Multiplanar reformatted images are provided for review.  Dose modulation, iterative reconstruction, and/or weight based adjustment of the mA/kV was utilized to reduce the radiation dose to as low as reasonably achievable. COMPARISON: March 17, 2021 HISTORY: ORDERING SYSTEM PROVIDED HISTORY: sacral wound TECHNOLOGIST PROVIDED HISTORY: Reason for exam:->sacral wound Additional Contrast?->None Decision Support Exception->Emergency Medical Condition ([place]) What reading provider will be dictating this exam?->CRC FINDINGS: Lower Chest: Imaged portions of the lower chest demonstrate mild cardiomegaly. There are coronary artery calcifications potentially a marker for coronary artery disease. Mild dependent atelectasis is present at both lung bases. A small sliding-type hiatal hernia is noted. Organs: There is stable dilation of the common bile duct which measures up to 14 mm. The gallbladder is surgically absent. Mild intrahepatic biliary dilatation is present, also stable. No evidence of an enhancing liver lesion. The portal vein is patent. The spleen and adrenal glands are normal in size. The pancreas is poorly visualized due to respiratory motion artifact. There is diffuse pancreatic atrophy. The left kidney is not well evaluated due to respiratory motion artifact in the mid upper pole. Within this limitation, no obvious acute abnormality of the kidneys. GI/Bowel: No evidence of a bowel obstruction, free air or pneumatosis. Portions the colon are decompressed, limiting assessment for mucosal based abnormalities. There is diverticulosis without evidence of diverticulitis. Please note that portions of the colon are obscured by significant respiratory motion artifact. The appendix is not well visualized due to significant motion artifact. No obvious pericecal inflammatory changes. Pelvis: The urinary bladder is distended without obvious abnormality. The uterus has a heterogeneous enhancement pattern likely contains multiple fibroids. No evidence of ovarian enlargement. No free fluid is identified in the pelvis. There is no pelvic lymphadenopathy. Peritoneum/Retroperitoneum: The abdominal aorta is heavily calcified but nonaneurysmal.  No retroperitoneal lymphadenopathy or mass. Bones/Soft Tissues: There is a sacral decubitus ulcer with several small locules of gas in the subcutaneous fat posterior to the level of the L5-S1 intervertebral disc space on axial image 112. The fluid extending along this fascial plane is stable but the gas locules are new from the prior study. A small amount of fluid with peripheral enhancement of the adjacent tissues measures 2.9 x 0.8 cm on axial image 145 and extends along the right sacrospinous ligament. This may reflect a developing abscess. Bones are diffusely osteopenic. There are stable postsurgical changes related to internal fixation of bilateral proximal femoral fractures. Erosive changes at the posterior aspect of the S3 segment is again noted, grossly stable there are advanced degenerative changes of the lower lumbar spine with multifocal severe spinal canal stenosis secondary to facet arthropathy, intervertebral disc space narrowing and large disc osteophyte complexes. Degenerative narrowing of the spinal canal is most pronounced at L4-L5 and L5-S1. Sacral decubitus ulcer with several new, small locules of gas in the subcutaneous fat posterior to the L5-S1 level. A developing abscess cannot be excluded. There are erosive changes of the S3 vertebral segment, stable in appearance compared with the prior study. Additionally, there is new peripheral hyperenhancement with a small volume of fluid along the right sacrospinous ligament. A developing abscess in this location cannot be excluded. Stable dilation of the common bile duct with mild intrahepatic biliary dilatation. This may be related to the patient's prior cholecystectomy.  Consider correlation with liver function test.     Xr Chest Portable    Result Date: 3/29/2021  EXAMINATION: ONE XRAY VIEW OF THE CHEST 3/29/2021 12:45 pm COMPARISON: 12/04/2020 HISTORY: ORDERING SYSTEM PROVIDED HISTORY: chest pain TECHNOLOGIST PROVIDED HISTORY: Reason for exam:->chest pain What reading provider will be dictating this exam?->CRC FINDINGS: Some scarring/infiltrative changes are suggested in the left infrahilar region. There are no right sided infiltrates but there is elevation the right hemidiaphragm. There are no effusions. Heart size is normal.     Suspect some scarring/infiltrative changes in left lower hemithorax medially. Recommend follow-up PA and lateral views         ASSESSMENT:  80 y.o. female with chronic sacral wound. PLAN:  - Air on CT likely due to tunneling tracks. Probed wound, did not find any abscess cavities. Is draining appropriately  - Continued Dakins as per wound care recommendations. Make sure to pack well.   - No plan for surgical intervention at this time  - Abx as per ID  - Continue care as per primary    Electronically signed by Christiane Pino MD on 3/30/21 at 11:41 AM EDT

## 2021-03-30 NOTE — PROGRESS NOTES
Physical Therapy  Physical Therapy Initial Assessment   Name: Claire Castano  : 1933  MRN: [id number]    Referring Provider:  David Prater MD    Date of Service: 3/30/2021    Evaluating PT:  James Valderrama, PT, DPT [id number]    Room #:  3347/3439-D  Diagnosis:   PMHx/PSHx:  Sepsis  Procedure/Surgery:  Peripheral neuropathy, HTN, depression, anxiety, DM, spinal stenosis, GERD, frequent UTI, B hip fx surgery (dates not listed), COVID-19  Precautions:  Falls, sacral wound, incontinence  Equipment Needs:  TBD at rehab    SUBJECTIVE:  Pt admitted from [place] [address]. Pt was previously admitted for COVID-19 in 2020 and after 1-2 months of hospitalization pt was discharged home with family. Pt's daughter present at bedside and reports that pt was ambulatory with assistance at first when she returned home but over the past 3 months pt has gradually declined at home. Pt was then admitted to hospital and sent to [facility]. Pt has been in [facility] for past 10 days. Daughter is concerned about worsening sacral wound. OBJECTIVE:   Initial Evaluation  Date: 3/30/2021 Treatment Short Term/ Long Term   Goals   AM-PAC 6 Clicks      Was pt agreeable to Eval/treatment? yes     Does pt have pain?  8/10 sacral wound     Bed Mobility  Rolling: maxA  Supine to sit: maxA  Sit to supine: maxA  Scooting: maxA  Rolling: Nat  Supine to sit: Nat  Sit to supine: Nat  Scooting: Nat   Transfers Sit to stand: maxA x 2  Stand to sit: maxA x 2  Stand pivot: maxA front [employer]  Sit to stand: Nat  Stand to sit: Nat  Stand pivot: Nat front [employer]   Ambulation    NT  25 feet with front [employer] negotiation: ascended and descended  NT  NA   ROM BUE:  Per OT note  BLE:  WNL     Strength BUE:  Per OT note  BLE:  Grossly 2+/5     Balance Sitting EOB:  ModA<>Nat  Dynamic Standing:  maxA front [employer]  Sitting EOB:  Independent  Dynamic Standing:  Nat front [employer]     Pt is A & O x 3  Sensation:  Pt denies numbness and tingling to extremities  Edema: unremarkable    Therapeutic Exercises:  hip flexion, LAQ, ankle df/pf x 10 reps each bilaterally. AAROM as needed to achieve full ROM. Patient education  Pt educated on role of PT intervention. Pt educated on safety in room with utilization of call light for assistance with mobility. Pt educated on importance of maximizing OOB time by transferring to bedside chair for meals and ambulating to bathroom/transferring to bedside commode with assistance from nursing and therapy staff to increase functional activity tolerance and overall functional independence. Pt educated on importance of independent performance of therapeutic exercises designated above for improved strength, activity tolerance, and ROM. Patient response to education:   Pt verbalized understanding Pt demonstrated skill Pt requires further education in this area   yes yes yes     ASSESSMENT:    Comments:  RN cleared pt for activity prior to session. Pt received supine in bed and agreeable to PT intervention at this time. Pt performed all functional mobility as noted above. Pt presents with generalized weakness and fatigue. Pt has had limited mobility since getting COVID-19 in November of 2020. Pt transferred to bedside chair with 2 person assistance and educated on therapeutic exercises as noted above. Later returned to assist with pt hygiene care as pt was incontinent of bowel and bladder while sitting in chair. Pt returned to supine at end of session and left with all needs met and call light in reach. Pt requires continued skilled PT intervention for the purposes of maximizing functional mobility and independence by addressing deficits described above. Treatment:  Patient practiced and was instructed in the following treatment:     Therapeutic Activities Completed:  o Functional mobility as noted above:   - Bed mobility: maxA with bed rail.   Max VC and hand over hand guidance to facilitate efficient use of BUE on bed rail to promote more independent completion of task. At EOB pt required modA to correct retropulsive and R sided lean. Pt improved to [place] with cues and assistance for proper use of BUE for support and posture correction.   - Transfer training: STS maxA x 2 from EOB and chair. STS performed x 10 reps throughout session. Pt with poor static standing tolerance only able to stand <10 seconds each rep. Stand pivot with front [employer] maxA bed<>chair. Cues for proper hand placement and sequencing. Assistance required to weight shift and advance BLE.  o Skilled repositioning in seated position initially to ensure pressure relief from wound. Pt then returned to supine with HOB elevated for comfort. TAPs under L side for pressure relief from wound. o Pt education as noted above. Pt's/ family goals   1. Return to [facility]. Patient and or family understand(s) diagnosis, prognosis, and plan of care. yes    PLAN OF CARE:    Current Treatment Recommendations     [x] Strengthening     [x] ROM   [x] Balance Training   [x] Endurance Training   [x] Transfer Training   [x] Gait Training   [] Stair Training   [x] Positioning   [x] Safety and Education Training   [x] Patient/Caregiver Education   [] HEP  [] Other     PT care will be provided in accordance with the objectives noted above. The above treatment recommendations will be utilized to address deficits described above in order to restore pt's prior level of function and/or achieve modified functional independence with adaptive strategies. Frequency of treatments: 2-5x/week x 1-2 weeks. Time in  1026  Time out  1100    Total Treatment Time  25 minutes     Evaluation Time includes thorough review of current medical information, gathering information on past medical history/social history and prior level of function, completion of standardized testing/informal observation of tasks, assessment of data and education on plan of care and goals.     CPT codes:  [] Low Complexity PT evaluation 37908  [x] Moderate Complexity PT evaluation 38753  [] High Complexity PT evaluation 89890  [] PT Re-evaluation 36385  [] Gait training 57333 0 minutes  [] Manual therapy 16197 0 minutes  [x] Therapeutic activities 28962 25 minutes  [] Therapeutic exercises 15881 0 minutes  [] Neuromuscular reeducation 91467 0 minutes     Monica Florian, PT, DPT  [id number]

## 2021-03-31 NOTE — PROGRESS NOTES
Department of Internal Medicine  Infectious Diseases  Progress  Note      C/C : :  Coccyx wound infection     Pt is awake and alert  Denies fever and chills  Reports back pain   Discussed with the pt's daughter     Current Facility-Administered Medications   Medication Dose Route Frequency Provider Last Rate Last Admin    lactated ringers infusion   Intravenous Continuous Rome Peraza MD 75 mL/hr at 03/31/21 1319 Restarted at 03/31/21 1319    perflutren lipid microspheres (DEFINITY) injection 1.65 mg  1.5 mL Intravenous ONCE PRN Rome Peraza MD        morphine (PF) injection 2 mg  2 mg Intravenous Q4H PRN Rome Peraza MD   2 mg at 03/30/21 1311    sodium hypochlorite (DAKINS) 0.125 % external solution   Irrigation Daily Rick Pelaez MD   Given at 03/31/21 0064    sodium chloride flush 0.9 % injection 10 mL  10 mL Intravenous PRN Rome Peraza MD        0.9 % sodium chloride infusion  25 mL Intravenous PRN Rome Peraza MD        piperacillin-tazobactam (ZOSYN) 3,375 mg in dextrose 5 % 100 mL IVPB extended infusion (mini-bag)  3,375 mg Intravenous George Perez MD   Stopped at 03/31/21 1302    apixaban (ELIQUIS) tablet 5 mg  5 mg Oral BID Rome Peraza MD   5 mg at 03/31/21 1013    aspirin chewable tablet 81 mg  81 mg Oral Daily Rome Peraza MD   81 mg at 03/31/21 1016    atorvastatin (LIPITOR) tablet 40 mg  40 mg Oral Nightly Rome Peraza MD   40 mg at 03/30/21 2046    [Held by provider] busPIRone (BUSPAR) tablet 5 mg  5 mg Oral Daily Rome Peraza MD   5 mg at 03/30/21 7131    carvedilol (COREG) tablet 12.5 mg  12.5 mg Oral BID WC Rome Peraza MD   12.5 mg at 03/31/21 1017    [Held by provider] furosemide (LASIX) tablet 20 mg  20 mg Oral Daily Rome Peraza MD   20 mg at 03/30/21 0829    [Held by provider] gabapentin (NEURONTIN) capsule 600 mg  600 mg Oral TID Rome Peraza MD   Stopped at 03/30/21 2100    ipratropium-albuterol (DUONEB) nebulizer solution 3 mL  1 vial Nebulization Q4H PRN Cindy Pillai MD        pantoprazole (PROTONIX) tablet 40 mg  40 mg Oral QAM AC Cindy Pillai MD   40 mg at 03/31/21 0526    montelukast (SINGULAIR) tablet 10 mg  10 mg Oral Nightly Cindy Pillai MD   10 mg at 03/30/21 2046    sertraline (ZOLOFT) tablet 150 mg  150 mg Oral Daily Cindy Pillai MD   150 mg at 03/31/21 1018    glucose (GLUTOSE) 40 % oral gel 15 g  15 g Oral PRN Cindy Pillai MD        dextrose 50 % IV solution  12.5 g Intravenous PRN Cindy Pillai MD        glucagon (rDNA) injection 1 mg  1 mg Intramuscular PRN Cindy Pillai MD        dextrose 5 % solution  100 mL/hr Intravenous PRN Cindy Pillai MD        insulin lispro (HUMALOG) injection vial 0-6 Units  0-6 Units Subcutaneous TID WC Cinyd Pillai MD   1 Units at 03/31/21 1205    insulin lispro (HUMALOG) injection vial 0-3 Units  0-3 Units Subcutaneous Nightly Cindy Pillai MD   1 Units at 03/30/21 2041    sodium chloride flush 0.9 % injection 10 mL  10 mL Intravenous 2 times per day Cindy Pillai MD   10 mL at 03/31/21 0855    sodium chloride flush 0.9 % injection 10 mL  10 mL Intravenous PRN Cindy Pillai MD        0.9 % sodium chloride infusion  25 mL Intravenous PRN Cindy Pillai  mL/hr at 03/30/21 2128 25 mL at 03/30/21 2128    promethazine (PHENERGAN) tablet 12.5 mg  12.5 mg Oral Q6H PRN Cindy Pillai MD        Or    ondansetron [facility]) injection 4 mg  4 mg Intravenous Q6H PRN Cindy Pillai MD        polyethylene glycol (GLYCOLAX) packet 17 g  17 g Oral Daily PRN Cindy Pillai MD        acetaminophen (TYLENOL) tablet 650 mg  650 mg Oral Q6H PRN Cindy Pillai MD   650 mg at 03/31/21 1214    Or    acetaminophen (TYLENOL) suppository 650 mg  650 mg Rectal Q6H PRN Cindy Pillai MD             REVIEW OF SYSTEMS:    CONSTITUTIONAL:  Fever   HEENT: denies blurring of vision or double vision, denies hearing problem  RESPIRATORY: denies cough, shortness of breath, sputum expectoration.   CARDIOVASCULAR:  Denies palpitation GASTROINTESTINAL:  Denies abdomen pain, diarrhea or constipation. GENITOURINARY:  Denies burning urination or frequency of urination  INTEGUMENT: Non healing sacral  wound   HEMATOLOGIC/LYMPHATIC:  Denies lymph node swelling, gum bleeding or easy bruising. MUSCULOSKELETAL:  Denies leg pain , joint pain , joint swelling  NEUROLOGICAL:  Weakness   PHYSICAL EXAM:      Vitals:     Vitals:    03/31/21 0854   BP: (!) 103/52   Pulse: 80   Resp: 20   Temp: 96.1 °F (35.6 °C)   SpO2: 91%       General Appearance:    Awake, alert , no acute distress. Head:    Normocephalic, atraumatic   Eyes:    No pallor, no icterus,   Ears:    No obvious deformity or drainage.    Nose:   No nasal drainage   Throat:   Mucosa moist, no oral thrush   Neck:   Supple, no lymphadenopathy   Back:     no CVA tenderness   Lungs:     Clear to auscultation bilaterally, no wheeze    Heart:    Regular rate and rhythm, no murmur, rub or gallop   Abdomen:     Soft, non-tender, bowel sounds present    Extremities:   + edema    Pulses:   Dorsalis pedis palpable    Skin:       Sacral wound tunnels about 5.5 cm at 1 pm position, foul odor, tender, no periwound erythema      CBC with Differential:      Lab Results   Component Value Date    WBC 9.1 03/31/2021    RBC 2.75 03/31/2021    HGB 7.6 03/31/2021    HCT 25.4 03/31/2021     03/31/2021    MCV 92.4 03/31/2021    MCH 27.6 03/31/2021    MCHC 29.9 03/31/2021    RDW 14.5 03/31/2021    SEGSPCT 58 06/18/2013    LYMPHOPCT 16.0 03/31/2021    MONOPCT 5.6 03/31/2021    BASOPCT 0.2 03/31/2021    MONOSABS 0.51 03/31/2021    LYMPHSABS 1.45 03/31/2021    EOSABS 0.18 03/31/2021    BASOSABS 0.02 03/31/2021       CMP     Lab Results   Component Value Date     03/31/2021    K 3.5 03/31/2021    K 3.7 03/30/2021    CL 98 03/31/2021    CO2 24 03/31/2021    BUN 32 03/31/2021    CREATININE 1.7 03/31/2021    GFRAA 34 03/31/2021    LABGLOM 28 03/31/2021    GLUCOSE 117 03/31/2021    PROT 5.9 03/31/2021    LABALBU 2.0 03/31/2021    CALCIUM 8.5 03/31/2021    BILITOT 0.3 03/31/2021    ALKPHOS 82 03/31/2021    AST 27 03/31/2021    ALT 16 03/31/2021         Hepatic Function Panel:    Lab Results   Component Value Date    ALKPHOS 82 03/31/2021    ALT 16 03/31/2021    AST 27 03/31/2021    PROT 5.9 03/31/2021    BILITOT 0.3 03/31/2021    LABALBU 2.0 03/31/2021       PT/INR:    Lab Results   Component Value Date    PROTIME 13.9 01/09/2020    INR 1.2 01/09/2020       TSH:    Lab Results   Component Value Date    TSH 4.220 09/16/2020       U/A:    Lab Results   Component Value Date    NITRITE NEGATIVE 09/09/2019    COLORU Yellow 03/29/2021    PHUR 5.0 03/29/2021    WBCUA 1-3 03/29/2021    RBCUA 0-1 03/29/2021    BACTERIA MANY 03/29/2021    CLARITYU Clear 03/29/2021    SPECGRAV 1.020 03/29/2021    LEUKOCYTESUR Negative 03/29/2021    UROBILINOGEN 0.2 03/29/2021    BILIRUBINUR Negative 03/29/2021    BILIRUBINUR NEGATIVE 09/09/2019    BLOODU Negative 03/29/2021    GLUCOSEU Negative 03/29/2021    AMORPHOUS RARE 11/16/2020       ABG:  No results found for: CJK6EDF, BEART, Q5BNHEOK, PHART, THGBART, PZI7MGR, PO2ART, YQP9MZD    MICROBIOLOGY:    Wound Culture -     Growth present, evaluating for:   Mixed Gram positive organisms   Gram negative rods    Narrative:                 Radiology :    CT scan of abdomen and pelvis -  Impression:        Sacral decubitus ulcer with several new, small locules of gas in the   subcutaneous fat posterior to the L5-S1 level.  A developing abscess cannot   be excluded.  There are erosive changes of the S3 vertebral segment, stable   in appearance compared with the prior study.  Additionally, there is new   peripheral hyperenhancement with a small volume of fluid along the right   sacrospinous ligament.  A developing abscess in this location cannot be   excluded. Stable dilation of the common bile duct with mild intrahepatic biliary   dilatation.  This may be related to the patient's prior cholecystectomy. Consider correlation with liver function test.        IMPRESSION:    1. Sacral wound infection , tunneling wound -chronic non healing wound   2. Leukocytosis  - improved         RECOMMENDATIONS:      1.  Zosyn 3.375 grams IV q 8 hrs   2. Off loading pressure , check pre albumin level   3.  Wound care

## 2021-03-31 NOTE — FLOWSHEET NOTE
Pressure Stage  4   Dressing/Treatment ABD  (dakins solution)   Wound Length (cm) 1.2 cm   Wound Width (cm) 1.4 cm   Wound Depth (cm) 1.9 cm   Wound Surface Area (cm^2) 1.68 cm^2   Change in Wound Size % (l*w) -69.7   Wound Volume (cm^3) 3.19 cm^3   Wound Healing % -70   Undermining Starts ___ O'Clock 1   Undermining Ends___ O'Clock 3   Undermining Maxium Distance (cm) 3.4   Wound Assessment Pink/red   Drainage Amount Small   Drainage Description Purulent;Serosanguinous   Odor None   Xena-wound Assessment Intact; Maceration       **Informed Consent**     photos taken of wounds and inserted into their chart as part of their permanent medical record for purposes of documentation, treatment management and/or medical review. All Images taken on 3/31/21 of patient name: Tiff Ho were transmitted and stored on secured [employer] located within [email or web address] by a registered [employer] Mobile Application Device.      Impression:  Left heel: unstageable  Coccyx: stage 4    Plan: opticell, abd pad, kerlix: left heel  Dakins solution, ABD pad: coccyx  Heel protectors  Comfort glide  Low air loss module  Active with outpatient wound center  Patient will need continued preventative care      Cassia Felipe 3/31/2021 10:35 AM

## 2021-03-31 NOTE — PROGRESS NOTES
Vancomycin has been discontinued   [address] to [place] [address] Physician to re-consult pharmacy if future dosing is needed    Thank you for the consult,  Loc Soto, PharmD 3/31/2021 9:30 AM 98.4

## 2021-03-31 NOTE — CARE COORDINATION
Spoke with pt's daughter unsure of discharge plan at this time, considering home vs SNF. Pt was not accepted to [facility], SNF list for [facility] provided. Daughters are also considering home with [facility] and family assist to provide 24 hour care. Pt has hospital bed, raised toilet seat, wheelchair, and nebulizer; would need trent lift. Discussed transportation home with either ambulance vs ambulette pending CM/SW judgement cost to be covered by family. Pt is from [facility] in [facility] and can return if no decision is reached at time of discharge.

## 2021-03-31 NOTE — PROGRESS NOTES
BASOSABS 0.02 03/31/2021     CMP:    Lab Results   Component Value Date     03/31/2021    K 3.5 03/31/2021    K 3.7 03/30/2021    CL 98 03/31/2021    CO2 24 03/31/2021    BUN 32 03/31/2021    CREATININE 1.7 03/31/2021    GFRAA 34 03/31/2021    LABGLOM 28 03/31/2021    GLUCOSE 117 03/31/2021    PROT 5.9 03/31/2021    LABALBU 2.0 03/31/2021    CALCIUM 8.5 03/31/2021    BILITOT 0.3 03/31/2021    ALKPHOS 82 03/31/2021    AST 27 03/31/2021    ALT 16 03/31/2021          Assessment/Plan:  Principal Problem:    Sepsis ([facility] [facility]  Active Problems:    CKD (chronic kidney disease) stage 3, GFR 30-59 ml/min ([facility])    Essential hypertension    History of pulmonary embolus (PE)    Acute kidney injury superimposed on CKD ([facility] [address]    Uncontrolled type 2 diabetes mellitus with hyperglycemia (HCC)    Sacral wound    Pressure injury of sacral region, stage 3 (HCC)    Pressure ulcer of right heel, stage 2 (HCC)    Elevated troponin    Acute metabolic encephalopathy    Moderate protein-calorie malnutrition (HCC)    PVD (peripheral vascular disease) ([facility] [address]  Resolved Problems:    * No resolved hospital problems. *       Continue broad-spectrum antibiotics for coccyx wound infection. No obvious drainable abscess on the CT scan. Discussed with ID. Encourage p.o. intake    Encephalopathy seems much better today. She is awake and conversant. Blood pressure well controlled    Glucose well controlled    New PHILIP superimposed on CKD, probably due to poor intake. Start IV fluids    Continue Eliquis for history of VTE    Time spent: 35 minutes, at least 50% was on face-to-face counseling and coordination of care.    Requires continued inpatient level of care   Zeke Silva    3:11 PM  3/31/2021

## 2021-03-31 NOTE — PROGRESS NOTES
GENERAL SURGERY  DAILY PROGRESS NOTE  3/31/2021    Subjective:  Patient not complaining of any pain this morning. Objective:  BP (!) 107/51   Pulse 95   Temp 97.5 °F (36.4 °C) (Temporal)   Resp 25   Ht 5' (1.524 m)   Wt 174 lb 3 oz (79 kg)   LMP  (LMP Unknown)   SpO2 90%   BMI 34.02 kg/m²     GENERAL:  Laying in bed, awake, alert, cooperative, no apparent distress  HEAD: Normocephalic, atraumatic  EYES: No sclera icterus, pupils equal  LUNGS:  No increased work of breathing  CARDIOVASCULAR:  RR  ABDOMEN:  Soft, non-tender, non-distended  EXTREMITIES: No edema or swelling  SKIN: Warm and dry.  Sacral wound with no surrounding erythema, no current drainage    Assessment/Plan:  80 y.o. female with chronic sacral wound    - continue local wound care  - no need for surgical intervention at this time    Electronically signed by Sherrilee Holstein, MD on 3/31/2021 at 7:54 AM

## 2021-04-01 PROBLEM — R78.81 POSITIVE BLOOD CULTURE: Status: ACTIVE | Noted: 2021-01-01

## 2021-04-01 NOTE — PROGRESS NOTES
Chief Complaint:  Chief Complaint   Patient presents with    Hypotension     With a low hgb of 7.6 B/P is currently 102/60. Pt sent in by her PCP.  Wound Infection     Pt is being treated for a coccyx wound. Sepsis ([address]     Subjective:    Patient feels a little better today. Denies shortness of breath or cough. No fevers. Objective:    /60   Pulse 84   Temp 97.4 °F (36.3 °C) (Temporal)   Resp 20   Ht 5' (1.524 m)   Wt 174 lb 3 oz (79 kg)   LMP  (LMP Unknown)   SpO2 93%   BMI 34.02 kg/m²     Current medications that patient is taking have been reviewed.     General appearance: NAD, conversant, frail appearing  HEENT: AT/NC, MMM  Neck: FROM, supple  Lungs: Clear to auscultation, WOB normal  CV: RRR, no MRGs  Abdomen: Soft, non-tender; no masses or HSM, +BS  Extremities: No peripheral edema or digital cyanosis  Skin: I did not flip her to reexamine the ulcer today  Psych: Calm and cooperative  Neuro: Alert and interactive, face symmetric, moving all extremities, speech fluent    Labs:  CBC with Differential:    Lab Results   Component Value Date    WBC 10.4 04/01/2021    RBC 2.87 04/01/2021    HGB 8.1 04/01/2021    HCT 26.8 04/01/2021     04/01/2021    MCV 93.4 04/01/2021    MCH 28.2 04/01/2021    MCHC 30.2 04/01/2021    RDW 14.3 04/01/2021    SEGSPCT 58 06/18/2013    LYMPHOPCT 12.3 04/01/2021    MONOPCT 5.3 04/01/2021    BASOPCT 0.3 04/01/2021    MONOSABS 0.55 04/01/2021    LYMPHSABS 1.28 04/01/2021    EOSABS 0.18 04/01/2021    BASOSABS 0.03 04/01/2021     CMP:    Lab Results   Component Value Date     04/01/2021    K 3.6 04/01/2021    K 3.7 03/30/2021     04/01/2021    CO2 26 04/01/2021    BUN 34 04/01/2021    CREATININE 2.3 04/01/2021    GFRAA 24 04/01/2021    LABGLOM 20 04/01/2021    GLUCOSE 137 04/01/2021    PROT 5.9 03/31/2021    LABALBU 2.0 03/31/2021    CALCIUM 8.7 04/01/2021    BILITOT 0.3 03/31/2021    ALKPHOS 82 03/31/2021    AST 27 03/31/2021    ALT 16 03/31/2021          Assessment/Plan:  Principal Problem:    Sepsis ([facility] [address]  Active Problems:    CKD (chronic kidney disease) stage 3, GFR 30-59 ml/min ([facility])    Essential hypertension    History of pulmonary embolus (PE)    Acute kidney injury superimposed on CKD ([facility] [address]    Uncontrolled type 2 diabetes mellitus with hyperglycemia ([facility])    Sacral wound    Pressure injury of sacral region, stage 3 (HCC)    Pressure ulcer of right heel, stage 2 (HCC)    Elevated troponin    Acute metabolic encephalopathy    Moderate protein-calorie malnutrition ([facility])    PVD (peripheral vascular disease) ([facility])    Positive blood culture  Resolved Problems:    * No resolved hospital problems. *       Continue broad-spectrum antibiotics for coccyx wound infection. No obvious drainable abscess on the CT scan. Positive blood culture x1 for non-aureus Staphylococcus, methicillin resistant. Possible contaminant. Recheck culture today    Encourage p.o. intake    PHILIP is worsening. CT abdomen pelvis on admission does not show any kidney abnormalities. Increase IV fluids for presumed prerenal PHILIP. Continue to hold diuretic    Encephalopathy seems much better today. She is awake and conversant.   Stay off the gabapentin    Blood pressure well controlled    Glucose well controlled    Continue Eliquis for history of VTE    Requires continued inpatient level of care   David Prater    2:01 PM  4/1/2021

## 2021-04-01 NOTE — CARE COORDINATION
Received call last evening from pt's daughter Gatha Aase; called back this morning and left ; saw pt at bedside, reports she feels better; discharge plan remains undetermined; currently awaiting call back. Called pt's other daughter Bette Bryant however, she was unable to talk, awaiting call back.

## 2021-04-01 NOTE — PROGRESS NOTES
Patient requires Jany Viet lift at home due to obesity and terrible deconditioning and requires 2 or more people to get her up

## 2021-04-01 NOTE — PLAN OF CARE
Problem: Skin Integrity:  Goal: Will show no infection signs and symptoms  Description: Will show no infection signs and symptoms  Outcome: Met This Shift  Goal: Absence of new skin breakdown  Description: Absence of new skin breakdown  Outcome: Met This Shift     Problem: Falls - Risk of:  Goal: Will remain free from falls  Description: Will remain free from falls  Outcome: Met This Shift  Goal: Absence of physical injury  Description: Absence of physical injury  Outcome: Met This Shift     Problem: Pain:  Goal: Control of acute pain  Description: Control of acute pain  Outcome: Met This Shift  Goal: Control of chronic pain  Description: Control of chronic pain  Outcome: Met This Shift

## 2021-04-01 NOTE — PROGRESS NOTES
mL  1 vial Nebulization Q4H PRN Mellisa Canela MD        pantoprazole (PROTONIX) tablet 40 mg  40 mg Oral QAM AC Mellisa Canela MD   40 mg at 04/01/21 0539    montelukast (SINGULAIR) tablet 10 mg  10 mg Oral Nightly Mellisa Canela MD   10 mg at 03/31/21 2015    sertraline (ZOLOFT) tablet 150 mg  150 mg Oral Daily Mellisa Canela MD   150 mg at 04/01/21 0833    glucose (GLUTOSE) 40 % oral gel 15 g  15 g Oral PRN Mellisa Canela MD        dextrose 50 % IV solution  12.5 g Intravenous PRN Mellisa Canela MD        glucagon (rDNA) injection 1 mg  1 mg Intramuscular PRN Mellisa Canela MD        dextrose 5 % solution  100 mL/hr Intravenous PRN Mellisa Canela MD        insulin lispro (HUMALOG) injection vial 0-6 Units  0-6 Units Subcutaneous TID WC Mellisa Canela MD   1 Units at 04/01/21 1136    insulin lispro (HUMALOG) injection vial 0-3 Units  0-3 Units Subcutaneous Nightly Mellisa Canela MD   1 Units at 03/31/21 2016    sodium chloride flush 0.9 % injection 10 mL  10 mL Intravenous 2 times per day Mellisa Canela MD   10 mL at 04/01/21 0835    sodium chloride flush 0.9 % injection 10 mL  10 mL Intravenous PRN Mellisa Canela MD        0.9 % sodium chloride infusion  25 mL Intravenous PRN Mellisa Canela  mL/hr at 03/30/21 2128 25 mL at 03/30/21 2128    promethazine (PHENERGAN) tablet 12.5 mg  12.5 mg Oral Q6H PRN Mellisa Canela MD        Or    ondansetron [facility]) injection 4 mg  4 mg Intravenous Q6H PRN Mellisa Canela MD        polyethylene glycol (GLYCOLAX) packet 17 g  17 g Oral Daily PRN Mellisa Canela MD        acetaminophen (TYLENOL) tablet 650 mg  650 mg Oral Q6H PRN Mellisa Canela MD   650 mg at 04/01/21 1135    Or    acetaminophen (TYLENOL) suppository 650 mg  650 mg Rectal Q6H PRN Mellisa Canela MD             REVIEW OF SYSTEMS:    CONSTITUTIONAL:  Fever   HEENT: denies blurring of vision or double vision, denies hearing problem  RESPIRATORY: denies cough, shortness of breath, sputum expectoration. CARDIOVASCULAR:  Denies palpitation  GASTROINTESTINAL:  Denies abdomen pain, diarrhea or constipation. GENITOURINARY:  Denies burning urination or frequency of urination  INTEGUMENT: Non healing sacral  wound   HEMATOLOGIC/LYMPHATIC:  Denies lymph node swelling, gum bleeding or easy bruising. MUSCULOSKELETAL:  Denies leg pain , joint pain , joint swelling  NEUROLOGICAL:  Weakness   PHYSICAL EXAM:      Vitals:     Vitals:    04/01/21 0830   BP: 136/60   Pulse: 84   Resp: 20   Temp: 97.4 °F (36.3 °C)   SpO2: 93%       General Appearance:    Awake, alert , no acute distress. Head:    Normocephalic, atraumatic   Eyes:    No pallor, no icterus,   Ears:    No obvious deformity or drainage.    Nose:   No nasal drainage   Throat:   Mucosa moist, no oral thrush   Neck:   Supple, no lymphadenopathy   Back:     no CVA tenderness   Lungs:     Clear to auscultation bilaterally, no wheeze    Heart:    Regular rate and rhythm, no murmur, rub or gallop   Abdomen:     Soft, non-tender, bowel sounds present    Extremities:   + edema    Pulses:   Dorsalis pedis palpable    Skin:       Sacral wound tunnels about 5.5 cm at 1 pm position, foul odor, tender, no periwound erythema      CBC with Differential:      Lab Results   Component Value Date    WBC 10.4 04/01/2021    RBC 2.87 04/01/2021    HGB 8.1 04/01/2021    HCT 26.8 04/01/2021     04/01/2021    MCV 93.4 04/01/2021    MCH 28.2 04/01/2021    MCHC 30.2 04/01/2021    RDW 14.3 04/01/2021    SEGSPCT 58 06/18/2013    LYMPHOPCT 12.3 04/01/2021    MONOPCT 5.3 04/01/2021    BASOPCT 0.3 04/01/2021    MONOSABS 0.55 04/01/2021    LYMPHSABS 1.28 04/01/2021    EOSABS 0.18 04/01/2021    BASOSABS 0.03 04/01/2021       CMP     Lab Results   Component Value Date     04/01/2021    K 3.6 04/01/2021    K 3.7 03/30/2021     04/01/2021    CO2 26 04/01/2021    BUN 34 04/01/2021    CREATININE 2.3 04/01/2021    GFRAA 24 04/01/2021    LABGLOM 20 04/01/2021    GLUCOSE 137 04/01/2021 PROT 5.9 03/31/2021    LABALBU 2.0 03/31/2021    CALCIUM 8.7 04/01/2021    BILITOT 0.3 03/31/2021    ALKPHOS 82 03/31/2021    AST 27 03/31/2021    ALT 16 03/31/2021         Hepatic Function Panel:    Lab Results   Component Value Date    ALKPHOS 82 03/31/2021    ALT 16 03/31/2021    AST 27 03/31/2021    PROT 5.9 03/31/2021    BILITOT 0.3 03/31/2021    LABALBU 2.0 03/31/2021       PT/INR:    Lab Results   Component Value Date    PROTIME 13.9 01/09/2020    INR 1.2 01/09/2020       TSH:    Lab Results   Component Value Date    TSH 4.220 09/16/2020       U/A:    Lab Results   Component Value Date    NITRITE NEGATIVE 09/09/2019    COLORU Yellow 03/29/2021    PHUR 5.0 03/29/2021    WBCUA 1-3 03/29/2021    RBCUA 0-1 03/29/2021    BACTERIA MANY 03/29/2021    CLARITYU Clear 03/29/2021    SPECGRAV 1.020 03/29/2021    LEUKOCYTESUR Negative 03/29/2021    UROBILINOGEN 0.2 03/29/2021    BILIRUBINUR Negative 03/29/2021    BILIRUBINUR NEGATIVE 09/09/2019    BLOODU Negative 03/29/2021    GLUCOSEU Negative 03/29/2021    AMORPHOUS RARE 11/16/2020       ABG:  No results found for: Estanislado Mailman, [id number], PHART, THGBART, POY8AGI, PO2ART, NVC4HJZ    MICROBIOLOGY:    Culture, Wound [[id number]] (Abnormal)     Collected: 03/30/21 0920    Updated: 04/01/21 0927    Specimen Type: Coccyx     WOUND/ABSCESS --Abnormal     Additional growth present, also evaluating for;   Mixed Gram positive organisms   Abnormal     Organism Klebsiella oxytocaAbnormal     WOUND/ABSCESS --    Light growth   Identification and sensitivity to follow    Narrative:     Source: COCCY       Site: Coccyx&Coccyx                   Blood cx - CONS       Radiology :    CT scan of abdomen and pelvis -  Impression:        Sacral decubitus ulcer with several new, small locules of gas in the   subcutaneous fat posterior to the L5-S1 level.  A developing abscess cannot   be excluded.  There are erosive changes of the S3 vertebral segment, stable   in appearance compared with the prior study.  Additionally, there is new   peripheral hyperenhancement with a small volume of fluid along the right   sacrospinous ligament.  A developing abscess in this location cannot be   excluded. Stable dilation of the common bile duct with mild intrahepatic biliary   dilatation.  This may be related to the patient's prior cholecystectomy. Consider correlation with liver function test.        IMPRESSION:    1. Sacral wound infection , tunneling wound -chronic non healing wound   2. Leukocytosis  - improved         RECOMMENDATIONS:      1. Stop zosyn  ( PHILIP ) , no abx for +ve blood cx   Invanz 500 mg IV q 24 hrs   2. Off loading pressure   3.  Wound care

## 2021-04-01 NOTE — CARE COORDINATION
Received call back from Shahid Wright, discharge plan is home with [facility] and DME; referral made to [facility] (they can not come out till Sunday, if pt discharges today will need the order to state okay for start of care to begin Sunday); need an order for trent lift also at time of discharge and [facility] [place] orders for PT/OT/CPA/med compliance. Please note physician note to match DME order (weakness, sepsis, high fall risk, sacral wound, and incontinence. Pt requires Max assist x2 to transfer and would remain confined to a bed without lift equipment. ). Shahid Veraskenyon, understands that ambulance home is out of pocket and would need to be paid upfront, cost, and phone numbers provided. The Plan for Transition of Care is related to the following treatment goals: medical stability     The Patient and/or patient representative daughter Shahid Wright was provided with a choice of provider and agrees   with the discharge plan. [x] Yes [] No    Freedom of choice list was provided with basic dialogue that supports the patient's individualized plan of care/goals, treatment preferences and shares the quality data associated with the providers.  [x] Yes [] No

## 2021-04-02 NOTE — DISCHARGE SUMMARY
Physician Discharge Summary     Patient ID:  Elizabeth Diaz  [id number]  80 y.o.  6/29/1933    Admit date: 3/29/2021    Discharge date and time: 4/2/2021    Admission Diagnoses:   Patient Active Problem List   Diagnosis    GERD (gastroesophageal reflux disease)    Type 2 diabetes mellitus with hyperglycemia, with long-term current use of insulin (HCC)    CKD (chronic kidney disease) stage 3, GFR 30-59 ml/min (HCC)    Essential hypertension    History of pulmonary embolus (PE)    Acute kidney injury superimposed on CKD (HCC)    Obesity (BMI 30-39. 9)    COVID-19    Anxiety and depression    Pneumonia due to COVID-19 virus    Uncontrolled type 2 diabetes mellitus with hyperglycemia (HCC)    Sacral wound    Pressure injury of sacral region, stage 3 (HCC)    Pressure ulcer of right heel, stage 2 (HCC)    Cellulitis    Controlled type 2 diabetes mellitus without complication (HCC)    Sepsis (HCC)    Elevated troponin    Acute metabolic encephalopathy    Moderate protein-calorie malnutrition (HCC)    [facility]    Positive blood culture       Discharge Diagnoses: Sepsis    Consults: ID    Procedures: None    Hospital Course: The patient is a 80 y.o. female of Jordan Ko DO with significant past medical history of HTN, DM, GERD, and PVD who presents with low blood pressures and increased drainage from her sacral decubitus ulcer. Patient had fever prior to coming to emergency room. Patient has a coccyx wound and cultures were taken. As were positive and infectious disease was consulted. Continue broad-spectrum antibiotics for coccyx wound infection. No obvious drainable abscess on the CT scan. -Discharging on oral antibiotics liquid 500 every 48 hours and amoxicillin 500 every 12 hours x1 week     Positive blood culture x1 for non-aureus Staphylococcus, methicillin resistant. Possible contaminant.   Recheck culture today     Encourage p.o. intake     PHILIP is worsening. CT abdomen pelvis on admission does not show any kidney abnormalities. Increase IV fluids for presumed prerenal PHILIP. Continue to hold diuretic -acute kidney injury resolved     Encephalopathy seems much better today. She is awake and conversant. Stay off the gabapentin -do not resume gabapentin on discharge     Blood pressure well controlled     Glucose well controlled     Continue Eliquis for history of VTE           Recent Labs     03/31/21  0558 04/01/21  0620 04/02/21  0723   WBC 9.1 10.4 10.2   HGB 7.6* 8.1* 8.0*   HCT 25.4* 26.8* 26.2*    326 319       Recent Labs     03/31/21  0558 04/01/21  0620 04/02/21  0723    138 139   K 3.5 3.6 3.4*   CL 98 100 103   CO2 24 26 22   BUN 32* 34* 30*   CREATININE 1.7* 2.3* 2.0*   CALCIUM 8.5* 8.7 8.2*       Ct Chest Wo Contrast    Result Date: 3/29/2021  EXAMINATION: CT OF THE CHEST WITHOUT CONTRAST 3/29/2021 3:32 pm TECHNIQUE: CT of the chest was performed without the administration of intravenous contrast. Multiplanar reformatted images are provided for review. Dose modulation, iterative reconstruction, and/or weight based adjustment of the mA/kV was utilized to reduce the radiation dose to as low as reasonably achievable. COMPARISON: 01/09/2020. HISTORY: ORDERING SYSTEM PROVIDED HISTORY: ?aspiration PNA TECHNOLOGIST PROVIDED HISTORY: Reason for exam:->?aspiration PNA Decision Support Exception->Emergency Medical Condition (MA) What reading provider will be dictating this exam?->CRC FINDINGS: The heart size is in the upper limits of normal.  The great vessels are normal.  Moderately enlarged mediastinal lymph nodes are present. There is minimal atelectasis/early infiltrates in the right lower lobe. The remainder of lungs are clear. Liver is of normal architecture. The gallbladder is absent. Degenerative changes are identified in the thoracic spine. A hiatal hernia is noted.      Atelectasis/beginning infiltrates in the right lower lobe concerning for developing pneumonia. Ct Abdomen Pelvis W Iv Contrast Additional Contrast? None    Result Date: 3/29/2021  EXAMINATION: CT OF THE ABDOMEN AND PELVIS WITH CONTRAST 3/29/2021 3:32 pm TECHNIQUE: CT of the abdomen and pelvis was performed with the administration of intravenous contrast. Multiplanar reformatted images are provided for review. Dose modulation, iterative reconstruction, and/or weight based adjustment of the mA/kV was utilized to reduce the radiation dose to as low as reasonably achievable. COMPARISON: March 17, 2021 HISTORY: ORDERING SYSTEM PROVIDED HISTORY: sacral wound TECHNOLOGIST PROVIDED HISTORY: Reason for exam:->sacral wound Additional Contrast?->None Decision Support Exception->Emergency Medical Condition (MA) What reading provider will be dictating this exam?->CRC FINDINGS: Lower Chest: Imaged portions of the lower chest demonstrate mild cardiomegaly. There are coronary artery calcifications potentially a marker for coronary artery disease. Mild dependent atelectasis is present at both lung bases. A small sliding-type hiatal hernia is noted. Organs: There is stable dilation of the common bile duct which measures up to 14 mm. The gallbladder is surgically absent. Mild intrahepatic biliary dilatation is present, also stable. No evidence of an enhancing liver lesion. The portal vein is patent. The spleen and adrenal glands are normal in size. The pancreas is poorly visualized due to respiratory motion artifact. There is diffuse pancreatic atrophy. The left kidney is not well evaluated due to respiratory motion artifact in the mid upper pole. Within this limitation, no obvious acute abnormality of the kidneys. GI/Bowel: No evidence of a bowel obstruction, free air or pneumatosis. Portions the colon are decompressed, limiting assessment for mucosal based abnormalities. There is diverticulosis without evidence of diverticulitis.  Please note that portions of the colon are small volume of fluid along the right sacrospinous ligament. A developing abscess in this location cannot be excluded. Stable dilation of the common bile duct with mild intrahepatic biliary dilatation. This may be related to the patient's prior cholecystectomy. Consider correlation with liver function test.     Xr Chest Portable    Result Date: 3/29/2021  EXAMINATION: ONE XRAY VIEW OF THE CHEST 3/29/2021 12:45 pm COMPARISON: 12/04/2020 HISTORY: ORDERING SYSTEM PROVIDED HISTORY: chest pain TECHNOLOGIST PROVIDED HISTORY: Reason for exam:->chest pain What reading provider will be dictating this exam?->CRC FINDINGS: Some scarring/infiltrative changes are suggested in the left infrahilar region. There are no right sided infiltrates but there is elevation the right hemidiaphragm. There are no effusions. Heart size is normal.     Suspect some scarring/infiltrative changes in left lower hemithorax medially. Recommend follow-up PA and lateral views       Discharge Exam:    HEENT: NCAT,  PERRLA, No JVD  Heart:  RRR, no murmurs, gallops, or rubs.   Lungs:  CTA bilaterally, no wheeze, rales or rhonchi  Abd: bowel sounds present, nontender, nondistended, no masses  Extrem:  No clubbing, cyanosis, or edema    Disposition: home     Patient Condition at Discharge: Stable    Patient Instructions:      Medication List      START taking these medications    amoxicillin 500 MG capsule  Commonly known as: AMOXIL  Take 1 capsule by mouth every 12 hours for 7 days     levoFLOXacin 500 MG tablet  Commonly known as: LEVAQUIN  Take 1 tablet by mouth every other day for 7 days        CONTINUE taking these medications    acetaminophen 325 MG tablet  Commonly known as: TYLENOL     apixaban 5 MG Tabs tablet  Commonly known as: Eliquis  Take 1 tablet by mouth 2 times daily     aspirin 81 MG chewable tablet  Take 1 tablet by mouth daily     atorvastatin 40 MG tablet  Commonly known as: LIPITOR  Take 1 tablet by mouth nightly     bisacodyl 10 MG suppository  Commonly known as: DULCOLAX     Caltrate 600+D3 600-800 MG-UNIT Tabs  Generic drug: Calcium Carb-Cholecalciferol     carvedilol 12.5 MG tablet  Commonly known as: COREG  Take 1 tablet by mouth 2 times daily (with meals)     * Voltaren 1 % Gel  Generic drug: diclofenac sodium     * diclofenac sodium 1 % Gel  Commonly known as: VOLTAREN  apply 2 grams to affected area four times a day     fish oil 1000 MG Caps     fluticasone 50 MCG/ACT nasal spray  Commonly known as: FLONASE     HumaLOG KwikPen 100 UNIT/ML Sopn  Generic drug: insulin lispro (1 Unit Dial)     ipratropium-albuterol 0.5-2.5 (3) MG/3ML Soln nebulizer solution  Commonly known as: DUONEB  Take 3 mLs by nebulization every 4 hours as needed for Shortness of Breath     isosorbide mononitrate 30 MG extended release tablet  Commonly known as: IMDUR  Take 1 tablet by mouth daily     lansoprazole 30 MG delayed release capsule  Commonly known as: PREVACID  Take 1 capsule by mouth daily     magnesium hydroxide 400 MG/5ML suspension  Commonly known as: MILK OF MAGNESIA     mineral oil-hydrophilic petrolatum ointment     montelukast 10 MG tablet  Commonly known as: SINGULAIR  Take 1 tablet by mouth nightly     nitroGLYCERIN 0.4 MG SL tablet  Commonly known as: NITROSTAT     nystatin 892421 UNIT/GM powder  Commonly known as: MYCOSTATIN     sertraline 100 MG tablet  Commonly known as: ZOLOFT     Victoza 18 MG/3ML Sopn SC injection  Generic drug: Liraglutide  Inject 1.2 mg into the skin daily         * This list has 2 medication(s) that are the same as other medications prescribed for you. Read the directions carefully, and ask your doctor or other care provider to review them with you.             STOP taking these medications    busPIRone 5 MG tablet  Commonly known as: BUSPAR     furosemide 20 MG tablet  Commonly known as: LASIX     gabapentin 300 MG capsule  Commonly known as: NEURONTIN     lisinopril 2.5 MG tablet  Commonly known as: PRINIVIL;ZESTRIL Where to Get Your Medications      You can get these medications from any pharmacy    Bring a paper prescription for each of these medications  · amoxicillin 500 MG capsule  · levoFLOXacin 500 MG tablet       Activity: activity as tolerated  Diet: diabetic diet    Pt has been advised to: Follow-up with Bhanu Lane DO in 1 week.   Follow-up with consultants as recommended by them    Note that over 30 minutes was spent in preparing discharge papers, discussing discharge with patient, medication review, etc.    Signed:  KRISTIE Carter CNP  4/2/2021  3:17 PM

## 2021-04-02 NOTE — CARE COORDINATION
Discharge plan is home with [facility] and DME, referrals made, trent lift and [facility] orders obtained, Eric Palacios (daughter) will need ambulance set-up for pt at discharge to return home, she has costs and is aware must be paid up front prior to transport also provided with phone numbers for both Rai Schmid and LAURA Cain. Discussed with attending, jose manuel for general floor transfer.   Pt now on IV Invanz Q 24 hr.

## 2021-04-02 NOTE — PROGRESS NOTES
Problems:    CKD (chronic kidney disease) stage 3, GFR 30-59 ml/min (HCC)    Essential hypertension    History of pulmonary embolus (PE)    Acute kidney injury superimposed on CKD ([address]    Uncontrolled type 2 diabetes mellitus with hyperglycemia (HCC)    Sacral wound    Pressure injury of sacral region, stage 3 (HCC)    Pressure ulcer of right heel, stage 2 (HCC)    Elevated troponin    Acute metabolic encephalopathy    Moderate protein-calorie malnutrition (HCC)    PVD (peripheral vascular disease) ([facility])    Positive blood culture  Resolved Problems:    * No resolved hospital problems. *       AMS resolved    Sepsis resolved    Transitioned to PO abx    Glucose well controlled    PHILIP improving    The plan had been to send the patient home. However, I am told her daughter does not want to take her home tonight. I am not going to be able to quickly, significantly improve the patient's performance status and I made that quite clear to her in our first long conversation. Medically ready for d/c, but delayed. Likely will need alternative discharge plan I.e.  [facility]  Amina Salomon    5:44 PM  4/2/2021

## 2021-04-02 NOTE — PROGRESS NOTES
Department of Internal Medicine  Infectious Diseases  Progress  Note      C/C : :  Coccyx wound infection     Pt is awake and alert  Denies fever and chills  Reports back pain   Discussed with the pt's daughter     Current Facility-Administered Medications   Medication Dose Route Frequency Provider Last Rate Last Admin    ertapenem (INVanz) 500 mg in sodium chloride 0.9 % 50 mL IVPB  500 mg Intravenous Q24H Prudence Yeboah MD   Stopped at 04/01/21 1730    lactated ringers infusion   Intravenous Continuous Rasheeda Smiley  mL/hr at 04/02/21 0610 125 mL/hr at 04/02/21 0610    perflutren lipid microspheres (DEFINITY) injection 1.65 mg  1.5 mL Intravenous ONCE PRN Rasheeda Smiley MD        morphine (PF) injection 2 mg  2 mg Intravenous Q4H PRN Rasheeda Smiley MD   2 mg at 03/31/21 1500    sodium hypochlorite (DAKINS) 0.125 % external solution   Irrigation Daily Christiane Pino MD   Given at 04/02/21 1239    sodium chloride flush 0.9 % injection 10 mL  10 mL Intravenous PRN Rasheeda Smiley MD        0.9 % sodium chloride infusion  25 mL Intravenous PRN Rasheeda Smiley MD        apixaban (ELIQUIS) tablet 5 mg  5 mg Oral BID Rasheeda Smiley MD   5 mg at 04/02/21 8775    aspirin chewable tablet 81 mg  81 mg Oral Daily Rasheeda Smiley MD   81 mg at 04/02/21 9137    atorvastatin (LIPITOR) tablet 40 mg  40 mg Oral Nightly Rasheeda Smiley MD   40 mg at 04/01/21 2117    [Held by provider] busPIRone (BUSPAR) tablet 5 mg  5 mg Oral Daily Rasheeda Smiley MD   5 mg at 03/30/21 5458    carvedilol (COREG) tablet 12.5 mg  12.5 mg Oral BID WC Rasheeda Smiley MD   12.5 mg at 04/02/21 0809    [Held by provider] furosemide (LASIX) tablet 20 mg  20 mg Oral Daily Rasheeda Smiley MD   20 mg at 03/30/21 0531    [Held by provider] gabapentin (NEURONTIN) capsule 600 mg  600 mg Oral TID Rasheeda Smiley MD   Stopped at 03/30/21 2100    ipratropium-albuterol (DUONEB) nebulizer solution 3 mL  1 vial Nebulization Q4H PRN MD Addie Strattonner pantoprazole (PROTONIX) tablet 40 mg  40 mg Oral QAM AC Rasheeda Smiley MD   40 mg at 04/02/21 0610    montelukast (SINGULAIR) tablet 10 mg  10 mg Oral Nightly Rasheeda Smiley MD   10 mg at 03/31/21 2015    sertraline (ZOLOFT) tablet 150 mg  150 mg Oral Daily Rasheeda Smiley MD   150 mg at 04/02/21 0909    glucose (GLUTOSE) 40 % oral gel 15 g  15 g Oral PRN Rasheeda Smiley MD        dextrose 50 % IV solution  12.5 g Intravenous PRN Rasheeda Smiley MD        glucagon (rDNA) injection 1 mg  1 mg Intramuscular PRN Rasheeda Smiley MD        dextrose 5 % solution  100 mL/hr Intravenous PRN Rasheeda Smiley MD        insulin lispro (HUMALOG) injection vial 0-6 Units  0-6 Units Subcutaneous TID WC Rasheeda Smiley MD   1 Units at 04/02/21 1247    insulin lispro (HUMALOG) injection vial 0-3 Units  0-3 Units Subcutaneous Nightly Rasheeda Smiley MD   1 Units at 04/01/21 2118    sodium chloride flush 0.9 % injection 10 mL  10 mL Intravenous 2 times per day Rasheeda Smiley MD   10 mL at 04/02/21 1240    sodium chloride flush 0.9 % injection 10 mL  10 mL Intravenous PRN Rasheeda Smiley MD        0.9 % sodium chloride infusion  25 mL Intravenous PRN Rasheeda Smiley  mL/hr at 03/30/21 2128 25 mL at 03/30/21 2128    promethazine (PHENERGAN) tablet 12.5 mg  12.5 mg Oral Q6H PRN Rasheeda Smiley MD        Or    ondansetron [facility]) injection 4 mg  4 mg Intravenous Q6H PRN Rasheeda Smiley MD        polyethylene glycol (GLYCOLAX) packet 17 g  17 g Oral Daily PRN Rasheeda Smiley MD        acetaminophen (TYLENOL) tablet 650 mg  650 mg Oral Q6H PRN Rasheeda Smiley MD   650 mg at 04/01/21 1135    Or    acetaminophen (TYLENOL) suppository 650 mg  650 mg Rectal Q6H PRN Rasheeda Smiley MD             REVIEW OF SYSTEMS:    CONSTITUTIONAL:  Fever   HEENT: denies blurring of vision or double vision, denies hearing problem  RESPIRATORY: denies cough, shortness of breath, sputum expectoration.   CARDIOVASCULAR:  Denies palpitation  GASTROINTESTINAL:  Denies BILITOT 0.3 03/31/2021    ALKPHOS 82 03/31/2021    AST 27 03/31/2021    ALT 16 03/31/2021         Hepatic Function Panel:    Lab Results   Component Value Date    ALKPHOS 82 03/31/2021    ALT 16 03/31/2021    AST 27 03/31/2021    PROT 5.9 03/31/2021    BILITOT 0.3 03/31/2021    LABALBU 2.0 03/31/2021       PT/INR:    Lab Results   Component Value Date    PROTIME 13.9 01/09/2020    INR 1.2 01/09/2020       TSH:    Lab Results   Component Value Date    TSH 4.220 09/16/2020       U/A:    Lab Results   Component Value Date    NITRITE NEGATIVE 09/09/2019    COLORU Yellow 03/29/2021    PHUR 5.0 03/29/2021    WBCUA 1-3 03/29/2021    RBCUA 0-1 03/29/2021    BACTERIA MANY 03/29/2021    CLARITYU Clear 03/29/2021    SPECGRAV 1.020 03/29/2021    LEUKOCYTESUR Negative 03/29/2021    UROBILINOGEN 0.2 03/29/2021    BILIRUBINUR Negative 03/29/2021    BILIRUBINUR NEGATIVE 09/09/2019    BLOODU Negative 03/29/2021    GLUCOSEU Negative 03/29/2021    AMORPHOUS RARE 11/16/2020       ABG:  No results found for: [id number], BEART, E4TYPUYO, PHART, THGBART, GNK9SWP, PO2ART, NLQ6UFW    MICROBIOLOGY:  Narrative  Performed by: Luis M [address] [place] [address] Lab  Source: [facility]       Site: Coccyx&Coccyx             Susceptibility    Klebsiella oxytoca (1)    Antibiotic Interpretation PEDRITO Status    ampicillin Resistant >=^32 mcg/mL     ceFAZolin Resistant >=^64 mcg/mL     cefepime Sensitive ^0.5 mcg/mL     cefTRIAXone Resistant ^4 mcg/mL     Confirmatory Extended Spectrum Beta-Lactamase Negative Neg mcg/mL     ertapenem Sensitive <=^0.12 mcg/mL     gentamicin Sensitive <=^1 mcg/mL     levofloxacin Sensitive <=^0.12 mcg/mL     piperacillin-tazobactam Resistant >=^128 mcg/mL     trimethoprim-sulfamethoxazole Sensitive <=^20 mcg/mL           Blood cx - CONS       Radiology :    CT scan of abdomen and pelvis -  Impression:        Sacral decubitus ulcer with several new, small locules of gas in the   subcutaneous fat posterior to the L5-S1 level.  A developing abscess cannot   be excluded.  There are erosive changes of the S3 vertebral segment, stable   in appearance compared with the prior study.  Additionally, there is new   peripheral hyperenhancement with a small volume of fluid along the right   sacrospinous ligament.  A developing abscess in this location cannot be   excluded. Stable dilation of the common bile duct with mild intrahepatic biliary   dilatation.  This may be related to the patient's prior cholecystectomy. Consider correlation with liver function test.        IMPRESSION:    1. Sacral wound infection , tunneling wound -chronic non healing wound   2. Leukocytosis  - improved   3. CONS bacteremia - contaminant         RECOMMENDATIONS:      1. Levaquin 500 mg po q 48 hrs and Amoxicillin 500 mg po q 12 hrs X 1 week   2. Off loading pressure   3.  Monitor Cr   4.Stop invanz

## 2021-04-03 NOTE — PLAN OF CARE
Problem: Skin Integrity:  Goal: Will show no infection signs and symptoms  Description: Will show no infection signs and symptoms  4/3/2021 1051 by Alon Lucas RN  Outcome: Met This Shift  4/3/2021 0128 by Aislinn Olsen RN  Outcome: Met This Shift  Goal: Absence of new skin breakdown  Description: Absence of new skin breakdown  4/3/2021 1051 by Alon Lucas RN  Outcome: Met This Shift  4/3/2021 0128 by Aislinn Olsen RN  Outcome: Met This Shift     Problem: Falls - Risk of:  Goal: Will remain free from falls  Description: Will remain free from falls  4/3/2021 1051 by Alon Lucas RN  Outcome: Met This Shift  4/3/2021 0128 by Aislinn Olsen RN  Outcome: Met This Shift  Goal: Absence of physical injury  Description: Absence of physical injury  4/3/2021 1051 by Alon Lucas RN  Outcome: Met This Shift  4/3/2021 0128 by Aislinn Olsen RN  Outcome: Met This Shift     Problem: Pain:  Goal: Pain level will decrease  Description: Pain level will decrease  4/3/2021 0128 by Aislinn Olsen RN  Outcome: Met This Shift  Goal: Control of acute pain  Description: Control of acute pain  4/3/2021 1051 by Alon Lucas RN  Outcome: Met This Shift  4/3/2021 0128 by Aislinn Olsen RN  Outcome: Met This Shift  Goal: Control of chronic pain  Description: Control of chronic pain  4/3/2021 1051 by Alon Lucas RN  Outcome: Met This Shift  4/3/2021 0128 by Aislinn Olsen RN  Outcome: Met This Shift

## 2021-04-03 NOTE — PLAN OF CARE
Problem: Skin Integrity:  Goal: Will show no infection signs and symptoms  Description: Will show no infection signs and symptoms  4/3/2021 0128 by Moises Washington RN  Outcome: Met This Shift  4/2/2021 1851 by Lore Bullard RN  Outcome: Met This Shift  Goal: Absence of new skin breakdown  Description: Absence of new skin breakdown  4/3/2021 0128 by Moises Washington RN  Outcome: Met This Shift  4/2/2021 1851 by Lore Bullard RN  Outcome: Met This Shift

## 2021-04-03 NOTE — PROGRESS NOTES
Department of Internal Medicine  Infectious Diseases  Progress  Note      C/C : :  Coccyx wound infection     Pt is awake and alert  Denies fever and chills  Reports back pain   Discussed with the pt's daughters     Current Facility-Administered Medications   Medication Dose Route Frequency Provider Last Rate Last Admin    amoxicillin (AMOXIL) capsule 500 mg  500 mg Oral 2 times per day Erna Medina MD   500 mg at 04/03/21 0812    levoFLOXacin (LEVAQUIN) tablet 500 mg  500 mg Oral Every Other Day Alan Geller MD   500 mg at 04/02/21 1310    perflutren lipid microspheres (DEFINITY) injection 1.65 mg  1.5 mL Intravenous ONCE PRN Zuleima Torres MD        morphine (PF) injection 2 mg  2 mg Intravenous Q4H PRN Zuleima Torres MD   2 mg at 04/02/21 2343    sodium hypochlorite (DAKINS) 0.125 % external solution   Irrigation Daily Ling Heath MD   Given at 04/03/21 0818    sodium chloride flush 0.9 % injection 10 mL  10 mL Intravenous PRN Zuleima Torres MD        0.9 % sodium chloride infusion  25 mL Intravenous PRN Zuleima Torres MD        apixaban (ELIQUIS) tablet 5 mg  5 mg Oral BID Zuleima Torres MD   5 mg at 04/03/21 0813    aspirin chewable tablet 81 mg  81 mg Oral Daily Zuleima Torres MD   81 mg at 04/03/21 0813    atorvastatin (LIPITOR) tablet 40 mg  40 mg Oral Nightly Zuleima Torres MD   40 mg at 04/02/21 2052    [Held by provider] busPIRone (BUSPAR) tablet 5 mg  5 mg Oral Daily Zuleima Torres MD   5 mg at 03/30/21 7085    carvedilol (COREG) tablet 12.5 mg  12.5 mg Oral BID WC Zuleima Torres MD   12.5 mg at 04/03/21 0813    [Held by provider] furosemide (LASIX) tablet 20 mg  20 mg Oral Daily Zuleima Torres MD   20 mg at 03/30/21 0631    [Held by provider] gabapentin (NEURONTIN) capsule 600 mg  600 mg Oral TID Zuleima Torres MD   Stopped at 03/30/21 2100    ipratropium-albuterol (DUONEB) nebulizer solution 3 mL  1 vial Nebulization Q4H PRN Zuleima Torres MD   3 mL at 04/03/21 0012    pantoprazole (PROTONIX) tablet 40 mg  40 mg Oral QAM AC Jigna Coffey MD   40 mg at 04/03/21 0547    montelukast (SINGULAIR) tablet 10 mg  10 mg Oral Nightly Jigna Coffey MD   10 mg at 04/02/21 2052    sertraline (ZOLOFT) tablet 150 mg  150 mg Oral Daily Jigna Coffey MD   150 mg at 04/03/21 0813    glucose (GLUTOSE) 40 % oral gel 15 g  15 g Oral PRN Jigna Coffey MD        dextrose 50 % IV solution  12.5 g Intravenous PRN Jigna Coffey MD        glucagon (rDNA) injection 1 mg  1 mg Intramuscular PRN Jigna Coffey MD        dextrose 5 % solution  100 mL/hr Intravenous PRN Jigna Coffey MD        insulin lispro (HUMALOG) injection vial 0-6 Units  0-6 Units Subcutaneous TID WC Jigna Coffey MD   1 Units at 04/03/21 1212    insulin lispro (HUMALOG) injection vial 0-3 Units  0-3 Units Subcutaneous Nightly Jigna Coffey MD   1 Units at 04/01/21 2118    sodium chloride flush 0.9 % injection 10 mL  10 mL Intravenous 2 times per day Jigna Coffey MD   10 mL at 04/03/21 0814    sodium chloride flush 0.9 % injection 10 mL  10 mL Intravenous PRN Jigna Coffey MD        0.9 % sodium chloride infusion  25 mL Intravenous PRN Jigna Coffey  mL/hr at 03/30/21 2128 25 mL at 03/30/21 2128    promethazine (PHENERGAN) tablet 12.5 mg  12.5 mg Oral Q6H PRAD Coffey MD        Or    [facility] 4 mg  4 mg Intravenous Q6H PRN Jigna Coffey MD        polyethylene glycol (GLYCOLAX) packet 17 g  17 g Oral Daily PRN Jigna Coffey MD        acetaminophen (TYLENOL) tablet 650 mg  650 mg Oral Q6H PRN Jigna Coffey MD   650 mg at 04/03/21 8417    Or    acetaminophen (TYLENOL) suppository 650 mg  650 mg Rectal Q6H PRN Jigna Coffey MD             REVIEW OF SYSTEMS:    CONSTITUTIONAL:  Denies fever   HEENT: denies blurring of vision or double vision, denies hearing problem  RESPIRATORY: denies cough, shortness of breath, sputum expectoration.   CARDIOVASCULAR:  Denies palpitation  GASTROINTESTINAL:  Denies abdomen pain, diarrhea or constipation. GENITOURINARY:  Denies burning urination or frequency of urination  INTEGUMENT: Non healing sacral  wound   HEMATOLOGIC/LYMPHATIC:  Denies lymph node swelling, gum bleeding or easy bruising. MUSCULOSKELETAL:  Denies leg pain , joint pain , joint swelling  NEUROLOGICAL:  Weakness   PHYSICAL EXAM:      Vitals:     Vitals:    04/03/21 0758   BP: 139/63   Pulse: 98   Resp: 18   Temp: 97 °F (36.1 °C)   SpO2: 98%       General Appearance:    Awake, alert , no acute distress. Head:    Normocephalic, atraumatic   Eyes:    No pallor, no icterus,   Ears:    No obvious deformity or drainage.    Nose:   No nasal drainage   Throat:   Mucosa moist, no oral thrush   Neck:   Supple, no lymphadenopathy   Back:     no CVA tenderness   Lungs:     Clear to auscultation bilaterally, no wheeze    Heart:    Regular rate and rhythm, no murmur, rub or gallop   Abdomen:     Soft, non-tender, bowel sounds present    Extremities:   + edema    Pulses:   Dorsalis pedis palpable    Skin:       Sacral wound tunnels about 5.5 cm at 1 pm position, foul odor, tender, no periwound erythema      CBC with Differential:      Lab Results   Component Value Date    WBC 10.8 04/03/2021    RBC 3.16 04/03/2021    HGB 8.9 04/03/2021    HCT 29.2 04/03/2021     04/03/2021    MCV 92.4 04/03/2021    MCH 28.2 04/03/2021    MCHC 30.5 04/03/2021    RDW 14.5 04/03/2021    SEGSPCT 58 06/18/2013    LYMPHOPCT 9.2 04/03/2021    MONOPCT 3.6 04/03/2021    BASOPCT 0.3 04/03/2021    MONOSABS 0.39 04/03/2021    LYMPHSABS 0.99 04/03/2021    EOSABS 0.09 04/03/2021    BASOSABS 0.03 04/03/2021       CMP     Lab Results   Component Value Date     04/03/2021    K 3.9 04/03/2021    K 3.7 03/30/2021     04/03/2021    CO2 24 04/03/2021    BUN 20 04/03/2021    CREATININE 1.3 04/03/2021    GFRAA 47 04/03/2021    LABGLOM 39 04/03/2021    GLUCOSE 166 04/03/2021    PROT 5.9 03/31/2021    LABALBU 2.0 03/31/2021    CALCIUM 8.7 04/03/2021 BILITOT 0.3 03/31/2021    ALKPHOS 82 03/31/2021    AST 27 03/31/2021    ALT 16 03/31/2021         Hepatic Function Panel:    Lab Results   Component Value Date    ALKPHOS 82 03/31/2021    ALT 16 03/31/2021    AST 27 03/31/2021    PROT 5.9 03/31/2021    BILITOT 0.3 03/31/2021    LABALBU 2.0 03/31/2021       PT/INR:    Lab Results   Component Value Date    PROTIME 13.9 01/09/2020    INR 1.2 01/09/2020       TSH:    Lab Results   Component Value Date    TSH 4.220 09/16/2020       U/A:    Lab Results   Component Value Date    NITRITE NEGATIVE 09/09/2019    COLORU Yellow 03/29/2021    PHUR 5.0 03/29/2021    WBCUA 1-3 03/29/2021    RBCUA 0-1 03/29/2021    BACTERIA MANY 03/29/2021    CLARITYU Clear 03/29/2021    SPECGRAV 1.020 03/29/2021    LEUKOCYTESUR Negative 03/29/2021    UROBILINOGEN 0.2 03/29/2021    BILIRUBINUR Negative 03/29/2021    BILIRUBINUR NEGATIVE 09/09/2019    BLOODU Negative 03/29/2021    GLUCOSEU Negative 03/29/2021    AMORPHOUS RARE 11/16/2020       ABG:  No results found for: SUA5HQH, BEART, H1VJKRMF, PHART, THGBART, MBE7EFV, PO2ART, KID7CSX    MICROBIOLOGY:  Narrative  Performed by: [facility]  Source: [facility]       Site: Coccyx&Coccyx             Susceptibility    Klebsiella oxytoca (1)    Antibiotic Interpretation PEDRITO Status    ampicillin Resistant >=^32 mcg/mL     ceFAZolin Resistant >=^64 mcg/mL     cefepime Sensitive ^0.5 mcg/mL     cefTRIAXone Resistant ^4 mcg/mL     Confirmatory Extended Spectrum Beta-Lactamase Negative Neg mcg/mL     ertapenem Sensitive <=^0.12 mcg/mL     gentamicin Sensitive <=^1 mcg/mL     levofloxacin Sensitive <=^0.12 mcg/mL     piperacillin-tazobactam Resistant >=^128 mcg/mL     trimethoprim-sulfamethoxazole Sensitive <=^20 mcg/mL           Blood cx - CONS       Radiology :    CT scan of abdomen and pelvis -  Impression:        Sacral decubitus ulcer with several new, small locules of gas in the   subcutaneous fat posterior to the L5-S1 level.  A

## 2021-04-03 NOTE — PROGRESS NOTES
Vascular Surgery Progress Note    CC: f/u left heel ulcer    HISTORY:  The patient is awake. More alert. Daughters present. IMPRESSION:  Sacral decubitus and left heel ulcers. PLAN:  Continue local care at rehab. Instructed daughters to call [facility] for f/u appt. Patient Active Problem List   Diagnosis Code    GERD (gastroesophageal reflux disease) K21.9    Type 2 diabetes mellitus with hyperglycemia, with long-term current use of insulin ([facility]) E11.65, Z79.4    CKD (chronic kidney disease) stage 3, GFR 30-59 ml/min ([facility]) N18.30    Essential hypertension I10    History of pulmonary embolus (PE) Z86.711    Acute kidney injury superimposed on CKD ([facility]) N17.9, N18.9    Obesity (BMI 30-39. 9) E66.9    COVID-19 U07.1    Anxiety and depression F41.9, F32.9    Pneumonia due to COVID-19 virus U07.1, J12.82    Uncontrolled type 2 diabetes mellitus with hyperglycemia ([facility]) E11.65    Sacral wound S31.000A    Pressure injury of sacral region, stage 3 ([facility]) L89.153    Pressure ulcer of right heel, stage 2 ([facility]) L89.612    Cellulitis L03.90    Controlled type 2 diabetes mellitus without complication ([facility]) K28.6    Sepsis ([facility]) A41.9    Elevated troponin R77.8    Acute metabolic encephalopathy E48.45    Moderate protein-calorie malnutrition ([facility]) E44.0    PVD (peripheral vascular disease) ([facility]) I73.9    Positive blood culture R78.81       Current Medications:    sodium chloride      dextrose      sodium chloride 25 mL (03/30/21 2127)      perflutren lipid microspheres, morphine, sodium chloride flush, sodium chloride, ipratropium-albuterol, glucose, dextrose, glucagon (rDNA), dextrose, sodium chloride flush, sodium chloride, promethazine **OR** ondansetron, polyethylene glycol, acetaminophen **OR** acetaminophen    amoxicillin  500 mg Oral 2 times per day    levoFLOXacin  500 mg Oral Every Other Day    sodium hypochlorite   Irrigation Daily    apixaban  5 mg Oral BID    aspirin  81 mg Oral Daily  atorvastatin  40 mg Oral Nightly    [Held by provider] busPIRone  5 mg Oral Daily    carvedilol  12.5 mg Oral BID WC    [Held by provider] furosemide  20 mg Oral Daily    [Held by provider] gabapentin  600 mg Oral TID    pantoprazole  40 mg Oral QAM AC    montelukast  10 mg Oral Nightly    sertraline  150 mg Oral Daily    insulin lispro  0-6 Units Subcutaneous TID WC    insulin lispro  0-3 Units Subcutaneous Nightly    sodium chloride flush  10 mL Intravenous 2 times per day          PHYSICAL EXAM:    Vitals:    04/03/21 0758   BP: 139/63   Pulse: 98   Resp: 18   Temp: 97 °F (36.1 °C)   SpO2: 98%     CONSTITUTIONAL:  awake, alert, cooperative, no apparent distress  LUNGS:  no increased work of breathing, good air exchange and   CARDIOVASCULAR:  regular rate and rhythm and   ABDOMEN:  , non-distended and   Left heel dry scab    LABS:    Lab Results   Component Value Date    WBC 10.8 04/03/2021    HGB 8.9 (L) 04/03/2021    HCT 29.2 (L) 04/03/2021     04/03/2021    PROTIME 13.9 (H) 01/09/2020    INR 1.2 01/09/2020    APTT 40.6 (H) 01/11/2020    K 3.9 04/03/2021    BUN 20 04/03/2021    CREATININE 1.3 (H) 04/03/2021       RADIOLOGY:

## 2021-04-05 PROBLEM — R41.82 ALTERED MENTAL STATUS: Status: ACTIVE | Noted: 2021-01-01

## 2021-04-05 NOTE — ED PROVIDER NOTES
Department of Emergency Medicine   ED  Provider Note  Admit Date/RoomTime: 4/5/2021  5:54 PM  ED Room: 11/11          History of Present Illness:  4/5/21, Time: 6:00 PM EDT  Chief Complaint   Patient presents with    Abdominal Pain     weakness and abdominal pain, recent admission for sepsis                Chase Dubose is a 80 y.o. female presenting to the ED for abdominal pain and weakness, beginning earlier today. The complaint has been persistent, moderate in severity, and worsened by nothing. Patient was recently discharged from the hospital after having sepsis. Family states that she had been doing well until today when home health care noted that she was complaining of some abdominal pain. Family states that she has been somewhat slow to respond today, is oriented but has had a definite change in her mental status starting today. There has been no reported fevers, vomiting, diarrhea or dysuria. Review of Systems:   Pertinent positives and negatives are stated within HPI, all other systems reviewed and are negative.        --------------------------------------------- PAST HISTORY ---------------------------------------------  Past Medical History:  has a past medical history of Anxiety, Depression, Diabetes mellitus ([facility] [address] Frequent urinary tract infections, GERD (gastroesophageal reflux disease), Hypertension, Peripheral neuropathy, [facility] and Spinal stenosis. Past Surgical History:  has a past surgical history that includes Cholecystectomy; Skin cancer excision; and Hip fracture surgery (Bilateral). Social History:  reports that she has never smoked. She has never used smokeless tobacco. She reports that she does not drink alcohol or use drugs. Family History: family history is not on file. . Unless otherwise noted, family history is non contributory    The patients home medications have been reviewed.     Allergies: Patient has no known allergies. ---------------------------------------------------PHYSICAL EXAM--------------------------------------    Constitutional/General: Awake and alert, no acute distress  Head: Normocephalic and atraumatic  Eyes: PERRL, EOMI, sclera non icteric  Mouth: Oropharynx clear, handling secretions, no trismus, no asymmetry of the posterior oropharynx or uvular edema  Neck: Supple, full ROM, no stridor, no meningeal signs  Respiratory: Lungs clear to auscultation bilaterally, no wheezes, rales, or rhonchi. Not in respiratory distress  Cardiovascular:  Regular rate. Regular rhythm. No murmurs, no aortic murmurs, no gallops, or rubs. 2+ distal pulses. Equal extremity pulses. Chest: No chest wall tenderness  GI:  Abdomen Soft, Non tender, Non distended. No rebound, guarding, or rigidity. No pulsatile masses. Musculoskeletal: Moves all extremities x 4. Warm and well perfused, no clubbing, cyanosis, or edema. Capillary refill <3 seconds  Integument: skin warm and dry. No rashes. Neurologic: GCS 14, no focal deficits, symmetric strength 5/5 in the upper and lower extremities bilaterally  Psychiatric: Normal Affect          -------------------------------------------------- RESULTS -------------------------------------------------  I have personally reviewed all laboratory and imaging results for this patient. Results are listed below.      LABS: (Lab results interpreted by me)  Results for orders placed or performed during the hospital encounter of 04/05/21   Lactate, Sepsis   Result Value Ref Range    Lactic Acid, Sepsis 0.9 0.5 - 1.9 mmol/L   CBC Auto Differential   Result Value Ref Range    WBC 9.5 4.5 - 11.5 E9/L    RBC 2.69 (L) 3.50 - 5.50 E12/L    Hemoglobin 7.4 (L) 11.5 - 15.5 g/dL    Hematocrit 25.2 (L) 34.0 - 48.0 %    MCV 93.7 80.0 - 99.9 fL    MCH 27.5 26.0 - 35.0 pg    MCHC 29.4 (L) 32.0 - 34.5 %    RDW 14.9 11.5 - 15.0 fL    Platelets 478 363 - 270 E9/L    MPV 8.3 7.0 - 12.0 fL    Neutrophils % 74.8 43.0 - 80.0 %    Immature Granulocytes % 0.6 0.0 - 5.0 %    Lymphocytes % 18.0 (L) 20.0 - 42.0 %    Monocytes % 5.5 2.0 - 12.0 %    Eosinophils % 0.9 0.0 - 6.0 %    Basophils % 0.2 0.0 - 2.0 %    Neutrophils Absolute 7.12 1.80 - 7.30 E9/L    Immature Granulocytes # 0.06 E9/L    Lymphocytes Absolute 1.72 1.50 - 4.00 E9/L    Monocytes Absolute 0.52 0.10 - 0.95 E9/L    Eosinophils Absolute 0.09 0.05 - 0.50 E9/L    Basophils Absolute 0.02 0.00 - 0.20 J3/Z   Basic Metabolic Panel w/ Reflex to MG   Result Value Ref Range    Sodium 137 132 - 146 mmol/L    Potassium reflex Magnesium 3.5 3.5 - 5.0 mmol/L    Chloride 105 98 - 107 mmol/L    CO2 24 22 - 29 mmol/L    Anion Gap 8 7 - 16 mmol/L    Glucose 117 (H) 74 - 99 mg/dL    BUN 11 8 - 23 mg/dL    CREATININE 1.0 0.5 - 1.0 mg/dL    GFR Non-African American 52 >=60 mL/min/1.73    GFR African American >60     Calcium 8.4 (L) 8.6 - 10.2 mg/dL   Hepatic Function Panel   Result Value Ref Range    Total Protein 5.7 (L) 6.4 - 8.3 g/dL    Albumin 2.2 (L) 3.5 - 5.2 g/dL    Alkaline Phosphatase 63 35 - 104 U/L    ALT 6 0 - 32 U/L    AST 11 0 - 31 U/L    Total Bilirubin <0.2 0.0 - 1.2 mg/dL    Bilirubin, Direct <0.2 0.0 - 0.3 mg/dL    Bilirubin, Indirect see below 0.0 - 1.0 mg/dL   Lipase   Result Value Ref Range    Lipase 20 13 - 60 U/L   Urinalysis, reflex to microscopic   Result Value Ref Range    Color, UA Yellow Straw/Yellow    Clarity, UA Clear Clear    Glucose, Ur Negative Negative mg/dL    Bilirubin Urine Negative Negative    Ketones, Urine Negative Negative mg/dL    Specific Gravity, UA >=1.030 1.005 - 1.030    Blood, Urine Negative Negative    pH, UA 5.0 5.0 - 9.0    Protein, UA Negative Negative mg/dL    Urobilinogen, Urine 0.2 <2.0 E.U./dL    Nitrite, Urine Negative Negative    Leukocyte Esterase, Urine TRACE (A) Negative   Magnesium   Result Value Ref Range    Magnesium 1.7 1.6 - 2.6 mg/dL   Microscopic Urinalysis   Result Value Ref Range    WBC, UA 2-5 0 - 5 /HPF    RBC, UA nursing notes within the ED encounter and vital signs as below have been reviewed by myself  /64   Pulse 90   Temp 97.5 °F (36.4 °C) (Infrared)   Resp 20   Ht 5' (1.524 m)   Wt 174 lb (78.9 kg)   LMP  (LMP Unknown)   SpO2 100%   BMI 33.98 kg/m²     Oxygen Saturation Interpretation: Normal    The patients available past medical records and past encounters were reviewed. ------------------------------ ED COURSE/MEDICAL DECISION MAKING----------------------  Medications   0.9 % sodium chloride bolus (500 mLs Intravenous New Bag 4/6/21 0000)   0.9 % sodium chloride infusion (1,000 mLs Intravenous New Bag 4/6/21 0000)   0.9 % sodium chloride bolus (0 mLs Intravenous Stopped 4/5/21 2329)   iopamidol (ISOVUE-370) 76 % injection 90 mL (90 mLs Intravenous Given 4/5/21 2225)           The cardiac monitor revealed sinus rhythm with a heart rate in the 90s as interpreted by me. The cardiac monitor was ordered secondary to the patient's chest pain and to monitor for patient for dysrhythmia. CPT 87399           Medical Decision Making:     I, Dr. Frederic Lema, am the primary provider of record    41-year-old female presenting with abdominal pain and a change in mental status. She arrived hemodynamically stable but had a brief period of atrial fibrillation with a ventricular rate of 120-130 that resolved spontaneously. She is hemodynamically stable, no increased work of breathing or hypoxia. EKG shows no signs of ischemia or injury. Metabolic panel is within acceptable limits, no leukocytosis or anemia. Lactic acid is normal.  Chest x-ray is unremarkable, urinalysis shows no signs of infection. CT of the abdomen/pelvis shows no acute abnormalities. Daughter is at bedside and states that she believes there has been a definite change in her mental status and level of responsiveness. She has been awake and alert during her ED course but has been slow to answer questions.   This is a definite change

## 2021-04-06 PROBLEM — E87.70 FLUID OVERLOAD: Status: ACTIVE | Noted: 2021-01-01

## 2021-04-06 PROBLEM — D63.8 ANEMIA OF CHRONIC DISEASE: Status: ACTIVE | Noted: 2021-01-01

## 2021-04-06 NOTE — H&P
lispro, 1 Unit Dial, (HUMALOG KWIKPEN) 100 UNIT/ML SOPN, Inject 0-10 Units into the skin 3 times daily (before meals)  magnesium hydroxide (MILK OF MAGNESIA) 400 MG/5ML suspension, Take 30 mLs by mouth daily as needed for Constipation  nitroGLYCERIN (NITROSTAT) 0.4 MG SL tablet, Place 0.4 mg under the tongue every 5 minutes as needed for Chest pain  diclofenac sodium (VOLTAREN) 1 % GEL, Apply 2 g topically 4 times daily as needed for Pain (apply to hands)  sertraline (ZOLOFT) 100 MG tablet, Take 150 mg by mouth daily  montelukast (SINGULAIR) 10 MG tablet, Take 1 tablet by mouth nightly  Liraglutide (VICTOZA) 18 MG/3ML SOPN SC injection, Inject 1.2 mg into the skin daily  diclofenac sodium (VOLTAREN) 1 % GEL, apply 2 grams to affected area four times a day  atorvastatin (LIPITOR) 40 MG tablet, Take 1 tablet by mouth nightly  carvedilol (COREG) 12.5 MG tablet, Take 1 tablet by mouth 2 times daily (with meals)  isosorbide mononitrate (IMDUR) 30 MG extended release tablet, Take 1 tablet by mouth daily  lansoprazole (PREVACID) 30 MG delayed release capsule, Take 1 capsule by mouth daily  ipratropium-albuterol (DUONEB) 0.5-2.5 (3) MG/3ML SOLN nebulizer solution, Take 3 mLs by nebulization every 4 hours as needed for Shortness of Breath  Calcium Carb-Cholecalciferol (CALTRATE 600+D3) 600-800 MG-UNIT TABS, Take 1 tablet by mouth 2 times daily  nystatin (MYCOSTATIN) 304547 UNIT/GM powder, Apply 1 each topically 2 times daily Apply to abdominal folds  Omega-3 Fatty Acids (FISH OIL) 1000 MG CAPS, Take 1,000 mg by mouth 2 times daily  apixaban (ELIQUIS) 5 MG TABS tablet, Take 1 tablet by mouth 2 times daily  aspirin 81 MG chewable tablet, Take 1 tablet by mouth daily  acetaminophen (TYLENOL) 325 MG tablet, Take 650 mg by mouth every 4 hours as needed for Pain or Fever   fluticasone (FLONASE) 50 MCG/ACT nasal spray, 1 spray by Nasal route daily as needed for Rhinitis   mineral oil-hydrophilic petrolatum (HYDROPHOR) ointment, Apply topically daily Apply to both feet    Allergies:  Patient has no known allergies. Social History:   TOBACCO:   reports that she has never smoked. She has never used smokeless tobacco.  ETOH:   reports no history of alcohol use. MARITAL STATUS:    OCCUPATION:      Family History:   History reviewed. No pertinent family history. REVIEW OF SYSTEMS:    General ROS: positive for  - fatigue and malaise, intermittent abdominal pain  Hematological and Lymphatic ROS: negative  Endocrine ROS: negative  Respiratory ROS: no cough, shortness of breath, or wheezing  Cardiovascular ROS: no chest pain or dyspnea on exertion  Gastrointestinal ROS: no abdominal pain, change in bowel habits, or black or bloody stools  Genito-Urinary ROS: no dysuria, trouble voiding, or hematuria  Neurological ROS: no TIA or stroke symptoms  negative    Vitals:  BP (!) 155/7   Pulse 97   Temp 97.1 °F (36.2 °C) (Temporal)   Resp 18   Ht 5' (1.524 m)   Wt 174 lb (78.9 kg)   LMP  (LMP Unknown)   SpO2 98%   BMI 33.98 kg/m²     PHYSICAL EXAM:  General:  Awake, alert, oriented X 3. Well developed, well nourished, well groomed. No apparent distress. HEENT:  Normocephalic, atraumatic. Pupils equal, round, reactive to light. No scleral icterus. No conjunctival injection. Normal lips, teeth, and gums. No nasal discharge. Neck:  Supple, FROM  Heart:  RRR, no murmurs, gallops, rubs, carotid upstroke normal, no carotid bruits  Lungs:  CTA bilaterally, bilat symmetrical expansion, no wheeze, rales, or rhonchi  Abdomen:   Bowel sounds present, soft, nontender, no masses, no organomegaly, no peritoneal signs  Extremities:  No clubbing, cyanosis, or edema  Skin:  Warm and dry, no open lesions or rash  Neuro:  Cranial nerves 2-12 intact, no focal deficits  Vascular: Radial and pedal Pulses 2+  Breast: deferred  Rectal: deferred  Genitalia:  deferred      DATA:     Recent Labs     04/05/21  1000 04/05/21  1817 04/06/21  0423   WBC 9.7 9.5 10. 3   HGB 8.0* 7.4* 7.5*    302 335     Recent Labs     04/05/21  1000 04/05/21  1817 04/06/21  0423    137 140   K 3.6 3.5 3.4*   BUN 13 11 10   CREATININE 1.1* 1.0 0.9     Recent Labs     04/05/21  1000 04/05/21  1817 04/06/21  0423   PROT 6.3* 5.7* 5.7*     Recent Labs     04/05/21  1000 04/05/21  1817 04/06/21  0423   AST 12 11 13   ALT 5 6 8   ALKPHOS 68 61 65   BILIDIR  --  <0.2  --    BILITOT <0.2 <0.2 <0.2     No results for input(s): BNP in the last 72 hours. Recent Labs     04/06/21  0423   TROPONINI 0.03       ASSESSMENT:      Principal Problem:    Altered mental status  Active Problems:    GERD (gastroesophageal reflux disease)    Type 2 diabetes mellitus with hyperglycemia, with long-term current use of insulin (HCC)    History of pulmonary embolus (PE)    Obesity (BMI 30-39. 9)    Sacral wound    Pressure injury of sacral region, stage 3 (HCC)    Pressure ulcer of right heel, stage 2 (HCC)    Controlled type 2 diabetes mellitus without complication (HCC)    Moderate protein-calorie malnutrition (HCC)    PVD (peripheral vascular disease) (HCC)    Fluid overload    Anemia of chronic disease  Resolved Problems:    * No resolved hospital problems. *        PLAN:    Admitted for evaluation of abdominal pain  CT abdomen and pelvis is negative  Hemoglobin has dropped from 9 on previous discharge to 7 this admission  Monitor H&H every 8 hours, Hemoccult stool  She is on oral anticoagulation with apixaban at home-would discontinue in an elderly lady now with acute drop in hemoglobin  Bowel regimen    More than anything patient requires placement at this point-extremely frail and it appears that family is unable to care for her at home.         DVT prophylaxis  PT OT  Discharge plan-to subacute rehab if family agreeable    Ling Monk MD  4/6/2021  1:27 PM

## 2021-04-06 NOTE — TELEPHONE ENCOUNTER
Writer contacted Dr. Kenya Galindo to inform of 30 day readmission risk. Writer's attempt to contact Dr. Kenya Galindo was unsuccessful.

## 2021-04-06 NOTE — PROGRESS NOTES
Physical Therapy    Physical Therapy Initial Assessment     Name: Adama Jacobs  : 1933  MRN: [id number]    Referring Provider:  KRISTIE Howell CNP    Date of Service: 2021    Evaluating PT:  Tyrel Ashley PT, DPT [id number]      Room #:  5742/7528-N  Diagnosis:  Altered mental status  PMHx/PSHx:  Peripheral neuropathy, HTN, depression, anxiety, diabetes, spinal stenosis, GERD, frequent UTI, PVD, cholecystectomy, skin CA excision, B hip fracture surgeries  Procedure/Surgery:  None this admission  Precautions:  Falls, cognition, bed alarm, O2  Equipment Needs:  Pt owns all necessary DME    SUBJECTIVE:    Pt has  family assistance with family members in a rotation in a single story house with w/c lift to enter. Bed is on first floor and bath is on first floor. She sleeps in a hospital bed and family assists with transfers using [employer] steady lift. She uses w/c for primary mobility with family assisting to propel. DME owned: w/c, walkers, trent lift, [employer] steady lift, hospital bed, [facility]    OBJECTIVE:   Initial Evaluation  Date: 21 Treatment Short Term/ Long Term   Goals   AM-PAC 6 Clicks      Was pt agreeable to Eval/treatment? yes     Does pt have pain? No c/o pain     Bed Mobility  Rolling: Mod A  Supine to sit: Max Ax2  Sit to supine: Max Ax2  Scooting: Max A to EOB  Rolling: Min A  Supine to sit: Min A  Sit to supine: Min A  Scooting: Min A to EOB   Transfers Sit to stand: NT  Stand to sit: NT  Stand pivot: NT  Sit to stand: Mod A  Stand to sit: Mod A  Stand pivot: TBD   Ambulation    NT  NA, pt non-ambulatory at recent baseline.    Stair negotiation: ascended and descended  NT  TBD   ROM BUE:  Defer to OT  BLE:  WNL     Strength BUE:  Defer to OT  BLE:  4-/5     Balance Sitting EOB:  Mod>CGA  Dynamic Standing:  NT  Sitting EOB:  Supervision  Dynamic Standing:  TBD     Pt is A & O x 3, latent responses with 10-15 second delay for all verbal responses and initiation of motor plans  Sensation:  Pt denies numbness and tingling to extremities  Edema:  L UE    Vitals:  SPO2 95% on RA  HR 85-99bpm    Patient education  Pt educated on role of therapy, safety with mobility, transfer technique, sitting balance    Patient response to education:   Pt verbalized understanding Pt demonstrated skill Pt requires further education in this area   yes yes yes     ASSESSMENT:    Comments:  Pt resting semi-supine upon arrival, agreeable to PT eval with OT collaboration for safety with mobility. Pt requires manual assist for B LE progression and trunk lift/lower for supine<>sit with HOB remaining elevated approx 30 degrees during supine>sit. She initially presents with strong posterior listing that improved with continued time in upright and cues for improved BUE placement and trunk positioning. Pt unsafe to attempt standing at this time due to B LE weakness, decreased sitting balance and delayed processing. Pt was able to maintain sitting balance with single vs B UE support with CGA after approx 5 min sitting EOB. Pt returned to semi-supine upon completion of session with all needs in reach and daughter present at bedside. Pt daughter verbalized understanding of all transfer techniques and current level of function. She will benefit from continued skilled PT services to improve independence with all functional mobility to ease caregiver burden and maximize quality of life. Treatment:  Patient practiced and was instructed in the following treatment:     Bed mobility: cues for sequencing, manual assist for B LE progression and trunk lift/lower   Therapeutic activities: sitting EOB x10 minutes, cues for improved sitting posture and balance, manual assist for seated weight shifting    Pt's/ family goals   1. To return home with family assist PRN    Patient and or family understand(s) diagnosis, prognosis, and plan of care.   yes    PLAN OF CARE:    Current Treatment Recommendations   [x] Strengthening [] ROM   [x] Balance Training   [x] Endurance Training   [x] Transfer Training   [] Gait Training   [] Stair Training    [x] Positioning   [x] Safety and Education Training   [x] Patient/Caregiver Education   [] HEP  [] Other       PT care will be provided in accordance with the objectives noted above. Whenever appropriate, clear delegation orders will be provided for nursing staff. Exercises and functional mobility practice will be used as well as appropriate assistive devices or modalities to obtain goals. Patient and family education will also be administered as needed. Frequency of treatments: 2-5x/week x 7-10 days. Time in  1327  Time out  1354    Total Treatment Time  15 minutes     Evaluation Time includes thorough review of current medical information, gathering information on past medical history/social history and prior level of function, completion of standardized testing/informal observation of tasks, assessment of data and education on plan of care and goals.     CPT codes:  [] Low Complexity PT evaluation 70715  [x] Moderate Complexity PT evaluation 79811  [] High Complexity PT evaluation 03277  [] PT Re-evaluation 53020  [] Gait training 43293 0 minutes  [] Manual therapy 31077 0 minutes  [x] Therapeutic activities 86358 15 minutes  [] Therapeutic exercises 90557 0 minutes  [] Neuromuscular reeducation 63869 0 minutes     Georges Garrett, PT, DPT  [id number]

## 2021-04-06 NOTE — PROGRESS NOTES
Occupational Therapy  OCCUPATIONAL THERAPY INITIAL EVALUATION      Date:2021  Patient Name: Diya Samayoa  MRN: [id number]  : 1933  Room: [address] [place] [address]    Evaluating OT: ANIKET Larkin, OTR/L #[id number]    Referring physician: KRISTIE Harris CNP  AM-PAC Daily Activity Raw Score:   Recommended Adaptive Equipment: TBD     Diagnosis: Altered mental status [R41.82]  Reason for Admission: Presented to hospital with abdominal pain   Pertinent Medical History/Surgery: anxiety, depression, DM, frequent UTIs, GERD, peripheral neuropathy, spinal stenosis, PVD, B hip fx surgery, cholecystectomy. Skin cancer excision, CKD, hx of PE, obesity, COVID-19 (2020), sacral wound, cellulitis, elevated troponin, metabolic encephalopathy, anemia of chronic disease, sepsis  -Patient with recent hospitalization 3/2021 due to sepsis    Precautions:  Falls, TAPS, impaired cognition, alarm, sacral wound, tele, supplemental O2      Home Living: Patient's daughter provided home set-up and PLOF. Pt lives alone (however her two daughter rotate to provide  assistance and supervision) in a one story house with wheelchair lift to enter. Bedroom and bathroom are located on the first floor. Bathroom setup: 3 in one commode, patient complete sponge bathing only   Equipment owned: wheelchair, trent lift, LAHTI stedy, bsc, hospital bed  Prior Level of Function:   Assist with ADLs. Patient able to complete feeding and light grooming tasks after set-up. Extensive assist required with dressing, bathing and toileting. Assist with IADLs. Patient was using wheelchair  for functional mobility around home with total A to propel. Patient completed transfers from various surfaces via [place] with assistance from family. Driving: no                             Occupation: not employed      Pain Level:  Moderate abdominal pain  Cognition: A&O: self:yes, place:yes, time: month and year, situation:no  -Patient lethargic but able to follow one step commands and attend to task with cues  Communication: Increased processing time required, minimal verbal responses  Follows 1 step directions   Memory:  fair -   Sequencing:  poor   Problem solving:  poor   Judgement/safety:  poor     Functional Assessment:   Initial Eval Status  Date: 4/6/21 Treatment Status  Date: Short Term Goals=LTG  Treatment frequency: PRN 1-4 x/week   Feeding  Maximal Assist  Set-up/SBA    Grooming Maximal Assist   Completed sitting edge of bed, hand over hand assistance  Set-up/SBA     UB Dressing Dependent   Moderate Assist    LB Dressing Dependent       Bathing Dependent   Maximal Assist    Toileting Dependent   Maximal Assist    Bed Mobility  Supine to sit: Max A x 2   Sit to supine: Max A x 2  Two person assist required for safety  Supine to sit: Mod A   Sit to supine: Mod A   Functional Transfers Sit to stand: NT  Stand to sit: NT  Stand pivot: NT  Unable to safely complete  Sit to stand: Mod A  Stand to sit: Mod A  Stand pivot:  Mod A  Bed<>bsc/chair using LRD   Functional Mobility NT  Unable to safely complete, patient uses wheelchair at baseline     Balance Sitting:     Static:Mod A progressing to [facility]     Standing: NT  Patient will demonstrate 15 minutes or more of unsupported sitting balance with SBA overall with UE support as needed with no LOB to improve performance with ADLs   Activity Tolerance Fair-  Fair+   Visual/  Perceptual Glasses/corrective lenses: yes, not present         Safety       Poor                  Fair     Upper Extremities:   Hand Dominance: Right [x]  Left []      ROM and Strength Coordination Short Term Goals=LTG   Right UE Active assist ROM:   Shoulder: ~0-45 degrees flexion extension  Elbow-hand: WFL, increased time to complete full ROM     Strength: 3+/5 Fine/gross Motor Coordination: Impaired     Patient will improve shoulder active ROM to 0-45 degrees flexion/extension to improve ADL performance   Left UE Active assist ROM: scapular stability/mobility, normalization muscle tone and facilitation active functional movement/Attention   [x] Patient/Family education to increase safety and functional independence   [x] Positioning to improve functional independence, safety, and skin integrity   []Sensory re-education techniques to improve extremity awareness, maintain skin integrity and improve hand function         [x]Splinting/positioning needs to maintain joint/skin integrity and prevent contractures       [x] Therapeutic Activity to improve activity tolerance for functional activities and ADLs    [x]Therapeutic Exercise to improve UE strength and activity tolerance for functional transfers and ADLs   [x] Visual/Perceptual retraining to improve body awareness and safety during functional activities and ADLs   []      Rehab Potential:  Good for established goals    Patient / Family Goal: To return home     Evaluation Time includes thorough review of current medical information, gathering information on past medical history/social history and prior level of function, completion of standardized testing/informal observation of tasks, assessment of data and education on plan of care and goals. Patient and/or family were instructed on functional diagnosis, prognosis/goals and OT plan of care. Patient demonstrated fair. Patient's daughter verbalized good understanding.     Time In: 13:27  Time Out: 13:56  Total Session Time: 29 minutes  Total Treatment Time: 9 minutes    Min Units   OT Eval Low 12021       OT Eval Medium 97166  X 1   OT Eval High 06521       OT Re-Eval [id number]       Therapeutic Ex 76173       Therapeutic Activities 01178       ADL/Self Care 70232  9  1   Orthotic Management 08752       Neuro Re-Ed 78108       Non-Billable Time          Mod OT Evaluation + 9  treatment minutes    ANIKET Brian, OTR/L #[id number]

## 2021-04-07 PROBLEM — I50.33 ACUTE ON CHRONIC DIASTOLIC CONGESTIVE HEART FAILURE (HCC): Status: ACTIVE | Noted: 2019-11-29

## 2021-04-07 NOTE — PROGRESS NOTES
Contacted Dr Teresa Walters, (PA covering) to update concerning H&H result of 7.0 HGB and Hct of 23.1. Ordered to continue with H&H lab draw every 8 hours if HGB lower than 7.0 next draw transfuse.

## 2021-04-07 NOTE — PROGRESS NOTES
Subjective: The patient is awake and alert. No acute events overnight. Denies chest pain, angina, SOB     Objective:    /63   Pulse 82   Temp 96.9 °F (36.1 °C) (Temporal)   Resp 18   Ht 5' (1.524 m)   Wt 174 lb (78.9 kg)   LMP  (LMP Unknown)   SpO2 99%   BMI 33.98 kg/m²     In: 0   Out: 1150   In: 0   Out: 1150 [Urine:1150]    General appearance: NAD, conversant  HEENT: AT/NC, MMM  Neck: FROM, supple  Lungs: Clear to auscultation  CV: RRR, no MRGs  Vasc: Radial pulses 2+  Abdomen: Soft, non-tender; no masses or HSM  Extremities: No peripheral edema or digital cyanosis  Skin: no rash, lesions or ulcers  Psych: Alert and oriented to person, place and time  Neuro: Alert and interactive     Recent Labs     04/05/21  1000 04/05/21  1817 04/06/21  0423 04/06/21 2036 04/07/21  0552   WBC 9.7 9.5 10.3  --   --    HGB 8.0* 7.4* 7.5* 7.0* 7.5*   HCT 27.2* 25.2* 24.8* 23.1* 25.0*    302 335  --   --        Recent Labs     04/05/21  1000 04/05/21 1817 04/06/21  0423    137 140   K 3.6 3.5 3.4*    105 108*   CO2 25 24 23   BUN 13 11 10   CREATININE 1.1* 1.0 0.9   CALCIUM 8.8 8.4* 8.5*       Assessment:    Principal Problem:    Altered mental status  Active Problems:    GERD (gastroesophageal reflux disease)    Type 2 diabetes mellitus with hyperglycemia, with long-term current use of insulin (HCC)    History of pulmonary embolus (PE)    Obesity (BMI 30-39. 9)    Sacral wound    Pressure injury of sacral region, stage 3 (HCC)    Pressure ulcer of right heel, stage 2 (HCC)    Controlled type 2 diabetes mellitus without complication (HCC)    Moderate protein-calorie malnutrition (HCC)    PVD (peripheral vascular disease) (HCC)    Fluid overload    Anemia of chronic disease  Resolved Problems:    * No resolved hospital problems.  *      Plan:  Admitted for evaluation of abdominal pain  CT abdomen and pelvis is negative  Hemoglobin has dropped from 9 on previous discharge to 7 this admission  Monitor H&H every 8 hours, Hemoccult stool  She is on oral anticoagulation with apixaban at home-would discontinue in an elderly lady now with acute drop in hemoglobin  Bowel regimen    Elevated BNP at 10,000  Continue diuresis with caution  Last echo is from March 2021-EF 45 to 50% with mild LVH   cardiology following       More than anything patient requires placement at this point-extremely frail and it appears that family is unable to care for her at home.          DVT Prophylaxis   PT/OT  Discharge planning           Eleanor De Leon MD  11:25 AM  4/7/2021

## 2021-04-07 NOTE — CARE COORDINATION
Pt from home, with daughter, and active with [facility] for PT/OT/SN/HHA; home DME includes hospital bed, raised toilet seat, wheel chair, nebulizer, and trent lift. Last admission pt was received from [facility]  in [facility], and family was considering [facility] however, ultimately decided to take pt home via out of pocket ambulance service through [facility]. I left pt's daughter Jenn Hensley detailed VM with request to call this CM back. Awaiting call back to determine discharge plan.

## 2021-04-07 NOTE — PLAN OF CARE
Problem: Skin Integrity:  Goal: Will show no infection signs and symptoms  Description: Will show no infection signs and symptoms  4/7/2021 0231 by Camilo Rebollar RN  Outcome: Met This Shift  4/6/2021 1548 by Jaki George RN  Outcome: Met This Shift     Problem: Skin Integrity:  Goal: Absence of new skin breakdown  Description: Absence of new skin breakdown  4/7/2021 0231 by Camilo Rebollar RN  Outcome: Met This Shift  4/6/2021 1548 by Jaki George RN  Outcome: Met This Shift     Problem: Falls - Risk of:  Goal: Will remain free from falls  Description: Will remain free from falls  4/7/2021 0231 by Camilo Rebollar RN  Outcome: Met This Shift  4/6/2021 1548 by Jaki George RN  Outcome: Met This Shift

## 2021-04-07 NOTE — HOME CARE
Patient is active with [facility] for SN/PT/OT/HHA. Will need resumption of care orders at discharge.  Thank you, [facility]

## 2021-04-07 NOTE — PROGRESS NOTES
Comprehensive Nutrition Assessment    Type and Reason for Visit:  Initial, Positive Nutrition Screen    Nutrition Recommendations/Plan: Continue current diet, Start Boost Pudding TID    Nutrition Assessment:  Pt nutritionally at risk w/ increased nutrient needs for multiple pressure injuries. Noted hx DM, dysphagia on modified diet. Will provide ONS and monitor    Malnutrition Assessment:  Malnutrition Status: At risk for malnutrition (Comment)    Context:  Acute Illness     Findings of the 6 clinical characteristics of malnutrition:  Energy Intake:  Mild decrease in energy intake (Comment)  Weight Loss:  No significant weight loss     Body Fat Loss:  No significant body fat loss     Muscle Mass Loss:  No significant muscle mass loss    Fluid Accumulation:  No significant fluid accumulation     Strength:  Not Performed    Estimated Daily Nutrient Needs:  Energy (kcal):  (MSJ 1146 x 1.3 SF); Weight Used for Energy Requirements:  Current     Protein (g):  70-80; Weight Used for Protein Requirements:  Ideal(1.5-1.8)        Fluid (ml/day):  1500 ml per order; Method Used for Fluid Requirements:  1 ml/kcal      Nutrition Related Findings:  pt alert, soft abd, active BS, +1 edema, -I/Os      Wounds:  Multiple, Pressure Injury, Stage II, Stage III(staging per H&P)       Current Nutrition Therapies:    DIET CARDIAC; Low Sodium (2 GM); Dental Soft; Mildly Thick (Nectar); Daily Fluid Restriction: 1500 ml    Anthropometric Measures:  · Height: 5' (152.4 cm)  · Current Body Weight: 174 lb (78.9 kg)(measured per EMR 3/30/21, UTO updated wt)   · Usual Body Weight: 178 lb (80.7 kg)(actual per EMR 11/2020)     · Ideal Body Weight: 100 lbs; % Ideal Body Weight 174 %   · BMI: 34  · BMI Categories: Obese Class 1 (BMI 30.0-34. 9)       Nutrition Diagnosis:   · Increased nutrient needs related to increase demand for energy/nutrients as evidenced by wounds      Nutrition Interventions:   Food and/or Nutrient Delivery:

## 2021-04-07 NOTE — CARE COORDINATION
Left second  for Vivi Bobby with request to please call this CM back d/t pt is ready for discharge. I did speak with pt's other daughter Wilber Smith whom relayed that she is aware pt will discharge tomorrow however, they have not made up their minds regarding destination. Wilber Smith did not offer any further information to this CM although she did take my number to call back. I messaged attending to relay the aforementioned.

## 2021-04-08 NOTE — PROGRESS NOTES
Occupational Therapy  OT BEDSIDE TREATMENT NOTE      Date:2021  Patient Name: Chase Dubose  MRN: [id number]  : 1933  Room: [address] [place], [place] [address]     Evaluating OT: ANIKET Garrison, OTR/L #[id number]     Referring physician: KRISTIE Jonas CNP  AM-PAC Daily Activity Raw Score:   Recommended Adaptive Equipment: TBD      Diagnosis: Altered mental status [R41.82]  Reason for Admission: Presented to hospital with abdominal pain   Pertinent Medical History/Surgery: anxiety, depression, DM, frequent UTIs, GERD, peripheral neuropathy, spinal stenosis, PVD, B hip fx surgery, cholecystectomy. Skin cancer excision, CKD, hx of PE, obesity, COVID-19 (2020), sacral wound, cellulitis, elevated troponin, metabolic encephalopathy, anemia of chronic disease, sepsis  -Patient with recent hospitalization 3/2021 due to sepsis     Precautions:  Falls, TAPS, impaired cognition, alarm, sacral wound, tele, supplemental O2   -Modified diet: Nectar thick liquids, dental soft     Home Living: Patient's daughter provided home set-up and PLOF. Pt lives alone (however her two daughter rotate to provide  assistance and supervision) in a one story house with wheelchair lift to enter. Bedroom and bathroom are located on the first floor. Bathroom setup: 3 in one commode, patient complete sponge bathing only   Equipment owned: wheelchair, trent lift, Jett [place] stedy, bsc, hospital bed  Prior Level of Function:   Assist with ADLs. Patient able to complete feeding and light grooming tasks after set-up. Extensive assist required with dressing, bathing and toileting. Assist with IADLs. Patient was using wheelchair  for functional mobility around home with total A to propel. Patient completed transfers from various surfaces via [place] with assistance from family.    Driving: no                             Occupation: not employed      21  Pain Level: Patient reported no pain  Cognition: A&O: self:yes, place:yes, time: month and this date includes:    ADL training-  Instruction/training on safety and adapted techniques for completion of ADLs: Facilitated grooming and feeding seated edge of bed for safety. Max A to comb hair using B UE with hand over hand assistance and continuous verbal/tactile cues for initiation, technique, thoroughness, and positioning. Patient instructed to complete self-feeding with max A overall using R UE with hand over hand assistance and verbal/tactile cues for safety, technique, and initiation, Increased time and effort required with assistance provided for reaching, grasping, releasing, and manipulation of utensil/cup.   Mobility training-  Instruction/training on safety and improved independence with bed mobility. Dependent x2 supine<>sit with verbal/tactile cues for initiation, hand placement, participation, and technique. Two person assist required for safety.      Sitting EOB x ~20 minutes to improve dynamic sitting balance and activity tolerance during ADLs. Patient required CGA<>mod A for sitting balance with continuous verbal/tactile cues for postural control and hand placement due to R lateral lean tendency.   Skilled positioning/alignment-  Proper Positioning/Alignment: Max A to roll for L for re-positioning utilizing TAPS at end of session. Completed for pain management and joint/skin protection.   Skilled monitoring of O2 sats-  Skilled monitoring of O2 sats, HR, and pt response throughout treatment. Patient on 3 L O2. HR: 80s bpm; SpO2: >93% throughout session  Comments: Nursing approved OT session. Upon arrival pt semi-supine in bed, agreeable to session. Patient continues to demonstrated delayed processing time and minimal verbal/responses with flat affect. Pt educated on safety with transfers and technique for ADLs. At end of session semi-supine in bed all lines and tubes intact, call light within reach. · Pt has made fair progress towards set goals.    · Continue with current plan of care with focus on improving patient activity tolerance and participation with self-care.        Treatment Time In:09:22            Treatment Time Out: 09:56    Total Treatment Time:   34 minutes        Treatment Charges: Mins Units   Ther Ex  71546     Manual Therapy [place] 8141 [id number] 78 1   ADL/Home Mgt [id number] 20 1   Neuro Re-ed 06143     Group Therapy      Orthotic manage/training  50353     Non-Billable Time     Total Timed Treatment 34 2       Completed by: ANIKET Carney, OTR/L #[id number]

## 2021-04-08 NOTE — DISCHARGE SUMMARY
Physician Discharge Summary     Patient ID:  Sissy Madera  [id number]  80 y.o.  6/29/1933    Admit date: 4/5/2021    Discharge date and time: 4/8/2021    Admission Diagnoses:   Patient Active Problem List   Diagnosis    GERD (gastroesophageal reflux disease)    Type 2 diabetes mellitus with hyperglycemia, with long-term current use of insulin (HCC)    CKD (chronic kidney disease) stage 3, GFR 30-59 ml/min (HCC)    Essential hypertension    History of pulmonary embolus (PE)    Acute kidney injury superimposed on CKD ([facility] [address]    Acute on chronic diastolic congestive heart failure (HCC)    Obesity (BMI 30-39. 9)    COVID-19    Anxiety and depression    Pneumonia due to COVID-19 virus    Uncontrolled type 2 diabetes mellitus with hyperglycemia (HCC)    Sacral wound    Pressure injury of sacral region, stage 3 (HCC)    Pressure ulcer of right heel, stage 2 (HCC)    Cellulitis    Controlled type 2 diabetes mellitus without complication (HCC)    Sepsis (HCC)    Elevated troponin    Acute metabolic encephalopathy    Moderate protein-calorie malnutrition (HCC)    PVD (peripheral vascular disease) (HCC)    Positive blood culture    Altered mental status    Fluid overload    Anemia of chronic disease    Paroxysmal atrial fibrillation (HCC)    Cardiomyopathy ([facility] [address]    Nonrheumatic aortic valve insufficiency    EKG, abnormal    Pleural effusion on right       Discharge Diagnoses: Heart failure, lethargy, questionable GI bleed    Consults: Cardiology    Procedures: None    Hospital Course: Admitted for evaluation of abdominal pain  CT abdomen and pelvis is negative  Hemoglobin has dropped from 9 on previous discharge to 7 this admission  Monitor H&H every 8 hours, Hemoccult stool  She is on oral anticoagulation with apixaban at home-would discontinue in an elderly lady now with acute drop in hemoglobin  Bowel regimen     Elevated BNP at 10,000  Continue diuresis with caution  Last echo is from March 2021-EF 45 to 50% with mild LVH   cardiology following        More than anything patient requires placement at this point-extremely frail and it appears that family is unable to care for her at home.-However they wish to take her home and possibly get hospice involved at home-discussed with daughter at bedside          Recent Labs     04/05/21 1817 04/06/21  0423 04/06/21  0423 04/07/21  1320 04/07/21  2056 04/08/21  0612   WBC 9.5 10.3  --   --   --   --    HGB 7.4* 7.5*   < > 7.7* 7.8* 8.1*   HCT 25.2* 24.8*   < > 25.7* 25.4* 26.1*    335  --   --   --   --     < > = values in this interval not displayed. Recent Labs     04/05/21 1817 04/06/21 0423    140   K 3.5 3.4*    108*   CO2 24 23   BUN 11 10   CREATININE 1.0 0.9   CALCIUM 8.4* 8.5*       Ct Head Wo Contrast    Result Date: 4/6/2021  EXAMINATION: CT OF THE HEAD WITHOUT CONTRAST  4/6/2021 2:46 am TECHNIQUE: CT of the head was performed without the administration of intravenous contrast. Dose modulation, iterative reconstruction, and/or weight based adjustment of the mA/kV was utilized to reduce the radiation dose to as low as reasonably achievable. COMPARISON: December 1, 2019 HISTORY: ORDERING SYSTEM PROVIDED HISTORY: ams TECHNOLOGIST PROVIDED HISTORY: Reason for exam:->[facility] Has a \"code stroke\" or \"stroke alert\" been called? ->No Decision Support Exception->Emergency Medical Condition (MA) What reading provider will be dictating this exam?->CRC FINDINGS: Tiny likely old lacunar infarcts in the right caudate nucleus. No mass, hemorrhage or midline shift. There is atrophy and likely chronic microvascular ischemic disease. Unremarkable bony calvarium. Atrophy and likely chronic microvascular ischemic disease. Likely old lacunar infarcts in the right caudate nucleus.      Ct Abdomen Pelvis W Iv Contrast Additional Contrast? None    Result Date: 4/5/2021  EXAMINATION: CT OF THE ABDOMEN AND PELVIS WITH CONTRAST 4/5/2021 10:23 pm TECHNIQUE: CT of the abdomen and pelvis was performed with the administration of intravenous contrast. Multiplanar reformatted images are provided for review. Dose modulation, iterative reconstruction, and/or weight based adjustment of the mA/kV was utilized to reduce the radiation dose to as low as reasonably achievable. COMPARISON: None. HISTORY: ORDERING SYSTEM PROVIDED HISTORY: pain TECHNOLOGIST PROVIDED HISTORY: Additional Contrast?->None Reason for exam:->pain Decision Support Exception->Emergency Medical Condition (MA) What reading provider will be dictating this exam?->CRC FINDINGS: Lower Chest: Small right pleural effusion with adjacent airspace opacity consistent with atelectasis and/or pneumonia. Debris identified within bronchi to the right lower lobe. Correlate with aspiration clinically. Moderate-sized hiatal hernia. Organs: Cholecystectomy. Intra and extrahepatic biliary duct dilatation most likely related to reservoir effect. The spleen, adrenal glands and pancreas are unremarkable. Bilateral renal cortical atrophy. GI/Bowel: Diffuse colonic fecal retention. The small bowel and appendix are normal. Pelvis: Diffuse urinary bladder wall thickening. Small focus of air within the urinary bladder of unknown clinical significance. Myometrial calcifications possibly related to uterine fibroids. . Peritoneum/Retroperitoneum: No free air or free fluid Bones/Soft Tissues: Degenerative changes thoracolumbar spine. Grade 1 anterolisthesis L4 over L5. internal fixation hardware involving both proximal femurs. Degenerative changes involving both hip and SI joints. Small right pleural effusion with adjacent airspace opacity consistent with atelectasis and/or pneumonia. Debris identified within the bronchi to the right lower lobe. Correlate with aspiration clinically. Diffuse colonic fecal retention. Cholecystectomy.   Intra and extrahepatic biliary duct dilatation most likely related to reservoir effect. Bilateral renal cortical atrophy. Diffuse urinary bladder wall thickening. Small focus of air within the urinary bladder of unknown clinical significance. Myometrial calcifications. Hiatal hernia. Xr Chest Portable    Result Date: 4/6/2021  EXAMINATION: ONE XRAY VIEW OF THE CHEST 4/6/2021 6:30 pm COMPARISON: 04/05/2021 HISTORY: ORDERING SYSTEM PROVIDED HISTORY: chf TECHNOLOGIST PROVIDED HISTORY: Reason for exam:->chf What reading provider will be dictating this exam?->CRC FINDINGS: Moderate right pleural effusion not present on prior study. No pneumothorax. Stable cardiomegaly. The osseous structures are without acute process. Moderate-sized right pleural effusion not present on 04/05/2021. Xr Chest Portable    Result Date: 4/5/2021  EXAMINATION: ONE XRAY VIEW OF THE CHEST 4/5/2021 6:42 pm COMPARISON: March 29, 2021 HISTORY: ORDERING SYSTEM PROVIDED HISTORY: ams TECHNOLOGIST PROVIDED HISTORY: Reason for exam:->ams What reading provider will be dictating this exam?->CRC FINDINGS: Increased interstitial and hazy opacities in both lungs compared to prior. Mild cardiomegaly. Mild blunting of right costophrenic sulcus. No pneumothorax. Increased interstitial and hazy opacities could suggest developing interstitial pulmonary edema with probable right pleural effusion or subsegmental atelectasis. Continued follow-up could be helpful for further evaluation. Discharge Exam:    HEENT: NCAT,  PERRLA, No JVD  Heart:  RRR, no murmurs, gallops, or rubs.   Lungs:  CTA bilaterally, no wheeze, rales or rhonchi  Abd: bowel sounds present, nontender, nondistended, no masses  Extrem:  No clubbing, cyanosis, or edema    Disposition: Home-family has already discussed with hospice for information    Patient Condition at Discharge: Stable but with guarded/poor prognosis secondary to advanced age and current comorbidities    Patient Instructions:      Medication List      START taking these medications bumetanide 1 MG tablet  Commonly known as: Bumex  Take 1 tablet by mouth daily     docusate 100 MG Caps  Commonly known as: COLACE, DULCOLAX  Take 200 mg by mouth 2 times daily     ondansetron 4 MG/5ML solution  Commonly known as: Zofran  Take 5 mLs by mouth 2 times daily as needed for Nausea or Vomiting        CONTINUE taking these medications    acetaminophen 325 MG tablet  Commonly known as: TYLENOL     amoxicillin 500 MG capsule  Commonly known as: AMOXIL  Take 1 capsule by mouth every 12 hours for 7 days     aspirin 81 MG chewable tablet  Take 1 tablet by mouth daily     atorvastatin 40 MG tablet  Commonly known as: LIPITOR  Take 1 tablet by mouth nightly     bisacodyl 10 MG suppository  Commonly known as: DULCOLAX     Caltrate 600+D3 600-800 MG-UNIT Tabs  Generic drug: Calcium Carb-Cholecalciferol     carvedilol 12.5 MG tablet  Commonly known as: COREG  Take 1 tablet by mouth 2 times daily (with meals)     * Voltaren 1 % Gel  Generic drug: diclofenac sodium     * diclofenac sodium 1 % Gel  Commonly known as: VOLTAREN  apply 2 grams to affected area four times a day     fish oil 1000 MG Caps     fluticasone 50 MCG/ACT nasal spray  Commonly known as: FLONASE     HumaLOG KwikPen 100 UNIT/ML Sopn  Generic drug: insulin lispro (1 Unit Dial)     ipratropium-albuterol 0.5-2.5 (3) MG/3ML Soln nebulizer solution  Commonly known as: DUONEB  Take 3 mLs by nebulization every 4 hours as needed for Shortness of Breath     isosorbide mononitrate 30 MG extended release tablet  Commonly known as: IMDUR  Take 1 tablet by mouth daily     lansoprazole 30 MG delayed release capsule  Commonly known as: PREVACID  Take 1 capsule by mouth daily     levoFLOXacin 500 MG tablet  Commonly known as: LEVAQUIN  Take 1 tablet by mouth every other day for 7 days     magnesium hydroxide 400 MG/5ML suspension  Commonly known as: MILK OF MAGNESIA     mineral oil-hydrophilic petrolatum ointment     montelukast 10 MG tablet  Commonly known as: SINGULAIR  Take 1 tablet by mouth nightly     nitroGLYCERIN 0.4 MG SL tablet  Commonly known as: NITROSTAT     nystatin 249790 UNIT/GM powder  Commonly known as: MYCOSTATIN     sertraline 100 MG tablet  Commonly known as: ZOLOFT     Victoza 18 MG/3ML Sopn SC injection  Generic drug: Liraglutide  Inject 1.2 mg into the skin daily         * This list has 2 medication(s) that are the same as other medications prescribed for you. Read the directions carefully, and ask your doctor or other care provider to review them with you. STOP taking these medications    apixaban 5 MG Tabs tablet  Commonly known as: Eliquis           Where to Get Your Medications      These medications were sent to [facility], [address] [address]    Phone: [phone]   · bumetanide 1 MG tablet  · docusate 100 MG Caps  · ondansetron 4 MG/5ML solution       Activity: activity as tolerated  Diet: regular diet    Pt has been advised to: Follow-up with Moriah Lane DO in 1 week.   Follow-up with consultants as recommended by them    Note that over 30 minutes was spent in preparing discharge papers, discussing discharge with patient, medication review, etc.    Signed:  Gómez Olsen MD  4/8/2021  2:40 PM

## 2021-04-08 NOTE — PROGRESS NOTES
CLINICAL PHARMACY NOTE: MEDS TO [address] [place] [address] Drive Select Patient?: No  Total # of Prescriptions Filled: 3   The following medications were delivered to the patient:  · Stool softener 100 mg  · Bumetanide 1 mg  · Ondansetron orally dissolving tablet 4 mg  Total # of Interventions Completed: 3  Time Spent (min): 15    Additional Documentation:

## 2021-04-08 NOTE — FLOWSHEET NOTE
Inpatient Wound Care (Initial consult) 7404A    Admit Date: 4/5/2021  5:54 PM    Reason for consult:  Coccyx, left heel    Significant history: Per H&P    HISTORY OF PRESENT ILLNESS:       The patient is a 80 y.o. female of Juan Jose Esparza DO with significant past medical history of recent admission to the hospital at which time she was treated for sepsis from decubitus ulcers, she was discharged in stable condition on Augmentin by infectious disease. She presents back again 2 days later secondary to abdominal pain per her daughter. Hemoglobin has dropped from around 9 on discharge to 7 on admission this time. She is on Eliquis at home for questionable pulmonary embolism-unclear when this diagnosis was made. On my evaluation she is extremely weak lethargic and frail appearing. She requires placement however her daughter wanted to take her home last time. On my evaluation she complains of intermittent abdominal pain otherwise no acute complaints. CT abdomen small right pleural effusion, diffuse colonic fecal retention     Findings:      04/08/21 1215   Skin Integrity   Skin Integrity Bruising   Location BUE   Skin Integrity Site 2   Skin Integrity Location 2   (dried scab)   Location 2   (right lower leg)   Wound 02/17/21 Heel Left #1   Date First Assessed/Time First Assessed: 02/17/21 1349   Present on Hospital Admission: Yes  Wound Approximate Age at First Assessment (Weeks): 2 weeks  Primary Wound Type: Diabetic Ulcer  Location: Heel  Wound Location Orientation: Left  Wound Descri. ..    Wound Image    Wound Etiology Pressure Unstageable   Dressing/Treatment   (mepilex)   Wound Length (cm) 1 cm   Wound Width (cm) 0.9 cm   Wound Depth (cm)   (unable to determine)   Wound Surface Area (cm^2) 0.9 cm^2   Change in Wound Size % (l*w) 66.17   Wound Assessment Pink/red  (yellow)   Drainage Amount Scant   Drainage Description Serosanguinous   Odor None   Xena-wound Assessment Non-blanchable erythema   Wound 02/17/21 Coccyx #2   Date First Assessed/Time First Assessed: 02/17/21 1349   Wound Approximate Age at First Assessment (Weeks): 52 weeks  Primary Wound Type: Pressure Injury  Location: Coccyx  Wound Description (Comments): #2   Wound Image    Wound Etiology Pressure Stage  4   Dressing Status New dressing applied   Wound Cleansed Cleansed with saline   Dressing/Treatment ABD; Moist to dry   Wound Length (cm) 1.6 cm   Wound Width (cm) 1 cm   Wound Depth (cm) 1 cm   Wound Surface Area (cm^2) 1.6 cm^2   Change in Wound Size % (l*w) -61.62   Wound Volume (cm^3) 1.6 cm^3   Wound Healing % 15   Undermining Starts ___ O'Clock 12   Undermining Ends___ O'Clock 12   Undermining Maxium Distance (cm) [email or web address]   Wound Assessment Pink/red  (tan)   Drainage Amount Scant   Drainage Description Serosanguinous   Odor None   Xena-wound Assessment Maceration      **Informed Consent**     photos taken of wounds and inserted into their chart as part of their permanent medical record for purposes of documentation, treatment management and/or medical review. All Images taken on 4/8/21 of patient name: Telly Cantor were transmitted and stored on secured [employer] located within [facility] by a registered [employer] Mobile Application Device. Impression:  Coccyx: stage 4  Left heel: unstageable    Plan:opticell, abd pad  Heel mepilex  Low air loss module   comfort glide  Heel protectors  Patient will need continued preventative care.       Gabriela Dubon 4/8/2021 2:03 PM

## 2021-04-08 NOTE — PROGRESS NOTES
Physical Therapy  Facility/Department: [facility]  Daily Treatment Note  NAME: Diya Samayoa  : 1933  MRN: [id number]    Date of Service: 2021    Referring Provider:  KRISTIE Harris CNP     Date of Service: 2021     Evaluating PT:  Radha Lopez PT, [id number] [id number]        Room #:  [address] [place] [address]-A  Diagnosis:  Altered mental status  PMHx/PSHx:  Peripheral neuropathy, HTN, depression, anxiety, diabetes, spinal stenosis, GERD, frequent UTI, PVD, cholecystectomy, skin CA excision, B hip fracture surgeries  Procedure/Surgery:  None this admission  Precautions:  Falls, cognition, bed alarm, O2  Equipment Needs:  Pt owns all necessary DME     SUBJECTIVE:     Pt has  family assistance with family members in a rotation in a single story house with w/c lift to enter. Bed is on first floor and bath is on first floor. She sleeps in a hospital bed and family assists with transfers using Raul Carmel steady lift. She uses w/c for primary mobility with family assisting to propel. DME owned: w/c, walkers, trent lift, gianfranco steady lift, hospital bed, [facility]     OBJECTIVE:    Initial Evaluation  Date: 21 Treatment  21 Short Term/ Long Term   Goals   AM-PAC 6 Clicks 3/33  0/14     Was pt agreeable to Eval/treatment? yes  yes     Does pt have pain? No c/o pain  no c/o pain     Bed Mobility  Rolling: Mod A  Supine to sit: Max Ax2  Sit to supine: Max Ax2  Scooting: Max A to EOB  Rolling: MaxA  Supine to sit: Depx2  Sit to supine: Depx2  Scooting: Max A to EOB Rolling: Min A  Supine to sit: Min A  Sit to supine: Min A  Scooting: Min A to EOB   Transfers Sit to stand: NT  Stand to sit: NT  Stand pivot: NT  NT for safety Sit to stand: Mod A  Stand to sit: Mod A  Stand pivot: TBD   Ambulation    NT  NT NA, pt non-ambulatory at recent baseline.    Stair negotiation: ascended and descended  NT   TBD   ROM BUE:  Defer to OT  BLE:  WNL       Strength BUE:  Defer to OT  BLE:  4-/5       Balance Sitting EOB:  Mod>CGA  Dynamic Standing:  NT  Sitting EOB: Mod>CGA  Dynamic standing: NT Sitting EOB:  Supervision  Dynamic Standing:  TBD      Pt is A & O x 3, latent responses with 10-15 second delay for all verbal responses and initiation of motor plans  Sensation:  Pt denies numbness and tingling to extremities  Edema:  L UE    Therapeutic Exercises:    Seated reaching activity with alternating UEs to variable heights/distances and crossing midline x5 minutes  LAQ sitting EOB with AAROM 10x ea    Patient education  Pt educated on participation with bed mobility, sitting balance and posture, max engagement with all activities    Patient response to education:   Pt verbalized understanding Pt demonstrated skill Pt requires further education in this area   yes yes yes     ASSESSMENT:    Comments:  Pt resting semi-supine upon arrival, agreeable to PT session with OT collaboration to allow safe mobility. Pt requires manual assist for progression of B LEs and R UE to initiate supine>sit and rolling. Pt has minimal engagement of extremities and trunk with supine<>sit transitions this date. She maintains R lateral listing with kyphotic posture throughout all sitting. Pt balance improved at EOB with additional time upright. Pt continues to have latency of all motor planning and requires manual assist for B LE TE's and increased time to reach target of therapist's hand during seated reaching. PT assisting with improved sitting posture and balance during seated grooming/feeding activities completed at EOB with OT. Increased balance assist required as pt fatigued and with L UE engaging in tasks rather than providing support on bed rail. Pt returned to semi-supine upon completion of session with skilled positioning implemented to improve posture and decreased risk of breakdown.     Treatment:  Patient practiced and was instructed in the following treatment:     Bed mobility: cues for sequencing and engagement with transitions, manual assist for B LE and trunk negotiation   Therapeutic activities: sitting EOB x20 minutes with verbal/tactile cues for improved balance and posture, manual assist for sitting balance, skilled positioning upon returning to semi-supine   Therapeutic exercises: completion of the above TE's for improved B LE circulation and for improved sitting balance to allow engagement in functional tasks    PLAN:    Patient is making continued progress towards established goals. Will continue with current POC.         PLAN:    Time in  0923  Time out  0955    Total Treatment Time  32    CPT codes:  [] Gait training 11457 0 minutes  [] Manual therapy 85636 0 minutes  [x] Therapeutic activities 83835 22 minutes  [x] Therapeutic exercises 55367 10 minutes  [] Neuromuscular reeducation 71080 0 minutes      Gena Locke, PT, DPT  [id number]

## 2021-04-08 NOTE — PROGRESS NOTES
[id number]   Department of Pharmacy   Pharmacist Transition of Care Services         Patient Demographics  Name:  Ousmane Neal   Medical Record Number:  [id number]  Gender:  female   Age:  80 y.o. YOB: 1933    Readmission Risk: 34%       Pharmacist Review and Summary of Medications     Date of last reviewed/update: 04/08/21     Category Comments   New Medication Started   1. Bumex 1 mg QD  2. Zofran PO solution BID PRN  3. Docusate 200 mg BID     Change in Outpatient Medication      Discontinued/On hold Outpatient Medication  1. Eliquis     Other          Pharmacist Patient Education:    Date  Person Educated Content of Education             Documentation of Pharmacist Interventions and Follow-up Plan:     The following Pharmacist Transition of Care Services were completed:   [x]  Reviewed and summarized medication changes  []  Entire home medication list was reviewed for accuracy  []  Home medication list was updated or corrected    [x]  Reviewed discharge medication reconciliation  []  Discharge medication list was updated or corrected    [x]  Added information to AVS   []  Patient education was provided on new medications  []  Patient education was provided on medication changes  []  Reviewed the AVS with patient    Additional Interventions:  []  Inpatient prescriber was contacted and the following pharmacy recommendations        were accepted: **     [] Other interventions: **        Pharmacist: Andrei Dahl PharmD, [facility]  Date:  4/8/2021 12:19 PM

## 2021-04-08 NOTE — CARE COORDINATION
Met with pt and daughters at bedside. Discharge plan is home with [facility] order for [facility], pt requires PO testing prior to discharge (no home oxygen, pt currently on 3 liters via nasal cannula with sat of 100-96%) should pt require home going oxygen daughter Wendy Schmid choiced for [facility] notified by [employer], transportation home via [facility] private pay. The Plan for Transition of Care is related to the following treatment goals: medical stability    The Patient and/or patient representative Wendy Schmid was provided with a choice of provider and agrees   with the discharge plan. [x] Yes [] No    Freedom of choice list was provided with basic dialogue that supports the patient's individualized plan of care/goals, treatment preferences and shares the quality data associated with the providers.  [x] Yes [] No

## 2021-04-09 NOTE — CARE COORDINATION
[facility] Transitions Initial Follow Up Call    Call within 2 business days of discharge: Yes    Patient: Ousmane Neal Patient : 1933   MRN: [id number]  Reason for Admission: AMS, abdominal pain  Discharge Date: 21 RARS: Readmission Risk Score: 33      Last Discharge [facility]       Complaint Diagnosis Description Type Department Provider    21 Abdominal Pain Generalized abdominal pain . .. ED to Hosp-Admission (Discharged) (ADMITTED) Jan Bar MD; Denisse Zelaya DO        -First attempt to reach the patient's daughter Akbar Singh communication release of information form) for initial BPCI call post hospital discharge. Message left with CTN's contact information requesting return phone call. Non-face-to-face services provided:  Communication with home health agencies or other community services the patient is currently using-left message at [facility] requesting date for [facility], CTN contact inofrmation provided.         Follow Up  Future Appointments   Date Time Provider Kacy Parish   2021  2:00 PM MD JAYLON Aguayo RN

## 2021-04-12 PROBLEM — E66.01 MORBIDLY OBESE (HCC): Status: ACTIVE | Noted: 2021-01-01

## 2021-04-12 NOTE — CARE COORDINATION
Priyanka 45 Transitions Initial Follow Up Call    Call within 2 business days of discharge: Yes    Patient: Tien Zhang Patient : 1933   MRN: [id number]  Reason for Admission: AMS  Discharge Date: 21 RARS: Readmission Risk Score: 33      Last Discharge [address] [address]       Complaint Diagnosis Description Type Department Provider    21 Abdominal Pain Generalized abdominal pain . .. ED to Hosp-Admission (Discharged) (ADMITTED) Timo Luna MD; Doretta Najjar, DO            Challenges to be reviewed by the provider   Additional needs identified to be addressed with provider No  none             Method of communication with provider : none    Discussed COVID-19 related testing which was not done at this time. Test results were not done. Patient's daughter informed of results, if available? N/A    Advance Care Planning:   Does patient have an Advance Directive:  decision maker current. Was this a readmission? Yes  Patient stated reason for admission: decreased level of alertness, upper abdominal pain  Patients top risk factors for readmission: medical condition and polypharmacy    Care Transition Nurse (CTN) contacted the family by telephone to perform post hospital discharge assessment. Provided introduction to self, and explanation of the CTN role. CTN reviewed discharge instructions, medical action plan and red flags with family who verbalized understanding. Family given an opportunity to ask questions and does not have any further questions or concerns at this time. Were discharge instructions available to patient? Yes. Reviewed appropriate site of care based on symptoms and resources available to patient including: PCP and Home health. The family agrees to contact the PCP office for questions related to their healthcare.      Medication reconciliation was not performed with family as patient's daughter is agreeable to a call from [facility]-CTN will route to [facility]. Patient's daughter verbalizes understanding of administration of home medications. CTN confirmed with patient's daughter new/stopped medications as per AVS.    Was patient discharged with a pulse oximeter? No, but patient has a home oximeter. Range is 87-94% in RA per patient's daughter    Discussed follow-up appointments. If no appointment was previously scheduled, appointment scheduling offered: N/A. Is follow up appointment scheduled within 7 days of discharge? Yes, virtual visit today with PCP  Non-[facility] follow up appointment(s): none    Non-face-to-face services provided:  Scheduled appointment with Specialist-4/28/21 wound care  Obtained and reviewed discharge summary and/or continuity of care documents  Establishment or re-establishment of referrals-to route to [facility] pharmacy    Care Transitions 24 Hour Call    Do you have a copy of your discharge instructions?: Yes  Do you have all of your prescriptions and are they filled?: Yes  Have you been contacted by a [facility] Pharmacist?: No  Have you scheduled your follow up appointment?: Yes  How are you going to get to your appointment?:  (Comment: 4/12/21-virtual visit with PCP today)  Were you discharged with any Home Care or Post Acute Services: Yes  Post Acute Services: Home Health (Comment: [facility] 4/12/21-CANDI with [facility])  Do you feel like you have everything you need to keep you well at home?: Yes  Care Transitions Interventions    Pharmacist: Completed         -Spoke with patient's daughter Julio César Mary communication release of information form) for initial BPCI care transition call post hospital discharge. Explained the role of Care Transition Nurse and the BPCI-A program. Marissa Razo is a RN known to this CTN from previous outreaches. Lisandro Brown describes her mother's level of alertness as \"dazed, hazy, and flat affect. \"  HR in 80's per Marissa Razo. Has occasional slight moist non productive cough, using Duoneb per patient's daughter.   Virtual

## 2021-04-12 NOTE — TELEPHONE ENCOUNTER
Received a referral:  from Care Coordinator to review patients medications. Called patient to schedule a time to speak with a pharmacist over the telephone. Spoke to daughter and advised them of the above message. Monica France verified understanding and scheduled their appointment: tomorrow at [facility]    Patient not found in [facility]    Enedina Lord CPhT.    [address] [facility], toll free: [phone], option 7

## 2021-04-12 NOTE — PROGRESS NOTES
Post-Discharge Transitional Care Management Services or Hospital Follow Up      Adama Jacobs   YOB: 1933    Date of Office Visit:  4/12/2021  Date of Hospital Admission: 4/5/21  Date of Hospital Discharge: 4/8/21  Risk of hospital readmission (high >=14%. Medium >=10%) :Readmission Risk Score: 33      Care management risk score Rising risk (score 2-5) and Complex Care (Scores >=6): 16     Non face to face  following discharge, date last encounter closed (first attempt may have been earlier): 4/12/2021 10:14 AM    Call initiated 2 business days of discharge: Yes    Patient Active Problem List   Diagnosis    GERD (gastroesophageal reflux disease)    Type 2 diabetes mellitus with hyperglycemia, with long-term current use of insulin (HCC)    CKD (chronic kidney disease) stage 3, GFR 30-59 ml/min ([address]    Essential hypertension    History of pulmonary embolus (PE)    Acute kidney injury superimposed on CKD ([address]    Acute on chronic diastolic congestive heart failure (HCC)    Obesity (BMI 30-39. 9)    COVID-19    Anxiety and depression    Pneumonia due to COVID-19 virus    Uncontrolled type 2 diabetes mellitus with hyperglycemia (HCC)    Sacral wound    Pressure injury of sacral region, stage 3 (HCC)    Pressure ulcer of right heel, stage 2 (HCC)    Cellulitis    Controlled type 2 diabetes mellitus without complication (HCC)    Sepsis (HCC)    Elevated troponin    Acute metabolic encephalopathy    Moderate protein-calorie malnutrition (HCC)    PVD (peripheral vascular disease) (HCC)    Positive blood culture    Altered mental status    Fluid overload    Anemia of chronic disease    Paroxysmal atrial fibrillation (HCC)    Cardiomyopathy ([address]    Nonrheumatic aortic valve insufficiency    EKG, abnormal    Pleural effusion on right    Morbidly obese (HCC)       No Known Allergies    Medications listed as ordered at the time of discharge from [facility] Medication Instructions KALE:    Printed on:04/12/21 121   Medication Information                      acetaminophen (TYLENOL) 325 MG tablet  Take 650 mg by mouth every 4 hours as needed for Pain or Fever              aspirin 81 MG chewable tablet  Take 1 tablet by mouth daily             atorvastatin (LIPITOR) 40 MG tablet  Take 1 tablet by mouth nightly             bisacodyl (DULCOLAX) 10 MG suppository  Place 10 mg rectally daily as needed for Constipation             bumetanide (BUMEX) 1 MG tablet  Take 1 tablet by mouth daily             Calcium Carb-Cholecalciferol (CALTRATE 600+D3) 600-800 MG-UNIT TABS  Take 1 tablet by mouth 2 times daily             carvedilol (COREG) 12.5 MG tablet  Take 1 tablet by mouth 2 times daily (with meals)             diclofenac sodium (VOLTAREN) 1 % GEL  Apply 2 g topically 4 times daily as needed for Pain (apply to hands)             docusate sodium (COLACE, DULCOLAX) 100 MG CAPS  Take 200 mg by mouth 2 times daily             fluticasone (FLONASE) 50 MCG/ACT nasal spray  1 spray by Nasal route daily as needed for Rhinitis              gabapentin (NEURONTIN) 300 MG capsule  Take 1 capsule by mouth 2 times daily for 180 days.  Intended supply: 90 days             insulin lispro, 1 Unit Dial, (HUMALOG KWIKPEN) 100 UNIT/ML SOPN  Inject 0-10 Units into the skin 3 times daily (before meals)             ipratropium-albuterol (DUONEB) 0.5-2.5 (3) MG/3ML SOLN nebulizer solution  Take 3 mLs by nebulization every 4 hours as needed for Shortness of Breath             isosorbide mononitrate (IMDUR) 30 MG extended release tablet  Take 1 tablet by mouth daily             lansoprazole (PREVACID) 30 MG delayed release capsule  Take 1 capsule by mouth daily             Liraglutide (VICTOZA) 18 MG/3ML SOPN SC injection  Inject 1.2 mg into the skin daily             magnesium hydroxide (MILK OF MAGNESIA) 400 MG/5ML suspension  Take 30 mLs by mouth daily as needed for Constipation mineral oil-hydrophilic petrolatum (HYDROPHOR) ointment  Apply topically daily Apply to both feet             montelukast (SINGULAIR) 10 MG tablet  Take 1 tablet by mouth nightly             nitroGLYCERIN (NITROSTAT) 0.4 MG SL tablet  Place 0.4 mg under the tongue every 5 minutes as needed for Chest pain             nystatin (MYCOSTATIN) 377735 UNIT/GM powder  Apply 1 each topically 2 times daily Apply to abdominal folds             Omega-3 Fatty Acids (FISH OIL) 1000 MG CAPS  Take 1,000 mg by mouth 2 times daily             ondansetron (ZOFRAN) 4 MG/5ML solution  Take 5 mLs by mouth 2 times daily as needed for Nausea or Vomiting             sertraline (ZOLOFT) 100 MG tablet  Take 150 mg by mouth daily                   Medications marked \"taking\" at this time  Outpatient Medications Marked as Taking for the 4/12/21 encounter (Virtual Visit) with Tyrel Abena, DO   Medication Sig Dispense Refill    gabapentin (NEURONTIN) 300 MG capsule Take 1 capsule by mouth 2 times daily for 180 days.  Intended supply: 90 days 180 capsule 1    bumetanide (BUMEX) 1 MG tablet Take 1 tablet by mouth daily 30 tablet 3    ondansetron (ZOFRAN) 4 MG/5ML solution Take 5 mLs by mouth 2 times daily as needed for Nausea or Vomiting 50 mL 0    insulin lispro, 1 Unit Dial, (HUMALOG KWIKPEN) 100 UNIT/ML SOPN Inject 0-10 Units into the skin 3 times daily (before meals)      nitroGLYCERIN (NITROSTAT) 0.4 MG SL tablet Place 0.4 mg under the tongue every 5 minutes as needed for Chest pain      sertraline (ZOLOFT) 100 MG tablet Take 150 mg by mouth daily      montelukast (SINGULAIR) 10 MG tablet Take 1 tablet by mouth nightly 90 tablet 1    atorvastatin (LIPITOR) 40 MG tablet Take 1 tablet by mouth nightly 90 tablet 3    carvedilol (COREG) 12.5 MG tablet Take 1 tablet by mouth 2 times daily (with meals) 180 tablet 3    isosorbide mononitrate (IMDUR) 30 MG extended release tablet Take 1 tablet by mouth daily 90 tablet 1    ipratropium-albuterol (DUONEB) 0.5-2.5 (3) MG/3ML SOLN nebulizer solution Take 3 mLs by nebulization every 4 hours as needed for Shortness of Breath 360 mL 2    nystatin (MYCOSTATIN) 792748 UNIT/GM powder Apply 1 each topically 2 times daily Apply to abdominal folds      aspirin 81 MG chewable tablet Take 1 tablet by mouth daily 30 tablet 0    acetaminophen (TYLENOL) 325 MG tablet Take 650 mg by mouth every 4 hours as needed for Pain or Fever       fluticasone (FLONASE) 50 MCG/ACT nasal spray 1 spray by Nasal route daily as needed for Rhinitis       mineral oil-hydrophilic petrolatum (HYDROPHOR) ointment Apply topically daily Apply to both feet          Medications patient taking as of now reconciled against medications ordered at time of hospital discharge: Yes    Chief Complaint   Patient presents with    Follow-Up from Hospital     Pt and daughter Radha Portillo calling to follow up from multiple hospital admissions    Neurologic Problem     Pt's daughter concerned of pt's total neurological status change, AMS, confusion, flat affect, pt is reactive but will not willingly converse, pt's daughter reports pt is A&O x3    Respiratory Distress     Pt's daughter concerned of pt's pulmonary effusion    Atrial Fibrillation     Pt also went into Afib during admission - no new medications for this    Seizures     Per pt's daughter Radha Portillo pt possible had a witnessed seizure yesterday    GI Problem     Pt has been having nausea and vomiting, pt's daughter thinks this is from her being d/c from Gabapentin in hospital admission and has never been resumed    Diabetes     Pt has had low/minimal caloric intake and pt's daughter concerned that she may be hypoglycemic at time.  Insulin AND Victoza is ON HOLD at this time    Other     Patient consents to telemedicine visit and [place]       History of Present illness - Follow up of Hospital diagnosis(es): post wound care and infectious disease     Inpatient course: Discharge summary reviewed- see chart. Interval history/Current status: bed ridden and doing poorly     A comprehensive review of systems was negative except for what was noted in the HPI. There were no vitals filed for this visit. There is no height or weight on file to calculate BMI. Wt Readings from Last 3 Encounters:   04/08/21 158 lb 1 oz (71.7 kg)   03/30/21 174 lb 3 oz (79 kg)   03/24/21 144 lb (65.3 kg)     BP Readings from Last 3 Encounters:   04/08/21 132/61   04/03/21 139/63   03/29/21 (!) 80/49        Physical Exam:  TeleMedicine Video Visit    Elizabeth Diaz, was evaluated through a synchronous (real-time) audio-video encounter. The patient (or guardian if applicable) is aware that this is a billable service. Verbal consent to proceed has been obtained within the past 12 months. The visit was conducted pursuant to the emergency declaration under the [address] [place] [address] authority and the [employer] and [employer] General Act. Patient identification was verified, and a caregiver was present when appropriate. The patient was located in a state where the provider was credentialed to provide care. Patient identification was verified at the start of the visit, including the patient's telephone number and physical location. I discussed with the patient the nature of our telehealth visits, that:     1. Due to the nature of an audio- video modality, the only components of a physical exam that could be done are the elements supported by direct observation. 2. I would evaluate the patient and recommend diagnostics and treatments based on my assessment. 3. If it was felt that the patient should be evaluated in clinic or an emergency room setting, then they would be directed there. 4. Our sessions are not being recorded and that personal health information is protected.   5. Our team would provide follow up care in person if/when the patient needs it. Patient's location: home address in [address] [place] [address] Dr  Physician  location home address in [facility] other people involved in call  , is her daughter      On this date 4/12/2021 I have spent 40  minutes reviewing previous notes, test results and face to face (virtual) with the patient discussing the diagnosis and importance of compliance with the treatment plan as well as documenting on the day of the visit. ,     This visit was completed virtually using Doxy. me            Assessment/Plan:  1. Morbidly obese ([address]  --talk diet and weight loss    2. Type 2 diabetes mellitus with hyperglycemia, with long-term current use of insulin ([address]  Long talk on treatment and prevention  Literature is given       3. Essential hypertension  --patient is instructed on low to moderate sodium ( 2 to 2.5 grams ), daily    Also to increase potassium in the diet to about 3.5 grams daily    Literature is provided       4. Acute on chronic diastolic congestive heart failure ([address]  --patient is stable on good preload and afterload reduction    Patient is doing well on Bumex for a diuretic   --this patient has good systolic  support      5. Wound of sacral region, subsequent encounter  --follows wound care and infectious disease     6. PVD (peripheral vascular disease) (HCC)  ---VASCULAR PANEL  A) ASA, plavix, aggrenox  B) coumadin, pletal, tzd, STATIN  C) ace, BUMEX, folic, ccb  D) cannikinumab, fish oils     ---CARDIAC---ASA, ace, BETA, STATIN, BUMEX, ( ccb )    7.  Stage 3b chronic kidney disease  --stable on current care planning  -- continue treatment as we are meeting goals           Medical Decision Making: high complexity

## 2021-04-13 NOTE — TELEPHONE ENCOUNTER
CLINICAL PHARMACY NOTE - Medication Review  Patient outreach to review medications - Spoke with daughter. SUBJECTIVE/OBJECTIVE:   Elizabeth Diaz is a 80 y.o. female referred to a clinical pharmacy specialist by care coordination. Her daughter had questions about some of her medications    Medications:  Current Outpatient Medications   Medication Sig Dispense Refill    gabapentin (NEURONTIN) 300 MG capsule Take 1 capsule by mouth 2 times daily for 180 days.  Intended supply: 90 days 180 capsule 1    bumetanide (BUMEX) 1 MG tablet Take 1 tablet by mouth daily 30 tablet 3    ondansetron (ZOFRAN) 4 MG/5ML solution Take 5 mLs by mouth 2 times daily as needed for Nausea or Vomiting 50 mL 0    bisacodyl (DULCOLAX) 10 MG suppository Place 10 mg rectally daily as needed for Constipation      insulin lispro, 1 Unit Dial, (HUMALOG KWIKPEN) 100 UNIT/ML SOPN Inject 0-10 Units into the skin 3 times daily (before meals)      nitroGLYCERIN (NITROSTAT) 0.4 MG SL tablet Place 0.4 mg under the tongue every 5 minutes as needed for Chest pain      sertraline (ZOLOFT) 100 MG tablet Take 150 mg by mouth daily      montelukast (SINGULAIR) 10 MG tablet Take 1 tablet by mouth nightly 90 tablet 1    atorvastatin (LIPITOR) 40 MG tablet Take 1 tablet by mouth nightly 90 tablet 3    carvedilol (COREG) 12.5 MG tablet Take 1 tablet by mouth 2 times daily (with meals) 180 tablet 3    isosorbide mononitrate (IMDUR) 30 MG extended release tablet Take 1 tablet by mouth daily 90 tablet 1    lansoprazole (PREVACID) 30 MG delayed release capsule Take 1 capsule by mouth daily 90 capsule 1    ipratropium-albuterol (DUONEB) 0.5-2.5 (3) MG/3ML SOLN nebulizer solution Take 3 mLs by nebulization every 4 hours as needed for Shortness of Breath 360 mL 2    aspirin 81 MG chewable tablet Take 1 tablet by mouth daily 30 tablet 0    docusate sodium (COLACE, DULCOLAX) 100 MG CAPS Take 200 mg by mouth 2 times daily (Patient not taking: Reported on 4/12/2021) 60 capsule 0    magnesium hydroxide (MILK OF MAGNESIA) 400 MG/5ML suspension Take 30 mLs by mouth daily as needed for Constipation      diclofenac sodium (VOLTAREN) 1 % GEL Apply 2 g topically 4 times daily as needed for Pain (apply to hands)      Liraglutide (VICTOZA) 18 MG/3ML SOPN SC injection Inject 1.2 mg into the skin daily (Patient not taking: Reported on 4/12/2021) 5 pen 1    Calcium Carb-Cholecalciferol (CALTRATE 600+D3) 600-800 MG-UNIT TABS Take 1 tablet by mouth 2 times daily      nystatin (MYCOSTATIN) 781548 UNIT/GM powder Apply 1 each topically 2 times daily Apply to abdominal folds      Omega-3 Fatty Acids (FISH OIL) 1000 MG CAPS Take 1,000 mg by mouth 2 times daily      acetaminophen (TYLENOL) 325 MG tablet Take 650 mg by mouth every 4 hours as needed for Pain or Fever       fluticasone (FLONASE) 50 MCG/ACT nasal spray 1 spray by Nasal route daily as needed for Rhinitis       mineral oil-hydrophilic petrolatum (HYDROPHOR) ointment Apply topically daily Apply to both feet       No current facility-administered medications for this visit. Allergies:   No Known Allergies    Pertinent Labs/Vitals:  BP Readings from Last 3 Encounters:   04/08/21 132/61   04/03/21 139/63   03/29/21 (!) 80/49     Lab Results   Component Value Date    LABMICR 21.2 (H) 01/23/2018     Lab Results   Component Value Date    LABA1C 6.6 (H) 04/12/2021    LABA1C 7.0 (H) 03/29/2021    LABA1C 7.5 (H) 03/17/2021     Lab Results   Component Value Date    CHOL 83 11/17/2020    TRIG 87 11/17/2020    HDL 38 11/17/2020    LDLCALC 28 11/17/2020     ALT   Date Value Ref Range Status   04/06/2021 8 0 - 32 U/L Final     AST   Date Value Ref Range Status   04/06/2021 13 0 - 31 U/L Final     The ASCVD Risk score (Lilli Case., et al., 2013) failed to calculate for the following reasons:     The 2013 ASCVD risk score is only valid for ages 36 to 78    The patient has a prior MI or stroke diagnosis     Lab Results   Component message with my info in case she has any more questions. - Upcoming appointments:   Future Appointments   Date Time Provider Kacy Parish   4/13/2021  2:00 PM SCHEDULE, [facility] Rx None   4/28/2021  2:00 PM Jennifer Jernigan MD [place] [address] Prosper Chou, PharmD, [address]  Direct: [phone]  Department, toll free: [phone], option 7    CLINICAL PHARMACY CONSULT: MED RECONCILIATION/REVIEW ADDENDUM    For Pharmacy Admin Tracking Only    PHSO: Yes  Total # of Interventions Recommended: 1  - Updated Order #: 1 Updated Order Reason(s):  Other  Recommended intervention potential cost savings: 1  Total Interventions Accepted: 1  Time Spent (min): [address], PharmD  [address]

## 2021-04-13 NOTE — TELEPHONE ENCOUNTER
Aguilar Perez called in she reviewed pts labs and she wanted to know if pt should take her water pill today? She wants to know if any of her meds need changed and if you can review the labs.

## 2021-04-14 NOTE — TELEPHONE ENCOUNTER
Patient's daughter, Malcolm Price notified. Malcolm Primes states she is concerned because patient's CO2 seems to be creeping up and her chloride is dropping. Any further orders?

## 2021-04-22 NOTE — PROGRESS NOTES
TeleMedicine Video Visit    Cori Long, was evaluated through a synchronous (real-time) audio-video encounter. The patient (or guardian if applicable) is aware that this is a billable service. Verbal consent to proceed has been obtained within the past 12 months. The visit was conducted pursuant to the emergency declaration under the [address] [facility], [address] [place] [address] authority and the [employer] General Act. Patient identification was verified, and a caregiver was present when appropriate. The patient was located in a state where the provider was credentialed to provide care. Patient identification was verified at the start of the visit, including the patient's telephone number and physical location. I discussed with the patient the nature of our telehealth visits, that:     1. Due to the nature of an audio- video modality, the only components of a physical exam that could be done are the elements supported by direct observation. 2. I would evaluate the patient and recommend diagnostics and treatments based on my assessment. 3. If it was felt that the patient should be evaluated in clinic or an emergency room setting, then they would be directed there. 4. Our sessions are not being recorded and that personal health information is protected. 5. Our team would provide follow up care in person if/when the patient needs it. Patient's location: home address in [facility]  Physician  location other address in [facility] other people involved in call  None. Not billed by time    This visit was completed virtually using GLIIF. [facility])  Family Medicine Outpatient        SUBJECTIVE:  CC: had concerns including Follow-up (Pt's daughter calling to follow up on last virtual visit, overall pt's daughter feels she is stable from a cardiovascular standpoint), Anorexia (Concerned that pt has no appetite, keeping her hydrated, about me to clarify.

## 2021-04-28 NOTE — PLAN OF CARE
Problem: Wound:  Goal: Will show signs of wound healing; wound closure and no evidence of infection  Description: Will show signs of wound healing; wound closure and no evidence of infection  Outcome: Ongoing     Problem: Pressure Ulcer:  Goal: Signs of wound healing will improve  Description: Signs of wound healing will improve  Outcome: Ongoing  Goal: Absence of new pressure ulcer  Description: Absence of new pressure ulcer  Outcome: Ongoing  Goal: Will show no infection signs and symptoms  Description: Will show no infection signs and symptoms  Outcome: Ongoing     Problem: Blood Glucose:  Goal: Ability to maintain appropriate glucose levels will improve  Description: Ability to maintain appropriate glucose levels will improve  Outcome: Ongoing

## 2021-04-28 NOTE — PROGRESS NOTES
[facility] Followup Visit Note    Referring Physician : Briana Tian DO   [address] RECORD NUMBER:  [id number]  AGE: 80 y.o. GENDER: female  : 1933  EPISODE DATE:  2021    Subjective:     Chief Complaint   Patient presents with    Wound Check     left heel /coccyx      HISTORY of PRESENT ILLNESS HPI   Ousmane Neal is a 80 y.o. female who presents today in regards to follow up evaluation and treatment of wound/ulcer. That patient's past medical, family and social hx were reviewed and changes were made if present. 21 - Ousmane Neal is a 80 y.o. female returns to the wound care center for evaluation of a sacral decubitus ulcer and a right heel wound. The patient is accompanied by her daughter. She states that shortly after we last saw her in the fall, she developed Covid requiring 2 hospitalizations for pneumonia and bacterial pneumonia. She developed a pressure sore on her right heel and her sacral ulcer which was nearly healed progressed. She denies any recent fevers or chills. 3/3/21 -patient returns. Daughter present. She denies any new problems. 3/24/21 -patient was admitted for increased drainage from sacral wound with erythema. Currently in skilled nursing. Daughter not pleased with care. 21 -patient was again admitted. The daughter states that she thinks the skin around the wound is more fluctuant and erythematous. There is occasionally purulent drainage. No current fevers.           PAST MEDICAL HISTORY      Diagnosis Date    Anxiety     Depression     Diabetes mellitus ([address]     Frequent urinary tract infections     GERD (gastroesophageal reflux disease)     Hypertension     Peripheral neuropathy     PVD (peripheral vascular disease) ([address] 3/30/2021    Spinal stenosis      Past Surgical History:   Procedure Laterality Date    CHOLECYSTECTOMY      HIP FRACTURE SURGERY Bilateral     SKIN CANCER EXCISION       History reviewed. No pertinent family history. Social History     Tobacco Use    Smoking status: Never Smoker    Smokeless tobacco: Never Used   Substance Use Topics    Alcohol use: No    Drug use: No     No Known Allergies  Current Outpatient Medications on File Prior to Encounter   Medication Sig Dispense Refill    gabapentin (NEURONTIN) 300 MG capsule Take 1 capsule by mouth 2 times daily for 180 days.  Intended supply: 90 days 180 capsule 1    docusate sodium (COLACE, DULCOLAX) 100 MG CAPS Take 200 mg by mouth 2 times daily 60 capsule 0    bumetanide (BUMEX) 1 MG tablet Take 1 tablet by mouth daily 30 tablet 3    insulin lispro, 1 Unit Dial, (HUMALOG KWIKPEN) 100 UNIT/ML SOPN Inject 0-10 Units into the skin 3 times daily (before meals)      nitroGLYCERIN (NITROSTAT) 0.4 MG SL tablet Place 0.4 mg under the tongue every 5 minutes as needed for Chest pain      diclofenac sodium (VOLTAREN) 1 % GEL Apply 2 g topically 4 times daily as needed for Pain (apply to hands)      sertraline (ZOLOFT) 100 MG tablet Take 150 mg by mouth daily      montelukast (SINGULAIR) 10 MG tablet Take 1 tablet by mouth nightly 90 tablet 1    Liraglutide (VICTOZA) 18 MG/3ML SOPN SC injection Inject 1.2 mg into the skin daily 5 pen 1    atorvastatin (LIPITOR) 40 MG tablet Take 1 tablet by mouth nightly 90 tablet 3    carvedilol (COREG) 12.5 MG tablet Take 1 tablet by mouth 2 times daily (with meals) 180 tablet 3    isosorbide mononitrate (IMDUR) 30 MG extended release tablet Take 1 tablet by mouth daily 90 tablet 1    lansoprazole (PREVACID) 30 MG delayed release capsule Take 1 capsule by mouth daily 90 capsule 1    ipratropium-albuterol (DUONEB) 0.5-2.5 (3) MG/3ML SOLN nebulizer solution Take 3 mLs by nebulization every 4 hours as needed for Shortness of Breath 360 mL 2    Calcium Carb-Cholecalciferol (CALTRATE 600+D3) 600-800 MG-UNIT TABS Take 1 tablet by mouth 2 times daily      nystatin (MYCOSTATIN) 382849 UNIT/GM powder Apply 1 each topically 2 times daily Apply to abdominal folds      Omega-3 Fatty Acids (FISH OIL) 1000 MG CAPS Take 1,000 mg by mouth 2 times daily      aspirin 81 MG chewable tablet Take 1 tablet by mouth daily 30 tablet 0    acetaminophen (TYLENOL) 325 MG tablet Take 650 mg by mouth every 4 hours as needed for Pain or Fever       fluticasone (FLONASE) 50 MCG/ACT nasal spray 1 spray by Nasal route daily as needed for Rhinitis       mineral oil-hydrophilic petrolatum (HYDROPHOR) ointment Apply topically daily Apply to both feet      ondansetron (ZOFRAN) 4 MG/5ML solution Take 5 mLs by mouth 2 times daily as needed for Nausea or Vomiting (Patient not taking: Reported on 4/22/2021) 50 mL 0    bisacodyl (DULCOLAX) 10 MG suppository Place 10 mg rectally daily as needed for Constipation      magnesium hydroxide (MILK OF MAGNESIA) 400 MG/5ML suspension Take 30 mLs by mouth daily as needed for Constipation       No current facility-administered medications on file prior to encounter.         REVIEW OF SYSTEMS See HPI    Objective:    /64   Pulse 68   Temp 97.8 °F (36.6 °C) (Temporal)   Resp 18   Ht 5' (1.524 m)   Wt 158 lb (71.7 kg)   LMP  (LMP Unknown)   BMI 30.86 kg/m²   Wt Readings from Last 3 Encounters:   04/28/21 158 lb (71.7 kg)   04/08/21 158 lb 1 oz (71.7 kg)   03/30/21 174 lb 3 oz (79 kg)     PHYSICAL EXAM  CONSTITUTIONAL:   Awake, alert, cooperative  EYES:  lids and lashes normal  ENT: external ears and nose without lesions  NECK:  supple, symmetrical, trachea midline  SKIN:  Open wound present    Assessment:     Problem List Items Addressed This Visit     Pressure ulcer of right heel, stage 2 (HCC) (Chronic)    Relevant Orders    Initiate Outpatient Wound Care Protocol    * (Principal) Pressure injury of sacral region, stage 3 ([address] - Primary    Relevant Orders    Initiate Outpatient Wound Care Protocol          Pre Debridement Measurements:  Are located in the [place]  Documentation Flow Sheet  Post Debridement Measurements:  Wound/Ulcer Descriptions are Pre Debridement except measurements:     Wound 12/01/20 Other (Comment) Outer;Right r buttocks open  area (Active)   Number of days: 148       Wound 02/17/21 Heel Left #1 (Active)   Wound Image   04/28/21 1410   Wound Etiology Pressure Unstageable 04/08/21 1215   Dressing Status Reinforced dressing 04/03/21 0805   Wound Cleansed Cleansed with saline 04/03/21 0805   Dressing/Treatment ABD; Alginate;Roll gauze 04/03/21 0805   Offloading for Diabetic Foot Ulcers No offloading required 02/17/21 1448   Dressing Change Due 04/03/21 04/03/21 0805   Wound Length (cm) 0 cm 04/28/21 1410   Wound Width (cm) 0 cm 04/28/21 1410   Wound Depth (cm) 0 cm 04/28/21 1410   Wound Surface Area (cm^2) 0 cm^2 04/28/21 1410   Change in Wound Size % (l*w) 100 04/28/21 1410   Wound Volume (cm^3) 0 cm^3 04/28/21 1410   Wound Healing % 100 04/28/21 1410   Post-Procedure Length (cm) 0 cm 04/28/21 1432   Post-Procedure Width (cm) 0 cm 04/28/21 1432   Post-Procedure Depth (cm) 0 cm 04/28/21 1432   Post-Procedure Surface Area (cm^2) 0 cm^2 04/28/21 1432   Post-Procedure Volume (cm^3) 0 cm^3 04/28/21 1432   Wound Assessment Pink/red 04/08/21 1215   Drainage Amount Scant 04/08/21 1215   Drainage Description Serosanguinous 04/08/21 1215   Odor None 04/08/21 1215   Xena-wound Assessment Non-blanchable erythema 04/08/21 1215   Number of days: 70       Wound 02/17/21 Coccyx #2 (Active)   Wound Image   04/28/21 1410   Wound Etiology Pressure Stage  4 04/08/21 1215   Dressing Status New dressing applied 04/28/21 1444   Wound Cleansed Cleansed with saline 04/28/21 1444   Dressing/Treatment Iodoform gauze;Dry dressing 04/28/21 1444   Offloading for Diabetic Foot Ulcers No offloading required 04/03/21 0805   Dressing Change Due 04/04/21 04/03/21 0805   Wound Length (cm) 1.3 cm 04/28/21 1410   Wound Width (cm) 1 cm 04/28/21 1410   Wound Depth (cm) 1.3 cm 04/28/21 1410   Wound Surface Area Instructions    Written patient dismissal instructions given to patient and signed by patient or POA. 3/24/21 -continue treatment with Dakin's packing or iodoform gauze. Follow-up 1 month or sooner as needed. 4/28/21 -I reviewed with the patient's daughters that we may need to unroofed the track and surgery to allow packing otherwise she is prone for recurrent abscesses. They understand. We will use iodoform gauze for the next 2 weeks and then reevaluate at that time. I asked him to call sooner with any new fevers. Discharge Instructions          Visit Discharge/Physician Orders     Discharge condition: Stable     Assessment of pain at discharge: NONE     Anesthetic used: LIDOCAINE 4%     Discharge to: HOME     Left via:Private automobile     Accompanied by: DAUGHTER     ECF/HHA: MARCELLO AWAN     Dressing Orders:WOUND LOCATION COCCYX CLEANSE WOUND WITH NORMAL SALINE PACK WITH 1/4 IODAFORM GAUZE (PACK LIGHTLY)  COVER WITH A DRY DRESSING AND SECURE. CHANGE DAILY     TO LEFT HEEL HEALED     Treatment Orders:FOLLOW A NUTRITIOUS DIET HIGH IN PROTEIN AND VITAMIN C TO PROMOTE WOUND HEALING. TAKE A MULTIVITAMIN DAILY.     KEEP PRESSURE OFF COCCYX AT ALL TIMES/ KEEP PRESSURE OFF HEEL     LEVAQUIN AS ORDERED        [facility] followup visit ____2 WEEK_______________________  (Please note your next appointment above and if you are unable to keep, kindly give a 24 hour notice. Thank you.)     Physician signature:__________________________        If you experience any of the following, please call the [facility] during business hours:     * Increase in Pain  * Temperature over 101  * Increase in drainage from your wound  * Drainage with a foul odor  * Bleeding  * Increase in swelling  * Need for compression bandage changes due to slippage, breakthrough drainage.     If you need medical attention outside of the business hours of the [facility] please contact your PCP or go to the nearest emergency room.                         Electronically signed by Mellisa Millan MD on 4/28/2021 at 3:53 PM

## 2021-04-29 PROBLEM — R77.8 ELEVATED TROPONIN: Status: RESOLVED | Noted: 2021-01-01 | Resolved: 2021-01-01

## 2021-04-29 PROBLEM — R79.89 ELEVATED TROPONIN: Status: RESOLVED | Noted: 2021-01-01 | Resolved: 2021-01-01

## 2021-05-03 NOTE — TELEPHONE ENCOUNTER
Patient's daughter is asking for refill of bumex. Refill sent. Is patient supposed to be continuing her pepcid 20mg BID? She is still on prevacid 30mg XR QD. Is she to continue on both? Also patient's daughter states that patient is moving around a lot more than she was, and her neuropathy is bothering her. She is asking if gabapentin can be increased to 300mg TID?

## 2021-05-04 NOTE — TELEPHONE ENCOUNTER
Eric Score instructed to restart [facility] on her insulin, and call us back if any further questions or problems.

## 2021-05-12 NOTE — PROGRESS NOTES
Spinal stenosis      Past Surgical History:   Procedure Laterality Date    CHOLECYSTECTOMY      HIP FRACTURE SURGERY Bilateral     SKIN CANCER EXCISION       History reviewed. No pertinent family history. Social History     Tobacco Use    Smoking status: Never Smoker    Smokeless tobacco: Never Used   Substance Use Topics    Alcohol use: No    Drug use: No     No Known Allergies  Current Outpatient Medications on File Prior to Encounter   Medication Sig Dispense Refill    gabapentin (NEURONTIN) 300 MG capsule Take 1 capsule by mouth 3 times daily for 90 days.  Intended supply: 90 days 270 capsule 0    famotidine (PEPCID) 20 MG tablet Take 1 tablet by mouth 2 times daily 180 tablet 1    bumetanide (BUMEX) 1 MG tablet Take 1 tablet by mouth daily 90 tablet 1    docusate sodium (COLACE, DULCOLAX) 100 MG CAPS Take 200 mg by mouth 2 times daily 60 capsule 0    ondansetron (ZOFRAN) 4 MG/5ML solution Take 5 mLs by mouth 2 times daily as needed for Nausea or Vomiting 50 mL 0    nitroGLYCERIN (NITROSTAT) 0.4 MG SL tablet Place 0.4 mg under the tongue every 5 minutes as needed for Chest pain      sertraline (ZOLOFT) 100 MG tablet Take 150 mg by mouth daily      montelukast (SINGULAIR) 10 MG tablet Take 1 tablet by mouth nightly 90 tablet 1    atorvastatin (LIPITOR) 40 MG tablet Take 1 tablet by mouth nightly 90 tablet 3    carvedilol (COREG) 12.5 MG tablet Take 1 tablet by mouth 2 times daily (with meals) 180 tablet 3    isosorbide mononitrate (IMDUR) 30 MG extended release tablet Take 1 tablet by mouth daily 90 tablet 1    lansoprazole (PREVACID) 30 MG delayed release capsule Take 1 capsule by mouth daily 90 capsule 1    Calcium Carb-Cholecalciferol (CALTRATE 600+D3) 600-800 MG-UNIT TABS Take 1 tablet by mouth 2 times daily      nystatin (MYCOSTATIN) 752597 UNIT/GM powder Apply 1 each topically 2 times daily Apply to abdominal folds      Omega-3 Fatty Acids (FISH OIL) 1000 MG CAPS Take 1,000 mg by mouth 2 times daily      aspirin 81 MG chewable tablet Take 1 tablet by mouth daily 30 tablet 0    acetaminophen (TYLENOL) 325 MG tablet Take 650 mg by mouth every 4 hours as needed for Pain or Fever       mineral oil-hydrophilic petrolatum (HYDROPHOR) ointment Apply topically daily Apply to both feet      bisacodyl (DULCOLAX) 10 MG suppository Place 10 mg rectally daily as needed for Constipation      insulin lispro, 1 Unit Dial, (HUMALOG KWIKPEN) 100 UNIT/ML SOPN Inject 0-10 Units into the skin 3 times daily (before meals)      diclofenac sodium (VOLTAREN) 1 % GEL Apply 2 g topically 4 times daily as needed for Pain (apply to hands)      ipratropium-albuterol (DUONEB) 0.5-2.5 (3) MG/3ML SOLN nebulizer solution Take 3 mLs by nebulization every 4 hours as needed for Shortness of Breath 360 mL 2    fluticasone (FLONASE) 50 MCG/ACT nasal spray 1 spray by Nasal route daily as needed for Rhinitis        No current facility-administered medications on file prior to encounter.         REVIEW OF SYSTEMS See HPI    Objective:    /62   Pulse 72   Temp 97.9 °F (36.6 °C) (Temporal)   Resp 22   Ht 5' (1.524 m)   Wt 158 lb (71.7 kg)   LMP  (LMP Unknown)   BMI 30.86 kg/m²   Wt Readings from Last 3 Encounters:   05/12/21 158 lb (71.7 kg)   04/28/21 158 lb (71.7 kg)   04/08/21 158 lb 1 oz (71.7 kg)     PHYSICAL EXAM  CONSTITUTIONAL:   Awake, alert, cooperative  EYES:  lids and lashes normal  ENT: external ears and nose without lesions  NECK:  supple, symmetrical, trachea midline  SKIN:  Open wound present    Assessment:     Problem List Items Addressed This Visit     Pressure ulcer of right heel, stage 2 (HCC) - Primary (Chronic)    Relevant Orders    Initiate Outpatient Wound Care Protocol    * (Principal) Pressure injury of sacral region, stage 3 [facility])    Relevant Orders    Initiate Outpatient Wound Care Protocol          Pre Debridement Measurements:  Are located in the [place]  Documentation Flow Sheet  Post Debridement Measurements:  Wound/Ulcer Descriptions are Pre Debridement except measurements:     Wound 12/01/20 Other (Comment) Outer;Right r buttocks open  area (Active)   Number of days: 162       Wound 02/17/21 Coccyx #2 (Active)   Wound Image   04/28/21 1410   Wound Etiology Pressure Stage  4 02/17/21 1348   Dressing Status New dressing applied 04/28/21 1444   Wound Cleansed Cleansed with saline 04/28/21 1444   Dressing/Treatment Iodoform gauze;Dry dressing 04/28/21 1444   Offloading for Diabetic Foot Ulcers No offloading required 03/24/21 1517   Wound Length (cm) 1.1 cm 05/12/21 1402   Wound Width (cm) 1 cm 05/12/21 1402   Wound Depth (cm) 1.5 cm 05/12/21 1402   Wound Surface Area (cm^2) 1.1 cm^2 05/12/21 1402   Change in Wound Size % (l*w) -11.11 05/12/21 1402   Wound Volume (cm^3) 1.65 cm^3 05/12/21 1402   Wound Healing % 12 05/12/21 1402   Post-Procedure Length (cm) 1.3 cm 04/28/21 1432   Post-Procedure Width (cm) 1.1 cm 04/28/21 1432   Post-Procedure Depth (cm) 1.3 cm 04/28/21 1432   Post-Procedure Surface Area (cm^2) 1.43 cm^2 04/28/21 1432   Post-Procedure Volume (cm^3) 1.86 cm^3 04/28/21 1432   Distance Tunneling (cm) 5.4 cm 05/12/21 1402   Tunneling Position ___ O'Clock 12 05/12/21 1402   Undermining Starts ___ O'Clock 4 05/12/21 1402   Undermining Ends___ O'Clock 5 05/12/21 1402   Undermining Maxium Distance (cm) Nicole@[employer] 05/12/21 1402   Wound Assessment Pale granulation tissue 05/12/21 1402   Drainage Amount Large 05/12/21 1402   Drainage Description Purulent;Serosanguinous 05/12/21 1402   Odor None 05/12/21 1402   Xena-wound Assessment Maceration 05/12/21 1402   Number of days: 83          Procedure Note  Indications:  Based on my examination of this patient's wound(s)/ulcer(s) today, debridement is required to promote healing and evaluate the wound base.     Performed by: Juan Peoples MD    Consent obtained:  Yes    Time out taken:  Yes    Pain Control: Anesthetic  Anesthetic: 4% Lidocaine Liquid Topical     Debridement:Excisional Debridement    Using curette the wound(s)/ulcer(s) was/were sharply debrided down through and including the removal of epidermis. Devitalized Tissue Debrided:  slough to stimulate bleeding to promote healing, post debridement good bleeding base and wound edges noted    Wound/Ulcer #: 2    Percent of Wound/Ulcer Debrided: 80%    Total Surface Area Debrided:  2 sq cm     Estimated Blood Loss:  Minimal  Hemostasis Achieved:  by pressure    Procedural Pain:  1  / 10   Post Procedural Pain:  0 / 10     Response to treatment:  Well tolerated by patient. Plan:   Treatment Note please see attached Discharge Instructions    Written patient dismissal instructions given to patient and signed by patient or POA. 3/24/21 -continue treatment with Dakin's packing or iodoform gauze. Follow-up 1 month or sooner as needed. 4/28/21 -I reviewed with the patient's daughters that we may need to unroofed the track and surgery to allow packing otherwise she is prone for recurrent abscesses. They understand. We will use iodoform gauze for the next 2 weeks and then reevaluate at that time. I asked him to call sooner with any new fevers. 5/12/21 -continue current packing with iodoform. Follow-up 2 weeks. Discharge Instructions       Visit Discharge/Physician Orders     Discharge condition: Stable     Assessment of pain at discharge: NONE     Anesthetic used: LIDOCAINE 4%     Discharge to: HOME     Left via:Private automobile     Accompanied by: DAUGHTER     ECF/HHA: MARCELLO LIBELIO     Dressing Orders:WOUND LOCATION COCCYX CLEANSE WOUND WITH NORMAL SALINE PACK WITH 1/4 IODAFORM GAUZE (PACK LIGHTLY)  COVER WITH A DRY DRESSING AND SECURE. CHANGE DAILY     TO LEFT HEEL HEALED     Treatment Orders:FOLLOW A NUTRITIOUS DIET HIGH IN PROTEIN AND VITAMIN C TO PROMOTE WOUND HEALING.  TAKE A MULTIVITAMIN DAILY.     KEEP PRESSURE OFF COCCYX AT ALL TIMES/ KEEP PRESSURE OFF HEEL      LEVAQUIN AS ORDERED        [facility] followup visit ____2 WEEK_______________________  (Please note your next appointment above and if you are unable to keep, kindly give a 24 hour notice.  Thank you.)     Physician signature:__________________________        If you experience any of the following, please call the [address] [place] [address] during business hours:     * Increase in Pain  * Temperature over 101  * Increase in drainage from your wound  * Drainage with a foul odor  * Bleeding  * Increase in swelling  * Need for compression bandage changes due to slippage, breakthrough drainage.     If you need medical attention outside of the business hours of the [address] [place] [address] Road please contact your PCP or go to the nearest emergency room.                 Electronically signed by Oumou Nicholson MD on 5/12/2021 at 2:40 PM

## 2021-05-21 NOTE — TELEPHONE ENCOUNTER
José Miguel Kelley called in she said since Bystricka her mom has a persistent moist cough and has secretions. She said her mom had a temp of 101.5 and it has since went down to 98.6. She asked if we can send a antibiotic or mucinex to [facility] on [address].  She said they have been using aerosol treatment and her O2 is 93%

## 2021-05-25 PROBLEM — I50.9 CHF (CONGESTIVE HEART FAILURE) (HCC): Status: ACTIVE | Noted: 2021-01-01

## 2021-05-25 PROBLEM — I26.99 PULMONARY EMBOLISM (HCC): Status: ACTIVE | Noted: 2021-01-01

## 2021-05-25 PROBLEM — J96.01 ACUTE RESPIRATORY FAILURE WITH HYPOXIA (HCC): Status: ACTIVE | Noted: 2021-01-01

## 2021-05-25 PROBLEM — J18.9 PNEUMONIA: Status: ACTIVE | Noted: 2021-01-01

## 2021-05-25 NOTE — LETTER
[address] Medicaid  CERTIFICATION OF NECESSITY  FOR TRANSPORTATION   BY WHEELCHAIR VAN     Individual Information   1. Name: Beba Holly 2. [place] Medicaid Billing Number:    3. Address: [address] [place] [address] Drive      Transportation Provider Information   4. Provider Name:    5. [place] Medicaid Provider Number:  National Provider Identifier (NPI):      Certification  7. Criteria:  By signing this document, the practitioner certifies that two statements are true:  A. This individual must be accompanied by a mobility-related assistive device from the point of pick-up to the point of drop-off. B. Transport of this individual by standard passenger vehicle or common carrier is precluded or contraindicated. 8. Period Beginning Date: 2021   9. Length  [x] Not more than 10 day(s)  [] One Year     Additional Information Relevant to Certification   10. Comments or Explanations, If Necessary or Appropriate : Hypoxia, PVD, Spinal Stenosis, CHF         Certifying Practitioner Information   11. Name of Practitioner:Dr. Becki Marie M.D.   12. [facility] Provider Number, If Applicable:  [address] Provider Identifier (NPI):      Signature Information   14. Date of Signature:2021 13. Name of Person Signing:Ghada Larson[address]   16. Signature and Professional Edmundo Mcfarlane [facility] [address]  Rev. 2015  [address] Encounter Date/Time: 2021 [facility] Account: [de-identified]    MRN: [id number]    Patient: Beba Holly    Contact Serial #: [id number]      ENCOUNTER          Patient Class: I Private Enc?   No Unit  BD: [facility] 6405/6405-B   Hospital Service: Intermediate   Encounter DX: Acute respiratory failur*   ADM Provider: Becki Marie MD   Procedure:     ATT Provider: Becki Marie MD   REF Provider:        Admission DX: Acute respiratory failure with hypoxia [facility]) and codes: 518.81      PATIENT                 Name: Beba Holly : 1933 (87 yrs)   Address: [address] Sex: Female   City: [address] [place] [address]         Marital Status:    Employer: RETIRED         Scientology: Yazidi   Primary Care Provider: Simeon iKng         Primary Phone: [phone]   EMERGENCY CONTACT   Contact Name Legal Guardian? Relationship to Patient Home Phone Work Phone   1. Abena Hay  2. Michael Serrano      Child  Child [phone]              GUARANTOR            Guarantor: Beba Holly     : 1933   Address: [address] Sex: Female     [place] [address]     Relation to Patient: Self       Home Phone: [phone]   Guarantor ID: [id number]       Work Phone:     Guarantor Employer: RETIRED         Status: RETIRED      COVERAGE        PRIMARY INSURANCE   Payor: MEDICARE Plan: MEDICARE PART A AND B   Payor Address: [facility],  [facility] 99, [facility] 1284       Group Number:   Insurance Type: INDEMNITY   Subscriber Name: Fahad Gandhi : 1933   Subscriber ID: [id number] Pat. Rel. to Sub: Self   SECONDARY INSURANCE   Payor: [facility] Plan: [address] [place] [address]   Payor Address:  [facility] [id number], [address]          Group Number: [id number] Insurance Type: INDEMNITY   Subscriber Name: Fahad Gandhi : 1933   Subscriber ID: [id number] Yvonne Melba.  Rel. to Sub: SELF

## 2021-05-25 NOTE — ED TRIAGE NOTES
FIRST PROVIDER CONTACT ASSESSMENT NOTE        Department of Emergency Medicine            ED  First Provider Note            5/25/21  1:21 PM EDT    Chief Complaint: Shortness of Breath (Shorrtness of Breath x 1 wk; Fever; Pulse ox 88% for triage placed on nc; non productive cough; denies chest pain )      History of Present Illness:    Keena Davila is a 80 y.o. female who presents to the emergency department for congested cough, dyspnea  Focused Screening Exam:  Constitutional:  Alert, appears stated age and is in no distress. *ALLERGIES*     Patient has no known allergies.      ED Triage Vitals   BP Temp Temp Source Pulse Resp SpO2 Height Weight   05/25/21 1043 05/25/21 1022 05/25/21 1042 05/25/21 1022 05/25/21 1043 05/25/21 1022 05/25/21 1043 05/25/21 1043   (!) 96/52 97.1 °F (36.2 °C) Tympanic 73 18 90 % 5' (1.524 m) 158 lb (71.7 kg)        Initial Plan of Care:  Initiate Treatment-Testing, Proceed toTreatment Area When Bed Available for ED Attending/MLP to Continue Care    -----------------END OF FIRST PROVIDER CONTACT ASSESSMENT NOTE--------------  Electronically signed by KRISTIE Walters CNP   DD: 5/25/21

## 2021-05-25 NOTE — ED PROVIDER NOTES
Chief Complaint   Patient presents with    Shortness of Breath     Shorrtness of Breath x 1 wk; Fever; Pulse ox 88% for triage placed on nc; non productive cough; denies chest pain        Patient is a 68-year-old female who presents today for shortness of breath, fever, and generalized fatigue. Per daughter patient not been feeling well for the past several days, did spike a low-grade fever several days ago. They state they have a pulse ox at home, noted that she was hypoxic today. She does not wear oxygen at baseline. Patient has had multiple admissions to the hospital recently for anemia. Patient was taken off her blood thinners several months ago as she did have a GI bleed. Patient does have a history of atrial fibrillation as well as blood clots. Patient also had Covid at the end of last year. Did have her vaccine as well. Symptoms are aggravated or alleviated by any specific factors. Per daughter she has been more fatigued than usual, has not been up and moving around as much. Also endorses a cough with productive sputum. Denies chest pain. Denies lightheadedness or dizziness. The history is provided by the patient and a relative. No  was used. Review of Systems   Constitutional: Positive for chills, fatigue and fever. HENT: Negative for ear pain, sinus pressure and sore throat. Eyes: Negative for pain, discharge and redness. Respiratory: Positive for cough and shortness of breath. Negative for wheezing. Cardiovascular: Negative for chest pain. Gastrointestinal: Negative for abdominal distention, diarrhea, nausea and vomiting. Genitourinary: Negative for dysuria and frequency. Musculoskeletal: Negative for arthralgias and back pain. Skin: Negative for rash and wound. Neurological: Negative for weakness and headaches. Hematological: Negative for adenopathy. All other systems reviewed and are negative.        Physical Exam  Vitals and nursing note reviewed. Constitutional:       General: She is not in acute distress. Appearance: Normal appearance. She is well-developed. She is not ill-appearing. HENT:      Head: Normocephalic and atraumatic. Eyes:      Pupils: Pupils are equal, round, and reactive to light. Cardiovascular:      Rate and Rhythm: Normal rate and regular rhythm. Pulses: Normal pulses. Heart sounds: Normal heart sounds. Pulmonary:      Effort: Pulmonary effort is normal. No respiratory distress. Breath sounds: Normal breath sounds. No wheezing or rales. Abdominal:      General: Bowel sounds are normal.      Palpations: Abdomen is soft. Tenderness: There is no abdominal tenderness. There is no guarding or rebound. Musculoskeletal:      Cervical back: Normal range of motion and neck supple. Right lower leg: No edema. Left lower leg: No edema. Skin:     General: Skin is warm and dry. Capillary Refill: Capillary refill takes less than 2 seconds. Neurological:      General: No focal deficit present. Mental Status: She is alert and oriented to person, place, and time. Cranial Nerves: No cranial nerve deficit.       Coordination: Coordination normal.          Procedures     Labs Reviewed   CBC WITH AUTO DIFFERENTIAL - Abnormal; Notable for the following components:       Result Value    Hemoglobin 10.4 (*)     MCHC 29.9 (*)     RDW 17.9 (*)     All other components within normal limits   COMPREHENSIVE METABOLIC PANEL - Abnormal; Notable for the following components:    Glucose 222 (*)     BUN 30 (*)     CREATININE 1.1 (*)     Albumin 2.9 (*)     All other components within normal limits   TROPONIN - Abnormal; Notable for the following components:    Troponin, High Sensitivity 39 (*)     All other components within normal limits   BRAIN NATRIURETIC PEPTIDE - Abnormal; Notable for the following components:    Pro-BNP 3,988 (*)     All other components within normal limits   TROPONIN - Abnormal; Notable for the following components:    Troponin, High Sensitivity 38 (*)     All other components within normal limits   COVID-19, RAPID   CULTURE, BLOOD 1   CULTURE, BLOOD 2   LACTIC ACID, PLASMA     CTA PULMONARY W CONTRAST   Final Result   No central pulmonary embolism or aortic dissection. Small filling defects in   the distal subsegmental and peripheral vessels of left lower lobe which could   be small subsegmental pulmonary embolism of unknown age. Atelectasis/beginning infiltrates in the right middle lobe and right lower   lobe. Developing pneumonia is considered. XR CHEST (2 VW)   Final Result   No acute process. EKG #1:  I personally interpreted this EKG  Time:  1122    Rate: 72  Rhythm: Sinus. Interpretation: normal sinus rhythm, LAD, no ST elevation. MDM  Number of Diagnoses or Management Options  Diagnosis management comments: Patient is 80-year-old female presents today for shortness of breath patient had to be hypoxic on arrival at 88%. She was placed on 2 L nasal cannula, does not wear oxygen at home. Her lungs are clear to auscultation. Lab work imaging was obtained. Lab work pertinent for mildly elevated proBNP of 4000, troponin is 38. Chest x-ray shows no acute abnormalities, therefore CTA was obtained as we do not have clear source of patient's hypoxia. CTA shows no evidence of large or segmental PE, they do note possible filling defects in the periphery. They also note early patchy infiltrates in the lung bases which would be concerning for pneumonia. With patient having productive cough, CT findings, she was started on IV antibiotics. Patient will be admitted to the hospital for shortness of breath. PCP consulted agrees admit.        Amount and/or Complexity of Data Reviewed  Clinical lab tests: reviewed  Tests in the radiology section of CPT®: reviewed  Tests in the medicine section of CPT®: reviewed --------------------------------------------- PAST HISTORY ---------------------------------------------  Past Medical History:  has a past medical history of Anxiety, Depression, Diabetes mellitus ([facility] Frequent urinary tract infections, GERD (gastroesophageal reflux disease), Hypertension, Peripheral neuropathy, PVD (peripheral vascular disease) ([facility] and Spinal stenosis. Past Surgical History:  has a past surgical history that includes Cholecystectomy; Skin cancer excision; and Hip fracture surgery (Bilateral). Social History:  reports that she has never smoked. She has never used smokeless tobacco. She reports that she does not drink alcohol and does not use drugs. Family History: family history is not on file. The patients home medications have been reviewed. Allergies: Patient has no known allergies.     -------------------------------------------------- RESULTS -------------------------------------------------    LABS:  Results for orders placed or performed during the hospital encounter of 05/25/21   COVID-19, Rapid    Specimen: Nasopharyngeal Swab   Result Value Ref Range    SARS-CoV-2, NAAT Not Detected Not Detected   CBC auto differential   Result Value Ref Range    WBC 8.4 4.5 - 11.5 E9/L    RBC 3.75 3.50 - 5.50 E12/L    Hemoglobin 10.4 (L) 11.5 - 15.5 g/dL    Hematocrit 34.8 34.0 - 48.0 %    MCV 92.8 80.0 - 99.9 fL    MCH 27.7 26.0 - 35.0 pg    MCHC 29.9 (L) 32.0 - 34.5 %    RDW 17.9 (H) 11.5 - 15.0 fL    Platelets 718 064 - 101 E9/L    MPV 9.1 7.0 - 12.0 fL    Neutrophils % 65.6 43.0 - 80.0 %    Immature Granulocytes % 0.4 0.0 - 5.0 %    Lymphocytes % 26.6 20.0 - 42.0 %    Monocytes % 4.8 2.0 - 12.0 %    Eosinophils % 2.2 0.0 - 6.0 %    Basophils % 0.4 0.0 - 2.0 %    Neutrophils Absolute 5.50 1.80 - 7.30 E9/L    Immature Granulocytes # 0.03 E9/L    Lymphocytes Absolute 2.23 1.50 - 4.00 E9/L    Monocytes Absolute 0.40 0.10 - 0.95 E9/L    Eosinophils Absolute 0.18 0.05 - 0.50 E9/L Basophils Absolute 0.03 0.00 - 0.20 E9/L   Comprehensive Metabolic Panel   Result Value Ref Range    Sodium 139 132 - 146 mmol/L    Potassium 4.3 3.5 - 5.0 mmol/L    Chloride 101 98 - 107 mmol/L    CO2 27 22 - 29 mmol/L    Anion Gap 11 7 - 16 mmol/L    Glucose 222 (H) 74 - 99 mg/dL    BUN 30 (H) 6 - 23 mg/dL    CREATININE 1.1 (H) 0.5 - 1.0 mg/dL    GFR Non-African American 47 >=60 mL/min/1.73    GFR African American 57     Calcium 9.3 8.6 - 10.2 mg/dL    Total Protein 7.2 6.4 - 8.3 g/dL    Albumin 2.9 (L) 3.5 - 5.2 g/dL    Total Bilirubin 0.3 0.0 - 1.2 mg/dL    Alkaline Phosphatase 75 35 - 104 U/L    ALT 10 0 - 32 U/L    AST 20 0 - 31 U/L   Troponin   Result Value Ref Range    Troponin, High Sensitivity 39 (H) 0 - 9 ng/mL   Brain Natriuretic Peptide   Result Value Ref Range    Pro-BNP 3,988 (H) 0 - 450 pg/mL   Troponin   Result Value Ref Range    Troponin, High Sensitivity 38 (H) 0 - 9 ng/mL   Lactic Acid, Plasma   Result Value Ref Range    Lactic Acid 1.2 0.5 - 2.2 mmol/L   EKG 12 Lead   Result Value Ref Range    Ventricular Rate 72 BPM    Atrial Rate 72 BPM    P-R Interval 178 ms    QRS Duration 68 ms    Q-T Interval 386 ms    QTc Calculation (Bazett) 422 ms    P Axis 44 degrees    R Axis -35 degrees    T Axis 53 degrees       RADIOLOGY:  CTA PULMONARY W CONTRAST   Final Result   No central pulmonary embolism or aortic dissection. Small filling defects in   the distal subsegmental and peripheral vessels of left lower lobe which could   be small subsegmental pulmonary embolism of unknown age. Atelectasis/beginning infiltrates in the right middle lobe and right lower   lobe. Developing pneumonia is considered. XR CHEST (2 VW)   Final Result   No acute process.                  ------------------------- NURSING NOTES AND VITALS REVIEWED ---------------------------  Date / Time Roomed:  5/25/2021  2:01 PM  ED Bed Assignment:  37/37    The nursing notes within the ED encounter and vital signs as below have been reviewed. Patient Vitals for the past 24 hrs:   BP Temp Temp src Pulse Resp SpO2 Height Weight   05/25/21 1710 136/66 98 °F (36.7 °C) -- 79 21 100 % -- --   05/25/21 1043 (!) 96/52 -- -- -- 18 99 % 5' (1.524 m) 158 lb (71.7 kg)   05/25/21 1042 -- 97.1 °F (36.2 °C) Tympanic 73 -- -- -- --   05/25/21 1022 -- 97.1 °F (36.2 °C) -- 73 -- 90 % -- --       Oxygen Saturation Interpretation: Normal    ------------------------------------------ PROGRESS NOTES ------------------------------------------    Counseling:  I have spoken with the patient and discussed todays results, in addition to providing specific details for the plan of care and counseling regarding the diagnosis and prognosis. Their questions are answered at this time and they are agreeable with the plan of admission.    --------------------------------- ADDITIONAL PROVIDER NOTES ---------------------------------  Consultations:  Spoke with [facility]. Discussed case. They will admit the patient. This patient's ED course included: a personal history and physicial examination    This patient has remained hemodynamically stable during their ED course. Medications   sodium chloride flush 0.9 % injection 10 mL (10 mLs Intravenous Given 5/25/21 1640)   0.9 % sodium chloride infusion (has no administration in time range)   doxycycline (VIBRAMYCIN) 100 mg in dextrose 5 % 100 mL IVPB (100 mg Intravenous New Bag 5/25/21 0453)   iopamidol (ISOVUE-370) 76 % injection 60 mL (60 mLs Intravenous Given 5/25/21 1639)   cefTRIAXone (ROCEPHIN) 1,000 mg in sterile water 10 mL IV syringe (0 mg Intravenous Stopped 5/25/21 9713)         Diagnosis:  1. Acute respiratory failure with hypoxia ([facility]    2. Pneumonia due to organism        Disposition:  Patient's disposition: Admit to telemetry  Patient's condition is stable.              Latasha Jenkins DO  Resident  05/25/21 4000

## 2021-05-25 NOTE — ED NOTES
Called for repeat labs, not in [facility] at this time.      Amy Jenkins RN  05/25/21 0363

## 2021-05-26 PROBLEM — E44.0 MODERATE PROTEIN-CALORIE MALNUTRITION (HCC): Chronic | Status: ACTIVE | Noted: 2021-01-01

## 2021-05-26 NOTE — HOME CARE
Patient is active with [facility] for skilled nursing, [facility]. Will need home care orders at discharge.  Lazaro Winston lpn

## 2021-05-26 NOTE — H&P
[address] [facility]  History and Physical      CHIEF COMPLAINT:  Shortness of breath       Patient of Domo Perdomo DO presents with:  Acute respiratory failure with hypoxia ([facility]    History of Present Illness:   Patient is a 80year old female with a past medical history of DM, UTI, GERD, Covid 19, PVD, and HTN. Patient presented to the ER with complaints of cough, shortness of breath and fever. Patient states she has not felt well for the past few days. Patient denies any chest pain, abdominal pain, nausea, emesis, and headache. Patient states there are no aggravating factors and no relieving factors. Patient does have a history of Covid in November 2020. She has received her Covid vaccine. Patient does not use home O2. Patient is currently on 5 liters O2 and has a very moist productive cough. ER workup reveals elevated BNP 3988 and Ct scan - developing PNA. REVIEW OF SYSTEMS:  Pertinent negatives are above in HPI. 10 point ROS otherwise negative. Past Medical History:   Diagnosis Date    Anxiety     Depression     Diabetes mellitus ([facility]     Frequent urinary tract infections     GERD (gastroesophageal reflux disease)     Hypertension     Peripheral neuropathy     PVD (peripheral vascular disease) ([facility] 3/30/2021    Spinal stenosis          Past Surgical History:   Procedure Laterality Date    CHOLECYSTECTOMY      HIP FRACTURE SURGERY Bilateral     SKIN CANCER EXCISION         Medications Prior to Admission:    Medications Prior to Admission: guaiFENesin (MUCINEX) 600 MG extended release tablet, Take 1 tablet by mouth 2 times daily for 15 days  gabapentin (NEURONTIN) 300 MG capsule, Take 1 capsule by mouth 3 times daily for 90 days.  Intended supply: 90 days  famotidine (PEPCID) 20 MG tablet, Take 1 tablet by mouth 2 times daily  bumetanide (BUMEX) 1 MG tablet, Take 1 tablet by mouth daily  docusate sodium (COLACE, DULCOLAX) 100 MG CAPS, Take 200 mg by mouth 2 times daily  ondansetron (ZOFRAN) 4 MG/5ML solution, Take 5 mLs by mouth 2 times daily as needed for Nausea or Vomiting  bisacodyl (DULCOLAX) 10 MG suppository, Place 10 mg rectally daily as needed for Constipation  insulin lispro, 1 Unit Dial, (HUMALOG KWIKPEN) 100 UNIT/ML SOPN, Inject 0-10 Units into the skin 3 times daily (before meals)  nitroGLYCERIN (NITROSTAT) 0.4 MG SL tablet, Place 0.4 mg under the tongue every 5 minutes as needed for Chest pain  diclofenac sodium (VOLTAREN) 1 % GEL, Apply 2 g topically 4 times daily as needed for Pain (apply to hands)  sertraline (ZOLOFT) 100 MG tablet, Take 150 mg by mouth daily  montelukast (SINGULAIR) 10 MG tablet, Take 1 tablet by mouth nightly  atorvastatin (LIPITOR) 40 MG tablet, Take 1 tablet by mouth nightly  carvedilol (COREG) 12.5 MG tablet, Take 1 tablet by mouth 2 times daily (with meals)  isosorbide mononitrate (IMDUR) 30 MG extended release tablet, Take 1 tablet by mouth daily  lansoprazole (PREVACID) 30 MG delayed release capsule, Take 1 capsule by mouth daily  ipratropium-albuterol (DUONEB) 0.5-2.5 (3) MG/3ML SOLN nebulizer solution, Take 3 mLs by nebulization every 4 hours as needed for Shortness of Breath  Calcium Carb-Cholecalciferol (CALTRATE 600+D3) 600-800 MG-UNIT TABS, Take 1 tablet by mouth 2 times daily  nystatin (MYCOSTATIN) 991729 UNIT/GM powder, Apply 1 each topically 2 times daily Apply to abdominal folds  Omega-3 Fatty Acids (FISH OIL) 1000 MG CAPS, Take 1,000 mg by mouth 2 times daily  aspirin 81 MG chewable tablet, Take 1 tablet by mouth daily  acetaminophen (TYLENOL) 325 MG tablet, Take 650 mg by mouth every 4 hours as needed for Pain or Fever   fluticasone (FLONASE) 50 MCG/ACT nasal spray, 1 spray by Nasal route daily as needed for Rhinitis   mineral oil-hydrophilic petrolatum (HYDROPHOR) ointment, Apply topically daily Apply to both feet    Note that the patient's home medications were reviewed and the above list is accurate to the best of my knowledge at the time of the exam.    Allergies:    Patient has no known allergies. Social History:    reports that she has never smoked. She has never used smokeless tobacco. She reports that she does not drink alcohol and does not use drugs. Family History:   Unable to obtain      PHYSICAL EXAM:    Vitals:  /66   Pulse 66   Temp 97.6 °F (36.4 °C) (Oral)   Resp 19   Ht 5' (1.524 m)   Wt 150 lb 12.7 oz (68.4 kg)   LMP  (LMP Unknown)   SpO2 98%   BMI 29.45 kg/m²       General appearance: NAD, conversant, ill appearing  Eyes: Sclerae anicteric, PERRLA  HEENT: AT/NC, DMM  Neck: FROM, supple, no thyromegaly  Lymph: No cervical / supraclavicular lymphadenopathy  Lungs: Rhonchorus bilateral upper lobes, 5 liters O2   CV: RRR, no MRGs, bilateral lower extremity edema  Abdomen: Soft, non-tender; no masses or HSM, +BS  Extremities: FROM without synovitis. No clubbing or cyanosis of the hands. Bilateral lower extremity edema   Skin: Coccyx wound  Psych: Calm and cooperative. Normal judgement and insight. Normal mood and affect. Neuro: Alert and interactive, face symmetric, speech fluent. LABS:  All labs reviewed.   Of note:  CBC with Differential:    Lab Results   Component Value Date    WBC 8.2 05/26/2021    RBC 3.58 05/26/2021    HGB 9.8 05/26/2021    HCT 33.3 05/26/2021     05/26/2021    MCV 93.0 05/26/2021    MCH 27.4 05/26/2021    MCHC 29.4 05/26/2021    RDW 17.9 05/26/2021    SEGSPCT 58 06/18/2013    LYMPHOPCT 26.6 05/25/2021    MONOPCT 4.8 05/25/2021    BASOPCT 0.4 05/25/2021    MONOSABS 0.40 05/25/2021    LYMPHSABS 2.23 05/25/2021    EOSABS 0.18 05/25/2021    BASOSABS 0.03 05/25/2021     CMP:    Lab Results   Component Value Date     05/26/2021    K 3.7 05/26/2021     05/26/2021    CO2 28 05/26/2021    BUN 34 05/26/2021    CREATININE 1.0 05/26/2021    GFRAA >60 05/26/2021    LABGLOM 52 05/26/2021    GLUCOSE 135 05/26/2021    PROT 7.2 05/25/2021    LABALBU 2.9 05/25/2021 level of care  Naomi Blanchard, APRN - CNP    2:20 PM  5/26/2021     admitted for ? pna - not sure about this   She is advanced age, bed bound and debilitated  Check viral panel    Negative procal and lactate   Staph spp in 1/4 blood cultures - likely contaminant   Follow for fevers, wbc elevation   If status improved by tomorrow , ok for home by Friday   dw daughter at bedside   Cards followed     I personally saw, examined and provided care for the patient. Radiographs, labs and medication list were reviewed by me independently. The case was discussed in detail and plans for care were established. Review of [address] [place] [address], documentation was conducted and revisions were made as appropriate directly by me. I agree with the above documented exam, problem list, and plan of care.      Bernadine Hawkins MD  6:15 PM  5/26/2021

## 2021-05-26 NOTE — PROGRESS NOTES
Pharmacy Note    Keena Davila was ordered fish oil capsules. As per the [address] [place] [address], herbals and certain dietary supplements will be discontinued.   The herbal or dietary supplement may be continued after discharge from the hospital.    Nadja Escobar, PharmD, [facility], BCPS 5/25/2021 9:58 PM

## 2021-05-26 NOTE — CONSULTS
Inpatient Cardiology Consultation      Reason for Consult:  CHF and Elevated Troponin     Consulting Physician: Dr. Alejandrina Snow     Requesting Physician: Cele FLOWERS     Date of Consultation: 5/26/2021    HISTORY OF PRESENT ILLNESS:   Ms. Eris Miller is an 80year old  female who follows with Dr. Alejandrina Snow. She was most recently seen in consultation with Dr. Ignacio Joy on 04/07/2021. Her medical history includes coronary artery disease s/p NSTEMI 11/2019 treated with conservative medical management, ischemic cardiomyopathy with improved EF (40% 10/2019 --> 60-65% 1/2020 --> 45-50% 2021)  pulmonary emboli 10/2019 on Eliquis, hypertension, hyperlipidemia, chronic kidney disease stage III, type 2 diabetes mellitus, peripheral neuropathy, depression/anxiety, spinal stenosis, obesity, COVID19 11/2020, chronic non healing sacral wound. Of note in her 04/2021 admission her Eliquis was discontinued in view of her fragility and drop in hemoglobin. She was noted to have spontaneous conversion of Atrial Fibrillation during that admission as well. Ms. Eris Miller presented to [facility] ED on 05/25/2021 with complaints of a moist nonproductive cough. Please note this information was obtained by the patient's daughter (who is present at the bedside) as the patient is somewhat of a poor historian. According to Mrs. Francisco J Bates daughter, she was having a nonproductive \"moist\" cough that she was unable to clear with the use of Duoneb and Mucinex at home over the past week. She states that prior to the cough beginning, she had a fever of 101.5 that resolved after 2 doses of Tylenol. She states that her SaO2 was maintaining greater than 90 and that she was not complaining of dyspnea, chest pain, orthopnea, PND, or edema to BLE. She has been compliant with her medications to include Bumex 1mg daily. States that her I&O has been adequate. Upon arrival to the ED her VS were 97.1-73-18-88% on RA-96/52. EKG SR. Covid testing was negative. WBC 8.4. H&H 10.4/34.8. BUN/SCr 30/1.1. Troponin 39. ProBNP 3988. CXR was unremarkable. CTA of the chest with no central pulmonary embolism or aortic dissection.  Small filling defects in the distal subsegmental and peripheral vessels of left lower lobe which could be small subsegmental pulmonary embolism of unknown age. Atelectasis/beginning infiltrates in the right middle lobe and right lower lobe.  Developing pneumonia is considered. She received Doxyxycline, Rocephin, and was then admitted to a telemetry monitored unit. Cardiology was consulted by Cele FLOWERS for management of CHF and Troponin Elevation. Please note: past medical records were reviewed per electronic medical record (EMR) - see detailed reports under Past Medical/ Surgical History. Past Medical and Surgical History:    1. Lifelong non smoker  2. HTN  3. T2DM insulin requiring with neuropathy, and nephropathy. HgbA1c 6.8 on 11/17/2020  4. Hx HLD on fish oil. Statin therapy stopped in 2015 secondary to age. T Chol 219 Triglycerides 252 HDL 46   on 10/21/2019  5. CKD  6. Anxiety/Depression  7. GERD  8. Spinal stenosis/back pain with remote history of injections  9. L hip fracture s/p ORIF,  Cholecystectomy, bilateral hip arthroplasty's, skin carcinoma excision.   10. [facility]  9/23/2019-9/26/2019 admitted for weakness, cough and hypoxia.  She was treated for a UTI.  Ultrasound of the lower extremities showed no evidence for DVT.   reated with Omnicef.  Aspirin 81 daily, atenolol 50 mg daily on discharge.  Cardiology was not consulted during that admission.   11. 1st degree AVB  12. [facility] 10/21/2019-10/24/2019 Troponin 0.18-->0.76-->0.84  13. 10/21/2019 CTA Chest: Subsegmental pulmonary embolism in the left lower lobe. There is no central pulmonary embolism. 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 with collateral vessels in the mediastinum-->Eliquis was initiated at that time   14.  TTE 10/21/2019: EF 45-50%, moderate LVH, stage 1 DD, mild AR, mild AS. 15. Lexsican MPS 10/23/2019: LVEF 40%, no reversible perfusion defect.  Fixed perfusion defect was noted extending from the apex into the anteroseptal and inferolateral walls.  Global hypokinesis with severe hypokinesis of the septum was noted  16. 11/22/2019 p-, troponin 0.03-->0.17-->0.22-->0. 22. Ranexa, statin, and Imdur added. Coreg increased. 17. 11/23/2019 Per Dr Cintia García a long discussion with the patient and her daughter. Naren Cesar will continue with Dr. Fahad Zavala plan to continue with aggressive medication titration. Carolina Cavazos will ambulate today and tomorrow. [facility] medical therapy if she is asymptomatic with this. 18. TTE: 1/6/2020 (Dr. Machuca):  Technically difficult examination due to body habitus and unable to position. Normal left ventricle size and systolic function. Ejection fraction is visually estimated at 60-65%. Normal left ventricle wall thickness. No regional wall motion abnormalities seen. Indeterminate diastolic function. Normal right ventricular size and function. TAPSE 21 mm. No hemodynamically significant aortic stenosis is present. Moderate aortic regurgitation is noted. Unable to estimate PA systolic pressure. No evidence for hemodynamically significant pericardial effusion. Compared to prior echo of 10/22/2019, changes noted. LVEF has improved from 45-50% to 60-65%. 19. TTE 03/31/2021 [facility]): Left ventricle size is normal.  Ejection fraction is visually estimated at 45-50%.  Overall ejection fraction mildly decreased .  No regional wall motion abnormalities seen.  Mild concentric left ventricular hypertrophy.  Stage II diastolic dysfunction.  The left atrium is mildly dilated.   Normal right ventricular size and function.  TAPSE 19 mm.  Physiologic and/or trace mitral regurgitation is present.  No hemodynamically significant aortic stenosis is present.  Mild aortic regurgitation is noted.  The tricuspid valve was not well visualized.  No evidence for hemodynamically significant pericardial effusion. 20. Atrial Fibrillation with spontaneous conversion to NSR 04/2021-->discharged from [facility] off of Eliquis on 04/08/2021 per Primary Service as her hemoglobin dropped from 9 to 7 and in consideration of her frail status  21. Chronic nonhealing sacral wound       Medications Prior to admit:  Prior to Admission medications    Medication Sig Start Date End Date Taking? Authorizing Provider   guaiFENesin (MUCINEX) 600 MG extended release tablet Take 1 tablet by mouth 2 times daily for 15 days 5/21/21 6/5/21 Yes Latasha Lane DO   gabapentin (NEURONTIN) 300 MG capsule Take 1 capsule by mouth 3 times daily for 90 days.  Intended supply: 90 days 5/4/21 8/2/21 Yes Juan Jose Noble DO   famotidine (PEPCID) 20 MG tablet Take 1 tablet by mouth 2 times daily 5/4/21  Yes Latasha Lane DO   bumetanide (BUMEX) 1 MG tablet Take 1 tablet by mouth daily 5/3/21  Yes Latasha Lane DO   docusate sodium (COLACE, DULCOLAX) 100 MG CAPS Take 200 mg by mouth 2 times daily 4/8/21  Yes Mitch Franklin MD   ondansetron [facility]) 4 MG/5ML solution Take 5 mLs by mouth 2 times daily as needed for Nausea or Vomiting 4/8/21  Yes Mitch Franklin MD   bisacodyl (DULCOLAX) 10 MG suppository Place 10 mg rectally daily as needed for Constipation   Yes Historical Provider, MD   insulin lispro, 1 Unit Dial, (HUMALOG KWIKPEN) 100 UNIT/ML SOPN Inject 0-10 Units into the skin 3 times daily (before meals)   Yes Historical Provider, MD   nitroGLYCERIN (NITROSTAT) 0.4 MG SL tablet Place 0.4 mg under the tongue every 5 minutes as needed for Chest pain   Yes Historical Provider, MD   diclofenac sodium (VOLTAREN) 1 % GEL Apply 2 g topically 4 times daily as needed for Pain (apply to hands)   Yes Historical Provider, MD   sertraline (ZOLOFT) 100 MG tablet Take 150 mg by mouth daily   Yes Historical Provider, MD   montelukast (SINGULAIR) 10 MG tablet Take 1 tablet by mouth nightly 3/12/21  Yes Brayan Lane DO   atorvastatin (LIPITOR) 40 MG tablet Take 1 tablet by mouth nightly 1/21/21  Yes Mahogany Hill MD   carvedilol (COREG) 12.5 MG tablet Take 1 tablet by mouth 2 times daily (with meals) 1/21/21  Yes Mahogany Hill MD   isosorbide mononitrate (IMDUR) 30 MG extended release tablet Take 1 tablet by mouth daily 1/14/21  Yes Brayan Lane DO   lansoprazole (PREVACID) 30 MG delayed release capsule Take 1 capsule by mouth daily 1/14/21  Yes Brayan Lane DO   ipratropium-albuterol (DUONEB) 0.5-2.5 (3) MG/3ML SOLN nebulizer solution Take 3 mLs by nebulization every 4 hours as needed for Shortness of Breath 12/15/20  Yes Brayan Lane DO   Calcium Carb-Cholecalciferol (CALTRATE 600+D3) 600-800 MG-UNIT TABS Take 1 tablet by mouth 2 times daily   Yes Historical Provider, MD   nystatin (MYCOSTATIN) 679438 UNIT/GM powder Apply 1 each topically 2 times daily Apply to abdominal folds   Yes Historical Provider, MD   Omega-3 Fatty Acids (FISH OIL) 1000 MG CAPS Take 1,000 mg by mouth 2 times daily   Yes Historical Provider, MD   aspirin 81 MG chewable tablet Take 1 tablet by mouth daily 1/14/20  Yes Ger Beltran MD   acetaminophen (TYLENOL) 325 MG tablet Take 650 mg by mouth every 4 hours as needed for Pain or Fever    Yes Historical Provider, MD   fluticasone (FLONASE) 50 MCG/ACT nasal spray 1 spray by Nasal route daily as needed for Rhinitis    Yes Historical Provider, MD   mineral oil-hydrophilic petrolatum (HYDROPHOR) ointment Apply topically daily Apply to both feet   Yes Historical Provider, MD       Current Medications:    Current Facility-Administered Medications: white petrolatum ointment, , Topical, Daily  sodium chloride flush 0.9 % injection 10 mL, 10 mL, Intravenous, PRN  0.9 % sodium chloride infusion, 25 mL, Intravenous, PRN  sodium chloride flush 0.9 % injection 5-40 mL, 5-40 mL, Intravenous, 2 times per day  sodium chloride flush 0.9 % injection 5-40 mL, 5-40 mL, Intravenous, PRN  0.9 % sodium chloride infusion, 25 mL, Intravenous, PRN  enoxaparin (LOVENOX) injection 70 mg, 1 mg/kg, Subcutaneous, BID  promethazine (PHENERGAN) tablet 12.5 mg, 12.5 mg, Oral, Q6H PRN **OR** ondansetron (ZOFRAN) injection 4 mg, 4 mg, Intravenous, Q6H PRN  polyethylene glycol (GLYCOLAX) packet 17 g, 17 g, Oral, Daily PRN  acetaminophen (TYLENOL) tablet 650 mg, 650 mg, Oral, Q6H PRN **OR** acetaminophen (TYLENOL) suppository 650 mg, 650 mg, Rectal, Q6H PRN  cefTRIAXone (ROCEPHIN) 1,000 mg in sterile water 10 mL IV syringe, 1,000 mg, Intravenous, Q24H  doxycycline (VIBRAMYCIN) 100 mg in dextrose 5 % 100 mL IVPB, 100 mg, Intravenous, Q12H  ipratropium-albuterol (DUONEB) nebulizer solution 1 ampule, 1 ampule, Inhalation, Q6H WA  aspirin chewable tablet 81 mg, 81 mg, Oral, Daily  atorvastatin (LIPITOR) tablet 40 mg, 40 mg, Oral, Nightly  bisacodyl (DULCOLAX) suppository 10 mg, 10 mg, Rectal, Daily PRN  bumetanide (BUMEX) injection 0.5 mg, 0.5 mg, Intravenous, Q12H  calcium-vitamin D (OSCAL-500) 500-200 MG-UNIT per tablet 1 tablet, 1 tablet, Oral, BID  carvedilol (COREG) tablet 12.5 mg, 12.5 mg, Oral, BID WC  diclofenac sodium (VOLTAREN) 1 % gel 2 g, 2 g, Topical, Q6H PRN  docusate sodium (COLACE) capsule 200 mg, 200 mg, Oral, BID  fluticasone (FLONASE) 50 MCG/ACT nasal spray 1 spray, 1 spray, Nasal, Daily PRN  gabapentin (NEURONTIN) capsule 300 mg, 300 mg, Oral, TID  isosorbide mononitrate (IMDUR) extended release tablet 30 mg, 30 mg, Oral, Daily  montelukast (SINGULAIR) tablet 10 mg, 10 mg, Oral, Nightly  nitroGLYCERIN (NITROSTAT) SL tablet 0.4 mg, 0.4 mg, Sublingual, Q5 Min PRN  sertraline (ZOLOFT) tablet 150 mg, 150 mg, Oral, Daily  miconazole (MICOTIN) 2 % powder, , Topical, BID  insulin lispro (HUMALOG) injection vial 0-6 Units, 0-6 Units, Subcutaneous, TID WC  insulin lispro (HUMALOG) injection vial 0-3 Units, 0-3 Units, Subcutaneous, Nightly  glucose (GLUTOSE) 40 % oral gel 15 g, 15 g, Oral, PRN  dextrose 50 % IV solution, 12.5 g, Intravenous, PRN  glucagon (rDNA) injection 1 mg, 1 mg, Intramuscular, PRN  dextrose 5 % solution, 100 mL/hr, Intravenous, PRN  pantoprazole (PROTONIX) tablet 40 mg, 40 mg, Oral, QAM AC  guaiFENesin tablet 400 mg, 400 mg, Oral, TID    Allergies:  Patient has no known allergies. Social History:    Denies tobacco, alcohol, and illicit drug use. Family History:   Please note this information was not obtained at this time as it is limited in nature due to the patient's advanced age. REVIEW OF SYSTEMS:     · Constitutional: Denies fevers, chills, night sweats, and fatigue  · HEENT: Denies headaches, nose bleeds, and blurred vision,oral pain, abscess or lesion. · Musculoskeletal: Denies falls, pain to BLE with ambulation and edema to BLE. · Neurological: Denies dizziness and lightheadedness, numbness and tingling  · Cardiovascular: Denies chest pain, palpitations, and feelings of heart racing. · Respiratory: Denies orthopnea and PND. Complains of a moist nonproductive cough  · Gastrointestinal: Denies heartburn, nausea/vomiting, diarrhea and constipation, black/bloody, and tarry stools. · Genitourinary: Denies dysuria and hematuria  · Hematologic: Denies excessive bruising or bleeding  · Lymphatic: Denies lumps and bumps to neck, axilla, breast, and groin  · Endocrine: Denies excessive thirst. Denies intolerance to hot and cold  · GYN: Postmenopausal state; Denies vaginal bleeding. · Psychiatric: Denies anxiety and depression. PHYSICAL EXAM:   /66   Pulse 66   Temp 97.6 °F (36.4 °C) (Oral)   Resp 19   Ht 5' (1.524 m)   Wt 150 lb 12.7 oz (68.4 kg)   LMP  (LMP Unknown)   SpO2 98%   BMI 29.45 kg/m²   CONST:  Well developed, obese, elderly female who appears stated age.  Awake, alert, cooperative, no apparent distress  HEENT:   Head- Normocephalic, atraumatic   Eyes- Conjunctivae pink, anicteric  Throat- Oral mucosa pink central pulmonary embolism or aortic dissection.  Small filling defects in the distal subsegmental and peripheral vessels of left lower lobe which could be small subsegmental pulmonary embolism of unknown age. Atelectasis/beginning infiltrates in the right middle lobe and right lower lobe.  Developing pneumonia is considered. 05/25/2021 CXR:   No acute process    IMPRESSION:  1. Acute decompensation of HFrEF  2. Elevated Troponin with Delta <5 without ischemic EKG changes. 3. NSTEMI in 11/2020 with medical management pursued with Lexiscan MPS 10/2020 with no reversible ischemia. 4. Developing Pneumonia per CTA of the chest this admission; On IV antibiotic therapy   5. PAF noted on admission in 04/2021: Remains NSR  6. Hx of Pulmonary embolism in 10/2019 (Eliquis initiated at that time). CTA of the chest this admission with small segmental PE of unknown age  9. Chronic anticoagulation with Eliquis discontinued on discharge 04/08/2021 in view of fragility and anemia. Now on Lovenox BID  8. HTN: Controlled   9. DM  10. Obesity   11. GERD  12. Anxiety and Depression     PLAN:   1. No need to repeat echo at this time  2. Monitor daily weight, I&O, BMP; Change Bumex to 1mg PO QD  3. Monitor H&H. Continue Lovenox BID-->Consider changing to Eliquis 5mg BID 05/26/2021 if hemoglobin remains stable  4.   Above as discussed with Dr. Kirk Childress     Electronically signed by KRISTIE Nicole CNP on 5/26/2021 at 10:03 AM

## 2021-05-26 NOTE — PROGRESS NOTES
Comprehensive Nutrition Assessment    Type and Reason for Visit:  Initial, Consult    Nutrition Recommendations/Plan: Recommend and start Boost Pudding supplement BID and Magic Cup BID to help meet increased nutritional needs. Pt currently on thickened liquids. Recommend speech therapy to consult to help determine correct food and fluid consistencies. Nutrition Assessment:  Pt adm w/ poor po intake/appetite PTA 2/2 SOB and fatigue ; pt at further nutritional risk d/t increased needs from wound healing ; pt also meets criteria for moderate malnutrition AEB poor po intake >1 month and muscle/fat wasting ; adm w/ acute respiratory failure with hypoxia and pneumonia ; hx of DM/CKD/CHF ; will start ONS    Malnutrition Assessment:  Malnutrition Status: Moderate malnutrition    Context:  Chronic Illness     Findings of the 6 clinical characteristics of malnutrition:  Energy Intake:  7 - 75% or less estimated energy requirements for 1 month or longer  Weight Loss:  Unable to assess (d/t possible fluid shifts ; hx of CHF)     Body Fat Loss:  1 - Mild body fat loss Orbital, Triceps   Muscle Mass Loss:  1 - Mild muscle mass loss Temples (temporalis), Clavicles (pectoralis & deltoids)  Fluid Accumulation:  No significant fluid accumulation     Strength:  Not Performed    Estimated Daily Nutrient Needs:  Energy (kcal):  4767-7293 (REE 1038 x 1.2 SF); Weight Used for Energy Requirements:  Current     Protein (g):  65-85 (1.5-1.8g/kg IBW);  Weight Used for Protein Requirements:  Ideal        Fluid (ml/day):  1500ml FR; Method Used for Fluid Requirements:  1 ml/kcal      Nutrition Related Findings:  I&Os WNL, no edema, A&O x 3, missing teeth, redness to buttocks, boggy heels, questionable dysphagia, SOB, fatigue, muscle/fat wasting      Wounds:  Multiple, Open Wounds, Wound Consult Pending (wounds x 2)       Current Nutrition Therapies:    DIET CARB CONTROL; Carb Control: 4 carb choices (60 gms)/meal; Low Sodium (2 GM); Dental Soft; Mildly Thick (Nectar); Daily Fluid Restriction: 1500 ml    Anthropometric Measures:  · Height: 5' (152.4 cm)  · Current Body Weight: 150 lb (68 kg) (5/26, bedscale)   · Admission Body Weight: 158 lb (71.7 kg) (5/25, no method)    · Usual Body Weight:  (EMR shows past weights of 174# bedscale on 3/29/21 and 178# bedscale on 11/16/20)     · Ideal Body Weight: 100 lbs; % Ideal Body Weight 150 %   · BMI: 29.3  · BMI Categories: Overweight (BMI 25.0-29. 9)       Nutrition Diagnosis:   · Moderate malnutrition, In context of chronic illness related to catabolic illness (hx of CKD) as evidenced by poor intake prior to admission, mild muscle loss, mild loss of subcutaneous fat      Nutrition Interventions:   Food and/or Nutrient Delivery:  Continue Current Diet, Start Oral Nutrition Supplement  Nutrition Education/Counseling:  Education not indicated   Coordination of Nutrition Care:  Continue to monitor while inpatient, Speech Therapy, Swallow Evaluation    Goals:  Patient will consume 50-75% of meals served       Nutrition Monitoring and Evaluation:   Behavioral-Environmental Outcomes:  Beliefs and Attitutes   Food/Nutrient Intake Outcomes:  Food and Nutrient Intake, Supplement Intake  Physical Signs/Symptoms Outcomes:  Biochemical Data, Chewing or Swallowing, GI Status, Fluid Status or Edema, Hemodynamic Status, Meal Time Behavior, Nutrition Focused Physical Findings, Skin, Weight     Discharge Planning:     Too soon to determine     Electronically signed by Florence Hancock RD, LD on 5/26/21 at 1:30 PM EDT    Contact: [phone]

## 2021-05-26 NOTE — ED NOTES
Patient was cleaned up from large amount of urine in diaper fresh pad down and new diaper applied patient is resting in bed call light within reach no distress noted     Haritha Willard, RN  05/25/21 1705

## 2021-05-26 NOTE — PROGRESS NOTES
Physical Therapy  Physical Therapy Initial Assessment     Name: Tien Zhang  : 1933  MRN: [id number]      Date of Service: 2021    Evaluating PT:  Cristal Hirsch, PT [id number]      Room #:  2796/1441-P  Diagnosis:  Acute [facility] failure with [facility] ([facility] [J96.01]  PMHx/PSHx:     has a past surgical history that includes Cholecystectomy; Skin cancer excision; and Hip fracture surgery (Bilateral). has a past surgical history that includes Cholecystectomy; Skin cancer excision; and Hip fracture surgery (Bilateral). Procedure/Surgery:  none  Precautions:  Falls, O2 and skin, TAPS for positioning  Equipment Needs:  None    SUBJECTIVE:    Per patient. Patient lives alone with family support in a single story  with w/c lift to enter. Bed is on 1 floor and bath is on 1 floor. Patient has hospital bed. Per previous PT note from prior admission patient uses [employer] steady lift for transfers. Patient uses w/c for mobility. Equipment owned: w/c, walkers, trent lift, gianfranco steady lift, hospital bed, [facility]    OBJECTIVE:   Initial Evaluation  Date: 21 Treatment Short Term/ Long Term   Goals   AM-PAC 6 Clicks      Was pt agreeable to Eval/treatment? yes     Does pt have pain? None stated     Bed Mobility  Rolling: Max Reached for rail with LUE unable with RUE  Supine to sit: Max A  Sit to supine: max a   Scooting: Max a  Rolling: Mod  Supine to sit: Mod  Sit to supine: Mod  Scooting: Mod   Transfers Sit to stand: NT  Stand to sit: NT  Stand pivot: NT  Sit to stand: Max  Stand to sit: Max  Stand pivot: Max pivot transfer. Ambulation    NT  Previously w/c for locomotion. Stair negotiation: ascended and descended  NT  W/c lift to house. ROM BUE: RUE very limited at shoulder. LUE  wfl  BLE:  wfl     Strength BUE: RUE decreased. LUE grossly 3+/5  RLE:  3+/5  LLE:   3+/5  4-/5   Balance Sitting EOB:  Mod retro tendency.    Dynamic Standing:  Nt  Sitting EOB:  Min  Dynamic Standing:  TBD     Patient is Alert & Oriented x person, place, time and situation slow responses. and follows directions   Sensation:  Pt denies numbness and tingling to extremities  Edema:  BLE noted. Vitals:  Blood Pressure at rest - Blood Pressure post session -   Heart Rate at rest 71 Heart Rate post session -   SPO2 at rest 97% 3L SPO2 post session 96% 3L     Therapeutic Exercises:  AAROM for BLE while supine prior to functional mobility. Patient education  Pt educated on role of PT and need to change position and positioning for protection of skin. Patient response to education:   Pt verbalized understanding Pt demonstrated skill Pt requires further education in this area   yes yes reminders     ASSESSMENT:    Conditions Requiring Skilled Therapeutic Intervention:    [x]Decreased strength     [x]Decreased ROM  [x]Decreased functional mobility  [x]Decreased balance   [x]Decreased endurance   [x]Decreased posture  []Decreased sensation  []Decreased coordination   []Decreased vision  [x]Decreased safety awareness   []Increased pain       Comments:    RN cleared patient for participation in therapy session. Patient was seen this date for PT evaluation. . Patient was agreeable to intervention. Results of the functional assessment are noted above. Upon entering the room patient was found supine in bed positioned off loaded Right with TAPS. AAROM completed for BLE prior to functional mobility with light manual resistance. Heavy Max A required for bed mobility and to transition to EOB. Once seated Mod to Max A required to maintain upright posture. Sat EOB x 10 minutes to increase dynamic sitting balance and activity tolerance. Patient then assisted back to supine and HOB elevated TAPS placed to off load Left positioned for comfort. Heels floated with protectors in place. At end of session, patient in bed [facility] elevated with  call light and phone within reach,  all lines and tubes intact, nursing notified.    This patient can benefit from the continuation of skilled PT  to maximize functional level and return to PLOF. Treatment:  Patient practiced and was instructed in the following treatment:    · Bed mobility training - pt given verbal and tactile cues to facilitate proper sequencing and safety during rolling and supine>sit as well as provided with physical assistance to complete task    · Seated EOB challenging sitting balance and upright posture. Tactile and verbal cues required. · Therapeutic exercise was completed to improve strength of BLE to improve functional mobility status. Pt's/ family goals   1. Home when able with family assistance. Prognosis is good for reaching above PT goals. Patient and or family understand(s) diagnosis, prognosis, and plan of care. yes    PHYSICAL THERAPY PLAN OF CARE:    PT POC is established based on physician order and patient diagnosis     Referring provider/PT Order:    05/25/21 2200  PT eval and treat     April KRISTIE Schmidt - CNP       Diagnosis:  Acute respiratory failure with hypoxia ([facility] [J96.01]  Specific instructions for next treatment:  Increase EOB sitting tolerance and ability to maintain upright posture.      Current Treatment Recommendations:     [x] Strengthening to improve independence with functional mobility   [x] ROM to improve independence with functional mobility   [x] Balance Training to improve static/dynamic balance and to reduce fall risk  [x] Endurance Training to improve activity tolerance during functional mobility   [x] Transfer Training to improve safety and independence with all functional transfers   [] Gait Training to improve gait mechanics, endurance and asses need for appropriate assistive device  [] Stair Training in preparation for safe discharge home and/or into the community   [] Positioning to prevent skin breakdown and contractures  [] Safety and Education Training   [x] Patient/Caregiver Education   [] HEP  [] Other     PT long term treatment goals are located in above grid    Frequency of treatments: 2-5x/week x 1-2 weeks. Time in  0830  Time out  0910    Total Treatment Time  25 minutes     Evaluation Time includes thorough review of current medical information, gathering information on past medical history/social history and prior level of function, completion of standardized testing/informal observation of tasks, assessment of data and education on plan of care and goals.     CPT codes:  [x] Low Complexity PT evaluation 15975  [] Moderate Complexity PT evaluation 33353  [] High Complexity PT evaluation 00303  [] PT Re-evaluation 02927  [] Gait training 72815 - minutes  [] Manual therapy 93195 - minutes  [x] Therapeutic activities 55554 25 minutes  [] Therapeutic exercises 70846 - minutes  [] Neuromuscular reeducation 30473 - minutes     Alexandria Perdomo [id number] [facility]

## 2021-05-27 NOTE — FLOWSHEET NOTE
Inpatient Wound Care (Initial consult) [id number]    Admit Date: 5/25/2021  2:01 PM    Reason for consult:  Buttocks    Significant history: Per H&P     History of Present Illness:     Patient is a 80year old female with a past medical history of DM, UTI, GERD, Covid 23, PVD, and HTN. Patient presented to the ER with complaints of cough, shortness of breath and fever. Patient states she has not felt well for the past few days. Patient denies any chest pain, abdominal pain, nausea, emesis, and headache. Patient states there are no aggravating factors and no relieving factors. Patient does have a history of Covid in November 2020. She has received her Covid vaccine. Patient does not use home O2. Patient is currently on 5 liters O2 and has a very moist productive cough. ER workup reveals elevated BNP 3988 and Ct scan - developing PNA. Findings:  Bilateral buttocks: redness, blanchable     05/27/21 1526   Skin Integrity   Skin Integrity Bruising   Location BUE, abdomen   Skin Integrity Site 2   Skin Integrity Location 2 Abrasion   Location 2   (right knee, left posterior ankle)   Skin Integrity Site 3   Skin Integrity Location 3 Excoriation    Location 3   (abdominal fold)   Wound 02/17/21 Coccyx #2   Date First Assessed/Time First Assessed: 02/17/21 1349   Wound Approximate Age at First Assessment (Weeks): 52 weeks  Primary Wound Type: Pressure Injury  Location: Coccyx  Wound Description (Comments): #2   Wound Image    Wound Etiology Pressure Stage  4   Dressing Status New dressing applied   Wound Cleansed Cleansed with saline   Dressing/Treatment ABD; Alginate   Wound Length (cm) 1 cm   Wound Width (cm) 1.2 cm   Wound Depth (cm) 1.7 cm   Wound Surface Area (cm^2) 1.2 cm^2   Change in Wound Size % (l*w) -21.21   Wound Volume (cm^3) 2.04 cm^3   Wound Healing % -9   Undermining Starts ___ O'Clock 12   Undermining Ends___ O'Clock 4   Undermining Maxium Distance (cm) Beba@Hightail   Wound Assessment [place]/red   Drainage Amount Moderate   Drainage Description Purulent;Serosanguinous   Odor None   Xena-wound Assessment Blanchable erythema; Maceration   Wound 05/25/21 Heel Left;Medial   Date First Assessed/Time First Assessed: 05/25/21 2133   Present on Hospital Admission: Yes  Location: Heel  Wound Location Orientation: (c) Left;Medial   Wound Image    Wound Etiology Deep tissue/Injury   Dressing/Treatment Open to air   Wound Length (cm) 0.8 cm   Wound Width (cm) 0.2 cm   Wound Depth (cm)   (unable to determine)   Wound Surface Area (cm^2) 0.16 cm^2   Change in Wound Size % (l*w) 86.67   Wound Assessment Purple/maroon   Drainage Amount None   Odor None   Xena-wound Assessment Intact       **Informed Consent**    The patient has given verbal consent to have photos taken of wounds and inserted into their chart as part of their permanent medical record for purposes of documentation, treatment management and/or medical review. All Images taken on 5/27/21 of patient name: Jose M Ye were transmitted and stored on secured [employer] located within [facility] by a registered [employer] Mobile Application Device. Impression:  Coccyx: stage 4  Left medial heel: DTI    Plan: Aquacel rope, 4x4, abd pad  Antifungal powder  Medix heel boots  TAP system  Low air loss module  Patient will need continued preventative care.       Dasia Lund RN 5/27/2021 3:31 PM

## 2021-05-27 NOTE — PLAN OF CARE
Problem: OXYGENATION/RESPIRATORY FUNCTION  Goal: Patient will maintain patent airway  Outcome: Met This Shift     Problem: OXYGENATION/RESPIRATORY FUNCTION  Goal: Patient will achieve/maintain normal respiratory rate/effort  Description: Respiratory rate and effort will be within normal limits for the patient  Outcome: Met This Shift

## 2021-05-27 NOTE — PROGRESS NOTES
retraining/development of therapeutic activities to improve problem solving, judgement, memory, and attention for increased safety/participation in ADL/IADL tasks  Splinting/positioning for increased function, prevention of contractures, and improve skin integrity  Therapeutic exercise to improve motor endurance, ROM, and functional strength for ADLs/functional transfers  Therapeutic activities to facilitate/challenge dynamic balance, stand tolerance for increased safety and independence with ADLs  Therapeutic activities to facilitate gross/fine motor skills for increased independence with ADLs  Neuro-muscular re-education: facilitation of righting/equilibrium reactions, midline orientation, scapular stability/mobility, normalization of muscle tone, and facilitation of volitional active controled movement  Positioning to improve skin integrity, interaction with environment and functional independence      Recommended Adaptive Equipment: TBD     Home Living: Pt lives alone in 1 story house (family stays 24/7); bed/bath on 1st floor; w/c lift to enter house   Bathroom setup: sponge bathes   Equipment owned: hospital bed, w/c, ww, trent lift, BSC    Prior Level of Function: Assistance with ADLs (pt able to self feed and perform light grooming tasks with set-up), family/caregivers complete IADLs; uses w/c for mobility with assist; uses trent lift vs person assist depending on day per pt report    Pain Level: Pt reports 0/10 pain this session  Cognition: A&O: 3/4; Follows 1 step directions w/ increased cueing for comprehension of task   Memory:  fair   Sequencing:  fair   Problem solving:  fair   Judgement/safety:  fair     Functional Assessment:  AM-PAC Daily Activity Raw Score: 11/24   Initial Eval Status  Date: 5/27/21 Treatment Status  Date: STGs = LTGs  Time frame: 10-14 days   Feeding Set-up/Min.  A (to bring cup to mouth, unsteady)--thickened liquid  Set-up   Grooming Maximal Assist (supported in bed)  Minimal Assist    UB Dressing Maximal Assist   Moderate Assist    LB Dressing Dependent       Bathing Maximal Assist  Mod. A   Toileting Dependent      Bed Mobility  Supine to sit: Maximal Assist   Sit to supine: Maximal Assist   Supine to sit: Moderate Assist   Sit to supine: Moderate Assist    Functional Transfers NT d/t fatigue EOB  Maximal Assist    Functional Mobility NT   Uses w/c for mobility PTA    Balance Sitting:     Static: Mod. A (posterior lean noted)    Dynamic:Max A  Standing: NT  Sitting:     Static: Min. A    Dynamic:Mod. A  Standing: Max A   Activity Tolerance Fair- (limited d/t fatigue)  Fair+   Visual/  Perceptual Glasses: Yes    Appears WFL                Hand Dominance R   AROM (PROM) Strength Additional Info:    RUE  Proximally: severely limited at shoulder  Distally: WFL Proximally: 2-/5  Distally: 3+/5 fair  and fair FMC/dexterity noted during ADL tasks       LUE Grossly WFL Proximally: 3-/5  Distally: 3+/5 fair  and fair FMC/dexterity noted during ADL tasks       H[facility]: [facility]   Sensation:  No c/o numbness or tingling   Tone: WFL   Edema: BLEs edema noted (placed on pillow for edema control)    Comments: Obtained nursing clearance prior to session. Upon arrival patient lying in bed. Pt demonstrating fair- understanding of education/techniques, requiring additional training / education. At end of session, patient lying in bed (re-positioned) with call light and phone within reach, all lines and tubes intact. Pt instructed on use of call light for assistance and fall prevention. Line management and environmental modifications made prior to and end of session to ensure patient safety and to increase efficiency of session. Skilled monitoring of HR, O2 saturation, blood pressure and patient's response to activity performed throughout session. Overall pt demonstrated decreased independence and safety during completion of ADL/functional transfers/mobility tasks.   Pt would benefit from continued skilled OT to increase safety and independence with completion of ADL/IADL tasks for functional independence and quality of life. Treatment: Therapist facilitated bed mobility(supine<>sit, increased cueing for sequencing of task and hand placement; increased assistance for BLEs and trunk, pt able to reach with BUEs) and sitting tolerance tasks(addressing posture and incorporating light functional reaching tasks impacting ADLs-sat EOB for ~5 minutes) - skilled cuing on hand placement, posture, body mechanics and safety. Therapist facilitated self-care retraining: UB/LB self-care tasks(socks), simulated toileting task and grooming task(bed level supported for increased independence to comb hair-pt requiring assistance for back of head and d/t decreased strength) and feeding task(pt able to grasp cup and bring to mouth with therapist's assist d/t unsteadiness) while educating pt on modified techniques, posture, safety and energy conservation techniques. Skilled monitoring of HR, O2 sats and pts response to treatment. Rehab Potential: Good for established goals     Patient / Family Goal: not stated      Patient and/or family were instructed on functional diagnosis, prognosis/goals and OT plan of care. Demonstrated fair understanding. Eval Complexity: Low    Time In: 12:40  Time Out: 1:05  Total Treatment Time:  10 minutes    Min Units   OT Eval Low 97165  x  1   OT Eval Medium 59664      OT Eval High 01631       OT Re-Eval [id number]       Therapeutic Ex 25108       Therapeutic Activities 14599       ADL/Self Care 17368  10  1   Orthotic Management 03500       Neuro Re-Ed 28299       Non-Billable Time          Evaluation Time includes thorough review of current medical information, gathering information on past medical history/social history and prior level of function, completion of standardized testing/informal observation of tasks, assessment of data and education on plan of care and goals. Joey Rico, OTR/L #[id number]

## 2021-05-27 NOTE — PROGRESS NOTES
Pharmacy Consultation Note  (Antibiotic Dosing and Monitoring)    Initial consult date: 21  Consulting physician: Dr. Rose Hodge  Drug: Vancomycin  Indication: Bloodstream infection    Age/  Gender Height Weight IBW Dosing weight  Allergy Information   87 y.o./female 5' (152.4 cm) 158 lb (71.7 kg)     Ideal body weight: 45.5 kg (100 lb 4.9 oz)  Adjusted ideal body weight: 54.3 kg (119 lb 11.4 oz)  67 kg  Patient has no known allergies. Other anti-infectives Start date Stop date   Ceftriaxone     Doxycycline            Temp (24hrs), Av.2 °F (36.8 °C), Min:97.9 °F (36.6 °C), Max:98.4 °F (36.9 °C)          Date  WBC BUN/SCr CrCl  (mL/min) Drug/Dose Time   Given Level(s)   (Time) Comments    7.5 36/1.1  31   Vancomycin 1500 mg IV x1 <1500>         Vancomycin 750 mg IV q24h <1500>                             Intake/Output Summary (Last 24 hours) at 2021 1428  Last data filed at 2021 0532  Gross per 24 hour   Intake 10 ml   Output 500 ml   Net -490 ml       Cultures:    Site Date Result   Blood x2  Gram positive cocci, Staph species   Biofire PCR  Positive for methicillin resistance, staph species   Wound  Gram positive cocci     Assessment:  · Pharmacy consulted to dose vancomycin for this 80 y.o. female for positive blood cultures. · Goal trough level = 15-20 mcg/mL    Plan:  · Vancomycin 1500 mg IV x1 then 750 mg IV q24h. · Pharmacist will follow and monitor/adjust dosing as necessary.      Armida Brumfield, [address] [place] [address] 2021 2:28 PM

## 2021-05-27 NOTE — PROGRESS NOTES
Infectious disease office notified of new consult. Dr. Soledad Diehl on for consult and updated on patient.    Mandi Walters RN

## 2021-05-27 NOTE — PROGRESS NOTES
Subjective: The patient is awake and alert. No acute complaints     Objective:    BP (!) 106/54   Pulse 86   Temp 98.3 °F (36.8 °C) (Oral)   Resp 16   Ht 5' (1.524 m)   Wt 148 lb 13 oz (67.5 kg)   LMP  (LMP Unknown)   SpO2 99%   BMI 29.06 kg/m²     In: 10 [I.V.:10]  Out: 500   In: 10   Out: 500 [Urine:500]    General appearance: NAD, conversant  HEENT: AT/NC, MMM  Neck: FROM, supple  Lungs: Coarse   CV: RRR, no MRGs  Vasc: Radial pulses 2+  Abdomen: Soft, non-tender; no masses or HSM  Extremities: No peripheral edema or digital cyanosis  Skin: no rash, lesions or ulcers  Psych: Alert and oriented to person, place and time  Neuro: Alert and interactive     Recent Labs     05/25/21  1124 05/26/21  0554 05/27/21  0540   WBC 8.4 8.2 7.5   HGB 10.4* 9.8* 10.1*   HCT 34.8 33.3* 33.9*    253 263       Recent Labs     05/25/21  1124 05/26/21  0554 05/27/21  0540    142 145   K 4.3 3.7 3.6    103 103   CO2 27 28 34*   BUN 30* 34* 36*   CREATININE 1.1* 1.0 1.1*   CALCIUM 9.3 9.2 9.4       Assessment:    Principal Problem:    Acute respiratory failure with hypoxia (HCC)  Active Problems:    GERD (gastroesophageal reflux disease)    Type 2 diabetes mellitus with hyperglycemia, with long-term current use of insulin (HCC)    Essential hypertension    Obesity (BMI 30-39. 9)    Anxiety and depression    Moderate protein-calorie malnutrition (HCC)    Pneumonia    Pulmonary embolism (HCC)    CHF (congestive heart failure) ([facility]  Resolved Problems:    * No resolved hospital problems. *      Plan:  80Year old female admitted telemetry with     Acute respiratory failure hypoxia  - Supplement O2 demands keeping oxygen saturation greater than 92%.   - Broad-spectrum IV antibiotic therapy Rocephin/Doxy  - DuoNebs   - Swallow study if questionable aspiration pneumonia  mucosmyst nebs   chest pt while here      CHF/Elevated Troponins  - Diurese cautiously so as to avoid renal's insufficiency  - BNP and other

## 2021-05-28 NOTE — PROGRESS NOTES
CLINICAL PHARMACY NOTE: MEDS TO BEDS    Total # of Prescriptions Filled: 1   The following medications were delivered to the patient:  · Doxycycline 100  mg    Additional Documentation:  Filled Eliquis 3month supply, patient's daughter will let us know if she needs the meds, if not we will transfer out. Acetylcysteine to [facility].

## 2021-05-28 NOTE — PROGRESS NOTES
Patient's daughter Rach Zapata called unit with questions about patient's medications. Attempted to call Rcah Zapata back with no answer. Left a voice mail for her to call back.    Nickie Wheeler RN

## 2021-05-28 NOTE — PROGRESS NOTES
Pharmacy Consultation Note  (Antibiotic Dosing and Monitoring)    Initial consult date: 21  Consulting physician: Dr. Carmen Reagan  Drug: Vancomycin  Indication: Bloodstream infection    Age/  Gender Height Weight IBW Dosing weight  Allergy Information   87 y.o./female 5' (152.4 cm) 158 lb (71.7 kg)     Ideal body weight: 45.5 kg (100 lb 4.9 oz)  Adjusted ideal body weight: 54.3 kg (119 lb 10.7 oz)  67 kg  Patient has no known allergies. Other anti-infectives Start date Stop date   Ceftriaxone     Doxycycline            Temp (24hrs), Av.5 °F (36.9 °C), Min:98.2 °F (36.8 °C), Max:98.9 °F (37.2 °C)          Date  WBC BUN/SCr CrCl  (mL/min) Drug/Dose Time   Given Level(s)   (Time) Comments    7.5 36/1.1  31   Vancomycin 1500 mg IV x1 1644      -- 36/1.0 34 Vancomycin 750 mg IV q24h <1600>                             Intake/Output Summary (Last 24 hours) at 2021 0750  Last data filed at 2021 0557  Gross per 24 hour   Intake 1060 ml   Output 1025 ml   Net 35 ml       Cultures:    Site Date Result   Blood x2  Gram positive cocci, Staph species   Biofire PCR  Positive for methicillin resistance, staph species   Wound  Gram positive cocci     Assessment:  · Pharmacy consulted to dose vancomycin for this 80 y.o. female for positive blood cultures. · Goal trough level = 15-20 mcg/mL    Plan:  · Continue vancomycin  mg IV q24h. · Pharmacist will follow and monitor/adjust dosing as necessary.      Alley Salgado, [facility] 2021 7:50 AM

## 2021-05-28 NOTE — PROGRESS NOTES
Dr. Velia Katz on floor and states patient can discharge from his stand point.   Alejandra Weller RN

## 2021-05-28 NOTE — DISCHARGE SUMMARY
Physician Discharge Summary     Patient ID:  Chase Dubose  [id number]  80 y.o.  6/29/1933    Admit date: 5/25/2021    Discharge date and time: 5/28/2021    Admission Diagnoses:   Patient Active Problem List   Diagnosis    GERD (gastroesophageal reflux disease)    Type 2 diabetes mellitus with hyperglycemia, with long-term current use of insulin (HCC)    CKD (chronic kidney disease) stage 3, GFR 30-59 ml/min (HCC)    Essential hypertension    History of pulmonary embolus (PE)    Acute kidney injury superimposed on CKD [facility]    Acute on chronic diastolic congestive heart failure (HCC)    Obesity (BMI 30-39. 9)    COVID-19    Anxiety and depression    Pneumonia due to COVID-19 virus    Uncontrolled type 2 diabetes mellitus with hyperglycemia (HCC)    Sacral wound    Pressure injury of sacral region, stage 3 (HCC)    Pressure ulcer of right heel, stage 2 (HCC)    Cellulitis    Controlled type 2 diabetes mellitus without complication ([facility])    Sepsis (HCC)    Acute metabolic encephalopathy    Moderate protein-calorie malnutrition (HCC)    PVD (peripheral vascular disease) ([facility])    Positive blood culture    Altered mental status    Fluid overload    Anemia of chronic disease    Paroxysmal atrial fibrillation ([facility])    Cardiomyopathy ([facility]    Nonrheumatic aortic valve insufficiency    EKG, abnormal    Pleural effusion on right    Morbidly obese (HCC)    Acute respiratory failure with hypoxia (HCC)    Pneumonia    Pulmonary embolism ([facility])    CHF (congestive heart failure) ([facility]       Discharge Diagnoses: Bronchitis, advanced age, respiratory failure, staph hominis bacteremia    Consults: Infectious disease, cardiology    Procedures: None    Hospital Course:   80Year old female admitted telemetry with     Acute respiratory failure hypoxia  - Supplement O2 demands keeping oxygen saturation greater than 92%.   - Broad-spectrum IV antibiotic therapy Rocephin/Doxy  - DuoNebs   - Swallow study if questionable aspiration pneumonia  mucosmyst nebs   chest pt while here      CHF/Elevated Troponins  - Diurese cautiously so as to avoid renal's insufficiency  - BNP and other labs in a.m.  - Daily weights strict I's and O's  - Low-sodium diet  - Cardiology following - no need for echo at this time     Diabetes Mellitus  - ISS for glucose control  - Hypoglycemia protocol initiated  - HgbA1C      admitted for ? pna - not sure about this   She is advanced age, bed bound and debilitated  Check viral panel  - negative   Negative procal and lactate   Staph spp in 1/4 blood cultures on 5/26/2021- likely contaminant   she now 5/27/2021 has 3 out of 4 blood culture bottles positive for staph,  methicillin-resistant -not aureus  ID  consult appreciated-appears to be contaminant still-okay for discharge from my standpoint  Okay for home today  Had a long discussion with daughter at bedside regarding patient's advanced age and poor prognosis as she will continue to decline over the next few months. Daughter indicates they have attempted to have hospice liaison come out to the house but that has not yet materialized.   They are definitely interested in making patient palliative/hospice however patient herself has been resistant to this in spite of her worsening status.         Recent Labs     05/26/21  0554 05/27/21  0540   WBC 8.2 7.5   HGB 9.8* 10.1*   HCT 33.3* 33.9*    263       Recent Labs     05/26/21  0554 05/27/21  0540 05/28/21  0501    145 138   K 3.7 3.6 3.3*    103 101   CO2 28 34* 27   BUN 34* 36* 36*   CREATININE 1.0 1.1* 1.0   CALCIUM 9.2 9.4 9.0       XR CHEST (2 VW)    Result Date: 5/25/2021  EXAMINATION: TWO XRAY VIEWS OF THE CHEST 5/25/2021 1:05 pm COMPARISON: 04/06/2021 HISTORY: ORDERING SYSTEM PROVIDED HISTORY: dyspnea TECHNOLOGIST PROVIDED HISTORY: Reason for exam:->dyspnea What reading provider will be dictating this exam?->CRC FINDINGS: The lungs are without acute focal process. There is no effusion or pneumothorax. The cardiomediastinal silhouette is without acute process. The osseous structures are without acute process. No acute process. CTA PULMONARY W CONTRAST    Result Date: 5/25/2021  EXAMINATION: CTA OF THE CHEST 5/25/2021 4:42 pm TECHNIQUE: CTA of the chest was performed after the administration of intravenous contrast.  Multiplanar reformatted images are provided for review. MIP images are provided for review. Dose modulation, iterative reconstruction, and/or weight based adjustment of the mA/kV was utilized to reduce the radiation dose to as low as reasonably achievable. COMPARISON: 03/29/2021. HISTORY: ORDERING SYSTEM PROVIDED HISTORY: hx of PE, not on anticoagulation, SHOB, hypoxia TECHNOLOGIST PROVIDED HISTORY: Reason for exam:->hx of PE, not on anticoagulation, SHOB, hypoxia Decision Support Exception - unselect if not a suspected or confirmed emergency medical condition->Emergency Medical Condition (MA) What reading provider will be dictating this exam?->CRC FINDINGS: There is borderline cardiomegaly. The great vessels are normal.  Moderately enlarged mediastinal and hilar lymph nodes are present. There are no filling defects in the main pulmonary artery and the central branches. Small filling defects are identified in the distal subsegmental and peripheral vessels of left lower lobe which could be PE of unknown age. There is a large hiatal hernia. There is atelectasis/beginning infiltrates in the right middle lobe and right lower lobe with minimal atelectasis in the left base. The liver is of decreased attenuation likely fatty infiltrated. Gallbladder is absent. Degenerative changes are identified in the thoracic spine. No central pulmonary embolism or aortic dissection. Small filling defects in the distal subsegmental and peripheral vessels of left lower lobe which could be small subsegmental pulmonary embolism of unknown age. Atelectasis/beginning infiltrates in the right middle lobe and right lower lobe. Developing pneumonia is considered. Discharge Exam:    HEENT: NCAT,  PERRLA, No JVD  Heart:  RRR, no murmurs, gallops, or rubs. Lungs: Coarse when she coughs  Abd: bowel sounds present, nontender, nondistended, no masses  Extrem:  No clubbing, cyanosis, or edema    Disposition: home     Patient Condition at Discharge: Stable    Patient Instructions:      Medication List      START taking these medications    acetylcysteine 20 % nebulizer solution  Commonly known as: MUCOMYST  Inhale 3 mLs into the lungs three times daily     doxycycline hyclate 100 MG capsule  Commonly known as: VIBRAMYCIN  Take 1 capsule by mouth every 12 hours for 10 days        CONTINUE taking these medications    acetaminophen 325 MG tablet  Commonly known as: TYLENOL     aspirin 81 MG chewable tablet  Take 1 tablet by mouth daily     atorvastatin 40 MG tablet  Commonly known as: LIPITOR  Take 1 tablet by mouth nightly     bisacodyl 10 MG suppository  Commonly known as: DULCOLAX     bumetanide 1 MG tablet  Commonly known as: Bumex  Take 1 tablet by mouth daily     Caltrate 600+D3 600-800 MG-UNIT Tabs  Generic drug: Calcium Carb-Cholecalciferol     carvedilol 12.5 MG tablet  Commonly known as: COREG  Take 1 tablet by mouth 2 times daily (with meals)     docusate 100 MG Caps  Commonly known as: COLACE, DULCOLAX  Take 200 mg by mouth 2 times daily     famotidine 20 MG tablet  Commonly known as: PEPCID  Take 1 tablet by mouth 2 times daily     fish oil 1000 MG Caps     fluticasone 50 MCG/ACT nasal spray  Commonly known as: FLONASE     gabapentin 300 MG capsule  Commonly known as: NEURONTIN  Take 1 capsule by mouth 3 times daily for 90 days.  Intended supply: 90 days     guaiFENesin 600 MG extended release tablet  Commonly known as: MUCINEX  Take 1 tablet by mouth 2 times daily for 15 days     HumaLOG KwikPen 100 UNIT/ML Sopn  Generic drug: insulin lispro (1 Unit

## 2021-05-28 NOTE — PROGRESS NOTES
Spoke with SAMANTA Anders regarding bs [phone] and pt given 3 units she ordered repeat glucose check and pt bs was 246, she stated to keep monitoring v/s and glucose as scheduled no new orders at this time

## 2021-05-28 NOTE — PROGRESS NOTES
[address] [place] [address]   Department of Pharmacy   Pharmacist Transition of Care Services         Patient Demographics  Name:  Saleem Solano   Medical Record Number:  [id number]  Gender:  female   Age:  80 y.o. YOB: 1933    Readmission Risk: 40%       Pharmacist Review and Summary of Medications     Date of last reviewed/update: 5/28/21     Category Comments   New Medication Started   1. Doxycycline  2. Acetylcysteine inhalation     Change in Outpatient Medication      Discontinued/On hold Outpatient Medication       Other          Documentation of Pharmacist Interventions and Follow-up Plan:     The following Pharmacist Transition of Care Services were completed:   [x]  Reviewed and summarized medication changes  []  Entire home medication list was reviewed for accuracy  []  Home medication list was updated or corrected    [x]  Reviewed discharge medication reconciliation  []  Discharge medication list was updated or corrected    [x]  Added information to AVS   []  Patient education was provided on new medications  []  Patient education was provided on medication changes  []  Reviewed the AVS with patient    Additional Interventions:  []  Inpatient prescriber was contacted and the following pharmacy recommendations        were accepted: **     [] Other interventions: **        Pharmacist: Verna Castellanos, [address] PharmD, [facility]  Date:  5/28/2021 2:56 PM

## 2021-05-28 NOTE — PROGRESS NOTES
Informed nurse practioner Anna Byrd regarding pt low urine outpt and she ordered bladder scanner once and I informed her of results , and she stated to pass on to morning nurse to inform Dr. Lillette Bamberger of low urine outpt

## 2021-05-28 NOTE — PROGRESS NOTES
CLINICAL PHARMACY: DISCHARGE MED RECONCILIATION/REVIEW    [facility]) Select Patient?: Yes  Total # of Interventions Recommended: 0   -   Total # Interventions Accepted: 0  Intervention Severity:   - Level 1 Intervention Present?: No   - Level 2 #: 0   - Level 3 #: 0   Time Spent (min): 5    Additional Documentation: See progress note.

## 2021-05-28 NOTE — PROGRESS NOTES
Discharge instructions reviewed with patient's daughter Alberto Leonard. All questions answered. Script given on cough syrup with codeine. Meds to Beds already delivered.    Clem Rapp RN

## 2021-05-28 NOTE — CONSULTS
Department of Internal Medicine  Infectious Diseases   Consult Note      Reason for Consult:  Bacteremia       Requesting Physician:  Dr Harrison Sa:                  pt is known to me . Discussed with pt's daughter . This is an 80 y.o. female who developed a chronic non healing sacral pressure ulcer , COVID 19 ( 11/2020) , DM presented to the ER with shortness of breath and cough . She apparently had fever at home .  She WBC was 8.4 K, procalcitonin 0.-08, pro BNP  3900   CTA chest suggested infiltrates , developing pneumonia   Blood cx grew Staph hominis   Pt was given vancomycin     Past Medical History:    Past Medical History:   Diagnosis Date    Anxiety     Depression     Diabetes mellitus (HCC)     Frequent urinary tract infections     GERD (gastroesophageal reflux disease)     Hypertension     Peripheral neuropathy     PVD (peripheral vascular disease) ([facility] 3/30/2021    Spinal stenosis          Past Surgical History:      Past Surgical History:   Procedure Laterality Date    CHOLECYSTECTOMY      HIP FRACTURE SURGERY Bilateral     SKIN CANCER EXCISION           Current Medications:      Current Facility-Administered Medications   Medication Dose Route Frequency Provider Last Rate Last Admin    potassium chloride (KLOR-CON M) extended release tablet 40 mEq  40 mEq Oral Once Araceli Mg MD        acetylcysteine (MUCOMYST) 20 % solution 600 mg  600 mg Inhalation TID Araceli Mg MD   600 mg at 05/27/21 1931    vancomycin (VANCOCIN) 750 mg in dextrose 5 % 250 mL IVPB  750 mg Intravenous Q24H Araceli Mg MD        white petrolatum ointment   Topical Daily April KRISTIE Schmidt - CNP   Given at 05/28/21 0916    bumetanide (BUMEX) tablet 1 mg  1 mg Oral Daily KRISTIE Puente CNP   1 mg at 05/28/21 0910    sodium chloride flush 0.9 % injection 10 mL  10 mL Intravenous PRN Kiran Drew DO   10 mL at 75/67/46 1644    0.9 % sodium chloride infusion  25 mL Intravenous PRN Juaquinis Jacqueline Drew,         sodium chloride flush 0.9 % injection 5-40 mL  5-40 mL Intravenous 2 times per day April [facility], APRN - CNP   10 mL at 05/28/21 0908    sodium chloride flush 0.9 % injection 5-40 mL  5-40 mL Intravenous PRN [facility], APR[facility] - CNP   10 mL at 05/27/21 1915    0.9 % sodium chloride infusion  25 mL Intravenous PRN April Chapo-[place], APRN - CNP        enoxaparin (LOVENOX) injection 70 mg  1 mg/kg Subcutaneous BID April Storey-[place], APRN - CNP   70 mg at 05/28/21 7129    promethazine (PHENERGAN) tablet 12.5 mg  12.5 mg Oral Q6H PRN April Storey-Temo, APRN - CNP        Or    ondansetron [facility]) injection 4 mg  4 mg Intravenous Q6H PRN April Tift-Temo, APRN - CNP        polyethylene glycol (GLYCOLAX) packet 17 g  17 g Oral Daily PRN April Chapo-[place], APRN - CNP        acetaminophen (TYLENOL) tablet 650 mg  650 mg Oral Q6H PRN April Tift-[place], APRN - CNP   650 mg at 05/26/21 0535    Or    acetaminophen (TYLENOL) suppository 650 mg  650 mg Rectal Q6H PRN April Storey-[place], APRN - CNP        cefTRIAXone (ROCEPHIN) 1,000 mg in sterile water 10 mL IV syringe  1,000 mg Intravenous Q24H April Storey-[place], APRN - CNP   1,000 mg at 05/27/21 1915    ipratropium-albuterol (DUONEB) nebulizer solution 1 ampule  1 ampule Inhalation Q6H WA April Chapo-[place], APRN - CNP   1 ampule at 05/27/21 1930    aspirin chewable tablet 81 mg  81 mg Oral Daily April Storey-[place], APRN - CNP   81 mg at 05/28/21 7913    atorvastatin (LIPITOR) tablet 40 mg  40 mg Oral Nightly April Storey-Cornelius, APRN - CNP   40 mg at 05/27/21 2119    bisacodyl (DULCOLAX) suppository 10 mg  10 mg Rectal Daily PRN April KRISTIE Schmidt CNP   10 mg at 05/27/21 1004    calcium-vitamin D (OSCAL-500) 500-200 MG-UNIT per tablet 1 tablet  1 tablet Oral BID April KRISTIE Schmidt CNP   1 tablet at 05/28/21 0910    carvedilol Brayan KRISTIE Mohamud CNP   40 mg at 05/28/21 0610    guaiFENesin tablet 400 mg  400 mg Oral TID April SHOSHANA SchmidtAD Mohamud CNP   400 mg at 05/28/21 8307       Allergies:  Patient has no known allergies. Social History:      Social History     Socioeconomic History    Marital status:      Spouse name: Not on file    Number of children: Not on file    Years of education: Not on file    Highest education level: Not on file   Occupational History    Not on file   Tobacco Use    Smoking status: Never Smoker    Smokeless tobacco: Never Used   Vaping Use    Vaping Use: Never used   Substance and Sexual Activity    Alcohol use: No    Drug use: No    Sexual activity: Not Currently   Other Topics Concern    Not on file   Social History Narrative    Not on file     Social Determinants of Health     Financial Resource Strain:     Difficulty of Paying Living Expenses:    Food Insecurity:     Worried About Running Out of Food in the Last Year:     [address] in the Last Year:    Transportation Needs:     Lack of Transportation (Medical):      Lack of Transportation (Non-Medical):    Physical Activity:     Days of Exercise per Week:     Minutes of Exercise per Session:    Stress:     Feeling of Stress :    Social Connections:     Frequency of Communication with Friends and Family:     Frequency of Social Gatherings with Friends and Family:     Attends Y[employer] Services:     Active Member of Clubs or Organizations:     Attends Club or Organization Meetings:     Marital Status:    Intimate Partner Violence:     Fear of Current or Ex-Partner:     Emotionally Abused:     Physically Abused:     Sexually Abused:          Family History:     Not pertinent to present illness     REVIEW OF SYSTEMS:    CONSTITUTIONAL:  had fever   HEENT: denies blurring of vision or double vision, denies hearing problem  RESPIRATORY: Cough , SOB   CARDIOVASCULAR:  Denies palpitation  GASTROINTESTINAL: Denies abdomen pain, diarrhea or constipation. GENITOURINARY:  Denies burning urination or frequency of urination  INTEGUMENT: Non healing sacral  wound   HEMATOLOGIC/LYMPHATIC:  Denies lymph node swelling, gum bleeding or easy bruising. MUSCULOSKELETAL:  Denies leg pain , joint pain , joint swelling  NEUROLOGICAL:  Weakness   PHYSICAL EXAM:      Vitals:     Vitals:    05/28/21 0747   BP: 133/66   Pulse: 88   Resp: 20   Temp: 98.7 °F (37.1 °C)   SpO2: 97%       General Appearance:    Awake, alert , no acute distress. Head:    Normocephalic, atraumatic   Eyes:    No pallor, no icterus,   Ears:    No obvious deformity or drainage.    Nose:   No nasal drainage   Throat:   Mucosa moist, no oral thrush   Neck:   Supple, no lymphadenopathy   Back:     no CVA tenderness   Lungs:     Bilateral coarse rhonchi     Heart:    Regular rate and rhythm, systolic murmur    Abdomen:     Soft, non-tender, bowel sounds present    Extremities:   No edema, no cyanosis    Pulses:   Dorsalis pedis palpable    Skin:            CBC with Differential:      Lab Results   Component Value Date    WBC 7.5 05/27/2021    RBC 3.64 05/27/2021    HGB 10.1 05/27/2021    HCT 33.9 05/27/2021     05/27/2021    MCV 93.1 05/27/2021    MCH 27.7 05/27/2021    MCHC 29.8 05/27/2021    RDW 18.0 05/27/2021    SEGSPCT 58 06/18/2013    LYMPHOPCT 26.6 05/25/2021    MONOPCT 4.8 05/25/2021    BASOPCT 0.4 05/25/2021    MONOSABS 0.40 05/25/2021    LYMPHSABS 2.23 05/25/2021    EOSABS 0.18 05/25/2021    BASOSABS 0.03 05/25/2021       CMP     Lab Results   Component Value Date     05/28/2021    K 3.3 05/28/2021    K 3.7 05/26/2021     05/28/2021    CO2 27 05/28/2021    BUN 36 05/28/2021    CREATININE 1.0 05/28/2021    GFRAA >60 05/28/2021    LABGLOM 52 05/28/2021    GLUCOSE 93 05/28/2021    PROT 7.2 05/25/2021    LABALBU 2.9 05/25/2021    CALCIUM 9.0 05/28/2021    BILITOT 0.3 05/25/2021    ALKPHOS 75 05/25/2021    AST 20 05/25/2021    ALT 10 05/25/2021         Hepatic Function Panel:    Lab Results   Component Value Date    ALKPHOS 75 05/25/2021    ALT 10 05/25/2021    AST 20 05/25/2021    PROT 7.2 05/25/2021    BILITOT 0.3 05/25/2021    BILIDIR <0.2 04/05/2021    IBILI see below 04/05/2021    LABALBU 2.9 05/25/2021       PT/INR:    Lab Results   Component Value Date    PROTIME 13.9 01/09/2020    INR 1.2 01/09/2020       TSH:    Lab Results   Component Value Date    TSH 5.700 04/07/2021       U/A:    Lab Results   Component Value Date    NITRITE NEGATIVE 09/09/2019    COLORU Yellow 04/05/2021    PHUR 5.0 04/05/2021    WBCUA 2-5 04/05/2021    RBCUA 1-3 04/05/2021    BACTERIA FEW 04/05/2021    CLARITYU Clear 04/05/2021    SPECGRAV >=1.030 04/05/2021    LEUKOCYTESUR TRACE 04/05/2021    UROBILINOGEN 0.2 04/05/2021    BILIRUBINUR Negative 04/05/2021    BILIRUBINUR NEGATIVE 09/09/2019    BLOODU Negative 04/05/2021    GLUCOSEU Negative 04/05/2021    AMORPHOUS RARE 11/16/2020       ABG:  No results found for: TMQ6WAV, BEART, L6TMALNV, PHART, THGBART, DYU7JGU, PO2ART, GBS1NAH    MICROBIOLOGY:    Blood culture -  Culture, Blood 2 [[id number]] (Abnormal)    Collected: 05/25/21 1811    Updated: 05/28/21 1110    Specimen Source: Blood     Culture, Blood 2 --Abnormal     Gram stain performed from blood culture bottle media   Gram positive cocci in clusters   Abnormal     Organism Staphylococcus speciesAbnormal     Culture, Blood 2 Identification and sensitivity to follow   Narrative:     CALL  [facility]  H[facility] tel. ,   Previous panic on this admission - call not needed per SOP, 05/27/2021 09:30,   by [facility]   Culture, Wound [[id number]] (Abnormal)    Collected: 05/25/21 2315    Updated: 05/28/21 0928    Specimen Type: Buttock     Organism Alpha Strep, not pneumococcusAbnormal     WOUND/ABSCESS Moderate growth    Organism Staphylococcus coagulase-negativeAbnormal     WOUND/ABSCESS Light growth   Narrative:     Source: BUTT       Site: Buttock&Buttock             Culture, Blood 1 [[id number]] (Abnormal)     Collected: 05/25/21 1811    Updated: 05/28/21 0834    Specimen Source: Blood     Blood Culture, Routine --Abnormal     Gram stain performed from blood culture bottle media   Gram positive cocci in clusters   Abnormal     Organism Staphylococcus hominis ssp hominisAbnormal     Blood Culture, Routine Identification and sensitivity to follow   Narrative:     Source: BLOOD       Site: Antecubital-Rig          Microbiology results called to and read   back by Misha Yates,          Radiology :      CTA scan of chest -  Impression:        No central pulmonary embolism or aortic dissection.  Small filling defects in   the distal subsegmental and peripheral vessels of left lower lobe which could   be small subsegmental pulmonary embolism of unknown age. Atelectasis/beginning infiltrates in the right middle lobe and right lower   lobe.  Developing pneumonia is considered. IMPRESSION:     1. Staph hominis bacteremia - contamination   2. Non healing sacral wound   3. Cough - bronchitis - not impressed with the CT scan report of developing pneumonia     RECOMMENDATIONS:      1. Stop rocephin and vancomycin   2. Oral doxycycline 100 mg po q 12 hrs  3.  Breathing treatment , diuretics     Thank you Dr David Lisa for the consult

## 2021-05-28 NOTE — PROGRESS NOTES
([facility] [address]    Nonrheumatic aortic valve insufficiency    EKG, abnormal    Pleural effusion on right    Morbidly obese (HCC)    Acute respiratory failure with hypoxia (HCC)    Pneumonia    Pulmonary embolism (HCC)    CHF (congestive heart failure) (HCC)       Allergies:  No Known Allergies    Current Medications:  Current Facility-Administered Medications   Medication Dose Route Frequency Provider Last Rate Last Admin    doxycycline hyclate (VIBRAMYCIN) capsule 100 mg  100 mg Oral 2 times per day Genevieve Cao MD        acetylcysteine (MUCOMYST) 20 % solution 600 mg  600 mg Inhalation TID Gómez Olsen MD   600 mg at 05/27/21 1931    white petrolatum ointment   Topical Daily April Chapo-Wildwood, APRN - CNP   Given at 05/28/21 0916    bumetanide (BUMEX) tablet 1 mg  1 mg Oral Daily Dolores Ruiz, APRN - CNP   1 mg at 05/28/21 0910    sodium chloride flush 0.9 % injection 10 mL  10 mL Intravenous PRN Kiran A Kutlick, DO   10 mL at 55/81/98 1644    0.9 % sodium chloride infusion  25 mL Intravenous PRN Kiran A Kutlick, DO        sodium chloride flush 0.9 % injection 5-40 mL  5-40 mL Intravenous 2 times per day April Chapo-Wildwood, APRN - CNP   10 mL at 05/28/21 0908    sodium chloride flush 0.9 % injection 5-40 mL  5-40 mL Intravenous PRN April Whitley-Wildwood, APRN - CNP   10 mL at 05/27/21 1915    0.9 % sodium chloride infusion  25 mL Intravenous PRN April Chapo-Wildwood, APRN - CNP        enoxaparin (LOVENOX) injection 70 mg  1 mg/kg Subcutaneous BID April Chapo-Wildwood, APRN - CNP   70 mg at 05/28/21 4052    promethazine (PHENERGAN) tablet 12.5 mg  12.5 mg Oral Q6H PRN April Chapo-Wildwood, APRN - CNP        Or    ondansetron [facility]) injection 4 mg  4 mg Intravenous Q6H PRN April Whitley-Temo, APRN - CNP        polyethylene glycol (GLYCOLAX) packet 17 g  17 g Oral Daily PRN April Whitley-Temo, APRN - CNP        acetaminophen (TYLENOL) tablet 650 mg  650 mg Oral Q6H PRN April Brayan, APRN - CNP   650 mg at 05/26/21 0535    Or    acetaminophen (TYLENOL) suppository 650 mg  650 mg Rectal Q6H PRN April SHOSHANA SchmidtN - VIVIANE        ipratropium-albuterol (DUONEB) nebulizer solution 1 ampule  1 ampule Inhalation Q6H WA April Brayan APRN - CNP   1 ampule at 05/27/21 1930    aspirin chewable tablet 81 mg  81 mg Oral Daily April Brayan APRN - CNP   81 mg at 05/28/21 4481    atorvastatin (LIPITOR) tablet 40 mg  40 mg Oral Nightly April Brayan APRN - CNP   40 mg at 05/27/21 2119    bisacodyl (DULCOLAX) suppository 10 mg  10 mg Rectal Daily PRN April Storey-Temo APRN - CNP   10 mg at 05/27/21 1004    calcium-vitamin D (OSCAL-500) 500-200 MG-UNIT per tablet 1 tablet  1 tablet Oral BID April ChapoSHOSHANA OlivaresN - CNP   1 tablet at 05/28/21 0910    carvedilol (COREG) tablet 12.5 mg  12.5 mg Oral BID  April Chapo-Temo APRN - CNP   12.5 mg at 05/28/21 0910    diclofenac sodium (VOLTAREN) 1 % gel 2 g  2 g Topical Q6H PRN April Chapo-SHOSHANA PlascenciaN - CNP        docusate sodium (COLACE) capsule 200 mg  200 mg Oral BID April Storey-SHOSHANA PlascenciaN - CNP   200 mg at 05/28/21 0909    fluticasone (FLONASE) 50 MCG/ACT nasal spray 1 spray  1 spray Nasal Daily PRN April Brayan APRN - CNP   1 spray at 05/28/21 0915    gabapentin (NEURONTIN) capsule 300 mg  300 mg Oral TID April StoreySHOSHANA GallegoN - CNP   300 mg at 05/28/21 1410    isosorbide mononitrate (IMDUR) extended release tablet 30 mg  30 mg Oral Daily April KRISTIE Schmidt - CNP   30 mg at 05/28/21 0910    montelukast (SINGULAIR) tablet 10 mg  10 mg Oral Nightly April KRISTIE Schmidt CNP   10 mg at 05/27/21 2118    nitroGLYCERIN (NITROSTAT) SL tablet 0.4 mg  0.4 mg Sublingual Q5 Min PRN April KRISTIE Schmidt CNP        sertraline (ZOLOFT) tablet 150 mg  150 mg Oral Daily April KRISTIE Schmidt CNP   150 mg at 05/28/21 2007    miconazole (MICOTIN) 2 % powder   Topical BID April ChapoJossueHelen, APRN - CNP   Given at 05/28/21 0916    insulin lispro (HUMALOG) injection vial 0-6 Units  0-6 Units Subcutaneous TID [facility] April BrayanKRISTIE - CNP   1 Units at 05/28/21 1203    insulin lispro (HUMALOG) injection vial 0-3 Units  0-3 Units Subcutaneous Nightly April DavideliusKRISTIE - CNP   3 Units at 05/27/21 2131    glucose (GLUTOSE) 40 % oral gel 15 g  15 g Oral PRN April RenanHelen, APRN - CNP        dextrose 50 % IV solution  12.5 g Intravenous PRN April KRISTIE Schmidt - CNP        glucagon (rDNA) injection 1 mg  1 mg Intramuscular PRN April IslandJossueHelenKRISTIE garcia - CNP        dextrose 5 % solution  100 mL/hr Intravenous PRN April DavideSHOSHANA garciaN - VIVIANE        pantoprazole (PROTONIX) tablet 40 mg  40 mg Oral QAM [facility] April ChapoElise, APRN - CNP   40 mg at 05/28/21 0610    guaiFENesin tablet 400 mg  400 mg Oral TID April Brayan, APRN - CNP   400 mg at 05/28/21 1410      sodium chloride      sodium chloride      dextrose         Physical Exam:  /66   Pulse 88   Temp 98.7 °F (37.1 °C) (Oral)   Resp 20   Ht 5' (1.524 m)   Wt 148 lb 11.2 oz (67.4 kg)   LMP  (LMP Unknown)   SpO2 93%   BMI 29.04 kg/m²   Wt Readings from Last 3 Encounters:   05/28/21 148 lb 11.2 oz (67.4 kg)   05/12/21 158 lb (71.7 kg)   04/28/21 158 lb (71.7 kg)     Appearance: Awake, alert, no acute respiratory distress  Skin: Intact, no rash  Head: Normocephalic, atraumatic  Eyes: EOMI, no conjunctival erythema  ENMT: No pharyngeal erythema, MMM, no rhinorrhea  Neck: Supple, no elevated JVP, no carotid bruits  Lungs: Has coarse rhonchi throughout lung fields.     Cardiac: Regular rate and rhythm, +S1S2, no murmurs apparent  Abdomen: Soft, nontender, +bowel sounds  Extremities: Moves all extremities x 4, no lower extremity edema  Neurologic: No focal motor deficits apparent, normal mood and affect  Peripheral Pulses: Intact posterior tibial pulses bilaterally    Intake/Output:    Intake/Output Summary (Last 24 hours) at 5/28/2021 1431  Last data filed at 5/28/2021 1427  Gross per 24 hour   Intake 760 ml   Output 1325 ml   Net -565 ml     I/O this shift:  In: 120 [P.O.:120]  Out: 300 [Urine:300]    Laboratory Tests:  Recent Labs     05/26/21  0554 05/27/21  0540 05/28/21  0501    145 138   K 3.7 3.6 3.3*    103 101   CO2 28 34* 27   BUN 34* 36* 36*   CREATININE 1.0 1.1* 1.0   GLUCOSE 135* 91 93   CALCIUM 9.2 9.4 9.0     Lab Results   Component Value Date    MG 1.7 04/06/2021     No results for input(s): ALKPHOS, ALT, AST, PROT, BILITOT, BILIDIR, LABALBU in the last 72 hours.   Recent Labs     05/26/21  0554 05/27/21  0540   WBC 8.2 7.5   RBC 3.58 3.64   HGB 9.8* 10.1*   HCT 33.3* 33.9*   MCV 93.0 93.1   MCH 27.4 27.7   MCHC 29.4* 29.8*   RDW 17.9* 18.0*    263   MPV 9.2 9.2     Lab Results   Component Value Date    CKTOTAL 41 11/28/2020    CKMB 4.5 (H) 11/17/2020    TROPONINI 0.03 04/06/2021    TROPONINI 0.05 (H) 04/02/2021    TROPONINI 0.04 (H) 04/02/2021     Lab Results   Component Value Date    INR 1.2 01/09/2020    INR 1.2 11/22/2019    INR 1.0 10/21/2019    PROTIME 13.9 (H) 01/09/2020    PROTIME 13.5 (H) 11/22/2019    PROTIME 11.4 10/21/2019     Lab Results   Component Value Date    TSH 5.700 (H) 04/07/2021     Lab Results   Component Value Date    LABA1C 7.0 (H) 04/22/2021     No results found for: EAG  Lab Results   Component Value Date    CHOL 83 11/17/2020    CHOL 104 09/16/2020    CHOL 181 10/21/2019     Lab Results   Component Value Date    TRIG 87 11/17/2020    TRIG 170 (H) 09/16/2020    TRIG 197 (H) 10/21/2019     Lab Results   Component Value Date    HDL 38 11/17/2020    HDL 37 09/16/2020    HDL 46 11/23/2019     Lab Results   Component Value Date    LDLCALC 28 11/17/2020    LDLCALC 33 09/16/2020    LDLCALC 123 (H) 11/23/2019     Lab Results   Component Value Date    LABVLDL 17 11/17/2020 LABVLDL 34 09/16/2020    LABVLDL 50 11/23/2019     No results found for: CHOLHDLRATIO    Cardiac Tests:  Telemetry findings reviewed: SR at rate 70s, no new tachy/bradyarrhythmias overnight     EKG: Normal sinus rhythm, left axis deviation, LVH, lateral wall MI age undetermined, abnormal EKG.    Vitals and labs were reviewed: As above.     1. TTE 10/21/2019: EF 45-50%, moderate LVH, stage 1 DD, mild AR, mild AS. 2. [place] MPS 10/23/2019: LVEF 40%, no reversible perfusion defect.  Fixed perfusion defect was noted extending from the apex into the anteroseptal and inferolateral walls.  Global hypokinesis with severe hypokinesis of the septum was noted  3. TTE: 1/6/2020 (Dr. Machuca):  Technically difficult examination due to body habitus and unable to position. Normal left ventricle size and systolic function. Ejection fraction is visually estimated at 60-65%. Normal left ventricle wall thickness. No regional wall motion abnormalities seen. Indeterminate diastolic function. Normal right ventricular size and function. TAPSE 21 mm. No hemodynamically significant aortic stenosis is present. Moderate aortic regurgitation is noted. Unable to estimate PA systolic pressure. No evidence for hemodynamically significant pericardial effusion. Compared to prior echo of 10/22/2019, changes noted. LVEF has improved from 45-50% to 60-65%. 4. TTE 03/31/2021 (S[place]): Left ventricle size is normal.  Ejection fraction is visually estimated at 45-50%.  Overall ejection fraction mildly decreased .  No regional wall motion abnormalities seen.  Mild concentric left ventricular hypertrophy.  Stage II diastolic dysfunction.  The left atrium is mildly dilated.   Normal right ventricular size and function.  TAPSE 19 mm.  Physiologic and/or trace mitral regurgitation is present.  No hemodynamically significant aortic stenosis is present.  Mild aortic regurgitation is noted.  The tricuspid valve was not well visualized.  No evidence for

## 2021-05-28 NOTE — PLAN OF CARE
Problem: Skin Integrity:  Goal: Absence of new skin breakdown  Description: Absence of new skin breakdown  5/28/2021 0707 by Irwin Emmanuel RN  Outcome: Met This Shift  5/27/2021 1829 by Lissett Lovett RN  Outcome: Met This Shift     Problem: Falls - Risk of:  Goal: Will remain free from falls  Description: Will remain free from falls  5/28/2021 0707 by Irwin Emmanuel RN  Outcome: Met This Shift  5/27/2021 1829 by Lissett Lovett RN  Outcome: Met This Shift  Goal: Absence of physical injury  Description: Absence of physical injury  Outcome: Met This Shift     Problem: OXYGENATION/RESPIRATORY FUNCTION  Goal: Patient will maintain patent airway  5/28/2021 0707 by Irwin Emmanuel RN  Outcome: Met This Shift  5/27/2021 1830 by Lissett Lovett RN  Outcome: Met This Shift  Goal: Patient will achieve/maintain normal respiratory rate/effort  Description: Respiratory rate and effort will be within normal limits for the patient  5/28/2021 0707 by Irwin Emmanuel RN  Outcome: Met This Shift  5/27/2021 1830 by Lissett Lovett RN  Outcome: Met This Shift     Problem: Skin Integrity:  Goal: Will show no infection signs and symptoms  Description: Will show no infection signs and symptoms  5/28/2021 0707 by Irwin Emmanuel RN  Outcome: Not Met This Shift  5/27/2021 1829 by Lissett Lovett RN  Outcome: Met This Shift

## 2021-05-29 NOTE — CARE COORDINATION
clinics, Home health, When to call 911 and Condition related references. The family agrees to contact the PCP office for questions related to their healthcare. Medication reconciliation was performed with family, who verbalizes understanding of administration of home medications. Advised obtaining a 90-day supply of all daily and as-needed medications. Covid Risk Education     Educated patient about risk for severe COVID-19 due to risk factors according to CDC guidelines. CTN reviewed discharge instructions, medical action plan and red flag symptoms with the family who verbalized understanding. Family was given an opportunity to verbalize any questions and concerns and agrees to contact CTN or health care provider for questions related to their healthcare. Reviewed and educated family on any new and changed medications related to discharge diagnosis. Was patient discharged with a pulse oximeter? No Discussed and confirmed pulse oximeter discharge instructions and when to notify provider or seek emergency care. CTN provided contact information. Plan for follow-up call in 5-7 days based on severity of symptoms and risk factors.       Non-face-to-face services provided:  Scheduled appointment with PCP-CTN scheduled HFU appt with Dr. Alexis Copeland (PCP) for 6/1/21 at 1:45 pm.   Scheduled appointment with Specialist-CTN confirmed with patient DTR Nora Bosch) that she is scheduled to follow up with Dr. Ra Carrillo (cardiology) on 11:40 am.   Obtained and reviewed discharge summary and/or continuity of care documents  Education of patient/family/caregiver/guardian to support self-management-Discussed CHF zone tool and knowing when to call doctor or seek medical attention,   Assessment and support for treatment adherence and medication management-Advised for patient to take Doxycyline as directed until comletely finished and emphasized bleeding precautions associated with Eliquis therapy  Establishment or re-establishment of referrals-CTN confirmed with DTR Lissett Albarado) that [facility] nurse is currently at home doing visit for [facility]. Care Transitions 24 Hour Call    Schedule Follow Up Appointment with PCP: Completed  Do you have any ongoing symptoms?: Yes  Patient-reported symptoms: Cough  Do you have a copy of your discharge instructions?: Yes  Do you have all of your prescriptions and are they filled?: No  Have you been contacted by a [facility]?: No  Have you scheduled your follow up appointment?: Yes  How are you going to get to your appointment?: Car - family or friend to transport  Were you discharged with any Home Care or Post Acute Services: Yes  Post Acute Services: Home Health (Comment: CANDI with [facility])  Do you feel like you have everything you need to keep you well at home?: No  Care Transitions Interventions       Spoke with patient DTR Lissett Albarado) for BPCI/hospital discharge follow up for acute respiratory failure with hypoxia. Moises Angelo reports patient continues with ongoing productive cough and is taking cough syrup and Mucinex as directed. Denies any shortness of breath and admits oxygen saturation is 95% on room air as patient is not on any home oxygen therapy. States patient has associated chest pain from coughing that Moises Angelo report as relieved with taking NTG. Denies any nausea, vomiting, diarrhea, chills or fever. Provided a complete review of home meds with patient DTR Lissett Albarado) confirming patient obtained all new meds ordered on discharge except [address] states [facility] requires documentation before dispensing Mucomyst for nebulizer. CTN will route note advising PCP. DTR states patient is taking her Duoneb nebulizer at home. Advised to take Doxycycline as directed until completely finished. 18171R code entered. Denies any LE swelling or sudden weight gain. States patient is non-weight bearing therefore does not weight self.  Discussed CHF zone tool and knowing when to call doctor or seek medical attention. CTN scheduled HFU appt for patient with Dr. Osman Cook ([id number] for 6/1/21 at 1:45 pm which DTR is in agreement with as patient will also se Dr. Arlette Camarillo (Cardio) earlier in the day at 11:40 am. [facility] reports transportation provided by Alfredia Most. Denies any other complaints or concerns at this time. CTN signing off d/t DTR declining sub calls.      Follow Up  Future Appointments   Date Time Provider Kacy Parish   6/1/2021 11:40 AM Ousmane Lindsey MD [id number] [facility]   6/1/2021  1:45 PM Juan Jose Noe, DO [facility]       KRISTIE Lewis

## 2021-06-01 NOTE — PATIENT INSTRUCTIONS
Patient Education        Learning About Meal Planning for Diabetes  Why plan your meals? Meal planning can be a key part of managing diabetes. Planning meals and snacks with the right balance of carbohydrate, protein, and fat can help you keep your blood sugar at the target level you set with your doctor. You don't have to eat special foods. You can eat what your family eats, including sweets once in a while. But you do have to pay attention to how often you eat and how much you eat of certain foods. You may want to work with a dietitian or a certified diabetes educator. He or she can give you tips and meal ideas and can answer your questions about meal planning. This health professional can also help you reach a healthy weight if that is one of your goals. What plan is right for you? Your dietitian or diabetes educator may suggest that you start with the plate format or carbohydrate counting. The plate format  The plate format is a simple way to help you manage how you eat. You plan meals by learning how much space each food should take on a plate. Using the plate format helps you spread carbohydrate throughout the day. It can make it easier to keep your blood sugar level within your target range. It also helps you see if you're eating healthy portion sizes. To use the plate format, you put non-starchy vegetables on half your plate. Add meat or meat substitutes on one-quarter of the plate. Put a grain or starchy vegetable (such as brown rice or a potato) on the final quarter of the plate. You can add a small piece of fruit and some low-fat or fat-free milk or yogurt, depending on your carbohydrate goal for each meal.  Here are some tips for using the plate format:  · Make sure that you are not using an oversized plate. A 9-inch plate is best. Many restaurants use larger plates. · Get used to using the plate format at home. Then you can use it when you eat out.   · Write down your questions about using rapid-acting insulin to take before you eat. If you use an insulin pump, you get a constant rate of insulin during the day. So the pump must be programmed at meals to give you extra insulin to cover the rise in blood sugar after meals. When you know how much carbohydrate you will eat, you can take the right amount of insulin. Or, if you always use the same amount of insulin, you need to make sure that you eat the same amount of carbohydrate at meals. If you need more help to understand carbohydrate counting and food labels, ask your doctor, dietitian, or diabetes educator. How can you plan healthy meals? Here are some tips to get started:  · Plan your meals a week at a time. Don't forget to include snacks too. · Use cookbooks or online recipes to plan several main meals. Plan some quick meals for busy nights. You also can double some recipes that freeze well. Then you can save half for other busy nights when you don't have time to cook. · Make sure you have the ingredients you need for your recipes. If you're running low on basic items, put these items on your shopping list too. · List foods that you use to make breakfasts, lunches, and snacks. List plenty of fruits and vegetables. · Post this list on the refrigerator. Add to it as you think of more things you need. · Take the list to the store to do your weekly shopping. Follow-up care is a key part of your treatment and safety. Be sure to make and go to all appointments, and call your doctor if you are having problems. It's also a good idea to know your test results and keep a list of the medicines you take. Where can you learn more? Go to [email or web address] and sign in to your [employer] account. Enter P696 in the [employer] box to learn more about \"Learning About Meal Planning for Diabetes. \"     If you do not have an account, please click on the \"Sign Up Now\" link.   Current as of: August 31, 2020               Content

## 2021-06-01 NOTE — PROGRESS NOTES
guaiFENesin-codeine (TUSSI-ORGANIDIN NR) 100-10 MG/5ML syrup  Take 5 mLs by mouth 4 times daily as needed for Cough for up to 7 days.              insulin lispro, 1 Unit Dial, (HUMALOG KWIKPEN) 100 UNIT/ML SOPN  Inject 0-10 Units into the skin 3 times daily (before meals)             ipratropium-albuterol (DUONEB) 0.5-2.5 (3) MG/3ML SOLN nebulizer solution  Take 3 mLs by nebulization every 4 hours as needed for Shortness of Breath             isosorbide mononitrate (IMDUR) 30 MG extended release tablet  Take 1 tablet by mouth daily             lansoprazole (PREVACID) 30 MG delayed release capsule  Take 1 capsule by mouth daily             lisinopril (PRINIVIL;ZESTRIL) 2.5 MG tablet  Take 1 tablet by mouth daily             methylPREDNISolone (MEDROL DOSEPACK) 4 MG tablet  use as directed FOLLOW DIRECTIONS ON BACK OF FOIL PACK             mineral oil-hydrophilic petrolatum (HYDROPHOR) ointment  Apply topically daily Apply to both feet             montelukast (SINGULAIR) 10 MG tablet  Take 1 tablet by mouth nightly             nitroGLYCERIN (NITROSTAT) 0.4 MG SL tablet  Place 0.4 mg under the tongue every 5 minutes as needed for Chest pain             nystatin (MYCOSTATIN) 923684 UNIT/GM powder  Apply 1 each topically 2 times daily Apply to abdominal folds             Omega-3 Fatty Acids (FISH OIL) 1000 MG CAPS  Take 1,000 mg by mouth 2 times daily             ondansetron (ZOFRAN) 4 MG/5ML solution  Take 5 mLs by mouth 2 times daily as needed for Nausea or Vomiting             SERTRALINE HCL PO  Take 150 mg by mouth daily                    Medications marked \"taking\" at this time  Outpatient Medications Marked as Taking for the 6/1/21 encounter (Office Visit) with Koby Jolly, DO   Medication Sig Dispense Refill    methylPREDNISolone (MEDROL DOSEPACK) 4 MG tablet use as directed FOLLOW DIRECTIONS ON BACK OF FOIL PACK      lisinopril (PRINIVIL;ZESTRIL) 2.5 MG tablet Take 1 tablet by mouth daily 30 tablet 5  bumetanide (BUMEX) 0.5 MG tablet Take 1 tablet by mouth daily 30 tablet 5    budesonide-formoterol (SYMBICORT) 160-4.5 MCG/ACT AERO Inhale 2 puffs into the lungs 2 times daily 1 Inhaler 3    guaiFENesin-codeine (TUSSI-ORGANIDIN NR) 100-10 MG/5ML syrup Take 5 mLs by mouth 4 times daily as needed for Cough for up to 7 days. 120 mL 0    acetylcysteine (MUCOMYST) 20 % nebulizer solution Inhale 3 mLs into the lungs three times daily 120 mL 0    doxycycline hyclate (VIBRAMYCIN) 100 MG capsule Take 1 capsule by mouth every 12 hours for 10 days 20 capsule 0    apixaban (ELIQUIS) 5 MG TABS tablet Take 1 tablet by mouth 2 times daily 180 tablet 1    guaiFENesin (MUCINEX) 600 MG extended release tablet Take 1 tablet by mouth 2 times daily for 15 days 30 tablet 0    gabapentin (NEURONTIN) 300 MG capsule Take 1 capsule by mouth 3 times daily for 90 days.  Intended supply: 90 days 270 capsule 0    famotidine (PEPCID) 20 MG tablet Take 1 tablet by mouth 2 times daily 180 tablet 1    docusate sodium (COLACE, DULCOLAX) 100 MG CAPS Take 200 mg by mouth 2 times daily 60 capsule 0    ondansetron (ZOFRAN) 4 MG/5ML solution Take 5 mLs by mouth 2 times daily as needed for Nausea or Vomiting 50 mL 0    bisacodyl (DULCOLAX) 10 MG suppository Place 10 mg rectally daily as needed for Constipation      insulin lispro, 1 Unit Dial, (HUMALOG KWIKPEN) 100 UNIT/ML SOPN Inject 0-10 Units into the skin 3 times daily (before meals)      nitroGLYCERIN (NITROSTAT) 0.4 MG SL tablet Place 0.4 mg under the tongue every 5 minutes as needed for Chest pain      diclofenac sodium (VOLTAREN) 1 % GEL Apply 2 g topically 4 times daily as needed for Pain (apply to hands)      SERTRALINE HCL PO Take 150 mg by mouth daily       montelukast (SINGULAIR) 10 MG tablet Take 1 tablet by mouth nightly 90 tablet 1    atorvastatin (LIPITOR) 40 MG tablet Take 1 tablet by mouth nightly 90 tablet 3    carvedilol (COREG) 12.5 MG tablet Take 1 tablet by mouth 2 times daily (with meals) 180 tablet 3    isosorbide mononitrate (IMDUR) 30 MG extended release tablet Take 1 tablet by mouth daily 90 tablet 1    lansoprazole (PREVACID) 30 MG delayed release capsule Take 1 capsule by mouth daily 90 capsule 1    ipratropium-albuterol (DUONEB) 0.5-2.5 (3) MG/3ML SOLN nebulizer solution Take 3 mLs by nebulization every 4 hours as needed for Shortness of Breath 360 mL 2    Calcium Carb-Cholecalciferol (CALTRATE 600+D3) 600-800 MG-UNIT TABS Take 1 tablet by mouth 2 times daily      nystatin (MYCOSTATIN) 756927 UNIT/GM powder Apply 1 each topically 2 times daily Apply to abdominal folds      Omega-3 Fatty Acids (FISH OIL) 1000 MG CAPS Take 1,000 mg by mouth 2 times daily      aspirin 81 MG chewable tablet Take 1 tablet by mouth daily 30 tablet 0    acetaminophen (TYLENOL) 325 MG tablet Take 650 mg by mouth every 4 hours as needed for Pain or Fever       fluticasone (FLONASE) 50 MCG/ACT nasal spray 1 spray by Nasal route daily as needed for Rhinitis       mineral oil-hydrophilic petrolatum (HYDROPHOR) ointment Apply topically daily Apply to both feet          Medications patient taking as of now reconciled against medications ordered at time of hospital discharge: Yes    Chief Complaint   Patient presents with    Follow-Up from Hospital     Acute respiratory failure; patient does not feel better at all. Deep cough still present. Wheezing. History of Present illness - Follow up of Hospital diagnosis(es): Bronchitis, CHF, Type II diabetes     Inpatient course: Discharge summary reviewed- see chart. Interval history/Current status: she is still having a chronic cough and weakness    A comprehensive review of systems was negative except for what was noted in the HPI. Vitals:    06/01/21 1258   BP: 112/62   Pulse: 75   Resp: 18   Temp: 97.7 °F (36.5 °C)   SpO2: 96%   Weight: 150 lb (68 kg)   Height: 5' (1.524 m)     Body mass index is 29.29 kg/m².    Wt Readings from Last 3 Encounters:   06/01/21 150 lb (68 kg)   06/01/21 150 lb (68 kg)   05/28/21 148 lb 11.2 oz (67.4 kg)     BP Readings from Last 3 Encounters:   06/01/21 112/62   06/01/21 (!) 108/54   05/28/21 133/63        Physical Exam:  General Appearance: in no acute distress and alert  Skin: skin lesions to her buttock   Head: normocephalic and atraumatic  Eyes: pupils equal, round, and reactive to light, extraocular eye movements intact, conjunctivae normal  ENT: tympanic membrane, external ear and ear canal normal bilaterally, oropharynx clear and moist with normal mucous membranes  Neck: neck supple and non tender without mass, no thyromegaly or thyroid nodules, no cervical lymphadenopathy   Pulmonary/Chest: rales present- to all lobes right and left   Cardiovascular: normal rate, regular rhythm, normal S1 and S2, no murmurs, no gallops, intact distal pulses and no carotid bruits  Abdomen: soft, non-tender, non-distended, normal bowel sounds, no masses or organomegaly  Extremities: weakness and pain  Musculoskeletal: diffusely painful joints  Neurologic: she is non ambulatory     Assessment/Plan:  1. Acute on chronic diastolic congestive heart failure ([address]  --patient is instructed on low to moderate sodium ( 2 to 2.5 grams ), daily    Also to increase potassium in the diet to about 3.5 grams daily    Literature is provided     --patient is stable on good preload and afterload reduction    Patient is doing well on Bumex  for a diuretic   --this patient has good systolic  support        2. Essential hypertension    ---VASCULAR PANEL  A) ASA, plavix, aggrenox  B) ELIQUIS, pletal, tzd, STATIN  C) ACE, BUMEX, folic, ccb  D) cannikinumab, FISH OILS    ---CARDIAC---ASA, ACE, BETA, STATIN, BUMEX, ( ccb )    3. Type 2 diabetes mellitus with hyperglycemia, with long-term current use of insulin ([address]  Long talk on treatment and prevention  Literature is given       4.  Bronchitis  --PLAN--aerosol accuneb 1.25 plus chest percussion--Rx  -continue Duoneb   - budesonide-formoterol (SYMBICORT) 160-4.5 MCG/ACT AERO; Inhale 2 puffs into the lungs 2 times daily  Dispense: 1 Inhaler; Refill: 3  - dexamethasone (DECADRON) injection 4 mg  - guaiFENesin-codeine (TUSSI-ORGANIDIN NR) 100-10 MG/5ML syrup; Take 5 mLs by mouth 4 times daily as needed for Cough for up to 7 days. Dispense: 120 mL; Refill: 0    5. Pneumonia due to COVID-19 virus    - guaiFENesin-codeine (TUSSI-ORGANIDIN NR) 100-10 MG/5ML syrup; Take 5 mLs by mouth 4 times daily as needed for Cough for up to 7 days.   Dispense: 120 mL; Refill: 0        Medical Decision Making: high complexity

## 2021-06-01 NOTE — PROGRESS NOTES
admitted to the hospital with chest pain recently and was diagnosed with non-ST elevation MI and was discharged home on 11/22/2019. She remained chest pain-free in the hospital, she was initiated on antianginal therapy with Ranexa and Imdur which she is tolerating well without any further episodes of chest pain. She was seen in the office on 9/24/2020,  she was recently hospitalized for Covid 19 pneumonia twice first time from 11/16/2020 to 11/20/2020 and again from 11/28/2020 to 12/5/2020. She is feeling much better. After her hospitalization her lisinopril and diuretic dose were discontinued due to hypotension. Subsequently, she developed significant edema in her legs. She was restarted on furosemide 40 mg p.o. daily along with lisinopril. Swelling in her legs pretty much gone. During her last hospitalization her diuretic therapy was changed to Bumex 1 mg p.o. daily. She still has some generalized weakness. .  She is compliant with medications, as well as salt and fluid intake. He does not take any over-the-counter arthritis medications. Ann-Marie Nassar has lot of generalized weakness but denies any cardiac symptoms. No new cardiac complaints since last cardiology evaluation. She denies recent chest pain, palpitations, lightheadedness, dizziness, syncope, PND, or orthopnea. SR on EKG.     Review of Systems:   Cardiac: As per HPI  General: No fever, chills  Pulmonary: As per HPI  HEENT: No visual disturbances, difficult swallowing  GI: No nausea, vomiting  Endocrine: No thyroid disease or DM  Musculoskeletal: CACERES x 4, no focal motor deficits  Skin: Intact, no rashes  Neuro/Psych: No headache or seizures    Past Medical History:  Past Medical History:   Diagnosis Date    Anxiety     Depression     [facility] mellitus [facility]     Frequent urinary tract infections     GERD (gastroesophageal reflux disease)     Hypertension     Peripheral neuropathy     PVD (peripheral vascular disease) ([facility] 3/30/2021    Spinal stenosis        Past Surgical History:  Past Surgical History:   Procedure Laterality Date    CHOLECYSTECTOMY      HIP FRACTURE SURGERY Bilateral     SKIN CANCER EXCISION         Family History:  History reviewed. No pertinent family history. Social History:  Social History     Socioeconomic History    Marital status:      Spouse name: Not on file    Number of children: Not on file    Years of education: Not on file    Highest education level: Not on file   Occupational History    Not on file   Tobacco Use    Smoking status: Never Smoker    Smokeless tobacco: Never Used   Vaping Use    Vaping Use: Never used   Substance and Sexual Activity    Alcohol use: No    Drug use: No    Sexual activity: Not Currently   Other Topics Concern    Not on file   Social History Narrative    Not on file     Social Determinants of Health     Financial Resource Strain:     Difficulty of Paying Living Expenses:    Food Insecurity:     Worried About Running Out of Food in the Last Year:     [address] in the Last Year:    Transportation Needs:     Lack of Transportation (Medical):  Lack of Transportation (Non-Medical):    Physical Activity:     Days of Exercise per Week:     Minutes of Exercise per Session:    Stress:     Feeling of Stress :    Social Connections:     Frequency of Communication with Friends and Family:     Frequency of Social Gatherings with Friends and Family:     Attends [employer] Services:     Active Member of Clubs or Organizations:     Attends Club or Organization Meetings:     Marital Status:    Intimate Partner Violence:     Fear of Current or Ex-Partner:     Emotionally Abused:     Physically Abused:     Sexually Abused:         Allergies:  No Known Allergies    Current Medications:  Current Outpatient Medications   Medication Sig Dispense Refill    methylPREDNISolone (MEDROL DOSEPACK) 4 MG tablet use as directed FOLLOW DIRECTIONS ON BACK OF FOIL PACK by mouth daily 90 tablet 1    lansoprazole (PREVACID) 30 MG delayed release capsule Take 1 capsule by mouth daily 90 capsule 1    ipratropium-albuterol (DUONEB) 0.5-2.5 (3) MG/3ML SOLN nebulizer solution Take 3 mLs by nebulization every 4 hours as needed for Shortness of Breath 360 mL 2    Calcium Carb-Cholecalciferol (CALTRATE 600+D3) 600-800 MG-UNIT TABS Take 1 tablet by mouth 2 times daily      nystatin (MYCOSTATIN) 132477 UNIT/GM powder Apply 1 each topically 2 times daily Apply to abdominal folds      Omega-3 Fatty Acids (FISH OIL) 1000 MG CAPS Take 1,000 mg by mouth 2 times daily      aspirin 81 MG chewable tablet Take 1 tablet by mouth daily 30 tablet 0    acetaminophen (TYLENOL) 325 MG tablet Take 650 mg by mouth every 4 hours as needed for Pain or Fever       fluticasone (FLONASE) 50 MCG/ACT nasal spray 1 spray by Nasal route daily as needed for Rhinitis       mineral oil-hydrophilic petrolatum (HYDROPHOR) ointment Apply topically daily Apply to both feet       No current facility-administered medications for this visit. Physical Exam:  BP (!) 108/54   Pulse 76   Resp 16   Ht 5' (1.524 m)   Wt 150 lb (68 kg)   LMP  (LMP Unknown)   BMI 29.29 kg/m²   Wt Readings from Last 3 Encounters:   06/01/21 150 lb (68 kg)   05/28/21 148 lb 11.2 oz (67.4 kg)   05/12/21 158 lb (71.7 kg)     Appearance: Awake, alert and oriented x 3, no acute respiratory distress  Skin: Intact, no rash  Head: Normocephalic, atraumatic  Eyes: EOMI, no conjunctival erythema  ENMT: No pharyngeal erythema, MMM, no rhinorrhea  Neck: Supple, no elevated JVP, no carotid bruits  Lungs: Clear to auscultation bilaterally. No wheezes, rales, or rhonchi.   Cardiac: Regular rate and rhythm, +S1S2, no murmurs apparent  Abdomen: Soft, nontender, +bowel sounds  Extremities: Moves all extremities x 4, no lower extremity edema  Neurologic: No focal motor deficits apparent, normal mood and affect, alert and oriented x 3  Peripheral Pulses: Intact posterior tibial pulses bilaterally    Laboratory Tests:  Lab Results   Component Value Date    CREATININE 1.0 05/28/2021    BUN 36 (H) 05/28/2021     05/28/2021    K 3.3 (L) 05/28/2021     05/28/2021    CO2 27 05/28/2021     Lab Results   Component Value Date    MG 1.7 04/06/2021     Lab Results   Component Value Date    WBC 7.5 05/27/2021    HGB 10.1 (L) 05/27/2021    HCT 33.9 (L) 05/27/2021    MCV 93.1 05/27/2021     05/27/2021     Lab Results   Component Value Date    ALT 10 05/25/2021    AST 20 05/25/2021    ALKPHOS 75 05/25/2021    BILITOT 0.3 05/25/2021     Lab Results   Component Value Date    CKTOTAL 41 11/28/2020    CKMB 4.5 (H) 11/17/2020    TROPONINI 0.03 04/06/2021    TROPONINI 0.05 (H) 04/02/2021    TROPONINI 0.04 (H) 04/02/2021     Lab Results   Component Value Date    INR 1.2 01/09/2020    INR 1.2 11/22/2019    INR 1.0 10/21/2019    PROTIME 13.9 (H) 01/09/2020    PROTIME 13.5 (H) 11/22/2019    PROTIME 11.4 10/21/2019     Lab Results   Component Value Date    TSH 5.700 (H) 04/07/2021     Lab Results   Component Value Date    LABA1C 7.0 (H) 04/22/2021     No results found for: EAG  Lab Results   Component Value Date    CHOL 83 11/17/2020    CHOL 104 09/16/2020    CHOL 181 10/21/2019     Lab Results   Component Value Date    TRIG 87 11/17/2020    TRIG 170 (H) 09/16/2020    TRIG 197 (H) 10/21/2019     Lab Results   Component Value Date    HDL 38 11/17/2020    HDL 37 09/16/2020    HDL 46 11/23/2019     Lab Results   Component Value Date    LDLCALC 28 11/17/2020    LDLCALC 33 09/16/2020    LDLCALC 123 (H) 11/23/2019     Lab Results   Component Value Date    LABVLDL 17 11/17/2020    LABVLDL 34 09/16/2020    LABVLDL 50 11/23/2019     No results found for: CHOLHDLRATIO    Cardiac Tests:  ECG: Sinus rhythm, low voltage precordial leads, baseline artifact,  abnormal EKG. [id number]/22/2019: EF 45-50%, moderate LVH, stage 1 DD, mild AR, mild AS.       TTE1/9/2020: Limited echo to assess LV function. Normal left ventricle size and systolic function. Ejection fraction is visually estimated at 60-65%. Normal left ventricle wall thickness. Indeterminate diastolic function. No change in LV function was noted when compared to prior echo and no wall   motion abnormalities noted. No regional wall motion abnormalities seen. Normal right ventricular size and function. No evidence for hemodynamically significant pericardial effusion. Stress test 10/23/2019: LVEF 40%, no reversible perfusion defect. Fixed perfusion defect was noted extending from the apex into the anteroseptal and inferolateral walls. Global hypokinesis with severe hypokinesis of the septum was noted. Cardiac catheterization:     CTA chest 10/21/2019: Sub-segmental pulmonary emboli in the left lower lobe. No evidence of central pulmonary embolism. There is severe narrowing/occlusion of the left innominate vein with collateral vessels in the mediastinum    The ASCVD Risk score (Heber Chaudhry., et al., 2013) failed to calculate for the following reasons: The 2013 ASCVD risk score is only valid for ages 36 to 78    The patient has a prior MI or stroke diagnosis        ASSESSMENT:  · CAD, status post NSTEMI11/22/2019 with chronic stable angina. Not a candidate for cardiac intervention. · History of PE diagnosed on 10/21/2019 on Eliquis for anticoagulation  · VHD: Mild AS/AI  · Ischemic cardiomyopathy with an EF of 45 to 50%>> 60 to 65%>>45-50%  · HFpEF, euvolemic and hemodynamically stable  · Hypertension, well controlled  · Type 2 diabetes with peripheral neuropathy  · CKD stage III with PHILIP. · Hyperlipidemia  · Obesity  · Status post bilateral hip fracture status post ORIF  · Anxiety depression  · Gait instability  · Persistent Cough, etiology unclear, differential diagnosis include persistent bronchitis versus aspiration    Plan:   · Continue antianginal therapy with Imdur, Coreg.  Will treat her with medical therapy for only and her symptoms are well controlled. · Will add Lisinopril 2.5 mg po daily due to LV dysfunction. Check BMP in one week. · Continue aspirin and Eliquis for anticoagulation  · Decrease Bumex to 0.5 mg po daily. · Continue Atorvastatin for CAD  · Continue rest of the current medications. · Follow up with Dr. Karlo Jacob for cough and congestion. · Follow up with me in 3 months. The patient's current medication list, allergies, problem list and results of all previously ordered testing were reviewed at today's visit.   Aracely Vasques MD  [facility]) Cardiology

## 2021-06-01 NOTE — PATIENT INSTRUCTIONS
· Continue antianginal therapy with Imdur, Coreg. Will treat her with medical therapy for only and her symptoms are well controlled. · Will add Lisinopril 2.5 mg po daily due to LV dysfunction. Check BMP in one week. · Continue aspirin and Eliquis for anticoagulation  · Decrease Bumex to 0.5 mg po daily. · Continue Atorvastatin for CAD  · Continue rest of the current medications. · Follow up with Dr. Earnest Carney for cough and congestion. · Follow up with me in 3 months.

## 2021-06-04 NOTE — TELEPHONE ENCOUNTER
Darylene Lopes called in stating that Tati Lucero has not improved at all since her visit on Tuesday 6/1/2021.

## 2021-06-06 PROBLEM — J18.9 HCAP (HEALTHCARE-ASSOCIATED PNEUMONIA): Status: ACTIVE | Noted: 2021-01-01

## 2021-06-06 NOTE — ED PROVIDER NOTES
4:28 PM EDT  I received this patient at sign out from Dr. Gregory Mcbride. I have discussed the patient's initial exam, treatment and plan of care with the out going physician. I have introduced my self to the patient / family and have answered their questions to this point. I have examined the patient myself and reviewed ordered tests / medications and  reviewed any available results to this point. If a resident is involved in the Emergency Department care, I have discussed my findings and plan with them as well. 40-year-old female presenting from home. She had altered mental status per family, also had recently had pneumonia. Has had a continuing cough but decreased strength to clear according to daughter who is an RN. Patient is awake, minimally alert, does respond with simple short answers only. 90 to 94% during my examination though the ABG shows she was significantly hypoxic and the initial evaluation showed she was profoundly more hypoxic than that. Had Covid several months ago, has not recuperated since that time, is nonambulatory for about 6 to 8 months, does not normally use oxygen at home. Gradual onset, persistent, worsening, moderate to severe, 1 day duration with confusion, so she with recent illnesses. No family history on file. Past Surgical History:   Procedure Laterality Date    CHOLECYSTECTOMY      HIP FRACTURE SURGERY Bilateral     SKIN CANCER EXCISION         Review of Systems   Unable to perform ROS: Mental status change   Respiratory: Positive for shortness of breath. Psychiatric/Behavioral: Positive for confusion. Physical Exam  Constitutional:       General: She is not in acute distress. Appearance: She is well-developed. HENT:      Head: Normocephalic and atraumatic. Eyes:      Conjunctiva/sclera: Conjunctivae normal.      Pupils: Pupils are equal, round, and reactive to light. Neck:      Thyroid: No thyromegaly.    Cardiovascular:      Rate and Rhythm: Normal rate and regular rhythm. Pulmonary:      Effort: Pulmonary effort is normal. No respiratory distress. Breath sounds: Normal breath sounds. Abdominal:      General: There is no distension. Palpations: Abdomen is soft. Tenderness: There is no abdominal tenderness. There is no guarding or rebound. Musculoskeletal:         General: No tenderness. Normal range of motion. Cervical back: Normal range of motion. Skin:     General: Skin is warm and dry. Findings: No erythema. Neurological:      Mental Status: She is lethargic and confused. GCS: GCS eye subscore is 3. GCS verbal subscore is 4. GCS motor subscore is 6. Cranial Nerves: No cranial nerve deficit. Coordination: Coordination normal.          Procedures     MDM              --------------------------------------------- PAST HISTORY ---------------------------------------------  Past Medical History:  has a past medical history of Anxiety, Depression, Diabetes mellitus ([facility] [address] Frequent urinary tract infections, GERD (gastroesophageal reflux disease), Hypertension, Peripheral neuropathy, PVD [facility]peripheral vascular disease) [facility] and Spinal stenosis. Past Surgical History:  has a past surgical history that includes Cholecystectomy; Skin cancer excision; and Hip fracture surgery (Bilateral). Social History:  reports that she has never smoked. She has never used smokeless tobacco. She reports that she does not drink alcohol and does not use drugs. Family History: family history is not on file. The patients home medications have been reviewed. Allergies: Patient has no known allergies.     -------------------------------------------------- RESULTS -------------------------------------------------    Lab  Results for orders placed or performed during the hospital encounter of 06/06/21   Lactate, Sepsis   Result Value Ref Range    Lactic Acid, Sepsis 1.8 0.5 - 1.9 mmol/L   Lactate, Sepsis   Result Value Ref Range    Lactic Acid, Sepsis 1.5 0.5 - 1.9 mmol/L   Basic metabolic panel   Result Value Ref Range    Sodium 142 132 - 146 mmol/L    Potassium 4.5 3.5 - 5.0 mmol/L    Chloride 104 98 - 107 mmol/L    CO2 25 22 - 29 mmol/L    Anion Gap 13 7 - 16 mmol/L    Glucose 284 (H) 74 - 99 mg/dL    BUN 69 (H) 6 - 23 mg/dL    CREATININE 1.2 (H) 0.5 - 1.0 mg/dL    GFR Non-African American 42 >=60 mL/min/1.73    GFR African American 51     Calcium 9.6 8.6 - 10.2 mg/dL   CBC   Result Value Ref Range    WBC 9.5 4.5 - 11.5 E9/L    RBC 5.30 3.50 - 5.50 E12/L    Hemoglobin 15.2 11.5 - 15.5 g/dL    Hematocrit 49.3 (H) 34.0 - 48.0 %    MCV 93.0 80.0 - 99.9 fL    MCH 28.7 26.0 - 35.0 pg    MCHC 30.8 (L) 32.0 - 34.5 %    RDW 20.2 (H) 11.5 - 15.0 fL    Platelets 474 938 - 549 E9/L    MPV 9.1 7.0 - 12.0 fL   Troponin   Result Value Ref Range    Troponin, High Sensitivity 47 (H) 0 - 9 ng/L   Brain Natriuretic Peptide   Result Value Ref Range    Pro-BNP 3,219 (H) 0 - 450 pg/mL   Magnesium   Result Value Ref Range    Magnesium 2.2 1.6 - 2.6 mg/dL   Hepatic Function Panel   Result Value Ref Range    Total Protein 7.1 6.4 - 8.3 g/dL    Albumin 3.0 (L) 3.5 - 5.2 g/dL    Alkaline Phosphatase 120 (H) 35 - 104 U/L    ALT 10 0 - 32 U/L    AST 14 0 - 31 U/L    Total Bilirubin 0.5 0.0 - 1.2 mg/dL    Bilirubin, Direct <0.2 0.0 - 0.3 mg/dL    Bilirubin, Indirect see below 0.0 - 1.0 mg/dL   Blood Gas, Arterial   Result Value Ref Range    Date Analyzed 20210606     Time Analyzed 1034     Source: Blood Arterial     pH, Blood Gas 7.413 7.350 - 7.450    PCO2 42.1 35.0 - 45.0 mmHg    PO2 54.3 (L) 75.0 - 100.0 mmHg    HCO3 26.3 (H) 22.0 - 26.0 mmol/L    B.E. 1.5 -3.0 - 3.0 mmol/L    O2 Sat 87.3 (L) 92.0 - 98.5 %    O2Hb 86.7 (L) 94.0 - 97.0 %    COHb 0.4 0.0 - 1.5 %    MetHb 0.3 0.0 - 1.5 %    O2 Content 13.9 mL/dL    HHb 12.6 (H) 0.0 - 5.0 %    tHb (est) 11.4 (L) 11.5 - 16.5 g/dL    Mode RA     Date Of Collection      Time Collected      Pt Temp 37.0 C  ID [id number]     Lab [id number]     Critical(s) Notified . No Critical Values    Troponin   Result Value Ref Range    Troponin, High Sensitivity 56 (H) 0 - 9 ng/L   POCT Glucose   Result Value Ref Range    Meter Glucose 269 (H) 74 - 99 mg/dL   EKG 12 Lead   Result Value Ref Range    Ventricular Rate 95 BPM    Atrial Rate 95 BPM    P-R Interval 200 ms    QRS Duration 76 ms    Q-T Interval 346 ms    QTc Calculation (Bazett) 434 ms    P Axis 91 degrees    R Axis -26 degrees    T Axis 63 degrees       Radiology  CT CHEST WO CONTRAST   Final Result   1. New opacities in right and left lower lobes notable on the right related   to pneumonia and atelectasis. 2.  Filling defects and thickening associated with right and left posterior   segmental bronchi suggesting inflammation or mucous plugging. CT HEAD WO CONTRAST   Final Result   No acute intracranial abnormality. No significant interval changes since the previous study of April 6, 2021         XR CHEST PORTABLE   Final Result   Mild patchy right lung base atelectasis or infiltrate             ------------------------- NURSING NOTES AND VITALS REVIEWED ---------------------------  Date / Time Roomed:  6/6/2021  9:35 AM  ED Bed Assignment:  0478/4698-03    The nursing notes within the ED encounter and vital signs as below have been reviewed.    Patient Vitals for the past 24 hrs:   BP Temp Temp src Pulse Resp SpO2 Height Weight   06/06/21 2304 (!) 147/60 98.2 °F (36.8 °C) Oral 92 22 94 % 5' 2.01\" (1.575 m) 147 lb 0.8 oz (66.7 kg)   06/06/21 2050 127/68 98.2 °F (36.8 °C) Oral 87 26 95 %     06/06/21 1815 (!) 157/51   92 16 94 %     06/06/21 1510 (!) 162/71   88 18 94 %     06/06/21 1228 135/70   91 18 94 %     06/06/21 1147 (!) 158/69   94 16 98 %     06/06/21 1047     18 97 %     06/06/21 0942 (!) 158/80 98 °F (36.7 °C)  95 16 96 % 5' 2\" (1.575 m) 147 lb (66.7 kg)       Oxygen Saturation Interpretation: Abnormal and Improved after treatment      ------------------------------------------ PROGRESS NOTES ------------------------------------------  Re-evaluation(s):    Patients symptoms show no change  Repeat physical examination is not changed      I have spoken with the patient and discussed todays results, in addition to providing specific details for the plan of care and counseling regarding the diagnosis and prognosis. Their questions are answered at this time and they are agreeable with the plan.      --------------------------------- ADDITIONAL PROVIDER NOTES ---------------------------------  Consultations:  Spoke with admitting team,  They will admit this patient. This patient's ED course included: a personal history and physicial examination, re-evaluation prior to disposition, multiple bedside re-evaluations and IV medications    This patient has remained hemodynamically stable and been closely monitored during their ED course. Clinical Impression  1. Acute respiratory failure with hypoxia (HCC)    2. Dehydration    3. Stage 3b chronic kidney disease ([facility]    4. Pressure injury of sacral region, stage 4 ([facility]    5. Pneumonia of both lower lobes due to infectious organism          Disposition  Patient's disposition: Admit to telemetry  Patient's condition is stable.        Jerry Naranjo DO  06/07/21 0132

## 2021-06-06 NOTE — ED PROVIDER NOTES
This patient resides on with family. She was recently treated for pneumonia. Daughter states she has had decreasing level of consciousness, increased cough and shortness of breath. The history is provided by the patient. Altered Mental Status  Presenting symptoms: lethargy    Severity:  Moderate  Most recent episode:  2 days ago  Episode history:  Continuous  Timing:  Constant  Progression:  Worsening  Chronicity:  New  Context: recent infection    Associated symptoms: difficulty breathing and weakness    Associated symptoms: no abdominal pain, no fever, no hallucinations, no headaches, no nausea, no rash, no slurred speech, no suicidal behavior and no vomiting         Review of Systems   Constitutional: Positive for activity change, appetite change and fatigue. Negative for chills and fever. HENT: Negative for ear pain, sinus pressure and sore throat. Eyes: Negative for pain, discharge and redness. Respiratory: Positive for cough and shortness of breath. Negative for wheezing. Cardiovascular: Negative for chest pain. Gastrointestinal: Negative for abdominal distention, abdominal pain, diarrhea, nausea and vomiting. Genitourinary: Negative for dysuria and frequency. Musculoskeletal: Negative for arthralgias and back pain. Skin: Negative for rash and wound. Neurological: Positive for weakness. Negative for headaches. Hematological: Negative for adenopathy. Psychiatric/Behavioral: Negative for hallucinations. All other systems reviewed and are negative. Physical Exam  Vitals and nursing note reviewed. Constitutional:       Appearance: She is well-developed. HENT:      Head: Normocephalic and atraumatic. Eyes:      Pupils: Pupils are equal, round, and reactive to light. Cardiovascular:      Rate and Rhythm: Normal rate and regular rhythm. Heart sounds: Normal heart sounds. No murmur heard.      Pulmonary:      Effort: Pulmonary effort is normal. No respiratory distress. Breath sounds: Wheezing and rhonchi present. No rales. Comments: Patient has a noted weak cough during exam.  Abdominal:      General: Bowel sounds are normal.      Palpations: Abdomen is soft. Tenderness: There is no abdominal tenderness. There is no guarding or rebound. Musculoskeletal:         General: No swelling. Cervical back: Normal range of motion and neck supple. Skin:     General: Skin is warm and dry. Comments: Sacral wound noted. Neurological:      Mental Status: She is alert and oriented to person, place, and time. GCS: GCS eye subscore is 4. GCS verbal subscore is 4. GCS motor subscore is 6. Cranial Nerves: No cranial nerve deficit. Coordination: Coordination normal.          Procedures     MDM     EKG: This EKG is signed and interpreted by me. Rate: 95  Rhythm: Sinus  Interpretation: no acute changes and non-specific EKG  Comparison: stable as compared to patient's most recent EKG      Venous Access Procedure Note  Indication: nursing staff unable to obtain access and line sufficient for CTA. Procedure: The patient was placed in the appropriate position and the skin over the puncture site was prepped with betadine and draped in a sterile fashion. Intravenous access was obtained in the right antecubital vein and the site was secured appropriately. Procedure was preformed under live ultrasound for guidance. The patient tolerated the procedure well. Complications: None      4:07 AM EDT  I have signed this patient out to the oncoming physician, Dr. Ramy Colón. I have discussed the patient's initial exam, treatment and plan of care with the on coming physician. I have notified the patient / family of the change in treating physician and answered their questions to this point.          Ananda Valentine, [place]  06/14/21 4088

## 2021-06-06 NOTE — H&P
Department of Internal Medicine  History and Physical    PCP: Dr. Karina Prather Physician: Dr. Ofelia Santos  Consultants:   Date of Service: 6/6/2021    CHIEF COMPLAINT:  Altered mental status     HISTORY OF PRESENT ILLNESS:    Patient is a 60-year-old female who presented to the ED due to altered mental status. Patient recently discharged from [facility] after being treated for pneumonia. Patient was discharged home on the 28th. Milagros Rivera states that she had contracted COVID-19 a few months ago. Since her COVID-19 infection she has steadily declined. Since being home after being discharged on the 28th patient has been weak. More recently she was confused according to family. On exam patient does respond to questions. She does answer appropriately. PAST MEDICAL Hx:  Past Medical History:   Diagnosis Date    Anxiety     Depression     Diabetes mellitus ([address]     Frequent urinary tract infections     GERD (gastroesophageal reflux disease)     Hypertension     Peripheral neuropathy     PVD (peripheral vascular disease) ([facility] 3/30/2021    Spinal stenosis        PAST SURGICAL Hx:   Past Surgical History:   Procedure Laterality Date    CHOLECYSTECTOMY      HIP FRACTURE SURGERY Bilateral     SKIN CANCER EXCISION         FAMILY Hx:  No family history on file. HOME MEDICATIONS:  Prior to Admission medications    Medication Sig Start Date End Date Taking?  Authorizing Provider   methylPREDNISolone (MEDROL DOSEPACK) 4 MG tablet use as directed FOLLOW DIRECTIONS ON BACK OF FOIL PACK 5/30/21   Historical Provider, MD   lisinopril (PRINIVIL;ZESTRIL) 2.5 MG tablet Take 1 tablet by mouth daily 6/1/21   Trinity Godfrey MD   bumetanide (BUMEX) 0.5 MG tablet Take 1 tablet by mouth daily 6/1/21   Trinity Godfrey MD   budesonide-formoterol [facility]) 160-4.5 MCG/ACT AERO Inhale 2 puffs into the lungs 2 times daily 6/1/21   Juan Jose Lane DO   guaiFENesin-codeine (Suman Esteban NR) 100-10 MG/5ML syrup Take 5 mLs by mouth 4 times daily as needed for Cough for up to 7 days. 6/1/21 6/8/21  Juan Jose Lane DO   acetylcysteine (MUCOMYST) 20 % nebulizer solution Inhale 3 mLs into the lungs three times daily 5/28/21   Leatha Gooden MD   doxycycline hyclate (VIBRAMYCIN) 100 MG capsule Take 1 capsule by mouth every 12 hours for 10 days 5/28/21 6/7/21  Leatha Gooden MD   apixaban (ELIQUIS) 5 MG TABS tablet Take 1 tablet by mouth 2 times daily 5/28/21   Leatha Gooden MD   gabapentin (NEURONTIN) 300 MG capsule Take 1 capsule by mouth 3 times daily for 90 days.  Intended supply: 90 days 5/4/21 8/2/21  Darrin Lane DO   famotidine (PEPCID) 20 MG tablet Take 1 tablet by mouth 2 times daily 5/4/21   Darrin Lane DO   docusate sodium (COLACE, DULCOLAX) 100 MG CAPS Take 200 mg by mouth 2 times daily 4/8/21   Leatha Gooden MD   ondansetron [facility]) 4 MG/5ML solution Take 5 mLs by mouth 2 times daily as needed for Nausea or Vomiting 4/8/21   Leatha Gooden MD   bisacodyl (DULCOLAX) 10 MG suppository Place 10 mg rectally daily as needed for Constipation    Historical Provider, MD   insulin lispro, 1 Unit Dial, (HUMALOG KWIKPEN) 100 UNIT/ML SOPN Inject 0-10 Units into the skin 3 times daily (before meals)    Historical Provider, MD   nitroGLYCERIN (NITROSTAT) 0.4 MG SL tablet Place 0.4 mg under the tongue every 5 minutes as needed for Chest pain    Historical Provider, MD   diclofenac sodium (VOLTAREN) 1 % GEL Apply 2 g topically 4 times daily as needed for Pain (apply to hands)    Historical Provider, MD   SERTRALINE HCL PO Take 150 mg by mouth daily     Historical Provider, MD   montelukast (SINGULAIR) 10 MG tablet Take 1 tablet by mouth nightly 3/12/21   Darrin Lane DO   atorvastatin (LIPITOR) 40 MG tablet Take 1 tablet by mouth nightly 1/21/21   Ousmane Lindsey MD   carvedilol (COREG) 12.5 MG tablet Take 1 tablet by mouth 2 times daily (with meals) 1/21/21   Ousmane Lindsey MD   isosorbide Resource Strain:     Difficulty of Paying Living Expenses:    Food Insecurity:     Worried About Running Out of Food in the Last Year:     [address] in the Last Year:    Transportation Needs:     Lack of Transportation (Medical):  Lack of Transportation (Non-Medical):    Physical Activity:     Days of Exercise per Week:     Minutes of Exercise per Session:    Stress:     Feeling of Stress :    Social Connections:     Frequency of Communication with Friends and Family:     Frequency of Social Gatherings with Friends and Family:     Attends Rastafari Services:     Active Member of Clubs or Organizations:     Attends Club or Organization Meetings:     Marital Status:    Intimate Partner Violence:     Fear of Current or Ex-Partner:     Emotionally Abused:     Physically Abused:     Sexually Abused:        ROS: Positive in bold. Limited due to altered mental status. General:   Denies chills, fatigue, fever, malaise, night sweats or weight loss    Psychological:   Denies anxiety, disorientation or hallucinations    ENT:    Denies epistaxis, headaches, vertigo or visual changes    Cardiovascular:   Denies any chest pain, irregular heartbeats, or palpitations. No paroxysmal nocturnal dyspnea. Respiratory:   Denies shortness of breath, coughing, sputum production, hemoptysis, or wheezing. No orthopnea. Gastrointestinal:   Denies nausea, vomiting, diarrhea, or constipation. Denies any abdominal pain. Denies change in bowel habits or stools. Genito-Urinary:    Denies any urgency, frequency, hematuria. Voiding without difficulty. Musculoskeletal:   Denies joint pain, joint stiffness, joint swelling or muscle pain    Neurology:    Denies any headache or focal neurological deficits. No weakness or paresthesia. Derm:    Denies any rashes, ulcers, or excoriations. Denies bruising. Extremities:   Denies any lower extremity swelling or edema.       PHYSICAL EXAM: Abnormal findings noted  VITALS:  Vitals:    06/06/21 1228   BP: 135/70   Pulse: 91   Resp: 18   Temp:    SpO2: 94%         CONSTITUTIONAL:    Awake, alert, cooperative, no apparent distress, and appears stated age    EYES:     EOMI, sclera clear, conjunctiva normal    ENT:    Normocephalic, atraumatic,  External ears without lesions. NECK:    Supple, symmetrical, trachea midline, no JVD    HEMATOLOGIC/LYMPHATICS:    No cervical lymphadenopathy and no supraclavicular lymphadenopathy    LUNGS:    Symmetric. Patient has bilateral coarse breath sounds    CARDIOVASCULAR:    Normal apical impulse, regular rate and rhythm, normal S1 and S2, no S3 or S4, and no murmur noted    ABDOMEN:    soft, non-distended, non-tender    MUSCULOSKELETAL:    There is no redness, warmth, or swelling of the joints. NEUROLOGIC:    Patient awake and alert    SKIN:    No bruising or bleeding. No redness, warmth, or swelling    EXTREMITIES:    Peripheral pulses present. No edema, cyanosis, or swelling.     LINES/CATHETERS   Gonzalez catheter in place    LABORATORY DATA:  CBC with Differential:    Lab Results   Component Value Date    WBC 9.5 06/06/2021    RBC 5.30 06/06/2021    HGB 15.2 06/06/2021    HCT 49.3 06/06/2021     06/06/2021    MCV 93.0 06/06/2021    MCH 28.7 06/06/2021    MCHC 30.8 06/06/2021    RDW 20.2 06/06/2021    SEGSPCT 58 06/18/2013    LYMPHOPCT 26.6 05/25/2021    MONOPCT 4.8 05/25/2021    BASOPCT 0.4 05/25/2021    MONOSABS 0.40 05/25/2021    LYMPHSABS 2.23 05/25/2021    EOSABS 0.18 05/25/2021    BASOSABS 0.03 05/25/2021     CMP:    Lab Results   Component Value Date     06/06/2021    K 4.5 06/06/2021    K 3.7 05/26/2021     06/06/2021    CO2 25 06/06/2021    BUN 69 06/06/2021    CREATININE 1.2 06/06/2021    GFRAA 51 06/06/2021    LABGLOM 42 06/06/2021    GLUCOSE 284 06/06/2021    PROT 7.1 06/06/2021    LABALBU 3.0 06/06/2021    CALCIUM 9.6 06/06/2021    BILITOT 0.5 06/06/2021    ALKPHOS 120 06/06/2021    AST 14 06/06/2021    ALT 10 06/06/2021       ASSESSMENT/PLAN:  1. Acute encephalopathy  2. Acute hypoxic respiratory failure  3. Pneumonia  4. Sacral decubitus ulcer  5. Recently positive blood cultures considered contaminant  6. Paroxysmal atrial fibrillation  7. Chronic systolic and diastolic congestive heart failure  8. Coronary artery disease  9. Valvular heart disease  10. History of PE on Eliquis  11. Chronic kidney disease  12. Peripheral vascular disease  13. Peripheral neuropathy  14. Diabetes mellitus  15. GERD  16. Hypertension  17. Hyperlipidemia  18. History of skin cancer  19. Depression anxiety      Patient presented with altered mental status. She was also found to be hypoxic initially on presentation. Imaging revealed pneumonia. She also has sacral decubitus ulcer. Cultures obtained. Placed on broad-spectrum antibiotics. Palliative care Consulted.       Vani Knott  6:06 PM  6/6/2021    Electronically signed by Bhavana Bey DO on 6/6/21 at 6:06 PM EDT

## 2021-06-06 NOTE — ED NOTES
RN spoke with sepsis coordinator. Provider notified that antibiotics need hung by [phone] if thinking sepsis.       Santhosh Levine RN  06/06/21 1217

## 2021-06-06 NOTE — ED NOTES
RN at bedside to help provider exam patient's sacral wound. Provider states there is a 1 cm stage 4 pressure ulcer with underminding noted. Gonzalez cath order obtained for wound protection.       Luan Guzman RN  06/06/21 1253

## 2021-06-06 NOTE — [facility] REMOTE MONITORING
Called and spoke to [facility] regarding sepsis bundle. For any questions, please feel free to call back: [phone]    Thank you!      Segun Jackson BSN, [address]   [phone]

## 2021-06-06 NOTE — ED NOTES
Bed: 09  Expected date: 6/6/21  Expected time: 9:28 AM  Means of arrival: [facility]  Comments:  [facility]-SOB- Dx pneumonia     Vanessa Waller RN  06/06/21 9913

## 2021-06-07 PROBLEM — E43 SEVERE PROTEIN-CALORIE MALNUTRITION (HCC): Status: ACTIVE | Noted: 2021-01-01

## 2021-06-07 NOTE — PROGRESS NOTES
Room #:   9058/8107-52    Date: 2021       Patient Name: Sissy Madera  : 1933      MRN: [id number]     Patient unavailable for physical therapy treatment due to hold per nursing due to patient pt too lethargic to participate in therapy at this time. Will attempt PT evaluation at a later time. Thank you.      Jana Luna, PT

## 2021-06-07 NOTE — PROGRESS NOTES
Tiana Cortez Query,    Your patient is on a medication that requires a renal dose adjustment. Renal Function Assessment:    Date Body Weight IBW Adj. Body Weight SCr CrCl Dialysis status   6/7/2021 66.7 kg 50.1 kg 56.7 kg 0.9 39 ml/min no       Pharmacy has renally dose-adjusted the following medication(s):    Date Medication Original Dosing Regimen New Dosing Regimen   6/7/2021 famotidine 20mg PO BID 10mg PO BID           These changes were made per protocol according to the [facility] Renal Function-Based Dose Adjustments Policy    *Please note this dose may need readjusted if your patient's renal function significantly improves. Please contact pharmacy with any questions regarding these changes.

## 2021-06-07 NOTE — CONSULTS
[address] [facility]  Consult Note    [facility]  [facility] CARE [facility]ENTER, [address]  Phone [phone]   [phone]      Admit Date: 2021  9:35 AM  Pt Name: Tiara Ribera  MRN: [id number]  : 1933  Reason for Consult:    Chief Complaint   Patient presents with    Altered Mental Status     recently dx of pneumonia     Requesting Physician:  Ramana Grullon DO  PCP: Dante Ferrara DO  History Obtained From:  patient, chart   ID consulted for HCAP (healthcare-associated pneumonia) [J18.9]  on hospital day 1  CHIEF COMPLAINT       Chief Complaint   Patient presents with    Altered Mental Status     recently dx of pneumonia     HISTORYOF PRESENT ILLNESS   Tiara Ribera is a 80 y.o. female who presents with significant past medical history of  has a past medical history of Anxiety, Depression, Diabetes mellitus ([address] Frequent urinary tract infections, GERD (gastroesophageal reflux disease), Hypertension, Peripheral neuropathy, PVD (peripheral vascular disease) ([facility] [address] and Spinal stenosis. ED TRIAGEVITALS  BP: (!) 152/60, Temp: 98.8 °F (37.1 °C), Pulse: 100, Resp: 24, SpO2: 97 %  HPI  PT WAS RECENTLY D/C ON  Acute respiratory failure hypoxia/CONS BACTEREMIA THOUGHT CONTAMINANT  FOLLOWED BY ID DR GONSALES  HAS KNOWN SACRAL ULCERS POA-ON DOXY 100MG PO Q12   [facility] XC cons  ALPHA STREP   PT CAME IN DUE TO ALTERED MS  PT HAS H/O COVID 2020  AFEBRILE WBC9.5->17 CR1.20>0.9  CT CHEST  1.  New opacities in right and left lower lobes notable on the right related  to pneumonia and atelectasis. 2.  Filling defects and thickening associated with right and left posterior   segmental bronchi suggesting inflammation or mucous plugging.       vancomycin (VANCOCIN) 1,000 mg in dextrose 5 % 250 mL IVPB, Q24H     cefepime (MAXIPIME) 2000 mg IVPB minibag, Q12H     PT IS IN BED WEAK  NO F/C    REVIEW OF SYSTEMS     CONSTITUTIONAL:   No fever, chills, weight loss ALLERGIES:    No urticaria, hay fever,    EYES:     No blurry vision, loss of vision,eye pain  ENT:      No hearing loss, sore throat  CARDIOVASCULAR:  No chest pain or palpitations  RESPIRATORY:   No cough, sob  ENDOCRINE:    No increase thirst, urination   HEME-LYMPH:   No easy bruising or bleeding  GI:     No nausea, vomiting or diarrhea  :     No urinary complaints  NEURO:    No seizures, stroke, HA  MUSCULOSKELETAL:  No muscle aches or pain, no joint pain  SKIN:     No rash or itch  PSYCH:    No depression or anxiety    Medications Prior to Admission: aspirin 81 MG EC tablet, Take 81 mg by mouth daily  ondansetron (ZOFRAN-ODT) 4 MG disintegrating tablet, Take 4 mg by mouth every 12 hours as needed for Nausea or Vomiting  sertraline (ZOLOFT) 100 MG tablet, Take 150 mg by mouth daily  COVID-19 mRNA Vacc, Moderna, 100 MCG/0.5ML SUSP injection, Inject 0.5 mLs into the muscle once First Injection: 2/21/2021  lisinopril (PRINIVIL;ZESTRIL) 2.5 MG tablet, Take 1 tablet by mouth daily  bumetanide (BUMEX) 0.5 MG tablet, Take 1 tablet by mouth daily  budesonide-formoterol (SYMBICORT) 160-4.5 MCG/ACT AERO, Inhale 2 puffs into the lungs 2 times daily  guaiFENesin-codeine (TUSSI-ORGANIDIN NR) 100-10 MG/5ML syrup, Take 5 mLs by mouth 4 times daily as needed for Cough for up to 7 days. doxycycline hyclate (VIBRAMYCIN) 100 MG capsule, Take 1 capsule by mouth every 12 hours for 10 days  apixaban (ELIQUIS) 5 MG TABS tablet, Take 1 tablet by mouth 2 times daily  gabapentin (NEURONTIN) 300 MG capsule, Take 1 capsule by mouth 3 times daily for 90 days.  Intended supply: 90 days  famotidine (PEPCID) 20 MG tablet, Take 1 tablet by mouth 2 times daily  docusate sodium (COLACE, DULCOLAX) 100 MG CAPS, Take 200 mg by mouth 2 times daily  bisacodyl (DULCOLAX) 10 MG suppository, Place 10 mg rectally daily as needed for Constipation  insulin lispro, 1 Unit Dial, (HUMALOG KWIKPEN) 100 UNIT/ML SOPN, Inject 0-10 Units into the skin 3 times daily (before meals)  diclofenac sodium (VOLTAREN) 1 % GEL, Apply 2 g topically 4 times daily as needed for Pain (apply to hands)  montelukast (SINGULAIR) 10 MG tablet, Take 1 tablet by mouth nightly  atorvastatin (LIPITOR) 40 MG tablet, Take 1 tablet by mouth nightly  carvedilol (COREG) 12.5 MG tablet, Take 1 tablet by mouth 2 times daily (with meals)  isosorbide mononitrate (IMDUR) 30 MG extended release tablet, Take 1 tablet by mouth daily  lansoprazole (PREVACID) 30 MG delayed release capsule, Take 1 capsule by mouth daily  ipratropium-albuterol (DUONEB) 0.5-2.5 (3) MG/3ML SOLN nebulizer solution, Take 3 mLs by nebulization every 4 hours as needed for Shortness of Breath  Calcium Carb-Cholecalciferol (CALTRATE 600+D3) 600-800 MG-UNIT TABS, Take 1 tablet by mouth 2 times daily  nystatin (MYCOSTATIN) 090166 UNIT/GM powder, Apply 1 each topically 2 times daily Apply to abdominal folds  Omega-3 Fatty Acids (FISH OIL) 1000 MG CAPS, Take 1,000 mg by mouth 2 times daily  acetaminophen (TYLENOL) 325 MG tablet, Take 650 mg by mouth nightly   mineral oil-hydrophilic petrolatum (HYDROPHOR) ointment, Apply topically daily Apply to both feet  acetylcysteine (MUCOMYST) 20 % nebulizer solution, Inhale 3 mLs into the lungs three times daily  nitroGLYCERIN (NITROSTAT) 0.4 MG SL tablet, Place 0.4 mg under the tongue every 5 minutes as needed for Chest pain  fluticasone (FLONASE) 50 MCG/ACT nasal spray, 1 spray by Nasal route daily as needed for Rhinitis   CURRENT MEDICATIONS     Current Facility-Administered Medications:     acetylcysteine (MUCOMYST) 10 % solution 400 mg, 4 mL, Inhalation, Q4H, Ismail U DO Brianne, 400 mg at 06/07/21 1020    insulin lispro (HUMALOG) injection vial 0-18 Units, 0-18 Units, Subcutaneous, TID WC, Waylon Barrera DO, 9 Units at 06/07/21 1136    insulin lispro (HUMALOG) injection vial 0-9 Units, 0-9 Units, Subcutaneous, Nightly, Waylon E Bisel, DO    famotidine (PEPCID) tablet 10 mg, 10 mg, Oral, BID, Ismail U Brianne, DO    vancomycin (VANCOCIN) 1,000 mg in dextrose 5 % 250 mL IVPB, 1,000 mg, Intravenous, Q24H, Ismail U Brianne, DO    sodium chloride flush 0.9 % injection 10 mL, 10 mL, Intravenous, PRN, Ersami Mar, DO    apixaban (ELIQUIS) tablet 5 mg, 5 mg, Oral, BID, Ismail U Brianne, DO, 5 mg at 06/07/21 0012    aspirin EC tablet 81 mg, 81 mg, Oral, Daily, Ismail U Brianne, DO    atorvastatin (LIPITOR) tablet 40 mg, 40 mg, Oral, Nightly, Ismail U Brianne, DO, 40 mg at 06/07/21 0012    bisacodyl (DULCOLAX) suppository 10 mg, 10 mg, Rectal, Daily PRN, Ersami Mar, DO    carvedilol (COREG) tablet 12.5 mg, 12.5 mg, Oral, BID WC, Ismail U Brianne, DO    docusate sodium (COLACE) capsule 200 mg, 200 mg, Oral, BID, Ismail U Brianne, DO, 200 mg at 06/07/21 0012    gabapentin (NEURONTIN) capsule 300 mg, 300 mg, Oral, TID, Pablo Mar, , 300 mg at 06/07/21 0012    isosorbide mononitrate (IMDUR) extended release tablet 30 mg, 30 mg, Oral, Daily, Ismail U Brianne, DO    montelukast (SINGULAIR) tablet 10 mg, 10 mg, Oral, Nightly, Ismail U Brianne, DO, 10 mg at 06/07/21 0012    nitroGLYCERIN (NITROSTAT) SL tablet 0.4 mg, 0.4 mg, Sublingual, Q5 Min PRN, Ismail U Brianne, DO    sertraline (ZOLOFT) tablet 150 mg, 150 mg, Oral, Daily, Ismail U Brianne, DO    miconazole (MICOTIN) 2 % powder, , Topical, BID, Ersami Mar DO, Given at 06/07/21 0054    lactobacillus (CULTURELLE) capsule 1 capsule, 1 capsule, Oral, Daily, Ersami Mar DO    ipratropium-albuterol (DUONEB) nebulizer solution 1 ampule, 1 ampule, Inhalation, Q4H While awake, Pablo Mar DO, 1 ampule at 06/07/21 1020    cefepime (MAXIPIME) 2000 mg IVPB minibag, 2,000 mg, Intravenous, Q12H, Pablo Mar DO, Stopped at 06/07/21 1116    glucose (GLUTOSE) 40 % oral gel 15 g, 15 g, Oral, PRN, Pablo Mar DO    dextrose 50 % IV solution, 12.5 g, Intravenous, PRN, Pablo Mar DO    glucagon (rDNA) injection 1 mg, 1 mg, CHOLECYSTECTOMY      HIP FRACTURE SURGERY Bilateral     SKIN CANCER EXCISION       FAMILY HISTORY     No family history on file. SOCIAL HISTORY       Social History     Socioeconomic History    Marital status:      Spouse name: Not on file    Number of children: Not on file    Years of education: Not on file    Highest education level: Not on file   Occupational History    Not on file   Tobacco Use    Smoking status: Never Smoker    Smokeless tobacco: Never Used   Vaping Use    Vaping Use: Never used   Substance and Sexual Activity    Alcohol use: No    Drug use: No    Sexual activity: Not Currently   Other Topics Concern    Not on file   Social History Narrative    Not on file     Social Determinants of Health     Financial Resource Strain:     Difficulty of Paying Living Expenses:    Food Insecurity:     Worried About Running Out of Food in the Last Year:     [address] in the Last Year:    Transportation Needs:     Lack of Transportation (Medical):      Lack of Transportation (Non-Medical):    Physical Activity:     Days of Exercise per Week:     Minutes of Exercise per Session:    Stress:     Feeling of Stress :    Social Connections:     Frequency of Communication with Friends and Family:     Frequency of Social Gatherings with Friends and Family:     Attends Muslim Services:     Active Member of Clubs or Organizations:     Attends Club or Organization Meetings:     Marital Status:    Intimate Partner Violence:     Fear of Current or Ex-Partner:     Emotionally Abused:     Physically Abused:     Sexually Abused:    ·      PHYSICAL EXAM       ED Triage Vitals   BP Temp Temp Source Pulse Resp SpO2 Height Weight   06/06/21 0942 06/06/21 0942 06/06/21 2050 06/06/21 0942 06/06/21 0942 06/06/21 0942 06/06/21 0942 06/06/21 0942   (!) 158/80 98 °F (36.7 °C) Oral 95 16 96 % 5' 2\" (1.575 m) 147 lb (66.7 kg)     Vitals:    Vitals:    06/06/21 2304 06/07/21 0815 06/07/21 1007 06/07/21 1130   BP: (!) 147/60 (!) 162/71  (!) 152/60   Pulse: 92 103  100   Resp: 22 22 24   Temp: 98.2 °F (36.8 °C) 97.5 °F (36.4 °C) 99.7 °F (37.6 °C) 98.8 °F (37.1 °C)   TempSrc: Oral Axillary Axillary Oral   SpO2: 94% 90%  97%   Weight: 147 lb 0.8 oz (66.7 kg)      Height: 5' 2.01\" (1.575 m)        Physical Exam   Constitutional/General: AROUSABLE NAD  Head: NC/AT  Eyes: PERRL, EOMI  Neck: Supple, full ROM,    Pulmonary: Lungs DEC  to auscultation bilaterally. Not in respiratory distress  Cardiovascular:  Regular rate and rhythm, no murmurs, gallops, or rubs. Abdomen: Soft, + BS.    distension. Nontender. Extremities: Moves all extremities x 4. Warm and well perfused  Pulses:  Distal pulses DEC   Skin: Warm and dry without rash  Neurologic:    No focal deficits  Psych: Normal Affect     DIAGNOSTIC RESULTS   RADIOLOGY:   XR CHEST (2 VW)    Result Date: 5/25/2021  EXAMINATION: TWO XRAY VIEWS OF THE CHEST 5/25/2021 1:05 pm COMPARISON: 04/06/2021 HISTORY: ORDERING SYSTEM PROVIDED HISTORY: dyspnea TECHNOLOGIST PROVIDED HISTORY: Reason for exam:->dyspnea What reading provider will be dictating this exam?->CRC FINDINGS: The lungs are without acute focal process. There is no effusion or pneumothorax. The cardiomediastinal silhouette is without acute process. The osseous structures are without acute process. No acute process. CT HEAD WO CONTRAST    Result Date: 6/6/2021  EXAMINATION: CT OF THE HEAD WITHOUT CONTRAST  6/6/2021 5:24 pm TECHNIQUE: CT of the head was performed without the administration of intravenous contrast. Dose modulation, iterative reconstruction, and/or weight based adjustment of the mA/kV was utilized to reduce the radiation dose to as low as reasonably achievable. COMPARISON: None. HISTORY: ORDERING SYSTEM PROVIDED HISTORY: ams TECHNOLOGIST PROVIDED HISTORY: Has a \"code stroke\" or \"stroke alert\" been called? ->No Reason for exam:->ams Decision Support Exception - unselect if not a suspected or confirmed emergency medical condition->Emergency Medical Condition (MA) FINDINGS: BRAIN/VENTRICLES: There is no acute intracranial hemorrhage, mass effect or midline shift. No abnormal extra-axial fluid collection. The gray-white differentiation is maintained without evidence of an acute infarct. There is no evidence of hydrocephalus. Prominence of peripheral CSF space ventricular system correlates with the age group. ORBITS: The visualized portion of the orbits demonstrate no acute abnormality. SINUSES: The visualized paranasal sinuses and mastoid air cells demonstrate no acute abnormality. SOFT TISSUES/SKULL:  No acute abnormality of the visualized skull or soft tissues. No acute intracranial abnormality. No significant interval changes since the previous study of April 6, 2021     CT CHEST WO CONTRAST    Result Date: 6/6/2021  EXAMINATION: CT OF THE CHEST WITHOUT CONTRAST 6/6/2021 5:07 pm TECHNIQUE: CT of the chest was performed without the administration of intravenous contrast. Multiplanar reformatted images are provided for review. Dose modulation, iterative reconstruction, and/or weight based adjustment of the mA/kV was utilized to reduce the radiation dose to as low as reasonably achievable. COMPARISON: May 25, 2021 HISTORY: ORDERING SYSTEM PROVIDED HISTORY: Hypoxia TECHNOLOGIST PROVIDED HISTORY: Reason for exam:->Hypoxia FINDINGS: New opacities are present in right and left lower lobes notable on the right with adjacent ground-glass opacities. There is thickening of right and left lower lobe segmental bronchi. No pleural effusion. Mild pulmonary vascular congestion. The heart is normal in size. No pericardial effusion. Few prominent subcarinal lymph nodes measure up to 1.4 by 1.1 cm. No pneumothorax. Moderate size hiatal hernia. View of upper abdomen shows normal bilateral adrenal glands.      1.  New opacities in right and left lower lobes notable on the right related to pneumonia and atelectasis. 2.  Filling defects and thickening associated with right and left posterior segmental bronchi suggesting inflammation or mucous plugging. XR CHEST PORTABLE    Result Date: 6/6/2021  EXAMINATION: ONE XRAY VIEW OF THE CHEST 6/6/2021 11:10 am COMPARISON: 05/25/2021 HISTORY: ORDERING SYSTEM PROVIDED HISTORY: Chest Pain TECHNOLOGIST PROVIDED HISTORY: Reason for exam:->Chest Pain FINDINGS: The cardiomediastinal silhouette is unremarkable. Patchy right lung base atelectasis or infiltrate. No effusion or pneumothorax. No acute osseous abnormality. Mild patchy right lung base atelectasis or infiltrate     CTA PULMONARY W CONTRAST    Result Date: 5/25/2021  EXAMINATION: CTA OF THE CHEST 5/25/2021 4:42 pm TECHNIQUE: CTA of the chest was performed after the administration of intravenous contrast.  Multiplanar reformatted images are provided for review. MIP images are provided for review. Dose modulation, iterative reconstruction, and/or weight based adjustment of the mA/kV was utilized to reduce the radiation dose to as low as reasonably achievable. COMPARISON: 03/29/2021. HISTORY: ORDERING SYSTEM PROVIDED HISTORY: hx of PE, not on anticoagulation, SHOB, hypoxia TECHNOLOGIST PROVIDED HISTORY: Reason for exam:->hx of PE, not on anticoagulation, SHOB, hypoxia Decision Support Exception - unselect if not a suspected or confirmed emergency medical condition->Emergency Medical Condition (MA) What reading provider will be dictating this exam?->CRC FINDINGS: There is borderline cardiomegaly. The great vessels are normal.  Moderately enlarged mediastinal and hilar lymph nodes are present. There are no filling defects in the main pulmonary artery and the central branches. Small filling defects are identified in the distal subsegmental and peripheral vessels of left lower lobe which could be PE of unknown age. There is a large hiatal hernia.   There is atelectasis/beginning infiltrates in the Staphylococcus coagulase-negative 05/25/2021    ORG Staphylococcus hominis ssp hominis 05/25/2021    ORG Staphylococcus hominis ssp hominis 05/25/2021     Lab Results   Component Value Date    BLOODCULT2 5 Days no growth 04/05/2021    BLOODCULT2  03/29/2021     This organism was isolated in one set. Susceptibility testing is not routinely done as this  organism frequently represents skin contamination. Additional testing can be ordered by calling the  Microbiology Department. BLOODCULT2 5 Days no growth 03/16/2021    ORG Alpha Strep, not pneumococcus 05/25/2021    ORG Staphylococcus coagulase-negative 05/25/2021    ORG Staphylococcus hominis ssp hominis 05/25/2021    ORG Staphylococcus hominis ssp hominis 05/25/2021       WOUND/ABSCESS   Date Value Ref Range Status   05/25/2021 Moderate growth  Final   05/25/2021 Light growth  Final   03/30/2021 (A)  Final    Mixed aknita also isolated, including:  Coagulase negative Staph species  Alpha hemolytic Strep species     03/30/2021 Light growth  Final        Urine Culture, Routine   Date Value Ref Range Status   11/16/2020 >100,000 CFU/ml  Final   11/29/2019 Growth not present  Final   09/24/2019 Growth not present  Final     No results found for: [facility]  No results for input(s): CDIFFTOXANT in the last 72 hours. Recent Labs     06/07/21  0630   [id number] Presumptive Negative -suggesting no recent or current infections  with Legionella pneumophila serogroup 1. Infection to Legionella cannot be ruled out since other serogroups  and species may cause infection, antigen may not be present in  early infection, or level of antigen may be below the  detection limit. Normal Range: Presumptive Negative       Recent Labs     06/07/21  0630   STREPNEUMAGU Presumptive negative- suggests no current or recent  pneumococcal infection.   Infection due to Strep pneumoniae cannot be  ruled out since the antigen present in the sample  may be below the detection limit of the test. Normal Range:Presumptive Negative         Patient is a 80 y.o. female who presented with   Chief Complaint   Patient presents with    Altered Mental Status     recently dx of pneumonia        FINAL IMPRESSION    PT WITH RECENT D/C FROM [facility] WITH CONS BACTERMIA  D/C WITH DOXY FOR SACRAL WOUNDS  CAME IN WITH CHANGE OF MS  CONCERN FOR HCAP VS ASPIRATION   1. Acute respiratory failure with hypoxia (HCC)    2. Dehydration    3. Stage 3b chronic kidney disease ([facility] [address]    4. Pressure injury of sacral region, stage 4 ([facility] [address]    5. Pneumonia of both lower lobes due to infectious organism         ? CHEST VEST  CONT ATBX  CHECK RESP CX  MRSA SCREEN   PROCAL      vancomycin (VANCOCIN) 1,000 mg in dextrose 5 % 250 mL IVPB, Q24H     cefepime (MAXIPIME) 2000 mg IVPB minibag, Q12H         Imaging and labs were reviewed per medical records and any ID pertinent labs were addressed with the patient. The patient/FAMILY  was educated about the diagnosis, prognosis, indications, risks and benefits of treatment. An opportunity to ask questions was given to the patient/FAMILY and questions were answered. Thank you for involving me in the care of [facility]. Please do not hesitate to call for any questions or concerns.          Electronically signed by Kalin Castro MD on 6/7/2021 at 12:43 PM    Informed of +blood cx  Has h/o cons bacteremia   resp viral panel +rhinovirus  Isolation  Esr/crp  procal suggest RONALD  F/u blood cx  Per Dr Doretha Erwin for peg    Kalin Castro MD

## 2021-06-07 NOTE — FLOWSHEET NOTE
Inpatient Wound Care    Admit Date: 6/6/2021  9:35 AM    Reason for consult:  Coccyx buttocks  Bilateral heels    Significant history:    Past Medical History:   Diagnosis Date    Anxiety     Depression     Diabetes mellitus ([facility]     Frequent urinary tract infections     GERD (gastroesophageal reflux disease)     Hypertension     Peripheral neuropathy     PVD (peripheral vascular disease) ([facility] 3/30/2021    Spinal stenosis        Wound history:      Findings:       06/07/21 1109   Skin Integrity   Skin Integrity (WDL) X   Skin Integrity Redness   Location heels   Date Applied 06/07/21   Skin Integrity Site 2   Skin Integrity Location 2 Redness   Location 2 right left buttocks   Wound 02/17/21 Coccyx #2   Date First Assessed/Time First Assessed: 02/17/21 1349   Wound Approximate Age at First Assessment (Weeks): 52 weeks  Primary Wound Type: Pressure Injury  Location: Coccyx  Wound Description (Comments): #2   Wound Image    Wound Etiology Pressure Stage  4   Wound Cleansed Cleansed with saline   Wound Length (cm) 1 cm   Wound Width (cm) 1 cm   Wound Surface Area (cm^2) 1 cm^2   Change in Wound Size % (l*w) -1.01   Distance Tunneling (cm) 4 cm   Tunneling Position ___ O'Clock 1200   Drainage Amount Small   Drainage Description Serosanguinous   Odor None   Xena-wound Assessment   (sl red)   Wound 05/25/21 Heel Left;Medial   Date First Assessed/Time First Assessed: 05/25/21 2133   Present on Hospital Admission: Yes  Location: Heel  Wound Location Orientation: (c) Left;Medial   Dressing/Treatment Foam   Wound Assessment Purple/maroon   Drainage Amount None   Odor None       Right and left buttocks red blanches when touched  Skin is fragile  Bilateral heels are red left sl purplish      Impression:  Stage 4 pressure injury to coccyx  Deep tissue left heel  Redness right and left buttocks    Interventions in place:    aquacell packing to coccyx  Heel mepilex bilateral heels    Plan:  aquacell ag to coccyx

## 2021-06-07 NOTE — PROGRESS NOTES
SPEECH/LANGUAGE PATHOLOGY  CLINICAL ASSESSMENT OF SWALLOWING FUNCTION   and PLAN OF CARE    PATIENT NAME:  Zeynep Mike  (female)     MRN:  [id number]    :  1933  (80 y.o.)  STATUS:  Inpatient: Room 0635/0635-01    TODAY'S DATE:  2021  REFERRING PROVIDER:  21 2330   SLP eval and treat Start: 21, End: 21, ONE TIME, Standing Count: 1 Occurrences, R    Aracelis Lancaster,   REASON FOR REFERRAL: history of dysphagia/aspiration   EVALUATING THERAPIST: ALIRIO Leahy                 RESULTS:    DYSPHAGIA DIAGNOSIS:   Clinical indicators of  oropharyngeal phase dysphagia       DIET RECOMMENDATIONS:   NPO family considering feeding tube to improve nutrition/hydration with eventual return PO diet if appropriate      FEEDING RECOMMENDATIONS:     Assistance level:  Not applicable      Compensatory strategies recommended: Not applicable      Discussed recommendations with nursing and/or faxed report to referring provider: No secondary to no diet/liquid change recommended     SPEECH THERAPY  PLAN OF CARE   The dysphagia POC is established based on physician order, dysphagia diagnosis and results of clinical assessment     Skilled SLP intervention for dysphagia management on acute care 3-5 x per week until goals met, pt plateaus in function and/or discharged from hospital    Conditions Requiring Skilled Therapeutic Intervention for dysphagia:    History of dysphagia with repeat episodes of aspiration pneumonia     Specific dysphagia interventions to include:      Therapeutic exercises    Specific instructions for next treatment:  initiate instruction of therapeutic exercises   Patient Treatment Goals:    Short Term Goals:  Ongoing evaluation of swallow function to determine when PO diet/MBSS can be safely initiated    Long Term Goals:   Pt will improve oropharyngeal swallow function to ensure airway protection during PO intake to maintain adequate nutrition/hydration and decrease signs/symptoms of aspiration to less than 1 x/day. Patient/family Goal:    Return to least restrictive diet. Plan of care discussed with Patient  and Family   The Patient did not demonstrate complete understanding of the diagnosis, prognosis and plan of care and Family understand(s) the diagnosis, prognosis and plan of care     Rehabilitation Potential/Prognosis: fair                    ADMITTING DIAGNOSIS: HCAP (healthcare-associated pneumonia) [J18.9]    VISIT DIAGNOSIS:   Visit Diagnoses       Codes    Dehydration     E86.0    Pressure injury of sacral region, stage 4 ([facility]     L89.154           PATIENT REPORT/COMPLAINT: patient not able to report. Family reports she has demonstrated episodes of aspiration during intake on a few occasions but also report several episodes of aspiration that may be related to GERD which they feel has lessened with medication       RN cleared patient for participation in assessment     yes     PRIOR LEVEL OF SWALLOW FUNCTION:    PAST HISTORY OF DYSPHAGIA?: yes MBSS 12/16/19 Regular consistency solids with  nectar consistency (mildly thick) liquids    Home diet: Soft and bite size consistency solids (dysphagia 3) with  nectar consistency (mildly thick) liquids    PROCEDURE:  Consistencies Administered During the Evaluation   Liquids:    Solids:  Nothing given due to decreased alertness       Method of Intake:           Position:   Seated, upright    CLINICAL ASSESSMENT:  Oral Stage:       nothing presented did not rouse during session not appropriate for PO attempt. Pharyngeal Stage:      nothing presented did not rouse during session not appropriate for PO attempt.      Cognition:   Did not follow commands, not alert enough     Oral Peripheral Examination   Could not test    Current Respiratory Status    2 liters nasal cannula     Parameters of Speech Production  Respiration:  Adequate for speech production  Quality:   Could not test  Intensity: Could not test Volitional Swallow: not alert enough      Volitional Cough:    not alert enough      Pain: No pain reported. EDUCATION:   The Speech Language Pathologist (SLP) completed education regarding results of evaluation and that intervention is warranted at this time. Learner: Family  Education: Reviewed results and recommendations of this evaluation, Reviewed signs, symptoms and risks of aspiration and Reviewed recommendations for follow-up  Evaluation of Education:  Verbalizes understanding    This plan may be re-evaluated and revised as warranted. Evaluation Time includes thorough review of current medical information, gathering information on past medical history/social history and prior level of function, completion of standardized testing/informal observation of tasks, assessment of data and education on plan of care and goals. [x]The admitting diagnosis and active problem list, have been reviewed prior to initiation of this evaluation. ACTIVE PROBLEM LIST:   Patient Active Problem List   Diagnosis    GERD (gastroesophageal reflux disease)    Type 2 diabetes mellitus with hyperglycemia, with long-term current use of insulin ([facility])    CKD (chronic kidney disease) stage 3, GFR 30-59 ml/min (HCC)    Essential hypertension    History of pulmonary embolus (PE)    Acute kidney injury superimposed on CKD ([facility]    Acute on chronic diastolic congestive heart failure (HCC)    Obesity (BMI 30-39. 9)    COVID-19    Anxiety and depression    Pneumonia due to COVID-19 virus    Uncontrolled type 2 diabetes mellitus with hyperglycemia (HCC)    Sacral wound    Pressure injury of sacral region, stage 3 (HCC)    Pressure ulcer of right heel, stage 2 (HCC)    Cellulitis    Controlled type 2 diabetes mellitus without complication (HCC)    Sepsis (HCC)    Acute metabolic encephalopathy    Moderate protein-calorie malnutrition (HCC)    PVD (peripheral vascular disease) ([facility])    Positive blood culture  Altered mental status    Fluid overload    Anemia of chronic disease    Paroxysmal atrial fibrillation (HCC)    Cardiomyopathy ([facility]    Nonrheumatic aortic valve insufficiency    EKG, abnormal    Pleural effusion on right    Morbidly obese (HCC)    Acute respiratory failure with hypoxia (HCC)    Pneumonia    Pulmonary embolism (HCC)    CHF (congestive heart failure) ([facility]    HCAP (healthcare-associated pneumonia)         CPT code:  [address]  [facility]  bedside swallow gerard Dorman MSCCC/SLP  Speech Language Pathologist  [id number]

## 2021-06-07 NOTE — PROGRESS NOTES
ml   I/O last 3 completed shifts: In: 61 [P.O.:60]  Out: 1500 [Urine:1500]  Patient Vitals for the past 96 hrs (Last 3 readings):   Weight   06/06/21 2304 147 lb 0.8 oz (66.7 kg)   06/06/21 0942 147 lb (66.7 kg)     Vital Signs:   Blood pressure (!) 142/63, pulse 96, temperature 100.6 °F (38.1 °C), temperature source Oral, resp. rate 24, height 5' 2\" (1.575 m), weight 147 lb 0.8 oz (66.7 kg), SpO2 97 %, not currently breastfeeding. General appearance:  Patient somnolent but arousable  Head:  Normocephalic. No masses, lesions or tenderness. Eyes:  PERRLA. EOMI. Sclera clear. Buccal mucosa moist.  ENT:  Ears normal. Mucosa normal.  Neck:    Supple. Trachea midline. No thyromegaly. No JVD. No bruits. Heart:    Rhythm regular. Rate controlled. 1/6 murmur murmurs. Lungs:    Scattered wheezing with cough. Abdomen:   Soft. Non-tender. Non-distended. Bowel sounds positive. No organomegaly or masses. No pain on palpation. Extremities:    Peripheral pulses present. No peripheral edema. No ulcers. No cyanosis. No clubbing. Neurologic:    Response of pain lethargic no focal deficit. Cranial nerves grossly intact. No focal weakness. Psych:   Behavior is normal. Mood appears normal. Speech is not rapid and/or pressured. Musculoskeletal:   Spine ROM normal. Muscular strength intact. Gait not assessed. Integumentary:  No rashes  Skin normal color and texture.   Genitalia/Breast:  Deferred    Medication:  Scheduled Meds:   acetylcysteine  4 mL Inhalation Q4H    insulin lispro  0-18 Units Subcutaneous TID WC    insulin lispro  0-9 Units Subcutaneous Nightly    vancomycin  1,000 mg Intravenous Q24H    pantoprazole  40 mg Intravenous Daily    And    sodium chloride (PF)  10 mL Intravenous Daily    lidocaine PF  5 mL Intradermal Once    sodium chloride flush  5-40 mL Intravenous 2 times per day    heparin flush  3 mL Intravenous 2 times per day    apixaban  5 mg Oral BID    aspirin  81 mg Oral Daily    atorvastatin  40 mg Oral Nightly    carvedilol  12.5 mg Oral BID WC    docusate sodium  200 mg Oral BID    gabapentin  300 mg Oral TID    isosorbide mononitrate  30 mg Oral Daily    montelukast  10 mg Oral Nightly    sertraline  150 mg Oral Daily    miconazole   Topical BID    lactobacillus  1 capsule Oral Daily    ipratropium-albuterol  1 ampule Inhalation Q4H While awake    cefepime  2,000 mg Intravenous Q12H    sodium chloride  25 mL Intravenous Q12H     Continuous Infusions:   dextrose 5 % and 0.45 % NaCl 75 mL/hr at 06/07/21 1331    sodium chloride      dextrose      sodium chloride         Objective Data:  CBC with Differential:    Lab Results   Component Value Date    WBC 17.0 06/07/2021    RBC 3.49 06/07/2021    HGB 10.2 06/07/2021    HCT 32.7 06/07/2021     06/07/2021    MCV 93.7 06/07/2021    MCH 29.2 06/07/2021    MCHC 31.2 06/07/2021    RDW 19.5 06/07/2021    SEGSPCT 58 06/18/2013    LYMPHOPCT 9.4 06/07/2021    MONOPCT 4.7 06/07/2021    BASOPCT 0.2 06/07/2021    MONOSABS 0.80 06/07/2021    LYMPHSABS 1.59 06/07/2021    EOSABS 0.03 06/07/2021    BASOSABS 0.04 06/07/2021     CMP:    Lab Results   Component Value Date     06/07/2021    K 3.9 06/07/2021    K 3.7 05/26/2021     06/07/2021    CO2 25 06/07/2021    BUN 49 06/07/2021    CREATININE 0.9 06/07/2021    GFRAA >60 06/07/2021    LABGLOM 59 06/07/2021    GLUCOSE 236 06/07/2021    PROT 6.4 06/07/2021    LABALBU 2.6 06/07/2021    CALCIUM 9.3 06/07/2021    BILITOT 0.5 06/07/2021    ALKPHOS 120 06/07/2021    AST 25 06/07/2021    ALT 9 06/07/2021       Wound Documentation:   Wound 02/17/21 Coccyx #2 (Active)   Wound Image   06/07/21 1109   Wound Etiology Pressure Stage  4 06/07/21 1109   Dressing Status Clean;Dry; Intact 06/06/21 2300   Wound Cleansed Cleansed with saline 06/07/21 1109   Dressing/Treatment ABD; Other (comment) 06/06/21 2300   Dressing Change Due 05/29/21 05/28/21 1139   Wound Length (cm) 1 cm 06/07/21 1109 Wound Width (cm) 1 cm 06/07/21 1109   Wound Depth (cm) 1.7 cm 05/28/21 0200   Wound Surface Area (cm^2) 1 cm^2 06/07/21 1109   Change in Wound Size % (l*w) -1.01 06/07/21 1109   Wound Volume (cm^3) 2.04 cm^3 05/28/21 0200   Wound Healing % -9 05/28/21 0200   Distance Tunneling (cm) 4 cm 06/07/21 1109   Tunneling Position ___ O'Clock 1200 06/07/21 1109   Undermining Starts ___ O'Clock 12 05/27/21 1526   Undermining Ends___ O'Clock 4 05/27/21 1526   Undermining Maxium Distance (cm) Beba@[employer] 05/27/21 1526   Wound Assessment Pink/red 05/28/21 0200   Drainage Amount Small 06/07/21 1109   Drainage Description Serosanguinous 06/07/21 1109   Odor None 06/07/21 1109   Xena-wound Assessment Blanchable erythema 05/28/21 0200   Number of days: 110       Wound 05/25/21 Heel Left;Medial (Active)   Wound Image   05/27/21 1526   Wound Etiology Deep tissue/Injury 05/27/21 1526   Dressing Status Other (Comment) 05/26/21 2109   Dressing/Treatment Foam 06/07/21 1109   Offloading for Diabetic Foot Ulcers Back offloading shoe 05/26/21 2109   Wound Length (cm) 0.8 cm 05/27/21 1526   Wound Width (cm) 0.2 cm 05/27/21 1526   Wound Depth (cm) 0.1 cm 05/25/21 2133   Wound Surface Area (cm^2) 0.16 cm^2 05/27/21 1526   Change in Wound Size % (l*w) 86.67 05/27/21 1526   Wound Volume (cm^3) 0.12 cm^3 05/25/21 2133   Wound Assessment Purple/maroon 06/07/21 1109   Drainage Amount None 06/07/21 1109   Odor None 06/07/21 1109   Xena-wound Assessment Dry/flaky 06/06/21 2300   Number of days: 12       Assessment:    · Acute encephalopathy--multifactorial nasal  · Acute hypoxic respiratory failure probably secondary to aspiration pneumonia  · Aspiration both silent and with nectar thick  · Sacral decubitus ulcer--stage IV  · Recently positive blood cultures considered contaminant  · Paroxysmal atrial fibrillation  · Chronic systolic and diastolic congestive heart failure  · Coronary artery disease  · Valvular heart disease  · History of PE on Eliquis  · Chronic kidney disease  · Peripheral vascular disease  · Peripheral neuropathy  · Diabetes mellitus  · GERD  · Hypertension  · Hyperlipidemia  · History of skin cancer  · Depression anxiety  · Recent COVID-19 infection in November 2020      Plan:     · Patient does have multiple issues at play. She appear to be  debilitated due to recent COVID-19 infection in November 2020 along with recent hospitalization in May  · The patient does have evidence of aspiration with progressive weakness and esophageal dysfunction. I have discussed with the family placement of the PEG tube so she can  get her medication and  nutritional status  · X-ray does show evidence of right middle and lower lobe pneumonia. We will continue to antibiotic therapy infectious disease consult  · Discussed possible bronchoscopy  · Family has requested DNR CCA status which could change  · Continue to deal with chronic comorbidity  · Monitor blood sugar with IV dextrose infusion until PEG tube  · Continue pulmonary toiletry and suctioning and aerosol treatments      40 minutes of critical care time was spent with the patient. This includes chart review, , and discussion with those consultants involved in the patient's care. More than 50% of my  time was spent at the bedside counseling/coordinating care with the patient and/or family with face to face contact. This time was spent reviewing notes and laboratory data as well as instructing and counseling the patient. Time I spent with the family or surrogate(s) is included only if the patient was incapable of providing the necessary information or participating in medical decisions. I also discussed the differential diagnosis and all of the proposed management plans with the patient and individuals accompanying the patient.     Eleanor Kohli requires this high level of physician care and nursing on the IMC/Telemetry unit due the complexity of decision management and chance of rapid decline or death. Continued cardiac monitoring and higher level of nursing are required. I am readily available for any further decision-making and intervention.      Su Tosusaint DO, MEETAA.C.O.I.  6/7/2021  5:32 PM

## 2021-06-07 NOTE — PROGRESS NOTES
Room #:   5480/3778-78    Date: 2021       Patient Name: Tiara Ribera  : 1933      MRN: [id number]     Patient unavailable for physical therapy treatment due to hold per nursing due to patient pt too lethargic to participate in therapy at this time. Will attempt PT evaluation at a later time. Thank you.      Stephanie Grossman, PT

## 2021-06-07 NOTE — CARE COORDINATION
SOCIAL WORK / DISCHARGE PLANNING:  Resp panel pending. Sw met with daughters at bedside. They state they were in to speak with Dr Clinton Bocanegra and with Palliative Care. They have changed pt's code status but did not wish to pursue Hospice. Pt more lethargic today. Has been at home with 24/7 care and when medically stable, would return there again. Active with [facility], will need resume [facility] orders. Has chair lift at entrance to home and is trent dependent, discussed ambulance vs ambulette for dc transport. Currently would need ambulance due to lethargy and unable to sit unassisted.                    Electronically signed by JOSE ALEJANDRO Macias on 6/7/2021 at 3:24 PM

## 2021-06-07 NOTE — PROGRESS NOTES
ABG drawn x 1 from Right Radial. Patient had Normal Bill's Test.  Patient was on room air  at time of puncture. Pressure held for 5. No bleeding or bruising noted at puncture site. Patient tolerated procedure well.       Performed by Ladan Lin RCP

## 2021-06-07 NOTE — PROGRESS NOTES
Comprehensive Nutrition Assessment    Type and Reason for Visit:  Initial, Positive Nutrition Screen, Consult    Nutrition Recommendations/Plan: If within patient/family wishes recommend PEG placement and initiation of Glucerna 1.5 (Diabetic) @20 ml/hr advanced 10 ml/hr q 8 hr to goal rate of 40 ml/hr x 24 hr to provide: 960 mlTV, 1440 kcal, 79 gm protein, 729 mlFW. Pt will need 120 ml FW q 4 hr to meet fluid needs. Nutrition Assessment:  Pt admitted w/ HCAP, PMH of skin cancer, CHF, CKD, DM, and dysphagia. Pt also w/ multiple wounds noted. Oropharyngeal dysphagia w/ recommendation of NPO per SLP. Will provide EN recommendations for if needed and monitor while admitted. Malnutrition Assessment:  Malnutrition Status:  Severe malnutrition    Context:  Chronic Illness     Findings of the 6 clinical characteristics of malnutrition:  Energy Intake:  7 - 75% or less estimated energy requirements for 1 month or longer  Weight Loss:  7 - Greater than 7.5% over 3 months     Body Fat Loss:  1 - Mild body fat loss Orbital, Triceps   Muscle Mass Loss:  1 - Mild muscle mass loss Temples (temporalis), Clavicles (pectoralis & deltoids)  Fluid Accumulation:  No significant fluid accumulation     Strength:  Not Performed    Estimated Daily Nutrient Needs:  Energy (kcal):  1881-1352; Weight Used for Energy Requirements:  Current     Protein (g):  70-90 (1.4-1.8 gm/kg IBW);  Weight Used for Protein Requirements:  Ideal        Fluid (ml/day):  0046-1883; Method Used for Fluid Requirements:  1 ml/kcal      Nutrition Related Findings:  Pt oriented to person and situation, missing teeth, abd soft rounded tender, hypoactive BS, -1L I/O, trace BLE edema, hyperglycemia      Wounds:  Pressure Injury, Stage IV, Deep Tissue Injury, Open Wounds       Current Nutrition Therapies:    Diet NPO Exceptions are: Ice Chips    Anthropometric Measures:  · Height: 5' 2\" (157.5 cm)  · Current Body Weight: 147 lb 0.8 oz (66.7 kg) (6/6 no method)   · Admission Body Weight: 147 lb (66.7 kg) (6/6 actual)    · Usual Body Weight: 174 lb 3 oz (79 kg) (3/30/21 no edema per EMR)     · Ideal Body Weight: 110 lbs; % Ideal Body Weight 133.7 %   · BMI: 26.9  · Adjusted Body Weight: No Adjustment    · BMI Categories: Overweight (BMI 25.0-29. 9)       Nutrition Diagnosis:   · In context of chronic illness, Severe malnutrition related to swallowing difficulty as evidenced by poor intake prior to admission, mild muscle loss, mild loss of subcutaneous fat, weight loss 7.5% in 3 months      Nutrition Interventions:   Food and/or Nutrient Delivery:  Continue NPO, Start Tube Feeding (If within patient/family wishes recommend PEG placement and initiation of Glucerna 1.5 (Diabetic) @20 ml/hr advanced 10 ml/hr q 8 hr to goal rate of 40 ml/hr x 24 hr to provide: 960 mlTV, 1440 kcal, 79 gm protein, 729 mlFW. Pt will need 120 ml FW q 4 hr.)  Nutrition Education/Counseling:  Education not indicated   Coordination of Nutrition Care:  Continue to monitor while inpatient, Speech Therapy    Goals:  Nutrition Progression       Nutrition Monitoring and Evaluation:   Behavioral-Environmental Outcomes:  None Identified   Food/Nutrient Intake Outcomes:  Diet Advancement/Tolerance  Physical Signs/Symptoms Outcomes:  Biochemical Data, GI Status, Fluid Status or Edema, Nutrition Focused Physical Findings, Skin, Weight     Discharge Planning:     Too soon to determine     Electronically signed by Nova Fenton RD, LD on 6/7/21 at 4:45 PM EDT    Contact: [phone]

## 2021-06-07 NOTE — HOME CARE
Current with [facility] for SN, PT, OT, HHA. Will need CANDI orders prior to discharge if appropriate. Majo Andrews N, [facility].

## 2021-06-08 NOTE — FLOWSHEET NOTE
Inpatient Wound Care    Admit Date: 6/6/2021  9:35 AM    Reason for consult:  Skin care  Significant history:    Past Medical History:   Diagnosis Date    Anxiety     Depression     Diabetes mellitus ([facility]     Frequent urinary tract infections     GERD (gastroesophageal reflux disease)     Hypertension     Peripheral neuropathy     PVD (peripheral vascular disease) ([facility] 3/30/2021    Spinal stenosis        Wound history:      Findings:       06/07/21 2345   Wound 02/17/21 Coccyx #2   Date First Assessed/Time First Assessed: 02/17/21 1349   Wound Approximate Age at First Assessment (Weeks): 52 weeks  Primary Wound Type: Pressure Injury  Location: Coccyx  Wound Description (Comments): #2   Wound Etiology Pressure Stage  4   Wound Cleansed Cleansed with saline   Wound Length (cm) 1 cm   Wound Width (cm) 1 cm   Wound Surface Area (cm^2) 1 cm^2   Change in Wound Size % (l*w) -1.01   Distance Tunneling (cm) 4 cm   Tunneling Position ___ O'Clock 1200   Drainage Amount Small   Drainage Description Serosanguinous   Odor None   Christian Scale   Sensory Perceptions 2   Moisture 2   Activity 1   Mobility 1   Nutrition 1   Friction and Shear 1   Christian Scale Score 8   has less redness below wound      Impression:  Stage 4 pressure injury to coccyx      Interventions in place:     On dolphin bed      Plan:  Cont packing       Jessie Anand RN 6/8/2021 11:34 AM

## 2021-06-08 NOTE — PROGRESS NOTES
Family at bedside- stated that patient c/o chest pain and SOB- Patient moaning out- VS- EKG obtained- nitro x1 given. Dr. Lesley Monroe paged for update.

## 2021-06-08 NOTE — PROGRESS NOTES
Internal Medicine Progress Note    [facility]    [facility]. Shauna Andino, F[place] Melvin D.O., F[place] Barnhart D.O. Lisa Sanchez, MSN, APRN, NP-C  India Ramos. Teagan Lockett, MSN, APRN-CNP     Primary Care Physician: Kofi Rodriguez DO   Admitting Physician:  Peyman Pedro DO  Admission date and time: 6/6/2021  9:35 AM    Room:  [address] [place] [address]  Admitting diagnosis: HCAP (healthcare-associated pneumonia) [J18.9]    Patient Name: Sigrid Hess  MRN: [id number]    Date of Service: 6/8/2021     Subjective:  Isaiah Aponte is a 80 y.o. female who was seen and examined today,6/8/2021, at the bedside. Patient appear to be rather somnolent. Arousable but appear to be very weak. Extensive discussion with daughter present at the bedside. The daughter present is a registered nurse and has a very good understanding of the current situation we discussed the multiple invasive procedures being considered present including bronchoscopy, RONALD, and PEG tube. Presently she is deferring these after risk versus benefit discussion was held. She states that her mother is quite limited at baseline prior to this illness. Quality of life was the major focus of this discussion. We discussed maintaining current level of care for an additional 24 hours and revisiting this tomorrow, if mother is more alert and can answer for self will by her wishes and if not we will reevaluate goals of care moving forward. Review of System: Limited in review per family  Constitutional:   Significant weakness and fatigue which is acute on chronic  HEENT:   Denies ear pain, sore throat, sinus or eye problems. Cardiovascular:   Denies any chest pain, irregular heartbeats, or palpitations. Respiratory:   Persistent cough and shortness of breath  Gastrointestinal:   Denies nausea, vomiting, diarrhea, or constipation. Denies any abdominal pain.   Genitourinary:    Denies any urgency, frequency, hematuria. Voiding  without difficulty. Extremities:   Denies lower extremity swelling, edema or cyanosis. Neurology:    Declining neurological status  Psch:   Denies being anxious or depressed. Musculoskeletal:    Denies  myalgias, joint complaints or back pain. Integumentary:   Denies any rashes, ulcers, or excoriations. Denies bruising. Hematologic/Lymphatic:  Denies bruising or bleeding. Physical Exam:  I/O this shift: In: 48 [IV Piggyback:50]  Out: -     Intake/Output Summary (Last 24 hours) at 6/8/2021 1144  Last data filed at 6/8/2021 1135  Gross per 24 hour   Intake 583.75 ml   Output 1350 ml   Net -766.25 ml   I/O last 3 completed shifts: In: 533.8 [I.V.:457.5; IV Piggyback:76.3]  Out: 1350 [Urine:1350]  Patient Vitals for the past 96 hrs (Last 3 readings):   Weight   06/08/21 0017 148 lb (67.1 kg)   06/06/21 2304 147 lb 0.8 oz (66.7 kg)   06/06/21 0942 147 lb (66.7 kg)     Vital Signs:   Blood pressure (!) 144/61, pulse 96, temperature 98.2 °F (36.8 °C), temperature source Axillary, resp. rate 24, height 5' 2\" (1.575 m), weight 148 lb (67.1 kg), SpO2 96 %, not currently breastfeeding. General appearance:  Patient somnolent but arousable  Head:  Normocephalic. No masses, lesions or tenderness. Eyes:  PERRLA. EOMI. Sclera clear. Buccal mucosa moist.  ENT:  Ears normal. Mucosa normal.  Neck:    Supple. Trachea midline. No thyromegaly. No JVD. No bruits. Heart:    Rhythm regular. Rate controlled. 2/6 murmur murmurs. Lungs:    Scattered wheezing vs transmitted upper airway turbulence with cough. Diminished air exchange throughout. Abdomen:   Soft. Non-tender. Non-distended. Bowel sounds positive. No organomegaly or masses. No pain on palpation. Extremities:    Peripheral pulses present. No peripheral edema. No ulcers. No cyanosis. No clubbing. Neurologic:    Response of pain and loud verbal stimulation but does not maintain alertness.  Lethargic and generally weak without focal 3.7 05/26/2021     06/08/2021    CO2 22 06/08/2021    BUN 36 06/08/2021    CREATININE 0.9 06/08/2021    GFRAA >60 06/08/2021    LABGLOM 59 06/08/2021    GLUCOSE 247 06/08/2021    PROT 5.7 06/08/2021    LABALBU 1.8 06/08/2021    CALCIUM 8.9 06/08/2021    BILITOT 0.4 06/08/2021    ALKPHOS 101 06/08/2021    AST 30 06/08/2021    ALT 9 06/08/2021       Wound Documentation:   Wound 02/17/21 Coccyx #2 (Active)   Wound Image   06/07/21 1109   Wound Etiology Pressure Stage  4 06/07/21 1109   Dressing Status Clean;Dry; Intact 06/06/21 2300   Wound Cleansed Cleansed with saline 06/07/21 1109   Dressing/Treatment ABD; Other (comment) 06/06/21 2300   Dressing Change Due 05/29/21 05/28/21 1139   Wound Length (cm) 1 cm 06/07/21 1109   Wound Width (cm) 1 cm 06/07/21 1109   Wound Depth (cm) 1.7 cm 05/28/21 0200   Wound Surface Area (cm^2) 1 cm^2 06/07/21 1109   Change in Wound Size % (l*w) -1.01 06/07/21 1109   Wound Volume (cm^3) 2.04 cm^3 05/28/21 0200   Wound Healing % -9 05/28/21 0200   Distance Tunneling (cm) 4 cm 06/07/21 1109   Tunneling Position ___ O'Clock 1200 06/07/21 1109   Undermining Starts ___ O'Clock 12 05/27/21 1526   Undermining Ends___ O'Clock 4 05/27/21 1526   Undermining Maxium Distance (cm) [email or web address] 05/27/21 1526   Wound Assessment Pink/red 05/28/21 0200   Drainage Amount Small 06/07/21 1109   Drainage Description Serosanguinous 06/07/21 1109   Odor None 06/07/21 1109   Xena-wound Assessment Blanchable erythema 05/28/21 0200   Number of days: 110       Wound 05/25/21 Heel Left;Medial (Active)   Wound Image   05/27/21 1526   Wound Etiology Deep tissue/Injury 05/27/21 1526   Dressing Status Other (Comment) 05/26/21 2109   Dressing/Treatment Foam 06/07/21 1109   Offloading for Diabetic Foot Ulcers Back offloading shoe 05/26/21 2109   Wound Length (cm) 0.8 cm 05/27/21 1526   Wound Width (cm) 0.2 cm 05/27/21 1526   Wound Depth (cm) 0.1 cm 05/25/21 2133   Wound Surface Area (cm^2) 0.16 cm^2 05/27/21 1526   Change in Wound Size % (l*w) 86.67 05/27/21 1526   Wound Volume (cm^3) 0.12 cm^3 05/25/21 2133   Wound Assessment Purple/maroon 06/07/21 1109   Drainage Amount None 06/07/21 1109   Odor None 06/07/21 1109   Xena-wound Assessment Dry/flaky 06/06/21 2300   Number of days: 12       Assessment:    · Acute encephalopathy--multifactorial  · Acute hypoxic respiratory failure probably secondary to aspiration pneumonia  · Aspiration both silent and with nectar thick  · Sacral decubitus ulcer--stage IV  · Blood culture x1+ for MRSE with RONALD being considered  · Paroxysmal atrial fibrillation  · Chronic systolic and diastolic congestive heart failure  · Coronary artery disease  · Valvular heart disease  · History of PE on Eliquis  · Chronic kidney disease  · Peripheral vascular disease  · Peripheral neuropathy  · Diabetes mellitus  · GERD  · Hypertension  · Hyperlipidemia  · History of skin cancer  · Depression anxiety  · Recent COVID-19 infection in November 2020      Plan:   Darleen Patricia remains acutely ill. WBCs are trending downward with current treatment and hydration strategy and she is afebrile. Her daughter feels that she is marginally improved from yesterday however her current clinical condition would make moving forward with aggressive treatment very difficult. The patient is most likely aspirating and discussion regarding the risk versus benefits of PEG tube placement was held at bedside and after further discussion the patient daughter is presently deferring. We will reevaluate the situation tomorrow in the setting of improved clinical appearance to determine with the patient herself wishes to do moving forward. The same would be said for bronchoscopy and RONALD which are also being considered. Goals of care discussion was held as well and we will revisit this topic tomorrow. She is quite debilitated at baseline and we are unsure if prolonging her life will improve the quality of her life.  Dietary and wound care are following in the

## 2021-06-08 NOTE — PROGRESS NOTES
Physical Therapy    Physical Therapy Initial Evaluation/Plan of Care    Room #:  0635/0635-01  Patient Name: Yodit Awan  YOB: 1933  MRN: [id number]    Date of Service: 6/8/2021      Tentative placement recommendation: Home    Equipment recommendation: Patient has needed equipment       Evaluating Physical Therapist: Neva Toscano PT  #[id number]      Specific Provider Orders/Date/Referring Provider :  06/06/21 2315   PT eval and treat Start: 06/06/21 2315, End: 06/06/21 2315, ONE TIME, Standing Count: 1 Occurrences, R    Priyanka Albright, DO      Admitting Diagnosis:   HCAP (healthcare-associated pneumonia) [J18.9]  covid history, recetnly d/c from [facility] d/t pneumonia    Visit Diagnoses       Codes    Dehydration     E86.0    Pressure injury of sacral region, stage 4 ([address]     L89.154             Patient Active Problem List   Diagnosis    GERD (gastroesophageal reflux disease)    Type 2 diabetes mellitus with hyperglycemia, with long-term current use of insulin ([address]    CKD (chronic kidney disease) stage 3, GFR 30-59 ml/min ([address]    Essential hypertension    History of pulmonary embolus (PE)    Acute kidney injury superimposed on CKD ([address]    Acute on chronic diastolic congestive heart failure ([address]    Obesity (BMI 30-39. 9)    COVID-19    Anxiety and depression    Pneumonia due to COVID-19 virus    Uncontrolled type 2 diabetes mellitus with hyperglycemia (HCC)    Sacral wound    Pressure injury of sacral region, stage 3 (HCC)    Pressure ulcer of right heel, stage 2 (HCC)    Cellulitis    Controlled type 2 diabetes mellitus without complication (HCC)    Sepsis (HCC)    Acute metabolic encephalopathy    Severe protein-calorie malnutrition (HCC)    PVD (peripheral vascular disease) (HCC)    Positive blood culture    Altered mental status    Fluid overload    Anemia of chronic disease    Paroxysmal atrial fibrillation (HCC)    Cardiomyopathy ([address]    Nonrheumatic aortic valve insufficiency    EKG, abnormal    Pleural effusion on right    Morbidly obese (HCC)    Acute respiratory failure with hypoxia (HCC)    Pneumonia    Pulmonary embolism (HCC)    [facility]est[facility] failure) [facility]    HCAP (healthcare-associated pneumonia)        ASSESSMENT of Current Deficits Patient exhibits decreased endurance and range of motion impairing functional mobility, tolerance to activity and participation are barriers to d/c and require skilled intervention during hospital stay    ; pt exhibits impaired flexibility, difficulty mobilizing secretions and skin breakdown    PHYSICAL THERAPY  PLAN OF CARE       Physical therapy plan of care is established based on physician order,  patient diagnosis and clinical assessment    Current Treatment Recommendations:    -Bed Mobility: Lower extremity exercises , Upper extremity exercises , Trunk control activities  and Partial practice to incorporate into full roll to assist with ventilation, skin protection and increase flexibility in shoulders and bilateral lower extremities     PT long term treatment goals are located in below grid    Patient and or family understand(s) diagnosis, prognosis, and plan of care. Frequency of treatments: Patient will be seen  3-5 times/week. Prior Level of Function: Patient non ambulatory 6-8 months;able to assist with rolling and positioning  Rehab Potential: fair    for baseline    Past medical history:   Past Medical History:   Diagnosis Date    Anxiety     Depression     Diabetes mellitus ([facility]     Frequent urinary tract infections     GERD (gastroesophageal reflux disease)     Hypertension     Peripheral neuropathy     PVD (peripheral vascular disease) ([facility] 3/30/2021    Spinal stenosis      Past Surgical History:   Procedure Laterality Date    CHOLECYSTECTOMY      HIP FRACTURE SURGERY Bilateral     SKIN CANCER EXCISION         SUBJECTIVE:    Precautions:  Up with assistance, falls, alarm and Endurance: poor         Patient education  Patient educated on role of Physical Therapy, risks of immobility, safety and plan of care and  importance of mobility while in hospital       Patient response to education:   Pt verbalized understanding Pt demonstrated skill Pt requires further education in this area   Yes Partial Yes      Treatment:  Patient practiced and was instructed/facilitated in the following treatment:     a/a rom all joints/planes and extremities x 10 reps         At end of session, patient in bed with alarm call light and phone within reach,   all lines and tubes intact, nursing notified. Patient would benefit from continued skilled Physical Therapy to improve functional independence and quality of life. Patient's/ family goals   home with family support      Time in  80  Time out  1126    Total Treatment Time  0 minutes    Evaluation time includes thorough review of current medical information, gathering information on past medical history/social history and prior level of function, completion of standardized testing/informal observation of tasks, assessment of data, and development of Plan of care and goals.      CPT codes:  Low Complexity PT evaluation (16400)  No treatment    Americo Allen PT

## 2021-06-08 NOTE — CARE COORDINATION
SOCIAL WORK / DISCHARGE PLANNING:  COVID neg via resp panel 6/7. Pt resides at home with 24 hr care provided by family and aides. Has all necessary dme of hospital bed, trent lift, chair lift to entrance of home. Currently due to [facility], may need ambulance for transport home. Active with [facility], will need resume [address] orders at [place].                Electronically signed by JOSE ALEJANDRO Schwartz on 6/8/2021 at 1:57 PM

## 2021-06-08 NOTE — PROGRESS NOTES
Perfect served for PICC Placement, noted consent not signed by Doctor, RN was unaware. Will check back Wednesday. CONSTITUTIONAL: Well-developed; well-nourished; in no acute distress, nontoxic appearing  SKIN: skin exam is warm and dry,  HEAD: Normocephalic; atraumatic.  EYES: PERRL, 3 mm bilateral, no nystagmus, EOM intact; conjunctiva and sclera clear.  ENT: MMM, no nasal congestion  NECK: Supple; non tender.+ full passive ROM in all directions. No JVD  CARD: S1, S2 normal, no murmur  RESP: No wheezes, rales or rhonchi. Good air movement bilaterally  ABD: soft; non-distended; non-tender. No Rebound, No guarding  EXT: Normal ROM. No cyanosis or edema. Dp Pulses intact.   BACK: + minimal tenderness, to palpation along right upper back/ scalpular area, no e/e/e, no breaks in skin, no spinal tenderness  NEURO: awake, alert, following commands, oriented, grossly unremarkable. No Focal deficits. GCS 15.   PSYCH: Cooperative, appropriate.

## 2021-06-08 NOTE — CONSULTS
1 Brian Munoz MD    Patient's Name/Date of Birth: Stephanie Koch / 6/29/1933    Date: June 8, 2021     Consulting Surgeon: Radhika Almonte MD     PCP: Christiana Hess DO     Chief Complaint: Dysphagia, Sacral decubitus wound    HPI:   Stephanie Koch is a 80 y.o. female who presents for evaluation of dysphagia and sacral wound. She was admitted with progressive weakness and debilitation since Covid infection in November 2020 and recent hospitalization. She was found to have pneumonia, change in mental status and evidence of aspiration. Surgery was consulted for placement of PEG tube. The patient has a sacral decubitus wound that her granddaughter reports has been present for a while. It is currently being managed with local wound care. I was asked to evaluate this as well. HPI obtained from granddaughter at bedside and EMR. Patient Active Problem List   Diagnosis    GERD (gastroesophageal reflux disease)    Type 2 diabetes mellitus with hyperglycemia, with long-term current use of insulin [facility]    CKD (chronic kidney disease) stage 3, GFR 30-59 ml/min ([facility])    Essential hypertension    History of pulmonary embolus (PE)    Acute kidney injury superimposed on CKD ([address]    Acute on chronic diastolic congestive heart failure (HCC)    Obesity (BMI 30-39. 9)    COVID-19    Anxiety and depression    Pneumonia due to COVID-19 virus    Uncontrolled type 2 diabetes mellitus with hyperglycemia ([address]    Sacral wound    Pressure injury of sacral region, stage 3 (HCC)    Pressure ulcer of right heel, stage 2 (HCC)    Cellulitis    Controlled type 2 diabetes mellitus without complication (HCC)    Sepsis (HCC)    Acute metabolic encephalopathy    Severe protein-calorie malnutrition (HCC)    PVD (peripheral vascular disease) ([facility]    Positive blood culture    Altered mental status    Fluid overload    Anemia of chronic disease    Paroxysmal atrial fibrillation (HCC)    Cardiomyopathy ([facility]    Nonrheumatic aortic valve insufficiency    EKG, abnormal    Pleural effusion on right    Morbidly obese (HCC)    Acute respiratory failure with hypoxia (HCC)    Pneumonia    Pulmonary embolism (HCC)    CHF (congestive heart failure) ([facility]    HCAP (healthcare-associated pneumonia)       No Known Allergies    Past Medical History:   Diagnosis Date    Anxiety     Depression     Diabetes mellitus ([facility]     Frequent urinary tract infections     GERD (gastroesophageal reflux disease)     Hypertension     Peripheral neuropathy     PVD (peripheral vascular disease) ([facility] 3/30/2021    Spinal stenosis        Past Surgical History:   Procedure Laterality Date    CHOLECYSTECTOMY      HIP FRACTURE SURGERY Bilateral     SKIN CANCER EXCISION         Social History     Socioeconomic History    Marital status:      Spouse name: Not on file    Number of children: Not on file    Years of education: Not on file    Highest education level: Not on file   Occupational History    Not on file   Tobacco Use    Smoking status: Never Smoker    Smokeless tobacco: Never Used   Vaping Use    Vaping Use: Never used   Substance and Sexual Activity    Alcohol use: No    Drug use: No    Sexual activity: Not Currently   Other Topics Concern    Not on file   Social History Narrative    Not on file     Social Determinants of Health     Financial Resource Strain:     Difficulty of Paying Living Expenses:    Food Insecurity:     Worried About Running Out of Food in the Last Year:     [address] in the Last Year:    Transportation Needs:     Lack of Transportation (Medical):      Lack of Transportation (Non-Medical):    Physical Activity:     Days of Exercise per Week:     Minutes of Exercise per Session:    Stress:     Feeling of Stress :    Social Connections:     Frequency of Communication with Friends and Family:     Frequency of Social Gatherings with Friends and Family:     Attends Mu-ism Services:     Active Member of Clubs or Organizations:     Attends Club or Organization Meetings:     Marital Status:    Intimate Partner Violence:     Fear of Current or Ex-Partner:     Emotionally Abused:     Physically Abused:     Sexually Abused: The patient has a family history that is negative for severe cardiovascular or respiratory issues, negative for reaction to anesthesia. Recent Labs     06/06/21  1036 06/07/21  0536 06/08/21  0532   WBC 9.5 17.0* 11.6*   HGB 15.2 10.2* 9.3*   HCT 49.3* 32.7* 30.4*   MCV 93.0 93.7 95.0    288 250       Recent Labs     06/06/21  1036 06/07/21  0536 06/08/21  0532    145 142   K 4.5 3.9 4.0   CO2 25 25 22   PHOS  --  2.5  --    BUN 69* 49* 36*   CREATININE 1.2* 0.9 0.9       Recent Labs     06/06/21  1036 06/07/21  0536 06/08/21  0532   PROT 7.1 6.4 5.7*         Intake/Output Summary (Last 24 hours) at 6/8/2021 0843  Last data filed at 6/8/2021 9007  Gross per 24 hour   Intake 533.75 ml   Output 1350 ml   Net -816.25 ml       I have reviewed relevant labs from this admission and interpretation is included in my assessment and plan      Review of Systems  A complete 10 system review was performed and are otherwise negative unless mentioned in the above HPI. Specific negatives are listed below but may not include all those reviewed.     General ROS: negative obtundation, AMS  ENT ROS: negative rhinorrhea, epistaxis  Allergy and Immunology ROS: negative itchy/watery eyes or nasal congestion  Hematological and Lymphatic ROS: negative spontaneous bleeding or bruising  Endocrine ROS: negative  lethargy, mood swings, palpitations or polydipsia/polyuria  Respiratory ROS: negative sputum changes, stridor, tachypnea or wheezing  Cardiovascular ROS: negative for - loss of consciousness, murmur or orthopnea  Gastrointestinal ROS: negative for - hematochezia or hematemesis  Genito-Urinary ROS: negative for -  genital discharge or hematuria  Musculoskeletal ROS: negative for - focal weakness, gangrene  Psych/Neuro ROS: negative for - visual or auditory hallucinations, suicidal ideation      Physical exam:   BP (!) 144/61   Pulse 96   Temp 98.2 °F (36.8 °C) (Axillary)   Resp 24   Ht 5' 2\" (1.575 m)   Wt 148 lb (67.1 kg)   LMP  (LMP Unknown)   SpO2 96%   BMI 27.07 kg/m²   General appearance:  NAD, appears stated age  Head: NCAT, PERRLA, EOMI, red conjunctiva  Neck: supple, no masses, trachea midline  Lungs: Equal chest rise bilateral, no retractions, no wheezing  Heart: Reg rate  Abdomen: soft, nontender, non distended. Skin; Sacrum with 1cm round sacral decubitus wound stage IV. No signs of infection. No significant necrosis  Gu: no cva tenderness  Extremities: atraumatic, no focal motor deficits, no open wounds  Psych: No tremor, visual hallucinations    Pathology: n/a    Radiology: n/a    Assessment:  Beba Holly is a 80 y.o. female with oropharyngeal dysphagia, moderate protein calorie malnutrition, Stage IV sacral decubitus wound    Patient Active Problem List   Diagnosis    GERD (gastroesophageal reflux disease)    Type 2 diabetes mellitus with hyperglycemia, with long-term current use of insulin ([facility]    CKD (chronic kidney disease) stage 3, GFR 30-59 ml/min ([facility])    Essential hypertension    History of pulmonary embolus (PE)    Acute kidney injury superimposed on CKD [facility]    Acute on chronic diastolic congestive heart failure (HCC)    Obesity (BMI 30-39. 9)    COVID-19    Anxiety and depression    Pneumonia due to COVID-19 virus    Uncontrolled type 2 diabetes mellitus with hyperglycemia (HCC)    Sacral wound    Pressure injury of sacral region, stage 3 (HCC)    Pressure ulcer of right heel, stage 2 (HCC)    Cellulitis    Controlled type 2 diabetes mellitus without complication (HCC)    Sepsis (HCC)    Acute metabolic encephalopathy    Severe protein-calorie malnutrition

## 2021-06-08 NOTE — PROGRESS NOTES
[facility]  [facility]  Progress Note    NAME: Stella Roa  MR:  [id number]  :   1933  DATE OF SERVICE:21    This is a face to face encounter with Stella Roa 80 y.o. female on 21    CHIEF COMPLAINT     ID following for   Chief Complaint   Patient presents with    Altered Mental Status     recently dx of pneumonia     HISTORY OF PRESENT ILLNESS   Pt came in with sob has known sacral ulcer  Daughter present and updated   Pt going for echo   In bed no c/o   Patient is tolerating medications. No reported adverse drug reactions. REVIEW OF SYSTEMS     As stated above in the chief complaint, otherwise negative.   CURRENT MEDICATIONS      metoprolol  2.5 mg Intravenous Q6H    acetylcysteine  4 mL Inhalation Q4H    insulin lispro  0-18 Units Subcutaneous TID WC    insulin lispro  0-9 Units Subcutaneous Nightly    vancomycin  1,000 mg Intravenous Q24H    pantoprazole  40 mg Intravenous Daily    And    sodium chloride (PF)  10 mL Intravenous Daily    lidocaine PF  5 mL Intradermal Once    sodium chloride flush  5-40 mL Intravenous 2 times per day    heparin flush  3 mL Intravenous 2 times per day    ipratropium-albuterol  1 ampule Inhalation Q4H    [Held by provider] aspirin  81 mg Oral Daily    atorvastatin  40 mg Oral Nightly    [Held by provider] carvedilol  12.5 mg Oral BID WC    docusate sodium  200 mg Oral BID    isosorbide mononitrate  30 mg Oral Daily    montelukast  10 mg Oral Nightly    sertraline  150 mg Oral Daily    miconazole   Topical BID    lactobacillus  1 capsule Oral Daily    cefepime  2,000 mg Intravenous Q12H    sodium chloride  25 mL Intravenous Q12H     Continuous Infusions:   dextrose 5 % and 0.45 % NaCl 75 mL/hr at 21 1331    sodium chloride      dextrose      sodium chloride       PRN Meds:sodium chloride flush, sodium chloride, heparin flush, perflutren lipid microspheres, sodium chloride flush, bisacodyl, nitroGLYCERIN, glucose, dextrose, glucagon (rDNA), dextrose, sodium chloride flush, sodium chloride, polyethylene glycol, acetaminophen **OR** acetaminophen    PHYSICAL EXAM     BP (!) 126/52   Pulse 88   Temp 98.2 °F (36.8 °C) (Axillary)   Resp 24   Ht 5' 2\" (1.575 m)   Wt 148 lb (67.1 kg)   LMP  (LMP Unknown)   SpO2 96%   BMI 27.07 kg/m²   Temp  Av.7 °F (37.1 °C)  Min: 97.2 °F (36.2 °C)  Max: 100.6 °F (38.1 °C)  Constitutional:  The patient is awak  pale  Skin:    Warm and dry. No rashes were noted. HEENT:   Round and reactive pupils. AT/NC    Chest:   No use of accessory muscles to breathe. Symmetrical expansion. Cardiovascular:  S1 and S2 are rhythmic and regular. No murmurs appreciated. Abdomen:   Positive bowel sounds to auscultation. Benign to palpation. Extremities:   No clubbing, no cyanosis, edema.   CNS    AAxO   Lines: piv      DIAGNOSTIC RESULTS   Radiology:    Recent Labs     21  1036 21  0536 21  0532   WBC 9.5 17.0* 11.6*   RBC 5.30 3.49* 3.20*   HGB 15.2 10.2* 9.3*   HCT 49.3* 32.7* 30.4*   MCV 93.0 93.7 95.0   MCH 28.7 29.2 29.1   MCHC 30.8* 31.2* 30.6*   RDW 20.2* 19.5* 19.7*    288 250   MPV 9.1 9.3 9.7     Recent Labs     21  1036 21  0536 21  0532    145 142   K 4.5 3.9 4.0    111* 111*   CO2 25 25 22   BUN 69* 49* 36*   CREATININE 1.2* 0.9 0.9   GLUCOSE 284* 236* 247*   PROT 7.1 6.4 5.7*   LABALBU 3.0* 2.6* 1.8*   CALCIUM 9.6 9.3 8.9   BILITOT 0.5 0.5 0.4   ALKPHOS 120* 120* 101   AST 14 25 30   ALT 10 9 9     Lab Results   Component Value Date    CRP 3.9 (H) 2021    CRP 3.8 (H) 2021    CRP 18.4 (H) 2021     Lab Results   Component Value Date    SEDRATE 65 (H) 2021    SEDRATE 76 (H) 2021    SEDRATE 97 (H) 2021     Recent Labs     21  1036 21  0536 21  1353 21  1655 21  0532   PROCAL  --  0.24* 0.21* 0.25*  --    AST 14 25  --   --  30   ALT 10 9  --   -- 9     Lab Results   Component Value Date    CHOL 83 11/17/2020    TRIG 87 11/17/2020    HDL 38 11/17/2020    LDLCALC 28 11/17/2020    LABVLDL 17 11/17/2020     No results found for: VITD25    Microbiology:   Recent Labs     06/07/21  0830   COVID19 Not Detected     Lab Results   Component Value Date    [facility]  06/06/2021     Gram stain performed from blood culture bottle media  Gram positive cocci in clusters      [facility] Identification to follow 06/06/2021    BC 5 Days no growth 04/05/2021    BLOODCULT2 24 Hours no growth 06/06/2021    BLOODCULT2 5 Days no growth 04/05/2021    BLOODCULT2  03/29/2021     This organism was isolated in one set. Susceptibility testing is not routinely done as this  organism frequently represents skin contamination. Additional testing can be ordered by calling the  Microbiology Department. ORG Staphylococcus species 06/06/2021    ORG Alpha Strep, not pneumococcus 05/25/2021    ORG Staphylococcus coagulase-negative 05/25/2021     WOUND/ABSCESS   Date Value Ref Range Status   06/07/2021   Preliminary    Growth present, evaluating for:  Mixed Gram positive organisms     05/25/2021 Moderate growth  Final   05/25/2021 Light growth  Final        Recent Labs     06/06/21  1050   ORG Staphylococcus species*     Recent Labs     06/06/21  1050 06/07/21  1200   WNDABS  --  Growth present, evaluating for:  Mixed Gram positive organisms     ORG Staphylococcus species*  --      Recent Labs     06/06/21  1050   ORG Staphylococcus species*            FINAL IMPRESSION    Leukocytosis  Recurrent cons bactermeia   ?aspiration        vancomycin (VANCOCIN) 1,000 mg in dextrose 5 % 250 mL IVPB, Q24H     miconazole (MICOTIN) 2 % powder, BID  lactobacillus (CULTURELLE) capsule 1 capsule, Daily  cefepime (MAXIPIME) 2000 mg IVPB minibag, Q12H     Await cx  May need to eval for IE    · Monitor labs    Imaging and labs were reviewed per medical records.    The patient was educated about the diagnosis, prognosis,

## 2021-06-08 NOTE — PROGRESS NOTES
Date:2021  Patient Name: Zeynep Mike  MRN: [id number]  : 1933  ROOM #: [id number]    Occupational Therapy order received, chart reviewed and evaluation attempted this date. Patient is unavailable for OT evaluation due to being off the unit for a test (echo). Will attempt OT evaluation at a later time. Thank you.    Deshaun Sofia OTR/L #[id number]

## 2021-06-08 NOTE — ANESTHESIA PRE PROCEDURE
Department of Anesthesiology  Preprocedure Note       Name:  Kassy Araujo   Age:  80 y.o.  :  1933                                          MRN:  [id number]         Date:  2021      Surgeon: Sparkle Worley):  Chandrika Oliveros MD    Procedure: Procedure(s):  EGD ESOPHAGOGASTRODUODENOSCOPY PEG TUBE INSERTION    Medications prior to admission:   Prior to Admission medications    Medication Sig Start Date End Date Taking? Authorizing Provider   aspirin 81 MG EC tablet Take 81 mg by mouth daily   Yes Historical Provider, MD   ondansetron (ZOFRAN-ODT) 4 MG disintegrating tablet Take 4 mg by mouth every 12 hours as needed for Nausea or Vomiting   Yes Historical Provider, MD   sertraline (ZOLOFT) 100 MG tablet Take 150 mg by mouth daily   Yes Historical Provider, MD ASHFORDID-19 mRNA Vacc, Moderna, 100 MCG/0.5ML SUSP injection Inject 0.5 mLs into the muscle once First Injection: 2021   Yes Historical Provider, MD   lisinopril (PRINIVIL;ZESTRIL) 2.5 MG tablet Take 1 tablet by mouth daily 21  Yes Lilli Zabala MD   bumetanide (BUMEX) 0.5 MG tablet Take 1 tablet by mouth daily 21  Yes Lilli Zabala MD   budesonide-formoterol [facility]) 160-4.5 MCG/ACT AERO Inhale 2 puffs into the lungs 2 times daily 21  Yes Charissa Butler Catterlin, DO   guaiFENesin-codeine (TUSSI-ORGANIDIN NR) 100-10 MG/5ML syrup Take 5 mLs by mouth 4 times daily as needed for Cough for up to 7 days. 21 Yes Charissangozi Butler Catterlin, DO   apixaban (ELIQUIS) 5 MG TABS tablet Take 1 tablet by mouth 2 times daily 21  Yes Andreina Perez MD   gabapentin (NEURONTIN) 300 MG capsule Take 1 capsule by mouth 3 times daily for 90 days.  Intended supply: 90 days 21 Yes Charissa Carrie Catterlin, DO   famotidine (PEPCID) 20 MG tablet Take 1 tablet by mouth 2 times daily 21  Yes Charissa Fraise Catterlin, DO   docusate sodium (COLACE, DULCOLAX) 100 MG CAPS Take 200 mg by mouth 2 times daily 21  Yes Andreina Perez MD   bisacodyl (DULCOLAX) 10 MG suppository Place 10 mg rectally daily as needed for Constipation   Yes Historical Provider, MD   insulin lispro, 1 Unit Dial, (HUMALOG KWIKPEN) 100 UNIT/ML SOPN Inject 0-10 Units into the skin 3 times daily (before meals)   Yes Historical Provider, MD   diclofenac sodium (VOLTAREN) 1 % GEL Apply 2 g topically 4 times daily as needed for Pain (apply to hands)   Yes Historical Provider, MD   montelukast (SINGULAIR) 10 MG tablet Take 1 tablet by mouth nightly 3/12/21  Yes Nani Lane DO   atorvastatin (LIPITOR) 40 MG tablet Take 1 tablet by mouth nightly 1/21/21  Yes Kofi Rodriguez MD   carvedilol (COREG) 12.5 MG tablet Take 1 tablet by mouth 2 times daily (with meals) 1/21/21  Yes Kofi Rodriguez MD   isosorbide mononitrate (IMDUR) 30 MG extended release tablet Take 1 tablet by mouth daily 1/14/21  Yes Nani Lane DO   lansoprazole (PREVACID) 30 MG delayed release capsule Take 1 capsule by mouth daily 1/14/21  Yes Nani Lane DO   ipratropium-albuterol (DUONEB) 0.5-2.5 (3) MG/3ML SOLN nebulizer solution Take 3 mLs by nebulization every 4 hours as needed for Shortness of Breath 12/15/20  Yes Nani Lane DO   Calcium Carb-Cholecalciferol (CALTRATE 600+D3) 600-800 MG-UNIT TABS Take 1 tablet by mouth 2 times daily   Yes Historical Provider, MD   nystatin (MYCOSTATIN) 800607 UNIT/GM powder Apply 1 each topically 2 times daily Apply to abdominal folds   Yes Historical Provider, MD   Omega-3 Fatty Acids (FISH OIL) 1000 MG CAPS Take 1,000 mg by mouth 2 times daily   Yes Historical Provider, MD   acetaminophen (TYLENOL) 325 MG tablet Take 650 mg by mouth nightly    Yes Historical Provider, MD   mineral oil-hydrophilic petrolatum (HYDROPHOR) ointment Apply topically daily Apply to both feet   Yes Historical Provider, MD   acetylcysteine (MUCOMYST) 20 % nebulizer solution Inhale 3 mLs into the lungs three times daily 5/28/21   Diaz Kyle MD   nitroGLYCERIN (NITROSTAT) 0.4 MG SL tablet Place 0.4 mg under the tongue every 5 minutes as needed for Chest pain    Historical Provider, MD   fluticasone (FLONASE) 50 MCG/ACT nasal spray 1 spray by Nasal route daily as needed for Rhinitis     Historical Provider, MD       Current medications:    Current Facility-Administered Medications   Medication Dose Route Frequency Provider Last Rate Last Admin    metoprolol (LOPRESSOR) injection 2.5 mg  2.5 mg Intravenous Q6H Trinna Pennant, DO   2.5 mg at 06/08/21 1729    acetylcysteine (MUCOMYST) 10 % solution 400 mg  4 mL Inhalation Q4H Ismail U Brianne, DO   400 mg at 06/08/21 1629    insulin lispro (HUMALOG) injection vial 0-18 Units  0-18 Units Subcutaneous TID  Waylon Barrera DO   12 Units at 06/08/21 1149    insulin lispro (HUMALOG) injection vial 0-9 Units  0-9 Units Subcutaneous Nightly Tonyangel Barrera, DO   2 Units at 06/07/21 2225    vancomycin (VANCOCIN) 1,000 mg in dextrose 5 % 250 mL IVPB  1,000 mg Intravenous Q24H Ang Brush, DO   Stopped at 06/07/21 2236    dextrose 5 % and 0.45 % sodium chloride infusion   Intravenous Continuous Tony Stephon Barrera DO 75 mL/hr at 06/08/21 1724 New Bag at 06/08/21 1724    pantoprazole (PROTONIX) injection 40 mg  40 mg Intravenous Daily Waylon Barrera DO   40 mg at 06/08/21 1014    And    sodium chloride (PF) 0.9 % injection 10 mL  10 mL Intravenous Daily Waylon Barrera DO   10 mL at 06/08/21 1017    lidocaine PF 1 % injection 5 mL  5 mL Intradermal Once Waylon Barrera DO        sodium chloride flush 0.9 % injection 5-40 mL  5-40 mL Intravenous 2 times per day Tonyangel Barrera DO        sodium chloride flush 0.9 % injection 5-40 mL  5-40 mL Intravenous PRN Waylon Barrera DO        0.9 % sodium chloride infusion  25 mL Intravenous PRN Waylon Barrera DO        heparin flush 100 UNIT/ML injection 300 Units  3 mL Intravenous 2 times per day Waylon Barrera DO        heparin flush 100 UNIT/ML injection 300 Units  3 mL Intracatheter PRN Jordan Barrera, DO        perflutren lipid microspheres (DEFINITY) injection 1.65 mg  1.5 mL Intravenous ONCE PRN Jordan Barrera, DO        ipratropium-albuterol (DUONEB) nebulizer solution 1 ampule  1 ampule Inhalation Q4H Waylon Barrera DO   1 ampule at 06/08/21 1629    sodium chloride flush 0.9 % injection 10 mL  10 mL Intravenous PRN Radha Luria, DO        [Held by provider] aspirin EC tablet 81 mg  81 mg Oral Daily Radha Luria, DO        atorvastatin (LIPITOR) tablet 40 mg  40 mg Oral Nightly Radha Luria, DO   40 mg at 06/07/21 0012    bisacodyl (DULCOLAX) suppository 10 mg  10 mg Rectal Daily PRN Radha Luria, DO        [Held by provider] carvedilol (COREG) tablet 12.5 mg  12.5 mg Oral BID WC Radha Luria, DO        docusate sodium (COLACE) capsule 200 mg  200 mg Oral BID Radha Luria, DO   200 mg at 06/07/21 0012    isosorbide mononitrate (IMDUR) extended release tablet 30 mg  30 mg Oral Daily Radha Luria, DO        montelukast (SINGULAIR) tablet 10 mg  10 mg Oral Nightly Radha Luria, DO   10 mg at 06/07/21 0012    nitroGLYCERIN (NITROSTAT) SL tablet 0.4 mg  0.4 mg Sublingual Q5 Min PRN Radha Luria, DO   0.4 mg at 06/08/21 0749    sertraline (ZOLOFT) tablet 150 mg  150 mg Oral Daily Radha Luria, DO        miconazole (MICOTIN) 2 % powder   Topical BID Radha Luria, DO   Given at 06/08/21 1018    lactobacillus (CULTURELLE) capsule 1 capsule  1 capsule Oral Daily Radha Luria, DO        cefepime (MAXIPIME) 2000 mg IVPB minibag  2,000 mg Intravenous Q12H Radha Luria, DO 12.5 mL/hr at 06/08/21 1726 2,000 mg at 06/08/21 1726    glucose (GLUTOSE) 40 % oral gel 15 g  15 g Oral PRN Radha Luria, DO        dextrose 50 % IV solution  12.5 g Intravenous PRN Radha Edmondson DO        glucagon (rDNA) injection 1 mg  1 mg Intramuscular PRN Radha Edmondson,         dextrose 5 % solution  100 mL/hr Intravenous PRN Radha Edmondson,   sodium chloride flush 0.9 % injection 5-40 mL  5-40 mL Intravenous PRN Stanton Brannon, DO        0.9 % sodium chloride infusion  25 mL Intravenous PRN Stanton Brannon, DO        polyethylene glycol (GLYCOLAX) packet 17 g  17 g Oral Daily PRN Stanton Brannon, DO        acetaminophen (TYLENOL) tablet 650 mg  650 mg Oral Q6H PRN Stanton Brannon, DO        Or    acetaminophen (TYLENOL) suppository 650 mg  650 mg Rectal Q6H PRN Stanton Brannon, DO   650 mg at 06/08/21 1604    0.9 % sodium chloride bolus  25 mL Intravenous Q12H Stanton Brannon, DO   Stopped at 06/08/21 6751       Allergies:  No Known Allergies    Problem List:    Patient Active Problem List   Diagnosis Code    GERD (gastroesophageal reflux disease) K21.9    Type 2 diabetes mellitus with hyperglycemia, with long-term current use of insulin ([facility]) E11.65, Z79.4    CKD (chronic kidney disease) stage 3, GFR 30-59 ml/min ([facility]) N18.30    Essential hypertension I10    History of pulmonary embolus (PE) Z86.711    Acute kidney injury superimposed on CKD ([facility]) N17.9, N18.9    Acute on chronic diastolic congestive heart failure ([facility]) I50.33    Obesity (BMI 30-39. 9) E66.9    COVID-19 U07.1    Anxiety and depression F41.9, F32.9    Pneumonia due to COVID-19 virus U07.1, J12.82    Uncontrolled type 2 diabetes mellitus with hyperglycemia ([facility]) E11.65    Sacral wound S31.000A    Pressure injury of sacral region, stage 3 ([facility]) L89.153    Pressure ulcer of right heel, stage 2 ([facility]) L89.612    Cellulitis L03.90    Controlled type 2 diabetes mellitus without complication ([facility]) B03.0    Sepsis ([facility]) A41.9    Acute metabolic encephalopathy G00.18    Severe protein-calorie malnutrition ([facility]) E43    PVD (peripheral vascular disease) ([facility]) I73.9    Positive blood culture R78.81    Altered mental status R41.82    Fluid overload E87.70    Anemia of chronic disease D63.8    Paroxysmal atrial fibrillation ([facility]) I48.0    Cardiomyopathy ([facility]) I42.9  Nonrheumatic aortic valve insufficiency I35.1    EKG, abnormal R94.31    Pleural effusion on right J90    Morbidly obese ([facility]) E66.01    Acute respiratory failure with hypoxia ([facility]) J96.01    Pneumonia J18.9    Pulmonary embolism ([facility]) I26.99    CHF (congestive heart failure) ([facility]) I50.9    HCAP (healthcare-associated pneumonia) J18.9       Past Medical History:        Diagnosis Date    Anxiety     Depression     Diabetes mellitus ([facility])     Frequent urinary tract infections     GERD (gastroesophageal reflux disease)     Hypertension     Peripheral neuropathy     PVD (peripheral vascular disease) ([facility] [address] 3/30/2021    Spinal stenosis        Past Surgical History:        Procedure Laterality Date    CHOLECYSTECTOMY      HIP FRACTURE SURGERY Bilateral     SKIN CANCER EXCISION         Social History:    Social History     Tobacco Use    Smoking status: Never Smoker    Smokeless tobacco: Never Used   Substance Use Topics    Alcohol use: No                                Counseling given: Not Answered      Vital Signs (Current):   Vitals:    06/08/21 0745 06/08/21 1215 06/08/21 1545 06/08/21 1715   BP: (!) 144/61 (!) 126/52 (!) 131/54 (!) 102/51   Pulse: 96 88 88 83   Resp:   18    Temp:   98.3 °F (36.8 °C)    TempSrc:   Axillary    SpO2:   100%    Weight:       Height:                                                  BP Readings from Last 3 Encounters:   06/08/21 (!) 102/51   06/01/21 112/62   06/01/21 (!) 108/54       NPO Status:                                                                                 BMI:   Wt Readings from Last 3 Encounters:   06/08/21 148 lb (67.1 kg)   06/01/21 150 lb (68 kg)   06/01/21 150 lb (68 kg)     Body mass index is 27.07 kg/m².     CBC:   Lab Results   Component Value Date    WBC 11.6 06/08/2021    RBC 3.20 06/08/2021    HGB 9.3 06/08/2021    HCT 30.4 06/08/2021    MCV 95.0 06/08/2021    RDW 19.7 06/08/2021     06/08/2021       CMP:   Lab Results Component Value Date     06/08/2021    K 4.0 06/08/2021    K 3.7 05/26/2021     06/08/2021    CO2 22 06/08/2021    BUN 36 06/08/2021    CREATININE 0.9 06/08/2021    GFRAA >60 06/08/2021    LABGLOM 59 06/08/2021    GLUCOSE 247 06/08/2021    PROT 5.7 06/08/2021    CALCIUM 8.9 06/08/2021    BILITOT 0.4 06/08/2021    ALKPHOS 101 06/08/2021    AST 30 06/08/2021    ALT 9 06/08/2021       POC Tests: No results for input(s): POCGLU, POCNA, POCK, POCCL, POCBUN, POCHEMO, POCHCT in the last 72 hours. Coags:   Lab Results   Component Value Date    PROTIME 13.9 01/09/2020    INR 1.2 01/09/2020    APTT 40.6 01/11/2020       HCG (If Applicable): No results found for: PREGTESTUR, PREGSERUM, HCG, HCGQUANT     ABGs: No results found for: PHART, PO2ART, ATQ0QZJ, ABM5GMO, BEART, U0JJHRNH     Type & Screen (If Applicable):  No results found for: LABABO, LABRH    Drug/Infectious Status (If Applicable):  No results found for: HIV, HEPCAB    COVID-19 Screening (If Applicable):   Lab Results   Component Value Date    COVID19 Not Detected 06/07/2021     Chest CT:  HISTORY:   ORDERING SYSTEM PROVIDED HISTORY: Hypoxia   TECHNOLOGIST PROVIDED HISTORY:   Reason for exam:->Hypoxia       FINDINGS:   New opacities are present in right and left lower lobes notable on the right   with adjacent ground-glass opacities.  There is thickening of right and left   lower lobe segmental bronchi.  No pleural effusion.  Mild pulmonary vascular   congestion.  The heart is normal in size.  No pericardial effusion.  Few   prominent subcarinal lymph nodes measure up to 1.4 by 1.1 cm.  No   pneumothorax.  Moderate size hiatal hernia.  View of upper abdomen shows   normal bilateral adrenal glands.          Anesthesia Evaluation  Patient summary reviewed and Nursing notes reviewed no history of anesthetic complications:   Airway:         Dental:          Pulmonary:   (+) pneumonia (Hx. of COVID-19 pneumonia with acute respiratory failure with hypoxia (11/2020)): resolved,      (-) not a current smoker                          ROS comment: Rhinovirus   Cardiovascular:  Exercise tolerance: poor (<4 METS),   (+) hypertension: mild, valvular problems/murmurs: AI, dysrhythmias (PAF): atrial fibrillation, CHF:, hyperlipidemia      ECG reviewed      Echocardiogram reviewed         Beta Blocker:  Order written      ROS comment: Cardiomyopathy    Normal sinus rhythm  Nonspecific ST and T wave abnormality  Abnormal ECG  When compared with ECG of 08-JUN-2021 07:43,  premature ventricular complexes are no longer present  QT has shortened    Summary  Left ventricle size is normal.  Ejection fraction is visually estimated at 45-50%. Overall ejection fraction mildly decreased . No regional wall motion abnormalities seen. Mild concentric left ventricular hypertrophy. Stage II diastolic dysfunction. The left atrium is mildly dilated. Normal right ventricular size and function. TAPSE 19 mm. Physiologic and/or trace mitral regurgitation is present. No hemodynamically significant aortic stenosis is present. Mild aortic regurgitation is noted. The tricuspid valve was not well visualized. No evidence for hemodynamically significant pericardial effusion. Neuro/Psych:   (+) neuromuscular disease (Neuropathy):, psychiatric history: stable with treatmentdepression/anxiety              ROS comment: Acute metabolic encephalopathy GI/Hepatic/Renal:   (+) hiatal hernia, GERD:, renal disease (Stage III CKD:  creatinine 0.9 & GFR 59): CRI and ARF,          ROS comment: Dysphagia with recent aspiration. Endo/Other:    (+) DiabetesType II DM, using insulin, blood dyscrasia: anticoagulation therapy and anemia, arthritis: OA., .           Pt had no PAT visit        ROS comment: Sacral decubitus stage 3    Heel decubitus stage 2    Cellulitis    Spinal stenosis Abdominal:           Vascular: negative vascular ROS.  + PVD, aortic or cerebral, DVT, PE. Anesthesia Plan      MAC     ASA 4       Induction: intravenous. MIPS: Prophylactic antiemetics administered. Anesthetic plan and risks discussed with patient. Plan discussed with CRNA. Pj Buenrostro DO   6/8/2021      History, data, and pertinent studies from chart review. Above represents information available via the shared medical record including previous anesthetic, medication, and allergy history.   Confirmation of above and final disposition per DOS anesthesiologist.    Pj Buenrostro DO  Staff Anesthesiologist  June 8, 2021  6:32 PM

## 2021-06-09 NOTE — PROGRESS NOTES
Internal Medicine Progress Note    [facility]    Jacobomadi Thomastopher. Leti Baez., F.A.FELIPEOTARAH. Tawny Diaz D.O., RAMONE Starks, MSN, APRN, NP-C  Janina Cowart, MSN, APRN-CNP     Primary Care Physician: Alia Adams DO   Admitting Physician:  Nino Murphy DO  Admission date and time: 6/6/2021  9:35 AM    Room:  [address] [place] [address]  Admitting diagnosis: HCAP (healthcare-associated pneumonia) [J18.9]    Patient Name: Saleem Solano  MRN: [id number]    Date of Service: 6/9/2021     Subjective:  Tereza Pop is a 80 y.o. female who was seen and examined today,6/9/2021, at the bedside. Patient is noticeably more awake and alert today. Unfortunately she is not more forthcoming with information or input regarding the placement of PEG tube or continuation of aggressive measures. 2 daughters are present at bedside and we have again reviewed this. They are agreeable with PICC line placement today. They will make ultimate decision regarding PEG tube placement today as well. The understand the disease trajectory moving forward with either route well. Review of System: Limited in review per family  Constitutional:   Significant weakness and fatigue which is acute on chronic  HEENT:   Denies ear pain, sore throat, sinus or eye problems. Cardiovascular:   Denies any chest pain, irregular heartbeats, or palpitations. Respiratory:   Persistent cough and shortness of breath  Gastrointestinal:   Denies nausea, vomiting, diarrhea, or constipation. Denies any abdominal pain. Genitourinary:    Denies any urgency, frequency, hematuria. Voiding  without difficulty. Extremities:   Denies lower extremity swelling, edema or cyanosis. Neurology:    Declining neurological status  Psch:   Denies being anxious or depressed. Musculoskeletal:    Denies  myalgias, joint complaints or back pain. Integumentary:   Denies any rashes, ulcers, or excoriations. acetylcysteine  4 mL Inhalation Q4H    insulin lispro  0-18 Units Subcutaneous TID     insulin lispro  0-9 Units Subcutaneous Nightly    vancomycin  1,000 mg Intravenous Q24H    pantoprazole  40 mg Intravenous Daily    And    sodium chloride (PF)  10 mL Intravenous Daily    lidocaine PF  5 mL Intradermal Once    sodium chloride flush  5-40 mL Intravenous 2 times per day    heparin flush  3 mL Intravenous 2 times per day    ipratropium-albuterol  1 ampule Inhalation Q4H    [Held by provider] aspirin  81 mg Oral Daily    atorvastatin  40 mg Oral Nightly    [Held by provider] carvedilol  12.5 mg Oral BID     docusate sodium  200 mg Oral BID    isosorbide mononitrate  30 mg Oral Daily    montelukast  10 mg Oral Nightly    sertraline  150 mg Oral Daily    miconazole   Topical BID    lactobacillus  1 capsule Oral Daily    cefepime  2,000 mg Intravenous Q12H    sodium chloride  25 mL Intravenous Q12H     Continuous Infusions:   dextrose 5 % and 0.45 % NaCl 75 mL/hr at 06/08/21 1724    sodium chloride      dextrose      sodium chloride         Objective Data:  CBC with Differential:    Lab Results   Component Value Date    WBC 11.6 06/08/2021    RBC 3.20 06/08/2021    HGB 9.3 06/08/2021    HCT 30.4 06/08/2021     06/08/2021    MCV 95.0 06/08/2021    MCH 29.1 06/08/2021    MCHC 30.6 06/08/2021    RDW 19.7 06/08/2021    SEGSPCT 58 06/18/2013    LYMPHOPCT 17.7 06/08/2021    MONOPCT 5.7 06/08/2021    BASOPCT 0.2 06/08/2021    MONOSABS 0.66 06/08/2021    LYMPHSABS 2.05 06/08/2021    EOSABS 0.09 06/08/2021    BASOSABS 0.02 06/08/2021     CMP:    Lab Results   Component Value Date     06/08/2021    K 4.0 06/08/2021    K 3.7 05/26/2021     06/08/2021    CO2 22 06/08/2021    BUN 36 06/08/2021    CREATININE 0.9 06/08/2021    GFRAA >60 06/08/2021    LABGLOM 59 06/08/2021    GLUCOSE 247 06/08/2021    PROT 5.7 06/08/2021    LABALBU 1.8 06/08/2021    CALCIUM 8.9 06/08/2021    BILITOT 0.4 Assessment:    · Acute encephalopathy--multifactorial  · Acute hypoxic respiratory failure probably secondary to aspiration pneumonia  · Aspiration both silent and with nectar thick  · Sacral decubitus ulcer--stage IV  · Blood culture x1+ for MRSE with RONALD being considered  · Paroxysmal atrial fibrillation  · Chronic systolic and diastolic congestive heart failure  · Coronary artery disease  · Valvular heart disease  · History of PE on Eliquis  · Chronic kidney disease  · Peripheral vascular disease  · Peripheral neuropathy  · Diabetes mellitus  · GERD  · Hypertension  · Hyperlipidemia  · History of skin cancer  · Depression anxiety  · Recent COVID-19 infection in November 2020      Plan:   Wendi Cruz has improved clinically somewhat but remains quite ill and somnolent. Discussion was attempted with the patient herself regarding the aggressiveness of care moving forward however she does not provide any subjective input. As the patient's 2 daughters are present, they will make ultimate decision regarding invasive procedures and aggressive treatment moving forward. They are agreeable with PICC line presently and this will be undertaken today. Decision for PEG tube, RONALD and bronchoscopy will be finalized today with the family as per their discussion presently. We will continue with fluids, supportive measures and antibiotics at present at the discretion of the infectious disease team.  Dietary and wound care are following in the setting of wounds as well as poor skin integrity at baseline. Supplemental hydration is being provided in the setting of the inability to safely take by mouth. Sliding-scale insulin is being utilized for coverage of hyperglycemia associated. Chronic comorbidities labs and vital signs are being monitored and addressed accordingly. See additional orders for details.       More than 50% of my  time was spent at the bedside counseling/coordinating care with the patient and/or family with face to face contact. This time was spent reviewing notes and laboratory data as well as instructing and counseling the patient. Time I spent with the family or surrogate(s) is included only if the patient was incapable of providing the necessary information or participating in medical decisions. I also discussed the differential diagnosis and all of the proposed management plans with the patient and individuals accompanying the patient. Vasu Barba requires this high level of physician care and nursing on the IMC/Telemetry unit due the complexity of decision management and chance of rapid decline or death. Continued cardiac monitoring and higher level of nursing are required. I am readily available for any further decision-making and intervention.      Frank Toscano, KRISTIE - CNP  6/9/2021  10:38 AM

## 2021-06-09 NOTE — PROGRESS NOTES
OT SESSION ATTEMPT     Date:2021  Patient Name: Sissy Madera  MRN: [id number]  : 1933  Room: [address] [place] [address]     Attempted OT session this date:    [] unavailable due to other medical staff currently with pt   [x] on hold per nursing staff   [] on hold per nursing staff secondary to lab / radiology results    [] pt declined due to ____. Benefits of participation in therapy reviewed with pt.    [] off unit   [] Other:   Nursing reports overall fatigue and that nursing is requesting hospice services at this time. Will reattempt OT evaluation and/or treatment at a later time.     Ant Royal OTR/L; [id number]

## 2021-06-09 NOTE — FLOWSHEET NOTE
Inpatient Wound Care    Admit Date: 6/6/2021  9:35 AM    Reason for consult:  coccyx  Significant history:    Past Medical History:   Diagnosis Date    Anxiety     Depression     Diabetes mellitus ([facility]     Frequent urinary tract infections     GERD (gastroesophageal reflux disease)     Hypertension     Peripheral neuropathy     PVD (peripheral vascular disease) ([facility] 3/30/2021    Spinal stenosis        Wound history:      Findings:       06/09/21 1300   Wound 02/17/21 Coccyx #2   Date First Assessed/Time First Assessed: 02/17/21 1349   Wound Approximate Age at First Assessment (Weeks): 52 weeks  Primary Wound Type: Pressure Injury  Location: Coccyx  Wound Description (Comments): #2   Wound Etiology Pressure Stage  4   Wound Cleansed Cleansed with saline   Wound Length (cm) 1 cm   Wound Width (cm) 1 cm   Wound Surface Area (cm^2) 1 cm^2   Change in Wound Size % (l*w) -1.01   Distance Tunneling (cm) 4 cm   Tunneling Position ___ O'Clock 1200   Drainage Amount Small   Drainage Description Serosanguinous   Odor None   redness to buttocks fading  Wound tissue is pink  sm amt of serosang drainage    Impression:  Stage 4 pressure injury to coccyx    Interventions in place:  aquacell and mepilex dressing    Plan:  Cont treatment        Maureen Amaya RN 6/9/2021 2:01 PM

## 2021-06-09 NOTE — CARE COORDINATION
SS NOTE: COVID RESPIRATORY PANEL NEGATIVE 6/7. Pt is on O2 at 4 liters here and has no home O2. Pt is on Eloquis and has a PICC in place. Pt is for a possible PEG - dtr to decide. Pt to return home with 24 hr care provided by family and aides. Has all necessary dme of hospital bed, trent lift, chair lift to entrance of home. Ambulance for transport home. [facility] continues to follow for orders and dch. SS to continue. JOSE ALEJANDRO Mercer.6/9/2021.1:19 PM.

## 2021-06-09 NOTE — PROGRESS NOTES
[facility]  [facility]  Progress Note    NAME: Edi Ruff  MR:  [id number]  :   1933  DATE OF SERVICE:21    This is a face to face encounter with Edi Ruff 80 y.o. female on 21    CHIEF COMPLAINT     ID following for   Chief Complaint   Patient presents with    Altered Mental Status     recently dx of pneumonia     HISTORY OF PRESENT ILLNESS   Pt came in with sob has known sacral ulcer  No family present        In bed   Has no c/o but moans   Patient is tolerating medications. No reported adverse drug reactions. REVIEW OF SYSTEMS     As stated above in the chief complaint, otherwise negative.   CURRENT MEDICATIONS      metoprolol  2.5 mg Intravenous Q6H    acetylcysteine  4 mL Inhalation Q4H    insulin lispro  0-18 Units Subcutaneous TID WC    insulin lispro  0-9 Units Subcutaneous Nightly    vancomycin  1,000 mg Intravenous Q24H    pantoprazole  40 mg Intravenous Daily    And    sodium chloride (PF)  10 mL Intravenous Daily    lidocaine PF  5 mL Intradermal Once    sodium chloride flush  5-40 mL Intravenous 2 times per day    heparin flush  3 mL Intravenous 2 times per day    ipratropium-albuterol  1 ampule Inhalation Q4H    [Held by provider] aspirin  81 mg Oral Daily    atorvastatin  40 mg Oral Nightly    [Held by provider] carvedilol  12.5 mg Oral BID WC    docusate sodium  200 mg Oral BID    isosorbide mononitrate  30 mg Oral Daily    montelukast  10 mg Oral Nightly    sertraline  150 mg Oral Daily    miconazole   Topical BID    lactobacillus  1 capsule Oral Daily    cefepime  2,000 mg Intravenous Q12H    sodium chloride  25 mL Intravenous Q12H     Continuous Infusions:   dextrose 5 % and 0.45 % NaCl 75 mL/hr at 21 1724    sodium chloride      dextrose      sodium chloride       PRN Meds:sodium chloride flush, sodium chloride, heparin flush, perflutren lipid microspheres, sodium chloride flush, bisacodyl, nitroGLYCERIN, glucose, dextrose, glucagon (rDNA), dextrose, sodium chloride flush, sodium chloride, polyethylene glycol, acetaminophen **OR** acetaminophen    PHYSICAL EXAM     BP (!) 151/63   Pulse 93   Temp 97 °F (36.1 °C) (Axillary)   Resp 22   Ht 5' 2\" (1.575 m)   Wt 164 lb 4.8 oz (74.5 kg)   LMP  (LMP Unknown)   SpO2 99%   BMI 30.05 kg/m²   Temp  Av.3 °F (36.3 °C)  Min: 95.5 °F (35.3 °C)  Max: 98.4 °F (36.9 °C)  Constitutional:  The patient is awak  pale  Skin:    Warm and dry. No rashes were noted. HEENT:   Round and reactive pupils. AT/NC    Chest:   No use of accessory muscles to breathe. Symmetrical expansion. Cardiovascular:  S1 and S2 are rhythmic and regular. No murmurs appreciated. Abdomen:   Positive bowel sounds to auscultation. Benign to palpation. Extremities:   No clubbing, no cyanosis, edema.   CNS    AAxO   Lines: piv      DIAGNOSTIC RESULTS   Radiology:    Recent Labs     21  0536 21  0532   WBC 17.0* 11.6*   RBC 3.49* 3.20*   HGB 10.2* 9.3*   HCT 32.7* 30.4*   MCV 93.7 95.0   MCH 29.2 29.1   MCHC 31.2* 30.6*   RDW 19.5* 19.7*    250   MPV 9.3 9.7     Recent Labs     21  0536 21  0532    142   K 3.9 4.0   * 111*   CO2 25 22   BUN 49* 36*   CREATININE 0.9 0.9   GLUCOSE 236* 247*   PROT 6.4 5.7*   LABALBU 2.6* 1.8*   CALCIUM 9.3 8.9   BILITOT 0.5 0.4   ALKPHOS 120* 101   AST 25 30   ALT 9 9     Lab Results   Component Value Date    CRP 3.9 (H) 2021    CRP 3.8 (H) 2021    CRP 18.4 (H) 2021     Lab Results   Component Value Date    SEDRATE 65 (H) 2021    SEDRATE 76 (H) 2021    SEDRATE 97 (H) 2021     Recent Labs     21  0536 21  1353 21  1655 21  0532   PROCAL 0.24* 0.21* 0.25*  --    AST 25  --   --  30   ALT 9  --   --  9     Lab Results   Component Value Date    CHOL 83 2020    TRIG 87 2020    HDL 38 2020    LDLCALC 28 2020    LABVLDL 17 2020     No results found for: VITD25    Microbiology:   Recent Labs     06/07/21  0830   COVID19 Not Detected     Lab Results   Component Value Date    [facility]  06/06/2021     Gram stain performed from blood culture bottle media  Gram positive cocci in clusters      [facility] Identification and sensitivity to follow 06/06/2021    [facility]  06/06/2021     This organism was isolated in one set. Susceptibility testing is not routinely done as this  organism frequently represents skin contamination. Additional testing can be ordered by calling the  Microbiology Department.       BLOODCULT2 24 Hours no growth 06/07/2021    BLOODCULT2 24 Hours no growth 06/06/2021    BLOODCULT2 5 Days no growth 04/05/2021    ORG Staphylococcus coagulase-negative 06/07/2021    ORG Gram positive cocci 06/07/2021    ORG Staphylococcus species 06/06/2021    ORG Staphylococcus coagulase-negative 06/06/2021     WOUND/ABSCESS   Date Value Ref Range Status   06/07/2021 (A)  Preliminary    Growth present, evaluating for:  Mixed Gram positive organisms     06/07/2021 Rare growth  Preliminary   06/07/2021   Preliminary    Light growth  Identification and sensitivity to follow          Recent Labs     06/07/21  1200   ORG Staphylococcus coagulase-negative*  Gram positive cocci*     Recent Labs     06/07/21  1200   WNDABS Growth present, evaluating for:  Mixed Gram positive organisms  *  Rare growth  Light growth  Identification and sensitivity to follow     ORG Staphylococcus coagulase-negative*  Gram positive cocci*     Recent Labs     06/07/21  1200   ORG Staphylococcus coagulase-negative*  Gram positive cocci*            FINAL IMPRESSION    Leukocytosis  Recurrent cons bacteremia   Sacral wound with same   Candiduria   ?aspiration        vancomycin (VANCOCIN) 1,000 mg in dextrose 5 % 250 mL IVPB, Q24H cont      miconazole (MICOTIN) 2 % powder, BID  lactobacillus (CULTURELLE) capsule 1 capsule, Daily  cefepime (MAXIPIME) 2000 mg IVPB minibag, Q12H     Await cx  May need to eval for IE  Suggest LTAC  picc    · Monitor labs    Imaging and labs were reviewed per medical records. Thank you for involving me in the care of Anselmo Dickerson will continue to follow. Please do not hesitate to call for any questions or concerns.     Electronically signed by Sri Salgado MD on 6/9/2021 at 4:41 PM

## 2021-06-09 NOTE — PROGRESS NOTES
[address] [place] [address]     Liaison Information Visit Note; Information only              Patient Name: Chase Dubose   :  1933  MRN:  [id number]  Admit date:  2021   Hospital Admitting Physician:  Conrado Roberts DO   PCP:  Teofilo Patel DO  Primary Insurance: Payor: MEDICARE /  /  /    Emergency Contact:      Contact/Relation:   /         Phone:     Advance Directive  Patient has NOT completed an advance directive   Discussed with: Family member  DPOA-HC Name-Relation:    Phone:     Terminal Diagnosis information only. terminal diagnosis needs clarified. as confirmed by Dr. Guera Cates DO     Current Hospital Problem List:   Patient Active Problem List   Diagnosis Code    GERD (gastroesophageal reflux disease) K21.9    Type 2 diabetes mellitus with hyperglycemia, with long-term current use of insulin ([facility]) E11.65, Z79.4    CKD (chronic kidney disease) stage 3, GFR 30-59 ml/min ([facility]) N18.30    Essential hypertension I10    History of pulmonary embolus (PE) Z86.711    Acute kidney injury superimposed on CKD ([facility] N17.9, N18.9    Acute on chronic diastolic congestive heart failure ([facility]) I50.33    Obesity (BMI 30-39. 9) E66.9    COVID-19 U07.1    Anxiety and depression F41.9, F32.9    Pneumonia due to COVID-19 virus U07.1, J12.82    Uncontrolled type 2 diabetes mellitus with hyperglycemia ([facility]) E11.65    Sacral wound S31.000A    Pressure injury of sacral region, stage 3 ([facility]) L89.153    Pressure ulcer of right heel, stage 2 ([facility]) L89.612    Cellulitis L03.90    Controlled type 2 diabetes mellitus without complication ([facility]) X06.8    Sepsis ([facility]) A41.9    Acute metabolic encephalopathy B89.95    Severe protein-calorie malnutrition ([facility]) E43    PVD (peripheral vascular disease) ([facility]) I73.9    Positive blood culture R78.81    Altered mental status R41.82    Fluid overload E87.70    Anemia of chronic disease D63.8    Paroxysmal atrial fibrillation ([facility]) I48.0    Cardiomyopathy ([facility] I42.9    Nonrheumatic aortic valve insufficiency I35.1    EKG, abnormal R94.31    Pleural effusion on right J90    Morbidly obese ([facility]) E66.01    Acute respiratory failure with hypoxia ([facility]) J96.01    Pneumonia J18.9    Pulmonary embolism ([facility]) I26.99    CHF (congestive heart failure) ([facility]) I50.9    HCAP (healthcare-associated pneumonia) J18.9       Code Status Order: DNR-CCA     Allergies:  Patient has no known allergies. Family Goal: to understand the choices with SNF vs hospice    Meeting held with Samy Zazueta and Reyne Schaumann    The hospice benefit and philosophy were explained including that hospice is end of life care in which, per Medicare, a patient has a terminal diagnosis that life expectancy would be 6 months or less. Hospice care is a service that is covered by most insurance plans. The following levels of hospice care were discussed including, routine level of hospice care at private home or facility, which patient/family is responsible for any room and board fees at the facility, and general in patient level of care (GIP) at the [facility] for short term symptom management. Per Medicare guidelines, a patient is considered appropriate for GIP if they have uncontrolled symptoms such as pain, agitation, labored breathing or nausea/vomiting. Once symptoms become managed, the patient would need to be moved to a lower level of care such as home with hospice, ECF with hospice or the Transition program.  [facility] transition program also explained which is routine hospice care provided at the [facility] instead of an ECF or home. The transition program is private pay $300/day for room/board. Room/board for the transition program is not covered by Medicaid as would be in an ECF.   Family informed that with the routine level of care at home or ECF,  the hospice team consists of the RN who visits 1-3 times a week, a  who visits within the first five days of the hospice election, the personal care team who visit 1-3 times a week, non-medical volunteers and Chaplains. Explained that at home in routine level of care, familles are responsible for the 24 hour care. Discussed that under hospice care patient would not receive chemotherapy, radiation, immune therapy, IV antibiotics, dialysis or blood transfusions. Explained that once in hospice care, all aggressive treatments would be stopped and allow nature to takes its course with focus on comfort care for the patient. Met with family at the bedside    Discharge Plan:  Discharge Disposition; unknown    [facility] plan:  1. Information visit. Brochure provided. 2. Please call [facility] [phone] with any questions. 3. Patient NOT currently under the care of hospice.     Electronically signed by Adore Thao RN on 6/9/2021 at 5:19 PM

## 2021-06-09 NOTE — CARE COORDINATION
SS NOTE: COVID RESPIRATORY PANEL NEGATIVE 6/7. SW met with pt's dtrs -Lady Mari and Kendra Guthrie today- shared the list of Hospice providers and discussed Hospice. They request and informational session with [facility]. SW contacted \[facility] [place] [facility]\"" and liaison is meeting with the women now-will await outcome. Pt is on O2 at 4 liters here and has no home O2. Pt is on Eloquis and has a PICC in place. Pt is for a possible PEG - dtr to decide. Pt to return home with 24 hr care provided by family and aides. Has all necessary dme of hospital bed, trent lift, chair lift to entrance of home. Ambulance for transport home. [facility] continues to follow for orders and dch. SS to continue. JOSE ALEJANDRO Irizarry.6/9/2021.4:13 PM.

## 2021-06-10 NOTE — PROGRESS NOTES
Pharmacy Consultation Note  (Antibiotic Dosing and Monitoring)    Vancomycin has been discontinued; pharmacy will sign-off. Please reconsult if needed.     Thank you,  Cuate Gonzáles, PharmD, BCPS 6/10/2021 2:11 PM

## 2021-06-10 NOTE — PLAN OF CARE
Problem: Falls - Risk of:  Goal: Will remain free from falls  Description: Will remain free from falls  6/10/2021 0318 by Sami Gilliam RN  Outcome: Met This Shift     Problem: Falls - Risk of:  Goal: Absence of physical injury  Description: Absence of physical injury  6/10/2021 0318 by Sami Gilliam RN  Outcome: Met This Shift     Problem: Skin Integrity:  Goal: Will show no infection signs and symptoms  Description: Will show no infection signs and symptoms  6/10/2021 0318 by Sami Gilliam RN  Outcome: Met This Shift     Problem: Skin Integrity:  Goal: Absence of new skin breakdown  Description: Absence of new skin breakdown  6/10/2021 0318 by Sami Gilliam RN  Outcome: Met This Shift     Problem: Pain:  Goal: Pain level will decrease  Description: Pain level will decrease  6/10/2021 0318 by Sami Gilliam RN  Outcome: Met This Shift     Problem: Pain:  Goal: Control of acute pain  Description: Control of acute pain  6/10/2021 0318 by Sami Gilliam RN  Outcome: Met This Shift     Problem: Pain:  Goal: Control of chronic pain  Description: Control of chronic pain  6/10/2021 0318 by Sami Gilliam RN  Outcome: Met This Shift

## 2021-06-10 NOTE — CARE COORDINATION
SS NOTE: COVID RESPIRATORY PANEL NEGATIVE 6/7. Pt's dtrs -Dom Guthrie and Adriana Ness are going to finalize dch plan today- Home with [facility] liaison met with pt and dtrs yesterday) hospice vs having a PEG placed. await outcome. Pt is on O2 at 4 liters here and has no home O2. Pt is on Eloquis and has a PICC in place. Pt to return home with 24 hr care provided by family and aides. Has all necessary dme of hospital bed, trent lift, chair lift to entrance of home. Ambulance for transport home. [facility] continues to follow in case of need- for orders and dch. SS to continue. JOSE ALEJANDRO Siddiqi.6/10/2021.12:04PM.

## 2021-06-10 NOTE — PROGRESS NOTES
Physician Progress Note      Miladis Gomes  CSN #:                  [id number]  :                       1933  ADMIT DATE:       2021 2:01 PM  [address] DATE:        2021 5:49 PM  RESPONDING  PROVIDER #:        MAYTE Chambers MD          QUERY TEXT:    Patient admitted with Acute Respiratory failure. Documentation reflects ? PNA   unsure in DC summary. If possible, please document in the progress notes and   discharge summary if Pneumonia was: The medical record reflects the following:  Risk Factors: Acute Respiratory Failure  Clinical Indicators: DC Summary \" admitted for ? pna - not sure about this\".  ID note \"Cough - bronchitis - not impressed with the CT scan report of   developing pneumonia. Stop rocephin and vancomycin, Oral doxycycline 100 mg po   q 12 hrs, Breathing treatment , diuretics\".  Cardiology notes \" Acute   pneumonia on antibiotics, Chronic HFrEF, reasonably euvolemic does not appear   to be volume overloaded\". Noted to have the following labs and vitals:   Procalcitonin 0.21, 0.24, 0.25. WBC 9.5, 17, 11.6. Sed Rate 65. Afebrile. Noted rhonchi and wheezes with productive clear coug  Treatment: Rocephin and Vancomycin stopped, changed to PO Doxycycline    Thank you,  Saintclair Sheriff, RN, BSN, CCDS, Clinical Documentation Improvement  [phone]  Options provided:  -- Pneumonia confirmed after study  -- Pneumonia treated and resolved  -- Pneumonia ruled out after study  -- Other - I will add my own diagnosis  -- Disagree - Not applicable / Not valid  -- Disagree - Clinically unable to determine / Unknown  -- Refer to Clinical Documentation Reviewer    PROVIDER RESPONSE TEXT:    Pneumonia confirmed after study.     Query created by: Juanito Fuller on 2021 1:52 PM      Electronically signed by:  MAYTE Chambers MD 6/10/2021 10:23 AM

## 2021-06-10 NOTE — PROGRESS NOTES
[facility]  [facility]  Progress Note    NAME: Ousmane Neal  MR:  [id number]  :   1933  DATE OF SERVICE:06/10/21    This is a face to face encounter with Ousmane Neal 80 y.o. female on 06/10/21    CHIEF COMPLAINT     ID following for   Chief Complaint   Patient presents with    Altered Mental Status     recently dx of pneumonia     HISTORY OF PRESENT ILLNESS   Pt came in with sob has known sacral ulcer  No family present        In bed   Has no c/o   Seems more awake  Daughter present had no questions  Patient is tolerating medications. No reported adverse drug reactions. REVIEW OF SYSTEMS     As stated above in the chief complaint, otherwise negative.   CURRENT MEDICATIONS      fluconazole  200 mg Oral Daily    lidocaine 1 % injection  5 mL Intradermal Once    sodium chloride flush  10 mL Intravenous 2 times per day    metoprolol  2.5 mg Intravenous Q6H    acetylcysteine  4 mL Inhalation Q4H    insulin lispro  0-18 Units Subcutaneous TID WC    insulin lispro  0-9 Units Subcutaneous Nightly    vancomycin  1,000 mg Intravenous Q24H    pantoprazole  40 mg Intravenous Daily    And    sodium chloride (PF)  10 mL Intravenous Daily    lidocaine PF  5 mL Intradermal Once    sodium chloride flush  5-40 mL Intravenous 2 times per day    heparin flush  3 mL Intravenous 2 times per day    ipratropium-albuterol  1 ampule Inhalation Q4H    [Held by provider] aspirin  81 mg Oral Daily    atorvastatin  40 mg Oral Nightly    [Held by provider] carvedilol  12.5 mg Oral BID WC    docusate sodium  200 mg Oral BID    isosorbide mononitrate  30 mg Oral Daily    montelukast  10 mg Oral Nightly    sertraline  150 mg Oral Daily    miconazole   Topical BID    lactobacillus  1 capsule Oral Daily    cefepime  2,000 mg Intravenous Q12H    sodium chloride  25 mL Intravenous Q12H     Continuous Infusions:   sodium chloride      dextrose 5 % and 0.45 % NaCl 75 mL/hr at 06/10/21 1141    sodium chloride      dextrose      sodium chloride       PRN Meds:morphine 20MG/ML, ondansetron, sodium chloride flush, sodium chloride, sodium chloride flush, sodium chloride, heparin flush, perflutren lipid microspheres, sodium chloride flush, bisacodyl, nitroGLYCERIN, glucose, dextrose, glucagon (rDNA), dextrose, sodium chloride flush, sodium chloride, polyethylene glycol, acetaminophen **OR** acetaminophen    PHYSICAL EXAM     BP (!) 176/72   Pulse 104   Temp 99.3 °F (37.4 °C) (Axillary)   Resp 18   Ht 5' 2\" (1.575 m)   Wt 165 lb 14.4 oz (75.3 kg)   LMP  (LMP Unknown)   SpO2 97%   BMI 30.34 kg/m²   Temp  Av.8 °F (36.6 °C)  Min: 97 °F (36.1 °C)  Max: 99.3 °F (37.4 °C)  Constitutional:  The patient is awak  pale  Skin:    Warm and dry. HEENT:    AT/NC    Chest:   No use of accessory muscles to breathe. Symmetrical expansion. 2L  Cardiovascular:  S1 and S2 are rhythmic and regular. No murmurs appreciated. Abdomen:   Positive bowel sounds to auscultation. sofr Benign to palpation. Extremities:   No clubbing, no cyanosis, edema.   CNS    AAxO   Lines: piv      DIAGNOSTIC RESULTS   Radiology:    Recent Labs     21  0532 06/10/21  0451   WBC 11.6* 10.6   RBC 3.20* 3.37*   HGB 9.3* 9.8*   HCT 30.4* 30.9*   MCV 95.0 91.7   MCH 29.1 29.1   MCHC 30.6* 31.7*   RDW 19.7* 18.1*    251   MPV 9.7 9.5     Recent Labs     21  0532 06/10/21  0451    137   K 4.0 3.5   * 107   CO2 22 22   BUN 36* 18   CREATININE 0.9 0.7   GLUCOSE 247* 188*   PROT 5.7* 6.2*   LABALBU 1.8* 2.2*   CALCIUM 8.9 8.9   BILITOT 0.4 0.3   ALKPHOS 101 117*   AST 30 40*   ALT 9 18     Lab Results   Component Value Date    CRP 3.9 (H) 2021    CRP 3.8 (H) 2021    CRP 18.4 (H) 2021     Lab Results   Component Value Date    SEDRATE 65 (H) 2021    SEDRATE 76 (H) 2021    SEDRATE 97 (H) 2021     Recent Labs     21  1655 21  0532 06/10/21  0451   PROCAL 0.25* --   --    AST  --  30 40*   ALT  --  9 18     Lab Results   Component Value Date    CHOL 83 11/17/2020    TRIG 87 11/17/2020    HDL 38 11/17/2020    LDLCALC 28 11/17/2020    LABVLDL 17 11/17/2020     No results found for: VITD25    Microbiology:   No results for input(s): COVID19 in the last 72 hours. Lab Results   Component Value Date    [facility]  06/06/2021     This organism was isolated in one set. Susceptibility testing is not routinely done as this  organism frequently represents skin contamination. Additional testing can be ordered by calling the  Microbiology Department. BC 5 Days no growth 04/05/2021    BC 5 Days no growth 04/01/2021    BLOODCULT2 24 Hours no growth 06/07/2021    BLOODCULT2 24 Hours no growth 06/06/2021    BLOODCULT2 5 Days no growth 04/05/2021    ORG Staphylococcus coagulase-negative 06/07/2021    ORG Enterococcus faecium 06/07/2021    ORG Staphylococcus coagulase-negative 06/06/2021     WOUND/ABSCESS   Date Value Ref Range Status   06/07/2021 Rare growth  Final   06/07/2021   Final    Light growth  Vancomycin resistant Enterococci isolated     05/25/2021 Moderate growth  Final   05/25/2021 Light growth  Final        No results for input(s): CXSURG, ORG in the last 72 hours. No results for input(s): WNDABS, ORG in the last 72 hours. No results for input(s): Adrian Do in the last 72 hours. FINAL IMPRESSION    Leukocytosis  Recurrent cons bacteremia   Sacral wound with same  CONS/VRE   Candiduria   ?aspiration        vancomycin (VANCOCIN) 1,000 mg in dextrose 5 % 250 mL IVPB, Q24H cont    switch to daptomycin   cpk level  miconazole (MICOTIN) 2 % powder, BID  lactobacillus (CULTURELLE) capsule 1 capsule, Daily  cefepime (MAXIPIME) 2000 mg IVPB minibag, Q12H plan for 8 days      May need to eval for IE  Suggest LTAC  picc    · Monitor labs    Imaging and labs were reviewed per medical records.       Thank you for involving me in the care of [place]am [place] will continue to follow. Please do not hesitate to call for any questions or concerns.     Electronically signed by Anne Latham MD on 6/10/2021 at 2:02 PM

## 2021-06-10 NOTE — PROGRESS NOTES
Speech Langauge Pathology          PATIENT NAME:  Kassy Araujo  (female)                    MRN:  [id number]                       :  1933  (80 y.o.)  STATUS:  Inpatient: Room 0635/0635-01                   TODAY'S DATE:  2021    Pt unavailable at this time due to:  []?? HOLD per RN  []?? Off unit for testing/ procedure    []? ? With medical staff   []? ? Declined intervention  []? ? Sleeping/ Lethargic   [x]? ? Other: Pt is receiving a suppository     Will re-attempt later this date as able.  Thank you.     Boo Ron, CCC-SLP/L   Speech Language Pathologist  [phone]

## 2021-06-10 NOTE — PROGRESS NOTES
Physical Therapy        [id number]    Patient unavailable for physical therapy treatment due to family requesting to HOLD patient's therapy session. Patient's daughter states that the patient decided today \"that it's time to go\".      Maria Del Carmen Lagos, PTA  #[phone]

## 2021-06-10 NOTE — PROGRESS NOTES
Call placed to KEVAN Peguero. She is heading to patient's room now to see if family has made a decision regarding hospice care. Spoke with daughter Lary Sosa via conference call. Lary Sosa stated she had a conversation with patient and patient does not want any more treatment or a peg tube once she spoke with physician this morning. Lary Sosa stated patient will need a hospital bed, low air loss mattress and 2L of oxygen. Lary Sosa stated she will speak with her sister this afternoon however she feels she too will be agreeable to home with hospice care. Answered all questions relating to hospice care. Lary Sosa stated she will need to move furniture this evening to make room for DME delivery. Explained that DME will go out between 10-2pm tomorrow. Lary Sosa verbalized understanding. Asked Tevin Pond will ask for a hospice consult and a terminal diagnosis. She will let me know. Advise that Rosy Gay RN will be assisting with the discharge tomorrow.

## 2021-06-10 NOTE — PROGRESS NOTES
Internal Medicine Progress Note    [facility]    Tae Wells. Mily Perez., CHRISSOHollyI. Dano Mack D.O., RAMONE Sanabria, MSN, APRN, NP-C  Karmen Thayer. Sundar Bahena, MSN, APRN-CNP     Primary Care Physician: Teofilo Patel DO   Admitting Physician:  Conrado Roberts DO  Admission date and time: 6/6/2021  9:35 AM    Room:  [address] [place], [place] [id number]  Admitting diagnosis: HCAP (healthcare-associated pneumonia) [J18.9]    Patient Name: Chase Dubose  MRN: [id number]    Date of Service: 6/10/2021     Subjective:  Elana Frias is a 80 y.o. female who was seen and examined today,6/10/2021, at the bedside. Patient is noticeably more awake and alert today. We discussed that this appears to be her optimal condition considering her most recent baseline. We did have extensive discussion with the patient and daughters over the material that has been discussed recently regarding prognosis, PEG tube risk and benefits and all things were put in the post perspective of the patient's age and comorbid disease process. Decision has still yet to be made however and the family understands it is imperative to move forward with decision today to either proceed with PEG tube and aggressive treatment or move towards comfort care. Hospice did provide informational consultation yesterday. Both patient's daughters are present today     Review of System: Limited in review per family  Constitutional:   Significant weakness and fatigue which is acute on chronic  HEENT:   Denies ear pain, sore throat, sinus or eye problems. Cardiovascular:   Denies any chest pain, irregular heartbeats, or palpitations. Respiratory:   Persistent cough and shortness of breath  Gastrointestinal:   Denies nausea, vomiting, diarrhea, or constipation. Positive for abdominal pain  Genitourinary:    Denies any urgency, frequency, hematuria. Voiding  without difficulty.   Extremities:   Denies lower extremity swelling, edema or cyanosis. Neurology:    Declining neurological status  Psch:   Denies being anxious or depressed. Musculoskeletal:    Denies  myalgias, joint complaints or back pain. Integumentary:   Denies any rashes, ulcers, or excoriations. Denies bruising. Hematologic/Lymphatic:  Denies bruising or bleeding. Physical Exam:  I/O this shift:  In: 9989 [I.V.:3049]  Out: -     Intake/Output Summary (Last 24 hours) at 6/10/2021 1031  Last data filed at 6/10/2021 0952  Gross per 24 hour   Intake 3099 ml   Output 1000 ml   Net 2099 ml   I/O last 3 completed shifts: In: 48 [IV Piggyback:50]  Out: 1000 [Urine:1000]  Patient Vitals for the past 96 hrs (Last 3 readings):   Weight   06/10/21 0030 165 lb 14.4 oz (75.3 kg)   06/09/21 0215 164 lb 4.8 oz (74.5 kg)   06/08/21 0017 148 lb (67.1 kg)     Vital Signs:   Blood pressure (!) 146/67, pulse 95, temperature 97.6 °F (36.4 °C), temperature source Oral, resp. rate 18, height 5' 2\" (1.575 m), weight 165 lb 14.4 oz (75.3 kg), SpO2 99 %, not currently breastfeeding. General appearance:  Awake and alert, attempts very limited conversation today  Head:  Normocephalic. No masses, lesions or tenderness. Eyes:  PERRLA. EOMI. Sclera clear. Buccal mucosa moist.  ENT:  Ears normal. Mucosa normal.  Neck:    Supple. Trachea midline. No thyromegaly. No JVD. No bruits. Heart:    Rhythm regular. Rate controlled. 2/6 murmur murmurs. Lungs:    Scattered wheezing vs transmitted upper airway turbulence with cough. Diminished air exchange throughout. Abdomen:   Soft. Positive for mild epigastric tenderness to palpation. Non-distended. Bowel sounds positive. No organomegaly or masses. No pain on palpation. Extremities:    Peripheral pulses present. No peripheral edema. No ulcers. No cyanosis. No clubbing. Neurologic:    Awake and alert, minimally conversant. Generally weak without focal deficit. Cranial nerves grossly intact. Psych:   Unable to assess. Musculoskeletal:   No gross unilateral findings. Gait not assessed. Integumentary:  Multiple wounds. No rashes  Skin normal color and texture.   Genitalia/Breast:  Deferred    Medication:  Scheduled Meds:   fluconazole  200 mg Oral Daily    lidocaine 1 % injection  5 mL Intradermal Once    sodium chloride flush  10 mL Intravenous 2 times per day    metoprolol  2.5 mg Intravenous Q6H    acetylcysteine  4 mL Inhalation Q4H    insulin lispro  0-18 Units Subcutaneous TID WC    insulin lispro  0-9 Units Subcutaneous Nightly    vancomycin  1,000 mg Intravenous Q24H    pantoprazole  40 mg Intravenous Daily    And    sodium chloride (PF)  10 mL Intravenous Daily    lidocaine PF  5 mL Intradermal Once    sodium chloride flush  5-40 mL Intravenous 2 times per day    heparin flush  3 mL Intravenous 2 times per day    ipratropium-albuterol  1 ampule Inhalation Q4H    [Held by provider] aspirin  81 mg Oral Daily    atorvastatin  40 mg Oral Nightly    [Held by provider] carvedilol  12.5 mg Oral BID WC    docusate sodium  200 mg Oral BID    isosorbide mononitrate  30 mg Oral Daily    montelukast  10 mg Oral Nightly    sertraline  150 mg Oral Daily    miconazole   Topical BID    lactobacillus  1 capsule Oral Daily    cefepime  2,000 mg Intravenous Q12H    sodium chloride  25 mL Intravenous Q12H     Continuous Infusions:   sodium chloride      dextrose 5 % and 0.45 % NaCl 75 mL/hr at 06/09/21 2233    sodium chloride      dextrose      sodium chloride         Objective Data:  CBC with Differential:    Lab Results   Component Value Date    WBC 10.6 06/10/2021    RBC 3.37 06/10/2021    HGB 9.8 06/10/2021    HCT 30.9 06/10/2021     06/10/2021    MCV 91.7 06/10/2021    MCH 29.1 06/10/2021    MCHC 31.7 06/10/2021    RDW 18.1 06/10/2021    SEGSPCT 58 06/18/2013    LYMPHOPCT 13.5 06/10/2021    MONOPCT 5.2 06/10/2021    BASOPCT 0.2 06/10/2021    MONOSABS 0.55 06/10/2021    LYMPHSABS 1.43 06/10/2021 EOSABS 0.15 06/10/2021    BASOSABS 0.02 06/10/2021     CMP:    Lab Results   Component Value Date     06/10/2021    K 3.5 06/10/2021    K 3.7 05/26/2021     06/10/2021    CO2 22 06/10/2021    BUN 18 06/10/2021    CREATININE 0.7 06/10/2021    GFRAA >60 06/10/2021    LABGLOM >60 06/10/2021    GLUCOSE 188 06/10/2021    PROT 6.2 06/10/2021    LABALBU 2.2 06/10/2021    CALCIUM 8.9 06/10/2021    BILITOT 0.3 06/10/2021    ALKPHOS 117 06/10/2021    AST 40 06/10/2021    ALT 18 06/10/2021       Wound Documentation:   Wound 02/17/21 Coccyx #2 (Active)   Wound Image   06/07/21 1109   Wound Etiology Pressure Stage  4 06/07/21 1109   Dressing Status Clean;Dry; Intact 06/06/21 2300   Wound Cleansed Cleansed with saline 06/07/21 1109   Dressing/Treatment ABD; Other (comment) 06/06/21 2300   Dressing Change Due 05/29/21 05/28/21 1139   Wound Length (cm) 1 cm 06/07/21 1109   Wound Width (cm) 1 cm 06/07/21 1109   Wound Depth (cm) 1.7 cm 05/28/21 0200   Wound Surface Area (cm^2) 1 cm^2 06/07/21 1109   Change in Wound Size % (l*w) -1.01 06/07/21 1109   Wound Volume (cm^3) 2.04 cm^3 05/28/21 0200   Wound Healing % -9 05/28/21 0200   Distance Tunneling (cm) 4 cm 06/07/21 1109   Tunneling Position ___ O'Clock 1200 06/07/21 1109   Undermining Starts ___ O'Clock 12 05/27/21 1526   Undermining Ends___ O'Clock 4 05/27/21 1526   Undermining Maxium Distance (cm) [email or web address] 05/27/21 1526   Wound Assessment Pink/red 05/28/21 0200   Drainage Amount Small 06/07/21 1109   Drainage Description Serosanguinous 06/07/21 1109   Odor None 06/07/21 1109   Xena-wound Assessment Blanchable erythema 05/28/21 0200   Number of days: 110       Wound 05/25/21 Heel Left;Medial (Active)   Wound Image   05/27/21 1526   Wound Etiology Deep tissue/Injury 05/27/21 1526   Dressing Status Other (Comment) 05/26/21 2109   Dressing/Treatment Foam 06/07/21 1109   Offloading for Diabetic Foot Ulcers Back offloading shoe 05/26/21 2109   Wound Length (cm) 0.8 cm 05/27/21

## 2021-06-10 NOTE — PROGRESS NOTES
Followed up with consent being not signed from 6/8/2021, charge RN states consent signed, asked if pt still needs PICC, in notes looks like pt is going home on [facility].  discussed PICC line in place, although PICC has not been placed, will pt need to go home? At this time,charge RN states pt doesn't need PICC. Pt removed from service.

## 2021-06-11 NOTE — PROGRESS NOTES
Following for picc line, pt is being discharged to home with hospice care, according to medication orders, no need at this time for picc placement.

## 2021-06-11 NOTE — PROGRESS NOTES
F/U at the bedside. Pt's G-DTR Radha at the bedside. Pt to transfer home today with [facility];hospice. Terminal Dx Hypoxic respiratory failure d/t pneumonia. Spoke with Dr Eboni Miranda. He will write for comfort meds (Scop patch, Morphine sulfate conc, and ativan to [address] [place] [address] for meds to bed) Change DRC-CC, and discharge. [facility] ambulance for 3pm transfer, advised of + rhinovirus with MRSA-nares. Intake advised. Dr Zbigniew Hopson called for CTI. Finn called for admission.   Wound care orders faxed to [facility], [facility]

## 2021-06-11 NOTE — PROGRESS NOTES
CLINICAL PHARMACY NOTE: MEDS TO BEDS    Total # of Prescriptions Filled: 3   The following medications were delivered to the patient:  · MORPHINE 20MG/ML  · ATIVAN 2MG/ML  · SCOPOLAMINE 1MG/3 DAYS     Additional Documentation:

## 2021-06-11 NOTE — DISCHARGE SUMMARY
Internal Medicine Progress Note     [facility]     Kaylan Estrada. June Dumas, MEETAAHollyCHollyOHollyIHolly Cardoza D.O., CHRISSORAMONE Bal, MSN, APRN, NP-C  Toni Vides. Hiwot Mills, MSN, APRN-CNP       Internal Medicine  Discharge Summary    NAME: Vanessa Rodriguez  :  1933  MRN:  [id number]  PCP:Juan Jose Watkins DO  ADMITTED: 2021      DISCHARGED: 21    ADMITTING PHYSICIAN: Kaylan Barrera DO    CONSULTANT(S):   PHARMACY TO DOSE VANCOMYCIN  IP CONSULT TO INFECTIOUS DISEASES  IP CONSULT TO GENERAL SURGERY  IP CONSULT TO DIETITIAN  IP CONSULT TO HOSPICE     ADMITTING DIAGNOSIS:   HCAP (healthcare-associated pneumonia) [J18.9]     DISCHARGE DIAGNOSES:   · Acute encephalopathy--multifactorial, with terminal condition and transitioning to hospice care  · Acute hypoxic respiratory failure probably secondary to aspiration pneumonia  · Aspiration both silent and with nectar thick  · Sacral decubitus ulcer--stage IV  · Blood culture x1+ for MRSE with RONALD being considered  · Paroxysmal atrial fibrillation  · Chronic systolic and diastolic congestive heart failure  · Coronary artery disease  · Valvular heart disease  · History of PE on Eliquis  · Chronic kidney disease  · Peripheral vascular disease  · Peripheral neuropathy  · Diabetes mellitus  · GERD  · Hypertension  · Hyperlipidemia  · History of skin cancer  · Depression anxiety  · Recent COVID-19 infection in 2020    BRIEF HISTORY OF PRESENT ILLNESS:   Patient is a 51-year-old female who presented to the ED due to altered mental status. Patient recently discharged from [facility] after being treated for pneumonia. Patient was discharged home on the . Guilherme Colin states that she had contracted COVID-19 a few months ago. Since her COVID-19 infection she has steadily declined. Since being home after being discharged on the  patient has been weak.  More recently she was confused according to family. On exam patient does respond to questions. She does answer appropriately. LABS[de-identified]  Lab Results   Component Value Date    WBC 8.6 06/11/2021    HGB 9.6 (L) 06/11/2021    HCT 29.7 (L) 06/11/2021     06/11/2021     06/10/2021    K 3.5 06/10/2021     06/10/2021    CREATININE 0.7 06/10/2021    BUN 18 06/10/2021    CO2 22 06/10/2021    GLUCOSE 188 (H) 06/10/2021    ALT 18 06/10/2021    AST 40 (H) 06/10/2021    INR 1.2 01/09/2020     Lab Results   Component Value Date    INR 1.2 01/09/2020    INR 1.2 11/22/2019    INR 1.0 10/21/2019    PROTIME 13.9 (H) 01/09/2020    PROTIME 13.5 (H) 11/22/2019    PROTIME 11.4 10/21/2019      Lab Results   Component Value Date    TSH 5.700 (H) 04/07/2021     Lab Results   Component Value Date    TRIG 87 11/17/2020    TRIG 170 (H) 09/16/2020    TRIG 197 (H) 10/21/2019     Lab Results   Component Value Date    HDL 38 11/17/2020    HDL 37 09/16/2020    HDL 46 11/23/2019     Lab Results   Component Value Date    LDLCALC 28 11/17/2020    LDLCALC 33 09/16/2020    LDLCALC 123 (H) 11/23/2019     Lab Results   Component Value Date    LABA1C 7.0 (H) 04/22/2021       IMAGING:  XR CHEST (2 VW)    Result Date: 5/25/2021  EXAMINATION: TWO XRAY VIEWS OF THE CHEST 5/25/2021 1:05 pm COMPARISON: 04/06/2021 HISTORY: ORDERING SYSTEM PROVIDED HISTORY: dyspnea TECHNOLOGIST PROVIDED HISTORY: Reason for exam:->dyspnea What reading provider will be dictating this exam?->CRC FINDINGS: The lungs are without acute focal process. There is no effusion or pneumothorax. The cardiomediastinal silhouette is without acute process. The osseous structures are without acute process. No acute process.      CT HEAD WO CONTRAST    Result Date: 6/6/2021  EXAMINATION: CT OF THE HEAD WITHOUT CONTRAST  6/6/2021 5:24 pm TECHNIQUE: CT of the head was performed without the administration of intravenous contrast. Dose modulation, iterative reconstruction, and/or weight based adjustment of the mA/kV was utilized to reduce the radiation dose to as low as reasonably achievable. COMPARISON: None. HISTORY: ORDERING SYSTEM PROVIDED HISTORY: ams TECHNOLOGIST PROVIDED HISTORY: Has a \"code stroke\" or \"stroke alert\" been called? ->No Reason for exam:->ams Decision Support Exception - unselect if not a suspected or confirmed emergency medical condition->Emergency Medical Condition (MA) FINDINGS: BRAIN/VENTRICLES: There is no acute intracranial hemorrhage, mass effect or midline shift. No abnormal extra-axial fluid collection. The gray-white differentiation is maintained without evidence of an acute infarct. There is no evidence of hydrocephalus. Prominence of peripheral CSF space ventricular system correlates with the age group. ORBITS: The visualized portion of the orbits demonstrate no acute abnormality. SINUSES: The visualized paranasal sinuses and mastoid air cells demonstrate no acute abnormality. SOFT TISSUES/SKULL:  No acute abnormality of the visualized skull or soft tissues. No acute intracranial abnormality. No significant interval changes since the previous study of April 6, 2021     CT CHEST WO CONTRAST    Result Date: 6/6/2021  EXAMINATION: CT OF THE CHEST WITHOUT CONTRAST 6/6/2021 5:07 pm TECHNIQUE: CT of the chest was performed without the administration of intravenous contrast. Multiplanar reformatted images are provided for review. Dose modulation, iterative reconstruction, and/or weight based adjustment of the mA/kV was utilized to reduce the radiation dose to as low as reasonably achievable. COMPARISON: May 25, 2021 HISTORY: ORDERING SYSTEM PROVIDED HISTORY: Hypoxia TECHNOLOGIST PROVIDED HISTORY: Reason for exam:->Hypoxia FINDINGS: New opacities are present in right and left lower lobes notable on the right with adjacent ground-glass opacities. There is thickening of right and left lower lobe segmental bronchi. No pleural effusion. Mild pulmonary vascular congestion. The heart is normal in size. No pericardial effusion. Few prominent subcarinal lymph nodes measure up to 1.4 by 1.1 cm. No pneumothorax. Moderate size hiatal hernia. View of upper abdomen shows normal bilateral adrenal glands. 1.  New opacities in right and left lower lobes notable on the right related to pneumonia and atelectasis. 2.  Filling defects and thickening associated with right and left posterior segmental bronchi suggesting inflammation or mucous plugging. XR CHEST PORTABLE    Result Date: 6/6/2021  EXAMINATION: ONE XRAY VIEW OF THE CHEST 6/6/2021 11:10 am COMPARISON: 05/25/2021 HISTORY: ORDERING SYSTEM PROVIDED HISTORY: Chest Pain TECHNOLOGIST PROVIDED HISTORY: Reason for exam:->Chest Pain FINDINGS: The cardiomediastinal silhouette is unremarkable. Patchy right lung base atelectasis or infiltrate. No effusion or pneumothorax. No acute osseous abnormality. Mild patchy right lung base atelectasis or infiltrate     CTA PULMONARY W CONTRAST    Result Date: 5/25/2021  EXAMINATION: CTA OF THE CHEST 5/25/2021 4:42 pm TECHNIQUE: CTA of the chest was performed after the administration of intravenous contrast.  Multiplanar reformatted images are provided for review. MIP images are provided for review. Dose modulation, iterative reconstruction, and/or weight based adjustment of the mA/kV was utilized to reduce the radiation dose to as low as reasonably achievable. COMPARISON: 03/29/2021. HISTORY: ORDERING SYSTEM PROVIDED HISTORY: hx of PE, not on anticoagulation, SHOB, hypoxia TECHNOLOGIST PROVIDED HISTORY: Reason for exam:->hx of PE, not on anticoagulation, SHOB, hypoxia Decision Support Exception - unselect if not a suspected or confirmed emergency medical condition->Emergency Medical Condition (MA) What reading provider will be dictating this exam?->CRC FINDINGS: There is borderline cardiomegaly.   The great vessels are normal.  Moderately enlarged mediastinal and hilar lymph nodes are present. There are no filling defects in the main pulmonary artery and the central branches. Small filling defects are identified in the distal subsegmental and peripheral vessels of left lower lobe which could be PE of unknown age. There is a large hiatal hernia. There is atelectasis/beginning infiltrates in the right middle lobe and right lower lobe with minimal atelectasis in the left base. The liver is of decreased attenuation likely fatty infiltrated. Gallbladder is absent. Degenerative changes are identified in the thoracic spine. No central pulmonary embolism or aortic dissection. Small filling defects in the distal subsegmental and peripheral vessels of left lower lobe which could be small subsegmental pulmonary embolism of unknown age. Atelectasis/beginning infiltrates in the right middle lobe and right lower lobe. Developing pneumonia is considered. HOSPITAL COURSE:   Tereza Pop was initially admitted June 6 due to altered mentation secondary to underlying pneumonia and respiratory failure. She has steadily declined per family since being diagnosed with the COVID-19 infection in the recent past.  Despite aggressive medical treatment and the combined efforts of a multidisciplinary healthcare team, the lowest did not improve substantially. Her mentation did clear somewhat and extensive discussion was held regarding the aggressiveness of care and goals of care moving forward. Both she and her family made the joint decision to avoid medically aggressive treatment and opted for transition to hospice. We have discussed pleasure feedings with appropriate position for home as well as modified consistencies to prevent aspiration as best as possible. Morphine and Ativan solutions as well as scopolamine patch have been provided for symptomatic treatment.   The patient's routine medications have been discontinued due to the increased risk for aspiration and she is transition home with family under the AZA:[id number]    Printed on:06/11/21 0839   Medication Information                      acetaminophen (TYLENOL) 325 MG tablet  Take 650 mg by mouth nightly              bisacodyl (DULCOLAX) 10 MG suppository  Place 10 mg rectally daily as needed for Constipation             diclofenac sodium (VOLTAREN) 1 % GEL  Apply 2 g topically 4 times daily as needed for Pain (apply to hands)             docusate sodium (COLACE, DULCOLAX) 100 MG CAPS  Take 200 mg by mouth 2 times daily             ipratropium-albuterol (DUONEB) 0.5-2.5 (3) MG/3ML SOLN nebulizer solution  Take 3 mLs by nebulization every 4 hours as needed for Shortness of Breath             lansoprazole (PREVACID) 30 MG delayed release capsule  Take 1 capsule by mouth daily             LORazepam (ATIVAN) 2 MG/ML concentrated solution  Take 0.5 mLs by mouth every 6 hours as needed (anxiety) for up to 7 days. mineral oil-hydrophilic petrolatum (HYDROPHOR) ointment  Apply topically daily Apply to both feet             Morphine Sulfate (MORPHINE 20MG/ML) 10 MG/0.5ML SOLN conventrated solution  Place 0.1 mLs under the tongue every 4 hours as needed for Pain (air hunger) for up to 7 days. ondansetron (ZOFRAN-ODT) 4 MG disintegrating tablet  Take 4 mg by mouth every 12 hours as needed for Nausea or Vomiting             scopolamine (TRANSDERM-SCOP, 1.5 MG,) transdermal patch  Place 1 patch onto the skin every 72 hours for 9 days                 FOLLOW UP/INSTRUCTIONS:  · This patient is instructed to follow-up with her primary care physician. · Patient is instructed to follow-up with the consults listed above as directed by them. · she is instructed to resume home medications and take new medications as indicated in the list above. · If the patient has a recurrence of symptoms, she is instructed to go to the ED.     Preparing for this patient's discharge, including paperwork, orders, instructions, and meeting with patient did require > 40

## 2021-06-14 NOTE — CARE COORDINATION
Priyanka [facility] Transitions Initial Follow Up Call    Call within 2 business days of discharge: Yes    Patient: Elizabeth Diaz Patient : 1933   MRN: [id number]  Reason for Admission: HCAP  Discharge Date: 21 RARS: Readmission Risk Score: 44      Last Discharge [facility]       Complaint Diagnosis Description Type Department Provider    21 Altered Mental Status Acute respiratory failure with hypoxia ([facility] [address] . .. ED to Hosp-Admission (Discharged) (ADMITTED) [facility]S Margie Prather [address], DO; Chase Aldana. .. CTN spoke with Veda Hines at [address] [place] [address] who confirms patient is active with Hospice services at home upon hospital discharge on 21.      Ruddy Brown, APRN

## 2021-06-15 NOTE — PROGRESS NOTES
Physician Progress Note      Nakia Ballesteros  [facility] #:                  [id number]  :                       1933  ADMIT DATE:       2021 9:35 AM  DISCH DATE:        2021 3:35 PM  RESPONDING  PROVIDER #:        Vidal FORDE DO          QUERY TEXT:    Pt admitted with Altered Mental Status and has encephalopathy documented. If   possible, please document in progress notes and discharge summary further   specificity regarding the type of encephalopathy:    The medical record reflects the following:  Risk Factors: aspiration pneumonia  Clinical Indicators:H&P: \"More recently she was confused according to family. On exam patient does respond to questions. \"  Treatment: CT of the head, neuro checks and assessments, treat underlying   cause. Thank you,  Myles Pratt RN, BSN, CCDS  [phone]  Options provided:  -- Metabolic encephalopathy  -- Septic encephalopathy  -- Toxic encephalopathy  -- Other - I will add my own diagnosis  -- Disagree - Not applicable / Not valid  -- Disagree - Clinically unable to determine / Unknown  -- Refer to Clinical Documentation Reviewer    PROVIDER RESPONSE TEXT:    This patient has metabolic encephalopathy. Query created by: Floridalma Moreno on 2021 2:51 PM      QUERY TEXT:    Patient admitted with Altered Mental Status . If possible, please document in   progress notes and discharge summary if you are evaluating and /or treating   any of the following:     The medical record reflects the following:  Risk Factors: dysphagia, multiple wounds, pneumonia  Clinical Indicators: Dietitian Eval:Severe malnutrition  Treatment: Dietitian eval and monitoring, NPO, Tube feedings    Thank you,  Myles Pratt RN, BSN, CCDS  [phone]  Options provided:  -- Protein calorie malnutrition severe  -- Protein calorie malnutrition moderate  -- Other - I will add my own diagnosis  -- Disagree - Not applicable / Not valid  -- Disagree - Clinically unable to

## 2022-11-17 NOTE — PROGRESS NOTES
Subjective:  Feeling better   No CP or SOB  No fever or chills   No uncontrolled pain  No vomiting or diarrhea     Objective:    /60   Pulse 67   Temp 96.2 °F (35.7 °C) (Temporal)   Resp 20   Ht 5' (1.524 m)   Wt 178 lb 2 oz (80.8 kg)   LMP  (LMP Unknown)   SpO2 92%   BMI 34.79 kg/m²     24HR INTAKE/OUTPUT:      Intake/Output Summary (Last 24 hours) at 11/18/2020 0836  Last data filed at 11/18/2020 0534  Gross per 24 hour   Intake 420 ml   Output 1150 ml   Net -730 ml         General appearance: NAD, conversant  Neck: FROM, supple   Lungs: Clear bilaterally no wheezes, no rhonchi, no crackles  CV: RRR, no MRGs; normal carotid upstroke and amplitude without Bruits  Abdomen: Soft, non-tender; no masses or HSM  Extremities: +edema, no cyanosis, no clubbing  Skin: Intact no rash, no lesions, no ulcers    Psych: Alert and oriented normal affect  Neuro: Nonfocal  Most Recent Labs  Lab Results   Component Value Date    WBC 3.2 (L) 11/17/2020    HGB 10.3 (L) 11/17/2020    HCT 31.5 (L) 11/17/2020     (L) 11/17/2020     11/17/2020    K 4.1 11/17/2020     11/17/2020    CREATININE 1.4 (H) 11/17/2020    BUN 29 (H) 11/17/2020    CO2 25 11/17/2020    GLUCOSE 162 (H) 11/17/2020    ALT 13 10/05/2020    AST 20 10/05/2020    INR 1.2 01/09/2020    TSH 4.220 (H) 09/16/2020    LABA1C 6.8 (H) 11/17/2020    LABMICR 21.2 (H) 01/23/2018     No results for input(s): MG in the last 72 hours. Lab Results   Component Value Date    CALCIUM 9.0 11/17/2020        XR CHEST PORTABLE   Final Result   Subtle opacities in the left mid and lower lung field that could represent   pneumonia.              Assessment    Principal Problem:    Acute respiratory failure with hypoxia (HCC)  Active Problems:    GERD (gastroesophageal reflux disease)    Type 2 diabetes mellitus with hyperglycemia, with long-term current use of insulin (HCC)    CKD (chronic kidney disease) stage 3, GFR 30-59 ml/min (HCC)    Bacteriuria with pyuria Essential hypertension    History of pulmonary embolus (PE)    Chest pain    Obesity (BMI 30-39. 9)    COVID-19    Hyperkalemia    Anxiety and depression  Resolved Problems:    * No resolved hospital problems.  *      Plan:  63-year-old female history of diabetes, hypertension, frequent UTIs, obesity admitted with COVID-19 pneumonia     Apixaban  Asa statin  Troponin mildly elevated possible type I versus type II related to hypoxia and CKD  Cardiology consult appreciated--echo, possible stress test to stratify in future  Nuclear stress 10/2019 with decreased EF and fixed defect  SARS-CoV-2 PCR +   Isolation--droplet plus  Supplemental O2 wean as tolerated 97% on RA  MDIs, no nebulizers  Avoid NIPPV  Remdesivir  Decadron  Pepcid  Melatonin  On Rocephin for possible UTI--discontinued  Pulmonology consult appreciated  ID consult appreciated  Monitor inflammatory markers  Medications for other co morbidities cont as appropriate w dosage adjustments as necessary  PT/OT  DVT PPx  DC planning    Electronically signed by Adenike Anderson MD on 11/18/2020 at 8:36 AM [employer] Counseling:  I discussed with the patient the risks of mycophenolate mofetil including but not limited to infection/immunosuppression, GI upset, hypokalemia, hypercholesterolemia, bone marrow suppression, lymphoproliferative disorders, malignancy, GI ulceration/bleed/perforation, colitis, interstitial lung disease, kidney failure, progressive multifocal leukoencephalopathy, and birth defects.  The patient understands that monitoring is required including a baseline creatinine and regular CBC testing. In addition, patient must alert us immediately if symptoms of infection or other concerning signs are noted.

## 2023-01-01 NOTE — PROGRESS NOTES
[id number]  [facility]         Date:6/10/2021                                                   Patient Name: Pedro Luis Gauthier     MRN: [id number]     : 1933     Room: [address] [place] [address]       Evaluating OT: Mony Andrews, OTR/L; [id number]       Referring Provider:Sarah Pedraza DO       Specific Provider Orders/Date:OT eval and treat Start: 21, End: 21, ONE TIME, Standing Count: 1 Occurrences, R          Diagnosis:  HCAP  1. Acute respiratory failure with hypoxia (HCC)    2. Dehydration    3. Stage 3b chronic kidney disease ([facility]    4. Pressure injury of sacral region, stage 4 ([facility]    5.  Pneumonia of both lower lobes due to infectious organism              Pertinent Medical History:        Past Medical History:   Diagnosis Date    Anxiety     Depression     Diabetes mellitus ([facility]     Frequent urinary tract infections     GERD (gastroesophageal reflux disease)     Hypertension     Peripheral neuropathy     PVD (peripheral vascular disease) ([facility] 3/30/2021    Spinal stenosis           Past Surgical History:   Procedure Laterality Date    CHOLECYSTECTOMY      HIP FRACTURE SURGERY Bilateral     SKIN CANCER EXCISION         Precautions:  Fall Risk, contact/droplet for rhino, 2L, VRE    Recommended placement: subacute vs hospice with 24hour care, physical assistance and therapy    Assessment of current deficits     [x] Functional mobility  [x]ADLs  [x] Strength               [x]Cognition     [x] Functional transfers   [x] IADLs         [x] Safety Awareness   [x]Endurance     [x] Fine Coordination              [x] Balance      [] Vision/perception   [x]Sensation      [x]Gross Motor Coordination  [x] ROM  [x] Delirium                   [] Motor Control     OT PLAN OF CARE   OT POC based on physician orders, patient diagnosis and results of clinical assessment Frequency/Duration1-2x per 2 weeks. Interventions to include:   * Instruction/training on adapted ADL techniques and AE recommendations to increase functional independence within        precautions  * Training on energy conservation strategies, correct breathing pattern and techniques to improve independence/tolerance for self-care routine  * Functional transfer/mobility training/DME recommendations for increased independence, safety, and fall prevention  * Patient/Family education to increase follow through with safety techniques and functional independence  * Recommendation of environmental modifications for increased safety with functional transfers/mobility and ADLs  * Cognitive retraining/development of therapeutic activities to improve problem solving, judgement, memory, and attention for increased safety/participation in ADL/IADL tasks  * Sensory re-education to improve body/limb awareness, maintain/improve skin integrity, and improve hand/UE motor function  * Splinting/positioning for increased function, prevention of contractures, and improve skin integrity  * Therapeutic exercise to improve motor endurance, ROM, and functional strength for ADLs/functional transfers  * Therapeutic activities to facilitate gross/fine motor skills for increased independence with ADLs  * Positioning to improve skin integrity, interaction with environment and functional independence  * Manual techniques for edema management     Recommended Adaptive Equipment: none     Home Living: Pt lives at home with family and can have 24/7 assistance. Bathroom setup: recommending all ADLs at supine level    DME owned: hospital bed, trent, chair lift, 3:1     Prior Level of Function: dep with ADLs , dep with IADLs   Driving: none   Occupation: no    Pain Level: \"hurts\" in stomach; nursing present and aware.  Pt reports that this is a common complaint  Cognition: A&O: 1/4; Follows 1 step directions with verbal and tactile prompting [FreeTextEntry1] : Rectal stim with thermometer and lube. \par Pt. passed a lot of gas then started to pass soft, transitional stool.\par Advising to keep feeding on demand, monitor for stomach distention, pain, vomiting.\par Excellent weight gain. If not stooling regularly return 3/27 for exam, otherwise can push off weight check until 3/29.  Memory:  Impaired   Sequencing:  Impaired   Problem solving:  Impaired   Judgement/safety: Impaired     Functional Assessment:  AM-PAC Daily Activity Raw Score: 6/24   Initial Eval Status  Date: 6/10/21 Treatment Status  Date: STGs = LTGs  Time frame: 10-14 days   Feeding Dependent   Max A   Grooming Dependent   Maximal Assist    UB Dressing Dependent   Maximal Assist    LB Dressing Dependent   PLOF   Bathing Dependent  Maximal Assist    Toileting Dependent   PLOF   Bed Mobility  NT for safety  Not recommended   Functional Transfers NT for safety  Not recommended   Functional Mobility NT for safety  Not recommended   Balance NT for safety  Not recommended   Activity Tolerance poor  fair   Visual/  Perceptual Glasses: Present-bifocals;difficult to assess due to fatigue. NA   Gallo UE ROM  No active ROM; <25% PROM in all planes  Full PROM with positioning interventions for prevention of hand contractures     Hand Dominance right   AROM (PROM) Strength Additional Info:    RUE  Max limitaions trace Poor  and FMC/dexterity noted during ADL tasks       LUE Max limitaions trace Poor  and FMC/dexterity noted during ADL tasks         Hearing: WFL   Sensation:  No c/o numbness or tingling   Tone: hypotonic  Edema: gallo LE and hands    Comments: Upon arrival patient was sleeping in bed. Nursing was hesitant to approve OT evaluation, but after speaking to family, it was determined that they would like OT interventions for grooming, feeding, positioning and UE management. At end of session, patient remained in bed with daughters present and call light and phone within reach, all lines and tubes intact. Overall patient demonstrated max decreased independence and safety during completion of ADL/functional transfer/mobility tasks. Pt required dep A for most UB ADLs and dep A LB ADLs tasks. Not safe to complete EOB or transfers.  The biggest barriers reflect that of functional transfers, functional mobility, UB/LB ADLs,

## 2023-01-05 NOTE — PROGRESS NOTES
How Severe Are Your Spot(S)?: mild INPATIENT CARDIOLOGY FOLLOW-UP    Name: Chase Dubose    Age: 80 y.o. Date of Admission: 5/25/2021  2:01 PM    Date of Service: 5/27/2021    Chief Complaint: Follow-up for CHF and elevated troponin    Interim History:  No new overnight cardiac complaints and she is feeling better today cough and breathing are improving. Currently with no complaints of CP,palpitations, dizziness, or lightheadedness. SR on telemetry. Review of Systems:   Cardiac: As per HPI  General: No fever, chills  Pulmonary: As per HPI  HEENT: No visual disturbances, difficult swallowing  GI: No nausea, vomiting  Endocrine: No thyroid disease or DM  Musculoskeletal: CACERES x 4, no focal motor deficits  Skin: Intact, no rashes  Neuro/Psych: No headache or seizures    Problem List:  Patient Active Problem List   Diagnosis    GERD (gastroesophageal reflux disease)    Type 2 diabetes mellitus with hyperglycemia, with long-term current use of insulin (HCC)    CKD (chronic kidney disease) stage 3, GFR 30-59 ml/min ([facility])    Essential hypertension    History of pulmonary embolus (PE)    Acute kidney injury superimposed on CKD ([address]    Acute on chronic diastolic congestive heart failure (HCC)    Obesity (BMI 30-39. 9)    COVID-19    Anxiety and depression    Pneumonia due to COVID-19 virus    Uncontrolled type 2 diabetes mellitus with hyperglycemia (HCC)    Sacral wound    Pressure injury of sacral region, stage 3 (HCC)    Pressure ulcer of right heel, stage 2 (HCC)    Cellulitis    Controlled type 2 diabetes mellitus without complication (HCC)    Sepsis (HCC)    Acute metabolic encephalopathy    Moderate protein-calorie malnutrition (HCC)    PVD (peripheral vascular disease) (HCC)    Positive blood culture    Altered mental status    Fluid overload    Anemia of chronic disease    Paroxysmal atrial fibrillation (HCC)    Cardiomyopathy ([address]    Nonrheumatic aortic valve insufficiency    EKG, abnormal    Pleural effusion on What Is The Reason For Today's Visit?: Skin Lesions right    Morbidly obese ([facility] [facility]    Acute respiratory failure with hypoxia (HCC)    Pneumonia    Pulmonary embolism (HCC)    CHF (congestive heart failure) (HCC)       Allergies:  No Known Allergies    Current Medications:  Current Facility-Administered Medications   Medication Dose Route Frequency Provider Last Rate Last Admin    acetylcysteine (MUCOMYST) 20 % solution 600 mg  600 mg Inhalation TID Cindy Diaz MD   600 mg at 05/27/21 1931    [START ON 5/28/2021] vancomycin (VANCOCIN) 750 mg in dextrose 5 % 250 mL IVPB  750 mg Intravenous Q24H Cindy Diaz MD        white petrolatum ointment   Topical Daily April Brayan APRN - CNP   Given at 05/27/21 0956    bumetanide (BUMEX) tablet 1 mg  1 mg Oral Daily Geoffrey Garcia APRN - CNP   1 mg at 05/27/21 1007    sodium chloride flush 0.9 % injection 10 mL  10 mL Intravenous PRN Kiran A Kutlick, DO   10 mL at 64/77/08 1644    0.9 % sodium chloride infusion  25 mL Intravenous PRN Kiran A Kutlick, DO        sodium chloride flush 0.9 % injection 5-40 mL  5-40 mL Intravenous 2 times per day April Smith-Temo, APRN - CNP   10 mL at 05/27/21 1006    sodium chloride flush 0.9 % injection 5-40 mL  5-40 mL Intravenous PRN April Chapo-Temo, APRN - CNP   10 mL at 05/27/21 1915    0.9 % sodium chloride infusion  25 mL Intravenous PRN April Chapo-Temo, APRN - CNP        enoxaparin (LOVENOX) injection 70 mg  1 mg/kg Subcutaneous BID April Chapo-Temo, APRN - CNP   70 mg at 05/27/21 1006    promethazine (PHENERGAN) tablet 12.5 mg  12.5 mg Oral Q6H PRN April Chapo-[place], APRN - CNP        Or    ondansetron [facility]) injection 4 mg  4 mg Intravenous Q6H PRN April Chapo-[place], APRN - CNP        polyethylene glycol (GLYCOLAX) packet 17 g  17 g Oral Daily PRN April Chapo-[place], APRN - CNP        acetaminophen (TYLENOL) tablet 650 mg  650 mg Oral Q6H PRN April KRISTIE Schmidt CNP   650 mg at 05/26/21 0515    Or    acetaminophen (TYLENOL) suppository 650 mg  650 mg Rectal Q6H PRN April Storey-Livermore, APRN - CNP        cefTRIAXone (ROCEPHIN) 1,000 mg in sterile water 10 mL IV syringe  1,000 mg Intravenous Q24H April Storey-Temo, APRN - CNP   1,000 mg at 05/27/21 1915    ipratropium-albuterol (DUONEB) nebulizer solution 1 ampule  1 ampule Inhalation Q6H WA April Brayan, APRN - CNP   1 ampule at 05/27/21 1930    aspirin chewable tablet 81 mg  81 mg Oral Daily April Storey-Liver[place], APRN - CNP   81 mg at 05/27/21 1007    atorvastatin (LIPITOR) tablet 40 mg  40 mg Oral Nightly April Storey-[place]iver[place], APRN - CNP   40 mg at 05/26/21 2139    bisacodyl (DULCOLAX) suppository 10 mg  10 mg Rectal Daily PRN April Storey-Cornelius, APRN - CNP   10 mg at 05/27/21 1004    calcium-vitamin D (OSCAL-500) 500-200 MG-UNIT per tablet 1 tablet  1 tablet Oral BID April Storey-Livermore, APRN - CNP   1 tablet at 05/27/21 1007    carvedilol (COREG) tablet 12.5 mg  12.5 mg Oral BID  April Storey-Livermore, APRN - CNP   12.5 mg at 05/27/21 1007    diclofenac sodium (VOLTAREN) 1 % gel 2 g  2 g Topical Q6H PRN April Storey-Temo, APRN - CNP        docusate sodium (COLACE) capsule 200 mg  200 mg Oral BID April Storey-Temo, APRN - CNP   200 mg at 05/27/21 1006    fluticasone (FLONASE) 50 MCG/ACT nasal spray 1 spray  1 spray Nasal Daily PRN April Brayan, APRN - CNP   1 spray at 05/27/21 1010    gabapentin (NEURONTIN) capsule 300 mg  300 mg Oral TID April Storey-Temo, APRN - CNP   300 mg at 05/27/21 1526    isosorbide mononitrate (IMDUR) extended release tablet 30 mg  30 mg Oral Daily April Storey-KRISTIE Plascencia CNP   30 mg at 05/27/21 1007    montelukast (SINGULAIR) tablet 10 mg  10 mg Oral Nightly April KRISTIE Schmidt CNP   10 mg at 05/26/21 2139    nitroGLYCERIN (NITROSTAT) SL tablet 0.4 mg  0.4 mg Sublingual Q5 Min PRN April KRISTIE Schmidt CNP        sertraline (ZOLOFT) tablet 150 mg  150 mg Oral Daily April DavideliusKRISTIE CNP   150 mg at 05/27/21 1008    miconazole (MICOTIN) 2 % powder   Topical BID April KRISTIE Schmidt CNP   Given at 05/27/21 1009    insulin lispro (HUMALOG) injection vial 0-6 Units  0-6 Units Subcutaneous TID [facility] April ChapoP[place]unctionKRISTIE garcia - CNP   3 Units at 05/27/21 1647    insulin lispro (HUMALOG) injection vial 0-3 Units  0-3 Units Subcutaneous Nightly April [place] Grande[place]RISTIE garcia - CNP   1 Units at 05/26/21 2203    glucose (GLUTOSE) 40 % oral gel 15 g  15 g Oral PRN April KRISTIE Schmidt CNP        dextrose 50 % IV solution  12.5 g Intravenous PRN April KRISTIE Schmidt CNP        glucagon (rDNA) injection 1 mg  1 mg Intramuscular PRN April KRISTIE Schmidt CNP        dextrose 5 % solution  100 mL/hr Intravenous PRN April KRISTIE Schmidt CNP        pantoprazole (PROTONIX) tablet 40 mg  40 mg Oral [facility] April ChapoJossueTemoKRISTIE garcia - CNP   40 mg at 05/27/21 7429    guaiFENesin tablet 400 mg  400 mg Oral TID April BrayanKRISTIE - CNP   400 mg at 05/27/21 1526      sodium chloride      sodium chloride      dextrose         Physical Exam:  BP (!) 94/47   Pulse 79   Temp 98.5 °F (36.9 °C) (Oral)   Resp 18   Ht 5' (1.524 m)   Wt 148 lb 13 oz (67.5 kg)   LMP  (LMP Unknown)   SpO2 92%   BMI 29.06 kg/m²   Wt Readings from Last 3 Encounters:   05/27/21 148 lb 13 oz (67.5 kg)   05/12/21 158 lb (71.7 kg)   04/28/21 158 lb (71.7 kg)     Appearance: Awake, alert, no acute respiratory distress  Skin: Intact, no rash  Head: Normocephalic, atraumatic  Eyes: EOMI, no conjunctival erythema  ENMT: No pharyngeal erythema, MMM, no rhinorrhea  Neck: Supple, no elevated JVP, no carotid bruits  Lungs: Clear to auscultation bilaterally. No wheezes, rales, or rhonchi.   Cardiac: Regular rate and rhythm, +S1S2, no murmurs apparent  Abdomen: Soft, nontender, +bowel sounds  Extremities: Moves all extremities x 4, no lower extremity edema  Neurologic: No focal motor deficits apparent, normal mood and affect  Peripheral Pulses: Intact posterior tibial pulses bilaterally    Intake/Output:    Intake/Output Summary (Last 24 hours) at 5/27/2021 1953  Last data filed at 5/27/2021 1746  Gross per 24 hour   Intake 670 ml   Output 1325 ml   Net -655 ml     I/O this shift:  In: -   Out: 275 [Urine:275]    Laboratory Tests:  Recent Labs     05/25/21  1124 05/26/21  0554 05/27/21  0540    142 145   K 4.3 3.7 3.6    103 103   CO2 27 28 34*   BUN 30* 34* 36*   CREATININE 1.1* 1.0 1.1*   GLUCOSE 222* 135* 91   CALCIUM 9.3 9.2 9.4     Lab Results   Component Value Date    MG 1.7 04/06/2021     Recent Labs     05/25/21  1124   ALKPHOS 75   ALT 10   AST 20   PROT 7.2   BILITOT 0.3   LABALBU 2.9*     Recent Labs     05/25/21  1124 05/26/21  0554 05/27/21  0540   WBC 8.4 8.2 7.5   RBC 3.75 3.58 3.64   HGB 10.4* 9.8* 10.1*   HCT 34.8 33.3* 33.9*   MCV 92.8 93.0 93.1   MCH 27.7 27.4 27.7   MCHC 29.9* 29.4* 29.8*   RDW 17.9* 17.9* 18.0*    253 263   MPV 9.1 9.2 9.2     Lab Results   Component Value Date    CKTOTAL 41 11/28/2020    CKMB 4.5 (H) 11/17/2020    TROPONINI 0.03 04/06/2021    TROPONINI 0.05 (H) 04/02/2021    TROPONINI 0.04 (H) 04/02/2021     Lab Results   Component Value Date    INR 1.2 01/09/2020    INR 1.2 11/22/2019    INR 1.0 10/21/2019    PROTIME 13.9 (H) 01/09/2020    PROTIME 13.5 (H) 11/22/2019    PROTIME 11.4 10/21/2019     Lab Results   Component Value Date    TSH 5.700 (H) 04/07/2021     Lab Results   Component Value Date    LABA1C 7.0 (H) 04/22/2021     No results found for: EAG  Lab Results   Component Value Date    CHOL 83 11/17/2020    CHOL 104 09/16/2020    CHOL 181 10/21/2019     Lab Results   Component Value Date    TRIG 87 11/17/2020    TRIG 170 (H) 09/16/2020    TRIG 197 (H) 10/21/2019     Lab Results   Component Value Date    HDL 38 11/17/2020    HDL 37 09/16/2020    HDL 46 11/23/2019     Lab Results   Component Value Date    LDLCALC 28 11/17/2020    LDLCALC 33 09/16/2020    LDLCALC 123 (H) 11/23/2019     Lab Results   Component Value Date    LABVLDL 17 11/17/2020    LABVLDL 34 09/16/2020    LABVLDL 50 11/23/2019     No results found for: CHOLHDLRATIO    Cardiac Tests:  Telemetry findings reviewed: SR at rate 70s, no new tachy/bradyarrhythmias overnight     EKG: Normal sinus rhythm, left axis deviation, LVH, lateral wall MI age undetermined, abnormal EKG.    Vitals and labs were reviewed: As above.     1. TTE 10/21/2019: EF 45-50%, moderate LVH, stage 1 DD, mild AR, mild AS. 2. Lexsican MPS 10/23/2019: LVEF 40%, no reversible perfusion defect.  Fixed perfusion defect was noted extending from the apex into the anteroseptal and inferolateral walls.  Global hypokinesis with severe hypokinesis of the septum was noted  3. TTE: 1/6/2020 (Dr. Machuca):  Technically difficult examination due to body habitus and unable to position. Normal left ventricle size and systolic function. Ejection fraction is visually estimated at 60-65%. Normal left ventricle wall thickness. No regional wall motion abnormalities seen. Indeterminate diastolic function. Normal right ventricular size and function. TAPSE 21 mm. No hemodynamically significant aortic stenosis is present. Moderate aortic regurgitation is noted. Unable to estimate PA systolic pressure. No evidence for hemodynamically significant pericardial effusion. Compared to prior echo of 10/22/2019, changes noted. LVEF has improved from 45-50% to 60-65%. 4. TTE 03/31/2021 (Sven): Left ventricle size is normal.  Ejection fraction is visually estimated at 45-50%.  Overall ejection fraction mildly decreased .  No regional wall motion abnormalities seen.  Mild concentric left ventricular hypertrophy.  Stage II diastolic dysfunction.  The left atrium is mildly dilated.   Normal right ventricular size and function.  TAPSE 19 mm.  Physiologic and/or trace mitral regurgitation is present.  No hemodynamically significant aortic stenosis is present.  Mild aortic regurgitation is noted.  The tricuspid valve was not well visualized.  No evidence for hemodynamically significant pericardial effusion.      Assessment:  · Chronic HFrEF, reasonably euvolemic does not appear to be volume overloaded>>> euvolemic  · Troponin leak without any chest pain most likely from demand ischemia secondary to underlying pneumonia. · CAD history of NSTEMI on medical management only. Lexiscan stress test-10/2020 no reversible ischemia  · Acute pneumonia  · History of paroxysmal atrial fibrillation currently in sinus rhythm  · History of pulmonary embolism on Eliquis  · Hypertension controlled>> blood pressures are soft  · Type 2 diabetes  · GERD  · Anxiety/depression  · History of recent COVID-19 pneumonia  · Sacral decubitus ulcer. · CKD stage III creatinine stable at 1.1           Plan:   · She does not appear to be volume overloaded, I would recommend keeping her on oral diuretic therapy. We will change Bumex to 1 mg p.o. daily  · No ischemic work-up is planned at this time. · Monitor H&H and continue anticoagulation therapy and consider changing to Eliquis 5 mg p.o. twice daily  · IV antibiotics as per primary service  · She is otherwise stable from a cardiology standpoint. Debora Johnson MD., Mei Robles.   [facility]) Cardiology
